# Patient Record
Sex: MALE | Race: WHITE
[De-identification: names, ages, dates, MRNs, and addresses within clinical notes are randomized per-mention and may not be internally consistent; named-entity substitution may affect disease eponyms.]

---

## 2021-02-11 ENCOUNTER — HOSPITAL ENCOUNTER (OUTPATIENT)
Dept: HOSPITAL 53 - M LAB | Age: 51
End: 2021-02-11
Attending: PHYSICIAN ASSISTANT
Payer: MEDICARE

## 2021-02-11 DIAGNOSIS — R21: Primary | ICD-10-CM

## 2021-02-11 PROCEDURE — 86617 LYME DISEASE ANTIBODY: CPT

## 2021-02-11 PROCEDURE — 36415 COLL VENOUS BLD VENIPUNCTURE: CPT

## 2021-02-12 LAB
B BURGDOR IGG+IGM SER-ACNC: <0.91 ISR (ref 0–0.9)
B BURGDOR IGM SER IA-ACNC: <0.8 INDEX (ref 0–0.79)

## 2021-02-16 ENCOUNTER — HOSPITAL ENCOUNTER (OUTPATIENT)
Dept: HOSPITAL 53 - M LAB | Age: 51
End: 2021-02-16
Attending: PHYSICIAN ASSISTANT
Payer: MEDICARE

## 2021-02-16 DIAGNOSIS — A69.20: Primary | ICD-10-CM

## 2021-05-13 ENCOUNTER — HOSPITAL ENCOUNTER (OUTPATIENT)
Dept: HOSPITAL 53 - M PLALAB | Age: 51
End: 2021-05-13
Attending: STUDENT IN AN ORGANIZED HEALTH CARE EDUCATION/TRAINING PROGRAM
Payer: MEDICARE

## 2021-05-13 DIAGNOSIS — Z79.899: ICD-10-CM

## 2021-05-13 DIAGNOSIS — Z00.00: Primary | ICD-10-CM

## 2021-05-13 LAB
ALBUMIN SERPL BCG-MCNC: 4.1 GM/DL (ref 3.2–5.2)
ALT SERPL W P-5'-P-CCNC: 20 U/L (ref 12–78)
BASOPHILS # BLD AUTO: 0.1 10^3/UL (ref 0–0.2)
BASOPHILS NFR BLD AUTO: 1.1 % (ref 0–1)
BILIRUB SERPL-MCNC: 0.6 MG/DL (ref 0.2–1)
BUN SERPL-MCNC: 15 MG/DL (ref 7–18)
CALCIUM SERPL-MCNC: 9.8 MG/DL (ref 8.5–10.1)
CHLORIDE SERPL-SCNC: 106 MEQ/L (ref 98–107)
CHOLEST SERPL-MCNC: 209 MG/DL (ref ?–200)
CHOLEST/HDLC SERPL: 7.74 {RATIO} (ref ?–5)
CO2 SERPL-SCNC: 30 MEQ/L (ref 21–32)
CREAT SERPL-MCNC: 0.76 MG/DL (ref 0.7–1.3)
CREAT UR-MCNC: 63 MG/DL
EOSINOPHIL # BLD AUTO: 0.4 10^3/UL (ref 0–0.5)
EOSINOPHIL NFR BLD AUTO: 4.9 % (ref 0–3)
EST. AVERAGE GLUCOSE BLD GHB EST-MCNC: 114 MG/DL (ref 60–110)
GFR SERPL CREATININE-BSD FRML MDRD: > 60 ML/MIN/{1.73_M2} (ref 56–?)
GLUCOSE SERPL-MCNC: 110 MG/DL (ref 70–100)
HCT VFR BLD AUTO: 46.2 % (ref 42–52)
HDLC SERPL-MCNC: 27 MG/DL (ref 40–?)
HGB BLD-MCNC: 14.2 G/DL (ref 13.5–17.5)
LDLC SERPL CALC-MCNC: 121 MG/DL (ref ?–100)
LYMPHOCYTES # BLD AUTO: 2.5 10^3/UL (ref 1.5–5)
LYMPHOCYTES NFR BLD AUTO: 31.2 % (ref 24–44)
MCH RBC QN AUTO: 24.5 PG (ref 27–33)
MCHC RBC AUTO-ENTMCNC: 30.7 G/DL (ref 32–36.5)
MCV RBC AUTO: 79.8 FL (ref 80–96)
MICROALBUMIN UR-MCNC: 6.2 MG/L
MICROALBUMIN/CREAT UR: 9.8 MCG/MG (ref 0–30)
MONOCYTES # BLD AUTO: 0.7 10^3/UL (ref 0–0.8)
MONOCYTES NFR BLD AUTO: 8.6 % (ref 2–8)
NEUTROPHILS # BLD AUTO: 4.3 10^3/UL (ref 1.5–8.5)
NEUTROPHILS NFR BLD AUTO: 53.9 % (ref 36–66)
NONHDLC SERPL-MCNC: 182 MG/DL
PLATELET # BLD AUTO: 240 10^3/UL (ref 150–450)
POTASSIUM SERPL-SCNC: 4.9 MEQ/L (ref 3.5–5.1)
PROT SERPL-MCNC: 6.8 GM/DL (ref 6.4–8.2)
RBC # BLD AUTO: 5.79 10^6/UL (ref 4.3–6.1)
SODIUM SERPL-SCNC: 139 MEQ/L (ref 136–145)
T4 FREE SERPL-MCNC: 0.88 NG/DL (ref 0.76–1.46)
TRIGL SERPL-MCNC: 304 MG/DL (ref ?–150)
TSH SERPL DL<=0.005 MIU/L-ACNC: 2.04 UIU/ML (ref 0.36–3.74)
WBC # BLD AUTO: 8 10^3/UL (ref 4–10)

## 2021-06-23 ENCOUNTER — HOSPITAL ENCOUNTER (EMERGENCY)
Dept: HOSPITAL 53 - M ED | Age: 51
Discharge: HOME | End: 2021-06-23
Payer: MEDICAID

## 2021-06-23 VITALS — SYSTOLIC BLOOD PRESSURE: 180 MMHG | DIASTOLIC BLOOD PRESSURE: 112 MMHG

## 2021-06-23 VITALS — BODY MASS INDEX: 36.81 KG/M2 | HEIGHT: 69 IN | WEIGHT: 248.53 LBS

## 2021-06-23 VITALS — DIASTOLIC BLOOD PRESSURE: 105 MMHG | SYSTOLIC BLOOD PRESSURE: 151 MMHG

## 2021-06-23 DIAGNOSIS — Z86.19: ICD-10-CM

## 2021-06-23 DIAGNOSIS — F17.200: ICD-10-CM

## 2021-06-23 DIAGNOSIS — I10: Primary | ICD-10-CM

## 2021-06-23 DIAGNOSIS — J45.909: ICD-10-CM

## 2021-06-23 LAB
ALBUMIN SERPL BCG-MCNC: 4.1 GM/DL (ref 3.2–5.2)
ALT SERPL W P-5'-P-CCNC: 60 U/L (ref 12–78)
BASOPHILS # BLD AUTO: 0.1 10^3/UL (ref 0–0.2)
BASOPHILS NFR BLD AUTO: 1 % (ref 0–1)
BILIRUB CONJ SERPL-MCNC: 0.2 MG/DL (ref 0–0.2)
BILIRUB SERPL-MCNC: 0.6 MG/DL (ref 0.2–1)
EOSINOPHIL # BLD AUTO: 0.4 10^3/UL (ref 0–0.5)
EOSINOPHIL NFR BLD AUTO: 3.1 % (ref 0–3)
HCT VFR BLD AUTO: 48.1 % (ref 42–52)
HGB BLD-MCNC: 15.2 G/DL (ref 13.5–17.5)
LYMPHOCYTES # BLD AUTO: 3 10^3/UL (ref 1.5–5)
LYMPHOCYTES NFR BLD AUTO: 26.3 % (ref 24–44)
MCH RBC QN AUTO: 24.6 PG (ref 27–33)
MCHC RBC AUTO-ENTMCNC: 31.6 G/DL (ref 32–36.5)
MCV RBC AUTO: 77.8 FL (ref 80–96)
MONOCYTES # BLD AUTO: 0.8 10^3/UL (ref 0–0.8)
MONOCYTES NFR BLD AUTO: 6.6 % (ref 2–8)
NEUTROPHILS # BLD AUTO: 7.1 10^3/UL (ref 1.5–8.5)
NEUTROPHILS NFR BLD AUTO: 62.6 % (ref 36–66)
PLATELET # BLD AUTO: 233 10^3/UL (ref 150–450)
PROT SERPL-MCNC: 7.2 GM/DL (ref 6.4–8.2)
RBC # BLD AUTO: 6.18 10^6/UL (ref 4.3–6.1)
WBC # BLD AUTO: 11.4 10^3/UL (ref 4–10)

## 2021-06-23 NOTE — REPVR
PROCEDURE INFORMATION: 

Exam: MR Head Without Contrast 

Exam date and time: 6/23/2021 12:33 PM 

Age: 50 years old 

Clinical indication: Dizziness; Additional info: Dizzy? Cerebellar 



TECHNIQUE: 

Imaging protocol: MR of the head without contrast. 



COMPARISON: 

CT Head without contrast 6/23/2021 8:28 AM 



FINDINGS: 

Brain:  There is no extra-axial collection or intra-axial mass.  Mild diffuse 

volume loss is within the range of normal for patient age.  Normal parenchymal 

signal is preserved.  There is no acute intracranial abnormality.

Cerebral ventricles: Normal. No ventriculomegaly. 

Bones/joints: Unremarkable. 

Paranasal sinuses: Normal as visualized. No acute sinusitis. 

Mastoid air cells: Normal as visualized. No mastoid effusion. 

Orbital cavity: Unremarkable. 

Soft tissues: Unremarkable. 



IMPRESSION: 

No acute findings. 



Electronically signed by: Swapna Hwang On 06/23/2021  13:50:49 PM

## 2021-06-23 NOTE — REP
INDICATION:

dizzy.



COMPARISON:

None.



TECHNIQUE:

Helical scanning is acquired. 5 mm axial images were reformatted.  Coronal MPR images

were generated.



FINDINGS:

Bone window settings demonstrate an intact bony calvarium.  There is no evidence of

skull fracture or incidental bony calvarial lesion.  The visualized paranasal sinuses

appear clear.  No intraorbital abnormality is seen.  On soft tissue window setting

images; the lateral, third, and fourth ventricles are normal in size and position.

Gray-white differentiation pattern is normal above and below the tentorium.  There are

is no evidence of intracranial hemorrhage.  No mass, edema, infarction, or midline

shift is seen.  No extra-axial fluid collection is appreciated.





IMPRESSION:

Negative noncontrast head CT.





<Electronically signed by Mendez Rebollar > 06/23/21 [id number]

## 2021-06-23 NOTE — REPVR
PROCEDURE INFORMATION: 

Exam: MRA Head Without Contrast; Arteriography 

Exam date and time: 6/23/2021 12:33 PM 

Age: 50 years old 

Clinical indication: Dizziness and giddiness; Additional info: Dizzy? 

Cerebellar 



TECHNIQUE: 

Imaging protocol: Magnetic resonance angiography head without contrast. Exam 

focused on the arteries. 



COMPARISON: 

CT Head without contrast 6/23/2021 8:28 AM 



FINDINGS: 



ANTERIOR CIRCULATION: 

Right internal carotid artery: Intracranial segment is patent with no 

significant stenosis. No aneurysm. 

Right middle cerebral artery: No occlusion or significant stenosis. No 

aneurysm. 

Right anterior cerebral artery: No occlusion or significant stenosis. No 

aneurysm. 



Left internal carotid artery: Intracranial segment is patent with no 

significant stenosis. No aneurysm. 

Left middle cerebral artery: No occlusion or significant stenosis. No aneurysm. 

Left anterior cerebral artery: No occlusion or significant stenosis. No 

aneurysm. 



POSTERIOR CIRCULATION: 

Right vertebral artery: No occlusion or significant stenosis. No aneurysm. 

Left vertebral artery: No occlusion or significant stenosis. No aneurysm. 

Basilar artery: No occlusion or significant stenosis. No aneurysm. 

Right posterior cerebral artery: No occlusion or significant stenosis. No 

aneurysm. 

Left posterior cerebral artery: No occlusion or significant stenosis. No 

aneurysm. 



IMPRESSION: 

No stenosis or occlusion. 



Electronically signed by: Swapna Hwang On 06/23/2021  13:52:30 PM

## 2021-06-24 NOTE — ECGEPIP
[facility] - ED

                                       

                                       Test Date:    2021

Pat Name:     LIANE WILLIAMSON               Department:   

Patient ID:   [id number]                 Room:         -

Gender:       Male                     Technician:   OMI GRANGER

:          1970               Requested By: Grace Connors 

Order Number: [id number]     Reading MD:   Jeffry Hurtado

                                 Measurements

Intervals                              Axis          

Rate:         72                       P:            

VA:           140                      QRS:          208

QRSD:         82                       T:            80

QT:           390                                    

QTc:          427                                    

                           Interpretive Statements

Normal sinus rhythm

Right axis deviation

NSTTW ABNORMALITY(S)

NO PRIORS FOR COMPARISON

Electronically Signed on 2021 7:12:13 EDT by Jeffry Hurtado

## 2021-08-28 ENCOUNTER — HOSPITAL ENCOUNTER (OUTPATIENT)
Dept: HOSPITAL 53 - M LABSMTC | Age: 51
End: 2021-08-28
Attending: ANESTHESIOLOGY
Payer: MEDICARE

## 2021-08-28 DIAGNOSIS — Z20.828: Primary | ICD-10-CM

## 2021-08-28 DIAGNOSIS — Z11.59: ICD-10-CM

## 2021-11-18 ENCOUNTER — HOSPITAL ENCOUNTER (OUTPATIENT)
Dept: HOSPITAL 53 - M LABSMTC | Age: 51
End: 2021-11-18
Attending: ANESTHESIOLOGY
Payer: MEDICARE

## 2021-11-18 DIAGNOSIS — Z20.822: ICD-10-CM

## 2021-11-18 DIAGNOSIS — Z01.812: Primary | ICD-10-CM

## 2021-11-22 ENCOUNTER — HOSPITAL ENCOUNTER (OUTPATIENT)
Dept: HOSPITAL 53 - M OPP | Age: 51
Discharge: HOME | End: 2021-11-22
Attending: INTERNAL MEDICINE
Payer: MEDICARE

## 2021-11-22 VITALS — WEIGHT: 240 LBS | BODY MASS INDEX: 35.55 KG/M2 | HEIGHT: 69 IN

## 2021-11-22 VITALS — SYSTOLIC BLOOD PRESSURE: 137 MMHG | DIASTOLIC BLOOD PRESSURE: 97 MMHG

## 2021-11-22 DIAGNOSIS — K74.60: ICD-10-CM

## 2021-11-22 DIAGNOSIS — K64.8: ICD-10-CM

## 2021-11-22 DIAGNOSIS — R14.2: ICD-10-CM

## 2021-11-22 DIAGNOSIS — D12.2: Primary | ICD-10-CM

## 2021-11-22 DIAGNOSIS — R12: ICD-10-CM

## 2021-11-22 DIAGNOSIS — R68.81: ICD-10-CM

## 2021-11-22 DIAGNOSIS — G47.33: ICD-10-CM

## 2021-11-22 DIAGNOSIS — Z79.899: ICD-10-CM

## 2021-11-22 DIAGNOSIS — K22.89: ICD-10-CM

## 2021-11-22 DIAGNOSIS — Z86.010: ICD-10-CM

## 2021-11-22 DIAGNOSIS — E03.9: ICD-10-CM

## 2021-11-22 DIAGNOSIS — I10: ICD-10-CM

## 2021-11-22 DIAGNOSIS — E78.5: ICD-10-CM

## 2021-11-22 DIAGNOSIS — T18.2XXA: ICD-10-CM

## 2021-11-22 PROCEDURE — 43247 EGD REMOVE FOREIGN BODY: CPT

## 2021-11-22 PROCEDURE — 88305 TISSUE EXAM BY PATHOLOGIST: CPT

## 2021-11-22 NOTE — CCD
Summarization of Episode Note

                             Created on: 10/28/2021



TERI LIANE WILMER

External Reference #: [id number]

: 1970

Sex: Male



Demographics





                          Address                   [address]

[place]  [address]

 

                          Home Phone                [phone]

 

                          Preferred Language        Unknown

 

                          Marital Status            Unknown

 

                          Latter-day Affiliation     Unknown

 

                          Race                      White

 

                          Ethnic Group              Not  or 





Author





                          Author                    [facility]

ems

 

                          Organization              [facility]

ems

 

                          Address                   Unknown

 

                          Phone                     Unavailable







Support





                Name            Relationship    Address         Phone

 

                    LIANE WILLIAMSON         GUAR                [address]

 

[place]  [address]                    [phone]

 

                FABIAN BACON ECON            Unknown         [phone]







Care Team Providers





                    Care Team Member Name Role                Phone

 

                    Zachary Snow        Unavailable         [phone]







PROBLEMS





          Type      Condition ICD9-CM Code DHF15-FD Code Onset Dates Condition S

tatus W/U 

Status              Risk                SNOMED Code         Notes

 

       Problem Parkinson's disease        G20           Active confirmed        

[id number]  

 

       Problem Delusions of parasitosis        F22           Active confirmed   

     [id number]  

 

        Problem Parkinsonism, unspecified Parkinsonism type         G20         

    Active  confirmed         

[id number]                                 

 

        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

  confirmed         [id number]

                                         

 

        Problem Mild persistent asthma without complication         J45.30      

    Active  confirmed 

                          [id number]                  

 

       Problem RENUKA (obstructive sleep apnea)        G47.33        Active confirm

ed        [id number]  

 

          Problem   POTS (postural orthostatic tachycardia syndrome)           I

49.8               Active    

confirmed                               [id number]            

 

             Problem      Essential hypertension with goal blood pressure less t

nielsen 130/80              I10           

             Active       confirmed                 [id number]      







ALLERGIES

No Known Allergies



ENCOUNTERS from 1970 to 2021-10-27





             Encounter    Location     Date         Provider     Diagnosis

 

                          [facility]         [address] 

[phone] [place] [address]

                    21 Oct, 2021        Zachary Snow           







IMMUNIZATIONS

No Information



SOCIAL HISTORY

Tobacco Use:



                    Social History Observation Description         Date

 

                    Details (start date - stop date) Current Smoker       



Sex Assigned At Birth:



                          Social History Observation Description

 

                          Sex Assigned At Birth     Unknown



Education:



                    Question            Answer              Notes

 

                    Level of Education: College              



Language:



                    Question            Answer              Notes

 

                    Languages spoken:   English              



Alcohol Screening:



                    Question            Answer              Notes

 

                    Did you have a drink containing alcohol in the past year? Ye

s                  

 

                    Points              1                    

 

                    Interpretation      Negative             

 

                          How often did you have six or more drinks on one occas

ion in the past year? 

Never (0 points)                         

 

                                        How many drinks did you have on a typica

l day when you were drinking in the past

year?                     1 or 2 (0 points)          

 

                          How often did you have a drink containing alcohol in t

he past year? Monthly or 

less (1 point)                           



Tobacco Use:



                    Question            Answer              Notes

 

                    Are you a:          current smoker       

 

                    How many cigarettes a day do you smoke? 5 or less           

 







REASON FOR REFERRAL

No Information



VITAL SIGNS

No information



MEDICATIONS





           Medication SIG (Take, Route, Frequency, Duration) Notes      Start Da

te End Date   

Status

 

                          Flomax 0.4 MG             1 capsule 30 minutes after t

he same meal each day Orally Once a 

day for 90 days                                                 Active

 

           Ginkgo Biloba 40 MG as directed Orally                               

   Active

 

                          Metoprolol Tartrate 25 MG TAKE 1 TABLET BY MOUTH DAILY

 TAKE WITH METOPROLOL 50MG

AT NIGHT Oral for 30                                                 Not-Taking

 

                          Omeprazole 20 MG          TAKE ONE CAPSULE BY MOUTH 30

 MINUTES PRIOR TO MORNING MEAL for 

90                                                              Active

 

                Nasonex 50 MCG/ACT 2 sprays in each nostril Nasally Once a day f

or 30 day(s)                 

10 May, 2021                                        Active

 

            434.8 (200 Fe) MG 1 capsule Orally Once a day for 30 day(s)  

                                Active

 

           Venlafaxine HCl 25 MG 1 tablet with food Orally Twice a day for 30 da

y(s)                                  

Active

 

           clonazePAM 0.5 MG 1 tablet at bedtime Orally twice a day             

                     Active

 

           Symbicort 80-4.5 MCG/ACT 2 puffs Inhalation Twice a day                        Active

 

                          Albuterol Sulfate (2.5 MG/3ML) 0.083% 3 ml as needed I

nhalation every 6 hrs for 

90 days                         22 Sep, 2021                    Active

 

           hydrOXYzine HCl 25 MG 1 tablet as needed Orally 3 times a day        

                          Active

 

           busPIRone HCl 30 MG 1 tablet Orally Twice a day for 90 days          

                        Active

 

           Doxycycline Monohydrate 100 MG 1 capsule Orally Once a day for 30 day

s                                  

Active

 

                Rosuvastatin Calcium 40 MG 1 tablet Orally Once a day for 90 day

s                 

                                                    Active

 

           Sinemet CR 25-100mg 1 tablet orally at night                         

         Not-Taking

 

           Gabapentin 400 MG 3 capsule Orally 2qhs & 1 am                       

           Active

 

           Probiotic Acidophilus - as directed Orally                           

       Active

 

           Valsartan 320 MG 1 tablet Orally Once a day for 90 day(s)            

                      Not-Taking

 

                          Levothyroxine Sodium 25 MCG 1 tablet in the morning on

 an empty stomach Orally 

Every other day                                                 Active

 

           Vitamin D3 250 MCG (58970 UT) as directed Orally Once a day for 30 da

ys                                  

Not-Taking

 

                          Metoprolol Tartrate 50 MG 1 tablet with food Orally 2 

tablets in am and 1 tablet

in PM for 90 days                                                 Active

 

           Sinemet  MG 1 tablet Orally Once a day for 30 day(s)           

                       Not-Taking

 

           Omega 3 1000 MG 2 capsule Orally Once a day                          

        Active

 

           Aspirin 81 MG 1 tablet Orally Once a day for 30 day(s)                        Active

 

                    Ibuprofen 200 MG    1 tablet with food or milk as needed Ora

lly Three times a day  

                                                            Not-Taking

 

           Coenzyme Q10 50 MG as directed Orally                    

    Active

 

                          Flonase Allergy Relief 50 MCG/ACT 1 spray in each nost

ril Nasally Once a day for

30 day(s)                       04 May, 2021                    Not-Taking

 

                          Albuterol Sulfate  (90 Base) MCG/ACT 1 puff as 

needed Inhalation every 4 

hrs for 90 days                 26 Oct, 2021                    Active







PROCEDURES

No Information



RESULTS

No Results



REASON FOR VISIT

New Refill Request



MEDICAL (GENERAL) HISTORY





                    Type                Description         Date

 

                    Medical History     High blood pressure  

 

                    Medical History     Parkinsons disease undiagnosed  

 

                    Medical History     hypothyroidism       

 

                    Medical History     lymes disease        

 

                    Medical History     mild persistent asthma  

 

                          Medical History           sarcoidosis involving multip

le organs brain, lungs, liver, 

spleen, skin                             

 

                    Medical History     GERD                 

 

                    Medical History     generalized anxiety disorder  

 

                    Medical History     major depressive disorder  

 

                    Medical History     osteoarthritis] open cervical, thoracic,

 lumbar bracket close  

 

                    Medical History     morgellons disease   

 

                    Medical History     irritable bowel syndrome  

 

                    Medical History     memory loss          

 

                    Medical History     neuropathy bilateral legs  

 

                    Medical History     cognitive decline    

 

                    Surgical History    cardiac cath        2021

 

                    Hospitalization History cardiac             2021







Goals Section

No Information



Health Concerns

No Information



MEDICAL EQUIPMENT

No Information



MENTAL STATUS

No Information



FUNCTIONAL STATUS

No Information



ASSESSMENTS

No Information



PLAN OF TREATMENT

Medication



                Medication Name Sig             Start Date      Stop Date

 

                Venlafaxine HCl 25 MG 1 tablet with food Orally Twice a day for 

30 day(s)                  

 

                          Albuterol Sulfate  (90 Base) MCG/ACT 1 puff as 

needed Inhalation every 4 

hrs for 90 days           26 Oct, 2021               

 

                          Omeprazole 20 MG          TAKE ONE CAPSULE BY MOUTH 30

 MINUTES PRIOR TO MORNING MEAL for 

90                                                   

 

                          Albuterol Sulfate (2.5 MG/3ML) 0.083% 3 ml as needed I

nhalation every 6 hrs for 

90 days                   22 Sep, 2021               



Next Appt



                                        Details

 

                                        Provider Name:Zachary Gillespiei, 2021 10:

30:00 AM, [address],

[phone], [place], [place], [address], [phone]







Insurance Providers





             Payer Name   Payer Address Payer Phone  Insured Name Patient Relati

onship to 

Insured                   Coverage Start Date       Coverage End Date

 

             [employer] [address] [facility] [address] [phone]

0 LIANE WILLIAMSON 

self                                                 

 

          [address] [facility] [address]  [facility] [address]           WO

LIANE ORDOÑEZ self

## 2021-11-22 NOTE — CCD
Summarization Of Episode

                             Created on: 2021



ALFRED WILLIAMSON

External Reference #: [id number]

: 1970

Sex: Undifferentiated



Demographics





                          Address                   [address] [place] [address]  [address]

 

                          Home Phone                [phone]

 

                          Preferred Language        English

 

                          Marital Status            Unknown

 

                          Muslim Affiliation     Unknown

 

                          Race                      Unknown

 

                          Ethnic Group              Not  or 





Author





                          Author                    [facility]

 

                          Organization              [facility]

 

                          Address                   Unknown

 

                          Phone                     Unavailable







Support





                Name            Relationship    Address         Phone

 

                MARQUIS RAINEY Next Of Kin     Unknown         [phone]

 

                SE              Next Of Kin     Unknown         Unavailable

 

                    NONE,  PER PATIENT  Next Of Kin         UN

Unknown, -  U                           -

 

                    NONE,  NONE         Next Of Kin         UN

Unknown, -  U                           -

 

                DISABLED        Next Of Kin     Unknown         Unavailable

 

                    LUCIANA MILLER      Next Of Kin         NA

Unknown                                 Unavailable

 

                    TRIPP BARRON     Next Of Kin         NA

Unknown                                 Unavailable

 

                    MARQUIS BACON    Next Of Kin         NA

Unknown                                 Unavailable

 

                YANCY BACON   Next Of Kin     , ""            [phone]

 

                    LIANE WILLIAMSON        Next Of Kin         [address] [place]  [address]                      [phone]

 

                    FABIAN BACON   Next Of Kin         [address] [place]  [address]                   [phone]

 

                    LIANE MILLER       Next Of Kin         NA

Unknown                                 [phone]

 

                FABIAN BACON ECON            Unknown         Unavailable

 

                    NEHA MILLER      ECON                [address] [place]  [address]                       [phone]

 

                    GRICEL FORREST       ECON                [address] [place]  [address]                 [phone]

 

                yancy bacon   ECON            Unknown         [phone]







Care Team Providers





                    Care Team Member Name Role                Phone

 

                    Azzam, R Yahya DO   Unavailable         Unavailable

 

                    Azzam, R Yahya DO   Unavailable         Unavailable

 

                    Azzam, R Yahya DO   Unavailable         Unavailable

 

                    Azzam, R Yahya DO   Unavailable         Unavailable

 

                    Azzam, R Yahya DO   Unavailable         Unavailable

 

                    Sergio Strange MD, PGY-1 Unavailable         Unavailable

 

                    Sergio Strange MD, PGY-1 Unavailable         Unavailable

 

                    Sergio Strange MD, PGY-1 Unavailable         Unavailable

 

                    GRICEL THOMPSON      Unavailable         Unavailable

 

                    LANDEN WHITMAN Unavailable         Unavailable

 

                    LANDEN WHITMAN Unavailable         Unavailable

 

                    Griffin Winstno MD, [facility]         [phone]

[phone]

 

                    MD Faviola Pierre MD Unavailable         [phone]

 

                    EINAV,  ELDAD MD    Unavailable         Unavailable

 

                    EINAV,  ELDAD MD    Unavailable         Unavailable

 

                    EINAV,  ELDAD MD    Unavailable         Unavailable

 

                    EINAV,  ELDAD MD    Unavailable         Unavailable

 

                    EINAV,  ELDAD MD    Unavailable         Unavailable

 

                    EINAV,  ELDAD MD    Unavailable         Unavailable

 

                    EINAV,  ELDAD MD    Unavailable         Unavailable

 

                    EINAV,  ELDAD MD    Unavailable         Unavailable

 

                    EINAV,  ELDAD MD    Unavailable         Unavailable

 

                    EINAV,  ELDAD MD    Unavailable         Unavailable

 

                    EINAV,  ELDAD MD    Unavailable         Unavailable

 

                    EINAV,  ELDAD MD    Unavailable         Unavailable

 

                    EINAV,  ELDAD MD    Unavailable         Unavailable

 

                    EINAV,  ELDAD MD    Unavailable         Unavailable

 

                    EINAV,  ELDAD MD    Unavailable         Unavailable

 

                    Girshab,  Perkins MD Unavailable         Unavailable

 

                    Girshab,  Perkins MD Unavailable         Unavailable

 

                    Girshab,  Perkins MD Unavailable         Unavailable

 

                    Girshab,  Perkins MD Unavailable         Unavailable

 

                    Girshab,  Perkins MD Unavailable         Unavailable

 

                    Girshab,  Perkins MD Unavailable         Unavailable

 

                    Girshab,  Perkins MD Unavailable         Unavailable

 

                    Girshab,  Perkins MD Unavailable         Unavailable

 

                    Girshab,  Perkins MD Unavailable         Unavailable

 

                    Girshab,  Perkins MD Unavailable         Unavailable

 

                    Girshab,  Perkins MD Unavailable         Unavailable

 

                    Girshab,  Perkins MD Unavailable         Unavailable

 

                    Girshab,  Perkins MD Unavailable         Unavailable

 

                    Girshab,  Perkins MD Unavailable         Unavailable

 

                    WASSEL,  ANWAR MD   Unavailable         Unavailable

 

                    WASSEL,  ANWAR MD   Unavailable         Unavailable

 

                    WASSEL,  ANWAR MD   Unavailable         Unavailable

 

                    WASSEL,  ANWAR MD   Unavailable         Unavailable

 

                    WASSEL,  ANWAR MD   Unavailable         Unavailable

 

                    WASSEL,  ANWAR MD   Unavailable         Unavailable

 

                    WASSEL,  ANWAR MD   Unavailable         Unavailable

 

                    WASSEL,  ANWAR MD   Unavailable         Unavailable

 

                    WASSEL,  ANWAR MD   Unavailable         Unavailable

 

                    WASSEL,  ANWAR MD   Unavailable         Unavailable

 

                    WASSEL,  ANWAR MD   Unavailable         Unavailable

 

                    WASSEL,  ANWAR MD   Unavailable         Unavailable

 

                    WASSEL,  ANWAR MD   Unavailable         Unavailable

 

                    WASSEL,  ANWAR MD   Unavailable         Unavailable

 

                    WASSEL,  ANWAR MD   Unavailable         Unavailable

 

                    WASSEL,  ANWAR MD   Unavailable         Unavailable

 

                    WASSEL,  ANWAR MD   Unavailable         Unavailable

 

                    WASSEL,  ANWAR MD   Unavailable         Unavailable

 

                    WASSEL,  ANWAR MD   Unavailable         Unavailable

 

                    WASSEL,  ANWAR MD   Unavailable         Unavailable

 

                    WASSEL,  ANWAR MD   Unavailable         Unavailable

 

                    WASSEL,  ANWAR MD   Unavailable         Unavailable

 

                    WASSEL,  ANWAR MD   Unavailable         Unavailable

 

                    WASSEL,  ANWAR MD   Unavailable         Unavailable

 

                    WASSEL,  ANWAR MD   Unavailable         Unavailable

 

                    WASSEL,  ANWAR MD   Unavailable         Unavailable

 

                    WASSEL,  ANWAR MD   Unavailable         Unavailable

 

                    WASSEL,  ANWAR MD   Unavailable         Unavailable

 

                    WASSEL,  ANWAR MD   Unavailable         Unavailable

 

                    WASSEL,  ANWAR MD   Unavailable         Unavailable

 

                    WASSEL,  ANWAR MD   Unavailable         Unavailable

 

                    WASSEL,  ANWAR MD   Unavailable         Unavailable

 

                    WASSEL,  ANWAR MD   Unavailable         Unavailable

 

                    WASSEL,  ANWAR MD   Unavailable         Unavailable

 

                    WASSEL,  ANWAR MD   Unavailable         Unavailable

 

                    WASSEL,  ANWAR MD   Unavailable         Unavailable

 

                    WASSEL,  ANWAR MD   Unavailable         Unavailable

 

                    WASSEL,  ANWAR MD   Unavailable         Unavailable

 

                    WASSEL,  ANWAR MD   Unavailable         Unavailable

 

                    WASSEL,  ANWAR MD   Unavailable         Unavailable

 

                    WASSEL,  ANWAR MD   Unavailable         Unavailable

 

                    WASSEL,  ANWAR MD   Unavailable         Unavailable

 

                    WASSEL,  ANWAR MD   Unavailable         Unavailable

 

                    WASSEL,  ANWAR MD   Unavailable         Unavailable

 

                    WASSEL,  ANWAR MD   Unavailable         Unavailable

 

                    WASSEL,  ANWAR MD   Unavailable         Unavailable

 

                    WASSEL,  ANWAR MD   Unavailable         Unavailable

 

                    WASSEL,  ANWAR MD   Unavailable         Unavailable

 

                    FENG, A NATALIIA MD Unavailable         Unavailable

 

                    FENG, A NATALIIA MD Unavailable         Unavailable

 

                    FENG, A NATALIIA MD Unavailable         Unavailable

 

                    FENG, A NATALIIA MD Unavailable         Unavailable

 

                    FENG, A NATALIIA MD Unavailable         Unavailable

 

                    FENG, A NATALIIA MD Unavailable         Unavailable

 

                    FENG, A NATALIIA MD Unavailable         Unavailable

 

                    FENG, A NATALIIA MD Unavailable         Unavailable

 

                    FENG, A NATALIIA MD Unavailable         Unavailable

 

                    FENG, A NATALIIA MD Unavailable         Unavailable

 

                    FENG, A NATALIIA MD Unavailable         Unavailable

 

                    FENG, A NATALIIA MD Unavailable         Unavailable

 

                    FENG, A NATALIIA MD Unavailable         Unavailable

 

                    FENG, A NATALIIA MD Unavailable         Unavailable

 

                    FENG, A NATALIIA MD Unavailable         Unavailable

 

                    FENG, A NATALIIA MD Unavailable         Unavailable

 

                    FENG, A NATALIIA MD Unavailable         Unavailable

 

                    FENG, A NATALIIA MD Unavailable         Unavailable

 

                    FENG, A NATALIIA MD Unavailable         Unavailable

 

                    FENG, A NATALIIA MD Unavailable         Unavailable

 

                    FENG, A NATALIIA MD Unavailable         Unavailable

 

                    FENG, A NATALIIA MD Unavailable         Unavailable

 

                    FENG, A NATALIIA MD Unavailable         Unavailable

 

                    FENG, A NATALIIA MD Unavailable         Unavailable

 

                    FENG, A NATALIIA MD Unavailable         Unavailable

 

                    FENG, A NATALIIA MD Unavailable         Unavailable

 

                    FENG, A NATALIIA MD Unavailable         Unavailable

 

                    FENG, A NATALIIA MD Unavailable         Unavailable

 

                    FENG, A NATALIIA MD Unavailable         Unavailable

 

                    FENG, A NATALIIA MD Unavailable         Unavailable

 

                    MAYDA Uribehi     Unavailable         Unavailable

 

                    Makhuli, N Zahi     Unavailable         Unavailable

 

                    Makhuli, N Zahi     Unavailable         Unavailable

 

                    Makhuli, N Zahi     Unavailable         Unavailable

 

                    Makhuli, N Zahi     Unavailable         Unavailable

 

                    Makhuli, N Zahi     Unavailable         Unavailable

 

                    Makhuli, N Zahi     Unavailable         Unavailable

 

                    Makhuli, N Zahi     Unavailable         Unavailable

 

                    Makhuli, N Zahi     Unavailable         Unavailable

 

                    Makhuli, N Zahi     Unavailable         Unavailable

 

                    Makhuli, N Zahi     Unavailable         Unavailable

 

                    Makhuli, N Zahi     Unavailable         Unavailable

 

                    Makhuli, N Zahi     Unavailable         Unavailable

 

                    Makhuli, N Zahi     Unavailable         Unavailable

 

                    Makhuli, N Zahi     Unavailable         Unavailable

 

                    Makhuli, N Zahi     Unavailable         Unavailable

 

                    Makhuli, N Zahi     Unavailable         Unavailable

 

                    Makhuli, N Zahi     Unavailable         Unavailable

 

                    Makhuli, N Zahi     Unavailable         Unavailable

 

                    Makhuli, N Zahi     Unavailable         Unavailable

 

                    Makhuli, N Zahi     Unavailable         Unavailable

 

                    Makhuli, N Zahi     Unavailable         Unavailable

 

                    Makhuli, N Zahi     Unavailable         Unavailable

 

                    Makhuli, N Zahi     Unavailable         Unavailable

 

                    Makhuli, N Zahi     Unavailable         Unavailable

 

                    Makhuli, N Zahi     Unavailable         Unavailable

 

                    Makhuli, N Zahi     Unavailable         Unavailable

 

                    Makhuli, N Zahi     Unavailable         Unavailable

 

                    Makhuli, N Zahi     Unavailable         Unavailable

 

                    Makhuli, N Zahi     Unavailable         Unavailable

 

                    Makhuli, N Zahi     Unavailable         Unavailable

 

                    Makhuli, N Zahi     Unavailable         Unavailable

 

                    Makhuli, N Zahi     Unavailable         Unavailable

 

                    Makhuli, N Zahi     Unavailable         Unavailable

 

                    Makhuli, N Zahi     Unavailable         Unavailable

 

                    Makhuli, N Zahi     Unavailable         Unavailable

 

                    Makhuli, N Zahi     Unavailable         Unavailable

 

                    Makhuli, N Zahi     Unavailable         Unavailable

 

                    Makhuli, N Zahi     Unavailable         Unavailable

 

                    Makhuli, N Zahi     Unavailable         Unavailable

 

                    Makhuli, N Zahi     Unavailable         Unavailable

 

                    Makhuli, N Zahi     Unavailable         Unavailable

 

                    Makhuli, N Zahi     Unavailable         Unavailable

 

                    Makhuli, N Zahi     Unavailable         Unavailable

 

                    Makhuli, N Zahi     Unavailable         Unavailable

 

                    Makhuli, N Zahi     Unavailable         Unavailable

 

                    Makhuli, N Zahi     Unavailable         Unavailable

 

                    JENNIFER Vaughan MD Unavailable         Unavailable

 

                    JENNIFER Vaughan MD Unavailable         Unavailable

 

                    Clement K Milton OLGUIN Unavailable         Unavailable

 

                    Clement K Milton MD Unavailable         Unavailable

 

                    Clement K Milton MD Unavailable         Unavailable

 

                    Clement K Milton MD Unavailable         Unavailable

 

                    Clement K Milton MD Unavailable         Unavailable

 

                    Clement K Milton MD Unavailable         Unavailable

 

                    Vaughan, K Milton MD Unavailable         Unavailable

 

                    Vaughan, K Milton MD Unavailable         Unavailable

 

                    Vaughan, K Milton MD Unavailable         Unavailable

 

                    Vaughan, K Milton MD Unavailable         Unavailable

 

                    Vaughan, K Milton MD Unavailable         Unavailable

 

                    Vaughan, K Milton MD Unavailable         Unavailable

 

                    Vaughan, K Milton MD Unavailable         Unavailable

 

                    Vaughan, K Milton MD Unavailable         Unavailable

 

                    Vaughan, K Milton MD Unavailable         Unavailable

 

                    Schader, M Bernie   Unavailable         Unavailable

 

                    Schader, M Bernie   Unavailable         Unavailable

 

                    Schader, M Bernie   Unavailable         Unavailable

 

                    Schader, M Bernie   Unavailable         Unavailable

 

                    Schader, M Bernie   Unavailable         Unavailable

 

                    Schader, M Bernie   Unavailable         Unavailable

 

                    Schader, M Bernie   Unavailable         Unavailable

 

                    Schader, M Bernie   Unavailable         Unavailable

 

                    Schader, M Bernie   Unavailable         Unavailable

 

                    Schader, M Bernie   Unavailable         Unavailable

 

                    Schader, M Bernie   Unavailable         Unavailable

 

                    Schader, M Bernie   Unavailable         Unavailable

 

                    Schader, M Bernie   Unavailable         Unavailable

 

                    Schader, M Bernie   Unavailable         Unavailable

 

                    Schader, M Bernie   Unavailable         Unavailable

 

                    Schader, M Bernie   Unavailable         Unavailable

 

                    Schader, M Bernie   Unavailable         Unavailable

 

                    Schader, M Bernie   Unavailable         Unavailable

 

                    Schader, M Bernie   Unavailable         Unavailable

 

                    Schader, M Bernie   Unavailable         Unavailable

 

                    Schader, M Bernie   Unavailable         Unavailable

 

                    [place] B James OLGUIN Unavailable         Unavailable

 

                    [place], B James MD Unavailable         Unavailable

 

                    [place] B James MD Unavailable         Unavailable

 

                    [place], B James MD Unavailable         Unavailable

 

                    [place], B James MD Unavailable         Unavailable

 

                    [place], B James MD Unavailable         Unavailable

 

                    [place], B James OLGUIN Unavailable         Unavailable

 

                    [place], B James MD Unavailable         Unavailable

 

                    [place], B James MD Unavailable         Unavailable

 

                    [place], B James MD Unavailable         Unavailable

 

                    [place], B James MD Unavailable         Unavailable

 

                    [place], B James MD Unavailable         Unavailable

 

                    JANEY Pierson MD   Unavailable         Unavailable

 

                    JANEY Pierson MD   Unavailable         Unavailable

 

                    JANEY Pierson MD   Unavailable         Unavailable

 

                    JANEY Pierson MD   Unavailable         Unavailable

 

                    JANEY Pierson MD   Unavailable         Unavailable

 

                    JANEY Pierson MD   Unavailable         Unavailable

 

                    JANEY Pierson MD   Unavailable         Unavailable

 

                    JANEY Pierson MD   Unavailable         Unavailable

 

                    JANEY Pierson MD   Unavailable         Unavailable

 

                    JANEY Pierson MD   Unavailable         Unavailable

 

                    JANEY Pierson MD   Unavailable         Unavailable

 

                    JANEY Pierson MD   Unavailable         Unavailable

 

                    JANEY Pierson MD   Unavailable         Unavailable

 

                    JANEY BETHEA MD Unavailable         Unavailable

 

                    SASSOWER, JANEY THOMPSON MD Unavailable         Unavailable

 

                    SASSOWER, JANEY THOMPSON MD Unavailable         Unavailable

 

                    SASSOWER, JANEY THOMPSON MD Unavailable         Unavailable

 

                    SASSOWER, JANEY THOMPSON MD Unavailable         Unavailable

 

                    SASSOWER, JANEY THOMPSON MD Unavailable         Unavailable

 

                    SASSOWER, JANEY THOMPSON MD Unavailable         Unavailable

 

                    SASSOWER, JANEY THOMPSON MD Unavailable         Unavailable

 

                    SASSOWER, JANEY THOMPSON MD Unavailable         Unavailable

 

                    SASSOWER, JANEY THOMPSON MD Unavailable         Unavailable

 

                    SASSOWER, JANEY THOMPSON MD Unavailable         Unavailable

 

                    SASSOWER, JANEY THOMPSON MD Unavailable         Unavailable

 

                    SASSOWER, JANEY THOMPSON MD Unavailable         Unavailable

 

                    SASSOWER, JANEY THOMPSON MD Unavailable         Unavailable

 

                    SASSOWER, JANEY THOMPSON MD Unavailable         Unavailable

 

                    SASSOWER, JANEY THOMPSON MD Unavailable         Unavailable

 

                    SASSOWER, JANEY THOMPSON MD Unavailable         Unavailable

 

                    SASSOWER, JANEY THOMPSON MD Unavailable         Unavailable

 

                    SASSOWER, JANEY THOMPSON MD Unavailable         Unavailable

 

                    SASSOWER, JANEY THOMPSON MD Unavailable         Unavailable

 

                    SASSOWER, JANEY THOMPSON MD Unavailable         Unavailable

 

                    SASSOWER, JANEY THOMPSON MD Unavailable         Unavailable

 

                    SASSOWER, JANEY THOMPSON MD Unavailable         Unavailable

 

                    SASSOWER, JANEY THOMPSON MD Unavailable         Unavailable

 

                    SASSOWER, JANEY THOMPSON MD Unavailable         Unavailable

 

                    SASSOWER, JANEY THOMPSON MD Unavailable         Unavailable

 

                    SASSOWER, JANEY THOMPSON MD Unavailable         Unavailable

 

                    SASSOWER, JANEY THOMPSON MD Unavailable         Unavailable

 

                    SASSOWER, JANEY THOMPSON MD Unavailable         Unavailable

 

                    SASSOWER, JANEY THOMPSON MD Unavailable         Unavailable

 

                    SASSOWER, JANEY THOMPSON MD Unavailable         Unavailable

 

                    SASSOWER, JANEY THOMPSON MD Unavailable         Unavailable

 

                    SASSOWER, JANEY THOMPSON MD Unavailable         Unavailable

 

                    SASSOWER, JANEY THOMPSON MD Unavailable         Unavailable

 

                    SASSOWER, JANEY THOMPSON MD Unavailable         Unavailable

 

                    SASSOWER, JANEY THOMPSON MD Unavailable         Unavailable

 

                    SASSOWER, JANEY THOMPSON MD Unavailable         Unavailable

 

                    SASSOWER, JANEY THOMPSON MD Unavailable         Unavailable

 

                    SASSOWER, JANEY THOMPSON MD Unavailable         Unavailable

 

                    SASSOWER, JANEY THOMPSON MD Unavailable         Unavailable

 

                    SASSOWER, JANEY THOMPSON MD Unavailable         Unavailable

 

                    SASSOWER, JANEY THOMPSON MD Unavailable         Unavailable

 

                    SASSOWER, JANEY THOMPSON MD Unavailable         Unavailable

 

                    SASSOWER, JANEY THOMPSON MD Unavailable         Unavailable

 

                    SASSOWER, JANEY THOMPSON MD Unavailable         Unavailable

 

                    SASSOWER, JANEY THOMPSON MD Unavailable         Unavailable

 

                    Makhuli, N Zahi     Unavailable         Unavailable

 

                    Makhuli, N Zahi     Unavailable         Unavailable

 

                    Makhuli, N Zahi     Unavailable         Unavailable

 

                    Makhuli, N Zahi     Unavailable         Unavailable

 

                    Makhuli, N Zahi     Unavailable         Unavailable

 

                    Makhuli, N Zahi     Unavailable         Unavailable

 

                    Makhuli, N Zahi     Unavailable         Unavailable

 

                    Makhuli, N Zahi     Unavailable         Unavailable

 

                    Makhuli, N Zahi     Unavailable         Unavailable

 

                    Makhuli, N Zahi     Unavailable         Unavailable

 

                    Makhuli, N Zahi     Unavailable         Unavailable

 

                    Makhuli, N Zahi     Unavailable         Unavailable

 

                    Makhuli, N Zahi     Unavailable         Unavailable

 

                    Makhuli, N Zahi     Unavailable         Unavailable

 

                    Makhuli, N Zahi     Unavailable         Unavailable

 

                    Makhuli, N Zahi     Unavailable         Unavailable

 

                    Makhuli, N Zahi     Unavailable         Unavailable

 

                    Makhuli, N Zahi     Unavailable         Unavailable

 

                    Makhuli, N Zahi     Unavailable         Unavailable

 

                    Makhuli, N Zahi     Unavailable         Unavailable

 

                    Makhuli, N Zahi     Unavailable         Unavailable

 

                    Makhuli, N Zahi     Unavailable         Unavailable

 

                    Makhuli, N Zahi     Unavailable         Unavailable

 

                    Makhuli, N Zahi     Unavailable         Unavailable

 

                    Makhuli, N Zahi     Unavailable         Unavailable

 

                    Makhuli, N Zahi     Unavailable         Unavailable

 

                    Makhuli, N Zahi     Unavailable         Unavailable

 

                    Makhuli, N Zahi     Unavailable         Unavailable

 

                    Makhuli, N Zahi     Unavailable         Unavailable

 

                    Makhuli, N Zahi     Unavailable         Unavailable

 

                    Makhuli, N Zahi     Unavailable         Unavailable

 

                    Makhuli, N Zahi     Unavailable         Unavailable

 

                    Makhuli, N Zahi     Unavailable         Unavailable

 

                    Makhuli, N Zahi     Unavailable         Unavailable

 

                    Makhuli, N Zahi     Unavailable         Unavailable

 

                    Makhuli, N Zahi     Unavailable         Unavailable

 

                    Makhuli, N Zahi     Unavailable         Unavailable

 

                    Makhuli, N Zahi     Unavailable         Unavailable

 

                    Makhuli, N Zahi     Unavailable         Unavailable

 

                    Makhuli, N Zahi     Unavailable         Unavailable

 

                    Makhuli, N Zahi     Unavailable         Unavailable

 

                    Makhuli, N Zahi     Unavailable         Unavailable

 

                    Makhuli, N Zahi     Unavailable         Unavailable

 

                    Makhuli, N Zahi     Unavailable         Unavailable

 

                    Makhuli, N Zahi     Unavailable         Unavailable

 

                    Makhuli, N Zahi     Unavailable         Unavailable

 

                    Makhuli, N Zahi     Unavailable         Unavailable

 

                    David,  Paulino DO    Unavailable         Unavailable

 

                    David,  Paulino DO    Unavailable         Unavailable

 

                    David,  Paulino DO    Unavailable         Unavailable

 

                    David,  Paulino DO    Unavailable         Unavailable

 

                    David,  Paulino DO    Unavailable         Unavailable

 

                    David,  Paulino DO    Unavailable         Unavailable

 

                    David,  Paulino DO    Unavailable         Unavailable

 

                    David,  Paulino DO    Unavailable         Unavailable

 

                    David,  Paulino DO    Unavailable         Unavailable

 

                    David,  Paulino DO    Unavailable         Unavailable

 

                    David,  Paulino DO    Unavailable         Unavailable

 

                    David,  Paulino DO    Unavailable         Unavailable

 

                    David,  Paulino DO    Unavailable         Unavailable

 

                    David,  Paulino DO    Unavailable         Unavailable

 

                    David,  Paulino DO    Unavailable         Unavailable

 

                    David,  Paulino DO    Unavailable         Unavailable

 

                    David,  Paulino DO    Unavailable         Unavailable

 

                    David,  Paulino DO    Unavailable         Unavailable

 

                    David,  Paulino DO    Unavailable         Unavailable

 

                    David,  Paulino DO    Unavailable         Unavailable

 

                    David,  Paulino DO    Unavailable         Unavailable

 

                    David,  Paulino DO    Unavailable         Unavailable

 

                    David,  Paulino DO    Unavailable         Unavailable

 

                    David,  Paulino DO    Unavailable         Unavailable

 

                    David,  Paulino DO    Unavailable         Unavailable

 

                    David,  Paulino DO    Unavailable         Unavailable

 

                    David,  Paulino DO    Unavailable         Unavailable

 

                    David,  Paulino DO    Unavailable         Unavailable

 

                    David,  Paulino DO    Unavailable         Unavailable

 

                    TIKI LIZAMA MD Unavailable         Unavailable

 

                    TIKI LIZAMA MD Unavailable         Unavailable

 

                    TIKI LIZAMA MD Unavailable         Unavailable

 

                    TIKI LIZAMA MD Unavailable         Unavailable

 

                    TIKI LIZAMA MD Unavailable         Unavailable

 

                    TIKI LIZAMA MD Unavailable         Unavailable

 

                    TIKI LIZAMA MD Unavailable         Unavailable

 

                    TIKI LIZAMA MD Unavailable         Unavailable

 

                    TIKI LIZAMA MD Unavailable         Unavailable

 

                    TIKI LIZAMA MD Unavailable         Unavailable

 

                    TIKI LIZAMA MD Unavailable         Unavailable

 

                    TIKI LIZAMA MD Unavailable         Unavailable

 

                    TIKI LIZAMA MD Unavailable         Unavailable

 

                    TIKI LIZAMA MD Unavailable         Unavailable

 

                    TIKI LIZAMA MD Unavailable         Unavailable

 

                    TIKI LIZAMA MD Unavailable         Unavailable

 

                    TIKI LIZAMA MD Unavailable         Unavailable

 

                    TIKI LIZAMA MD Unavailable         Unavailable

 

                    TIKI LIZAMA MD Unavailable         Unavailable

 

                    TIKI LIZAMA MD Unavailable         Unavailable

 

                    TIKI LIZAMA MD Unavailable         Unavailable

 

                    TIKI LIZAMA MD Unavailable         Unavailable

 

                    TIKI LIZAMA MD Unavailable         Unavailable

 

                    TIKI LIZAMA MD Unavailable         Unavailable

 

                    TIKI LIZAMA MD Unavailable         Unavailable

 

                    TIKI LIAZMA MD Unavailable         Unavailable

 

                    TIKI LIZAMA MD Unavailable         Unavailable

 

                    TIKI LIZAMA MD Unavailable         Unavailable

 

                    TIKI LIZAMA MD Unavailable         Unavailable

 

                    TIKI LIZAMA MD Unavailable         Unavailable

 

                    TIKI LIZAMA MD Unavailable         Unavailable

 

                    TIKI LIZAMA MD Unavailable         Unavailable

 

                    TIKI LIZAMA MD Unavailable         Unavailable

 

                    TIKI LIZAMA MD Unavailable         Unavailable

 

                    TIKI LIZAMA MD Unavailable         Unavailable

 

                    TIKI LIZAMA MD Unavailable         Unavailable

 

                    TIKI LIZAMA MD Unavailable         Unavailable

 

                    TIKI LIZAMA MD Unavailable         Unavailable

 

                    TIKI LIZAMA MD Unavailable         Unavailable

 

                    TIKI LIZAMA MD Unavailable         Unavailable

 

                    MIHAILA, L SHAYE MD Unavailable         Unavailable

 

                    MIHAILA, L SHAYE MD Unavailable         Unavailable

 

                    MIHAILA, L SHAYE MD Unavailable         Unavailable

 

                    MIHAILA, L SHAYE MD Unavailable         Unavailable

 

                    MIHAILA, L SHAYE MD Unavailable         Unavailable

 

                    MIHAILA, L SHAYE MD Unavailable         Unavailable

 

                    MIHAILA, L SHAYE MD Unavailable         Unavailable

 

                    MIHAILA, L SHAYE MD Unavailable         Unavailable

 

                    MIHAILA, L SHAYE MD Unavailable         Unavailable

 

                    MIHAILA, L SHAYE MD Unavailable         Unavailable

 

                    MIHAILA, L SHAYE MD Unavailable         Unavailable

 

                    MIHAILA, L SHAYE MD Unavailable         Unavailable

 

                    MIHAILA, L SHAYE MD Unavailable         Unavailable

 

                    MIHAILA, L SHAYE MD Unavailable         Unavailable

 

                    MIHAILA, L SHAYE MD Unavailable         Unavailable

 

                    MIHAILA, L SHAYE MD Unavailable         Unavailable

 

                    MIHAILA, L SHAYE MD Unavailable         Unavailable

 

                    MIHAILA, L SHAYE MD Unavailable         Unavailable

 

                    MIHAILA, L SHAYE MD Unavailable         Unavailable

 

                    MIHAILA, L SHAYE MD Unavailable         Unavailable



                                  



Re-disclosure Warning

          The records that you are about to access may contain information from 
federally-assisted alcohol or drug abuse programs. If such information is 
present, then the following federally mandated warning applies: This information
has been disclosed to you from records protected by federal confidentiality 
rules (42 CFR part 2). The federal rules prohibit you from making any further 
disclosure of this information unless further disclosure is expressly permitted 
by the written consent of the person to whom it pertains or as otherwise 
permitted by 42 CFR part 2. A general authorization for the release of medical 
or other information is NOT sufficient for this purpose. The Federal rules 
restrict any use of the information to criminally investigate or prosecute any 
alcohol or drug abuse patient.The records that you are about to access may 
contain highly sensitive health information, the redisclosure of which is 
protected by Article 27-F of the [facility] Public Health law. If you 
continue you may have access to information: Regarding HIV / AIDS; Provided by 
facilities licensed or operated by the [facility] Office of Mental Health; 
or Provided by the [facility] Office for People With Developmental 
Disabilities. If such information is present, then the following [place] 
mandated warning applies: This information has been disclosed to you from 
confidential records which are protected by state law. State law prohibits you 
from making any further disclosure of this information without the specific 
written consent of the person to whom it pertains, or as otherwise permitted by 
law. Any unauthorized further disclosure in violation of state law may result in
a fine or [facility] sentence or both. A general authorization for the release of 
medical or other information is NOT sufficient authorization for further disc
losure.                                                                         
    



Allergies and Adverse Reactions

          



           Type       Description Substance  Reaction   Status     Data Source(s

)

 

           Propensity to adverse reactions NO KNOWN ALLERGIES NO KNOWN ALLERGIES

                       

[facility]

 

           Propensity to adverse reactions NO KNOWN ALLERGIES NO KNOWN ALLERGIES

                       [facility]

 

           Propensity to adverse reactions NO ALLERGIES ON FILE NO ALLERGIES ON 

FILE                       

[facility]

 

           Drug allergy No Known Drug Allergies No Known Drug Allergies         

              [facility]

 

           Food allergy No Known Food Allergies No Known Food Allergies         

              [facility]

 

           Propensity to adverse reactions NO KNOWN ALLERGIES NO KNOWN ALLERGIES

                       [facility]

 

           Propensity to adverse reactions ALLERGIES NOT ON FILE ALLERGIES NOT O

N FILE                       

[facility]



                                                                                
                                                                   



Family History

          



             Family Member Name Family Member Gender Family Member Status Date o

f Status 

Description                             Data Source(s)

 

           Unknown    Female     Diagnosis  2007 12:00:00 AM EST          

  [facility]

 

           Unknown    Female     Diagnosis  2007 12:00:00 AM EST          

  [facility]

 

           Unknown    Female     Diagnosis  2007 12:00:00 AM EST          

  [facility]



                                                                                
       



Encounters

          



           Encounter  Providers  Location   Date       Indications Data Source(s

)

 

                Unknown                         [address] [facility]

[place] [address] 2021 12:00:00 AM 

EST                                                 [facility] ([facility]

 

                Unknown                         [address] [facility] [address]

[place] [address] 10/28/2021 12:00:00 AM 

EDT                                                 [facility]

 

                Unknown                         [address] [facility] [address]

[place] [address] 10/27/2021 12:00:00 AM 

EDT                                                 [facility]CW1 ([facility]

[facility])

 

                Unknown                         [address] [facility] [address]

[place] [address] 10/21/2021 12:00:00 AM 

EDT                                                 [facility]

 

                Unknown                         [address] [facility] [address]

[place] [address] 2021 12:00:00 AM 

EDT                                                 eCW1 ([facility])

 

                Unknown                         [address] [facility] [address]

[place] [address] 2021 12:00:00 AM 

EDT                                                 [facility]1 ([facility])

 

                Unknown                         [address] [facility]

[place] [address] 2021 12:00:00 AM 

EDT                                                 eCW1 ([facility]

[facility])

 

                Unknown                         [address] [facility]

[place] [address] 2021 12:00:00 AM 

EDT                                                 eCW1 ([facility]

[facility])

 

                          INPATIENT                 Attender: Eric Sierra

nder: CANDACE LUNDY MDAttender: Gregorio Ibarra MDAdmitter: Gregorio Ibarra MDConsultant: JAY BETHEA MD 2E-2C      

               

2021 04:28:00 PM EDT - 2021 04:58:00 PM EDT                         

  [facility]

 

                                        Patient discharged. 

 

                          Inpatient                 Attender: James Alonzo MD

Attender: NATALIIA FENG MDAttender: 

Milton Vaughan MDAdmitter: James Alonzo MD                           08/10/202

1 11:17:00 PM EDT - 

2021 03:18:00 PM EDT SOB                       [facility]

al

 

                                        SOB 

 

                                        Patient discharged. 

 

                Unknown                         [address] [facility]

[place] [address] 2021 12:00:00 AM 

EDT                                                 eCW1 [facility]

 

                Unknown                         [address] [facility]

[place] [address] 2021 12:00:00 AM 

EDT                                                 eCW1 ([facility])

 

                Unknown                         [address] [facility]

[place] [address] 2021 12:00:00 AM 

EDT                                                 eCW1 ([facility]

[facility])

 

                Unknown                         [address] [facility]

[place] [address] 2021 12:00:00 AM 

EDT                                                 eCW1 ([facility])

 

                Unknown                         [address] [facility]

[place] [address] 2021 12:00:00 AM 

EDT                                                 eCW1 ([facility]

[facility])

 

                Unknown                         [address] [facility]

[place] [address] 2021 12:00:00 AM 

EDT                                                 eCW1 ([facility])

 

                Unknown                         [address] [facility]

[place] [address] 2021 12:00:00 AM 

EDT                                                 eCW1 ([facility])

 

                Unknown                         [address] [facility]

[place] [address] 2021 12:00:00 AM 

EDT                                                 eCW1 ([facility])

 

                Unknown                         [address] [facility] [address]

[place] [address] 2021 12:00:00 AM 

EDT                                                 eCW1 ([facility])

 

                Unknown                         [address] [facility]

[place] [address] 2021 12:00:00 AM 

EDT                                                 eCW1 ([facility])

 

                Outpatient                      [address] [facility]

[place] [address] 2021 12:00:00 AM

EDT                                                 eCW1 ([facility])

 

                Unknown                         [address] [facility]

[place] [address] 2021 12:00:00 AM 

EDT                                                 eCW1 ([facility])

 

                Unknown                         [address] [facility]

[place] [address] 2021 12:00:00 AM 

EDT                                                 eCW1 ([facility])

 

                Outpatient                      [address] [facility]

[place] [address] 2021 12:00:00 AM

EDT                                                 eCW1 ([facility])

 

                Unknown                         [address] [facility]

[place] [address] 2021 12:00:00 AM 

EDT                                                 eCW1 ([facility])

 

                Unknown                         [address] [facility]

[place] [address] 2021 12:00:00 AM 

EDT                                                 eCW1 ([facility])

 

                Unknown                         [address] [facility]

[place] [address] 2021 12:00:00 AM 

EDT                                                 eCW1 ([facility])

 

                Unknown                         [address] [facility]

[place] [address] 2021 12:00:00 AM 

EDT                                                 eCW1 ([facility]

 

                Unknown                         [address] [facility]

[place] [address] 2021 12:00:00 AM 

EDT                                                 eCW1 ([facility]

 [facility])

 

                Unknown                         [address] [facility]

[place] [address] 2021 12:00:00 AM 

EDT                                                 eCW1 ([facility]

 [facility])

 

                Unknown                         [address] [facility]

[place] [address] 05/10/2021 12:00:00 AM 

EDT                                                 [facility]

[facility])

 

                Unknown                         [address] [facility]

[place] [address] 2021 12:00:00 AM 

EDT                                                 eCW1 ([facility]

 [facility])

 

                Outpatient                      [address] [facility]

[place] [address] 2021 12:00:00 AM

EDT                                                 eCW1 ([facility]

 [facility])

 

                Unknown                         [address] [facility]

[place] [address] 2021 12:00:00 AM 

EDT                                                 eCW1 ([facility]

 [facility])

 

                Unknown                         [address] [facility]

[place] [address] 02/15/2021 12:00:00 AM 

EST                                                 [facility]CW1 [facility]

[facility])

 

                Outpatient      Attender: Paulino Hernandez DOAdmitter: Paulino Hernandez DO

 [facility]         2021 

10:04:00 AM EST - 2021 07:19:00 AM EST                           [place]

 [facility]

 

                                        Patient discharged. 

 

           Outpatient Attender: SHAYE LIZAMA MD            2021 12:00:00

 AM [facility]

 

           P                     PCE-PCE    2020 12:53:00 PM EST          

  [place] [facility]

 

                                        <content styleCode="Bold">OLL MS4 FIN 20

[id number] Date(s): 20 - 

21</content><br/>[address] [phone]<br/>Preadmit                     [facility]

icine 2020 

12:53:00 PM EST - 2021 11:59:59 PM EDT                           [place]

 [facility]

 

                                            2020 09:17:42 AM EST          

  [facility]

 

                Observation     Attender: DAVID WHITMANAttender: MD Bar Winston MD                 

2020 02:36:00 PM EST - 2020 01:00:00 PM EST [facility]

 

                                        Hallucinations 

 

                                        Patient discharged. 

 

           Outpatient Attender: SHAYE LIZAMA MD            2020 12:00:00

 AM EST            [facility]

 

                Outpatient      Attender: Bernie OviedoAdmitter: Bernie Oviedo

 OUT-OUT         2020 

11:00:00 AM EST - 2020 11:00:00 AM EST                           [place]

 [place] [facility]

 

                                        Patient discharged. 

 

                P               Attender: Bernie OviedoAdmitter: Bernie Oviedo

 ANC-ANC         2020 04:00:00 

AM EST                                              [place] [facility]

 

                Outpatient      Attender: VENESSA LUGO MDAdmitter: VENESSA LUGO MD

 LCG-LCG         2020 

12:36:00 PM EST - 2020 01:36:00 PM EST                           [place]

 [place] [facility]

 

                                        Patient discharged. 

 

                P               Attender: VENESSA LUGO MDAdmitter: VENESSA LUGO MD

 LCG-LCG         2020 11:36:00 

AM EST                                              [place] [facility]

 

                P               Attender: Kala UribeAdmitter: Kala Uribe ANC

-ANC         2020 04:00:00 AM 

EST                                                 [place] [facility]

 

           Outpatient Attender: Kala Uribe            10/29/2020 12:00:00 AM E

[facility]

 

           Outpatient Attender: Kala Rameshjeana            10/29/2020 12:00:00 AM E

[facility]

 

           Outpatient Referrer: Kala Barnardnikolas            10/29/2020 12:00:00 AM E

[facility]

 

                P               Attender: Kala RameshjeanaAdmitter: Kala Uribe ANC

-ANC         10/26/2020 04:00:00 AM 

EDT                                                 [place] [facility]

 

                P               Attender: Kala RameshjeanaAdmitter: Kala Uribe ANC

-ANC         10/23/2020 04:00:00 AM 

EDT                                                 [place] [facility]

 

                          Outpatient                Attender: VENESSA Josephi

tter: VENESSA LUGO MDReferrer: Sergio Strange MD, PGY-1       LCG-LCG         10/22/2020 04:00:00 AM EDT - 10/22/2020 04:00:00

 AM EDT                 

[place] [facility]

 

                                        Patient discharged. 

 

                P               Attender: Bernie OviedoAdmitter: Bernie Oviedo

 ANC-ANC         10/20/2020 04:00:00 

AM EDT                                              [place] [facility]

 

                Outpatient      Attender: Navjot Jovel DOAdmitter: Navjot Jovel DO

 PCM-PCM         10/12/2020 

12:06:00 PM EDT - 10/12/2020 12:06:00 PM EDT                           [place]

 [place] [facility]

 

                                        Patient discharged. 

 

                    Outpatient          Attender: Navjot Jovel DOAdmitter: Navjot Jovel DO 

[id number] 10/12/2020 12:06:00 PM EDT                     [place] [facility]

[facility]

 

                Outpatient      Attender: GRICEL VILLELAdmitter: GRICEL THOMPSON PCE

-PCE         10/12/2020 

05:00:00 AM EDT - 10/12/2020 06:00:00 AM EDT                           [place]

 [place] [facility]

 

                                        Patient discharged. 

 

                Preadmit        Attender: GRICEL VILLELAdmitter: GRICEL THOMPSON PCE

-PCE         10/12/2020 

04:00:00 AM EDT                                     [place] [facility]

 

                P               Attender: Navjot Jovel DOAttender: GRICEL Martin

mitter: GRICEL THOMPSON PCE-PCE         

10/12/2020 04:00:00 AM EDT                           [place] [facility]

[facility]

 

                P               Attender: GRICEL Fountainitter: GRICEL ROSEANNE PCE

-PCE         10/07/2020 02:45:00 PM 

EDT                                                 [place] [facility]

 

                Outpatient      Attender: Kala UribeAdmitter: Kala Uribe ANC

-ANC         2020 

10:32:00 AM EDT - 2020 10:32:00 AM EDT                           [place]

 [place] [facility]

 

                                        Patient discharged. 



                                                                                
                                                                                
                                                                                
                                                                                
                                                                                
                                                                                
                                                                                
                                                                                
                              



Functional Status

                                                                                
                 



Immunizations

          



             Vaccine      Date         Status       Description  Data Source(s)

 

             COVID-19 VACCINE Liliane 2021 12:00:00 AM EDT completed      

           [facility]

 

                                        Vaccine Series Complete: YESThis Data wa

s Submitted to [facility] Via [employer]. 

 

                Zoster, Recombinant 2020 12:00:00 AM EDT completed       <

td 

ID="[id number]">Zoster, Recombinant</td><td>2020</td><td></td> 

[facility]

 

                                        IIV3. This vaccine code is one of two wh

ich replace CVX 15, influenza, split 

virus.              2020 12:00:00 AM EDT completed           <td 

ID="[id number]">Influenza (IM) Preservative Free</td><td>2020, 
10/01/2009</td><td></td>                [facility]

 

                New in .  IIV4 2020 12:00:00 AM EDT completed       <t

d 

ID="[id number]">Flu, Recombinant, Quadrivalent, PF</td><td>2020, 
2018</td><td></td>                [facility]



                                                                                
                                     



Medications

          



          Medication Brand Name Start Date Product Form Dose      Route     Admi

nistrative 

Instructions Pharmacy Instructions Status     Indications Reaction   Description

 Data 

Source(s)

 

                                        24 HR venlafaxine 150 MG Extended Releas

e Oral Capsule Venlafaxine HCl  MG

             Venlafaxine HCl  MG 10/28/2021 12:00:00 AM EDT              1

.0 {capsule_with_food}  

                              active                        Venlafaxine HCl ER 1

50 MG eCW1 ([facility])

 

                                        24 HR venlafaxine 150 MG Extended Releas

e Oral Capsule Venlafaxine HCl  MG

             Venlafaxine HCl  MG 10/28/2021 12:00:00 AM EDT              1

.0 {capsule_with_food}  

                              active                                  eCW1 (On license of [facility])

 

                          Albuterol Sulfate  (90 Base) MCG/ACT Albuterol 

Sulfate  (90 Base) 

MCG/ACT 10/26/2021 12:00:00 AM EDT        1.0 {puff_as_needed}                  

    active               

Albuterol Sulfate  (90 Base) MCG/ACT eCW1 ([facility])

 

                          Albuterol Sulfate  (90 Base) MCG/ACT Albuterol 

Sulfate  (90 Base) 

MCG/ACT 10/26/2021 12:00:00 AM EDT        1.0 {puff_as_needed}                  

    active               

Albuterol Sulfate  (90 Base) MCG/ACT eCW1 ([facility])

 

                          Albuterol Sulfate  (90 Base) MCG/ACT Albuterol 

Sulfate  (90 Base) 

MCG/ACT 10/26/2021 12:00:00 AM EDT        1.0 {puff_as_needed}                  

    active               

Albuterol Sulfate  (90 Base) MCG/ACT eCW1 ([facility])

 

                          Albuterol Sulfate  (90 Base) MCG/ACT Albuterol 

Sulfate  (90 Base) 

MCG/ACT 10/26/2021 12:00:00 AM EDT       1.0 {puff_as_needed}                   

active                   

eCW1 ([facility])

 

                          Albuterol 0.83 MG/ML Inhalant Solution Albuterol Sulfa

te (2.5 MG/3ML) 0.083% 

Albuterol Sulfate (2.5 MG/3ML) 0.083% 2021 12:00:00 AM EDT                

     3.0 

{ml_as_needed}                         active                          eCW1 ([facility])

 

                          Albuterol 0.83 MG/ML Inhalant Solution Albuterol Sulfa

te (2.5 MG/3ML) 0.083% 

Albuterol Sulfate (2.5 MG/3ML) 0.083% 2021 12:00:00 AM EDT                

     3.0 

{ml_as_needed}                         active                  Albuterol Sulfate

 (2.5 MG/3ML) 0.083% eCW1 

([facility])

 

                          Albuterol 0.83 MG/ML Inhalant Solution Albuterol Sulfa

te (2.5 MG/3ML) 0.083% 

Albuterol Sulfate (2.5 MG/3ML) 0.083% 2021 12:00:00 AM EDT                

     3.0 

{ml_as_needed}                         active                  Albuterol Sulfate

 (2.5 MG/3ML) 0.083% eCW1 

([facility])

 

                          Albuterol 0.83 MG/ML Inhalant Solution Albuterol Sulfa

te (2.5 MG/3ML) 0.083% 

Albuterol Sulfate (2.5 MG/3ML) 0.083% 2021 12:00:00 AM EDT                

     3.0 

{ml_as_needed}                         active                  Albuterol Sulfate

 (2.5 MG/3ML) 0.083% eCW1 

([facility])

 

                          Albuterol 0.83 MG/ML Inhalant Solution Albuterol Sulfa

te (2.5 MG/3ML) 0.083% 

Albuterol Sulfate (2.5 MG/3ML) 0.083% 2021 12:00:00 AM EDT                

     3.0 

{ml_as_needed}                         active                  Albuterol Sulfate

 (2.5 MG/3ML) 0.083% eCW1 

([facility])

 

                                        Lactobacillus acidophilus Lactobacillus 

Acidophilus (Probiotic Acidophilus) 1.5 

mg (250 million cell) Capsule           Lactobacillus Acidophilus (Probiotic Aci

dophilus) 

1.5 mg (250 million cell) Capsule 2021 01:23:03 AM EDT              2000 M

MU CELLS              

                      active                                      [facility]

 

                    Metoprolol Tartrate 50 MG Oral Tablet Metoprolol Tartrate 08

/10/2021 02:03:58 PM

EDT           50 MG                       active                      [facility]

 

                          Rosuvastatin calcium 40 MG Oral Tablet Rosuvastatin (C

restor) 40 mg Tablet 

Rosuvastatin (Crestor) 40 mg Tablet 08/10/2021 02:03:58 PM EDT           40 MG  

                                 

active                                                          [facility]

 

          Clonazepam 0.5 MG Oral Tablet Clonazepam 08/10/2021 02:03:58 PM EDT   

        0.5 MG               

                      active                                      [facility]

 

     Gabapentin      08/10/2021 02:03:58 PM EDT      300 MG                activ

[facility]

 

                    Albuterol Sulfate (Proair Hfa) 90 mcg/actuation Hfa Aerosol 

Inhaler                     08/10/2021

02:03:58 PM EDT        2 PUFFS                      active                      

[facility]

 

                          coenzyme Q10 100 MG Oral Capsule Coenzyme Q10 (Coq-10)

 100 mg Capsule Coenzyme 

Q10 (Coq-10) 100 mg Capsule 08/10/2021 02:03:58 PM EDT         100 MG           

               active           

                                                    [facility]

[facility]

 

                    buspirone hydrochloride 30 MG Oral Tablet Buspirone Buspiron

e           08/10/2021 

02:03:58 PM EDT        30 MG                       active                      [facility]

 

                    Hydroxyzine Hydrochloride 25 MG Oral Tablet Hydroxyzine Hcl 

Hydroxyzine Hcl     

08/10/2021 02:03:58 PM EDT        25 MG                       active            

          [facility]

 

                                        24 HR venlafaxine 150 MG Extended Releas

e Oral Capsule Venlafaxine (Effexor Xr) 

150 mg Capsule,Extended Release 24hr    Venlafaxine (Effexor Xr) 150 mg 

Capsule,Extended Release 24hr 08/10/2021 02:03:58 PM EDT         150 MG         

                 active   

                                                            [facility]

 

           Vitamin E 100 UNT Oral Capsule Vitamin E  08/10/2021 02:03:58 PM EDT 

           100 UNIT    

                              active                                  [facility]

 

                    B.Breve-L.Acid-L.Rham-S.Thermo (Probiotic) 3 billion cell Ta

blet,Chewable                     

08/10/2021 02:03:58 PM EDT                                    active            

          [facility]

 

        Aspirin 81 MG Chewable Tablet Aspirin 08/10/2021 02:03:58 PM EDT        

 81 MG                           

active                                                          [facility]

 

                gabapentin 600 MG Oral Tablet Gabapentin Gabapentin      08/10/2

021 02:03:58 PM EDT  

        600 MG                          active                          [facility]

 

             valsartan 320 MG Oral Tablet Valsartan Valsartan    08/10/2021 02:0

3:58 PM EDT              

320 MG                          active                          [facility]

 

     Multivitamin      08/10/2021 02:03:58 PM EDT      1 TAB                acti

ve                [facility]

 

                    Omeprazole 20 MG Delayed Release Oral Tablet Omeprazole     

     08/10/2021 02:03:58 PM 

EDT           20 MG                       active                      [facility]

 

                          Tamsulosin hydrochloride 0.4 MG Oral Capsule Tamsulosi

n (Flomax) 0.4 mg Capsule 

Tamsulosin (Flomax) 0.4 mg Capsule 08/10/2021 02:03:58 PM EDT           0.4 MG  

                                

active                                                          [facility]

 

                    Levothyroxine Sodium 0.025 MG Oral Tablet Levothyroxine     

  08/10/2021 02:03:58 PM 

EDT           25 MCG                      active                      [facility]

 

                    coenzyme Q10 50 MG Oral Capsule Coenzyme Q10 50 MG Coenzyme 

Q10 50 MG  2021

12:00:00 AM EDT                                    active                      e

CW1 ([facility])

 

                          Rosuvastatin calcium 40 MG Oral Tablet Rosuvastatin Ca

lcium 40 MG Rosuvastatin 

Calcium 40 MG 2021 12:00:00 AM EDT        1.0 {tablet}                    

  active               

Rosuvastatin Calcium 40 MG              eCW1 ([facility])

 

                    coenzyme Q10 50 MG Oral Capsule Coenzyme Q10 50 MG Coenzyme 

Q10 50 MG  2021

12:00:00 AM EDT                                    active               Coenzyme

 Q10 50 MG eCW1 ([facility])

 

             Aspirin 81 MG Chewable Tablet Aspirin 81 MG 2021 12:00:00 AM 

EDT              1.0 

{tablet}                         active                  Aspirin 81 MG eCW1 ([facility])

 

             Aspirin 81 MG Chewable Tablet Aspirin 81 MG 2021 12:00:00 AM 

EDT              1.0 

{tablet}                         active                  Aspirin 81 MG eCW1 ([facility])

 

                    coenzyme Q10 50 MG Oral Capsule Coenzyme Q10 50 MG Coenzyme 

Q10 50 MG  2021

12:00:00 AM EDT                                    active               Coenzyme

 Q10 50 MG eCW1 ([facility])

 

                          Rosuvastatin calcium 40 MG Oral Tablet Rosuvastatin Ca

lcium 40 MG Rosuvastatin 

Calcium 40 MG 2021 12:00:00 AM EDT        1.0 {tablet}                    

  active               

Rosuvastatin Calcium 40 MG              eCW1 ([facility])

 

             Aspirin 81 MG Chewable Tablet Aspirin 81 MG 2021 12:00:00 AM 

EDT              1.0 

{tablet}                         active                  Aspirin 81 MG eCW1 ([facility])

 

                          Rosuvastatin calcium 40 MG Oral Tablet Rosuvastatin Ca

lcium 40 MG Rosuvastatin 

Calcium 40 MG 2021 12:00:00 AM EDT        1.0 {tablet}                    

  active               

Rosuvastatin Calcium 40 MG              eCW1 ([facility])

 

                          Rosuvastatin calcium 40 MG Oral Tablet Rosuvastatin Ca

lcium 40 MG Rosuvastatin 

Calcium 40 MG 2021 12:00:00 AM EDT        1.0 {tablet}                    

  active               

Rosuvastatin Calcium 40 MG              eCW1 ([facility])

 

                          Rosuvastatin calcium 40 MG Oral Tablet Rosuvastatin Ca

lcium 40 MG Rosuvastatin 

Calcium 40 MG 2021 12:00:00 AM EDT        1.0 {tablet}                    

  active               

Rosuvastatin Calcium 40 MG              eCW1 ([facility])

 

             Aspirin 81 MG Chewable Tablet Aspirin 81 MG 2021 12:00:00 AM 

EDT              1.0 

{tablet}                         active                  Aspirin 81 MG eCW1 ([facility])

 

                          Rosuvastatin calcium 40 MG Oral Tablet Rosuvastatin Ca

lcium 40 MG Rosuvastatin 

Calcium 40 MG 2021 12:00:00 AM EDT        1.0 {tablet}                    

  active               

Rosuvastatin Calcium 40 MG              eCW1 ([facility])

 

                    coenzyme Q10 50 MG Oral Capsule Coenzyme Q10 50 MG Coenzyme 

Q10 50 MG  2021

12:00:00 AM EDT                                    active               Coenzyme

 Q10 50 MG eCW1 ([facility])

 

                          Rosuvastatin calcium 40 MG Oral Tablet Rosuvastatin Ca

lcium 40 MG Rosuvastatin 

Calcium 40 MG 2021 12:00:00 AM EDT        1.0 {tablet}                    

  active               

Rosuvastatin Calcium 40 MG              eCW1 ([facility])

 

                          Rosuvastatin calcium 40 MG Oral Tablet Rosuvastatin Ca

lcium 40 MG Rosuvastatin 

Calcium 40 MG 2021 12:00:00 AM EDT        1.0 {tablet}                    

  active               

Rosuvastatin Calcium 40 MG              eCW1 ([facility])

 

             Aspirin 81 MG Chewable Tablet Aspirin 81 MG 2021 12:00:00 AM 

EDT              1.0 

{tablet}                         active                  Aspirin 81 MG eCW1 ([facility])

 

             Aspirin 81 MG Chewable Tablet Aspirin 81 MG 2021 12:00:00 AM 

EDT              1.0 

{tablet}                         active                  Aspirin 81 MG eCW1 ([facility])

 

                          Rosuvastatin calcium 40 MG Oral Tablet Rosuvastatin Ca

lcium 40 MG Rosuvastatin 

Calcium 40 MG 2021 12:00:00 AM EDT        1.0 {tablet}                    

  active               

Rosuvastatin Calcium 40 MG              eCW1 ([facility])

 

             Aspirin 81 MG Chewable Tablet Aspirin 81 MG 2021 12:00:00 AM 

EDT              1.0 

{tablet}                         active                  Aspirin 81 MG eCW1 ([facility])

 

             Aspirin 81 MG Chewable Tablet Aspirin 81 MG 2021 12:00:00 AM 

EDT              1.0 

{tablet}                         active                  Aspirin 81 MG eCW1 ([facility])

 

                          Rosuvastatin calcium 40 MG Oral Tablet Rosuvastatin Ca

lcium 40 MG Rosuvastatin 

Calcium 40 MG 2021 12:00:00 AM EDT        1.0 {tablet}                    

  active               

Rosuvastatin Calcium 40 MG              eCW1 ([facility])

 

                    coenzyme Q10 50 MG Oral Capsule Coenzyme Q10 50 MG Coenzyme 

Q10 50 MG  2021

12:00:00 AM EDT                                    active               Coenzyme

 Q10 50 MG eCW1 ([facility])

 

             Aspirin 81 MG Chewable Tablet Aspirin 81 MG 2021 12:00:00 AM 

EDT              1.0 

{tablet}                         active                  Aspirin 81 MG eCW1 ([facility])

 

                    coenzyme Q10 50 MG Oral Capsule Coenzyme Q10 50 MG Coenzyme 

Q10 50 MG  2021

12:00:00 AM EDT                                    active               Coenzyme

 Q10 50 MG eCW1 ([facility])

 

                          Rosuvastatin calcium 40 MG Oral Tablet Rosuvastatin Ca

lcium 40 MG Rosuvastatin 

Calcium 40 MG 2021 12:00:00 AM EDT       1.0 {tablet}                   ac

tive                   eCW1 

([facility])

 

             Aspirin 81 MG Chewable Tablet Aspirin 81 MG 2021 12:00:00 AM 

EDT              1.0 

{tablet}                         active                  Aspirin 81 MG eCW1 ([facility])

 

             Aspirin 81 MG Chewable Tablet Aspirin 81 MG 2021 12:00:00 AM 

EDT              1.0 

{tablet}                         active                  Aspirin 81 MG eCW1 ([facility])

 

             Aspirin 81 MG Chewable Tablet Aspirin 81 MG 2021 12:00:00 AM 

EDT              1.0 

{tablet}                         active                  Aspirin 81 MG eCW1 ([facility])

 

                          Rosuvastatin calcium 40 MG Oral Tablet Rosuvastatin Ca

lcium 40 MG Rosuvastatin 

Calcium 40 MG 2021 12:00:00 AM EDT        1.0 {tablet}                    

  active               

Rosuvastatin Calcium 40 MG              eCW1 ([facility])

 

             Aspirin 81 MG Chewable Tablet Aspirin 81 MG 2021 12:00:00 AM 

EDT              1.0 

{tablet}                         active                  Aspirin 81 MG eCW1 ([facility])

 

             Aspirin 81 MG Chewable Tablet Aspirin 81 MG 2021 12:00:00 AM 

EDT              1.0 

{tablet}                         active                  Aspirin 81 MG eCW1 ([facility])

 

             Aspirin 81 MG Chewable Tablet Aspirin 81 MG 2021 12:00:00 AM 

EDT              1.0 

{tablet}                         active                          eCW1 ([facility])

 

                          Rosuvastatin calcium 40 MG Oral Tablet Rosuvastatin Ca

lcium 40 MG Rosuvastatin 

Calcium 40 MG 2021 12:00:00 AM EDT        1.0 {tablet}                    

  active               

Rosuvastatin Calcium 40 MG              eCW1 ([facility])

 

                    coenzyme Q10 50 MG Oral Capsule Coenzyme Q10 50 MG Coenzyme 

Q10 50 MG  2021

12:00:00 AM EDT                                    active               Coenzyme

 Q10 50 MG eCW1 ([facility])

 

                    coenzyme Q10 50 MG Oral Capsule Coenzyme Q10 50 MG Coenzyme 

Q10 50 MG  2021

12:00:00 AM EDT                                    active               Coenzyme

 Q10 50 MG eCW1 ([facility])

 

                    coenzyme Q10 50 MG Oral Capsule Coenzyme Q10 50 MG Coenzyme 

Q10 50 MG  2021

12:00:00 AM EDT                                    active               Coenzyme

 Q10 50 MG eCW1 ([facility])

 

                    coenzyme Q10 50 MG Oral Capsule Coenzyme Q10 50 MG Coenzyme 

Q10 50 MG  2021

12:00:00 AM EDT                                    active               Coenzyme

 Q10 50 MG eCW1 ([facility])

 

                          Rosuvastatin calcium 40 MG Oral Tablet Rosuvastatin Ca

lcium 40 MG Rosuvastatin 

Calcium 40 MG 2021 12:00:00 AM EDT        1.0 {tablet}                    

  active               

Rosuvastatin Calcium 40 MG              eCW1 ([facility])

 

                    coenzyme Q10 50 MG Oral Capsule Coenzyme Q10 50 MG Coenzyme 

Q10 50 MG  2021

12:00:00 AM EDT                                    active               Coenzyme

 Q10 50 MG eCW1 ([facility])

 

                    coenzyme Q10 50 MG Oral Capsule Coenzyme Q10 50 MG Coenzyme 

Q10 50 MG  2021

12:00:00 AM EDT                                    active               Coenzyme

 Q10 50 MG eCW1 ([facility])

 

             Aspirin 81 MG Chewable Tablet Aspirin 81 MG 2021 12:00:00 AM 

EDT              1.0 

{tablet}                         active                  Aspirin 81 MG eCW1 ([facility])

 

                          Rosuvastatin calcium 40 MG Oral Tablet Rosuvastatin Ca

lcium 40 MG Rosuvastatin 

Calcium 40 MG 2021 12:00:00 AM EDT        1.0 {tablet}                    

  active               

Rosuvastatin Calcium 40 MG              eCW1 ([facility])

 

                    coenzyme Q10 50 MG Oral Capsule Coenzyme Q10 50 MG Coenzyme 

Q10 50 MG  2021

12:00:00 AM EDT                                    active               Coenzyme

 Q10 50 MG eCW1 ([facility])

 

                          Rosuvastatin calcium 40 MG Oral Tablet Rosuvastatin Ca

lcium 40 MG Rosuvastatin 

Calcium 40 MG 2021 12:00:00 AM EDT        1.0 {tablet}                    

  active               

Rosuvastatin Calcium 40 MG              eCW1 ([facility])

 

             Aspirin 81 MG Chewable Tablet Aspirin 81 MG 2021 12:00:00 AM 

EDT              1.0 

{tablet}                         active                  Aspirin 81 MG eCW1 ([facility])

 

             Aspirin 81 MG Chewable Tablet Aspirin 81 MG 2021 12:00:00 AM 

EDT              1.0 

{tablet}                         active                  Aspirin 81 MG eCW1 ([facility])

 

             Aspirin 81 MG Chewable Tablet Aspirin 81 MG 2021 12:00:00 AM 

EDT              1.0 

{tablet}                         active                  Aspirin 81 MG eCW1 ([facility])

 

                          Rosuvastatin calcium 40 MG Oral Tablet Rosuvastatin Ca

lcium 40 MG Rosuvastatin 

Calcium 40 MG 2021 12:00:00 AM EDT        1.0 {tablet}                    

  active               

Rosuvastatin Calcium 40 MG              eCW1 ([facility])

 

                          Rosuvastatin calcium 40 MG Oral Tablet Rosuvastatin Ca

lcium 40 MG Rosuvastatin 

Calcium 40 MG 2021 12:00:00 AM EDT        1.0 {tablet}                    

  active               

Rosuvastatin Calcium 40 MG              eCW1 ([facility])

 

                    coenzyme Q10 50 MG Oral Capsule Coenzyme Q10 50 MG Coenzyme 

Q10 50 MG  2021

12:00:00 AM EDT                                    active               Coenzyme

 Q10 50 MG eCW1 ([facility])

 

                    coenzyme Q10 50 MG Oral Capsule Coenzyme Q10 50 MG Coenzyme 

Q10 50 MG  2021

12:00:00 AM EDT                                    active               Coenzyme

 Q10 50 MG eCW1 ([facility])

 

                    coenzyme Q10 50 MG Oral Capsule Coenzyme Q10 50 MG Coenzyme 

Q10 50 MG  2021

12:00:00 AM EDT                                    active               Coenzyme

 Q10 50 MG eCW1 ([facility])

 

                    coenzyme Q10 50 MG Oral Capsule Coenzyme Q10 50 MG Coenzyme 

Q10 50 MG  2021

12:00:00 AM EDT                                    active               Coenzyme

 Q10 50 MG eCW1 ([facility])

 

             Aspirin 81 MG Chewable Tablet Aspirin 81 MG 2021 12:00:00 AM 

EDT              1.0 

{tablet}                         active                  Aspirin 81 MG eCW1 ([facility])

 

             Aspirin 81 MG Chewable Tablet Aspirin 81 MG 2021 12:00:00 AM 

EDT              1.0 

{tablet}                         active                  Aspirin 81 MG eCW1 ([facility])

 

                          Rosuvastatin calcium 40 MG Oral Tablet Rosuvastatin Ca

lcium 40 MG Rosuvastatin 

Calcium 40 MG 2021 12:00:00 AM EDT        1.0 {tablet}                    

  active               

Rosuvastatin Calcium 40 MG              eCW1 ([facility])

 

                    coenzyme Q10 50 MG Oral Capsule Coenzyme Q10 50 MG Coenzyme 

Q10 50 MG  2021

12:00:00 AM EDT                                    active               Coenzyme

 Q10 50 MG eCW1 ([facility])

 

                    coenzyme Q10 50 MG Oral Capsule Coenzyme Q10 50 MG Coenzyme 

Q10 50 MG  2021

12:00:00 AM EDT                                    active               Coenzyme

 Q10 50 MG eCW1 ([facility])

 

                          Rosuvastatin calcium 40 MG Oral Tablet Rosuvastatin Ca

lcium 40 MG Rosuvastatin 

Calcium 40 MG 2021 12:00:00 AM EDT        1.0 {tablet}                    

  active               

Rosuvastatin Calcium 40 MG              eCW1 ([facility])

 

                          Rosuvastatin calcium 40 MG Oral Tablet Rosuvastatin Ca

lcium 40 MG Rosuvastatin 

Calcium 40 MG 2021 12:00:00 AM EDT        1.0 {tablet}                    

  active               

Rosuvastatin Calcium 40 MG              eCW1 ([facility])

 

                    coenzyme Q10 50 MG Oral Capsule Coenzyme Q10 50 MG Coenzyme 

Q10 50 MG  2021

12:00:00 AM EDT                                    active               Coenzyme

 Q10 50 MG eCW1 ([facility])

 

                    coenzyme Q10 50 MG Oral Capsule Coenzyme Q10 50 MG Coenzyme 

Q10 50 MG  2021

12:00:00 AM EDT                                    active               Coenzyme

 Q10 50 MG eCW1 ([facility])

 

                    magnesium citrate 58.2 MG/ML Oral Solution Magnesium Citrate

   2021 12:00:00

AM EDT                                    active                      MEDENT ([facility], )

 

                    POLYETHYLENE GLYCOL 3350 142 MG/ML Oral Solution [Miralax] M

iralax             2021 

12:00:00 AM EDT                                    active                      M

MARGE ([facility], )

 

             Nasonex 50 MCG/ACT Nasonex 50 MCG/ACT 05/10/2021 12:00:00 AM EDT   

           2.0 

{sprays_in_each_nostril}                         active                  Nasonex

 50 MCG/ACT eCW1 ([facility])

 

             Nasonex 50 MCG/ACT Nasonex 50 MCG/ACT 05/10/2021 12:00:00 AM EDT   

           2.0 

{sprays_in_each_nostril}                         active                  Nasonex

 50 MCG/ACT eCW1 ([facility])

 

             Nasonex 50 MCG/ACT Nasonex 50 MCG/ACT 05/10/2021 12:00:00 AM EDT   

           2.0 

{sprays_in_each_nostril}                         active                  Nasonex

 50 MCG/ACT eCW1 ([facility])

 

             Nasonex 50 MCG/ACT Nasonex 50 MCG/ACT 05/10/2021 12:00:00 AM EDT   

           2.0 

{sprays_in_each_nostril}                         active                  Nasonex

 50 MCG/ACT eCW1 ([facility])

 

             Nasonex 50 MCG/ACT Nasonex 50 MCG/ACT 05/10/2021 12:00:00 AM EDT   

           2.0 

{sprays_in_each_nostril}                         active                         

 eCW1 ([facility])

 

             Nasonex 50 MCG/ACT Nasonex 50 MCG/ACT 05/10/2021 12:00:00 AM EDT   

           2.0 

{sprays_in_each_nostril}                         active                  Nasonex

 50 MCG/ACT eCW1 ([facility])

 

             Nasonex 50 MCG/ACT Nasonex 50 MCG/ACT 05/10/2021 12:00:00 AM EDT   

           2.0 

{sprays_in_each_nostril}                         active                  Nasonex

 50 MCG/ACT eCW1 ([facility])

 

             Nasonex 50 MCG/ACT Nasonex 50 MCG/ACT 05/10/2021 12:00:00 AM EDT   

           2.0 

{sprays_in_each_nostril}                         active                  Nasonex

 50 MCG/ACT eCW1 ([facility])

 

             Nasonex 50 MCG/ACT Nasonex 50 MCG/ACT 05/10/2021 12:00:00 AM EDT   

           2.0 

{sprays_in_each_nostril}                         active                  Nasonex

 50 MCG/ACT eCW1 ([facility])

 

             Nasonex 50 MCG/ACT Nasonex 50 MCG/ACT 05/10/2021 12:00:00 AM EDT   

           2.0 

{sprays_in_each_nostril}                         active                  Nasonex

 50 MCG/ACT eCW1 ([facility])

 

             Nasonex 50 MCG/ACT Nasonex 50 MCG/ACT 05/10/2021 12:00:00 AM EDT   

           2.0 

{sprays_in_each_nostril}                         active                  Nasonex

 50 MCG/ACT eCW1 ([facility])

 

             Nasonex 50 MCG/ACT Nasonex 50 MCG/ACT 05/10/2021 12:00:00 AM EDT   

           2.0 

{sprays_in_each_nostril}                         active                  Nasonex

 50 MCG/ACT eCW1 ([facility])

 

             Nasonex 50 MCG/ACT Nasonex 50 MCG/ACT 05/10/2021 12:00:00 AM EDT   

           2.0 

{sprays_in_each_nostril}                         active                  Nasonex

 50 MCG/ACT eCW1 ([facility])

 

             Nasonex 50 MCG/ACT Nasonex 50 MCG/ACT 05/10/2021 12:00:00 AM EDT   

           2.0 

{sprays_in_each_nostril}                         active                  Nasonex

 50 MCG/ACT eCW1 ([facility])

 

             Nasonex 50 MCG/ACT Nasonex 50 MCG/ACT 05/10/2021 12:00:00 AM EDT   

           2.0 

{sprays_in_each_nostril}                         active                  Nasonex

 50 MCG/ACT eCW1 ([facility])

 

             Nasonex 50 MCG/ACT Nasonex 50 MCG/ACT 05/10/2021 12:00:00 AM EDT   

           2.0 

{sprays_in_each_nostril}                         active                  Nasonex

 50 MCG/ACT eCW1 ([facility])

 

             Nasonex 50 MCG/ACT Nasonex 50 MCG/ACT 05/10/2021 12:00:00 AM EDT   

           2.0 

{sprays_in_each_nostril}                         active                  Nasonex

 50 MCG/ACT eCW1 ([facility])

 

             Nasonex 50 MCG/ACT Nasonex 50 MCG/ACT 05/10/2021 12:00:00 AM EDT   

           2.0 

{sprays_in_each_nostril}                         active                  Nasonex

 50 MCG/ACT eCW1 ([facility])

 

             Nasonex 50 MCG/ACT Nasonex 50 MCG/ACT 05/10/2021 12:00:00 AM EDT   

           2.0 

{sprays_in_each_nostril}                         active                  Nasonex

 50 MCG/ACT eCW1 ([facility])

 

             Nasonex 50 MCG/ACT Nasonex 50 MCG/ACT 05/10/2021 12:00:00 AM EDT   

           2.0 

{sprays_in_each_nostril}                         active                  Nasonex

 50 MCG/ACT eCW1 ([facility])

 

             Nasonex 50 MCG/ACT Nasonex 50 MCG/ACT 05/10/2021 12:00:00 AM EDT   

           2.0 

{sprays_in_each_nostril}                         active                  Nasonex

 50 MCG/ACT eCW1 ([facility])

 

             Nasonex 50 MCG/ACT Nasonex 50 MCG/ACT 05/10/2021 12:00:00 AM EDT   

           2.0 

{sprays_in_each_nostril}                         active                         

 eCW1 ([facility])

 

             Nasonex 50 MCG/ACT Nasonex 50 MCG/ACT 05/10/2021 12:00:00 AM EDT   

           2.0 

{sprays_in_each_nostril}                         active                  Nasonex

 50 MCG/ACT eCW1 ([facility])

 

             Nasonex 50 MCG/ACT Nasonex 50 MCG/ACT 05/10/2021 12:00:00 AM EDT   

           2.0 

{sprays_in_each_nostril}                         active                  Nasonex

 50 MCG/ACT eCW1 ([facility])

 

             Nasonex 50 MCG/ACT Nasonex 50 MCG/ACT 05/10/2021 12:00:00 AM EDT   

           2.0 

{sprays_in_each_nostril}                         active                  Nasonex

 50 MCG/ACT eCW1 ([facility])

 

             Nasonex 50 MCG/ACT Nasonex 50 MCG/ACT 05/10/2021 12:00:00 AM EDT   

           2.0 

{sprays_in_each_nostril}                         active                  Nasonex

 50 MCG/ACT eCW1 ([facility])

 

             Nasonex 50 MCG/ACT Nasonex 50 MCG/ACT 05/10/2021 12:00:00 AM EDT   

           2.0 

{sprays_in_each_nostril}                         active                  Nasonex

 50 MCG/ACT eCW1 ([facility])

 

             Nasonex 50 MCG/ACT Nasonex 50 MCG/ACT 05/10/2021 12:00:00 AM EDT   

           2.0 

{sprays_in_each_nostril}                         active                  Nasonex

 50 MCG/ACT eCW1 ([facility])

 

             Nasonex 50 MCG/ACT Nasonex 50 MCG/ACT 05/10/2021 12:00:00 AM EDT   

           2.0 

{sprays_in_each_nostril}                         active                  Nasonex

 50 MCG/ACT eCW1 ([facility])

 

             Nasonex 50 MCG/ACT Nasonex 50 MCG/ACT 05/10/2021 12:00:00 AM EDT   

           2.0 

{sprays_in_each_nostril}                         active                  Nasonex

 50 MCG/ACT eCW1 ([facility])

 

                    Flonase Allergy Relief 50 MCG/ACT Flonase Allergy Relief 50 

MCG/ACT 2021 

12:00:00 AM EDT         1.0 {spray_in_each_nostril}                         acti

ve                  Flonase Allergy 

Relief 50 MCG/ACT                       eCW1 ([facility])

 

                    Flonase Allergy Relief 50 MCG/ACT Flonase Allergy Relief 50 

MCG/ACT 2021 

12:00:00 AM EDT         1.0 {spray_in_each_nostril}                         susp

ended                 Flonase 

Allergy Relief 50 MCG/ACT               eCW1 ([facility])

 

                    Flonase Allergy Relief 50 MCG/ACT Flonase Allergy Relief 50 

MCG/ACT 2021 

12:00:00 AM EDT         1.0 {spray_in_each_nostril}                         acti

ve                  Flonase Allergy 

Relief 50 MCG/ACT                       eCW1 ([facility])

 

                    Flonase Allergy Relief 50 MCG/ACT Flonase Allergy Relief 50 

MCG/ACT 2021 

12:00:00 AM EDT         1.0 {spray_in_each_nostril}                         susp

ended                 Flonase 

Allergy Relief 50 MCG/ACT               eCW1 ([facility])

 

                    Flonase Allergy Relief 50 MCG/ACT Flonase Allergy Relief 50 

MCG/ACT 2021 

12:00:00 AM EDT         1.0 {spray_in_each_nostril}                         acti

ve                  Flonase Allergy 

Relief 50 MCG/ACT                       eCW1 ([facility])

 

                    Flonase Allergy Relief 50 MCG/ACT Flonase Allergy Relief 50 

MCG/ACT 2021 

12:00:00 AM EDT         1.0 {spray_in_each_nostril}                         acti

ve                  Flonase Allergy 

Relief 50 MCG/ACT                       eCW1 ([facility])

 

                    Flonase Allergy Relief 50 MCG/ACT Flonase Allergy Relief 50 

MCG/ACT 2021 

12:00:00 AM EDT         1.0 {spray_in_each_nostril}                         acti

ve                  Flonase Allergy 

Relief 50 MCG/ACT                       eCW1 ([facility])

 

                    Flonase Allergy Relief 50 MCG/ACT Flonase Allergy Relief 50 

MCG/ACT 2021 

12:00:00 AM EDT         1.0 {spray_in_each_nostril}                         susp

ended                 Flonase 

Allergy Relief 50 MCG/ACT               eCW1 ([facility])

 

                    Flonase Allergy Relief 50 MCG/ACT Flonase Allergy Relief 50 

MCG/ACT 2021 

12:00:00 AM EDT         1.0 {spray_in_each_nostril}                         acti

ve                  Flonase Allergy 

Relief 50 MCG/ACT                       eCW1 ([facility])

 

                    Flonase Allergy Relief 50 MCG/ACT Flonase Allergy Relief 50 

MCG/ACT 2021 

12:00:00 AM EDT         1.0 {spray_in_each_nostril}                         susp

ended                 Flonase 

Allergy Relief 50 MCG/ACT               eCW1 ([facility])

 

                    Flonase Allergy Relief 50 MCG/ACT Flonase Allergy Relief 50 

MCG/ACT 2021 

12:00:00 AM EDT         1.0 {spray_in_each_nostril}                         susp

ended                 Flonase 

Allergy Relief 50 MCG/ACT               eCW1 ([facility])

 

                    Flonase Allergy Relief 50 MCG/ACT Flonase Allergy Relief 50 

MCG/ACT 2021 

12:00:00 AM EDT         1.0 {spray_in_each_nostril}                         susp

ended                 Flonase 

Allergy Relief 50 MCG/ACT               eCW1 ([facility])

 

                    Flonase Allergy Relief 50 MCG/ACT Flonase Allergy Relief 50 

MCG/ACT 2021 

12:00:00 AM EDT        1.0 {spray_in_each_nostril}                      suspende

d                      eCW1 

([facility])

 

                    Flonase Allergy Relief 50 MCG/ACT Flonase Allergy Relief 50 

MCG/ACT 2021 

12:00:00 AM EDT         1.0 {spray_in_each_nostril}                         acti

ve                  Flonase Allergy 

Relief 50 MCG/ACT                       eCW1 ([facility])

 

                    Flonase Allergy Relief 50 MCG/ACT Flonase Allergy Relief 50 

MCG/ACT 2021 

12:00:00 AM EDT         1.0 {spray_in_each_nostril}                         susp

ended                 Flonase 

Allergy Relief 50 MCG/ACT               eCW1 ([facility])

 

                    Flonase Allergy Relief 50 MCG/ACT Flonase Allergy Relief 50 

MCG/ACT 2021 

12:00:00 AM EDT         1.0 {spray_in_each_nostril}                         acti

ve                  Flonase Allergy 

Relief 50 MCG/ACT                       eCW1 ([facility])

 

                    Flonase Allergy Relief 50 MCG/ACT Flonase Allergy Relief 50 

MCG/ACT 2021 

12:00:00 AM EDT         1.0 {spray_in_each_nostril}                         acti

ve                  Flonase Allergy 

Relief 50 MCG/ACT                       eCW1 ([facility])

 

                    Flonase Allergy Relief 50 MCG/ACT Flonase Allergy Relief 50 

MCG/ACT 2021 

12:00:00 AM EDT         1.0 {spray_in_each_nostril}                         susp

ended                 Flonase 

Allergy Relief 50 MCG/ACT               eCW1 ([facility])

 

                    Flonase Allergy Relief 50 MCG/ACT Flonase Allergy Relief 50 

MCG/ACT 2021 

12:00:00 AM EDT         1.0 {spray_in_each_nostril}                         susp

ended                 Flonase 

Allergy Relief 50 MCG/ACT               eCW1 ([facility])

 

                    Flonase Allergy Relief 50 MCG/ACT Flonase Allergy Relief 50 

MCG/ACT 2021 

12:00:00 AM EDT         1.0 {spray_in_each_nostril}                         susp

ended                 Flonase 

Allergy Relief 50 MCG/ACT               eCW1 ([facility])

 

                    Flonase Allergy Relief 50 MCG/ACT Flonase Allergy Relief 50 

MCG/ACT 2021 

12:00:00 AM EDT         1.0 {spray_in_each_nostril}                         susp

ended                 Flonase 

Allergy Relief 50 MCG/ACT               eCW1 ([facility])

 

                    Flonase Allergy Relief 50 MCG/ACT Flonase Allergy Relief 50 

MCG/ACT 2021 

12:00:00 AM EDT         1.0 {spray_in_each_nostril}                         susp

ended                 Flonase 

Allergy Relief 50 MCG/ACT               eCW1 ([facility])

 

                    Flonase Allergy Relief 50 MCG/ACT Flonase Allergy Relief 50 

MCG/ACT 2021 

12:00:00 AM EDT         1.0 {spray_in_each_nostril}                         acti

ve                  Flonase Allergy 

Relief 50 MCG/ACT                       eCW1 ([facility])

 

                    Flonase Allergy Relief 50 MCG/ACT Flonase Allergy Relief 50 

MCG/ACT 2021 

12:00:00 AM EDT         1.0 {spray_in_each_nostril}                         susp

ended                 Flonase 

Allergy Relief 50 MCG/ACT               eCW1 ([facility])

 

                    Flonase Allergy Relief 50 MCG/ACT Flonase Allergy Relief 50 

MCG/ACT 2021 

12:00:00 AM EDT         1.0 {spray_in_each_nostril}                         susp

ended                 Flonase 

Allergy Relief 50 MCG/ACT               eCW1 ([facility])

 

                    Flonase Allergy Relief 50 MCG/ACT Flonase Allergy Relief 50 

MCG/ACT 2021 

12:00:00 AM EDT         1.0 {spray_in_each_nostril}                         acti

ve                  Flonase Allergy 

Relief 50 MCG/ACT                       eCW1 ([facility])

 

                    Flonase Allergy Relief 50 MCG/ACT Flonase Allergy Relief 50 

MCG/ACT 2021 

12:00:00 AM EDT         1.0 {spray_in_each_nostril}                         susp

ended                 Flonase 

Allergy Relief 50 MCG/ACT               eCW1 ([facility])

 

                    Flonase Allergy Relief 50 MCG/ACT Flonase Allergy Relief 50 

MCG/ACT 2021 

12:00:00 AM EDT         1.0 {spray_in_each_nostril}                         susp

ended                 Flonase 

Allergy Relief 50 MCG/ACT               eCW1 ([facility])

 

                    Flonase Allergy Relief 50 MCG/ACT Flonase Allergy Relief 50 

MCG/ACT 2021 

12:00:00 AM EDT         1.0 {spray_in_each_nostril}                         susp

ended                 Flonase 

Allergy Relief 50 MCG/ACT               eCW1 ([facility])

 

                    Flonase Allergy Relief 50 MCG/ACT Flonase Allergy Relief 50 

MCG/ACT 2021 

12:00:00 AM EDT        1.0 {spray_in_each_nostril}                      active  

                    eCW1 ([facility])

 

                    Flonase Allergy Relief 50 MCG/ACT Flonase Allergy Relief 50 

MCG/ACT 2021 

12:00:00 AM EDT         1.0 {spray_in_each_nostril}                         acti

ve                  Flonase Allergy 

Relief 50 MCG/ACT                       eCW1 ([facility])

 

                    Flonase Allergy Relief 50 MCG/ACT Flonase Allergy Relief 50 

MCG/ACT 2021 

12:00:00 AM EDT         1.0 {spray_in_each_nostril}                         acti

ve                  Flonase Allergy 

Relief 50 MCG/ACT                       eCW1 ([facility])

 

                                        120 ACTUAT Budesonide 0.08 MG/ACTUAT / f

ormoterol fumarate 0.0045 MG/ACTUAT 

Metered Dose Inhaler [Symbicort] Symbicort 80-4.5 MCG/ACT Symbicort 80-4.5 

MCG/ACT 2021 12:00:00 AM EDT       2.0 {puffs}                   active   

                eCW1 

([facility])

 

                                        120 ACTUAT Budesonide 0.08 MG/ACTUAT / f

ormoterol fumarate 0.0045 MG/ACTUAT 

Metered Dose Inhaler [Symbicort] Symbicort 80-4.5 MCG/ACT Symbicort 80-4.5 

MCG/ACT 2021 12:00:00 AM EDT        2.0 {puffs}                      activ

e               Symbicort 80-

4.5 MCG/ACT                             eCW1 ([facility])

 

                                        120 ACTUAT Budesonide 0.08 MG/ACTUAT / f

ormoterol fumarate 0.0045 MG/ACTUAT 

Metered Dose Inhaler [Symbicort] Symbicort 80-4.5 MCG/ACT Symbicort 80-4.5 

MCG/ACT 2021 12:00:00 AM EDT        2.0 {puffs}                      activ

e               Symbicort 80-

4.5 MCG/ACT                             eCW1 ([facility])

 

                                        120 ACTUAT Budesonide 0.08 MG/ACTUAT / f

ormoterol fumarate 0.0045 MG/ACTUAT 

Metered Dose Inhaler [Symbicort] Symbicort 80-4.5 MCG/ACT Symbicort 80-4.5 

MCG/ACT 2021 12:00:00 AM EDT        2.0 {puffs}                      activ

e               Symbicort 80-

4.5 MCG/ACT                             eCW1 ([facility])

 

                                        120 ACTUAT Budesonide 0.08 MG/ACTUAT / f

ormoterol fumarate 0.0045 MG/ACTUAT 

Metered Dose Inhaler [Symbicort] Symbicort 80-4.5 MCG/ACT Symbicort 80-4.5 

MCG/ACT 2021 12:00:00 AM EDT        2.0 {puffs}                      activ

e               Symbicort 80-

4.5 MCG/ACT                             eCW1 ([facility])

 

                                        120 ACTUAT Budesonide 0.08 MG/ACTUAT / f

ormoterol fumarate 0.0045 MG/ACTUAT 

Metered Dose Inhaler [Symbicort] Symbicort 80-4.5 MCG/ACT Symbicort 80-4.5 

MCG/ACT 2021 12:00:00 AM EDT        2.0 {puffs}                      activ

e               Symbicort 80-

4.5 MCG/ACT                             eCW1 ([facility])

 

                                        120 ACTUAT Budesonide 0.08 MG/ACTUAT / f

ormoterol fumarate 0.0045 MG/ACTUAT 

Metered Dose Inhaler [Symbicort] Symbicort 80-4.5 MCG/ACT Symbicort 80-4.5 

MCG/ACT 2021 12:00:00 AM EDT        2.0 {puffs}                      activ

e               Symbicort 80-

4.5 MCG/ACT                             eCW1 ([facility])

 

                                        120 ACTUAT Budesonide 0.08 MG/ACTUAT / f

ormoterol fumarate 0.0045 MG/ACTUAT 

Metered Dose Inhaler [Symbicort] Symbicort 80-4.5 MCG/ACT Symbicort 80-4.5 

MCG/ACT 2021 12:00:00 AM EDT        2.0 {puffs}                      activ

e               Symbicort 80-

4.5 MCG/ACT                             eCW1 ([facility])

 

                                        120 ACTUAT Budesonide 0.08 MG/ACTUAT / f

ormoterol fumarate 0.0045 MG/ACTUAT 

Metered Dose Inhaler [Symbicort] Symbicort 80-4.5 MCG/ACT Symbicort 80-4.5 

MCG/ACT 2021 12:00:00 AM EDT        2.0 {puffs}                      activ

e               Symbicort 80-

4.5 MCG/ACT                             eCW1 ([facility])

 

                                        120 ACTUAT Budesonide 0.08 MG/ACTUAT / f

ormoterol fumarate 0.0045 MG/ACTUAT 

Metered Dose Inhaler [Symbicort] Symbicort 80-4.5 MCG/ACT Symbicort 80-4.5 

MCG/ACT 2021 12:00:00 AM EDT        2.0 {puffs}                      activ

e               Symbicort 80-

4.5 MCG/ACT                             eCW1 ([facility])

 

                                        120 ACTUAT Budesonide 0.08 MG/ACTUAT / f

ormoterol fumarate 0.0045 MG/ACTUAT 

Metered Dose Inhaler [Symbicort] Symbicort 80-4.5 MCG/ACT Symbicort 80-4.5 

MCG/ACT 2021 12:00:00 AM EDT        2.0 {puffs}                      activ

e               Symbicort 80-

4.5 MCG/ACT                             eCW1 ([facility])

 

                                        120 ACTUAT Budesonide 0.08 MG/ACTUAT / f

ormoterol fumarate 0.0045 MG/ACTUAT 

Metered Dose Inhaler [Symbicort] Symbicort 80-4.5 MCG/ACT Symbicort 80-4.5 

MCG/ACT 2021 12:00:00 AM EDT        2.0 {puffs}                      activ

e               Symbicort 80-

4.5 MCG/ACT                             eCW1 ([facility])

 

                                        120 ACTUAT Budesonide 0.08 MG/ACTUAT / f

ormoterol fumarate 0.0045 MG/ACTUAT 

Metered Dose Inhaler [Symbicort] Symbicort 80-4.5 MCG/ACT Symbicort 80-4.5 

MCG/ACT 2021 12:00:00 AM EDT        2.0 {puffs}                      activ

e               Symbicort 80-

4.5 MCG/ACT                             eCW1 ([facility])

 

                                        120 ACTUAT Budesonide 0.08 MG/ACTUAT / f

ormoterol fumarate 0.0045 MG/ACTUAT 

Metered Dose Inhaler [Symbicort] Symbicort 80-4.5 MCG/ACT Symbicort 80-4.5 

MCG/ACT 2021 12:00:00 AM EDT       2.0 {puffs}                   active   

                eCW1 

([facility])

 

                                        120 ACTUAT Budesonide 0.08 MG/ACTUAT / f

ormoterol fumarate 0.0045 MG/ACTUAT 

Metered Dose Inhaler [Symbicort] Symbicort 80-4.5 MCG/ACT Symbicort 80-4.5 

MCG/ACT 2021 12:00:00 AM EDT        2.0 {puffs}                      activ

e               Symbicort 80-

4.5 MCG/ACT                             eCW1 ([facility])

 

                                        120 ACTUAT Budesonide 0.08 MG/ACTUAT / f

ormoterol fumarate 0.0045 MG/ACTUAT 

Metered Dose Inhaler [Symbicort] Symbicort 80-4.5 MCG/ACT Symbicort 80-4.5 

MCG/ACT 2021 12:00:00 AM EDT        2.0 {puffs}                      activ

e               Symbicort 80-

4.5 MCG/ACT                             eCW1 ([facility])

 

                                        120 ACTUAT Budesonide 0.08 MG/ACTUAT / f

ormoterol fumarate 0.0045 MG/ACTUAT 

Metered Dose Inhaler [Symbicort] Symbicort 80-4.5 MCG/ACT Symbicort 80-4.5 

MCG/ACT 2021 12:00:00 AM EDT        2.0 {puffs}                      activ

e               Symbicort 80-

4.5 MCG/ACT                             eCW1 ([facility])

 

                                        120 ACTUAT Budesonide 0.08 MG/ACTUAT / f

ormoterol fumarate 0.0045 MG/ACTUAT 

Metered Dose Inhaler [Symbicort] Symbicort 80-4.5 MCG/ACT Symbicort 80-4.5 

MCG/ACT 2021 12:00:00 AM EDT        2.0 {puffs}                      activ

e               Symbicort 80-

4.5 MCG/ACT                             eCW1 ([facility])

 

                                        120 ACTUAT Budesonide 0.08 MG/ACTUAT / f

ormoterol fumarate 0.0045 MG/ACTUAT 

Metered Dose Inhaler [Symbicort] Symbicort 80-4.5 MCG/ACT Symbicort 80-4.5 

MCG/ACT 2021 12:00:00 AM EDT        2.0 {puffs}                      activ

e               Symbicort 80-

4.5 MCG/ACT                             eCW1 ([facility])

 

                                        120 ACTUAT Budesonide 0.08 MG/ACTUAT / f

ormoterol fumarate 0.0045 MG/ACTUAT 

Metered Dose Inhaler [Symbicort] Symbicort 80-4.5 MCG/ACT Symbicort 80-4.5 

MCG/ACT 2021 12:00:00 AM EDT        2.0 {puffs}                      activ

e               Symbicort 80-

4.5 MCG/ACT                             eCW1 ([facility])

 

                                        120 ACTUAT Budesonide 0.08 MG/ACTUAT / f

ormoterol fumarate 0.0045 MG/ACTUAT 

Metered Dose Inhaler [Symbicort] Symbicort 80-4.5 MCG/ACT Symbicort 80-4.5 

MCG/ACT 2021 12:00:00 AM EDT        2.0 {puffs}                      activ

e               Symbicort 80-

4.5 MCG/ACT                             eCW1 ([facility])

 

                                        120 ACTUAT Budesonide 0.08 MG/ACTUAT / f

ormoterol fumarate 0.0045 MG/ACTUAT 

Metered Dose Inhaler [Symbicort] Symbicort 80-4.5 MCG/ACT Symbicort 80-4.5 

MCG/ACT 2021 12:00:00 AM EDT        2.0 {puffs}                      activ

e               Symbicort 80-

4.5 MCG/ACT                             eCW1 ([facility])

 

                                        120 ACTUAT Budesonide 0.08 MG/ACTUAT / f

ormoterol fumarate 0.0045 MG/ACTUAT 

Metered Dose Inhaler [Symbicort] Symbicort 80-4.5 MCG/ACT Symbicort 80-4.5 

MCG/ACT 2021 12:00:00 AM EDT        2.0 {puffs}                      activ

e               Symbicort 80-

4.5 MCG/ACT                             eCW1 ([facility])

 

                                        120 ACTUAT Budesonide 0.08 MG/ACTUAT / f

ormoterol fumarate 0.0045 MG/ACTUAT 

Metered Dose Inhaler [Symbicort] Symbicort 80-4.5 MCG/ACT Symbicort 80-4.5 

MCG/ACT 2021 12:00:00 AM EDT        2.0 {puffs}                      activ

e               Symbicort 80-

4.5 MCG/ACT                             eCW1 ([facility])

 

                                        120 ACTUAT Budesonide 0.08 MG/ACTUAT / f

ormoterol fumarate 0.0045 MG/ACTUAT 

Metered Dose Inhaler [Symbicort] Symbicort 80-4.5 MCG/ACT Symbicort 80-4.5 

MCG/ACT 2021 12:00:00 AM EDT        2.0 {puffs}                      activ

e               Symbicort 80-

4.5 MCG/ACT                             eCW1 ([facility])

 

                                        120 ACTUAT Budesonide 0.08 MG/ACTUAT / f

ormoterol fumarate 0.0045 MG/ACTUAT 

Metered Dose Inhaler [Symbicort] Symbicort 80-4.5 MCG/ACT Symbicort 80-4.5 

MCG/ACT 2021 12:00:00 AM EDT        2.0 {puffs}                      activ

e               Symbicort 80-

4.5 MCG/ACT                             eCW1 ([facility])

 

                                        120 ACTUAT Budesonide 0.08 MG/ACTUAT / f

ormoterol fumarate 0.0045 MG/ACTUAT 

Metered Dose Inhaler [Symbicort] Symbicort 80-4.5 MCG/ACT Symbicort 80-4.5 

MCG/ACT 2021 12:00:00 AM EDT        2.0 {puffs}                      activ

e               Symbicort 80-

4.5 MCG/ACT                             eCW1 ([facility])

 

                                        120 ACTUAT Budesonide 0.08 MG/ACTUAT / f

ormoterol fumarate 0.0045 MG/ACTUAT 

Metered Dose Inhaler [Symbicort] Symbicort 80-4.5 MCG/ACT Symbicort 80-4.5 

MCG/ACT 2021 12:00:00 AM EDT        2.0 {puffs}                      activ

e               Symbicort 80-

4.5 MCG/ACT                             eCW1 ([facility])

 

                                        120 ACTUAT Budesonide 0.08 MG/ACTUAT / f

ormoterol fumarate 0.0045 MG/ACTUAT 

Metered Dose Inhaler [Symbicort] Symbicort 80-4.5 MCG/ACT Symbicort 80-4.5 

MCG/ACT 2021 12:00:00 AM EDT        2.0 {puffs}                      activ

e               Symbicort 80-

4.5 MCG/ACT                             eCW1 ([facility])

 

                                        120 ACTUAT Budesonide 0.08 MG/ACTUAT / f

ormoterol fumarate 0.0045 MG/ACTUAT 

Metered Dose Inhaler [Symbicort] Symbicort 80-4.5 MCG/ACT Symbicort 80-4.5 

MCG/ACT 2021 12:00:00 AM EDT        2.0 {puffs}                      activ

e               Symbicort 80-

4.5 MCG/ACT                             eCW1 ([facility])

 

                                        120 ACTUAT Budesonide 0.08 MG/ACTUAT / f

ormoterol fumarate 0.0045 MG/ACTUAT 

Metered Dose Inhaler [Symbicort] Symbicort 80-4.5 MCG/ACT Symbicort 80-4.5 

MCG/ACT 2021 12:00:00 AM EDT        2.0 {puffs}                      activ

e               Symbicort 80-

4.5 MCG/ACT                             eCW1 ([facility])

 

                                        120 ACTUAT Budesonide 0.08 MG/ACTUAT / f

ormoterol fumarate 0.0045 MG/ACTUAT 

Metered Dose Inhaler [Symbicort] Symbicort 80-4.5 MCG/ACT Symbicort 80-4.5 

MCG/ACT 2021 12:00:00 AM EDT        2.0 {puffs}                      activ

e               Symbicort 80-

4.5 MCG/ACT                             eCW1 ([facility])

 

                                        Triamcinolone Acetonide 0.001 MG/MG Topi

casimiro Ointment Triamcinolone Acetonide 0.1

 %           Triamcinolone Acetonide 0.1 % 2021 12:00:00 AM EST           

   1.0 {application} 

                              active                        Triamcinolone Aceton

brando 0.1 % eCW1 ([facility])

 

                    Doxycycline Monohydrate 100 MG Oral Capsule Doxycycline Mono

hydrate 100 MG 

2021 12:00:00 AM EST         1.0 {capsule}                         active 

                 Doxycycline 

Monohydrate 100 MG                      eCW1 ([facility])

 

        Ivermectin 3 MG Oral Tablet Ivermectin 3 MG 2021 12:00:00 AM EST  

                                

             active                                 Ivermectin 3 MG eCW1 ([facility])

 

                    Doxycycline Monohydrate 100 MG Oral Capsule Doxycycline Mono

hydrate 100 MG 

2021 12:00:00 AM EST         1.0 {capsule}                         active 

                 Doxycycline 

Monohydrate 100 MG                      eCW1 ([facility])

 

                                        Triamcinolone Acetonide 0.001 MG/MG Topi

casimiro Ointment Triamcinolone Acetonide 0.1

 %           Triamcinolone Acetonide 0.1 % 2021 12:00:00 AM EST           

   1.0 {application} 

                              active                        Triamcinolone Aceton

brando 0.1 % eCW1 ([facility])

 

        Ivermectin 3 MG Oral Tablet Ivermectin 3 MG 2021 12:00:00 AM EST  

                                

             active                                 Ivermectin 3 MG eCW1 ([facility])

 

                    venlafaxine 150 mg oral capsule, extended release [id number]    

          2021 02:26:00 PM 

EST     Extended Release Capsule 150.0 mg By Mouth                 active       

     

                                        150 mg = 1 cap(s), PO (oral), qDay, TAKE

 1 CAPSULE BY MOUTH EVERY DAY, # 90 

cap(s), 1 Refill(s), Maintenance, Pharmacy: [facility] [id number] [facility] [employer], 1 cap(s) PO 
(oral) qDay,Instr:TAKE 1 CAPSULE BY MOUTH EVERY DAY [facility]

 

             metoprolol tartrate 25 mg oral tablet [id number]       2021 02:26

:00 PM EST Tablet       

25.0 mg    By Mouth                         active                  

                                        25 mg = 1 tab(s), PO (oral), qDay, Take 

with Metoprolol 50 mg at night, # 30 

tab(s), 2 Refill(s), Maintenance, Pharmacy: [facility] [id number] [facility] [employer], 1 tab(s) PO 
(oral) qDay,x30 day(s),Instr:Take with Metoprolol 50 mg at night [facility]

 

        Nasonex 50 mcg/inh nasal spray [id number]  2021 12:55:00 PM EST       

  2.0     Nose                    

active                                                

                                        2 spray(s), Nasal, qDay, INHALE 2 SPRAYS

 NASALLY EVERY DAY, # 1 each, 1 

Refill(s), Maintenance, Pharmacy: [facility]/pharmacy #[id number], 2 spray(s) Nasal qDay,x30 
day(s),Instr:INHALE 2 SPRAYS NASALLY EVERY DAY [place] [facility]

 

             Metoprolol Tartrate 50 mg oral tablet [id number]       2021 12:55

:00 PM EST              50.0 

mg         By Mouth                         active                  

                                        50 mg = 1 tab(s), PO (oral), bid, Take a

s directed, # 60 tab(s), 2 Refill(s), 

Maintenance, Pharmacy: [facility]/pharmacy #[id number], 1 tab(s) PO (oral) bid,x30 
day(s),Instr:Take as directed           [place] [facility], [facility]

 

                          Amlodipine 5 MG Oral Tablet amLODIPine 5 mg oral table

t amLODIPine 5 mg oral 

tablet  2020 02:05:00 PM EST         5.0 mg  By Mouth                 acti

ve            

                                        5 mg = 1 tab(s), PO (oral), qDay, # 30 t

ab(s), Maintenance, Pharmacy: 

[facility]/pharmacy #[id number], 1 tab(s) PO (oral) qDay [place] [facility]

 

        Nasonex 50 mcg/inh nasal spray [id number]  2020 09:55:00 AM EST       

  2.0     Nose                    

active                                                

                                        2 spray(s), Nasal, qDay, INHALE 2 SPRAYS

 NASALLY EVERY DAY, # 1 each, 1 

Refill(s), Maintenance, Pharmacy: [facility]/pharmacy #[id number], 2 spray(s) Nasal qDay,x30 
day(s),Instr:INHALE 2 SPRAYS NASALLY EVERY DAY [place] [facility]

 

             Metoprolol Tartrate 50 mg oral tablet [id number]       2020 12:35

:00 PM EST              50.0 

mg         By Mouth                         active                  

                                        50 mg = 1 tab(s), PO (oral), bid, Take a

s directed, # 60 tab(s), 2 Refill(s), 

Maintenance, Pharmacy: [facility]/pharmacy #[id number], 1 tab(s) PO (oral) bid,x30 
day(s),Instr:Take as directed           [place] [facility]

 

             metoprolol tartrate 25 mg oral tablet [id number]       2020 12:34

:00 PM EST Tablet       

25.0 mg    By Mouth                         active                  

                                        25 mg = 1 tab(s), PO (oral), qDay, Take 

with Metoprolol 50 mg at night, # 30 

tab(s), 2 Refill(s), Maintenance, Pharmacy: [facility]/pharmacy #[id number], 1 tab(s) PO 
(oral) qDay,x30 day(s),Instr:Take with Metoprolol 50 mg at night [place] 

[facility]

 

                          valsartan 320 MG Oral Tablet valsartan 320 mg oral tab

let valsartan 320 mg oral 

tablet  2020 03:13:00 PM EST         320.0 mg By Mouth                 act

ken            

                                        320 mg = 1 tab(s), PO (oral), qDay, # 90

 tab(s), 1 Refill(s), Maintenance, 

Pharmacy: [facility]/pharmacy #[id number], 1 tab(s) PO (oral) qDay [place] [facility]

 

        Vitamin B Complex oral capsule         2020 11:50:00 AM EST       

  1.0     By Mouth                 

completed                                       1 cap(s), PO (oral), qDay, # 30 

tab(s), 0 Refill(s), Maintenance 

[place] [facility]

 

          Vitamin B Complex oral capsule [id number]    2020 11:50:00 AM EST   

        1.0       By Mouth  

                                active                            

                          1 cap(s), PO (oral), qDay, # 30 tab(s), 0 Refill(s), M

aintenance [place] 

[facility]

 

        Vitamin B Complex oral capsule         2020 11:50:00 AM EST       

  1.0     By Mouth                 

completed                                       1 cap(s), PO (oral), qDay, # 30 

tab(s), 0 Refill(s), Maintenance 

[place] [facility]

 

      Ginkgo Biloba       2020 11:49:00 AM EST             By Mouth       

      completed             PO 

(oral), qDay, 0 Refill(s), Maintenance  [place] [facility]

 

          Probiotic Formula oral capsule [id number]    2020 11:49:00 AM EST   

        1.0       By Mouth  

                                active                            

                          1 cap(s), PO (oral), qDay, 0 Refill(s), Maintenance [facility]

[facility]

 

      Ginkgo Biloba [id number] 2020 11:49:00 AM EST             By Mouth      

       active         

PO (oral), qDay, 0 Refill(s), Maintenance [place] [facility]

 

      Ginkgo Biloba       2020 11:49:00 AM EST             By Mouth       

      completed             PO 

(oral), qDay, 0 Refill(s), Maintenance  [place] [facility]

 

        Probiotic Formula oral capsule         2020 11:49:00 AM EST       

  1.0     By Mouth                 

completed                                       1 cap(s), PO (oral), qDay, 0 Ref

ill(s), Maintenance [place] 

[facility]

 

        Probiotic Formula oral capsule         2020 11:49:00 AM EST       

  1.0     By Mouth                 

completed                                       1 cap(s), PO (oral), qDay, 0 Ref

ill(s), Maintenance [place] 

[facility]

 

           metoprolol tartrate 25 mg oral tablet            2020 11:47:00 

AM EST            25.0       By 

Mouth                            completed                        25 mg = 1 tab(

s), PO (oral), bid, 0 Refill(s), 

Maintenance                             [place] [facility]

 

           metoprolol tartrate 25 mg oral tablet            2020 11:47:00 

AM EST            25.0       By 

Mouth                            completed                        25 mg = 1 tab(

s), PO (oral), bid, 0 Refill(s), 

Maintenance                             [place] [facility]

 

             metoprolol tartrate 25 mg oral tablet [id number]       2020 11:47

:00 AM EST              25.0 

mg         By Mouth                         active                  

                          25 mg = 1 tab(s), PO (oral), bid, 0 Refill(s), Mainten

[facility]

 

           levothyroxine 25 mcg (0.025 mg) oral tablet            10/20/2020 09:

15:00 PM EDT            25.0       

By Mouth                         completed                        25 mcg = 1 tab

(s), PO (oral), qOTHERday, # 15 tab(s),

 1 Refill(s), Maintenance, Pharmacy: [facility]/pharmacy #[id number], 1 tab(s) PO (oral) 
qOTHERday                               [place] [facility]

 

           levothyroxine 25 mcg (0.025 mg) oral tablet            10/20/2020 09:

15:00 PM EDT            25.0       

By Mouth                         completed                        25 mcg = 1 tab

(s), PO (oral), qOTHERday, # 15 tab(s),

 1 Refill(s), Maintenance, Pharmacy: [id number], 1 tab(s) PO (oral) 
qOTHERday                               [facility]

 

           levothyroxine 25 mcg (0.025 mg) oral tablet            10/20/2020 09:

15:00 PM EDT            25.0       

By Mouth                         completed                        25 mcg = 1 tab

(s), PO (oral), qOTHERday, # 15 tab(s),

 1 Refill(s), Maintenance, Pharmacy: [id number], 1 tab(s) PO (oral) 
qOTHERday                               [facility]

 

           levothyroxine 25 mcg (0.025 mg) oral tablet            10/20/2020 09:

15:00 PM EDT            25.0       

By Mouth                         completed                        25 mcg = 1 tab

(s), PO (oral), qOTHERday, # 15 tab(s),

 1 Refill(s), Maintenance, Pharmacy: [id number], 1 tab(s) PO (oral) 
qOTHERday                               [place] [facility], [facility]

 

                                        Levothyroxine Sodium 0.025 MG Oral Table

t levothyroxine 25 mcg (0.025 mg) oral 

tablet              levothyroxine 25 mcg (0.025 mg) oral tablet 10/20/2020 09:15

:00 PM EDT 

Tablet    25.0 ug   By Mouth                      active                

                                        25 mcg = 1 tab(s), PO (oral), qOTHERday,

 # 15 tab(s), 1 Refill(s), Maintenance, 

[id number] 1 tab(s[facility] PO (oral) [facility]

 

                          Hydrochlorothiazide 12.5 MG Oral Tablet hydroCHLOROthi

azide 12.5 mg oral tablet 

hydroCHLOROthiazide 12.5 mg oral tablet 10/16/2020 11:34:00 AM EDT              

   12.5 mg         By 

Mouth                                  active                      

                                        12.5 mg = 1 tab(s), PO (oral), qDay, Dr. Alonzo patient-needs medicaid 

provider, # 90 tab(s), 0 Refill(s), Maintenance, [id number], 1
 tab(s) PO (oral) qDay,x90 day(s),Instr:Dr. Alonzo patient-needs medicaid 
provider                                [facility], [facility]

 

           hydroCHLOROthiazide 12.5 mg oral tablet            10/16/2020 11:34:0

0 AM EDT            12.5       By 

Mouth                            completed                        12.5 mg = 1 ta

b(s), PO (oral), qDay, Dr. Alonzo 

patient-needs medicaid provider, # 90 tab(s), 0 Refill(s), Maintenance, 
[id number], 1 tab(s) PO (oral) qDay,x90 day(s),Instr:Dr. RISHABH noyola patient-needs medicaid provider

Dr. Alonzo patient-needs medicaid provider [facility]

 

           hydroCHLOROthiazide 12.5 mg oral tablet            10/16/2020 11:34:0

0 AM EDT            12.5       By 

Mouth                            completed                        12.5 mg = 1 ta

b(s), PO (oral), qDay, Dr. Alonzo 

patient-needs medicaid provider, # 90 tab(s), 0 Refill(s), Maintenance, 
Pharmacy: [id number], 1 tab(s) PO (oral) qDay,x90 day(s),Instr:Dr. RISHABH noyola patient-needs medicaid provider

Dr. Alonzo patient-needs medicaid provider [facility], [facility]

 

           hydroCHLOROthiazide 12.5 mg oral tablet            10/16/2020 11:34:0

0 AM EDT            12.5       By 

Mouth                            completed                        12.5 mg = 1 ta

b(s), PO (oral), qDay, Dr. Alonzo 

patient-needs medicaid provider, # 90 tab(s), 0 Refill(s), Maintenance, 
Pharm[facility]: [id number], 1 tab(s) PO (oral) qDay,x90 day(s),Instr:Dr. RISHABH noyola patient-needs medicaid provider

Dr. Alonzo patient-needs medicaid provider [facility], [facility]

 

           hydroCHLOROthiazide 12.5 mg oral tablet            10/16/2020 11:34:0

0 AM EDT            12.5       By 

Mouth                            completed                        12.5 mg = 1 ta

b(s), PO (oral), qDay, Dr. Alnozo 

patient-needs medicaid provider, # 90 tab(s), 0 Refill(s), Maintenance, 
Pharm[facility]: [id number], 1 tab(s) PO (oral) qDay,x90 day(s),Instr:Dr. RISHABH noyola patient-needs medicaid provider

Dr. Alonzo patient-needs medicaid provider [facility], [facility]

 

           hydrOXYzine hydrochloride 25 mg oral tablet            10/15/2020 05:

26:00 PM EDT            25.0       

By Mouth                         completed                        25 mg = 1 tab(

s), PO (oral), tid, PRN for itching, # 

90 tab(s), 3 Refill(s), Maintenance, [facility]: [id number], 1 tab(s) PO 
(oral) tid,x30 day(s),PRN:for itching   [facility], [facility]

 

           hydrOXYzine hydrochloride 25 mg oral tablet            10/15/2020 05:

26:00 PM EDT            25.0       

By Mouth                         completed                        25 mg = 1 tab(

s), PO (oral), tid, PRN for itching, # 

90 tab(s), 3 Refill(s), Maintenance, Pharm[facility]: [id number], 1 tab(s) PO 
(oral) tid,x30 day(s),PRN:for itching   [place] [facility], [facility]

 

                                        Hydroxyzine Hydrochloride 25 MG Oral Tab

let hydrOXYzine hydrochloride 25 mg oral

 tablet             hydrOXYzine hydrochloride 25 mg oral tablet 10/15/2020 05:26

:00 PM EDT  

          25.0 mg   By Mouth                      active                

                                        25 mg = 1 tab(s), PO (oral), tid, PRN fo

r itching, # 90 tab(s), 3 Refill(s), 

Maintenance, Pharm[facility]: [id number], 1 tab(s) PO (oral) tid,x30 
day(s),PRN:for itching                  [place] [facility], [facility]

 

           hydrOXYzine hydrochloride 25 mg oral tablet            10/15/2020 05:

26:00 PM EDT            25.0       

By Mouth                         completed                        25 mg = 1 tab(

s), PO (oral), tid, PRN for itching, # 

90 tab(s), 3 Refill(s), Maintenance, [facility]: [id number], 1 tab(s) PO 
(oral) tid,x30 day(s),PRN:for itching   [place] [facility], [facility]

 

           hydrOXYzine hydrochloride 25 mg oral tablet            10/15/2020 05:

26:00 PM EDT            25.0       

By Mouth                         completed                        25 mg = 1 tab(

s), PO (oral), tid, PRN for itching, # 

90 tab(s), 3 Refill(s), Maintenance, [facility]: [id number], 1 tab(s) PO 
(oral) tid,x30 day(s),PRN:for itching   [place] [facility]

 

                                        Carbidopa 25 MG / Levodopa 100 MG Oral T

ablet Carbidopa-Levodopa  MG Oral 

Tablet (Sinemet)          Carbidopa-Levodopa  MG Oral Tablet (Sinemet)  

12:00:00 AM EDT         1 {tbl} Oral                    active                  

Take 1 tablet by mouth Four times 

daily                                   [facility]

 

           metoprolol tartrate 25 mg oral tablet            2020 04:02:00 

PM EDT            25.0       By 

Mouth                            completed                        25 mg = 1 tab(

s), PO (oral), qDay, Take 1 tablet with 

each metoprolol tartrate 50 mg dose, # 30 tab(s), 2 Refill(s), Maintenance, 
Pharmacy: [id number], 1 tab(s) PO (oral) qDay,x30 day(s),Instr:Take 1 
tablet with each metoprolol tartrate 50 mg...

Take 1 tablet with each metoprolol tartrate 50 mg dose [facility]

 

        Nasonex 50 mcg/inh nasal spray         2020 03:50:00 PM EDT       

  2.0     Nose                    

completed                                                   2 spray(s), Nasal, q

Day, INHALE 2 SPRAYS NASALLY EVERY DAY, # 1 

each, 1 Refill(s), Maintenance, Pharmacy: [id number], 2 spray(s) Nasal 
qDay,x30 day(s),Instr:INHALE 2 SPRAYS NASALLY EVERY DAY

INHALE 2 SPRAYS NASALLY EVERY DAY       [facility]

 

        Nasonex 50 mcg/inh nasal spray         2020 03:50:00 PM EDT       

  2.0     Nose                    

completed                                                   2 spray(s), Nasal, q

Day, INHALE 2 SPRAYS NASALLY EVERY DAY, # 1 

each, 1 Refill(s), Maintenance, Pharmacy: [id number], 2 spray(s) Nasal 
qDay,x30 day(s),Instr:INHALE 2 SPRAYS NASALLY EVERY DAY

INHALE 2 SPRAYS NASALLY EVERY DAY       [facility]

 

        Nasonex 50 mcg/inh nasal spray         2020 03:50:00 PM EDT       

  2.0     Nose                    

completed                                                   2 spray(s), Nasal, q

Day, INHALE 2 SPRAYS NASALLY EVERY DAY, # 1 

each, 1 Refill(s), Maintenance, Pharmacy: [id number], 2 spray(s) Nasal 
qDay,x30 day(s),Instr:INHALE 2 SPRAYS NASALLY EVERY DAY

INHALE 2 SPRAYS NASALLY EVERY DAY       [facility]

 

        Nasonex 50 mcg/inh nasal spray         2020 03:50:00 PM EDT       

  2.0     Nose                    

completed                                                   2 spray(s), Nasal, q

Day, INHALE 2 SPRAYS NASALLY EVERY DAY, # 1 

each, 1 Refill(s), Maintenance, Pharmacy: [id number], 2 spray(s) Nasal 
qDay,x30 day(s),Instr:INHALE 2 SPRAYS NASALLY EVERY DAY

INHALE 2 SPRAYS NASALLY EVERY DAY       [place] [facility], [facility]

 

        Nasonex 50 mcg/inh nasal spray         2020 03:50:00 PM EDT       

  2.0     Nose                    

completed                                                   2 spray(s), Nasal, q

Day, INHALE 2 SPRAYS NASALLY EVERY DAY, # 1 

each, 1 Refill(s), Maintenance, Pharmacy: [id number], 2 spray(s) Nasal 
qDay,x30 day(s),Instr:INHALE 2 SPRAYS NASALLY EVERY DAY

INHALE 2 SPRAYS NASALLY EVERY DAY       [place] [facility], [facility]

 

        Nasonex 50 mcg/inh nasal spray         2020 03:50:00 PM EDT       

  2.0     Nose                    

completed                                                   2 spray(s), Nasal, q

Day, INHALE 2 SPRAYS NASALLY EVERY DAY, # 1 

each, 1 Refill(s), Maintenance, [id number], 2 spray(s) Nasal 
qDay,x30 day(s),Instr:INHALE 2 SPRAYS NASALLY EVERY DAY

INHALE 2 SPRAYS NASALLY EVERY DAY       [place] [facility]

 

        Nasonex 50 mcg/inh nasal spray         2020 03:50:00 PM EDT       

  2.0     Nose                    

completed                                                   2 spray(s), Nasal, q

Day, INHALE 2 SPRAYS NASALLY EVERY DAY, # 1 

each, 1 Refill(s), Maintenance, Pharmacy: [id number], 2 spray(s) Nasal 
qDay,x30 day(s),Instr:INHALE 2 SPRAYS NASALLY EVERY DAY

INHALE 2 SPRAYS NASALLY EVERY DAY       [place] [facility]

 

        Nasonex 50 mcg/inh nasal spray         2020 03:50:00 PM EDT       

  2.0     Nose                    

completed                                                   2 spray(s), Nasal, q

Day, INHALE 2 SPRAYS NASALLY EVERY DAY, # 1 

each, 1 Refill(s), Maintenance, Pharmacy: [id number], 2 spray(s) Nasal 
qDay,x30 day(s),Instr:INHALE 2 SPRAYS NASALLY EVERY DAY

INHALE 2 SPRAYS NASALLY EVERY DAY       [place]

 

        Nasonex 50 mcg/inh nasal spray         2020 03:50:00 PM EDT       

  2.0     Nose                    

completed                                                   2 spray(s), Nasal, q

Day, INHALE 2 SPRAYS NASALLY EVERY DAY, # 1 

each, 1 Refill(s), Maintenance, Pharmacy: [id number], 2 spray(s) Nasal 
qDay,x30 day(s),Instr:INHALE 2 SPRAYS NASALLY EVERY DAY

INHALE 2 SPRAYS NASALLY EVERY DAY       [place] [facility], [facility]

 

        Nasonex 50 mcg/inh nasal spray         2020 03:50:00 PM EDT       

  2.0     Nose                    

completed                                                   2 spray(s), Nasal, q

Day, INHALE 2 SPRAYS NASALLY EVERY DAY, # 1 

each, 1 Refill(s), Maintenance, Pharmacy: [id number], 2 spray(s) Nasal 
qDay,x30 day(s),Instr:INHALE 2 SPRAYS NASALLY EVERY DAY

INHALE 2 SPRAYS NASALLY EVERY DAY       [place] [facility]

 

        Nasonex 50 mcg/inh nasal spray         2020 03:50:00 PM EDT       

  2.0     Nose                    

completed                                                   2 spray(s), Nasal, q

Day, INHALE 2 SPRAYS NASALLY EVERY DAY, # 1 

each, 1 Refill(s), Maintenance, [id number], 2 spray(s) Nasal 
qDay,x30 day(s),Instr:INHALE 2 SPRAYS NASALLY EVERY DAY

INHALE 2 SPRAYS NASALLY EVERY DAY       [place] [facility]

 

        Nasonex 50 mcg/inh nasal spray         2020 03:50:00 PM EDT       

  2.0     Nose                    

completed                                                   2 spray(s), Nasal, q

Day, INHALE 2 SPRAYS NASALLY EVERY DAY, # 1 

each, 1 Refill(s), Maintenance, Pharmacy: [id number], 2 spray(s) Nasal 
qDay,x30 day(s),Instr:INHALE 2 SPRAYS NASALLY EVERY DAY

INHALE 2 SPRAYS NASALLY EVERY DAY       [facility], [facility]

 

        Nasonex 50 mcg/inh nasal spray [id number]  2020 03:50:00 PM EDT       

  2.0     Nose                    

active                                                

                                        2 spray(s), Nasal, qDay, INHALE 2 SPRAYS

 NASALLY EVERY DAY, # 1 each, 1 

Refill(s), Maintenance[id number], 2 spray(s) Nasal qDay,x30 
day(s),Instr:INHALE 2 SPRAYS NASALLY EVERY DAY [place]

 

        Nasonex 50 mcg/inh nasal spray         2020 03:50:00 PM EDT       

  2.0     Nose                    

completed                                                   2 spray(s), Nasal, q

Day, INHALE 2 SPRAYS NASALLY EVERY DAY, # 1 

each, 1 Refill(s), Maintenance, Pharmacy: [id number], 2 spray(s) Nasal 
qDay,x30 day(s),Instr:INHALE 2 SPRAYS NASALLY EVERY DAY

INHALE 2 SPRAYS NASALLY EVERY DAY       [facility]  [facility]

 

           hydroCHLOROthiazide 12.5 mg oral tablet            2020 12:05:0

0 PM EDT            12.5       By 

Mouth                            completed                        12.5 mg = 1 ta

b(s), PO (oral), qDay, Dr. Alonzo 

patient-needs medicaid provider, # 90 tab(s), 0 Refill(s), Maintenance, 
Pharmacy: [id number], 1 tab(s) PO (oral) qDay,x90 day(s),Instr:Dr. RISHABH noyola patient-needs medicaid provider

Dr. Alonzo patient-needs medicaid provider [facility]

 

           hydroCHLOROthiazide 12.5 mg oral tablet            2020 12:05:0

0 PM EDT            12.5       By 

Mouth                            completed                        12.5 mg = 1 ta

b(s), PO (oral), qDay, Dr. Alonzo 

patient-needs medicaid provider, # 90 tab(s), 0 Refill(s), Maintenance, 
[id number], 1 tab(s) PO (oral) qDay,x90 day(s),Instr:Dr. RISHABH noyola patient-needs medicaid provider

Dr. Alonzo patient-needs medicaid provider [place] [facility]

 

        gabapentin 300 mg oral capsule         2020 12:05:00 PM EDT       

  300.0   By Mouth          

                completed                                       300 mg = 1 cap(s

), PO (oral), tid, 1 capsule in the Am and 2 

before bedtime, # 180 cap(s), 1 Refill(s), Maintenance, [id number], 1 cap(s) PO (oral) tid,x60 day(s),Instr:1 capsule in the Am and 2 before 
bedtime

                          1 capsule in the Am and 2 before bedtime [facility]

 

           hydroCHLOROthiazide 12.5 mg oral tablet            2020 12:05:0

0 PM EDT            12.5       By 

Mouth                            completed                        12.5 mg = 1 ta

b(s), PO (oral), qDay, Dr. Alonzo 

patient-needs medicaid provider, # 90 tab(s), 0 Refill(s), Maintenance, 
Pharmacy: [id number], 1 tab(s) PO (oral) qDay,x90 day(s),Instr:Dr. RISHABH noyola patient-needs medicaid provider

Dr. Alonzo patient-needs medicaid provider [facility]

 

           hydroCHLOROthiazide 12.5 mg oral tablet            2020 12:05:0

0 PM EDT            12.5       By 

Mouth                            completed                        12.5 mg = 1 ta

b(s), PO (oral), qDay, Dr. Alonzo 

patient-needs medicaid provider, # 90 tab(s), 0 Refill(s), Maintenance, 
Pharmacy: [id number], 1 tab(s) PO (oral) qDay,x90 day(s),Instr:Dr. RISHABH noyola patient-needs medicaid provider

Dr. Alonzo patient-needs medicaid provider [facility], [facility]

 

           hydroCHLOROthiazide 12.5 mg oral tablet            2020 12:05:0

0 PM EDT            12.5       By 

Mouth                            completed                        12.5 mg = 1 ta

b(s), PO (oral), qDay, Dr. Alonzo 

patient-needs medicaid provider, # 90 tab(s), 0 Refill(s), Maintenance, 
Pharmacy: [id number], 1 tab(s) PO (oral) qDay,x90 day(s),Instr:Dr. RISHABH noyola patient-needs medicaid provider

Dr. Alonzo patient-needs medicaid provider [facility], [facility]

 

           hydroCHLOROthiazide 12.5 mg oral tablet            2020 12:05:0

0 PM EDT            12.5       By 

Mouth                            completed                        12.5 mg = 1 ta

b(s), PO (oral), qDay, Dr. Alonzo 

patient-needs medicaid provider, # 90 tab(s), 0 Refill(s), Maintenance, 
Pharmacy: [id number], 1 tab(s) PO (oral) qDay,x90 day(s),Instr:Dr. RISHABH noyola patient-needs medicaid provider

Dr. Alonzo patient-needs medicaid provider [facility], [facility]

 

           hydroCHLOROthiazide 12.5 mg oral tablet            2020 12:05:0

0 PM EDT            12.5       By 

Mouth                            completed                        12.5 mg = 1 ta

b(s), PO (oral), qDay, Dr. Alonzo 

patient-needs medicaid provider, # 90 tab(s), 0 Refill(s), Maintenance, 
Pharmacy: [id number], 1 tab(s) PO (oral) qDay,x90 day(s),Instr:Dr. RISHABH noyola patient-needs medicaid provider

Dr. Alonzo patient-needs medicaid provider [facility], [facility]

 

           hydroCHLOROthiazide 12.5 mg oral tablet            2020 12:05:0

0 PM EDT            12.5       By 

Mouth                            completed                        12.5 mg = 1 ta

b(s), PO (oral), qDay, Dr. Alonzo 

patient-needs medicaid provider, # 90 tab(s), 0 Refill(s), Maintenance, 
Pharmacy: [id number], 1 tab(s) PO (oral) qDay,x90 day(s),Instr:Dr. RISHABH noyola patient-needs medicaid provider

Dr. Alonzo patient-needs medicaid provider [place] [facility], [facility]

 

           hydroCHLOROthiazide 12.5 mg oral tablet            2020 12:05:0

0 PM EDT            12.5       By 

Mouth                            completed                        12.5 mg = 1 ta

b(s), PO (oral), qDay, Dr. Alonzo 

patient-needs medicaid provider, # 90 tab(s), 0 Refill(s), Maintenance, 
Pharmacy: [id number], 1 tab(s) PO (oral) qDay,x90 day(s),Instr:Dr. RISHABH noyola patient-needs medicaid provider

Dr. Alonzo patient-needs medicaid provider [facility]

 

        gabapentin 300 mg oral capsule         2020 12:05:00 PM EDT       

  300.0   By Mouth          

                completed                                       300 mg = 1 cap(s

), PO (oral), tid, 1 capsule in the Am and 2 

before bedtime, # 180 cap(s), 1 Refill(s), Maintenance, Pharmacy: [facility] 
#[id number], 1 cap(s) PO (oral) tid,x60 day(s),Instr:1 capsule in the Am and 2 before 
bedtime

                          1 capsule in the Am and 2 before bedtime [facility]

 

           hydrOXYzine hydrochloride 25 mg oral tablet            2020 12:

04:00 PM EDT            25.0       

By Mouth                         completed                        25 mg = 1 tab(

s), PO (oral), tid, PRN for itching, # 

90 tab(s), 3 Refill(s), Maintenance, [id number], 1 tab(s) PO 
(oral) tid,x30 day(s),PRN:for itching   [place] [facility]

 

           hydrOXYzine hydrochloride 25 mg oral tablet            2020 12:

04:00 PM EDT            25.0       

By Mouth                         completed                        25 mg = 1 tab(

s), PO (oral), tid, PRN for itching, # 

90 tab(s), 3 Refill(s), Maintenance, Pharm[facility]: [id number], 1 tab(s) PO 
(oral) tid,x30 day(s),PRN:for itching   [place] [facility], [facility]

 

           hydrOXYzine hydrochloride 25 mg oral tablet            2020 12:

04:00 PM EDT            25.0       

By Mouth                         completed                        25 mg = 1 tab(

s), PO (oral), tid, PRN for itching, # 

90 tab(s), 3 Refill(s), Maintenance, [facility]: [id number], 1 tab(s) PO 
(oral) tid,x30 day(s),PRN:for itching   [place] [facility]

 

           hydrOXYzine hydrochloride 25 mg oral tablet            2020 12:

04:00 PM EDT            25.0       

By Mouth                         completed                        25 mg = 1 tab(

s), PO (oral), tid, PRN for itching, # 

90 tab(s), 3 Refill(s), Maintenance, Pharm[facility]: [id number], 1 tab(s) PO 
(oral) tid,x30 day(s),PRN:for itching   [place] [facility]

 

           hydrOXYzine hydrochloride 25 mg oral tablet            2020 12:

04:00 PM EDT            25.0       

By Mouth                         completed                        25 mg = 1 tab(

s), PO (oral), tid, PRN for itching, # 

90 tab(s), 3 Refill(s), Maintenance, [facility]: [id number], 1 tab(s) PO 
(oral) tid,x30 day(s),PRN:for itching   [place] [facility]

 

           hydrOXYzine hydrochloride 25 mg oral tablet            2020 12:

04:00 PM EDT            25.0       

By Mouth                         completed                        25 mg = 1 tab(

s), PO (oral), tid, PRN for itching, # 

90 tab(s), 3 Refill(s), Maintenance, Pharm[facility]: [id number], 1 tab(s) PO 
(oral) tid,x30 day(s),PRN:for itching   [place] [facility], [facility]

 

           hydrOXYzine hydrochloride 25 mg oral tablet            2020 12:

04:00 PM EDT            25.0       

By Mouth                         completed                        25 mg = 1 tab(

s), PO (oral), tid, PRN for itching, # 

90 tab(s), 3 Refill(s), Maintenance, Pharm[facility]: [id number], 1 tab(s) PO 
(oral) tid,x30 day(s),PRN:for itching   [place] [facility]

 

           hydrOXYzine hydrochloride 25 mg oral tablet            2020 12:

04:00 PM EDT            25.0       

By Mouth                         completed                        25 mg = 1 tab(

s), PO (oral), tid, PRN for itching, # 

90 tab(s), 3 Refill(s), Maintenance, [facility]: [id number], 1 tab(s) PO 
(oral) tid,x30 day(s),PRN:for itching   [place] [facility]

 

           hydrOXYzine hydrochloride 25 mg oral tablet            2020 12:

04:00 PM EDT            25.0       

By Mouth                         completed                        25 mg = 1 tab(

s), PO (oral), tid, PRN for itching, # 

90 tab(s), 3 Refill(s), Maintenance, Pharmacy: [id number], 1 tab(s) PO 
(oral) tid,x30 day(s),PRN:for itching   [place] [facility]

 

                                        Carbidopa 25 MG / Levodopa 100 MG Oral T

ablet Carbidopa-Levodopa  MG Oral 

Tablet (Sinemet)          Carbidopa-Levodopa  MG Oral Tablet (Sinemet) 03/

10/2020 

12:00:00 AM EDT         1 {tbl} Oral                    active                  

Take 1 tablet by mouth Four times 

daily                                   [facility]

 

                                        Carbidopa 25 MG / Levodopa 100 MG Extend

ed Release Oral Tablet carbidopa-

levodopa (SINEMET CR)  mg CR tablet carbidopa-levodopa (SINEMET CR) 

 mg CR tablet               1 {tbl} oral                 completed              

 Take 1 tablet by mouth every 

night. Swallow whole.  Do not crush, chew, or split. [facility]

 

                                        Take 1 tablet by mouth every night. Swal

low whole.  Do not crush, chew, or 

split. 

 

                                        Carbidopa 25 MG / Levodopa 100 MG Oral T

ablet carbidopa-levodopa (SINEMET) 25-

100 mg tablet carbidopa-levodopa (SINEMET)  mg tablet                     

1 {tbl}   oral                 

             completed                              Take 1 tablet by mouth 4 (fo

ur) times a day. [facility]

 

                                        Take 1 tablet by mouth 4 (four) times a 

day. 

 

                                        Metoprolol Tartrate 25 MG Oral Tablet me

toprolol tartrate (LOPRESSOR) 25 mg 

tablet metoprolol tartrate (LOPRESSOR) 25 mg tablet                      oral   

              completed         

                          Take by mouth.            [facility]

 

                                        Take by mouth. 



                                                                                
                                                                                
                                                                                
                                                                                
                                                                                
                                                                                
                                                                                
                                                                                
                                                                                
                                                                                
                                                                                
                                                                                
                                                                                
                                                                                
                                                                                
                                                                                
                                                                                
                                                                                
                                                                                
                                                                                
                                                                                
                                                                                
                                                                                
                                                                                
                                                                                
                                                                                
                                                                                
                                                                                
                                                                                
                                                                                
                                                                                
                                                                                
                                                                                
                                                                                
                                                



Insurance Providers

          



             Payer name   Policy type / Coverage type Policy ID    Covered party

 ID Covered 

party's relationship to osman Policy Osman             Plan Information

 

          [facility] MEDICAID           [id number]           Self         

       [id number]

 

          [facility] MED[facility] HMO G         [id number]            Self              

  [id number]

 

          MEDICARE PART A [facility]       [id number]                   [id number]

[id number]

 

          UHC UNITED MEDICARE DUAL G         [id number]           Self           

     [id number]

 

          UHC UNITED MEDICARE DUAL G         [id number]           Self           

     [id number]

 

          COMMERCIAL INSURANCE OTHER COM         [id number] A            

       

 

          1         COM         [id number] A                   

 

          [facility] MEDICARE [facility]       [id number]  [id number] A                   [id number]

[id number]

 

          MEDICAID [facility] [employer] [facility]       [id number]  [id number] A               

    [id number]

 

          MEDICAID           [id number]            Self                [id number]

 

          [facility]       [id number]                   [id number]

[id number]

 

          ROBERT   I         [id number]           Self                [id number]

400

 

          [facility] MEDICARE [facility]       [id number] A                   [id number]

[id number]

 

          UNITED HE[employer] MEDICARE ADVANTAGE                                  

                  

 

          MEDICAID [facility] COMPUTER S MARGIE       [id number]  [id number] A               

    [id number]

 

          [place]ICAID [place]                                                    

 

          [employer] MEDICARE           [id number]           Self         

       [id number]

 

          UNITED [employer]ARE MEDICARE           [id number]           Self         

       [id number]

 

          UNITED [employer]CARE MEDICAID           [id number]           Self         

       [id number]

 

          [place]           [id number]            Self                [id number]

 

          [employer]           [place]    [id number] [place]                [place]

[place]

 

          [place]           [id number]            SP                  [id number]

J

 

          UHC MEDICARE MCR       [id number] A                   [id number]

[id number]

 

          [place]ARE [place] MARGIE       [id number] A                   [id number]

[id number]

 

          HMOBLUE OPTION MARGIE                 [id number] A                    

 

          DENTAL [facility] DENT MARGIE       [id number] A         

          [id number]

 

          DENTAL DENTAQUEST MARGIE       [id number] A                   

[id number]

 

          WELLCARE MEDICARE MCR       [id number]  [id number] A                   [id number]

[id number]

 

          PFAP 100% COM                 [id number] A                    

 

          DENTAL MEDICAID [facility]       [id number] A                  

 [id number]

 

          [facility] MCRO           [id number]           SP             

     [id number]

 

          MEDICARE COMPLETE           [id number]           SP                  [id number]

[id number]

 

          [employer] HealthCare           [place]    [id number] [place]

[place]

 

          Robert             [id number]           Self                [id number]

400

 

          Wellcare            [id number]            Self                [id number]

 

          [facility]           [id number]           SP             

     [id number]

 

          UHC MEDICARE MCR       [id number] A                   [id number]

46

 

          MEDICARE COMPLETE           [id number]           SP                  [id number]

[id number]

 

          [place]       [id number]  [id number] A               

    [id number]

 

          UHC MEDICARE MCR       [id number] A                   [id number]

46

 

          [facility]           [id number]           Self                [id number]

[id number]

 

          Medicaid            [id number]            Self                [id number]



                                                                                
                                                                                
                                                                                
                                                                                
                                                                                
                                                                                
                  



Problems, Conditions, and Diagnoses

          



           Code       Display Name Description Problem Type Effective Dates Data

 Source(s)

 

           I20.0      Unstable angina Unstable angina Diagnosis  2021 05:1

2:09 PM EDT 

[facility]

 

                    Z01.818             Encounter for other preprocedural examin

ation Encounter for other 

preprocedural examination Diagnosis           2021 04:28:00 PM EDT [facility]

 

                      unstable angina unstable angina Diagnosis  2021 04:2

8:00 PM EDT [facility]

 

                      Hospital Follow up Hospital Follow up Diagnosis  

0 12:53:00 PM EST 

[place] [facility]

 

             R62.7        Adult failure to thrive Adult failure to thrive Diagno

sis    2020 

02:36:00 PM EST                         [facility]

 

                    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

 condition, unspecified Mood 

disorder due to known physiological condition, unspecified Diagnosis            

     2020 

02:36:00 PM EST                         [facility]

 

                      Failure To Thrive Failure To Thrive Diagnosis  2020 

02:36:00 PM EST 

[facility]

 

                      Hallucinations Hallucinations Diagnosis  2020 02:36:

00 PM EST [facility]

 

                                        Needs placement/ assitance for ADL diffi

culty Needs placement/ assitance for 

ADL difficulty      Diagnosis           2020 02:25:00 PM EST [facility]

 

                     Dysphagia, unspecified Dysphagia, unspecified Diagnosi

s    2020 

11:00:00 AM EST                         [place] [facility]

 

                          XR ESOPH AND UGI       MLB XR ESOPH AND UGI       MLB 

Diagnosis    2020 

04:00:00 AM EST                         [place] [facility]

 

                                        6 month/Pt prefers NOT coming in office 

6 month/Pt prefers NOT coming in 

office              Diagnosis           2020 12:36:00 PM EST [place]

[facility]

 

             R079         Chest pain, unspecified Chest pain, unspecified Diagno

sis    2020 

12:36:00 PM EST                         [place] [facility]

 

             I10          Essential (primary) hypertension Essential (primary) h

ypertension Diagnosis    

2020 12:36:00 PM EST              [place] [facility]

 

                    R931                Abnormal findings on dx imaging of heart

 and cor circ Abnormal findings on 

dx imaging of heart and cor circ Diagnosis           2020 12:36:00 PM EST 

[place] 

[place] [facility]

 

                      US     MLB US     MLB Diagnosis  2020 04:00:00 AM ES

T [place] [facility]

 [facility]

 

                      US AA      US AA      Diagnosis  10/26/2020 04:00:00 AM ED

T [place] [facility]

 

                [id number]          Unspecified asthma, uncomplicated Unspecified as

thma, uncomplicated 

Diagnosis                 10/22/2020 04:00:00 AM EDT [place] [facility]

 

                     Dyspnea, unspecified Dyspnea, unspecified Diagnosis   

 10/22/2020 04:00:00 AM

 EDT                                    [place] [facility]

 

                          XR ESOPH UGI        MLB XR ESOPH UGI        MLB Diagno

sis    10/20/2020 04:00:00 

AM EDT                                  [place] [facility]

 

                 Prediabetes Prediabetes Diagnosis  10/12/2020 12:06:00 PM 

EDT [place] 

[place] [facility]

 

                      LABS     MLB LABS     MLB Diagnosis  10/12/2020 05:00:00 A

M EDT [place] 

[place] [facility]

 

                      BP issues  BP issues  Diagnosis  10/07/2020 02:45:00 PM ED

T [place] [facility] 

[facility]

 

             G60.3        [id number]    Idiopathic progressive neuropathy Problem

      2021 12:00:00 AM

 EDT                                    eCW1 ([facility])

 

             G20          [id number]     Parkinsonism, unspecified Parkinsonism ty

pe Problem      2021 

12:00:00 AM EDT                         eCW1 ([facility])

 

           G20        [id number]   Parkinson's disease Problem    2021 12:00

:00 AM EDT eCW1 

([facility])

 

                I10             [id number]        Essential hypertension with goal

 blood pressure less than 130/80 

Problem                   2021 12:00:00 AM EDT eCW1 ([facility])

 

             I49.8        [id number]    POTS (postural orthostatic tachycardia sy

ndrome) Problem      

2021 12:00:00 AM EDT              eCW1 ([facility])

 

             G47.33       [id number]     RENUKA (obstructive sleep apnea) Problem    

  2021 12:00:00 AM EDT

                                        eCW1 ([facility])

 

             [id number]     Essential hypertension Essential hypertension Problem 

     2021 

12:00:00 AM EDT                         [facility] ([facility] )

 

                    [id number]            Allergic asthma without status asthmatic

us Allergic asthma without 

status asthmaticus  Problem             2021 12:00:00 AM EDT [facility] ([facility] )

 

             J45.30       [id number]    Mild persistent asthma without complicati

on Problem      2021 

12:00:00 AM EDT                         eCW1 ([facility])

 

           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 

12:00:00 AM EST eCW1 

([facility])



                                                                                
                                                                                
                                                                                
                                                                                
                                                                                
                  



Surgeries/Procedures

          



             Procedure    Description  Date         Indications  Data Source(s)

 

                    Coronary Angiography With Left Heart Catheterization        

             2021 12:00:00 AM 

EDT                                                 [facility]

 

             Echocardiography (procedure)              2021 01:41:00 PM ED

T              [facility]

 

                    Echocardiography, Profl,Tranthoracic, Realtime Image Documen

tatio                     2021 

12:00:00 AM EDT                                     [facility]

 

             INITIAL HOSPITAL CARE/DAY 70 MINUTES              2021 12:00:

00 AM EDT              [facility]

 

             CTA Chest non-card W/ & or w/o              08/10/2021 06:15:00 PM 

EDT              [facility]

 

             Plain chest X-ray (procedure)              08/10/2021 03:10:00 PM E

[facility]

 

             SARS-CoV-2 Rapid RNA (RT-PCR)              08/10/2021 12:00:00 AM E

[facility]

 

                    CONTRAINDICATION TO ACE/ARB CONTRAINDICATION TO ACE/ARB  11:36:05 AM 

EST                                                 [facility]

 

                    NOTIFY PROVIDER (COMMUNICATION) NOTIFY PROVIDER (COMMUNICATI

ON) 2020 

11:36:05 AM EST                                     [facility]

 

             IV TO SALINE LOCK IV TO SALINE LOCK 2020 11:36:05 AM EST     

         [facility]

 

             DISCHARGE PATIENT DISCHARGE PATIENT 2020 11:36:04 AM EST     

         [facility]

 

                    FOLLOW UP PRIMARY PHYSICIAN FOLLOW UP PRIMARY PHYSICIAN  11:36:01 AM 

EST                                                 [facility]

 

             DISCHARGE ACTIVITY DISCHARGE ACTIVITY 2020 11:36:01 AM EST   

           [facility]

 

             ADULT DISCHARGE DIET ADULT DISCHARGE DIET 2020 11:36:01 AM JARETH ELENA              [facility]

 

                    IP CONSULT TO NUTRITION SERVICES IP CONSULT TO NUTRITION SER

VICES 2020 

07:32:05 AM [facility]

 

             YELLOW TOP   YELLOW TOP   2020 04:27:00 AM EST              [facility]

 

             BASIC METABOLIC PANEL BASIC METABOLIC PANEL 2020 04:04:00 AM 

[facility]

 

                          OT-EVALUATE PATIENT, DEVELOP A PLAN OF CARE, AND IMPLE

MENT PLAN OT-EVALUATE 

PATIENT, DEVELOP A PLAN OF CARE, AND IMPLEMENT PLAN 2020 10:03:36 PM EST  

                                        [facility]

 

                          PT-EVALUATE PATIENT, DEVELOP A PLAN OF CARE, AND IMPLE

MENT PLAN PT-EVALUATE 

PATIENT, DEVELOP A PLAN OF CARE, AND IMPLEMENT PLAN 2020 10:03:36 PM [facility]

 

             ADMIT TO INPATIENT ADMIT TO INPATIENT 2020 09:41:10 PM [facility]

 

                    GRADUATED COMPRESSION STOCKING APPLIED GRADUATED COMPRESSION

 STOCKING APPLIED 

2020 09:40:01 PM [facility]

 

                    NOTIFY PROVIDER (VITALS PARAMETERS) NOTIFY PROVIDER (VITALS 

PARAMETERS) 

2020 09:40:01 PM EST                           [facility]

 

             ACTIVITY     ACTIVITY     2020 09:40:01 PM EST              [facility]

 

             SALINE LOCK IV SALINE LOCK IV 2020 09:40:01 PM [facility]

 

             MAINTAIN IV ACCESS MAINTAIN IV ACCESS 2020 09:40:01 PM [facility]

 

             INSERT SALINE LOCKIV INSERT SALINE LOCKIV 2020 09:40:01 PM JARETH ELENA              [facility]

 

             VITAL SIGNS  VITAL SIGNS  2020 09:40:01 PM EST              [facility]

 

             ADULT DIET   ADULT DIET   2020 09:40:01 PM EST              [facility]

 

             FULL CODE    FULL CODE    2020 09:40:01 PM EST              [facility]

 

                IP CONSULT TO HOSPITALIST IP CONSULT TO HOSPITALIST 2020 0

7:40:04 PM [facility]

 

             CONSULT CPEP CONSULT CPEP 2020 06:57:20 PM EST              [facility]

 

                    URINALYSIS WITH REFLEX MICROSCOPIC URINALYSIS WITH REFLEX MI

CROSCOPIC 2020

 04:48:00 PM [facility]

 

             DRUG SCREEN, URINE DRUG SCREEN, URINE 2020 04:48:00 PM [facility]

 

             FLU A/B RSV NAAT FLU A/B RSV NAAT 2020 04:36:00 PM [facility]

 

             SARS-COV-2 BY NAAT SARS-COV-2 BY NAAT 2020 04:36:00 PM [facility]

 

             COVID-19 ORDERABLE [facility] COVID-19 ORDERABLE [facility] 2020 04:36:00 P

M [facility]

 

             CT HEAD WO CONTRAST CT HEAD WO CONTRAST 2020 03:53:01 PM [facility]

 

             GRAY TOP (BLOOD) GRAY TOP (BLOOD) 2020 03:39:00 PM [facility]

 

             LIGHT BLUE TOP LIGHT BLUE TOP 2020 03:39:00 PM [facility]

 

             EXTRA BLOOD CULTURE EXTRA BLOOD CULTURE 2020 03:39:00 PM [facility]

 

             EXTRA TUBES  EXTRA TUBES  2020 03:39:00 PM [facility]

 

             CBC AUTO DIFF CBC AUTO DIFF 2020 03:39:00 PM [facility]

 

             TSH          TSH          2020 03:39:00 PM [facility]

 

             TROPONIN I   TROPONIN I   2020 03:39:00 PM [facility]

 

                    CBC WITH AUTO DIFFERENTIAL CBC WITH AUTO DIFFERENTIAL 2020 03:39:00 PM [facility]

 

                    COMPREHENSIVE METABOLIC PANEL COMPREHENSIVE METABOLIC PANEL 

2020 03:39:00 

PM [facility]

 

             MAGNESIUM    MAGNESIUM    2020 03:39:00 PM [facility]

 

             LIPASE       LIPASE       2020 03:39:00 PM [facility]

 

             ETHANOL      ETHANOL      2020 03:39:00 PM [facility]

 

             SALICYLATE LEVEL SALICYLATE LEVEL 2020 03:39:00 PM [facility]

 

             ACETAMINOPHEN LEVEL ACETAMINOPHEN LEVEL 2020 03:39:00 PM [facility]

 

             CK           CK           2020 03:39:00 PM [facility]

 

             C-REACTIVE PROTEIN C-REACTIVE PROTEIN 2020 03:39:00 PM EST   

           [facility]

 

                    HIV ANTIBODY SCREEN (RAPID) HIV ANTIBODY SCREEN (RAPID)  03:39:00 PM 

EST                                                 [facility]

 

             XR CHEST 1 VIEW XR CHEST 1 VIEW 2020 03:30:21 PM EST         

     [facility]

 

             ECG 12-LEAD  ECG 12-LEAD  2020 03:19:11 PM EST              [facility]

 

             FLIGHT RISK  FLIGHT RISK  2020 02:49:40 PM EST              [facility]

 

             SUICIDE PRECAUTIONS SUICIDE PRECAUTIONS 2020 02:49:13 PM EST 

             [facility]

 

                    US EXAM ABDO BACK WALL COMP US EXAM ABDO BACK WALL COMP 2020 12:00:00 AM 

EST                                                 [place] [facility]

 

                    US EXAM ABDO BACK WALL COMP US EXAM ABDO BACK WALL COMP 2020 12:00:00 AM 

EST                                                 [place] [facility]

 [facility]

 

                    GLYCOSYLATED HEMOGLOBIN TEST GLYCOSYLATED HEMOGLOBIN TEST 10

/2020 12:00:00 AM

 EDT                                                [place] [facility]

 Of [facility]

 

                    ASSAY THYROID STIM HORMONE ASSAY THYROID STIM HORMONE 10/12/

2020 12:00:00 AM EDT

                                                    [place] [facility]

 [facility]

 

                    ASSAY THYROID STIM HORMONE ASSAY THYROID STIM HORMONE 10/12/

2020 12:00:00 AM EDT

                                                    [place] [facility]

 Of [facility]

 

             ROUTINE VENIPUNCTURE ROUTINE VENIPUNCTURE 10/12/2020 12:00:00 AM ED

T              [place]

 [facility]

 

                    GLYCOSYLATED HEMOGLOBIN TEST GLYCOSYLATED HEMOGLOBIN TEST 10

/2020 12:00:00 AM

 EDT                                                [place] [facility]

 [facility]

 

                    ASSAY THYROID STIM HORMONE ASSAY THYROID STIM HORMONE 10/12/

2020 12:00:00 AM EDT

                                                    [place] [facility]

 [facility]

 

                    ASSAY THYROID STIM HORMONE ASSAY THYROID STIM HORMONE 10/12/

2020 12:00:00 AM EDT

                                                    [place] [facility]

 Of [facility]

 

             ROUTINE VENIPUNCTURE ROUTINE VENIPUNCTURE 10/12/2020 12:00:00 AM ED

T              [place]

 [facility]

 

                    GLYCOSYLATED HEMOGLOBIN TEST GLYCOSYLATED HEMOGLOBIN TEST 10

/2020 12:00:00 AM

 EDT                                                [place] [facility]

 [facility]

 

                    ASSAY THYROID STIM HORMONE ASSAY THYROID STIM HORMONE 10/12/

2020 12:00:00 AM EDT

                                                    [place] [facility]

 [facility]

 

                    ASSAY THYROID STIM HORMONE ASSAY THYROID STIM HORMONE 10/12/

2020 12:00:00 AM EDT

                                                    [place] [facility]

 [facility]

 

             ROUTINE VENIPUNCTURE ROUTINE VENIPUNCTURE 10/12/2020 12:00:00 AM ED

T              [place]

 [facility]

 

                    ASSAY THYROID STIM HORMONE ASSAY THYROID STIM HORMONE 10/12/

2020 12:00:00 AM EDT

                                                    [place] [facility]

 [facility]

 

                    ASSAY THYROID STIM HORMONE ASSAY THYROID STIM HORMONE 10/12/

2020 12:00:00 AM EDT

                                                    [place] [facility]

 [facility]

 

             ROUTINE VENIPUNCTURE ROUTINE VENIPUNCTURE 10/12/2020 12:00:00 AM ED

T              [place]

 [facility]

 

                    GLYCOSYLATED HEMOGLOBIN TEST GLYCOSYLATED HEMOGLOBIN TEST 10

/2020 12:00:00 AM

 EDT                                                [place] [facility]

 [facility]

 

                    ASSAY THYROID STIM HORMONE ASSAY THYROID STIM HORMONE 10/12/

2020 12:00:00 AM EDT

                                                    [place] [facility]

 [facility]

 

                    ASSAY THYROID STIM HORMONE ASSAY THYROID STIM HORMONE 10/12/

2020 12:00:00 AM EDT

                                                    [place] [facility]

 [facility]



                                                                                
                                                                                
                                                                                
                                                                                
                                                                                
                                                                                
                                                                                
                                                                                
                                                                                
                                                                                
                                                          



Results

          



                    ID                  Date                Data Source

 

                    [id number]           2021 09:54:02 PM EDT [facility]









          Name      Value     Range     Interpretation Code Description Data Renita

rce(s) Supporting 

Document(s)

 

          Discharge Summary                                         [facility] 

RNQHCj8tUhMBZzZg22/WTQfwHGEpp4EiVRnrAAh7QRkiEXEgX7ZmHWQ5iV7eRON7NNzQLuDrUySdXVKd

[facility]
SnMlyKGgCoYOScCpsMOxEnMUznUadjaCBuEM9RjXI6BGHvG99uSNVjQZNbV6BpBEQ5YEM+Wl8PQLPpgH

QrVW9VVdtT0BisK8p8We2+Vpzi4NTocz0YbSS7jWNRXBGyAPRyX8p1+8PzIwjEF3D6zFx58Wsw2yosjM

83534PgPttNPRore9synBsQ5OSo4+LnSiQUorqZ/Ux
ZxY2sHby/cDwts4vjOg/nm9tTF1mCAgZoq+eevjATuJFOkC/LaemLg4xHdPOlQXCytikMCgSQGoStXjk

0g2+rfU6Li1UdB98XXz9cUbq8WHolNO/XdFGY3JLsv3/94MY/Vco3y+bKbOqhIA0VtVcuGAhXGHzfGe4

IYYMgffYGRvKBqhpii2CuI50oJ0vyWi24RDPKPFQZR
HsPsIODYijbDac86xzRDf3syAJibNQ1qUU84qAgSrKV2rTV+TMfEB0yuSZ2rPBwWasRaXJVqfzlOoOxj

6r203AtVGkcpmjBbz+neRYPT7Oqu4YsiECCd+LHKDcZap7GU7O44ySqTmzXoGKrAWE7BTnQTmbGC/KpK

TUDJrZfXKPawy7yZkw7lLnoJjz5gY6+B/Tr+lYvBfn
N2n+1thLWUk86Rw6GS4o0HjRVKQrCAw1d+ON7E92MkwkDVZa2fatmtwwQa6RzuF7wl2tqUnzvZGzuN/m

l3rTKjFcYtrtYpL/m3F6S+BvpFPxrbWktAWJE2qEjOZncf3OG5cQMPLr8ima0s2Ql0zhOKSdYtGGBRGw

+DfCtOlRlrus2nTkfiL2XlshdRu60vQakPZG2/x5b0
rLhZW+/ckYm6JjN8Vg8vHvi+vZ9l4G+CiV6ESktAjqeJmobQYk/E/Hotov9HXhLLJsdR0MzhQ3q

ro5V+gwehRrpLu1+5A4F1U1f7An2whKQyz2kKoGYh0k18dIEct56JP+vOx75NOtuoOova/1E3yIdzbd0

9O9s7+cgn9xx7r8FC7MiE3/PRxpSlQYiqiwJdWmIjz
8ehklpsC4+s9GHy4wqhg1fjyNjlFf7v1S/z10lHqqKgggfRr2jETDqs9MDogw+Pywve6MrLTYqn6r57k

aa+4VyJb826Y51n0cWSWyWk6asx8JxDBmR5i32s7+ASgZpkJXEDI+Gb/AQfoOILXsH3mJHUyHN5TrimV

Os9Abj6SNaw81sZY+C3d5RvgQdklgR21DrMnb51tin
pgh6oMoIk3V+eMBwyGGSme0yb2jMgX9VUgNHZ/Vx0CG6VZf9XaEIql1MlvFSNCemV8yy0HustLwciArJ

qUtb3TN51YG2TKx2z/O9wg4hok6bElPcrNT5Qhge7T+TF7ANEK6esOkuIn8Ipfn2DIjHUtLfSMe97qwC

hwteSCBLEc98DW42WXLORpmv3IDXMmM3sLWec6Yq0z
kZn9wgnwJPLcEOJFcbRwSiIABFmQIv4AiKR0j0SYOiHblVV7P4KANde92SMisr/F1TnHlnHdwnDrLWAJ

g+SVwwUbe+8u4RniARayp0H5Hx9hZNgB8TdUarwLJBIE3u9b3weVxYN3rJkgyW3iVwDV2BbOwHZ10XfH

/pTvdnss0MCvZ5BdDndrYRDI83/L9RoRxkCbeHZ6hb
6uM/UyS12q5wQhOjQzT8GQda3OJlUY82y2SkvKxQeDhBBPOKXSuFmIuT1dKSIFd2hMKc46mhSKK+qKtI

notuIDSRwHcphCnIhahe2KAJQXNlP1zPL17WXkuVP8BFu8P1AzBgS1pR8Lz2MPRHiBPwYcb3XopPXOE2

el7lxqQCtXk+NvNClwFkgc+VD1z7VNdadUTnFqpGLC
7PqMye9tUTucuD2Sz/TJ7OOwAxbKoURuF5AFa07esKdvp/6bd00qp+K6PeguSHBa5WaYCpjSSD/Gfb+Z

HK3WyTU7K64vY76BMiGgtb/Qp1Z/Go2DXJbgMQn3bC5DykEECFnlGzHJ1bQ2us/95soWtVoY1rV6zWl7

08OJ6L2Fn27//i2Nv55CvEKlViutMUWetYnsbqLcM/
zwrkqbTviZkETrM8RQ4B/gHXeXl4wAT1AnOedL6zZgGpvMd9vqto967nnbBZiFOznbEAPHPkkEPlUR0R

sNEFu7f5OaprkpV0cptx6ZWojcMMuF7chX8PCiX98qMW8ZaWrZQKslJcFNkc2RyNGyEUeIP3ORflQACO

oPasi0VCfAxjRAFN2jtuXBJkiSCA5t9ikdfGJtC9lw
HCXTQh0j/NwvRUpEWgVGjRQNZxhi4hnGxE2yPb8wx10559PyWoNiB2KEgbD9XEqS2IlW6xF6uII+h+9C

5I8aC4DhdVjSFXQLQpUnk9XtnwAHEhpgrbYAqBEKlj7Yvp88C7r71ZTMlua82gB+u51vfx/KZqMH28SX

loyasljOuytbrylqNcBW0lJkSHQ43tYm3YJtYW3SKZ
/JJW8nzQ/DtDt5mPVaKFjDTfYOyquANU5CSNCYn/jcAwKxekHvD2Jp732wPHQ2rQThpHDmS1+f2X5+iR

kwbSGCWvnsu72EGUBwa+mIWJoIbc6ZMHSQCRtfj0SJIU3xPBgiK5+OPi+EtxROJnMbCuHn2RVoeVzC8e

wCF2RBrv4BJ2OmyRha2GqG4IRnRomfHO23+xGdUIap
VLaIIABeunA2rWz6Ymr6Lzoh/+/FYXR2qoSpE9a4B6w2xpSyFpY+ew4Cg6dCxwQsRAjmrj4ID3h1k3bO

sMcXX4jxsb+yw7wNTidhjEksDm1klx3vXHpjcQlpUznwDZFNu/asTtICbg99uq+Lk3ZLWrlygO22m6E1

oUk1a5YKVBdNoFmpkReXKNJVaEcKxZGa5rHz3r+Z/v
L1x042UjmIBsEiwFwzwRcgCRkJ/0vZX2yNIUpi+zd4V9XsSzMDQb7wNh21s/KHA1iZlbSpmVVjeU9faK

W/86Acq9O4p/LgZG/0af/saMTOTaPin+zENMh+qPKdHtkE0ab+x3ujg+OD0Tk/K3UVP2M70kGKaUuXxl

CDHaeOlYluEHaa0x5hgAODqKe9D3/kM/TYoW01I3qy
l3Cq5BT85Oakznypk+K3QI7rwu6D4dakPODF32JmpXQXGren/fMQXYa6+phC1kWZmEhioFqy+Fpigo86

0hzUBs6YsaTSP2kvSUpjmcfu2F1QT97FmaLy7akMEia/a/EK/cEoR9HX9RZ8h2cx3EZqT0465WaQC/dY

322Obt6gx3/2TkWMb03K/3O2mfIBk/+GuhApo7O/Of
i4f69ifszsi+ThMRHuGFzqW6o58harhzEUHgIJ9/fp1TgAyyV/cviBsctv/7Zyg+G/wX85/ZJuFHh0YD

M27ZJlnwct2Xv1NnDIRvH6HsYaj9ebm83GiqJtq/AaVI2qbxmj35ErfPno/BfETzfbrHIPZ/QOnBwMvF

+mhRa+ahpoc5OvGqKTKIW8Vb5N+IPlBst+g8/H/tD8
eXhd5C6T/lB5fxEv0VSmFaG/0TsE1n3G/xaVuGun0O+uxNm7DUjqmpZrcSHdYK5DIjLzBZ7qli1TKARc

RA6rfc1GBDO6YV7CBRUmFO8CyEEuK9OgE8PPGaOrWPBbBRGsIA03PCEoVEOHGTkgKFOfI7Arw377bbVh

ipSlYCFxHw0ZJRYzVV0EMZNzMBWxfCAyUSFqVELuSk
L6ZHFcHSpsAHOwK7TflwErimMpKFUtZQNTIHbiKUQxD9ddv9PhPZh5VL9GXC3BqhTii7WlxaRhA6qmD4

TTMH2SQSOrN3RAM9CjU6joZtUsk5ScX7xaPgRvz7JaEl3DNfMsWg3JMvPeVE1hso9PWeJnLW6ajn9VYU

J9BR3GgBj1CCPsI7ZdIYKqEOMyh7AcBV0ZZW3prPpn
MjYyNT4+EIhmYIA5azHkaG9SMSEKaxjc90jGst8T7NixatDLlzkTFe+FsMFC2Hi507BeqciJ3PhYmjvu

t5WDhfmfq4+dl8rgDipE4c0smApKmjZ6I0NovP4awdTCxy3/fhNdugXXbz98DIOT0FcGC/+hummpaad1

t42Z6Pp4h53Dqpr0O/UQI9Z39XH4oi+dqN+Si0UOP4
ajxy/O8vce8Sd27s9Qs+HFw/W2+acP0/Ujnf32U/AgIU8zY6pTRZM0Lqr78+TdCcp1R8ZcqwSope3fmi

nq46W6q0v0Ay3R8rhO0P9GBzX9x9KIZjBewGIRt2N3cK6cP3kZi5bLoXJnWQztXJlKJC0ZkTQ4L9GiD9

KiWFB9qU+3iKITi1Ngg0lNnQd1YGBqmppLyl/SLDtJ
l5Jjuv2tQcHO2P/XbudB7Lq42ZU1/OyR+iiEC3DsiHK5uOXwpFTdk7nWueA/TD1i6zmBblTDgml5iLuc

k2d/aPeabzdjcS54zoyUVxkcw8P/7jZ5yoysd/RHpUyfehR2xfpzTeIz3n+GvsYCN633u0PmrMDl4Fj7

jrgFqO8YjmeYm0zbLn60fuy51jGDA9sMYoulc6dRxk
saYl04dWtwviprNyB2zIelTf7Af4cY3QYF6jPEQajbJsmv1LFf0tfzZC7eZ2eRdvKxJcoFRmMAOv2OW7

qM0l9ycGY7jhswEY7xVU5iwOqi1UiBT1nR9Tb5xS2zutGxxxUqkuhlw2iF1MVBiXkJ4MXQrjafgWfmdt

g+Zd45CZZZCokudvKwR4AeJIQr7TE2Cw2MoeOqaKzE
eryQ5SZVEbjjAu4Gu6H6l1hQphyaJ3nIvWsrKkEqndrCWgqzBUI+oDl7Zl76Ug3re9HGD3Jru6LhUZtS

VQkSErS692o0pku0FSvtLWEN6tm5PiwyUc/7e8f7Atcgj5dRBeHVgHEx5JakWV7k31v4vmapm1V5EXGp

z98FDm99GOjwoucrNxotn/NKaKlryzKRW+i4wdvvkz
F3l9c8nxFkEynhJGYKu/mXurlNjORS1/HmHYM4uQB4M4AapQN6xEblgqkGQOSFAqLCIkhrLl3JVp6+1K

4SooI5enryaIfJwTPhfnLozpOfzuCwEFC312VoJnPBwBw4nDk/Re1yambQpAUaXBZHxpdHnIket3JHAR

1zkuXH1rS/1ccPZxfsqiB/uVA9BPCjqsBDp8/KvKhA
cgS7WsfoEWL/ZUm1HscpjqiNel+CXw9Ei+fQ6MUw15ZjoIk4KLOaZXlYm4c/UnC3q/Kz4nT12Y3dCDqf

HVClaivt9H11qlVQ8HPF7Dq/q1GDEq66KPAQGlqSj9BJHcg3M/5Mt0vyfZuwe32lopnPC6+Fx6e4F18G

s8dJ680VbGY0dOcH9p3mB369heefqRPyQ+u0P+T1m4
b1VN2+xlckgXiKc37EKpGsvkkk48dudEN+S4CS4Q8wPPeo0uCCz41S7+9wamf2QOTx8cR3iGtOK5a7K3

Ljuj77qnK817IKuGO0/lts9DB5lM1jX6HWDrwLyXr9/J0YXQ6m5JPkSsuj+izo6NTAoLP9FclMeg/3Lz

9dB3blvmxIxkjAQEYdHEd9CH++dcX31vSjicuQdcUV
kAiAv6aqiFN1uV+ad3qN45Btv61Oh2+OD0/PGFdDmLHMGC7PBIy26FUcjT9PlUERNwJ5ULOrN7lN3OFa

uqlqk5fYgQRc95w2JK6f2/bjfU4nZr6NLJ9J149wS0cspugsh5A+bPc0UuKz3cdyjdbDmwQzY73mdMnK

DwPnqb8cr14c+OxhNp+oL+VSKraXbhtd2Uvf0qzlbG
IsphjxwJf03lrS2/egKkg6ytkqbeecdglSMFc1E+ja1l5Hpiq/79Knx53djKtFU4ZOlszi0H1EEV+6ym

HSev6WBTfs9QMd3UbiiCjJ+4OJMACA3WNhM5Mdd3FjR++FMioRpEAasHMUjsJQG3C/s/BdS0Y2JBSiqp

iv2fiqfvizPFVt5C5LfwFtm+8sIUW8S1cyaYnjdVpM
U9cK6cLO25e56XIw5AhvFYZ7uIKu2IFXuNFDLat9HHn9FMpdZHwOtEx8Z1PIoTK3JAFHFcFONb+LvIM3

QfgWWBYp2iHVLiIqSUvBtVvJ84eeoD/Za4/iEbT43ryhQUIfvwdrpgthYZNuGVrXP4xBg5gM6W0UpVD4

jn99CFnN0YTmV3HIt9ABVnGIHNom79vYiZl5w0M1se
kDTQhIfX37TRm7PnvBXH+v4Ju57CVeGPHQhp+ZET5DMwrWU2Ixsx7HZCMuvO4MyIPUjvSSz9Mz6C8U6D

USWJc31NKH7VhH2qSbHoN4wfhjM/lgi1zU5F27zsqX8EipHLgtYhfW0t8itkV6jIosancI3ohKzBFe1W

OCfTPIYhv4SLt9MMoyTVnIpWc8n7PShNU7ZFzxlrfU
z4og0qbDYtn7RkxJkfk8xBuWLKzx8KUvWrP7FSsAuxCrq57FYcJRjReU+XSXVSawldZj+JqUTbwJsb95

qHVMgvtmOPlECFhPGh4wBljleHOykN6dB3NeZrgaqR/YRFDX1D4t3WMNcabhqfL+FTR42wtteUkB4RZe

nWn0qqDx7ACDdQhCpWqJh+vTQ2OHPV9tgSNC4Eo26B
FcAHRHel+UNXsn9VrUSmS6mYkhO2cqq7CEm1qF7D6FoCV0at26MXdO2AMsX2HHc9NRPiDQWBlp33uHoG

o9f6K2wlrDZVrKr6qXAbL4JHhpyadv2owxvlniIEJw1X7QwsCky+2iBCK1B3innMfswyNXgj3CdOTJM7

nyMdWQBU8CUygiS3t2YQfnVVtJWHz4Z5OvobneclEK
UL9V9qakKseyIQkeL5FfvRXNweeiCaiG59BtsilxIVX+Ak89vfqw7cPjuFLtD3dlp+HKrQoKx9VCOPwi

Zgg5TwMRFWom2f0uErD0DGUVHp341LH2prA4RWgQ7I5uR/bAzwIETdM6A31itcQ94hkAA1aQ6zDNc4a1

6Pyn7TsrTIN3fIMl6ORKjDJHSev1SKg5AHydTMmQoN
l8R5MVtVR4ZYVUTjFUTd8oxziqELCqNKO0AaMkBM+hFl5LONB8zWwLXyMc9XDPhJFESir1CCfcFi7laH

3deeBDetYe6dwoAfCDILXg7jEAUdHfHTUOqToU15rpnW/Za4/tVkq97avnYJThgrgrobzwCCh5xTOLj7

kW58XtuhrqsVijvzqdnQOFfzF6COeHf+i9T0ZM9U1m
xQUkcGTJYwg7QxVT5R2hBfe7xUXRJO8FXojrSbtjX/dCR7uCge222buG+iZ54axzzGpRsYtHPTweLUlg

M1NkyNIMmVblviWFVcCXawhcqS3zy0CV1RBfMPOQLz/uuipQYbE2s8PVJTK3Vqen1iKVHKcLutnY3EEi

NF81RV++cQVtEuMMDb9+i3oNlyWLWf9zc+TShQkW/t
jM2LAAYWgvsi11v8378Hgr21j91zZxMwePCoeSzkdKc081e6NBLxtAJ7pkJZCvSpn1KOY38D5N7hBD/U

SG/jW1IMiMMFrxasPzxIHiJI9QUaItCQ5dot6IGhRjBI3pqv2UUYO6UD6QLLCrLE3PiGOfF2AlC7NJOc

WpXZRwKGSnEB92HOXsIUWVZUciQTDaJ4Xhc135ntCh
qfJxROMpGv0HQXKbVK5JHJMdQFBxlUYiFAVsJHGqUzA7MFYhGWjfBXTeG8JsfeTcunOtCZOqAFCWPQhr

KTHmT3bhx4XtWTu3DZ4CPY7AhbRkh3AsxoXiU4crP3URGS6OZDDwG6DTO4LvJ6ifOhQwh6SsD6dqWvZv

c1GkIx1EIgOnBv7ISbEsKZ2yss1LAFXeHX1cqj0VTR
K0TU4LdXv7DRHbN2ZrQKYzVINdn5BuGP7CQA7vwXafSvH8VW9+BYvhVEE1udMmyX1BXIKiK10a0oNQuP

em/mTVBIuyTSxMMNsKLsoCS4w0Dzsyr6sSUd0TetnEMLKxogfdTzJQbUMlQgJNyWoFP7mRmI+wCyzeAy

mE1L/g5OBRSyjm/Ku/UuPf7RH3/TBpubNLK3x+Oo2a
74PjdTG/S8YF+uabwXFRJONZOkG/GKq68woV4P5WAc3X2GgHsmhaDT8ma9zze94nxVUcb/aijs08SoJS

gqOhiQTFZgdQix6LaiDUZN/O0P17IBwLBy/cTAhBmx7AuGZH4GiteaB2ZONMEEzQeMxaU7N6PXyGYQcc

wI7DPUwjK2a8XTdNrIydtVwYvzpetxFYLPVjKp40BR
Zf0JyioyhTV5EbM50XJE7y78WBF/MkTjp7vjBONry+45jvnDP5/YQSdehd6jR3jwXMtdlAXiRILsWm89

ExWo4ljOuVyh2McJYeGF5mtDk431rDWfeJJ3MJN5NmQZyZnpm9vdgp6tdwondCNXb1nxGtIrUI1Gnzm8

n9E3wAmW1P/gu1eijfuR8JFZpNZkYKCf0OzH413O6+
vyAHxEF2fWCAVLLYs2RWsXqaApmY8ffUf0pyqkpSbQhjGwNW84XAT18HvrI3ZWaZLtF0rDUGGuCeY1MQ

FY1xNFtD8+897JT9jB6eLTg27WeYYZqjRPPMnM1wcRk2jd/NiNG7BJFyQmR2X/8jzXqf/TgkhwxkLGdH

E8jL9M+KTMEl2fZihaF82SzYn0dKCS0bMYrv0twpU/
NsP/fFds6AUXw6JiRt5POBIFimk1sAuoDcgB+jZtlmmRXwCc1h8cpzCj2B8o0GBken3FO9apkV2j2BJk

k+DDfYRmYCVEV5zYnp7apwqor9eChRyYr/hBZ4XoIUTRbjK2AB4GjSb1Ir1QUx8LFxKLvZwjTB0etZGM

B0UpwiGvdmrrbVRVBrD+TeR4L+ppCKoJQne+0xD/3q
7ISDwubXTz1PhNMIRF34hsIyojPpGvLQoxFnDTuJQwJVeERMZuepKMaE6ZyoY+B7jgZLC+rAXO4EVdIQ

FO15Tw3Az/Kif6IFl81XAvxHs4gOrcH2Rv3YOn1oUUZuRqRmfXRM5M44F/4H1Zt4q3CUXWuL0J4Hkn0P

46wjaBT9kDXQTuEhDv1ejyrBByIeAlmMG5ltXnqYQW
DAR8yjF2z+KcegHx7kb5a36u5YSdMIrFtdL6sBWRsQEhiU+GdHzmCcO/HUlf5WdBSatpD6lwKkjil88m

q6l3SWln7tp/3JmziNdMJQa1lutuCsxfR5gupFRzIYtzY73S0PQBJhmBLldFYzVEKvsdKnuJskLZa3YO

Vv3SVTEkFC2dAqMSmtaM9fsCapTpPC4O+tsFgJdJmg
RXKX/CkFC/r5pr+iS1iqjXP5GXYFGv4cTtN8SqEzoywDvdqow9rrW2ao71OB+kR1tVihhuea6QDgA7nL

XYjqNkDPGFR4AsAQVaOQWcVn2jPqX7Ec91+36DaStClP4YlP/tQPCwMpLpm4kyhp9J4XvIZcpGI4csAj

aj0HSi4Of0AHA/H9WvwfQD8aIX1P1LSFFWlIhQtMh+
kyYfZZT1y37QPYFzzP1IF8G98kAJ6ZldfvgXW/DJIiYrlCSounn8GbpyuuI5u5folgakJQGF4wYC8U1p

q7N8XFvTYYn5251i6b4WJzWmmDC+UsCcmW4G1oKQtsctY2b5AY90817/Si0t/3T6yqHpFWfR2eqoks9k

3uaXhGLXXcG4s/GARVwaRmR56u6L+IsYpgnby/HJ1f
/aL228qop5KUr5rzumMKMXR7BgXkVqBsqD4L3bzETtC2aExFrfnUvjFSz94gZLxXkUNZNyJjKYoMZuL6

8OpwgVTCzATysHLQBLJ/XJhCAqvPHMA2ObE0fxXbj501ABI1MJJZXFy1drgTD1y26Fs2Hn1nwRF3rZCz

FLTduCfXtMIZGF0mxirU2V5pgDcoHjZ0TedpgRNjjO
Gx0lZ4zBMRL2WRRdfvCPDq491/TwBgkwV5PtBB90FYNOks4dgmo+llsm5Kp6uDzQxMvDZBS6OM7690i1

DuU4/TUs0IQYQP1iQ4DdSSWs0SVOTjfDUrV1aPqyokFo6MEEbbbC4FdbLabBeHOE8QMSGPnCf8eD46vj

i/MAUm3xVuQT9zGwXfM4f5aGn6h/x0buOU8zN9cii7
K7R/HuoAbkcOV2Cx9z4Ovt8ObUkbWtv0SHQYnH1Lohxkl/LvvOtUNLJjEP0QupzWBQ/y2NpVScjot5v/

s6VHymJ56cl5+4XE5WjOwtiGKDC4bfeD+uMx02V1maNw3XRXkUP2TDfi5b0RGgC1qdhXFp3d4Hy9L6Sa

CISP+PSqOADjQdcp54po0ldnj55dzzGblkerfxJXkl
nydUN9CLm3rp7D3pOgnn1vf9x29FHk1kZyVmqRivDf0rPjVqlZwAexi1kvrJb+erqUBeOyTTZ5PNHxcm

En5kw70Gx6voi7BKlyOMMGtmJNP33WckZscxlEP6ZLVQb8LV25qODIVSw/2zlt5n5F3i33e5X3V7M6A0

Hg1WuN/mLx91fxYIYLHoPojoYZRfy/MMu/SZk/MMu/
UFeEhOyPtCHChSehVCPZx6nF+gOz/JuU+QOz/MfJ3V7DFQRC+MhTm2pz0wmoQY1Qjz+FPl2djo+TMn9g

ln+TMo9g+khzJU36z1jqzYIIW/2qwcirWyUgrsxoR72NoXu3x0UPuoBOWnopUX+o66J8he5qeHW0yr7q

vGI1fO0ylOfpLHsUC1c+P9vdrQE5lxbRhzq6elgJuq
a1frdHLrXSDjdVD+m2cQ9eLfdhxB7eMcwxhP1sBcvsdkxT9po8kzXvsViIP5FieD1Pu63jjZ5Te00azZ

fdsVeKXBa7PB3FP5P6VzgMYYntCqrJTNwxojr9sB0Ea28RSmQCID3DRG0fw1XoPVKzAYtvozMuEmtODi

klEKUrVueOIkQjZBuARzQfQNVhXVvnTC7UQNjrTOcv
TPSvP2DfftDisAFmJEPvCw0SYVStXX9BWGFswZIqLYPjOhQySJYBKmUmPCGiJTRucFORl0xkOcBaRNS1

XNYxWozpWD8MMVCqHT0Hp455LH13sdR5IWRaBz7RZRPwRW8Dot18fTV8WAFoHpIlVTRrqpUnHRBlilE8

FM0TWkNqVQE2iQNrKugKAG6OZLSacHCxTR8JRROnnD
NlID4+DQogID4+WSlptlDqNntJIgIgIQTyl8DnNBqkBQo7V3PihQTsiwZqQlfgtLXSIJUyUQTiW8zkot

k0gCOwHgZ1Af3WRuXxt3InRZFpVCqIvf5wW5/kEeZB0wsfV/NkRWoDR50ZljOpk48ZRY3sl7vb7bWnix

MeGzaugYs24YR6tMtITwDFk34tnhulmI7HZj/y8JAq
Qvq/b+jnTGmgQC0tg9GgHpBA/p6awnfbNgvkrhG8Bp4LO7U1s81sMKo2kcUVlrw2St5miixJpHMLSWZ/

KwaVfw68lAWqcVf85ZjO0G+3D4EKHsw6xW7Nr911zHRMDLm+ZOQh/ZKDBq2iKH5OFs2dY+A4co4SbUNf

rKRBdAR6qP8xVviEeuFuJTzQ1tfAAnNQWfsXpt6kv6
6xd2HiTf0qpnvl4uyF8zv6+yMMgeyrBnJmoi2774P27IG1FyH2xxNkN/XKx+zs+49jiJKxtQe5H5glj0

qJc6U9jpQpF/y+wF86KkxD87zColOsz8ZpjsDs7689JhtIt5quPgR2CehJ0xgIXN2scBbzjT3ZnGp3M5

/rC+9duN/Xo4r83MCduhTz++KWs4kTa8yHtegernV5
+VtdWzOqNRlbA/MOm4DlOJJKyiYoWtTYKvRiTREocWUo4/pucnSAbVgUtwUqvUtYlhrjjKTEPct3VSzr

CzlOYbNY4FQLD3fDLU2NGZb8USw0BVT4sjNkeRfmrggoDLKaeG6D41TJCMoFes95kpPSXju8rbXoDK+G

rjgix0FxdoC+Th+k5X0UBoMq+XZ+ly57H/04oPsMZC
RHRxPI8/HGPaHunKtGk84hF29eixqsI8LXzqCjhf5jZWVsV/fo6zL/vkTJ+gU6P/ueWArsmH363rijU9

ZI+euBcjOZ9due+p3jQE0jH3usBGeoZh/Qsk7MDeuVUBRyddpSEiBuEQxyEQASJ1VgsPoPdjiGdUPKqc

L/MVb5IqcHjn3/zE4O0P44vWsmrnf2UZZHDbDwMHTY
RPKqx7Nyj9aEzEj3uVzwzcQYbyQnSqv/exKaxlzN5hSYxFWp7iaVzicPujwj11dBr0kL6albiIdovngi

Cg1VHxozUEJ+ZHzp7PTFjuL+9xlWq8OqXj3bMaMRiRGoVhikVz8mS/vGFlA0WU9Bnp3OrPG+7jV4H6Xn

3J2ZLj+bY45HU9mUgrTBQwGYTvLCzTvUbVR+KvJFvp
PbUFpbFelKInnJh0Uey+uIKkj/z17qtbYIhNVkFJYe63m/m8xKK1L78Wer8zoMkJ0sg/C08zmCuVITxA

RDLSRxm32otS/VtvRgvmRolQtXfFfh2nrJx0QVr8P1K8DgHi8DChmrfPBqB/M+I9a8eC5FdoALZU91qv

DOvBGFGG/J8hfF/Wv5pXXov33TUBBJeRAZlds2FB56
Ph1NTo/RaHzUY/FtFCYKJz0mPwgQ5Fbd2sRUiq+Yicg12KGLYCAh7+CDkCn6Y1t6YueguNeZq4HoMZPw

UUKCUOf9++thGd3WwrCVQZmlrt/ARLrJ4XH+qMq+QTItN+mBL7hgr6KAXnnUPrMKtod+TZdpMZ+iWbG5

gHDwJfN4rB/ns/TgOi+ayJeWnZ02DetDOAaPLxYmPk
Iq7oUZxgn2VrWNv1yU+IHBFsN7ga8aK69bo0W/z1KobL7FHFl6zi3xfH9f4wVZhhliHr1MqZeKh67qCu

Zc+RPiW0IpfNtvSe7dVBrVSqUnggpfme3Fwl68PlNKPTOOSAFsf94kq1QisDX1gf4NeoUY7W7DXH7irb

7o8OB409Ldp7TVX/Ybf20SM8Los9OC8306yX6CQjlV
ZceUNqfe9YPymjWKrgg0oYOcVedFVY49JLJPv/Jm3W7pQFVTs8m/IdHBLU9MbyB9Q6NP6Dv5Tp4az3KD

WjJlgLAc/9NwWGar83sDG3F3B3jNFjolP8SMuZOVTKDWLIgPWAwE6X2gd6Y1/f6hsHF6SosvJkctWQRP

TQrFKZ3m+LeSwcEc/3FpWDh75jRWKHhgQHSMKMhXCX
7m+LeSwcEc/3AuRXk7zgWnwLUVWhTjSkQDFofp/SxR7JIWfvtTYVjkddxhdxCMOYJVAOcZDMnZ4D1wf0

M5/j2tqYY7xUT2laNEVeUhSrZCBcov/BqD1GOBecqORRcvz9QrZENbHTsgqnRlhMNgDTT0LOZHeJU9be

kU66ydjKGtEZ1GNqgxYwGzSQhKKccPtsO1oGX6jtVe
3Sqbmu9W6AaNAESI6cWd8jKrDxKCVrRoD2SHkVmjxYjPJeJshUHIvNPvJpkjK0bq84W5nNe+9H7+B0Bv

yRbNKoSlUOF0bqVwcX1FRD5zb0CaJLqbHFUcRN6gdy6GPXJ1TP7FALQmSL3KpVVjC9VhM0JRNaZhVGWg

RPSuKZ36FIVwPBFHKZuqHFLdF0Bzb933oqIqsbHiTF
FbFe0RUALiQM6FYWNcMZNeyZFxKLArMALeCjC2RHVsCYhoFMCdV1NbcqCznrAnRKLvLSXtFh9BYJLpXW

4Cxs12eZJ3OYQqAcIuQIYaowFiLBJjxjS9AH0KUhWgCAZ0uIYzQkzSLE1DSAUikTWlQE3LROMddHQcHH

4+DQogID4+LJihxvGuKnzPPmTpKRYok1AjCTioDEw1
Q4PzdVObuyQvVmpuaWLCYSSfUOUhK7xflmm7gXTvKvB1Jj1JDrQpd8UaSYHxFUtSvs8oJ8/bOBJ/P+C+

D6IQ2yTZSMSZUSzpujDpA2l5A99ovBiS334ptIP0aUwpPnhH5Id0w7rFZNlbTZOw5RTgljuViVvrj5h6

fOAse6s29+/oENAbc4n+W/0assNEAxcxw2IjCv3PA7
m8iiFiru4M5uK+HE8B7Dkl/qAoxpNZMmW/9EfZ3a/52pPk5l9y37wAnBNR2r0T8ryP/q9AyZlr3I6orB

gAWrej5DqSgHK7z+99L/ifgHMNAmMnqiG0FwOCLCa/9ogQrLT1Lo+w0fRo+AMb/Li8QvOHdBSfbMYB6H

J+5Zd7HSHweMnYeCfnaXlGPAaV7U2ib1VwDFd8jb/6
be0o2ZpNtjbBE3JM9bG+iZOo4C5CGPs6EVMQgR4GwWGtVBUnJUJt9mWfM+1+OJG/fBI4MJPTxMu2QYAG

MQyDEQryFLNu2mDnJuFjatFOaq7NZNP94LVkdJOb9Mmp5IwZgjY+IFx0a5ZXkN/m7G5CMAuBltoDPvyL

6G04/WgoyiiUjChAv/hY7Lpe0k5eWZUhXHRKV9+Qco
11xYQ37AH/qXXWwKYYXGOl9kIfBVE8qmQ7wsjC/SKO5PR8bruGNl8QUddsZsXRPwaSSlwEpU0UqSdTp6

s9RsarMchAH01BvI27cWh9WPtNedv4J7idkZlLX21HhfJBeHGUvdF2PEXWN00k+gEIrybEofZQXYONob

YiR1D7JX7aJ+fdlBP9UpRTp2DwuGnXd51nzkmu5UYW
Fqn3j/vI4CNtiImptT+QSlpDFIUHnCoUhWgsCrQH+YU2cVxwqc76x6zR0HdZeTlDxYXgB4Lctvu6nRVe

mo5u43Or8OaJFebLf7cRZFvmVFkNbxYEaBpJDcZJsEVEDjQIA+SgnldOe3iXr4AvuBHfZZzzz965zIVe

J6cVq4RB+JLCRXGROVivon4lRWPYuX02nFrLNVBJOH
/ZJPKDsVXMrB2ASRTKF1sWQYpvVarKIMDcVbXAqZfCvVRoD18BPjXB0HhkJHMun1UsXyVKBkZ0Au5IIW

VGGk5SnwsMj1eSE7ZAFLVha8As7ffGa8vrpwLufl0oF5I1Le8EVbjf2HwYhW6obI73flK2ZejO1KttC1

z0h8Z9Mn87qodHjmux2F7yGbXzsYuDH7Vgl/jlOedh
9HCQZidsFrOrGgdr2QUgnlh9mf2IlfD4FoCXM7iJb641UGGsIdWopZeV9dnG8etxN+XhfkbVaSrzG79v

7qa/usJc8AarzesI61D5fSz+k7xsuxnKulZyTIE+sR3DOC8a1/Jpkh/WYABfCT/ckuIoBx+aPRahR9I+

AG4m5GDPm1n5ryqluuWOmucdrAobH/r6AQBtV3fGxQ
jGdQ+GNLw7YBhzugjnw3lR8Id4OTWIPqjCtOnx5fXSEUlXGW/4jfh/jr+4oMW5mieAd/xzXDdj22o0O2

0OaTe93bOwy9nI5EadM+Ua8IhEOCcikVNR4zOdCex76M+j5XAtiFrKVZ5VS9oGKnEVmIA5tkFgIauYML

7PMnREAU8kmeEFeVygcJeG5Rfirpr1fbWUYxdN+ufD
3hQgGtEYkMbd03Qi2qtpGAgkmKhets8BwsS1uv90XsJ/2n+dci2JQLtAz005Mr3xKrZejLlR+j1XSVao

F2XzPTU6RWV1/G78v13sqKj4WASPoA4/upZp7c/IHffAQqpNr6lnuaSIsAvJH/ZL4iNLt2boSl6Q4eVG

4sVx0Z8w771XBvgVmSwEATgcMMcrxVREBy5B7L4pGa
WJnb2PimEw+tPRVbgVfaPXrfAymwrOp3UT7zXNQJB8W2G2fGWCDkHRrJAa+2Gyk95/3xpC3PZmQYzWHc

2kzyGy3OOPHGRa481HSgvuFxNJQswmvYPVbU9B/1SklVLDBVDp14Xn6DNBT4XIdmdQXEvOaw1HK2zGUn

jbmqUMLFXiwjHThhn3R1KVj/Calcf+bz1u6UJw8Opi
xXwOmZfYBnO8sQXmCYkwUDfvoqXUg08LNTFrVLBazijrQ9UgTU8TH8gMJb+A7aSN7OsgR+eUKh5wDTKl

mJVxX25dNxUBwOTPSPnyCLWLKv2X/93EHN+OZZc5I/5uC0f1ZcR1sP4OVNUJjIc9+sEA2ZDPPYXRKU3G

1MWtAVQA4MBjGoDoquAMfgJ8xH3h6fnckzEqhzSrzP
SNT0EspZzQ62+OclrHVoAayA1e3Goes3fJzuvWwNhYj6uZJO3wRkKrOk+dYKEA2EA+fnHKQxDyKCK6bQ

ru/kRHRGlHcJT1CZD94vIEkS9GZFdaBLstuR2GYlFDWAmQn7kli6PHXB7KGGmmJqQf570aiTckFcy6bQ

5vAWNkcUYHPSL/rCPFGgzIFrS+UYLX7le/BlEJK1xr
bbOkDCPRJUxxao9vSxdZZywIAnqSFvaLJkb9y0D7QdL6HrOENTGEldRCFYAWatQP02gdalQAyPGIApSJ

BkBVt8uXgrdgev7tp8GaDw8hmimX8ArwHdGeBlxiz97lZgTyDtWH0KfKD66DLlIIV7FdwmdXA5SNiyYs

TF3qcE4XfeoNx+3Xvuk1UbVantgkpfeyrd9bO3GFVX
VhLmV4IyNPfEI28kqi8xeVO6xwGmZYw6uVKhElkAEbZWVsGB/fqe2SQ5R3Q94KYQqH5H/qqlscZnQdBw

4/U4vAqz+ZJ7qqEAq+JL+u0ym91i79u46V2gix16mwy4wcz0sHrMtHWtuK40iTId0wGm4m42nzMxN4+m

61ek4Qq0ZBV4nSZr766GnS1Yhc8lIJmOJ0E23mmaFs
9tWEvmzCH1eCZrztvuUYw09268nZEztc8LV5lNYnUH8W4O8vFAG/0mDuU4xPnNP8B9hUOR2qS07H/1g/

hVCFzrtySIp2gB1Cl4pRBKB7fyYt8bhP8roGvCTGuAVWgr4ksLvCFl/jIIm7rIGk6puN6aTtj79osi65

hGnsA1KiPD1cyko7sT9Acb4/DrhC3i4M4iJH3bM9Cc
8y8mZ7b8+S1BnEk9GZtX+zi+HF0CGE+be2xOKFj3V+J/stacey/aPeAUqfvphaR8UZIteH2uuX41XMnyxWc

K88jrK/xJ8FZtAQtUXG7Zy+x5xhpjrX9UyZ3n4JmHjkeUpMlWo6vlTPD7KHFJhRTUX9gPL0NVgJPTvfm

umwk0zAJbqdl9Zav8vMsWLAQLDOSrxQvQtZBKJkUuo
AXfO8aLyFwJo5X0CpW9ZacIaQBe6FBFvhmn2G3PGX9YxtJ+oWJPHHRmUs6heyB8+RIF3nYN4p2Kf7+JH

Pt0D36nWD/Q94C7E8R++UYGxSonk+jqZFPNvCUuze+QyOcBkpZPfSkXXrcm0QAvQKO47elQS68MOJD4z

a85JHDgHm8Nq8Uj1ZvrbCvbj3z0t/DAq0+vpbApjT2
4zDk3ZabU9pT5MQYQLMu5s0H+QwUrZtNqYyBEInq3HpglgbKCDMR/IJv3Laon+9qpPR2ebrWwnQhHfVg

tWOhV0lbTtCU4xA5Ko/tCJD55MNYs751AyhF8kSHkbXTMuQSdnwr2syr0OjZ/izbROgqMvt4NK8NihRu

1Eu8qjE2Te/Dso5O11wtdXXRPfevedFEvItuU/aEr+
bdRb6S11PTi5rxx5C+t6uTs/bpgnX2NYugYh9ylL75dUoFAtSs1A2+K/aHevohBv8N+nLdArvVXa+y2m

78Gv/Monique+7PPiOGweqzFiiHCsHG6DCxTyPJ9cjk7GJTTdHNYzByzIMsImUDkWOcWzVJVjHJleMU5BHDhc

KOprLDEgG4RxxjMzlTWkVZVcYj2DKQFvRY1NCIEgyV
KqNVIvRqBlXPYQUqObYKNqFKRkiUZWs1xeWdTxWQE1QBHsLmkuAS2AOIWmAL1Qx041VR94buKcOvPzAY

XQOfFoWBWlF0OowWTzIApcC6BuS0XwDY2opVXyZB9gkDZfU4BkJ7ExgyclWIXXKwKoQCQuOJzsCGHbXb

BmYWxzZSA+Ld7OHHU+Ia1SVE3ub2QuVHeaLMQpQO5a
zg6SCVQ2AX0OqZc5WNXrG4PuFMMfDSYhw4JgIR7ZRY3caXetWQU9Tu3JJhXmz3CnMBRhVFrJbp7Y960p

WWX0d1F/tSeHBK9r08iFfVd8nwcJwG+iCoYKWO4Inizz/r4muw4+81SsKjXYpYRqnkfmSuFkZtm8Nwmv

1BgjT5/HN61kEmjN9zcgaQcQjqryf9C1lv15MiDapy
+xOQkc2jwc/lVtPVXqNUF21m9wIz4I1VZ/YEv273pxIBl4c7vjwmw24h0Da+9z5m9jV+vGTXY0shtNST

7b+F5b0MywHJDEOOCshcOa1j6qITETv2kocQRvec9vA/Zi3x0yQlHKc5aUDy786Pkf0FeCLU+caK0T+7

r9u3f18rNe7e3RT7EugDaQCnut05DaK3eKkItTxZKR
hyx4yYR7XCyYuEpgI71CncAP7K2o/yBFGR3vsmfqN0m6ZoNWc0oYXmjzeMlDmuzZqo07J8M7BTVUNzUa

dQrOLRi1ZQyMxnxWjj0pwh3i2xqDO2xipxQ3a8uTqli1M1nfOPXKT7syYRSXxXJTZZ4lg4rEnPltLyan

evhAkExUFiLL8XLB7JRi6RLsKiKbbDFcT68bq9ynsh
wbeK7xXlG8YQsS2/Rt9ta05NQZr63b6SynU60win/1A9F13Vx4HAQ1sz6GNtFicm3XaGMRAUyKTw9sEm

XeqC3uy3jvAjGPUSBOCEMXjMDmi/6YlkULHOrIVoBoAuva7zOTBPrlCdzV9ZN0WpLvF/hs5oQSW+2qvC

c7XAEeORlU2xmfCZOOG1tJTMZAc7YN30xHGt9s8lPe
dbSIZ8bq6HtbAY1SMxpqaYRXyBbOCbWgBDw92gaYgcdjaeTF/M3kCCYhgTBvBBPa5nHj+w+x3O7XaaDM

CD0kKh0TGPgEoTpJyFDnhRidIinbzGOP4IJNvqHabDapGYSno4an1AxDtHE0Z7PO7bOb9avf18wGLZ7q

CpHa2HU58W0Sb6ZI+0pPkFX9d0Dzm8fTgyJcNffdou
x79tUQdi8XCvgHcan7uglQzu0u4GtuMNZB4OUtniEnwhl5uenqzTBJY+gyBVaGEAiUgom/4vlSpHlgkN

HGsFMf5PxspjE5ocShEq3FzJ/V5mieKi+vzOU6qw9DJ55JhPPk7VzjcKg+cwx4cXtybhoA75E/4FcauW

hfyUxdZr06vu61Rr/g8Q+Mb04pZLduvlMgfLJrIU2K
AnObYW7zhb5JWRQnYFUcWsnOLuWeEJqHXbYjWTBlXDoqDP4FEPkiLUcnRBEaF0JoirSilMEkIZBzZk1B

RKYiOI7CVEVufPLuVOIzBnMzQSHCRaGrLLPwUUHoaXOGl3yiZaUxDAY1KISqZarzYZ5KWXZkHL3Qm895

BE84rpQjKNApUQRTZmSfODLgY1MdhAMjQEuxA6CnF8
JqHR7kjVLuBA5vwWKtV0OpH8EtsnbpZVEPEcIxKMMnASufMSAyCsMmOJloQBO+Rr7WNGT+Hh3KOS4bo8

KkHWopIvAyZS3vao9NNBDwYYj6OWL9FZVhWhn1SWDuXbC0QmGfASR7PFR9UuN8DXgrDaUbQIGzWHVuLq

CcDkQiUYN6DVG8ALZhLdh0CLEeGrRwFnvrLbl0QTV8
ZzR8MCQdTXD3VHR2BiF1XTZzSSU6BGK4VkB1ULQsBCT4MXJ9FnWdJqXfJaDvEQR4DRLZGtNrADa0XED8

EKI4RCUzPRe0PXleHwL7YyJcFeYoDTyeQsT3QfllWwGnFMz2VHE1PyHhCzd8DSX5ObL9AgKiAsBqDZww

ZgV2GeZuRod6HKS7VxW6JfsuCvDrWFU8RtY0LYEnUo
RzNPT7SgD8ZZDzIjI5JOD9JdN5CZWaPFybYYMgSwKmTnynNlUvFBG3QSP3EXLoOsPbZAF9HlV4MEVnQG

W0FXAzAOK1HCFvWeQgZYSrKCM0UFRwAbo7BFE4UUT8PQDmUjp9LVu7YLQ8MZBgGmZgTBEpHUT2OBNqGw

a0WND7ItArNpUpBhUfCCU7OiP7YzyfHSW9BD4EFHD5
FYBzQJUgLMB6FTJfDAPgBkf3FRJ5FNP8CSNaTnUeBUm4ASO2OXGcQvXjDTj6KOV3OITjDdZnAId4UQV7

IPQpVwRdXIp0NMI3XRDmKtRsCUp7BUN3NXQwLaAtEOt1UFR7POFkHjHoMZe0XIE0SXNcEnWhRFg4SEFP

SqPdZcUaUMn4YNF9VDQdLlRfTTw8CJO7OGDbSxIlWU
d7OXH1UTWpVox3KAMsUzK8YKAbIKF6BOE6GyX1HMHtZlbmCRR8ObAcGcQySyG5RHJ4TBH6QPCnZAm3XC

FhZrZ2UoabBXFnWFDuUFX1GPhhPxNaSUBfHaPwHnPbRPplNGO8RgS7ZMWeEjGcTDWgGtHrUkHrThJ5IK

T3GpW0WzZfEQJ6LDksNFJ5IWAuRkGsUAqpRtY6HcRl
YnHfETndJjQ7McExDYZhMEM1QrThWuD2FIG9HrV3IlopNgH3GPU1JCYyVbnsYnh5WXK0OBB0FiPnTaXy

TKr8JGAHVkWiYtz8TMc6AZZ0DkkiSro9SVN2SAM1JfndTaDuEJavOxO9IrZuHoTlBOQ7AhQ4TscfVpFq

DVV0PnC6YRVlOCG0DQT5RnV0ARVvQLG3SAb5JYK5BR
GmVDW6JPG8PzL0AFJxSXW9TVV3SIMtLmhySdr2SWU6XKP2XIFtBFqjZEGoZUK6DLZjTyArQFBlGJT0KK

BfNtNyPEE7KEB7AHZoIsRlOSOqFZB6XHRgTwYxHJD0QwG8ASUuGZA5TY2kKZfpinPeTmkOOiC6ZFOno5

SoESbpNBk5JVeaUISuU1E5zTKdAy7nmSQvy7RkcFM1
c4HCCjWqFWFfRo0vpV8dxMUxSSCjDHlySb2zRT4KQKBxDP3Yj5XxllKoJCO4Q5SkdGsvmAtkvSH2PWNs

WENcK8BmqMGsLrAiLVmxXVBwQ6LrUDrnOXUzMZruRBXvJ5GcanMELa53EGfwAQ2mZGGeHOYxDUE3ZZRb

BSesXVBxM5z8MNuhB7QbS0aqHAFmJ7FylHAiDS4EMO
A+Zu3GGA6rl6RgGWmuSFZhCW1kst4VEUF0MX8NESHlCB7DqRWbZ6UvowVsM3NxaSsbNG0CymLjFDukTG

3MORNeEj6xvK3XocvokEwUt6bxU4BbE79pzG9iK4bueqHrk1rGaeMqPZquIg7SBRRqXO5UqCIcvVPpIZ

HlRyRwGQEqyIQlYHKcNvW3LElpOVHkK8ooYVDmufZq
QfFoMJXYZiJyMLGpRg2ceRUuu8BsfUU4s5XhLToiDRWKITgsDX2+XIyweoJmIvnDEyS7ZEAjz4QnINow

UWytElOjNAz7ONGsKadpOwo2KEE1YEI8PJSxIVT5JUl6DXZ6HxlnZMjoYVBxNkUvUlUwGjc0TCJ1NELs

TxpuZiPpSLI4SYVaVmfsBWH2BVF6NiI7WKMnDON8IK
V0KvY2PHKeYKA4ZOX4SjV6FCLdQDA8TDV5VUAlAbglMYm7ZA8JCQT7QUOmGCd6ISP6IzNuPVQ6PGC9Al

B1IrxsFzXnDXccRjZ2NbjdCuWhTDg5FVW0GaRpZax7CZGkFHB6ApisHFD4XWyoMsA3QeVfZyu8SMH7So

C9HugzFjSlDKZ4BhJ8PVEbWrMtPLT9GtJ8DKSmHtF7
RXM4YeO2KHFkLUjdRZA8HYGvIdneKjf0EQT0FGS6WIByDqCdCUA2HlL4MGTmIZDlBMY5AfP7FCDqPju9

SFQ1CdS9SNFzGoOuSOEmXaH5LEMhGzLsRMxbMjD9ZXCdZHY4BTZ9BcY2MMHoBuJcSPTfIIEyTpltPSY6

GOReOGV3YsOrVKGsCO3EGBK0ONHnGUXrVGJoXEGmWr
YxEqG5WDQ6SQT1FYDpTlYiYZt5RWX8SXRhBqYaLOc8XXA4EUEdVoPhZKy5ZKM1GUVdAxInOGo9PHN0JZ

ZiJkVcAVu7QYG8IEYfSfWwJTa4PBG9IXKdGuRoRTz2HBJ8IBZnGiTdLTm5WELWSzWeOyIuWGe9KCU7MV

RwWuXdNAi6NOE2PKIsXjJtLHj7TVI2CGCgDwg7HYXp
VeW6SUAlVBK6LNI0ZsL5RQKmQaHbRCH4ZzNwXrZgYlF5WMJ9JZL9KBFsRCw3KOUtToV6KyxvBOStLGQd

NLK6HTyxPpEbBMGnUbEeJmNiEIufEOA2KlE8CxweZdYqMJMoGoRsIyItCpJ7MPD7VfV7LzKaCMW9GJge

SZD0WQIgTqL4GQO0EqG0MqwzOfV5TUW8IvB0BybwFW
UoNMD0GwDlZdQ4UQC6NqX3CytiQpM7RJE1LYOlJtztOox3KUK4MYW8OtOsRaQcWCq6IBKBQdGuKmj1HO

t7WOD9SarbCwg9WGL2VEK9DgwaWjZvBLlnLjY7CkUdAcHyYPD6CpV2PodwWgSbMUK3PpG2TYFxUZK5CD

T9HzW6USUhJLA1AYg4FMO1WGZsGEI2KJK3XuA3VYWw
EUD1YJI2SNSrYfssFnr7VBD5XZM5UCFaMUgwHVF3XaJ1JLBpWHK3ZHN3CbP6MBNyBCQ1HJK8WRC3ABCg

ITM7OAR8LgL7DPCmPDZ0DJBfFVR1KZFlFIZqHU5vBLnrxfGxGobXSpPbZGJfr9TvOVtlNDn3CWiiURLn

S4V3aRWpOs9auKQhf7RajZN2q2EUKhDdBDHhOo7lfG
6auHWyTYOiVPmAYjNsRAQqILXvJV69FMdgNW7VMTHWCPrfcRZvZGC1H5Lul0VvkkNiGYRvTs0LWDJxGU

6MqLOivcDqBz5TNPSbLS5Nd845ObTrbHWmZSAeSqDrKSTnSLEeWUC9MU7TYJMqLS0TcWDqmUKLrihnLT

KwW1N1RF9CLXNFMbAiPd8JSeHqMH2arp1TVoJhPKWa
GwzPCqNhZIiITbUvCPHjYFnjXV1Md865J9R0HkD4bUGtWRD8VCV4wVAwSoSgPGBazjWeVLHkAFrrKG1q

c4KxxfmfC7diUR7nxCXvJ00zfE5oVLvoCYPsZ4JoeqM1T6dltnIoYO4WXNI4O1hvifWiUMCVSdYtEVBa

S6wgwAhxZQM3ZAIdLe7NQOCcJE7Wf269BSGiD5YumY
XxwrCvBJKiDVZFByQnSq0DZlXlDD0twq9NUqYeJGMkAitFGqCsFuP8GISfHBxoPHZ1VjRkWqzkHEhlCY

G1UgQ4BiCdOHW5JMm0FGZaDYZyUaB1HHV3DQT4CERfEcY1LFY2GEC0VLPsCiymNCT7GvC2YvCgABe9IQ

A7IDO6AgGvKCh7YSA6LSS8ZgYzUSf4SUL7LTY3IlMa
SgnkHSM4OQA6GOzwPAawPRqcUsV6QRypSPW7RQh7DSB9YQPuUpEkYCReASK6AznfDPKkSPXwHSL6XTJw

ShX3QVXeEpT0SUOvXcT5MOGhAGM7BYfnNttpCFi3TKE8TNKdBnG2KWP4GrM9GgNzWCi6BHauEkP0Most

AEOoYII5FXQoPxI6JHLkHeLGDcPgOoA0MHIdNcUfFh
psDdR1KGTlSVRmTDDmWNV9FXOrCFH6GWMpKpZ7THL1MDEgQPOhJkC3FBHcCwGwJSLbCwb7HQU2MMFiZW

HxVQY9GDRvKDB6ILHnLlOmXPFtLBFoSUxyAWSaAFR8DHC9RWXkVYJzINv8WaK6GTPzDLkuUNYoQYM4Rf

qwMzP5HGEdObEiNegtBuI9YTMcEtB6ILJjGgZ0GZJ9
HeC9GOLbLtV1HQV2ReN6GFJnEfP3XSU6NnW0MBMwOxJ4EXH7ZyP5IPOeMjM5VGR2QrF3DKXwWjH1KPM9

CuK1TJAmSrU7TZL5WmD5GJCqMfA4XU7QWIV2RSCbOfX2TLN7OdX5OESaLxX5NQE3DoF0TUJdFxT0JRB5

UrIaUjQzDyN8XZY9YQJ2OtEkUMW9XUQ6ZJRiQmIvZU
c0BCX1VoU4MtWqVNVlRRZ0VWI0XWnsMAO6XDk9LIB0ARHrXadfJNpcKaZ5NAMdTYK8FJBpXvIUSvFuSz

GeMWAlZHVwTnDkQsU5MZJ7TFQ1ZZHiNRhePLl4GxU0NeChJLg8NKXiRHW8TOAdUvVeBLvmPkK0RFXfHm

IwBKzhGpX3CtNgZrR0YLKnDhL3CpYdRGQhWIVsWaQv
WAtsQdL0EYTmGhNxWEbrAeA2LYl8TIY2QEgxCYssFTa3UMG0OGmaSqG2FPf7CZV4GXqwOtE3QWwdFdK1

FoZfWhGqKIyhVlL1UYbkFqK8NBHxZMQ8KjhwZMQ3XDClOUD7HytgGXD9QCQrODP6CpgiGFlwNOE9IUT7

QSLcQULkWTA8MNPnUANiMcx6VJI8ULAgIXQcWVckIS
1CZIS1TYTjJoOsAFZzLNK5SHToZgTwTFAcQGD8QOlwKmWqHWTjYAY4KGJxWcT5ZKQtJIZ0TevbBaLpLD

UuARXbRW3QXJ5zp1SkLNmcUlVpCO9smf8VOKB8KH7GAOKhVF9QaOUoT4FxzrAZTYWhbjmecI1kBHqiFX

KaH2UpwoAAVK1cF0CuH38fBLhnVd9xHE6UDQZoTT7U
t2JherFgQRE6RC5JRTAOZJnewMMaYNV2QY8DGCUwIP29XC7fJFISEiLzDGNiCcheW2NjXgMLAdIqTKIf

Qb8zqZEVf7fyBlRrTNI4OMIjFIY3UNUpIpiwWHwiLKZcJ4a8UGvfL4WvE8jqDWGlQ0ZusNPtFM4IRID+

Kh7BXV1uw0MdZEawKYAsTI3hmj4QWYY3WZ1QUHXpTN
1BhDIeF2WamdBsS5KfeBguBQ4JnyZdINqeXV2KGRMlYb4gzA3PHJjnUJQPWLVnpVMvJT8pp7RzkkimM1

skZD2rmASaX86haV4fQAgrIYNwT8UbvlB9T0uysyRyAE0YMZE0J1esmnNnZZCJAjVdHXTtC1zkaCwsXD

TwTBWmGr4OTZXyWU9Zx371QLMuJ9ZkxSNkooVtRtXj
EPPKMzWfRy9LFqOoKL7tzw2VAgMyCPWoEzmROrPiDzL1RLHjJCTuUAA7ABJdDIFhVswdFSE8UFD4Lnkq

QGG9VSxoADMpRfHqCoRiCUHgOmG1EPgxVnr6YDQpFzD7BINeZpY0SMM4NQG3XiikCAM6HHOxKVG2Vlzk

QUR1HMMyAYI5CageUKR4ZYJeGMA1TrsiRjW8HCWjQj
S4BZFlSYjcDMH9HZZ1ZFYqPRG2NPa8VMY0CUNrWDibVDEkHAU3QUWhHKI5HCO7KGE8RDYcAkCtGPU2Vn

PqNNjgFRhlEYE4EKJ8SCjkYcQ7ACj7TQW5BfMdEuW9HIZ4HWY8CqXsKLF1RWY1SxF9PzGwQJE3TEK1UM

J6TSUkAMkhFO4ZWSNlKKGqXzc4NLBxWlT4DMKxCWV0
HWX9GID1FHbiJRy8GLC6KsM3BNcqBLOuBXCzXmS4VStmIPE6HKR3LmCePOHvCCu0XTE2GyR8HbPlLLJ1

SXL9GiV5ITyuCDe3DEN6FXT9ZmUdMlM7DEX3JnX2LviwXxTyYPV6RBNYWdZiMLj8JMG9CaWcEZRoFbX7

RUCbQvK5XXXoOgKhUIE0FpC9GEQqQhO9NHM2ZxV8RP
LpBuW2OSD6DnC3RGLhAvN5JUK6NzG5LPMbFzV3YBS4KaU2GMGjQrR5EVN3CmK3DCWuIiL5VVT5NtV2IX

RcQiK6PKY3QtR3RPMdFUrcLNE8UeB9HNJnTmW5OYU5PkE6BEDaMuV1SYO7JsX4IZMlHoS7PAF0FWOwKR

RiUPJ8WOGxRRU1KAWeJAU8YALjWPP5FKwqWJO1RPb7
HPMvSALoSZZ4NMY6ZVU6OSXbLUswRTArSWSwHimuKir6DVC8KkJgOkMkHnmpXC3NFLP1QrvrZKZ6GQJx

VoHuESLiXqPxPNQnNDB4TRMeKNJ1VTnfDJQ0IWZuISJ4QYA4ECE9KCZfLbO6VJI1MGC9PIGmZrF5NPd1

GVE9BIntHXG1FYVbJiP5XSNwXLX1IJT4ZcKhCxUnXi
P5QIY0ZvM4VWAxUxR7KNt3HGJTBtThPtL0AIe9NOR7TKFgUcO1WFN5SKK6ZIEiYrq3RLT4UtM3SLtzMc

z5CIK8BhL0PqKoWBS1MSTfIVV6KVmaOKZ9NIHzZAI3ZRciDQK5EKmjFpC3IoAkZOXdNTDhYsU8ThDmMA

XnRJY2HyHeVARtKtGnHWO6FsU1JJmkAFjrTZD1BeW4
UAZiZQc6GBY6UzV0KHVvYOe8JDC4RLW8TBNcJVN8EYN6ApP6AkMjKBO4JHX3KPP6CYyoSVb4ER0kWHle

xvVzJiqREiY8LSWmu6XrPIntMIw2GOooAUAnX1L2mAYlPc7bmXFsf9OivMW5l2MRRwZzFBIcQj9keE3f

aHCkY1Pil2COIQ7GMZBqDW6Xk0UihuYvAAJ4RJ0VNX
IIDMfccKPoQVM9XQ7WCYGnIJ22AN5sCPYFBjAuQPOgFtdlJ2TqNtWVBdMwZWUjYs5wxIGJs8eyHhByQW

Z1EXX6KJTvMHA5AO3ATkEjYILxDUZsuQwhFQ5brCYaGK1SzEKiWxLlIIqoTA7+XPzjqkWqXzsGTtO8GS

Vnh7SuCCtlMJh9QEvkTHGeH4X6yDNvPb7cqX3WhLX2
rLRtW3JgzLQQaOTpR5Xms2XJo808N2VcG66oEQldWI6on6MjtfqtG0piTE1tgNXkR02ybK6jZZqyKOXc

C8VsfrF5S0zqzfMjCQ0HVND9I9ydobWkIZBYPtSbSATeI1lizVixYNZ3CRLgYn0QLKVkZX5St192FACc

T8AczNMifxIxEnDjZWWEBiMkEn0JCoBjKS5qsu6OVi
agPTGfPzvFOzPePPzoEkvwpSYqCW2XjOE3KICfX99dLVHlOXKlF4OdMCM7OvWgM4zytpy8aODcSxRsZT

4+XGvmFUJ0pwQztQ1MUGElK2r7D7qR10byByV+JrsbieGSdbExsNWZKwBVPF9TFGZrnIVA1XNQYJHecE

SyckZBQKbY8gYpQBmbxNWkeVcCC8utgXUbx2txjd7a
rkgexO8KUD9kUDIrl8ijqB9XXi162snk1EM1P7/+1oRm1c19Wufb56U/e/KURPXgzMXdg6hxKD9xw6I0

rqqgPrhxXnBG1Vu+9PlGGhIldjCYxa5NpwF2irv5RAUNy/65NZHwVXJiVucvstEKyPXcZJKNuc7ia4jw

x+ZFALJMGj/w14f1oBo5UhwYOQJx7dFbyZdIb3/vAM
eca4rVUWxjaz+ebF1pIDZvzgwR6IjwmioezDk/qpCFv9SA6WYgEksEvq+C1d2MGE8rcgSiVM6/UeYrlv

TJDS/V8/0P08crGVfofkZJgoT9cMRzYeW8psJkgy61Yo9aSaT5aNWPZi7gbR8k2fdIl8QEdeO6uc8Kkl

NPcQBXFOzyfEJy3SNBOyZkreF6H19UH9LqqV6umptp
4Qwa/LNK1t0AEMJCZXHD2WrtpDybNpPZJBoIDQKk9ghGuW0echss8TlumOIqTjWxmC61AagOiKQQDkFg

ZMx6F0srG7GNPWHAtRUj0IG54jd9jscZDJflZT/b5Z3Q49tu7V/R7NSSzNIuHhlL1Wv937/i2wZNDjmK

02RNN3V/gXdxHMs0zBt6OzkN3kYB9KT0Ke/Cj+A9XI
In8Q/Vu3ilqT+m/uJrTJWsJPl6SVKhPbaThGwg/fe6zLuSdao5pMzCk4H9FO0n6xiS4ZUK8VlcoAgR1R

NC/4XkN2AHhCoLPhiQ3yAdNG/dtEUl62wt8VdQsmIEh1O4DOfVwKvFTmVMPPfu00tkpAppYFisUr1S7/

J0rTgejGafk95ODljANhIp4Cl4WnnR6x+k1qc+IR25
6UJqn4qp9UltR2CS2ZmR/Bbp+eh3FlGOD6Lv8+I5hB7wc0/ht/G/iZr3ge3e5JariJwOU/nI02S7kA8O

gZvRjUFKO2GyW61LChhzhpHBuCppaBimOvtKZ8cOzaYrQJEEbb6qwwcM2hEkX9jrxCho17JX5HfrAJKX

5nfM2dTa3avIoswjc0KsAVYccDYS9HC3Fl1JO9Dd7G
dRv9uuhlgtdLaOWehSdscl7G53y5XdugMSUkKNhXVRg6BfpV9RteXL3Zebwx1imIwDV/lUqMK4OM8IEN

izMWFM6iY4ZfNyW7tLzwx2AIO8MPFN6QGD7CYvRCNvEM/phOdL3YITQ2C3Z6K+jj8F3ryoGX5k47K7AE

rqzwbaXM6m/Ax+Bb8l/62J38Ax2YuXc/d2Um1TxCHQ
3y/bkwt5eJ/99G49U5/wheMvQPgLfBiwAR7ikBMviUndUaoO1znuL9MtN4VfzX3kzAmJVpsGXdP79Emw

L+pExsAH1moxeAN98FEsmFTexIc/M5yEM2wCsiBGTAe+IT4v/rv0QKkLOhkKYfHAOCxr7IFsjUxHeti7

mt5JRcKfjX51UPsuYLcrlaEe417/3ZGjqIL7Vz1oxP
J+1lkrJGycKS8z/fqxhR3sUMLWTPM+TBbOx1+xJNn7DQPkYK+7nKw7cIpAkkAZeyd0fJOYHnZfD2MnUt

QM6v2CiZqBnBGyLFItJ8IvrGsWxL9fd1V6tB1dgDX1CKikGfGVQoEZwhOaqYMa5GT0uqcgslBr9SlZYi

XTIDRFPweMjH8iOx6FWFRpBq1m79bzhv5jV+F9fFPe
QtaNC8/JwXcrNX4kUQEmQC/z8wv2GnmCZcl/N+Yu4RJyv17VKY4zi5LFxWHmP4xDu/AnODbxmfgWvMze

aA25cyIaf/UsXne72bxNXZhBTr2zXhgOEoFjYL2e0MRefzwVfytPd+Wl937Doy+97xl8je9aAkglXoMG

DJ/gXdXw/PLJPVZadK6cKrBsfLvsGeuawRpYa0tqYg
W3/Y4D5bvDue4zHV1+fNFmdK9y+zeSkqiMKrvYy3Woj82terVyLsMxhNRdn+O0J0wHjyxAqCrwAD6d83

kJU8PLq2lJ3fAiHV37Ve6xUa+lvCgKxSTdamIBZnjrs6TErYCeC7oUnjEIzJmdjiXWUa5c5vP5+L/ELF

iV84ola7fHXzY4b6T/RFdhnN7fz6wDmsz2heBIUOmS
s9XnvbErSPJumHsNgwxTNgHqcVW9yAybPg1eayslmpuAdmjqj3rFkMe4cZHRoAAZXf3EoupbRAnL6Tqb

GcZNqhGcme6ki+G4a3aIIu7eiW9H5tPnv+ujsh3IRXd/eo79P3H6ZrblllQSrxmhqUC3hAjnlVu8c2Wn

kaw3y6Q6yT7C334vou1p9ywyXJNH8n8N2sH4tV3LsP
/bp0saxGlyeQaX9Ls7eC0Zxp8AqCk5pkdbhBbicejTGc7g1EnS9f2g2QkkFSW0de5o1Q955yHx0I04LD

OQ9er5BPIs3OGo8P8ciZ6KJsKq54xwoPZlylHL3PttVpEd6bA9ieXC75sSk3jzv0ewHGawxOgcoXPDox

Oe5h2DciHVc+Evc/ocosrlz3mEdAvN4Cs8rQUnJsfd
+jufH5DUU2WK6W4ZwStNDqNK+zRaaQHN7RndLLk+OaJFSIB34FzVvmBov2+KF1Ow0Nc4oGHliJF7d3ni

fLnL5+WXWUjU3T7yqRT1Xt++xuNGMz8LFdYSdYtIGGPTfsNYPBW3igNJVt72twxMi2oh5qb+uotyZL6P

ynmMZumPNCnX0TmsnKB6wmU4VGiSTvcz+BvYxTErte
K03xzjVTNJuqPY0FW4ifFZcHm9wix4v7ZkPF8h8fk2J2q7q9NyUNdYeXzLLsfZzsQBv4hn0771az8uQ2

zUcuY8f94ezC0iUY1rbCl01OF8X07MTiPrLvuLBqybVF+irD4TZewtSMwHMzniCZtRkI7g+gdYbSpF/I

6RErdbEJjC2TGJMv/EQjTqYmdtBmzKqt07k3UX205g
WFrwn50Wf0BqpRL9QyQ4YUqit4Z2Jtvzly5N7gVT/hVSLrQRXiHdAbPk+DuI5fNiK9pedHyRAEXMJzRP

RWPEoMuCAyNYnx2mc8CU8ZR0fkBnu6Jx41ckM8zW4QytDeIP7y8yJL+rcSmKxl7UbsHX7QUl53HEfOb4

kn+pDeU6Vrla56H5SdGE+Q2glNLwIzvic7uhI/U6jW
yoaE7Os4l3QuHv4zZuHOl6H3nxQeV/agv5ZCbl/kxCq6i3GDtlYo72ZYiKW56EXdZIzz2OqISlPk5mXS

8oO2CR/L589vz4DviDNdasjaP/DS2U413r50Z3MbiZdsoRmNEZpylz0xxG37weJrfY1DcjoyD7/A6opt

jcJL+Re0Ls6eu5Oht3tisAelgIAzDBP7NKftEaYSJX
2vH5Duf3XGSIq8Z4nL5Dnak0qnLvKjjvRoe+AIy6lS5jQMfxhGlda1jDn2dWYltKvwjZnwPf/cdepNre

6vEkzUnS/DWshmr/L0sLsMHvtXc3oRYXyou33XI7e1SDs/MwFvVpozuS9DMfQQ3TM+knDqM49PK7vwAv

QZKoVpJtTJI/L6S4ab26e/uC/cKEdN/HWdvl5PIq+6
kNBL9lEFVO5XESNKsAYCdfRNVZzkv9cbi0icFI7jUzB1AWF8gFCUQdVr2SbAJsMKnfLWoyNNqSW7Y34g

qUmSvetNMqxTNB9qceBdTxGzhcjs8ERtI9sHjR1zo3Xr+nDi9OcVBJOg2Usz3Z5q5azumYgqfRftqNWu

tJKqAIltX5Tleoty1WyfxlMhcU2p0PzUP29jCa5Df+
wcG75Y83N53XmiDgVDmuTMWbFZ5ACsMkomkiiyssN3JvdpD9GbZUXorBzOygB/ZhP96G+1xCeNDbdR8T

kxcDnUxQ7T/mYlka99ge2f3fiXauUgxQJdj7QKbH2HL0PC9J/4hHqep+JV3Blzfz+Y70Uw6Sp/EEfguf

wCfp2+fb+BQ+arlette/gs3gKT+Z18Ph2OA2LIfDck2yZJ9
B68mU5RqZae/h9/AG+hD/Yb4OHEI1oBn4pRQJ2w4LXfS6XA1ly8HasR8XR6f6E2sq6aHUYSG5VifSpOo

Q0S+1H6WZVmPESEcuUHcJJuwykJLAWbnrsC8bo2D1Zgmrk16cqk09y2RzhnEXpXgY3kctIg4pTGJwqcT

tISrRoNwkP18L3OSkZEUHHD5RCqytnoH9RMOyoDdIL
zA4zNsH7WwVP+RtfZB0bkOF3EhrMwD88gSdlARZSpZzPXOv6DACDu5PuAbiADf4JZmIl0XIRHxNCDo0e

3DPWxvZme0eqXydIDA4opbFIWmfu6bqharIgUGY3mSBdbQ5oLvP7lkZJskyq9nHDQwleQS6TlWzcteln

wT3ZLvwhWJiYVEaYt6x5a1zXk7dYxZJ5q5gq5dw6q7
yrC+7TMinZz8IYyXK1TwS0mGJLX4sqxgw48ReRke0bORgqHTB4VDzQ04cULYjySbOMKDVUpCgV85CKm2

4C9q2l05ICI1ePirzxop2/IQ9z0E4EzSmiv+DIGC7Ci8UYujE1052XmmkIuJs7W5mhPXMEKpFOsMmPK5

ni50CGQhEQJTcIv8xrzamCOiXpCO/khN6rk/VKJh41
Ax72hxdBo2yeYfVAPfQaTaMZkr0KrU2wEjjf5yTcVJbIaPYp2T1PVrZUekMIct2lTEtMz8D8V6FiY44k

tbKFJWPZVm+jRafA9t2LR5whvKPxfJjpdr+jWCaGquETfzdnHef01Lj2ZdeLd5eaaW4OLs3zjYl6B2R1

umyBZDisGbaQvkcPaUokalgTc+rm53JxpCA2AYioSY
gLWwJGr6LpOcRxIPBDu5Z1ym71a/p5OwfJFiKvi69cqTAIU9badCsNtj0LLHvQqnPYlqEM74jwpzlqIT

VY5UuubZ7kva8aeGUM8vvj+vXsfnoVtQdibZdw2yHMIzA0Y8BF6xur9VVgUxMCkzlnPAZS1KNss0zC8I

kRxOE0tFJLpyyXaAOTG74tAbbqjLxgFazt2fyXoBdI
u/KD0Vi/fB9kzvuMad3ksBnWjrTzuUvxrvsQ7HOP6ImZunyapXtGXbxUUQPSUmLfrKTMvXMw/CnCunYm

kFAevfMOXBHYuueDKD4ncaU1SFJ26Thb6gLPdnGQO773f7MiVuphepUGbFCsB3VVOwLvYLznurDP7kNl

91rsBlk8s1QTY5gh9Tkh1Zy5Cm1JMsEVkWA43ALdWO
+2Ze3rj2C5frWciY2MYKOzUXyuTfW2iQI4GfckWlfik3eSvF/6UAHOzwBzVY3cir7tn05vAy2P9SbSMk

w5e4nohSvOiEetxr459dfyx1wadTkK5vGUTa5Ta961mWMwjjMzg215yjxflvgnkvpjls4mNgOhaCp1uQ

zd6OvIledlPC0UPA6rLy/ojHVuRjYfp8EvPqpFXxrG
MjwtjYsmwjep9d6NCIcKA028loudH13vmb7siJejm4JZ8ce9l0z9W9an1iny68Iw5VigpV87+UEMc4Hr

nnGr3ysxDRdO7WYZ14I92W/3oVd97Vn8/D739zCDmoGnNefevtTe6VwRwhnn9oeiPtpeHFuCa+OpG2Kg

8RIMGavhvDGUtrBvV8jcrhTOmGpaQw5AzoVfp20rmV
noDJjAP+Hz0pRpvkNObcXHrvQBzq0P5OsttadmxT+kAAJ+C69ZADGu3XiX6iD7Gh1nz8Gnl2l8YsPgHL

0ejKxnTr7UJikots2YDiG6818SB71DJs470kBGLeOPLcwhB8gj6hrOPUq73TCHHrR3KBbUKv4gN+5V2r

/0V3CF/lr2ewCmsmWH5JJfzMlrTGCai0R8aAO9xgkD
Vt44sS7z27LrOEC55RS1W9HkTVx/mblu+7wcEhDJHgBD4fUO5s/WWCv0fyCloAxUyKVte989ZEkhUtaa

i8oe1QC6MvM5gx+r+JxvGKER6TXWYa/NRZSHH2PdZy2+zUN9hyhxZerymNje+AtQBjg/+uOVHDXDsVUU

/DN6bvXlLjdOOotsBpt4rAMTTxdQ9xSoUVFF04JI1X
sbkl2OaK00Mu+7P3jWS65EOo/QiHr41iQ3TFlm73/8fyt4SQnPyy2Kbh3Rm6cNBkNjw4MN/knJ1HS/kz

D0Rfxfn8wxQZ09CtGCn/T01JlY/D+UdeE6wyZHewRm8+8N6Kbi00u5NKXEo19IEPLvJaTDEMDKzKSWnr

FC5ulsiUis2dhmYg1eMmRATqXIdoGayE6mMhWuvK28
sh2O/+SlppUQRipwnuqweCUjAAsE5NmBA6vAYcRNgXWbp+4pIKUDrOIJGamGgXAWoF5d95RHzp1DOqC6

zT4oMSFI8S/pRO9lmTgWEpbxrFQITH2DUXZdQIa4zPLVAKtePusyFAHMMbMZBSRQf5gpXHUbZJ4qdXn5

VE9+kYX+H/5Pw/B/A9xaoWhBAhUtGKO0yyMpfV7MGY
6bd8RzCCbuZFOcOL2obl9KBMA2JM5EdCk6NULzQ1ZyXVDnXXHtr8UlVF0WRP7nyLkwMhO8Zv9TQlGkw5

BcZPMfXEaLlJFQdN2XEOy3L/VHDbL1HCspTHjGsZWMNqSKcaXY5ZWlvCF9IdBa65rLqvSjj4P+tp/zbI

HusRY6M7aJGO/bVUDNQoBtzQM9M5OfT5DEN0ybSSyl
zVyZZazHWe7Nw043eELgBO3e+OiF4RmNc1HLhMel5SXEPtE1+4sI28oXDhYBbak6ju9DArVNhkA4HewD

rsXuJSuuuw8nPJ+JG6qUmGokqMvddDKEqmj6+DIlLET700wcqpN4iB3vEKz36h/qzSN8FktpOfp3j+ov

QgfMhDaCweZTVX0hOok6gFhgarSqnaKFphS5KGI7eE
WTBdZnODE7mhLooZ5WMI4vn8ZsCXusHLPdUB1hrp4YEWgXAmWyA8Z0hZHdWs5qiYKtu2VkhRQ2n3DXAv

RhT6HlkaBUIV9wS8FUKJZHQTcFpN2lx1dUOpLwR2TwU2KcfZBtRAM1F7UkzReuxQuhvKAbGLP6I4Hfa8

FtllVtYCMwIH8GLKHiUqtkO5AtTQ3ZVHRtEv7xzANP
h1gdUcEzSXAoJGHbLGKqLJSnQFAxMGnlNC1JqMZidTJYykoxGHWtE3I9UJ3QAGJPDnYpR0NvpuNJrYym

MiAyOCAwIFINCj4+UVnwknAcExvMQlWnDMGfc4NrDTi3TB9NYBDwPLrnQF0Mi637P5E9WeS4tGVgZ2bM

Jc6tpNF1yULiQ7Wjs8ZGn721G4YGPHGGKQbSbE8tf9
ljG2hMXU4IYZBPTSLfXTHidgFpbCtDPnDwT5JQXBA9y0TxeCgtOl4lCZsvQsXguAX0jrizYXUiw6ZdCW

9UaxZcaqaqCxYhEVFapnBvlRaxMA1QsKIenJDoDE66RND+VyRSRmOgL1WfabEGMQUqehkezG7jQKBmDY

JoWw5JDUWeYHoaKRDaTR9ZVaPwJdTeMM9lUOPwJSn0
LmSyCZ4VDr7+RGwfrzWlFldHUlJfNJCae4AmUSi0VF3MCQTaGSfuCY8Bu667H0T2SyG0pLDsPLhbUWJl

EkHvGBOvahBkDRVLNKCZO5O9fGGdkG6GaiYgMScnSd2RKKFupOk7pO1BQRzfPZ6TNMZqQR9iBW46Zt9q

xPXaXvViVJZaZr3YVaUtJ4CdWJ8qC66mBIZiFUCuKH
INCj4+WNzcvyZgHotMHbZnZXHsc9TsJKdqBEA8GaB0YFIdKfl2UGO3CyN8LFKfXXjzXYK7RdI7NmiiLH

SjWNT4SlQvDdOaHxqyIYB1SBD1SMioPkjnEWTuZEDiHrFcTOT5KOA5TxR9QnQgVYc6SCT3YsF5FcNtQT

q6JTJ1OhC3UsZzLPo9XUS1UjRlGoNxPpmwBDpuLLAM
WiriJOE0CRttUUI2LZt0QYD9RAXnIhN3IHMlPFI4SUQlFBKvGXUvVSI1LZomKzM6TZSnUrO5RIJnQmNj

VCFmWrA6NdHuRgr0THp7BBB8YcBzDoD0LRN1WPA2FmwmJFt2QNzkUoS1YwcdQOTuLAK7TWYoDfZ0QLLh

PmDHGaF0IPJ4OzdbIzkkIMgyHIL0WGPhOMH2YMT7Sc
U8OEYcOCTuVKO7VmD9JyNgQoy4BHF2TdP7LJjuYaH0MFP4EzC4LaYoHnC3XTbpSGH7ZKkqGPr6QYG0Wg

C7DBRzWNQqLJNlWDOqMcOxWCm6TZQwTwF4NOHzZQH7DM8JTHV9QEO8ImQiLtIjJfG5DWQ8QQD7TQeqUu

GcCZJ3IoO0ICQfHjC0QIA8TzK5CJGdNmU6GRB7VoQ6
WLZaDhC4AEP4JdO4YLUnMdI8KAU7MqO5QRXnNqW4ZAO3QdD1YKAqFhS0CQA7YvI6KNOmExM7WNY7YrF5

FBCoSQa7NMIpBhZ7WGR2IxM0MJLzAxZ2LPN4YvH0IHPjAsK9KMA5WyPeDiZtVzK2WQW4FgS6LuMlMKV7

ZLF5HpBmFhCjWPi8RFIeRQR2ZyNlOSZ6JQU2KdU6IY
xmDRA6TIe6EGA9NjpjMqrtYPhgKGI0LCSnBJWvWOInBJMUHaVwDeV5MPhsYowlDEJvJOZyDyssAHH2BU

D1KfQ7ZPGkGAO1QDi0ZXG4TCdsGjRaPJbrRyL5ZVYxRbFeDNbyNvX4WGBqEVN3MQFuMXD6SwRuGIOwJR

GoMJT7CjMiRzX2NRJvLaGyZUvqJgX4QSv9BaS0XFcr
NyA8BT2NWzD4RJr0YZP0CPblCvZ7EZczMBY2XZamNjNnTEcqLeO6JsPbRiRxKCRnEBL5VvOpMlLfRSL7

BrO7QGEtESomPWA7XjA3LNXgETorSVi9AVI6LxQqCUNvQBThONS9DyCcXDYlKAWqDnYoFTalIzE3BTPp

UrI0IxcwFGu7XWsfUYy4DOP5GVN6VpTdHBk8CDI5DN
B0XJgjQDl7LRG3TIB0RAfbROb8JTH1CCA4WblhNExcEDXsOQBqIY5UAP1qb3YlYAaiYBElTD8axt6HSP

cGBoYvC4J6iWZqEt5xoFLlk9KxwZP4c6TTYmAhV8LqsfRRHP4jI0GfR98lZUmwBo6wVMcSbULsvLSTCq

JhX4VkP4AylPQuJPb2H2ChjBposJmpgRNoDOd0S3Rv
c5XcncIyDWU4OC0EBDWlBayuU3RiVWWFThBlT1LdjnFMHk93GOfrSV2hBYYgJHMfMPFiDCl9JQ9NIXTc

JPTajDxbRJ5sgUZzKX0KiSCzPjYfXIk+Aw3NOX3pz2VvGCshBVJhCQ2hpw4ITUfPWlWnR0R6fLLmRr4z

bT4KgIO4jBHvF9SedNOHbUMfT8Alk7BBx546W0EeK2
1lOTsaPt0aNZtRpCJpwNUkTT6jo2RsddecB9ufLL2hoGCyO75mqE3xVJmyAE5AeNJlmTSvIZVwEvFoJQ

QmaMAiZMOmFuV0JGxkZJ6JtPF8tJBfZxMpYCILJUtcOF0Gp160XWMuS2ZwbUSuzsQqMXAaEDCAUe6+DQ

imqeKzBqiEWfN6IQGsz7EwPUkkFA4BLW1wz3TxJCwt
FJDzo2BwGZg1PW3ITKHtOETgN5SsvFCtB9PPPx7IGQt5H2mmPXxdUt8NuRFlJWOiGCyuWW8Yu910ZXk8

NV4IRYOxDKMgXJWISxLqRMWgAaNxJfOwFSIYUTmwXGHiE7XvUXY1MAPpGl9RBDCoTH8UQrZiZjQjWAAI

EjHxVRFpMmMjOpQzNOERRFlsLEObW1F0SEU7BCMfYr
4+HBegYS6DJ4GgFHW2TPc1KG9+TLppPT9TgCLXA5TovUMaNXubW6WLKX6FUTV8LZ7YyANiJC5TqOICH8

SslXSxIo9jYNZoz7ZoCl0tE9UVKKEZYLVuDMgaBIojQPBdCGq5R0B1YBGjR7GOF921uNDgjSj0Rm7iY1

UIRHsSNsPnFXviRQvmXYTfCPt6U6K8XJUlZ5KMO4Rs
UkJsooLyP5M+JfBiIXPQQZ1KFDAYXTq9Y6U4lGSuX8R3fVmXbZS7EK1HKV8SdTEyjTIqb78+PiANCiAg

H4MUO3HJSV8TQGh0U2W6rPGwO3E7sAmLnYC5LG4HIE2OiZhulKHkMk8xJLbwSHA+Ra9YCm1BMjWzCN6p

pq0UMdfyNZVfAicUBlz2E6kuxeq5dAXuOwG6G3A3Os
P5dMUqQW3XH2C7mLTmTNR4PAFycJQ+Qp0Vy9BkOOOhRHz1I1ucORMhBDSuDaLypF39W++4tzudtXN3D4

m4OCLWlVKbsNtSpuFpI4iYSZF3h5E2ELr/Sb3GBTU5aHs3rZIkLWDlZUq2uZ1qcRj7TsVhEC83WUZhCU

niqY4uEaj0N4Dtz6TkUf6iRo5epIJhTu3KRzYsOPE1
atEhJkPIPaG3uPnpnvcxIQX4H1h3mRK9Ev83j8yimfDxb9EpZiJ8FHpiVLDoIsKpasBxMQQ7wfRogV2b

oiOhRx4RCDVmFJrwudPtCuFWQn4DHhQpGA68CcrieB3yaSJ+DQogICAgICAgICAgICAgICAgICAgICAg

ICAgICAgICAgICAgICAgICAgICAgICAgICAgICAgIC
AgICAgICAgICAgICAgICAgICAgICAgICAgICAgICAgICAgICAgICAgICAgICAgDQogICAgICAgICAgIC

AgICAgICAgICAgICAgICAgICAgICAgICAgICAgICAgICAgICAgICAgICAgICAgICAgICAgICAgICAgIC

AgICAgICAgICAgICAgICAgICAgICAgICAgICAgDQog
ICAgICAgICAgICAgICAgICAgICAgICAgICAgICAgICAgICAgICAgICAgICAgICAgICAgICAgICAgICAg

ICAgICAgICAgICAgICAgICAgICAgICAgICAgICAgICAgICAgICAgDQogICAgICAgICAgICAgICAgICAg

ICAgICAgICAgICAgICAgICAgICAgICAgICAgICAgIC
AgICAgICAgICAgICAgICAgICAgICAgICAgICAgICAgICAgICAgICAgICAgICAgICAgDQogICAgICAgIC

AgICAgICAgICAgICAgICAgICAgICAgICAgICAgICAgICAgICAgICAgICAgICAgICAgICAgICAgICAgIC

AgICAgICAgICAgICAgICAgICAgICAgICAgICAgICAg
DQogICAgICAgICAgICAgICAgICAgICAgICAgICAgICAgICAgICAgICAgICAgICAgICAgICAgICAgICAg

ICAgICAgICAgICAgICAgICAgICAgICAgICAgICAgICAgICAgICAgICAgDQogICAgICAgICAgICAgICAg

ICAgICAgICAgICAgICAgICAgICAgICAgICAgICAgIC
AgICAgICAgICAgICAgICAgICAgICAgICAgICAgICAgICAgICAgICAgICAgICAgICAgICAgDQogICAgIC

AgICAgICAgICAgICAgICAgICAgICAgICAgICAgICAgICAgICAgICAgICAgICAgICAgICAgICAgICAgIC

AgICAgICAgICAgICAgICAgICAgICAgICAgICAgICAg
ICAgDQogICAgICAgICAgICAgICAgICAgICAgICAgICAgICAgICAgICAgICAgICAgICAgICAgICAgICAg

ICAgICAgICAgICAgICAgICAgICAgICAgICAgICAgICAgICAgICAgICAgICAgDQogICAgICAgICAgICAg

ICAgICAgICAgICAgICAgICAgICAgICAgICAgICAgIC
SaILSfPQOdUUGrYFMyGMMtPDLbFLBiDNMhIJHyYGVdCMSrLPUvOROiQIGmMWOoVMCmDMZwXKPsEYi5X3

zbDIElZFRqKT9dQUh6Ng0+XGtLZgBgMQV9ihDheK6MKI4ci6DtLXrhATRgl6CzPNn3ZU8SWPFkLEvpWW

4LKOwlcu1UDCSlGQWuvFEOy7tnIlBaYOB2IQFcZzos
OS1NKHRuW1zylmEmOROgPHGDLYdsKLZSBTleUUVUZVDyYJVuQdHuTgMqNBTuYKMaSXQRCB4YXgFqU7Sf

lO14JQCKYk6+XDgegnJlBdoEEvG2EIMbh0YwHFp2ZF6RFTZkOwgel1PeDpikPMEKRCjvBY9OXEY9OGT2

TWBqNp9ILXZeX523hzLfUT3KGo4EFwGuKR2tpw6JGx
mbCLYiSkdCRxc5RRcsTG9MiCAbCGyDqJNqaZIeB8SlW0HahOFajXOlhPWEAGokIMLUXNVgVZikoEkmZG

VmSAJbJO0gJg6zBQQuLOZzBjN4SSJIGV2POCEfGPNtrEAzUPBlPAECGF9TXQlgVXN6NqbifcNseSCsYM

uqSK2EOKVrvdZoRanvQDAXCQd+Rt2ZME5yz3ZuURd6
SIOxWE4rzn5EUDzFEpUbP5O7uOBkF1P6YKphGz0JDPToHAKbOkYsZLWVUWwjVI2NFU5tmzB3DK0FpZVl

GTWxHYAmoUVbBOu7T55tuHHfLGquES9VTZO+Santosh+Sj8XWFGxAFQbFCGeGtDmKVJQAsFyX9QpP4JKl8Bf

K5GxEL63cSeyqmXqGGucIH4OIY9fIHPbRVLAFX0EkP
DxmD0nvnScRMOiVQWDGsEuK18abPRsQTFlJJP6JBFfMo0MWUHtH7YxtxWjdOtdwyFxERGiKFNFDS0YEK

hubeCcjPHvrPsgGD95tAraKB3YEw8ENlImHD5gvv5GvHDvOy7ANRB0KT8YRCZaOEJjSNGkAOK8DGUeOn

OlJCtgUYUeIRSvVGB8MMByKHJyGV9BLlEzMXOsNnL9
FrLgJLFqKWDtve1XAHGqIEFrERqwNtQfJAHrYMMwIPgzYZRjSITyFCX0HADrIPFfFU4FAsVuTQSsAFH5

RPZfTIYoMYEaak4WDHWlQEDpNoa8GTYwZCDdQASvWEudBTWiAVL3Sla5FUXoMRBpWU2SSfVhJBUxZOF0

EOzqWQIgOVSwgd8YDBHlIGNkOZA3AENrIFHzBUXiWL
rmNRVvYQZ7FfOvBTLxGKSfRI2GKxOsGQDcLTGgYXXpYMFfXHDmih1XPLLiUIKnHPO0KNWxIGIrVTHgZI

svBWFiBHIkBVT5XMCdYFGaNY9GRwOzWYIdBDEnEzBvAENzBWRera7ZQAZwOHNsCFB1GLRpHTOtHNGxSC

ehDANyTQC3UWP2YEHbVCGbOP0HOpHyYNZdQCJ1KUZx
GDBaIOEgtd6SFRAbNIPnUNFqGlEpVJAsSCBmOAkuGPOeKLP6NVY1JZUjIMLmVY6GSsDpMDArGOE9YjSg

DTLlFBBrdb9BPROaUXYgLmc8TsDdCJMfORDsHQilNDGnCEA7BDG7KWBgHELyLT6FWiWpAHMoDXnlCXSt

PQNjSTInyj0NMEIiKQRaKDRwRTIiYKZaOVJaOJsuBI
EeCEO7GUJcSZRjLVMtNJ9SUjUzSIAcKFn8ZHwaLTBtSRBwup0PFSBxHOQhDOD2WYQcQSUzOLViXWljWG

HuTXN2HYD1XKEqSCCfFF4QBpHyPZSoYiZ0FTVlOAHlHABljl5XLJHxQUSgMCJvEGToMUBtGQVqISytYY

GfWST0DeXrSFWgRVAwYT8NLfEyRITtFoG9KvHoLHYu
MUQtgr3SLDLlQKMcZSMfJGUzVZZbBHTaOTgfHOEjMQB0LUYzIJCxMOExOF0TKiXnZATeEdF8QBdoQQGm

TLXlgr2UPURyUGXsZzb0FNOfLDVjNMGvHJhySEUpRQT7QxkyVKZtCQBeBE7UCkDeBKXmEpy2YlVcGRZv

TZIwvc7WLIGlZAGkCMtyLSOkSAKcPMPuWDswJSGfVK
H6ZJf9PCOiMDTdJE5EFfJyKYnuUGUORfr8NYfkB4p7CIG0FB2ZT7Hbo1LoEAMhDHIEFWgfVO5aazXfFB

DiTc2KB4dEEpz4NnYbFkRtAkQuFUD8BIs9AhUrPuX8OXD3NHuaUeH3UW5zZBOpFMWgXUC2CcH8Rnz1Ne

x5BXZ6JdS7XbjhYSFvARdzRwZzAF7VRa0YLmE4YKE7iPWzVq5KMogvEzUZXlUePJ9SFRn=









                    ID                  Date                Data Source

 

                    [id number]           2021 04:52:29 PM EDT [facility]









          Name      Value     Range     Interpretation Code Description Data Renita

rce(s) Supporting 

Document(s)

 

          Nursing Note                                         [facility] 

TQQSQv3kNoPHSnRq31/NRPhaDDXwp7CrFTyzHJn0WMmiRPXnI2IwDZV1tJ3bHTC6ZIaIUoPpFgVgXPBf

lbm
LsAycIQeCfFYLxKbmHHjMnSUqrFzfqaLWnPL8ChGP6SVUkG84rSBDsTMVpG4AxYKJbEVJ+Od8OLBBohN

IaXV6BVsbC3HmbbnGPRD9B2y/fnGhK9s7556WRtEaWADN0wnOVcXlkDy87LcCY2p+vVjc7bJ2REkxUgD

/y0q6XHCL1JqqnuNp/77SbWKUUFf+tqfQ9dQnT1+9R
fijNfBkypZRxy4NzElYG/QJIRagobqHlFNzSOip9fL2gKhWJFYm8iUMTAyNSy99c28zS7x7TyjO443Tn

F5K9UJg/nvrVxF2njxeLr92Ly/XyGTb3B4tNoN8bYibmiJvsZvmf9jvvNw3kMWpklWboJK4z1iYTJh7N

OkHd+SV5AWpfAPqKRpYh43SOeNm6qLlMtjT1dfQqVV
N66QZiGJHTr/ELh7m9DcOoEZCSP8NBO1bFcx3N04KG37HJoFudImgyPbcfxld4D49ETQRKuvIvoXeWX/

F2W5BaG5LyNEyhw3pvfOzIt9ZteYcer7vpFr5+pFJ7HohzAz6+wb67xWh9e82Eko7jDUY+uc7tPRsMYp

+cu8Gy88QwfBuiAscVJ9Dh/WxtvtmAfe4dl8aUnVJN
x34VR474wFA3tyPHh7aLiQaik9s/Sppw03S8sebB//4f9jKI0cmwVLhtD22w10ieTkhpLA8nm1D9pCkf

cexcXANTab1DrLKi66onR4MEMCB+FxQkc78pXkDQ1K+wwwd7+l+kYig9S9u3W9uay1pi5Yx5F/52fmrr

21jGR1F+ND+bT3IrEfx6wY3RJz8S8lE9jv2pwKVlb1
Jo4jHqi7RnK//KZUOfrrSXTzv/wD6WJF76lpCFRUz9fq3M9AY1WUmRJpKU1raaM6/WN6hVtgY0reyPmV

jbytKgXhXfMIYUJYgx5AiBIMBrsNMfnmkOeb8w6ffyNMnBTGNyIx5pyQNATvhWlwDC9I+1bZ5OdbwoxV

v3n20cNbqiwxLZQ8JAqUFriYPDgYX8OHYAi5PO8Evq
h4JVBcOYZbkVrlgDn9wghncpzMWEXD7ULDfybFTcNd1EeYWGC7pO6OJqF6ztcfMd85M50rlG0+u6wzsn

YpwheBPRfLy2aYuwM0t2rxblNp1hLLAVoULYatC1KijfwQEpoH+pwiuSNJgmkpnqfUkUJMtawrnkNZrF

MbEnTmEupghMl4ENIiHq39b8WjP/mBaaZqs7CyWkUW
fOV2N5o+2m/Icn9fQ1tdsCr75sgr61t/GXh4Dtf6ZtaYdYqJcVFIgLhC9yhEvWTdGxA3XN28dIViTT1x

NeCh0NDVcvJXhrDr9jX9ImptZD/PpaF9iLdXCTo/YtVYbYT/IZGPjOlzk4tadQiUduoGLjmGjmw/eheG

XfC4Va2aaihO8PQS5dfrUNOJfajgIM2rfUYQA7y63k
PeS6PKtn/GYn+MBe5qQpnH9j7VlCm4UVn3rJQ99ZwUAZeWLJTYI0hEXQFBvptDDzDM1/ALcmlDgNCmVu

MLE4sxNpzS8NRN5qx5JgMAc4FCZss9FfLIzsWXi1IWpdPUUnT1O0fNVkMTCpNM5KSGVaGZ5AGOKzpvFd

OpHtHMRWSmTmUITfNaNbp0XoB2LkUSOjEQRNMGufYN
AhB29qSOnfDs59OAkjKADfBeClURx2Di7HTcBhYGRwI19feBCctTXwSTDmRXUAYkPsZXCmK9VznXYvNL

xuT3HvX0FjTK4cnROpUZ2scZYlH7WqI8WxuoeqPXNOExSyJAPkFUlmOHWwJkLuBZwpARH+Gl8DZUS+Pg

2NCA5gj6ZuJLm8WPLuq9EnXGnbPGcyRsRrUXf9ZVXe
TrjiKuFuZVT3OGW6XDPtXKG8YZj1MGI7ZkFvIlX0WZFhCcPxWbZdLym4FDF6KGPvSmsvJnOwSLG1EUFm

DyfjCBZ5GJZ5TxD2QQIcABX6HTW1UwJ8QJWsUKF6DHK0FiA0QZIaDXE1BMMxRdIiZlUpNNk8GK1LEUB7

JUUhFJl0NAMyKNA4FuAjXkJjDPwmVmV3QtNtLkSwUA
X4EvK8PXMeBnq9OLsiKzTsMcgjGAP3BFtgEjG0SRFcVMFlUXytMaI2DlhxNfU7YNr6EQW9HbKcDkV1FT

MrIFE2HxIbRrD7EWh8ILL0TlpyFrR6RHTtZHUHMqRjCaQgHPT0NHNgTcVyESz8QLQ1WxGpNdWcUEK7FS

BwZFJ8GWZfTrCqHEV2DxEiTmFcMqDkWZTpTVGpNkcq
Plb4RDW6XbDaAlpcKTp7NGMtMTS7XMVyGkPtACZdBEUeLHzyBNA8YRWgYqG6NWArQEC7GDr6SGZ9JMCy

CTakIFS1GsY6CVMhGnh6BMY9CHTrAAogKGj4KOy0HRA1JSIiRrCtKVv2ODP3SPUpQrYnRFc1QLW1TWPh

HcZaJPs1EVQ0HHNfQeYsADi0MUP5IFIkDhBcBSu2JT
F3DDTjFmRkSBy1AON3CMCpMaUuABf7JLR6SMYkZxBoNQ0OECE2IXVbEqZzOKg5QLQ7DSFxQnEgHRj7NZ

Y6LTErCjVcNWc3TFApYtkrZmWoVXO2FcS2IZKkZOU0EDH0AzRjPXMgGLK2ZWUhSaY9JhzoQkbsVAI9Rw

Y3HJHyHrOzYZxvTrK8UUViKKFxDVA2XGPwIkReTrSe
UFXaWwRJGpFcMFx2BBH6FdRoGeUzFzTxHDXwIcFzHiJaXHL7FZgxQRC8OzVnTRL2DLNkJND6WwVxJnTo

WBshHiL2CcZtCwGcKFvqGjBsWTMbQLusAfT5KylmTlH7JAL4CxO5SngaXdq8HJA8JYEmKnmkVac9MUrn

BoI4EiOgCjz8OO9KAPZ4VqaaAiw7HOg9VBA3FgsjOD
k8CPs7XYT9QuHxBoXeLLnjVrI7MaTcCkJ1ORC3JzJ0LHGtYUM4EIR2CdO0OBSuQTD5FAC6EdM6QNTeHL

j8HSU1VtP9BNBgAWO6TMV1MnQ6URUkEog8XDR6KQWpVvdbWzf1FHOkQEGCYbDbPvNzNBCeJUX6BZXvLi

BgTCUaMSL2XRDiREA7EAGyRWJ2WDSmJeQxMWQcSIA8
WHAuVSG0ALSjZEQ1RBDvAKPSOyWkFD9hyi0FUgDnOQ2foh3BCKQ3IP7KYZCbDL9GhHLyB9UjakMPXWUn

kdgncD6jKQljERWzX1OvuvBXZR9gD4PhxINaNEFiwXHXAdWnDMKuJEFjYE75IGdrKC3NBZBFMPkvhATw

JKU6T4Ycn5YaepUqBBQsGx3INZDdXT2FwHTtmhKyMk
9UCCSjVU1Ni797OfPkyYXmRUVhSlUnOQTjVOTgOLC5OM1FTKOaKR5NuZCojXCUabmrJXCmV4R3PF6POR

KBGkTxUj8INzAySB8rel7UOOIhDJ3qsv5KRLD2GR3CMCRhIN6QtZRfN1WrzfCkQ7ZwvBixDV6AtoBoHD

zoXC2XRFPnCe7pnF0SjfndfKoJr0tuG9OcR71azO5v
U1rhllXdj9rVssQkNCfaIp9HPHOeHZ5HoIIeuHSsJQShQiMrBIXbsEIzXLHmBxA2ZAmmESTbW8cmTDFt

hbZ2LDWhEl3XPLOkMO4Xl620CHAcT1ZicRBigvF1PTWwSg3LIBB+Be7NWL7tx7RbZCb2SXLke7SyVHny

RCmnSqSoCLe1ICCbXbhbQii7LAP3FLS5CUTeFSM7FW
y7CFE6RgehUWfiOYBpQbFxGbXiOdo3CBK6OUKlIfbgQpRxEEO3EHVhAmohVXG5IPD0XzW9OTUoTNF5AU

H0YsP0SUArLDT0MGC5MgN9YJRvCNH6JWI9UBDwAjwqKWz1UM9ALII3QXKcCAz8IKO1MfRlXRQ2EAJ7Gn

E8NavyNpBdRQwvOaY7ArzjWiJeKEq6BES9XhFiWck3
EUQwXPP6SdrcURU7SAgvLxO3QsJbQbz3JCH0DjZ0WmkaKqJhQUG4KpK6SCOeTdUlUQF6CaQ4KLCiKbX0

DHT7QkF9RGBzZWpmCBP0GAHiHzxiVvu9AGD0NSU0VKPaWoEiWPP4UyZ2GMFiQPBlLHE0TxY8JSFqWes6

ZKU9QmM2NTSqVyItCWGoSuL6AKEcUyPxDFdpIhT1JQ
KnQTF6ALU2ApD7SBWqLxOzTXJzYCRlWuhxHXM6IXTdRXO3XaQkALTyCO9KYMY6GMLbCRWlLQFyLPAePn

QiYuQ8EVT5UOJ2OGLtOtKuCOi8BDX1SXNsWzXxJUg2SDS8HUYcQzZtPHi9DXW2EKRsLfDeCKy2GUZ0BF

YeObZaFLq5MAB3FVZoZeWuGOg8OQL5GNIeWfAfATa2
BTJ4JEHvDzEqNGc8IGZVOqIjFtOcTXk6INU7YJItYbXhSZz3JTS7TNKmUtAoJAc5UOB4JWJuWpl6IQGu

OpT9HAXfPTM1OEU1QjN5BGPxMyCkFUC6LhMiOwKzVfW6QUP7TQO3GVRdRBg5TJKdPeY0BleiLIOpBQUc

BZD1FLhlWqXrNVJxUuOtGkZsIQdsOIJ8XuR5XmikYi
KgVXAqMiSoZhUzGkR1RPG8EbO3MaNeAQF3LGxuRNU4SJSiInB9SPD6YmI6BbviWaZ0NIV5KyL6PtcuVJ

KcZWZ5PfFsLbS0GUI9QkQ6OktfCoS1RAI2ROBiFuzuYoy9ZBI8AVF9XtFrNiUjLVa2BHRSJvCnTgm6CI

h5CIE0GxlcNky6AHV9ABZ6GrzuAuIlSIcbUtQ4NyOi
JuQzUNJ5IyO1SwogEmEwQMY9LoY9LFLvOOC8LNQ4LfF7LOAqDDC7CIq6UGD6JWAtZBZ7AQO5CiI6AREh

LXZ0SHY7VUWtLciyIrd7QVD5UEC9MQAySEohCSQ4OdE8BKStOIP3JTG9ZiF9CHKuRXC8EEJ4BWT2FFFu

UXK1VKB3UkN9LDNtDKX0IWMyWLE3WQJvFFGeJG9hBT
ntdpIrZidZOzEgIYCiw5YwEXekWKn5YDftVPMyP1U4fLJrTs0ukRXrw6LvfMI1t7DJGdLeEWSfRk5jfF

7wxGKrZKIbBSvXEdEfBHVjDKQtSJ49FPyzXR4IVRXBECdfzOGdVKE4B4Aav1ZiopAgKDQxWn6VRVJqHL

4QdFKohtKpNo4EEPGpFU1Fa909DqHryTIvUJLrLjCf
EIJuNJKkRBB9PS6VYOWmYZ2OuCJgvVTZtjjeSOOoG9P2CG4WUPKHKcAwNw1HXrCaOE2swo2BOWUdIPPt

UqgAFnAdMZsENjMnKOFbLTpfXS4Xs732A7C8QgL6gTKfULY8WUF5aJKzUfJxECAmxzAdNDUrQRnnYY5s

f9VdgdpdN5juYT6uwMGhF26ecN9eLLzkAIBkD2Sjde
U9R6ikewUkNW4ZOLA2K6psquReEIOPLbUxRNFdC0uwjYtgBJulWTDONSqlHURoN3EppjFAYDRqwdxfvZ

8yKJBvXYLoSa6RDZZ+Ds6SEZ6bd6OoXUguZfClAH5wqb6BLXP3CW2BbEi1VXQeF5TjIXYbGIRjk3TjJQ

5DQE9awFliXNHxRUBoH6gucqs1rAKnKrKuNDY+Pg0K
GFIehCCxYZ9UAtmN9PnYoSVM0dhpnjj4ykIPHEZB2T2NqjEuGJXZRMCNuTZ1FIWXQWbBjsRIfU2BLTBu

z69NHLXUGfLZJIT9ZxxnyZR33v1R+x9kgrdM+7+d3x7bwsXyeAV///N8z9+y123tJgOe7w3hseofNbSX

HWeJoEn8i5no7E61rO4S0lHt4jxVQSdKiMi191VPbF
WLFmzzPmQQiQT4/aWXLPRv++qpL+XdB5TnwOTo6jFtkyxfXwIZZeNJ2NLbTwN5cFgGPEJds9K/0ZnzFi

9nnmwFf0YfsiMoeBJmWy4gmuhdweCB8ELYV6xnDSpDsuxHv8F5/+pGNjtJ8s+HFp3EEIlhG4+99Mtdf8

SceRRraVRcc36/uu0SnNNdBi2oQnVp2U9ha+zC9K93
g4h6963SX1MJivk2FmSXbfXUs7H10AAbqm8p9mladaET622kA7dD9nVkFkq8pNBIBrtCH9cAIzA0ILL6

ofNjnZ0cSMGRAqycLM5IhCW8oqj4AyvqtY7sk2nLMMsxASAAHsX99ZL3aqNglCsskRirUIPFRXKa9VZN

uITSSGcZucSvQBuF3cabRib4A9iIJ+CdRFfTbmopEq
os3Jrxbl8LwIPML5dFt6minoaOUzA0IO3tB/UA2VwS5hG0HMZrgrDF1i8K/gGG5pOidU7MX9dDqwq+Yv

6QzDl0e8ilzW12le6Qc2M2gseUknww2a7S8EmcPS4LF3rlPVXgTQvEhFrSt1h9It47d6DsWuWrvX8LJo

qORaaKgOQMrAJN5gIy7YnRwnbDQMwH7Gd6ZMgFdVcz
qqGd+NbSX+rx6se2qs8yCqRZ5MTWfp706KDBg9Ucf1WtOx3flDY2Vi/EzKf/toilGFIGE2y6EpKukupj

q9oeEnccMHLF2iGi/Ez8JK/O8bgkIO6XipDgjMkpsxAdDxaUfGHkOfzUMEfR+BX1lxR3WVSAiPcyIth9

NXJ/Q9CJdqCwBYvhg4/Cf5cq6u7zUkFtfsLI6wskIU
DB1D1dyYWdViBhGFLrsxDeI+0O/SG4MV1Ppd4+zaOVtJV+UlTqvtmxmrUscHMdkvuOaGWuvLsQP5EjmF

QgsNGsOP3O6P28Ad50zfN+4Ouq6Zd+FWrE14amR+t+sjKspTQO4+FaSH8nrUfNdtFBehvfD+gjYYhGoi

m+fpEsCsQV+X4oKbwAMyW1NZcRDq+Em9SG1wvirbsy
VXNvOSRrvsdBz2KqOP+Yr4q82HR6H4ZcfR+6Zqe5GW1fqC1RwrZ0eHkWcnf+e2aq9e30q3cZmj7wRsw5

PZkWuurUjaNywjf0M0bw/u2+394Um37StumqWnVX3B0S9wiFC3ATH/dtw0HORv0r0n+bZTR05r8EE8Ex

L5GWRQJl8BSOpRgxsSeRedsrmJOUDKkcHZ+kmx2RLe
AlN0amKR2sp5xmYEuf58Z2mOVuSx+TO+Vm9geWhyIMKyC0kTa9NFhES0vZbazfi9RH6qF2k+3h7VumU4

iyJEf79j43XI9ZP9nG8M/W1+xm601hG3lGeC/NaHOeudSsNf/oQtqhcMHCH78avwgrz24Y960bxB187P

x8Dw2NqJThIn8Iv0jjqk710nDJWXEeLJQSmGSEG3De
CwiiAICV5ZYuLvcRUOV5ZjLI2agNdZetu3KDFtbqaByqGCtxyRub0K8zY/V99K3+OWq2VQrsAkxSz+UR

poJYVZL6VIdlQd32LItP0gX+CJ18CjeLkgshdUatCE0xVqGm0KKVRXQdn1Yj2zPfMlvG1vJGX/OuwDge

EbRZE9pWUvMjbTMWuqOq1lK2JIbscunWzuK1Jg0/Arlette
g3XYCN4CFC3jh1RrMvm7OX3zYQRq/ZWUaViR87aJUyXm13rU1GU4vR2gT5bt10IB1WY7HAUUfo4s4C4X

flo9oo/bucUxgTTVfLjgRC87q6qDtNPrUqZUSBUQM/NF04vKjsT3DdH81fKUeiC8m6kvyObjcqjWJi9Y

hHiBbOnqsT63xuDV7arJea+BLyiEM3n2ubd0mS5LGJ
3fClD5FEb9rVYbimlhLXkEzbE/GNSpmU7RrJAmCeseKZLczSDrK4iMtsb3GVdbgp0LviOFT1H+bLVSf3

H8e8ZYurE/g+No4f9nrv1a+qgYy3LmpQDKw0EnYwtMf9pt4HFk+UQiInp6nF5JgQruBkL3DlK1mh2hSe

D3J363lYC6FLPgUlqVVSfn0DjvlE7ktuJQ21Ejegvh
CHWkcfKZgrgSb10et7Wp4n/muaeKutOW6oDqMvU2XDo72fdhWpvECnL22rs0lSSooYFbcjPs2GgxjYY0

l71j5a6Jz5Yy3sLRdg2xbedk1ml8NIi7vtiAcd6AXo6+IzYgZ30/lJ2nGE01/nfGAbsAfQaarWDuGxeF

1RWQSttL4Yr4MGmMhT+yD8gM9KAB1H1aiWEN2o7DBM
PorquE8sG5P1gnXRWPcOogEDdUCilBnPzyY6fVw0MLI+bT7iPoecOpOdp5agijH7DjP8TjX67lxD9Z8h

fHFUM08ls7ieMbwjGcJX91q3wsomA7mB0iEg7rNrQkCTXzsZIzjG6If8rhNJpElMhXpcEnVkTHHBrtIN

ZD3GjADYIaEdtB2wiWbqbj4YU7VFWDR68Ch9BQDZoQ
lJ6EcxjMSFMb72S/mtHSO/5o90RiZ0v2cYc8jmcIkN2Bf2qfw/cMasYB6mPOHDd7M7eVXcUGRJq/h+gO

/84m7EiA+ndCATmArcA+wBjgAe+Xc8Y+Q8rMjiF3yxUWwslqOWvugreYgvsAsEjoamTtGmdsF8+mXsUo

95nRqrW9EuePHkL0xGuSr6Gf6agWVmFmMOIZAGgY9y
MYIV4npw8EYiA4cGNOzACy/e7k/tiOgyX42BIBHRhjrT3zuyB2FKlkAIqPAaZxcyqLcjPuAXOGSzbjKG

BfxyP/Vn6Kv7CGPVUSMjI74IAsXoVM2byt0oG2sg47MN+cNx5wUAH3xDFlzYsYcSB9Jdis7Rx6P795Zd

LrzxxUmMaOONA9odq8dyEg7ePH1h60JklExpZ0aKLD
ErdIUtjo3shY7OMz5DCNO3BOQ0JB67dagj6fxq8LbiDHSKHlZhI5n2j3BNu6g267XJOQuzPyWlQ/Wm2+

DiR+r1K+DiR+ru30QAf5U7x3NJm7Tl9KKbM3SbMLz6cLdgMvk24Mh1x8pf5AacMw1wI69Vn1UJYvKjCY

pIyuS6ZHM54a41b3ikhcpQyJkvJUgkJi8M4lEzYTM5
FjLsCTMjpxUtUFcihGbeQp5PsEixNdgVgsBIMLP3NMq8KREveLNia82jqdkvBkpfHEWMocTHJGhOzd1q

0i/8POozmk9eCkbiVNE5VX656AEIYnxLkLY6gIWTjIO3fp89HrFK8lAK4U0fI5qkf8Y48i5Knc79/4z5

fW00mRPehkHqaICz1PJ9JXn3mEe+ZBCDZyYwFdgLHA
PbzQP4gzh+j4rH2HxREDQBgBoTB9LcvWfBRLqjkAgWWxMJgysAzrM9nN2iiwiYSWFrsFxUvVukPN8lQZ

LSZwaaV2WQqlbsB1gzla18uEfOd76/Ycg8gtFBGAwE8fQoKpPRQ7swM6t6BiGiDwqaFvZhIR/+i9rpGH

fuL3RNYUH+VZF6sTbBa/QsSpJbQTNqkiYiWUxNalff
btGUU+30/ZL0ie/DuJaQ6wvXE/sc2RiqsmL3MZYew/kkT7lI27R4mNbqK5lPNuAtqvA7U9lm83A6ldLh

EUpk1W9GUFmfmoQmqjtKDrMotli/Qp1dyC200dHuEULXnFHHB5cAogx6nMrd+umyPO64jK7aO7hLDgy5

YWgMWypOVAwO3ZP5JykoNqseEvp2JjkXTCIreFH9wa
cCDD2YQ0+ux1A9iwgexjmg8V08di6iH/yClZvtmv6KyBJwkMNi6R1M1MjfF3mv3Cq1a5QTgBVA9YFzVL

/s5GnQmVAhAmhe0m/9mB+pAdVrAedQIOUVzrLY3lBfGY+0wRfLXF1LVn3d5Ue6yyR6NToRPNDqb2hZvl

BYKywOWpIYihtSuIuEaLZkZSLTTktWFhVRQstLMhMy
8gA799bgcPbJlvMMZ/KPRNltCoa6On+/vSo6sa8ir2gSKmtcXUGwlA6R360u0XQf/ubA1q2dJlUMwmw3

VeJ1uIYcay1PWkwUgtTRLZ/5xT/Fu9LgV3UuzJqTpEM3ksK15ra5JDtXjq4VkYbYYBrTKcHfUmYeauvs

UorkVBf32DC3YkvmHxQGhWXV0+pHMY9OdvGhMXz3Fw
/+ilZLTqpkXFGEkNKsQhGZ7I5ZW3Pwc6msceYYu4aitCy3MEYfsKZ7NZGEf6TfL0VI1Ak8x7/kfMM9wI

5LBytBhLHEKuKxjjwjJf37susUUHA4a5+6tIgj/TCrjHpb2XjtAzAsywD4rht8jC1fKkJeAUWFPGuEPU

psbaLGFMjnNkaPnzjZKNZHXTgf1y047Es9Lyszrlkz
loq7owiXpzMuiLmZ+4hesp3TS+Wy+sUDeckruqQnqgxKU98kApFNnvpUDOHcykxlRnS3CHV4BYru4QYC

pdgYNU54JpT9XTpZ5YfvPMF6jKWuEeBLv6OdPbYEckALRNMorSFAoXPF5ASoSgZZTShrUbJqVhh5aqYF

sGBnZ6n+r1NLNZme9zj0c0uKS/ogLicULMmuWEiUF+
uOccaPXEx9vPhtGOsiVziR7u8t6Alf4ikxI6MORK7G5YJ8Zf9hzGBAEJ0ezj4/zPYD6KybqZ1vU1F0Qx

GTIXadbccHULJBtWzk16VCtnsnXGPnATMUYzOkK9Qz3pIBpGICmk+3fd2SQui8pGKA7XI2HXLVvH9D/+

UZSjoezHOqT9YceUDDWdhFnmlYuLEz2r4g0QR0aOeB
ZmjpwbhUnyb7+OK4xGkPpsE6ZxYc1+HjaBAqOD7wGWIFsFEUY0M8rKWPswUF7rUkm4jR3bE/EdTaaxqf

GJ8PuDvjtVmSpGUGHc0idHko2SlgK369GkQg7W77KD+qAJXwXDsLiZAWj88pr0z/Kmm9dYu0IuT7LGdy

qD6R2pyiwhCVeBXRIid9evI1L740H07IV5ZTs+3ZI7
XyKCdunCp9NGsYEgmhu7A07fi4GvoOqej9JdWiVMjgm1D2GAgbWT9zPSMZdJnewb95JWZfJ4R5QyqKfS

izNkt0x1KWyxG6GJcABMTqMLBhxZZfo/1x3nEugcsYqyzQWbjg2pX6Q7X0VNMxXj7l5hG3xQdgP3Icyc

/Ng/9+xWhBebFKmGNjbjoCIeN8TtSXDkm1uNH324S8
Ipt9AC7hp2oycRCmUi9U+wfk7Gak1yHesGvZI2ErIsycWPBKKYkVyNCbgBXkCEsCGg2k9tO5ieAvHJdl

yl/Q1OC1B3mygrRHy2Q6KpVrkmaH9HCDIhIZDsqMr7zvhIKHclqd/iNeikZJ16jef71nAX8e42pvTleN

K7d6CrVH/wdEZyLLJKzF3eeI42oSXdexQYNNO5+hef
xBEjkaNtnetYTeeTF3rQ+vg9gTlVtF0Me0beJq4tTyRUks5/my1UV1X79Kz1n3SkQmN3l8DS4EOyqPgT

HO9SgOv1mmid4mFql+qoGVQH9B+d1Be+gaepE0EHeXwa655mCqH/+65BydOAg0JkNC+iqtKTnuWnjI3l

Z0qD7w0gdhrw5prL2AnwJDpgawjN48DmxGa9eqXe65
7LbK5pinsBZ0PhzMrIoOwJFbHi5V8wqmbO4PBGdjF/ApsBGYA/PB1nJXPDwQgyvAF4PAc424AhD0wUtW

moBCfUj4AlfHb9mk2q2pUt3l3SkW8azO6wl6uwSWnSqMfe19l3jEnIFJ9bav9T+Z01nsxo1y9nhr73LJ

Ym2CaX4H8jfDtOweTzpBDt1xq1DKoxgN23EsFo0lee
5lLiv4VSrzFgSkCfATEv4QL+nKzv9o2Wb0P8qHz5TpXfhJBG0qRmHhePo0TvtXZzI0yqpDNE/whuTXNN

06o9dfG2iQrtdHglO6JcmOjp2Fyamd3nYblAw6QY4x9+kUbRPntg08WPhthQHDY/cxPBFnga4IgOTi08

fD/r7CN6lh7Udztf+MQ5aF+dyDr0XnayrC+Q8D6Oev
EujiTROrvXNEraeAYrH7/u0NjsaezoDwYunlZtP12MeNEVYWBY/SHm0l+iqL0rieeFUODJctMUMEKkab

LPkGLWZw/tvQHPcRTqilkAVBgs93dhIx011v5KA0Pi2NMAnOjZ/RXgsYhOfKlOwbYM9o4uoedFUqA76y

14/PF+dT7Een6ZbdZydXeZ/AupDjrax6Swlmivft5y
a3/lcbPfaoedZ4a0qM9M+aQpro3BzTC1iIk4UjR390srEZAd2asQ3dDvU6WCvbe56GfO6VOr9S2vkapQ

2NUQdWfo7J7c+ctPwiw773ognHV0zvaufa7HcyToextnTiejB54Ss8NvpRBR7kl5mm2TziYRRvTyrYqs

5j2pu7HF6+azfcRn61ZLlSSArfeLzgpxQZ8BNV16Wu
EzEui4QkcnV8KstXIoaHeva8UmSlLrBNKQaNOSNxUxhakikYnUJLBIdpvGZoI35Ox9D0926SwveylNzN

UU/4JaMEJtV8Uhz9ENNKsmsq2gkw3mFIdgHlHDtOWz/yw12QQ0I5mo6lM05n0jUW6FATdME9fVcxb+yJ

r+xop7PyBW1j44xdk62JpZn33kl9QhB9EJkL8hJN0/
LacQ3p93TwzwKEIqOV/8ZhqGKD0AeFhL4ntDbydNDzZOOD29w5U+e4ja7U96qeYks1Izk1gQaUatoPxv

SYw+LmAazjlhN9S4HfTmA0KtAtZfzeQOnwh/C3izeoXC2kS9lIIWsE08atHC1PRrULrWVD4IPDg27X08

cdgNzH7MZbnWxOCNBS/2SMgqt312oTeY8RIvsy1Y2j
A59OKwcRuJhgLn8nupJFjMwtP+ZuM+qm/aHg6Pvxh3ZGXF5/nnLqjEQ35frBWVpj/ifkH0U1TAdF1fB1

+ApIGW9m0w+nC/SpVKFVY+g0AaN3iUQ59nm1SKi8BRphoG0C4RDV9wIzzw5I+xesg+XFBjxsmxB9ZoME

zSzOvqUAyDgsY5XlkC3PtCk+SV7IWi+nK+Is8NC57L
jUsdp4wVLZ8yNuoBMIdfUlZZ4HKso2lGf9sc9Nk22ylCQlIQxQ8+e7aGMHp693NT0rG3iA3D9Qnph+2G

741O1xWqXhN0ny1bNKaNGxE8UEzaQ59CLxIw1j12Dm5VEOqf5g+R/RBaRG855ohQq+xRAUWH1r/RxrNw

EjqTNMvIHsjA6kCowXdNrYirb/rRe57Fr13Sy7tYRK
fg/55Jmxs8G3Ie9+AG35B/ShZZNiurqRq8b7bxkz01HDN4Z+/0W9prxUr/PA0GqmJBMIKoXweemQ0M7A

s1W2Hu1+QvjtKjyi/7RIUgxuLzul1M/d52l4wtgZZBo8RyaWn385dRRfny+E2CuqqGGT53mJMJubW3+w

GDjcHchzsbjqoWXmmoEArRWWUcM6QtwSzEEf/QehIw
ScUgfl0BVPt13H1GK7DmCx14pwUNjxcp6wfyEV9eNl/7j9Etk/kK+lxgRJo1EH7U+aDzrCpeFzNnLeRo

a0vtY4HAYig+oM6ommNNmq/cDzwHP/w9iCtEOYyVYid7BLEeGUJyK5nt6yMeuZFeFuSxGNwdnC7HdQw+

vc1tenFlytwX5VjHvb1W5lW1QgStGfxQrHBijrrM3q
sO4uo2Jild8+6YfAn45OnCCGP5bKxQy6bnKZ81D/B57PsDE6s8Q928QyqeFTO0V/EuBJ+L+Psf8HAIte

vSHiXBOoZOwAf3JrKXFREfB/Tk+///mjRBDCXnS29TpI1TDFy5f/TN3+kAOY2hBe/m+Uuf0BIYC4HdNc

+jh0Xiw27/T8glcUoa/qivRFXClmLuBzB6ocKvnvrb
3oULV/R2GnbvFn9TD7otf+EyqI3TuBj1FCpaUcMlL4kxZAFUkJocv1amTCmKIcU+cT4gkB2AnL0NJO5m

4A8jy8N+VJMGQLBfkYLCchlJynel75RxQI+NuC/yJbqx5vYIRZYnEs/jp1x0mN/EfR5YoWSqRascRVrM

4JY7JizG2LFp3d//NmyyM7Lwsq/p/21LCaFfSgqcW7
zjCoJlIgyunb1DL/ITv3z/oj7Y4/7Y+wS1x/JF4Pfmu5ze6HcynAX9Jc/Vnw3kJ/4XLf14wJFU/r55vj

QgqmnVE2yUIqsb3O/pVr3AbYSpSuCq/V3t8ow/sIZcC/PEF6eESJMSjLhnZClyoy9rZ0p1O/Hz5C8JK1

IIbtXWpAV6eYvI8UJcQ05QbSg6ITOJCtZM4Yf/ua95
Ao+2WEefb3QN2d8TU+GNubAVjckwzU1FD4N+KJzX3uEQeJ4y5Ht1moIlbTTt81Ap2KEP146Cad4ZoKZ2

sLaRj0/PC2BW40cs2EH2c8JEBfiY5hWTUl8kOI0zZR50Byh8/FW0nn62cr9TrL5+EyoTmIiTgq1Gbruf

Tdbp4cl0E9wBhN1Joau4JOtun4osbkdqgRfsIC6XNi
p9BfJ3KzWIO362NL9AhA6sp5Y8FDx0893xGo079tYB8X7nl5L+q2DURhlGh7p1ns9UKDTKbR0jk9z9lL

09uV3ZTL01WHfL8fDAfy+sKPk27DYxMCpqtV2XLD9KKghT9ajTQptBGf+ewjNnzNl0sOLMe/r7B+PP15

p/Wyc+9061okO0va5vNU1xwSHK9h3HnhJ6xDoM/Q+f
fAeZlhBEfbhpQmg88mR5p7DbpE7Dpk/q6No3Edh49Ih9Wv/KXqr+UrQ7GVqDMT4j6sa38/t7iLA8KsHs

q8GKWvJfe5s7o9DDbIh3Y/HdjyUQcbIAF33npy0tPAxRdwImwi0r/HyFrjC4YPF7rq/A3PBA08NxhQhZ

AnZSD8iaFLniJ7vX6zI8WnZMXcd2Ir9GO8RG0upfai
kDU0XtbUdeIAEBykqSo+PrvqLnVoLYB4GDjj4irlm39Jwwl1cZQJVzsC15jwne1HvYkWfXbdkn4vsMld

j5KiybL8H8HpUrHQ+7zr2G0rZPdHXGcOem8DE9ZWXioSlsI+Ctmi0RI73fguyDs9vm6+OfgB3xSLHFt0

oJwdyjyvrv98aEWKm94lDex/4ova8bUSLbLQiLRRkI
U5X2jEfjQMheS8GUsYs2Nl/gWOrsG84HBMR5HelNr6WkNTVnbBzrnE3KkQDxHRd1kIslepRXFmHtpO21

x9I9ZtGGZncBM88b8JhtVzD9wYvrZ/H64gBUMe3pxpRGP3g3gNvke5xsumN0Sd7UNtYJ1vI6x7PXecE5

O+8zj2U8XTUSQdg4gYy0UI5A5/SIijmwWdq181YKyX
JyHS7WdG56FRUO7VhF+2kv1D4GruNnHMlLbZsXmSLDgjs6Hx117tt0Qqsgilppg//gjoCxH+lsBndnmq

gNo9e4LQfAcvrneibpM72QS2c+EiJ6DvtVWJSmXFtTUE7uXSW5C1c7iV+9UzFnuy9IeSAqa3b2uZ1/w4

52XGB7Xv+hswyCWscUMyIPxupa8QH8IUVWhCQrWR9k
OMkkTSzaS464ZATIaWOqjeqowsSL7xlWz+J34ijL8C+dJ2mzFOHeZrN5jbbD5iwb9PPRw3AUgRht7MEl

l2GWDfep51y3IBlKZTk5h9GfpcxEltLvB0mK2LT67G+bnPgc8ptX1tdVYFCsdEE6o9p5T1fe2nNb0xh4

3kvP73jC+Z8vHtRw9tWnSplrJq9c7AcSeWOzPGAA66
vtYBS9rQjnB//pwuNH9TFjjmiOJt2KVRpv6598EBDV9fw5+q4Rz2hmyVNJDWUmamWklWKpa1W2Q4jSXb

My9x7t2cNrThRngnFFnf0aFXQdTiF96HWeEEUvFX8EVPuBlr5nuOz1b+B79/7krlrkKtOtR40dWkcLqC

FO1Sfh/melpUqgxTn2jodTo6Svm9Z1mfjd27ch4HWl
MuaVa4wrVq7l9C04ea/dkZhO6wk2/92IjpuC2encrhiV2jM8b8Jv+kkWJRNoe6Fi0nqcZfe3r61XlAWc

uYhlqRGi7PxCSjAboykLAbWLrNwO26BTcrn1svlGPMlIX2UbChWOZxn0Py8+/ZhELqjwCV03nJUIbD85

1pUbvGf5nRRI0wjYD7hxP6b7I4/Y3hxcTHjWCd80Lz
4393+E0IS5s9yDsmf/oN6KBzVcDPR+iJd/d4ofOzgQ/nWJ1uNR+pOs07/kh6O+eM2d354eDS0ztR9q+Y

mg2uQTO1ed/YJhD6KrKWJGwjmtH37BDk4M5NX06b+J494m4tAv098ffVm7skts13zXXr+A4YN0XoSP/T

Q6r987r0eXOzev4l9VkvV8d5em4ogVm+Yy7bY7t+H6
He8/PdnN9B/E3y7kYSVyVMKw6vXP8ShXDYl2nT8tc8M6Z5A0VCOt3zzQ7pRJK8Gmh1Gb38hUTcCornWz

3WSFj/Ujj9/ztd001VJ9j13wW0wECH/44RlNS7oNI34tE8lK26VzRKNQcIPXsBnFNq2KW757MjX3QhV6

J8Sv1PotpThs/Iqwol5rn/qt6/N0d4yDk9KeP1q+Zp
5Ub/YHuFanrNs2YiUlSGbm2LM4K4Wl7zPI736muJJrjYZ3w1v7hl9sLmcGZ8I1rfy+3kfAEqF3+CPQ/4

9e6RXocd+3yY3yJq1Im9t7v6D8xko0AdHWr9ysXi1shtbdoe99rF7eRtzobO67wY/qHUP5rkq/Lt/bS1

CdwkCc8rebDRzD8B4pvFXbbwsY4tbuZiJSIb8d833V
NZdaxZ17q7dIf3D4iOFvaV0pnuZSebm8CZK486aPqgYPccHhcOF2ea7j0vG5TeZSVI94cVew84Dc+FPf

3zvl97yDrYD/y+ZBjjhgcjrIdGfsV5L5wt+7J89vA+gD4cnChd7gec+c33a/hjBeMt9HP/DJd0yPxbE+

nd/O3rua49A3k1OhPwyn+KA2o3wWeqiKX6LNUEpetK
uGXPxAIat6ekVZz9+Os1DWPWgAhdsnkhFit++FXYO/YWylIEO8uAyKhCETqRRVI9NvuoaIXffB2sSpkn

vjtJ9nlX+8XBChscr/obQ9pVKyRJ4oZ6Geh3XenlR6+kdxl9BPINIanyxttIOan51ZgkkrXnJzi/zucK

zK/dRP2pTbfafpx4bqrieabD88EFGRY3rh8gzD7Tup
VEjpQ58kqQlQ8O75sGZslpmg33+0EjgFA6KwyKqZblqaQNyYUDaa51G+DrBW54yxG6TxN1CffqQSzEEg

q/muM5yoWe6jDCBsrg26CLOFQKk2FD9WSz/Q5Tbe3e/qPoJU7B2o/PpxxiiSia8z88LpuWzzrIbqXDn2

pCQKpg78N8/MlyZBy65o9g5le/cTcdG5lqfwgYNwaD
v7gkrrezO6Z1n22ImbBEfl41bGeP72DQgR00R18zwb0zu+62wuTt9VwLoRR4J6s2n5VA/7bue7GUi1gd

dN7h2A7IV0d7VbU/kRkcoLn0SxiB3gN/4p1LQ+/HHBaDWw6BspKQ8KWycz2j8Tyy6/sZOlmWOh+17bUb

qXdW8ulxDs7ZILxmID4ehXw75B1wio29r6m0MUGpjy
v3uU55dhemlzdG9tZ1lyk6IDW3dPhH9dt6/xHO+f368ZG3Rl1FKAC1onSfAhF8toy0yuZ2Dolo4ExJZ/

6y1fLrTOVVW2utsLbLxGHhsxirur0SgyoNG5A95HYCC31xnVQMM7tpgKujRR6j/kT7CSkydd8/5Ou2Dp

Ubm6uA13NQ/M6P79ZyT754h1iB8aWncRmIUxMxJVqK
71Hs/VMMz0H+CUwJ5vTrX33RqO+7wKB69Mem9HwtLd/a4rGzXOF1Vp6Qgwkpy/2+y2qWpcx7usjtSDQ6

6nDu0Vm3tF19Q/rIfrcvIvcF/D6H+4nz15rnMlOez0OeOrR8op95PtfsdEu9I3OK7WTkzA+F9b0jJ+9P

RrFp3rp/09K5mo1fa3kMix7s54aOLlzKi3FVua6k8T
69nlj6R/NoVsPrLtNo0JWnYkHKYhPOLSt4NiuQ+Kckmw3Ntede/k0Py6f5etnvnq4Pt6l6O+10WJT6Z8

Ldj8flw2fHqXEjpiG3elXS8eiwR0x5Th43UyXSyKkA+/ojh3Ws7dGa7seM+25zIoqjf5SIM9l3Pzc77Z

4Uxkp9vjj7pnspcbh6NFxN8pyleQE2FAvqwv5IpvN5
fukcb9LOeMqakuM2WeZ+ie1QZ5kM4O6qoFIKVUb5I/yPsVa3PLfZm3F43bqAFSjiD3COMotMUwZM+DlO

pnlQfjihBIo3dAa7LHY+1QK1oa7/RDqudiVEGcF2ZYlto+xl7Id9S2WjfN/q8ZgWFm/2ENX8w3df3Acy

NYugc+jOD26LLDEyxE4N7UxR6LZDzWsddi9XquyLid
FkgN4r24iVQfuwH+z2mBGL6R3eCCwCREa9hxImOTFfcu6hlLLHH7TmdEPgNm+RkcJ9LaTynMka6fFqhR

Ru9ZRUZ6kypx2Jjof6bZbhd9up80ij3vYwwmQqAx6G26du+yYunOSpX2QGijwHFvRV2OD5N86CV7LlW2

xIUHas1x/X4Iux41lR6aD0Wy2CiaJnFol7KZ2IeoUk
3oD46dXfZsWG+RCn+lLy29qlNr1ZRU+rsTWCy+V4bk/hh1rxf6XhZGJIRrpyVMBze56FyuwX9vzMFzcD

fQD5I0leNlEMiBvCJtpxayUAqz3XlaTHe0ohX5iZ0TywH81AlUlc+K+CWqtxeSC1cnKLjfgz3pYGyBF+

aB/+jj2yuAGHbl3nKxECowu+ao5QOJ5s2Wq+R/Gz0g
psqEdhrW3JJMDjhM6Idblsl/gRNA5ivG2y0X2Z8mWvfxplRNOmc0JNK5CelH0mpHOoNc/HhdCYMcC9j7

u8cisan1g3I2rWmFnW0OQuP0pkU4NTD4ScAnPJ0iOmOS0wXVefwDBOq+b2sITfvywad9zxZiMj8H0wB5

Pja3zpZ/rLy50WRmMjscnszXU9eGK1dRiqntTgtl43
012XM/zh50KW1icyNS8WwZLKd669mNNEKTv7CnZN+ykYR51voQjckElGF3Vh4M+R+t2WoT8FXw/BMQeo

YBop5x+99/4S0713NHLW1Ku18+P4lDmu4PEcp2THXPmOpb0/NzllZJ5IdTfBLrKzAb/7Rj6cONRL8fb4

n2dUdkwZL16kJlscfiO2yKQLT/m09t1DN/QJOZmd7P
titr/p7c7gQUXlY/BCLskHZWwX9kE83s/xdkL61HG20J2GOW9bgMygdx/Rs93q/XvRn7R3dxnHOcjByO

75lwKoAwDdDXqi6WY1rP+FAGcZPu1rB6lmrWFFy16bopYptprh0kCv+KSOlaAe3ksHiwuY9i3eWpaG6l

e3FVtuWdLyykN5ffVCRW8y00IaEJ4H3sxgjhE5c/vF
jGp1Xv1Ng+GGnIJi0VGOHVE2U+PSUtAkyK5ySiUO76LtZY9Ppkf+K1nReQOL/r9EgBuK4oWSxmMvLGAf

64JQEJWK815hwL54d3/6b5RVmx+L+/hgTXn14nyBOY9Gg7U0pU/yCF2Aq7uFj7c57kfPqekOkpW+XgZ2

Vp0Odx4kCry03HSG/RjfC+l+FZdDD+p53UJVZL7XWh
4ZJ3CbwZgWGnXEnWSmnM7KjTLA9I7ENgrsHp1dFvs4gjlIy2W0CpNdLzTMDezXAdtMq3nOhtV9fOHJcj

eGRC2Iqg6qMfLyZQkVRXJceccFMqov5WBtfMuhcBmUdazYQJw3jvx+H+noy4SeEFNonZDVBLTHTSgIyq

xTa0mIrB3M/qc2QAjDTNPyAFpHj8550bmBp1d3q07S
/gjx/F+mHKvTbyehUof8aSaMIJ46H1OT4jPA3j+wftYXoD7/Yt86jOp/X55e2b5YrpfSm8AqFYSYBadz

06glNAeZZz4Fe8YdOTjJxZx8t4pzTow39HSrhWdos608p+pB1rbp38mpl2a12InTjxL3PCdmui8cpV7O

Jaey57vAztMc5+RAf1T9kzgwD/qbENlv6wWMwWKcSd
Gocu62gcV/nmhT7a/HE5s7CzpU6IH/5ZMO6faVh2QW/woQbWpFZeNwEHcm30a/1K5HyRtWKK3SuLTi1/

pPrZYsH8DQ3aC+o31+NfwBinzfNrLNYb0At7Q3bFIoc8w6C2u1pQMVK13ntMWy2CZc0rBnZc4u321ml3

3h8rhw8AV5q2yosF52c+qRnzVu4R6W02hCZvstEchE
Xj5dKbMFwfBb64XR89oITM4ZvsocGMO5Q5Cc4H+bAnm92STaIz8LZncSLPlLRUIGj1Jql7xY/mLF5j+Z

WdqO7XZxLTnyJQ3nOn7LgPQ4xv0czW/5NnuDLivrucwOd0K7zKrz+sj0Awy0JnnmCL5bTJRv8yUJrDyV

RLE/D7B6jiakbQQzLAyjIIcTRRm3mDwxJQ3rRC5YnP
XrSQGLn0iItFPGOog9bqN5w7BaPAjJcx6xeGLcTSccLPseWfBb7dmbxlFEt3x6dM5CFvGGbSMr/0uTpl

fagZbzQM9NnPSm/fxxX3fUU060h1HO7yjkZpdZkFqUBwQxSSPAFLOGmhTBxvsa1fZwEGudBxT0SUWIgn

MTtaqchFl+5aZGJtWMUllgFA4P8rqQWnAMNypSJ6nK
j2YXxKZg+P8BeqHLiqKXOQIa/VwTrpVdP/sf+6QUz/oZfsOZcYuGd4sWIX4bIuM+ktZhDc1Zu5hF2RlM

NzMhVBGuvdzYBSQpW4C6tPqpd52rVbE3MswxN1qb7YJ3f0D1fELpzOnJM3uw34BLgpXzv5e8gmmRVFLh

AyLkL/QIbxKQ7uDEB+B5qog8O4hfBCNDZVgCp9rPFP
0prortz+BxVcZrnSm7TR7KSoGk299PWcooMtaJ1I+JxTkP9rsv6x50DR2x7DHUWtNIYMXyGDnr/L27db

5xZnLJK3Q8UXo+Obd3KUahe46uC03dCc2vKapK1PvJmgc5/Y2xjilMivU8txNo5ao0dae7WyvYdhN0fP

kFXSq01X/Q9sLd0Xj/uXvJmFTc9s+Wcc9WTX5I5+UH
5dJOOdIbJBV7nOk47AWpSh+4Mq3pwUhIPlXWyzRIeMsGxr9fwqzAU1UtkZ6ZW8IK2pRlwbdtVsNBq42j

tniY/SyKTk7+r7jjvhIiVwPVjSfdJ/3TTvYPOMl/dhwTuoCWkIsFuPlXh3ccT1iFQXELXX4YdMkyGNjy

FoNxOdlFWUjTHwj1GwfHI0Y06FUtjQoclhkvIArZnj
mCBr8t2mebKgHYWePY/LlVxVUltVbltIA/PhP7Au7zc86fzLKwOeiqb/rZapMrZBBuk2cer9psBW2xwi

zeQj7OhOZWy0Eu9AomN0XvWZcZNFKlNXXiGXtPPzy3M+KmOyLM3AFHPF6Xr2xPR3Mz/QjVf6GymzxsWZ

comGiZcKBN5ANptsp4XXkMgYTXZfMWnx6VV2Y9VyBz
+VX0RLxxWtodEpG/dYeCXeZ8JUHSz2RoAerXhGmZJuMJdW1SWU8gMDMfE1eD2x7jcNvBP9iyy2F/i1KR

9qcangmFweg+mb1FQfXXGoLyNBvjYwU2nKwI8FRXKnUgBbTI6Jx8DpMQYotj8pI3gZy0F9PMPSK3QVBy

6aNiXgo9qMpH2VJcpfI0GGK32KJpBaaaSVrgchtKKz
p6ntozACxKKIf2WMQiFw25COzu1l7XYjFCsutTvCb4N85XRPwLuPPYE36VbldbfqJGIxYVX7DGFP2FdL

95JeEVLi5T/K/yRT0DITgIF45CARcQ9jUh9hE6iN5wjyAQBsYWiSsmyOH6dIQ8bDqCp+cHpO22QdMz4i

ZN41LQCbtwgXGJVPgQXWztgKj7pPwiXDmMC2aEGemF
WCKZ17wdFcDAHRyZUZ7JapogTX5eFxlnBquEi39jnKdqd8iQfGS3cUqxu4NEaXBaGlKjt4qu1NyC38Gh

e1FzzrcayfSpBEDhnBGtkhhTUzEBGX2ZQmgLXWlaOlytWiajjkhyDr3eWE2FWNkkFmZMHQbTBpUnAF4S

IGAEAvyF0+aH6DF0uq4x6vOt72W9TidjhUKIshVPVs
qSJRN8AbFh/cVF/uKhJGJWaU3dVcbb/zwmskSywFuR1nATD+DBHoMAu5IB7UKS2nEhSA3cY5KbNqK2s8

upYkEwSwFLdoxzksvLYQJTM2SKtEcWY0zthAu3F6NYBIiqh787O0lcemsFkOAszS0JRCAkYzTAVXHQyZ

rQbTb3PG5qt/X70iT6MUUxjIm/vypM+CUc6sTsbGzG
xmlL6qm0URhAHGGEFtvPupDvFHPCiBYf0jx/4RG3wCppOMjsFgRr/MZl7latUTTJkYyU6dH/cFyYFpKt

+iKSKy/IA2trAenLC44HVC3iTBXAJYQeyKDInoJvyz/SCSSzGsTWVeTRURRd0cvXdOYd5NihDcrtr/y5

iWhY9/+FzP3/+P/4X4BH/w+xIxBZDQplbmRzdHJlYW
0PPrCmEC7igo9PPYYpPBPyYzeTGwUiJRwiHiohzEZpLW7WbMF3LNDtI08zHIJeFSVvU9ZcCSUjPX2+DQ

cvXVL4ioVeeY3EPMB1UhhdeEWBlNbzH/mNxsrB9UYWQp3QvNGmUv7GaRjxiLRqFc81gBM5EnrbafIa2Y

6/AM4Qx+drAcqJkPJRQOMdYCzFfG1iTFphTT7oNNVd
l9JbJpw7yVYz/OmZwbY8MCabgz7SAvDAf4g+PZ8i1GZp+UKjf10WTU4FAZdqbvH10E4Qt2BRABQtFRz7

eo6Ww2f7neoD/WE1KTjZpSfxh/OD1EBZWlwR9XghMZPS6qIjYEnt85+AVTi6dm0IwZw7oSOrYBPtei+n

+cnSlZGj0yeeylCIb1b+s+x9o8wMp6/0zic6O+cXM2
+OyY6ODF5co9NoJYBkLGeqmuNnDvxIVqA7FZLkh9OpVZk2LV1XDSOqZIipDO2Ae469ZUQwC0DlpQTleb

0AUKLjAh2iyA4bmPVaFNQNHOYOQ6KbwIRmQZvxBW9Ld9FjmqZeOQV7A4KhhMgscIjxwQP6IFMiVWEdD7

HfhYKhQhTuMQiiGF1IeNTsgiTyQr9HIOCcNt4nkYKI
f3spSbIzVYAxAlWiXFGiVRteBF1GXfLoM8w9LYxgY7XwF6vsRLVrF9TxkOAkON0GSYMnEy6lkCVplIYk

RAVjVVUhOk1HYf8ZCzGrRI6qkh2IVULsSLRlGmxJEac9THihWX5CwTOrF7BnvnGjQ9TmjDqaPJ9LKHBF

n993HAtiDHErMjPzVXCnbnKuRMKRNCKDJ1QnoLMiY9
IUCRLjS9ePGBUoF3ezSW97eIM7HIsgBS9HZLAMiUL7HZ9RkrLmOXb6H3LdT4uuoTK0FKyTNK5lJPfnK6

AtGKRwokcdPByaKH52pPS5VWWxT9FtzDwjcCGbjAUaQt2qUBdlKT2Ii865YAWrE4LvyLQhauEyHZNbNG

BEHiBpV6TERLAhAVKpR3emOPo3XHHpYEP7INKyJFMg
JI5oOU7ELw8VAwCzQI1udx0ENZGbCKVuOcyQDbp3RXrxLR1LfQLhZ9QipcQkQ4CinApbJZ3XqWWsAV9P

MYMbUx1fmV1KCJYVHVCsOYEaNWlpIH3uz5QpksmgSEDwvxEpqTsbKU3GNEYxODVxM0LjMBGqmGTqkmGh

ZNshCFHaBWEiUGnzCL3Ls8MluTLiDWUlGYXxGEFUTD
o+Ig5WGJ4dt1LvDKupRjCfQZ3dvz9PVtT8DROsMKOxNEX9OGErXHAnLpccDJE6BPX3SiuqREB9QBbqCI

LfDhWvOgNeATUqWkS7KHeeRbm0ICGlIdP3PEXgXxJ5XEA8HSH8OjddSAG5IIWfYWR5UjcrWHU3HCSeNP

U3MbzjMHZ0UWYwMVU1EkbbTqF1RJLhOmX1NRKjHOq2
WJApUpb9PTA9VSE2SFLeSpO2KIK2NaW6PZakKfOcPLLeYjP5IRfmOeh5YDnwALXcObOeCwJ9NJD5HGP5

ZdAqTKm2YKz0VQA3OAQlAKDuABm1GIA3KLwzUABoCFFhCVL4GNytXgFkAHytFIV9BIPnLWIsPAT1GGZZ

FvHvRfTnLtetGvVgDQM4EZO6BMRgHdD6KXVnMPO4RR
eaYQBvQFN9GPU0DcZnVcEyZGA0DDJ3BpJnDePvEOR9IoB4UVrrWiSyZKg1JRO8BvZvAHi0JQX2LMS3GU

mtCxO7ADQuHJNlFttrDRT1IVR4AWN4ZyYuYRQ6TJ5ELMd5VQY3UqRkHHEbWqJ5IPTxHhC0ELSeMhYyKY

D1LdK5UCIgIdF1LMD3WmQ6RJSzKfZ1UJJ3LpU4AQBw
YgA2GMV4CxQ8JHEgTzK4GKC2NiS1LCZjAtE7BLJ7UlP3YXWwHmH8QJY7QfT4CURkIlU0YZO7PrD6TOFs

MGw1JKRmMlR4IFT0SqO0RMNqMcA1UGD0PeW2WTLaYaH6MQR6HzZuSrUoHnb0PAMnFXU5YuqsDLG4AOQn

PLWyMaaeFDJ5NHBvHTU9LDOoYjzvCZYzStV4UBGsNN
RcZAp8GVW3SNAwItk6CMU8PJLuIgAaQnV9VXT3CdQPIhM5DhC0VRvxErJ4MFZlEYZcTHVvWAWzHRWeZr

U2EQFoSXAzWBj2BsA4BHwiZoA7AFE6PGP2ZONyAbW9VJO2OPF5IPEmMIYvXDBrHZH7TRCuKTZ1ZMTzAg

O3XLPmMvZ9LDB5BnTzCtWxLlI1JZqrBUU4CKcxOpC3
WB7BPwW3DHv2VRV3EUCsLlL1DIE0GZD4AXHrWrz2WTS7WtU9XMjrJsm6EAF8XbL1GjJeUNH1VWCmNSE3

QLvkHQE5DFQoBHV7XOxiFEQ6LBrnZqV0SuCkIOOyOFLlQwN1BjZjPWDcACB3AlFkNRTpWoOpOSL9SxF1

CDikJYa7MiByUIv7JSH8VkF5QBUuIIc4KSK9XjO4WV
CkWHl8VFT2FtZ9QlYgQWT3QXZ7OhG1JYUkJJu5UET2CDYoDB9BOX6ka5CqVVqxIJYeYE5oum6PZPhEUb

EaP2W5eWLbJw3zjMLin5UiyIR8p7EHEuVhA0VpclNFTA4uO6SrP68dYJeISxRsB2CkJ2RgaNMaFQt9A3

UmkXpksXzonGKpTOh0L2Hqk4OmeoEwIWN5OI6UFEZe
PknvJ3HwMvHVQqQmB9EhclECEa35HMgmCG0jBBHyQAY6QGKnJmljGBbkEQ6IfRVtkRVLdvmoAXJfF2L9

GG5GRWOKUz1+DLredaRkLrmYJlP1GEBif4PwALo8VU6RYWRjYHvmMI8Yo728E8Q2ClY2oJZvGDQ2KIF1

xLHqVtTeXLOdljDgH5Jjr9VQLP2IcjItQBrvHm2YkS
6BlwOaAC3ni9RaljmDHhMnS6UwrjT8L0qslkJjAP7VFZI8R2komvVbWMSTAaHyU7eyVKWkzxLuJnSmZI

AMYwOlW4GueyRJDMGaxfzclP2sFES2ZDCyBr6FTw9TMnOdYR2tpm8JVnBuGQSvMtpUFebbTAlknqEwPc

bOVjOzGWQpTlxFWev1HJsvLR3Hty0oS7P5IYztZRFW
P3VhhHAdFX9gA6AYB5ynEKkxJj3AHsKkQ9SlugTtNYspW0LqZKviKVVNPVvqIKQkS8BhRUNwDFBiKz6W

UTObOV0NCsRsEgEuVTOXIqYyKYNvLgDiYJzeKCYDSr9FKgYhT4gFAqebE4CwETtgCj8IXlDcX1X6eKyV

iIZ8AJK9IF1XSySILsLkJPh9V0B1tLNtV5G2sGcMsW
W8CR2FVU4SECAgWE0+CfSqH9JJXBsXPNS4RH3FhTBoAN1BfYHRO9XxbAAhDj7sPUOshTkkaLe+PiAvU1

BYMBDOAMW8RY7JaUDmEM5CwAQVA9FzgMFtRl1gVNkqUsFmQH6fSB5+HW8VVLNVUyJMXuZdCYkzQAbqDH

PsXKw8J5E8OMKpC7WIS6F4J7k5q7twuy4+KF9ZAQBv
G8TDDNGSUoTpTZyqEYnhLSXrTQv7G0C0JATgE0HQC7acS2h1DT1+PiANCiAgID4+DQo+Sv5PGO2xd7Ux

HJscGLKxBK5wdo7YYTapNIUdM5JfJGIzZOzcW7YtbFhrBG4ZOVnmPUxfEU1VCIPgWFK3BP8+DQpzdHJl

VP2GOmm/nKWcZ4lhkFBaLMhxyo3t26p/PdXuXL6nLq
HKXH6pZ7IkaOs6zmZQfv4QL4adDbmkTy3+TNdlSCq3VeebwW6mjPDhlNz5jRF4rq4kVt8jKPlyAMsccI

4hfrO7eE6iJQZgTsJ2ixB4vMA3ZI7rFi0YSHBiTEzzZNW2FvVQMDjzpK2yUkExBl9kfHU2cThpA6d4cd

39Lo3qqhumAQt2NL0fHr8xHk0eKTCng6uodAC8AU4p
Iyc+OEhkUFThVA6oMEF5AcWXSy9HGKU1X9m5bM6noUB9ZU3OAqXvJPOaBNWfTNZaXDKkJNIfTVEvRHTq

ICAgICAgICAgICAgICAgICAgICAgICAgICAgICAgICAgICAgICAgICAgICAgICAgICAgICAgICAgICAg

ICAgICAgICAgICAgICAgICANCiAgICAgICAgICAgIC
AgICAgICAgICAgICAgICAgICAgICAgICAgICAgICAgICAgICAgICAgICAgICAgICAgICAgICAgICAgIC

AgICAgICAgICAgICAgICAgICAgICAgICAgICANCiAgICAgICAgICAgICAgICAgICAgICAgICAgICAgIC

AgICAgICAgICAgICAgICAgICAgICAgICAgICAgICAg
ICAgICAgICAgICAgICAgICAgICAgICAgICAgICAgICAgICAgICANCiAgICAgICAgICAgICAgICAgICAg

ICAgICAgICAgICAgICAgICAgICAgICAgICAgICAgICAgICAgICAgICAgICAgICAgICAgICAgICAgICAg

ICAgICAgICAgICAgICAgICAgICANCiAgICAgICAgIC
AgICAgICAgICAgICAgICAgICAgICAgICAgICAgICAgICAgICAgICAgICAgICAgICAgICAgICAgICAgIC

AgICAgICAgICAgICAgICAgICAgICAgICAgICAgICANCiAgICAgICAgICAgICAgICAgICAgICAgICAgIC

AgICAgICAgICAgICAgICAgICAgICAgICAgICAgICAg
ICAgICAgICAgICAgICAgICAgICAgICAgICAgICAgICAgICAgICAgICANCiAgICAgICAgICAgICAgICAg

ICAgICAgICAgICAgICAgICAgICAgICAgICAgICAgICAgICAgICAgICAgICAgICAgICAgICAgICAgICAg

ICAgICAgICAgICAgICAgICAgICAgICANCiAgICAgIC
AgICAgICAgICAgICAgICAgICAgICAgICAgICAgICAgICAgICAgICAgICAgICAgICAgICAgICAgICAgIC

AgICAgICAgICAgICAgICAgICAgICAgICAgICAgICAgICANCiAgICAgICAgICAgICAgICAgICAgICAgIC

AgICAgICAgICAgICAgICAgICAgICAgICAgICAgICAg
ICAgICAgICAgICAgICAgICAgICAgICAgICAgICAgICAgICAgICAgICAgICANCiAgICAgICAgICAgICAg

ICAgICAgICAgICAgICAgICAgICAgICAgICAgICAgICAgICAgICAgICAgICAgICAgICAgICAgICAgICAg

ICAgICAgICAgICAgICAgICAgICAgICAgICANCjw/eH
SeD3yspLShenT8R6drHl0VIo3PVI7ku5ZoIRCkKVbujfHrZdcRHjJnMRRmEowZDjt3HZczCE7OkALpB1

WrA3DtIIucQM3KUNWlGYZnaMSuWVRyXUCeDkL3NCUnTFioGL5VzVQnERkuYBKyIDYrJL4KEVNfL140vx

ElIF5VVl1ZRtIhVA8byw8BBbVvTQRnHflIGcg8JJdx
LZ3GcGErgHRjLxHpNFWASbYoL3bvw2SxNsGlNQIGUPssOA1Cc0AuqKOaAPj+Uo6ODJ6vi4VbIVrzDqIc

FL3cce9UTUpSRqOhZ5DcjUnyPQ49hgRfxnnlSd96ZKXvtLFLrGMaEDHOq0QizgRpRBSIVoXmzOE0QhEz

BtOgItBpSIK3CZIvSE2aVAgsDR7PVCN1OKzdVUPgXI
FtW9iOShGmJYsqBQCdqIrqKC0PLjZoO5CzfjAlgPDyXvAdDYADCk5+GHycxsTkCksYKzV3CGAzp5UjVH

e8BB1MLROnDQxzEU0MEOLklX1gXAhzJI9MToYxUPDcRMDWOsVvA90bxCTcDMh1A2LmNsNtNRNzMkvyKR

MgPDwvTmFtZXMgWyBdDQogID4+ID4+IHokVG4ZTBgb
uiYnRZScHz3UWDFjNYQkBA6aJWFyVDYaC3Q2sPvaRZXHFkGrB5sexyxaLF4nUHKwT215oSrwlrQtFBAf

XUBrXa9UMTMoSSG9PLLrySGdIgLgLZNWMQumUK3OqIUzGDD3cY4kNZmzAJLeEGUzI1oHDgVpaNsfUB67

bGwgbnVsbCBdDQo+Ha2ZVG4du3DlWUi7nwHqNVnvSM
M7ENjaQLXbORDuQTJrGRY4AHK3ONHWXmZaNPXuCYDxASuyMFBmRCSgsq9BRXRrVCTrGYy0ZpQqXMXhUA

DxVOycLLIiAVKlFCncVGEwASFjLZ6DKcMjSIXoTMAsNKdaFHEeHCNvgz1IMLNlBPZwDDU4BQLfDESnIJ

HwUPidHGFkUOIzIQxaGCTeVZUwFX9JNdZjXDYgLCM5
NBOmRZGrMQFkuc9JJSBiEVPrRaI3BhGyRFKiHDXqISukMWQuWPWcQCI8TCUxVDMxMO6VAjVeJITpIDN7

TKFrFDIzLZQxst2ISKJpURVkVwx6BiSlPIPkSERqHOrbLPTrJYH9LKo5MCNzXWOoFW9WPpKcMHTgNTI7

RhZiUVLtPZUejk1NJXEsWLKbIih6XDYmDTYhZPZbCE
koCJRwUJT5IZO4SHYsRCGlXV4QTtPxEKBrHYuqHLkpCDGqSEFwtb8OAFKpYRUmPeYbKNIuCUZbSPNqIV

ldXFLfNFB4Pwp8RGBjWOHdCU3UUgUpIYTmEIi9OcVzGFUeAGEbqz1XWMVxMYRoNEwqQLYvLCNiDHAvPI

nvVPZxWOQ9FPAtRDTxYUWlTE5YHtItFVSaAhN7UDro
AVSkULIfry3GRQLyWNAzORU2OLDdAOOjPWLrJGncMBJnJJYbGAB6ADTwDKJlDT6XQmGxBQzgKVJACaq1

YQehG5m8NKQmDY3DL8Ybf5RlTiZaLERSTJhlPT5qveQuXEGnSd4PO3zZYvorCJHoGQMgYIWnTrkgSAMt

QWO4NOKaOzVaOZK7SaV4YM9uLLMjAgO8UuC7F1QxLf
HyT1R8UHe4C6XaTwLiMxowTcj6ZsAbQY6SKw9RCsK0JQG9wHFaFc5SHmJuGDBOUjYtPA1UWVr=









                    ID                  Date                Data Source

 

                    [id number]           2021 03:51:32 PM EDT [facility]









          Name      Value     Range     Interpretation Code Description Data Renita

rce(s) Supporting 

Document(s)

 

          Progress Notes                                         [facility] 

IGMJQa5sEyFNLcEh62/TPIrxFJYgf3ZvCChsQDy4PIonKWEyA1SlENM7aZ8dQKD1FGjXLzKaQbEeECQf

lbm
VtJqkNFfFxFDDmWrvJObSlEUtnXzwjkQClBS9OrOE6EXKvY33lFLJtMSXlW1VvTYJ8PyZ+Jf4KITMdpH

EjIH3LXsiD1NtYfhv4JT6w1L1QeS9XmAhwhJcRi0aJJlyKrhZeYCfkGvzJJkJinKvrReb870omY9GUvQ

8ipTzYdLyQgMHrknIapPCCSaN012XwwqDGZIX/l1+N
0+JqKr7+LzzuAIs7egNAQJEAoQAMSIzpw1Q9s+EENCCl3TS+QchBqOHCZzsEzISJ3B0udevX6fJ9F1L3

/uiE3BF7PLEHA0LajUXA76ChmSPQdGSmBCmqARt7TNioUj3GZFLMw2fr25ZyACIsJqHcwZNx2tjbPIUa

BSpJgucH5kF0zYdi2Ls8rCwPlTRY0c3UHU1mLFv9b4
80fu72JPX0NSMIQy4VDdmBg7wlc8ofqG1ASN6Ct0s23CmKBjuw8Ls2HVKXsJzVbVzKFCi9ZNRwVaVL+7

Rmmtl1igoBkA6WS8ZUOT2MWMdSw1kXD5zYEIGNrVQJCqQdPHTgyfyn+KRbdImynG8Hai+cEY0Fb9uki/

l+wTmJo8yXD5hwWNiVNUErqUVu74AjR3qvAoz7zMUh
e21PydWvYzOaOt89F+Vr/cMfYvBif/ilFzHeNYDr4XDLZN7HZ9QYrbJoQrOnPO9AIZIVnMCmJohpTpeA

HfNuxUF4MUXM99IpWEcrGsvPWEphqv4wqp/j+l5kaMm8B48EgnrSkeh4xf7qq/b35cVnyU1v6WgyFksX

JT1Sspa22trtHm15FnI0ZtK0A2CMsCjupG+mlRuiBA
Jh5qv5/uC9M9vKsO/pIlQ/4j+5d/24fyFUrpaNx2moFIqauhxp/4r4n1vxUrpoSJj8HlrVtGfJ6+IVf2

/T+0Dwi0yC+4RJWgJaZsCafp32WSg9kRYzqDoydK/ALlDNTag3ZR0THBWt/1/f5uImsko1pMKEBCDmIY

REUi+ydsOXOOavolSl6OhhikGiDE1F8qH7e2/G86wo
xOmszMLJrU/tgNQ9VaoDh8mBg+D1UT66pHiZyviYiKYQn9jUFEapVjTeGam0WLPJtYnT0cPndbkZffsC

WgyvC5khGGSDJNPmS5niqvKpII85M9uFuwJaZ4JZSWiYsgXoTCVnRu2LBYZRuMCwmM4HjoaeZcLCgoJ6

jF/BdSnHxoEd9I5bRm8kg4R2091IKuqlq8ZtqHNp3o
UX+VbCqX+5Dqz4V8Wp9Ei4+YWRutyq56bXQUFkrM5a1Y0PiQK1LZtQxr443dMrnUw6Nyvaez6ODtxMwQ

hog9mahok2a8CHxio4rWEtNLXWupO0K5Q60Eblzt6184MtjZ51YsBGfzmlddCsbjM1oZakwaCo37Srkv

n7sCcJxJoLZzgX9P7uXNYpaKFbemRQYH2bv3fd7W3Z
Te4CpZL0Uv0+95GdRO9bzMSeXrRpx7EMbVJygGbQQSjDaata92nQjX3twauU5e+w0Q280zaSvEyfeSm6

jPfF4dBm02cup+dZLhgPxyqffdGRRnDdnYs32hK/ngZZGciiu41CL4k28t/TgAN5uZvccTchVrRNQVLG

dsEECpzK6glklHl6JCGi3cORrdGC3uP5HND4blqOPW
ahs01l62WKOZcS+yfRkGeSRlZcHUwqWb+5AEq7QiyEValBCyiutRIEiXUSiJwWeriCtSc7FAsQx5ApA2

2qOJHR2A0OlxHXx+lNRFfGjXatGuMQ1UDeyu7kt+qzJ+bubQw478G5LDwjB7Ffd+zYGzsBNIeqi9czYV

4pymF+xRbSXYxs2cTfEY9pWxmDtGMZsxKuWGC1pFjT
YH4oA4e4/0VCTN5tT30Et4qIK8n5tft2CBL4XSjm4UdJ1w/K6Bw0NYBFApVhzQnTogkAL3vWR8NThDha

dyxC2l6zB8ioUK7nwSMSVhF3SMTt9j3V8nK1OB+trxqhqOJXa7zzZZWnYzUX/wUvoOh+9CrA9YTsa1i0

bH/ZR/GBzjDj8SPYQRyGwkzYAyrZsfqX0iMtXrJnc4
rIg2z9IhF9NsMVLZFnzjezDeZWBsGTjbjFCH88yhxgyw5n/s8qZvMdBNF2Dmw0VFnqfjjl+Kce3uVfD2

BT9J5sXuk5QZ8TbJacnQc6OBebtY0djujWXldEoHATrmzU8uqiu2OMFysLWs2iYW6nxfXJ4qruYig16A

I2OG0SkchIP700dBiqhOqIZq2R7rDgNTjRtIw3/LVd
lC268DELWNBNbcrsbL2I8OKLoE/9e05Ki9gN0xS3Lge138L8c0uOJ1uHAULCUF2y7357tYBTmArFgYSM

+nPDxx+WEREXGGw06oLrbgdQaPsGMb/zpYhf2vleJhdsOuaweodJMQlbjAx4J0EZbhHkaagiyIWI3mr6

/3zK+7+lEd7OMuZ2ob3UWyiHu18HPkjkMoqiGzvlHH
JgtoCVeGVEhL1t0NvwB/8OnuQq6H8INS4ji8FdKJCvIDddtgJmDywPXaSgUYSjPicECjNaVFpCBdIkDN

JwCFcxFU9VMIdwCVzlWEUwP5PdbxNawZMyWFLiWu2EWZKtNH5UTRRslPAdSTZsNmAfDEKPJvMlOCNkWP

QidMZRa6dlFzEjWBL3PYFgPovjLB7NZZRrCS0Df260
IC48osN0IBXqSh1MXMZwXW8Ztj49jHO0XOGfJfMyROEqxdErXBPgifS6ZX4ZVrXuAPQ6cEFgGcvVIF9M

FPZykNCwHN2LYPNvgQJkKI0+DQogID4+AXlgncMcEwhEVrWoJYFgMzrSOxOfWvN5YCGyAhQtCMC0GOKj

VuAkAFl3YRP9YnS6KPSmRWn9AYahDvMyRncgWeOqUZ
NlPiTpTXluAOv7DIX3FICfHiGiGcp9QUT5OUJ9ZZBbYHM6CMR8KdM0YROqHXI3ELK6EkC0XGRfHSA6FW

R6UbE2SNOpAfOyKQNhUjG0PBMeTYovUKK5IHV8TGFdBnDvXRp5BRJ6CdEfLxAhHZuiJtJ5JvQfOgS3CJ

SwXWD8PmibGsBmBCU9JMC7ZHQuByZhIYYvJSV5VuFz
NxWmTUi7GRQ7IptbFkc3KZrfOyP6AdeoCpHgBXipImS5HehnANY3VNN7NyW7JtgvHnZpDU0JLFLhClLf

Xgh4PEDyKyN8ABSbJFV2JUYrQfC3ZSDfCmAsANC0YiF6DUIsUYV4SVWpXaH3ONNvXiHmIIT3IGLoGehb

JPC0TXS6OXD3WKxwFsUhGVKrNAF3IQAzBvEcUOW7ET
Q1GNTfKwJqQYSpJQB5PKJtZvn4PRN1VnSPRmWhJEG9IJRiWRQvQLbyTbwvOKR0KMS4URHjYvRaUWd6HK

V3FIQnKnQoBHs7XOP0XZKgKoMdLRk1WKD4HBZnIyMqGIw2IMI4MTQzCiHrGHc6ZHA4PRZkVxGqOXa8PB

E4APBzFdKdUGq9SNR6KLVlYoWiKPh5PWY3AGBhBIig
UEc6OFK5AUKcWjWbEJz6DSB8WFHcOyPkUCr5AUK1XOLoQtWlENC1JJYrAkIcJTS2FYG9JoA6PMPnGZL5

AJL4REL0KZXiMaLqLMqbNzVlLsJtSBL1VVY1RNHeNsYjXdZ4MSB5HpQ5LNBwBBD9WPPvTqRxNgYlFmSv

YM4RKOB8CrIpCKN1ZEKzAxQtOgZqKiBxXNZ2NFL7KB
XhUWY3RRhcCCZ5CjXaNjZwSHknPiZ5IqBwQtPcAHmnQxG0GrRdRlCbFWLcXYTuCzSrESH7YvE6RtwuDo

X7FWL9WgHdHnlbOhs5SLF8YNNcZfyaOdRgJQumWcP3ReuwONgtXGz7DUY8BpqeEkw9XZo3DBE3EHNdZr

t5TCwdUoT4RiYqTpGeEWouXwN7RnvfPsB8TFZnFHB0
JULnBET7MET9KdD1GFVfWYH1JJQ3IvU6FQdbKWP1YRU3LnY7IOZbWHY1LAS6AlTnHbukXpc3OQF0QJPt

FaypWwEcDV6BBGR0QYOdLaPkRSQnEUU3LSUxOqXnPYOxBTO4CAilMlVsAFObRDM1VGJaOiRfNUSaASI2

FUQwDmJsCWT4EwCuJJ7VAQ0mz0TiNWt7JAEwn3QgBB
zjQHu8SWscLCEtF7B3wBTmMj5fuULgt4KwkHB9b9AAOqBnUYAjJs9zcO7eeAUtNGYyBQuwEi4kVM2GQB

LbOP1Vx3RbhgIfBJS7F5NvfGirtWaihPW9WHXnWRGkC6ZarOAqImSiRToqSMBzT4QwFWwwEBUmFCsnXM

RyF9TbvwWCDr50HYndLV3bZBFnFHThGAS4GXYwCHcc
BEPpN8m9PTooO1GyG1ozXWMfW8NucTMeKT4AQFG+Gt3CND2xk9NwNKl6GIPjm7VvEEmjCYx5RHtuWVEu

W0H4dDBkTt5doG9AfYX9jSRhZ3EuhCLCtVSlG3Oft0YGa147A8CxvMAzRRDchUCsAC3zy9MqmpqzG0fg

ZZ2xgNGqO12buT7dFDtsWPUqE7BhroO2H3tlbyHnYI
9YWCT5T6rrqpVdPSJLGzJyLVOpX7nmwRayZUDsOAPKUDgmPUZhQ0BztbFRAWTrnoypfF4zBVtlEBWNHB

ogID4+PKfhshHuErnQAacxCSRfFpvCIcVnBzJ4IOBhBhRmUPQ2DYWkYybmZqU3UYF9LfC9XQSbDWm6UQ

S7YmEsGLTzWiMfLOPpJhIcHSrgXOd8INV7VMCbMqWv
Tfs6ZIU6HGD0PNRcMHS5GRK5XfS2JYTuRQI7DIN6TgV2RUAzREH9QAS9ToN5KZYsPsv8AJT0VXH9ZRMt

QHypBYU7XZZ3JGUsIGR9IREtNUYaPxB3KBW3YdI5IqRrMaEgCVP9EdC0AWBbNnh6INenClAiIywlBPNq

YFA9GhK9CQLiGPQtKSjwFjZ5WygfSxN5IMr2QDG2Pk
RmOtE1FRCoHCB3CdGzIzQ0CWf9CRP6SfqwSvM5JSZyUZJRAxAnCql6SLO7HJBdVcdvPOA9UZO9UkFbFr

FyGAA6LNF0TqC1CDNpVPJ9SUB7AkDeXjbtKRQ9IBI4ScUtMuUsFuDmFWWxJQTfNqNeDCRvWHJ7BrP6SC

EeBCC6KSJ9QtKlNjPhOPDiBMT4DRU9GWWtPQMxEDho
LnW5PDOkEZvcBEKkWDA8ICVnKzT9ZRG6RARuFuUlHOq4MXg6COY9JVMuJeYsFZr2ACR3UYNyOuQvVEk1

WSQ1UWLcNuZqZXl3AAA0CNMvBmZtTTd9FSK9IHFjVxGrWYt8RPM7AVGjHbMoLHo2ABP8KTGyDxPuFMj5

WXU7YZJiLyVaZT1CDZA1QSBtLgQcJKe6JME0FAWlGg
LfDXz6AZF3MLQdZtXyUNr7HCBtZawlAfNcHUE2RlZ5DCJiGMA2WYB1BgUsZvWwLGP9TVUwQpV5JdfrBa

kmGCT7DgU5DQOxCrNtCUltLqJ1RENhXXFmSUT2ZCGcKgKiSwWnEPUgPsVWQuSjZOu8KOBxEfLhOtAuNx

XwBTZaBgTqKnZrIXN5XShwZOZ9SqDaWYB0NQVcOFQ5
AbloZnT1JGE7VvD8UvyrUrF2TJL8PhHbWEDdTOcgYwM4DylqPyJ5SZQ0VpP2KivgLpe4WHN8MHCeVerh

Nrv3JWkyZdE0BeJqKbl1PI5NFVM4RqejSed1SKz5BHV8DhezSGp1YJz5ZIN9IzJjNhSqUEvxYdO7PvAe

BtI7BNN5HjM7MFLrSEN0UQE7IoP5RNElGBU4KWS1Dr
F3KKQeKJd8XJXlEWA4MUJbRVR7TPZ2LdD3UWCfXvh1NBH7AVEyTthlJiq1HET5DiIRLwMoBSI4BEK3Ul

B8ESDhVGT1MBP9PyC0URIjTPX6OGEgSCC2VUAbNDR6AKS2WkW6WIXlFGMlVRJ9MvE9PEAsUNPNSuPxJI

2hkt6GNQClYUArEtgHFvJaYQoEQjEaYSLbLYzzNE2V
j554PCQaI7AzmXGxtu9PXHYuZA7Ut922SwTqEZ5EvhbbqB8BBVSeRZ5Ta5BzcwDrZIA3U6DcmAkgbTyh

eGR7HQLfRXUkA2UeoZCfChJjJNunACZcR9VfRPmfUUBrYMzzAPSfI1VunuSDOc02JRusCH2iNVLoEXCw

HSW4XTWpMOokIKGxO2f2JIluU0ReR1mrZKTsM0FbwT
IwKN4RFZF+Hl8QEV7md4ZeDAxaKHAtFC5wyz6TDRV6SZ9QRJMpNC0HjCSbN3BnzxYaG5CkrOppGB0Fth

MdYZnmIX6QNAJfKc3ovG8WnfrhbL1WhuNtZNkhFj5AjR0EonKpFE0wo4OkowqLCuKoKHEhMivzb6RVjB

VtUKGiZ9usq1PXiMPmDHM8UM3RIOYsLY6VpGP7oDZa
GMKbEYHISxArTEQnMo9rsJAyq9KrbAR0p6NuFIPwWNDLDMurLO4+LGuonfRiWuhVNtFfORZqf4AbVKtg

OKq6G9ZkoRYimkTrGhcndOWXMFXlGKQuH0hxwbj7kPPrEzC6JPCaEHByG0VtJXCnGbF6KL2+DQogIHN0

igOryT4LUSUazBg3FUAT2ie1J0sLvkGGRSFuKy9VOt
PQKFQQG2SavY2EErDZMRTWvUCOCZhrGyJAh6EBXGSOrHGFCnunLC2c7PnoMcu5AgIEA7J0j8gyzvQjxE

h88z//19cB8232nncUHS4oFg1jZl5KWEHbYEX9XmTmkPVtxVkpXqBbRKV5HQOWxt1uPxc3XIy2NTqNqJ

M+WUPVNXq8xPqd2N/35jvu7J6TFR8i3IKiCI21cuBN
Bz2hbkbqN+VuYd4Z34QIyqXhet/RmfMWLlqd/RcsUT5WB9RuDUJ/TdRBju1hRI4p1zgHhM3GbXx6FZBF

kjj/2YnvhMCmz3iV/8EPHVg7a0ZjCuGRyN+U2EuXvydmTH++icZwG9FO/qUH7ZhQkS779WQ2b4u/fPzX

vgcxyQL8TEhD6NcPBN/ZmKfZJAIByk448+Dn5WFDu2
ILzmFe3tCbFJdCV1BgOVzUrErJolyqjTI6R8uwguEzcqIWXx5whY47ddCzauDHwpVREvWwXcgOi+kRMY

pjfU31bBrIWPkpY+5pD3gYFdVcsDNvFzmTrCrVeFLH82dl/O2V7utB3LRxohCw6L5TN5LzjdmAwO/pGl

qi/R5urxHIni+KvNuOl9NiuUBjYjAR4VIMi2fERkYI
HReB5o1Iqme35GOo3f6fhgXmklLp+L5i/xF8y3kYmmDMqgFZXwQ7IG5Q8t2nSfUwUNcNId77RVoewH4l

TAPzYca6EgEjkQ2wUzhS5+ppRByhVKTs37yi8xyzWvP8ZUIF4w1CGedYscWLa4V4FyKTIDhU1bcjgT39

7pVo4L5R9Vyl8K2oserHjJhcMaqtiQdp4X4/ti4TLW
mbfWcBI1YEz/+m5+xGQGQIhldDJfBKGDjYG06CB+pYIOA5GbP7FNdoe8g5Au04FkiJhvBwa5t3jhuO+l

D4RoggsQoaZ2c7ro67FHnIQe64z1xV7yq4bx9meUdwbqU3wO1fe2WHoN3oGrzG8FGgcT1kaufMZ3IOYf

dZciTJ3jGfYlgCmwLq9U32Md30XfzvbL3Fv3F5xY3X
caKvGCfOFTPFlWKZuFWsEa+VV8UYtqAgWMcMO7wE9OTeS6dOexf6mc6VULmrhB2OA3k5NW5e853wGtwl

DK9w3JzDxsntC1671QB4Q51V1TBzoVaoDm0+jZzCbEE2Bf4Wyz3ac3zS5IUpr0lXrDgcZ/GDOCYxdKUp

R1TpeH4vGO9bJ8P5gZpxBMtspx/GV9EWAolhQriyYw
TOgQaJuml8H40JkB/qyyINadO0dtOKAojCg39dtdSRAqWz7kmjgG/+5v8eZU6/X0d1y+hJ8lEI9OEltY

cftkEr+ENynsIJleXY05he9lgV69EimM9t4HAOJjODZjHNuHNhKKHPiO5lOR5b0S8XH4WO9wCEBWSXag

Mt9x59vfl1Qm/aNte9XA8gd9P86CdyX21jIgOghBgv
alDEV35g8gSpv7rdqDW9zxH26HNgZ+49nC20Xzih8jTIDQ5ask/DZ4gW04/illaLiWsTM33iv3704Wc4

/+y00nYEV7TbqfOk44qYKg/k6zJYgc02ZE7qhgARTt0yFDr0zD2EjxRZ5hD1lLM7fqw0ABExEX2Ow+VV

FpkbBWshF4gHVkAk3BKX1g/Je+IGbaD6EiYAdIntz4
q2mP03FSKP9C80ax5x2kYJZD0lO3ck0FRyVyAUkb1T8aGhQg6Bt8gm0W8UBu4gkhmMTc2Pm/JBbSxGwV

/4DHKbGIx80tGbccNajmqfWMIn602XFV2hqwOmSLTP4ngXWa0Uteqzc36Oc3gIwRe+kLtuUo0qiuQWjG

H8UosF93RZp9MzoBYEeSpCH3pGonHaImsT8U7M1hlL
AjIqUwZ78zxvpGtVuq6D73D0ay9GCwk1U+Io4id4jHSyoTOMlBvj3uYgRkeYQZQeQmCLTbLxUW9V2Cvv

enJks25ZdYUSVy3aHiuh1WDJ0oeYIADwaMI7RMENQDevjktZMWKnMaf3RObqS6IEoAWvyKbFJNKyPVtc

1N7wyxJzXcLTxPxk88jw4IWi08yvaQm+mTdtx6uKab
UtoHaYOe+FzIudPLyeRk7uwOnk4mlHH/qqQfhWE3kS+V5CbgUbGeeA39P6lrMFTGdgtesXi28prKRVJo

Et/3DIfyb5qGyfrLrd2LlOBY3KdMm0nIUT4SCYusknzD0+3DVjzPNTTo2M28OoE0suzckRheC4b5GEN8

XLbE02HKdpEw9SnoL+J16NeQ9GolFP20GXIw8Vpfje
pWCnR57ku6H7qsc+TNkumGY+mQdC3zXHl0Wletclj/VJrTMdBqTWuSatrW+M9aIvQiNCK5xZEpxu0yNt

BnfT/Jjj5bDVa+o4QPrzL0AfchQU8mZ7OlQDCjoPWjUykIrJB9t4tzsYKfLhyyWfghv2qv/e2A0rk9Yb

Km8Tl6DsG5TGeOMfJIeuOIMNfiIJcF6Mbmv7KO3uPh
ASXSmtkyrhpzq9BhIPTUmche477TxjKzN9H53Y3i1Nqtn1bvjJe0yibR4usv8v0gZbq8qstroI11HzYz

lJH9kLU8im0nFYoPlTZ+Mv8QNSJtWI2oWvkfTX3RtgWEvCqj+AyeWLLg8ldRxWDX6xCGe4TpHeQvoiqW

nJIxRHPvkP+a0dI7/r6cBMyg1VM3zOzAbzR5CwT/h+
KD+ka+KrphI7O8W4vL8LQtDYYFfX+H6A7/ueoSfSe5u9UMFOUqxQ7KCYQG12uvsp5MwyxmlfciVCKf/D

M1a+x4q0k9qMnGkml+F3BE4lPQ68UpaGelE9h+OQdKqvPr2QCAlVBwhrwF+gFrIQ1gDNWR7u9RjJ2qYx

59O5bXCGkJv2m9weS00nI/LtTEzOla5gCAEG22Vf5+
GUqsPAfRUMlKmIyD5tCnWHXGDKPrOA6SsN/HI/4DSoLlBTeoDTjUirzXDadaYaOSeBe0GQdQIjKRQ90X

kuAYDhlh5x+zN8OpFJQxoH6vlW4oLw7jFBfCmhfDKOAX3OpVEp9z3jsdvNqjm5fL94mRXAwlWVVOlmQ5

PRWwC4SLqfIkj0TokroHclCV5l5Jca9AZxyQ2mbGnE
rU9lzg0ZeTkZx/i7gGmh6tw10LJi0rss3IHA6iBk3THZ1uYBvjUW9GwGzNPF0HRRoLMzxoCSvWcLJyKB

XHjUyvWPd+mh5bXxoaTRytAZjgOfCVegnrf9fAEy8JxHIpkWpk3qLpt86OmoFdajjkceqfHREFujIS9T

xhMz2epXww8jSGpfpsdNhuNsLNG0Q7SFXvqB3QxNjX
F73fHqq9FOxZgoEj8VaENnWhSMygcUNbLFU/oQr5GbvrOnrhJusf1pPivZfpID+7EUVQXbMwD8RcJWJn

uBCxvaMrElo27bt11GkO20Wuc2s7m/jPmYPvuQcB+2OQ33BCzakL4ETknBwO9kLSNtTcOG2LaoLTjKDL

f6MQ4rxipvuH4tKtIFt0DqdKhWRKJZva+HdX1RmnXz
V1LLHRK1D6+5mrLrxwFfjZDlJwud7lDcOdYKkQnaeOR8uGUIoY5rpovSgmGClM66+czxPvQ6PUrdBN1z

2B2cywX59/zK9D2C41A+4RscL/4Q4oqGqiLbmGoY7P8Ernh0QroZ0NcEzriYK4aYDoZBLJ9aH51i2QLL

2rX6nnYQ74ovLqhnL3iP+N701+qixncIIVt3+l5Pus
U8VuyvDgCFbdDJnP2+xborqa/kkf5A1EbUnB5rGa3fh++qpKG+ITwuitnCHE7ZxrDJt4ooIJ7a9OcsAW

2YiFInQ08PZgl3CfdapBuNOl4BoSBfD6aU8C4WinRDM0Aq7HAwPPfVL2LTj7oUeLb0Z6Z8RVx9iwS5Qb

2vK2PhtNdRD+sw3h0bfiH1mcZC06gTnb1glvFMYO4d
jNPJzu6E+ifyc0rug9V5wpA1IVwhTMXsL+ytS0u16G6IxkemG/1FX9rR3tJto8tWytgB8VKsnAeI87Y2

Se05MwOoCP/wAHtgQjIFDnPiCHAR8vfgYTqaYG8rtrfFT2L7t0ErklOaFmQDD7WKPMOQm0yqPsSkYlXi

m2b5tmnu10e29Zg2bq48L2QkK6+pjrzKHH7pabCZtc
1ZcOeFbs/3TyncBTP+hS80KpBXAtHRp2GC1yzz2ZpyZnlbSqviVBUZK/zZJ4CTgwJImmzPRx811Wh4e/

xM0nyaUD++CxcIRuMWGHmeHScP4mzQ+oweCpDsZ4sluVxc+soG4BU97pkh1A2KS9cT9wfAXtlCv/6Wlc

vWYkYNyvMR3h2WoDljPZPHuzwiCLwoKTd0SZ8iF0uW
laSJEzxJNk+Nuc1Jo5KhMtiyq/VbhFVzxRAD3KVl8fFGsDWtREcE8NjDqHQI6TD/l9b0zEA3AX36cCwu

KURc6oTqYNv7zVEK4g3vSHP38AQ9ooQss9rm6EfpcI0ZLM+sVCIfmEUapB6mYfDfSJ0QNowL+rLOGnua

hBNrAynoQKE9mXd9Q9kM5aV1iNo7fHljfnPcQNHNvP
xvhZrondSDvlw74Ptct1S4Xvkqc0JnonReFU1BWon6yEcAt89EQN6i0VwQUBfFaYP7il3OhcSmNcf3UA

SCF0Ud1XTnnO8dwjSFu+GuwUlOX1ucwqRPbFGv2/6rwAdBCy/mBrefaRVn+kMvCdLwln8SYSOM5eCoS8

yeG2do7dLEhZYYXiQzFXkQXTaD7lkEmmpezk4MnNmF
DhvIYVFRDuOp/X+xC7faNXcPB8nJlBDtNDWKheC3uNQbUSTYcyuunTmMe4ZZ4GeSUACFUfFuQhHz3Onz

eqx8j6eXYnBj6HiftkrbCWxgkhU9VJR/4cZKq2+vgZdk7DhlIsiYD1DO8qIuZIrfHwEbUDzLXzTgaTyZ

zLAiWhs3xdy8nS6qm63dQ9nh3bgQptjINR2FxAuOO4
g3rTiV0PKLQwMjFRrxbZGr7Y8V6VrnQf8S0OwcKaQSVtQvKnDuqou3xRGz5O5Cewn+iPYptr5Qc693zh

bqF4cKZ4wPyiTfo03VRpDU/VikcLNIEEH1RvvnOQjEApwkvcIHTnrD3MqnzgjEher7hTV/7dkjsRphCe

f2niZmosJ8Jh5GfMaesfqh9fyN37uPOcBLJv+voHhg
zNxlNCxDhCFctVbIPGOcN1rYEe3dviz1A+SaLQfw24E/NVcKRTcFDZy2QgMYUkIa/eLygQhAoqHQV1PS

gmo56YjzjrHn1f9ypcylfBi9Q+tr39opS96E/75v78O50Bb3gOgZrTGKZ/hqbTsvsU0ySxp3cu3K4Pm9

8xlZOdrVGYXB6kn18JdEy2U1LS0sW8ilqe4KyUNDhL
YkrRFIYxRO1qo3Q6qzwinj9ldhK+XPwAPIJ0jfXVCWgjDuVGbCHLwY6A+Rje4w3OMqDbvQGmvTdU5mHR

+CpqP+k+x6A8/BJL0okB5czyQPdhMqnWqFgklKUDN2rG1Sp1Jdh5AgCAsVxgYk2SwwXInsA2U5+kgSaK

18glxfTTUpjcr0J1fcm4Pz+ERfuPl79Qw4V9REAqef
+i2AjX2AjFH8bhvZ2WcOo/fUpxZy7c99U9SaDiblvZV/TdBagG7ikpRRHLH04QL96B+qMbQUWIGyjrjt

xG0D/7Xo1/Eo6zc+oHp5qDOm+yCVgKaquJv5azIfgsGwcc46DdERJHcMLkhn6wjoHV4sX45ccgzqsBPS

RtBlAWwxWcOYdHeFgOpfLdK2MUUnIXcSA9J29ErmMB
hW0LpuuA1cp3GrnsJwlitUXNT+AAcB73WYdY2AUtyR65B/2nPmXicvLifZfJjmOEjmPHm+7CfWLGNw3o

lzeLm1TX7kNTig/5KhrABbelRk2P/DDUBMmKNENCpghZYXXL3DzMQlmhcDfVlNPTkdFs6ZPuenLLDkRE

uqpU578EiF2JkPzdwc6CN6XrsiMNtepWdh42IPIiTx
mOTL196jiypMnrY0J1kaG6SUU/PQ9Nj86kcMtxQgx6O/ZnTwMtqLzafEQaGpG5vlVP3TEz6LExIm69zZ

BAm0blESnAikoJnr2ji6FtVwQesXSA24R8H/Lcq1kA5VeAbgx5fQydY48HWqgrJ74PC2QFrfFiTA0MWT

S3i6r3Phs9Iotye/InlxP1v/EaQ+DF6d1EjbhcaSlU
lCK3KsyCX3+sL8sadKIu89VLHBWywSl1Ks+QYtZnD+zTDKMc9BZfMAR1wt1plDyFathAIhIcIs/w44+M

KKn8CQTWz2HQEcmB2y/azWB+clOEzgCsbOq06P3AJCD50zLVPJP5xHt7dD2nCHkrAV+g4gfkebVjq+rB

3Ay3ARLR8Qqrdud4Og8itbLste4fAj5YVtXhs0AIHr
jmDCypgk5ZgrhoYoeLvRTtQqEy1eoEjX/RZgWA+lQNeYg5pYZy8czjV542E6kBg989eGw+Y5+ux+pW++

ozvUOjpRyRdlbqNrjGPod+yCZn1LJn4hTWY6ZF7Aox6CQxBuanNbyF3AO6Bil1b+JCupsGAFVR2OoDZo

GEztxgRc3XAS15qvrsIxddCE1ppXEFYCqvflPMDL4g
eUoRdA1+5hBv6qiwpMr40lpHydbZGxNo4PS/7v9VMOWJw8tLDF0iSmTRNlXyPL8JciPtJuyA6IV+d3r0

oAeVlkIn9BtvCXseyaOixnxKqLaIle9Wra1I9bjRAiQg4yqfM2iPFWjRcpwNzcAHzPPYlRpFSa7G93Se

nEbfvz8lA6cLr/XEfDveJ3VHL9LdbYjYUkwECBhb3y
eKci63xFOxevX3aWjNjJtOdhb8hmoEIkqVuR7vwxpnJYzKq2tromFIL70a0p9q9TJF8l1Axf6RnEofyo

1YQlJ4XldpxKsr7RVCkTziQfFERpgCf4qfTeFnlzWH+daIclMIj/7Pe96Liqw7MxJoAc6FeKPgMMzGCI

GsHNa+gu/VqapU+xIF0l0h9y6jk/INO9E6yuplDWtO
+o29gfXzt98cGCN9Y+M6JzlcsSTrxv6eQ7R9JawQGXE9nU0FxAcFTw2Y2I4ls92ITWSTuujCDG+u+Qrw

McDU1NPiAmDOV2X8cK0FOi4QJXM/Jz3B2hC6AP9TFJsYKTrIrQLeT13Y3LEqisC5cb26rsO/XkfJGOef

F4YJHl2Y5TPyI8dZIlaGD1eWwucGrDJpIenr8z3KzH
TDd4460BkAU9mAa6DprKw1qQ/qp89Q3Nzn4yRVCV7a5Aar66XOhTDRrzPrb6WTQukaeSHl3nxNMpGUUq

3Om4J7WeR2snur6TJA4675se82j4EHz3Q2QjHGQ5aju2DlDUTauUe1N7KP+DoCt5QDKYkBvuFGs+D/j0

MYaVQQbc8e7ImnfP0O0C9GW00xG/W7Z/F+eiMmXZ0k
7/gGd7OPKc3Am0CbtA2FJ64y8DMJQf1c7/i0a82W8R+O0qPKL/KANTm83gVN9cr+xPklfkK71OgkvB7p

cIpJHLgTrJE7m8m1+cFZuxRxM0c7jre8RABLYm4zrQPLVb21g+yiu6SWwPkWKVw0DwN+i2J9BzEFPG1A

rZDMlJVFnzfm6nLE/el6EFI4pRelF82obs9A6/uP0S
2T+Qr6d+bDnFroLsV6eDTaJz1SF7ho4Ecnt70PY4LONy16bh/G2BlK69bKRXf/+3agXTkQyOGtD5wVBq

YIpqjl2rNu6vAr3m4yd0lMr6wIPghtmC2c2EMRnZxlZNiBS0e+yAM8MkuTHIQrrvX4ORr06nbpNbPiuf

k1++dl0RL1T/iv2wd1Uo0qCRrlvrVAqwI01WzUr2Td
i/QrrrQZ+0211Bih2G9Kw2t1Y/XjXS2t0j5yNiyrZOMTSjCkC6til+9D9OT7//+NBUEcxd1PQonRbqlM

U9uN6E3a2prDvnMtv+o8xNdXmBFBcajHo5HAVuPZmm41d9GNthV+kCkMjTs7UHJzM2id3Jdx+V/ehQtX

5OgswLDNuaVugj2D6e23nbO4X66AnVroHv8O4+kstZ
N8J1q/du7yEdIMDW81YuW1bLNkQdN3Ft/YJ2Qpzj1KhDe3p1gOYWu2Wr9k6C3h2Q4RE376M+7C1rky04

Zyb8fTe2n/1/yq89F3eDUPhSI7WywRz1MDobKF7XrkSBgv7/9lp+dtS6pT7+n/bczl2I6GARPXfB3g6O

pQ27to6JvD1tM/fP+iPtjj/tz2GBIE5hErkZWBgoTb
GC6bGrKv82EeSvDv/qrM2wekF6q+vnm+NHG+KZD8vgu5UNufY+AB1ELhArIr0J/7Qf3vwD+oxrsF1qrJ

dgLVKjYKg7lwbY18pXetyaeu3Yl3LpNujkl6g4O04ilo+RtDx2K2ypJinv1cFOJDbRLfda+2c0wNq5aV

Sqy/layvO0z83SxDBJX0T90XQaAiuu6X+SCn6vBdXk
YjLMhO8BWb6fWn8+9hBIm4KlyFtvmVWritayOXN2DpymVapoxkTdsxLI70jt1hAtdqmyw4/p4I/nsyGP

b92XwVlrsD9Z/W2q5LUpg4H3uwRdsmsn1Kutn6A/qgi4LuFppKZ8qF+S+CzN9ckcL7Pgi9oka+SPYRtP

cIbyD4nbYeFm+Ks3hYuXh4rG2dTH4KGquaeJdNIESs
6K149tvFs7Ei8fWbCuWAgXlvmyVehCGKJrMdojaRQVmMMv/zZCBiz4B31odiP4eNJfLo9m86dvro047u

yspd+sxka7EWWI4iJxgNBYQd+vsH48/Idh1kBg9qrORtEqzI/zI8/qs9B09Pqs+1ec5H6fi8D1+lLAae

ZAFgqb5N3Be8ZBqmBF1EOGFS/2eemWcDcMzCvPDs76
wejt1woqMnUE8Emyz3zQnd+fn4VEnF7Xe/3KVR30cfygwENEEEMrl2782uhBpVgQc/uu1r1l4P3Xn48W

5hpl263UkZGUfv1Rge398jpMQAzQ2wG0uOyY1PhskXH8vHpQWwVmMj9FC1z6pb94UDKVlMfifPqFbYoW

98lh2FlB+h+9TQ6Iwk8IPK3JLMlBYe8yhDcl1BamYK
d7Dw3TyuFetZsuYBOJ78ySe4glAns26N49vivC4Xso2K4axUf7xQjcjglNb9KrWCNnEBFwf6OOE4ruEE

356Q1b98ahG+r2507baH0QtdI1n0WNcWf9xLZW6KATwljGFO19pa3ZEH/bgqJulKV6xFE35uoMAYaXih

6iMlmLMjZAFpSjxWL+CCZgfais7SQfMN+EOXYyhkQC
kAdvOvXiUHLs8CW9eODZbFOCPcGWy7UmXLuhejTE5ylYALpqo9PRL+McjJSNS79wO51sUE89I+Q4Uzt3

iATCO/nJRQJkmaBGfSSOJ0HwM/yk8pBSe24eoUtzwp4Lzwifd2iIoKGcvz6kf++/AOx/cPIM1doKesSc

DGd6lCqXnrDtv9tWwBGtj0TY7ql3W393J1ZQsOiNbQ
nrnrDLVnDKrNtupz/+PFjXWm1WvGy6govv0m64SVMPKux+a83dI5eyrdvi5F2Rokxr7nFZfWAD5AtmM7

0/uzkWnBr0UV7A+Qd4wEpmylrc2pUp/MwyzSMz2V/3ZwjZOrXd1X14A2ysB5Sltr28sKYvnUWfc95sc9

C2PwFbDIezf5OHlRlm4Q1uUH8LL/4pdAUtCOB5ELfE
gkaKz1T/A62IJ0vphsRjemtiDy+CfiOM2c1E6er0zBwVcmZHUaU3ZfEIyY+nQCKh1LoF/HDXmqv761A6

Tl2Kot69ELtY0A3Tq/hvwbp+Mg5brwWZfxQtl3bK42wYrZiTIsDs0HWke4Cm8cdeM1NLE5wVZdNvuoJZ

V4RvIb/+EYHBCEXa6juQLYe1rjokt2udaVGW45Nmz0
jxB/cQKlkYAxOp3MM56oDi3FrPpkmwluOgl1s3dpod0aVYY+GV7g0b9MkQEdauwPSFOmZP95fRo7Thzb

eid8297f3Cl2/ujNZmrkDKkhjl6bqj1bwE8NR+H+ltT8A6b9brqbuWDUNmmbeIlWcyYdp+uCEZ0N8ROX

vGtaPqbQrnjKD+zMGtDyivn/C9s1tAjBNzCyxGzBiS
ynrvA70QSOCWBaDcty/ZafPNbfjUVE3Qwyrgu5wx0wiBvxzV+D91GUaDIdfxp0v1fb6tm3t35Kcw3UnW

Te2IXxPccGpq67GvBbwW14jyc8FHSJvfjST+n6OsD98EWaWgNva2ou6PTRk2/jqKfA2bTMctpVkgy2n7

Hwk7q/nAhtqH+MVhVQyc1jFD6Bn65jo+chukf/AMje
ia69u9vGl+IKn27KfDUfsuP0xQ35MVJ1bhBP2Xurd9/1N7CsVk1yqiyi+YRvi4Gbzk/CSz8Kh8YXbOiw

ixSeucb2aXw5eu/j0uIey5WJgX1YWfo8A/qagbzFv7xcmy2j/VN0ZsbFjA/pvuwv9yevFi1B4jcc7j23

3u56F9Kr5vRRWMfwHS3vx0KFgqVKuH7QEg4IyrcAqu
0oOxEqKbrbNf83M4iLCAocqjbhAR7JUW/dRHJM6MPO83bxywhgMNuOlX2PfvA6x/kxS9STcvmclpZDI2

RpvvyLtluP67oTRHMDVjtmpi1O0O7Y8k0Wxpmnuj+U6+/9N+/J/2y3+b6d2mmkraZ6xh5xf29vsoHq5e

/FsUyqgreYe6ZZNgCuGrac7ZruarjgSeh3abYaGSct
qvWJ/uoGPmMikOimH09v+WPkz1N2B4D/sifu4lAz70aCti4frgeTdDvmhj431zrb6AdAi61f6k4rZlUV

6q7AQ2oW4SmwyqmVt+FE0/5nnv4z50xNLY86IA/buOBljAB1h4JWzWJai9NzBxeZ0JpXxrXfkzQQQm+1

skfUmFvfmHzIMuDwr5go0QtUKwo8SEpk8VvwkKEbd+
z4ZLmROxZl8m8VNQdOz2V4Onui4Zn8F2/QguaL2KiEkW/B3SO9PHsHEvM5C20bv9j2f5eoz35F0alSWg

l5Pq0hM3tfxr+B0IfY5ikR90mdMX+Y2J2c93dKi4yoVSueiw5mi4G3fQo0yCP814PifRl29JICHbOI11

45y/gthDebo5JwoS9f9iEmE52pQYy456Vbs22Xf/EJ
dDjdUfUSCE5SXfDz7Q+DGMk1/BD2g6/C+JLossNis5jcNCWyvc2fp7yCh9vOcfd/xSiTK9EOaSS7azsr

Qs5Ac+iE9EJqgXl6fjDyNIQV5dm//O5wrMr+UgvreAJY7cVDc0RkFka4EDDMnLhnxWQ6vwUzcciK+bAL

3R1wec/OEjjm3JhU4lOsi7PsS5KZk8oRUXU0sQeT21
G93ALepC0+AwDTR+wNFWMoSrh9Zfx7tsFq+3oMaV4L9pP1WJ9+pkhBcA5YKM3sC9ZtD0PUuIgZS/EKpA

aVf6t3vu71Ycgb5s/oI881Au60yrlUcUmaRs5822zSu3RBySgY8cmwfzKR+J0tRhrn+o4sHlHs+HfOiG

+tDHporbjruETvwlestfdU01tZYuksMktqA8Wonq9U
8cpwbNplM9RcaX9XjiOHf/1++0WCJ1yMH4I0nnswLPgtVV7p/JdyqRdzcausvRoD/kbAkU85cnLxaMgy

V3omarQt1O/3HgVujX+gv0DQF0R9SdzUn4OgzaG7UIHW8kxGTDOgplV/3VTl+wvpXV3vaDd+biVWdc2h

rN+Z2DOk/upwa8L1FG3RgYMk9wCil/Fk99pojqIV42
7VlUwLqWdapaM/huN/Is1HxccMave6FO/eexUQFBxEaRyCTNPTvLwqggef9VXkdfJJlYqaiqsjNunxf4

BiXgU2sL/SmRo0C1glIUL92Hxc/x95QIsh+Em8K9cCv9tvk222mXoZhsg8Nq7PymF9Ch2UOMtQZcNjr8

GebGHw4xMDN0frOdcd2eJ9p9QypEVhSTIOLup/PuBm
lAu4KvO8+d+W5v/p8ZpKg9U/1W4h1MEuftDVhicGi1CiIN+sh+bo4c7bZ6Nxk93z5B8+dmda+Y6ca26L

uQ0pu33VVltDjjFHhCacn6K2N9oISr829bI9gxOC/3c/ie5m0VrTS+br3TRwGT3nN3woE6/2TY/vZ+hr

mA9jV2wL6nppDfHpUGf5pErQN9O6yR/BX4B/CGfU51
/G3+7RC3S/1+6B6+K2K7v6kX6vCgTXmHOjpmz93rtDN0HP+LM/k8SQy66dtPG/dmwzvWKqS6O0u3+myH

JkvPwORfO4U9vTvnIEzQgFogeRAL/D3hPke/CrZ6R/WeaqJ+ahnNPRQOOt4i7giWJUekUr4+dHW/N1+/

awlQJ9h18ErrFW7vNFGajSGwCNshNpBu+/cT+kWUy/
k39IGCOYM3nnB3JfuZO9p+S1gWJQ/8UV4S7fbqhq2Yi/F3sMghnI9lcRgYOw7BAs3+moYdHtyOFKxO4h

66nn8w5K7Co+eCpeE5C/ohFnx/f5d4meY9c7Vx3uSUcty9EM+YNAN9eKWm8PrrbcctMxwsqL8jKMRaPc

zTfEcmB+Sb58vCvFKoRtSqX1BOAWzGfDOO7t4Ntg95
LyyutRsEp2sI/466o/xVwF+E8tAIHKRQKqMl4oxZw2sNN57PJ7UiV45USRQyulvjaNEncV9jAjPrlPg6

oR5oA+0V5DyRJCAoRiee5ptod3IeFj6VocbWTlk0E1pmLEW0N1nh2AfFyGpvwSfuQ7mAkY+2GH9k8Ilh

4+ostZ15IPd0tDFbko0NcSIGu6giTKSN1OmcI/N7ij
P4fzvrkEIDRXUYRHNs6jel+EBuXcFseGnutGpm8jfLVdTaaosq+D427qjY/c+yI0+doL98dNqLzBUveW

8Tuu22f2WxxIOqYGtLfz3dee/ldMEvzj0iO/50VvBKH7d/KOvUmYC2HG1k7wT9/qPg+d6X8fLOUjP0qy

OiPhdRFgJI1yS2DPrK+zRWHCjDXzSwnqtjNN5N+qkh
FQ445NZzuYXsTiNFDfoca8bVjIHpgoZ/tonya+C61/O9cqgLdllC1JPcWuBxSOhszLnQ0aqEIsVsMUXfKD

eZipclSN7id6UAwYjsv9iGMoM0bvQE8XrZ8zxCcC+xkdAUQ2iRNw52pZwBucU/sHN0csgM+CLjnTXZcz

+OTm3qDsaPuLyrWc8ri5AQntzdFJh/BH3nd8P1USvy
mwCd+tLH4H2ZqHk6H3c9A8BiLan7KvA4ljDshwE570/1xofmeOnY7cxm8t1g77hkeueFH/QBUa2Vt6v2

81/X16n0StNC1L4lrqu/m0ByJ2LXFqB/Z6OGL2Z3YOT8HNiAjNvviX346E+0ikKuof78VEhDQ7D6D3q8

zNRp5bZ9O13EqB5CAYCElWAsYyF/J+2A3JIFjfw6Ap
EshBKnbJ25Yw1yy9plp/njgUCGonEy00fwcOhNQyWwMBlPJdj/cli8DNx6Ye/ft4uNbGebvJnfzLGXIN

AGrHaF85HZupql/S+lD8y7Og1nRx2AW1dG489lRotIKvV/HF6u8MswAqv68ZSXgxk+jDMHeD+8WIO/oN

2lTg6C75o4Ybtro1auzM8cKkU2AacQ0mVaExQBPEvn
M2zM1rco6VoZE42i6mTlamXEpniIiC7e60C4BHXW/Erqj7touop/y2S09sl6v3+cH6KwtzHGStjXdkw/

uAX+kAPjrKKhsFmMkhmCAHUFksJF1jJoFHLrA8cjT/GGtaTwC9MD9H6Z5Qd5MR9O9sFV20VZRvaF8quw

049CKwY9KfkSuwiNrTYT/XmnYK0ajgYyUJ85FgnadP
zPhF0wm8QJXjMwOf35iRgc6Q9f7ND8FLUlhTYzY4OhV92T6AD7TojUGwa9Ud814lX562h1xbB3MN+RBd

K1kdfY7n5WkFOWkED8jGbhPS4iBsPr4w1DiKwH/APwn+vGU50CVo2Y6FU1E/nXjauk+fCj5iltE+CPH8

O4bRIFGtiq3Jbky815bRLncEZvpyplxgn5S+1hegPv
3PSZeofl5Tlwh9UouhCtzndCt3RTurtFjZCtKLB2WJ1oX/oRsVOQ/E+KN1vwMKmChtjALBAMA2hkq78k

tgNiUmSKvHL6p4PtA55tgWoJ+QNilJ2lqK1pvwREAdQ0Ex4SPv75Fj81WT+J5quadDLhZ24aIQKtNfqH

V6YT+prUSic0fNq4aG5zwMXS/6h7Pc7djGscH/F1h8
Vxer3qhe+a3fjL/UpGO8KUQIJNNMUvGk/FSMESI/jKId0F4qjV3C+Flow0EwkWNDFne/jvu8e4wEazmz

nqt64nswWTiLSAs76IYklEMEGutfg9+z5mo4wqiHK726o30tFQt62hxy8oBWXogag8n4WuOY85XDQoSf

VO9pVacIiXhuFjBFAg0b4NmM1TIyhKwdX4ASd/TUwL
Ol6zrsPPWWVRoYpvpMpFyVzTK+AzRcG0wxBC0mXBjDyvVTyaqq/QNpYLeu/pyAP/YMdiNyBcvWX0M3QB

agyB/1EckLYrmgzu1G6zFkKUNiOZbB6msKkQ4kvSrLiDij7kooU49ivefyVTb6j16TEw3LzkOpuMmPXb

DVQmXHaxeUDs8dRJxaXNgQu6F1Jqh0ICmgNLrfLCRb
bkAoPkFiE4DDx3q0IGkjwVTuSnJ/C9FrUCnYAzPtPcv6ABS+DXsikqUc7XOg4LbsNVaDpOi8XFSYDeOr

iZAExVkV2kAxoodD8W1wnQUz01ddzfcOYfB4vmnNEJ38XsSm42h5hCtAq97qM6WZ7qOpnPHiMLTSclhx

HpS5YipH2GTaxqHTqxM3LAVsxJ0/+x/0zHII8rovCy
qFpZLbj3J2agiBKH8n6In9yIT3lSBLUylhDgGozCjGQVrEyDHjJlrtinATlR9l9Os+OaZb1+rYZnbDVj

mCyQaRb6W1ngfjyNqnggmfgM9XXvJyCOgBPzSx9MRDinSf8Skk4C3hurMf8FEqBguEFdXWsHqDBDddsf

3P4Zw+NZHIQkdVJfYMCIDgz05MWADHhSAPV1saYK0l
pfezJZlgGJ9QqOvZCu4W5LdqRWX8vtn4ihKPBxTPTf1dmC2fc0VvUjusaeFZYiZEnuY4AUApJSXFY8Q2

bdaRKBRO8ARumCyfsA9cSGbZs4P1TgIMwdTCpJhMp1fpJ87Y+r+Q3SwJa4g7K8yKbhTxnuyf6Thnsu3k

1DpZIDnqxljcJE5t28s+jxTAkk3b1LMp69EYkzq1/x
+RywJPYeOQnoI/Vw2ytsvXV6OJmSz/ukf3vIe8DVsKOr1PmNern2yRdVZJROFG/bWX8w67zZ+XeA7uNC

vzb4w1PELzojUUtvCPravr5rZPyZoPtUzbDrTTcUwS/NVae/syihJDQTTZEGbA+UMWDD5ExUzChuxmSw

7ohgVR8YME5Sn8jHQMBAW0GsQ4xM63TBQWLr1ufjnF
U+kRgCqr682SfygVXGrxmjX/z82PbfehXmmxZXENJDNmsZC9FGQdQTtAOtTqUF5ypSrjOCcprgzZ8+QJ

sHAewbKUKASFq7YEiaU1uNU3TwXuCM4QSYQXIFBqXFCjYPWDz/jMOoQn7TKQTG+kWWUj1QX77yn1A6nN

yVcKkT7BhZ+0LeEI32rwhzfyeF8ZnqiSatjGpnNOal
hUXH7iZI38hEAk8qIeczMOsvH99j+DBzA4fqtwTSNO8W5M/aOUnWAkZ3AKCKyXQjbXM7UM2sIzLlVSL3

/aGdlYHEOIWSvzRacPeijvbaqpmhEvjReMvwPEoYzWM56CoXYl2aXKyCyAPfvM/MVVLsRX61KLMCvBX0

MOYfDVgijVuKrI5EciGzqwgswRLiOjWmkMgXSIODat
xajMMGoXxK3enbcgPEDP5ui5yJNacDbtFgdFcK4VFdP8a0yi6gRxcCtJ7mWDoyxJ3sCXUuLoXiDBfvXD

P2nGA3dq4KRfUukUKNkm8b+AI661GkdddJohnVIApMrQ9CmthcaRS0LF7hDqDMOABVPyIUKbjHmoO4kl

paTyBYy1OYsJYHgtJLcGcXfIdTaGAxYNml86SmN/rI
d1UztedJDwdZuMRzNUfjEpzu9u+eZzt0eoDHtDkk6E2p9yPxyUzC69zoRh2BFLNy6EVJ1yPfgmPpI2T5

Fu9+fN2i1tYcecacIeFODGLzPTma4YAy3sSJDZ/GRI7WzxuqrOZboDYxWaLlSVTd2ZalBJ8ZcEVsmBlM

ZNvBV4cPF+VcyMDP6HhzeXFfB+3GQzOaOcM4on8LeZ
7C0YRaQRuHSvvuboDM9LQazDlDLA4Ps8K46d5NJv6WeWJQStHoMfDSVUrhVF3YRADdqbNcoqiCDsUCcA

eegitgY4hGIsHuayddk3hhCilAaS8OUlGJb5eurvfQE85bytYLRRg4d9Xx+/Wos037ST1W8bDZu94k/1

5/iCO5bnkzjBZG3+UwEyN9xoIDZZh8/E0Dd71uO9UD
dC1N/5EXyLj6TVOyYflAscGH2ySFkIsb41LtIxU5JXFamKVuilg7aMkcDRJkIgmzzOfreDmaMvG22KV9

3Xc7XslEgdWUXml1ML0vJmozHRQH1t/LmJaFj3/4XM/f/4//hfgEf/O3UhDJaPFbTwUNU7jvRwqA2HNO

2wr5GbYHvcEtFrGJ2rpl2VTNC9DO5DaRn0HTKkI1Xu
TGIoJCZhx1GoFR5URK8ypPveXmY2Oq4OUhSsi7LzJQUyDRkBvUUNa37UTFp7G+o/+Orzu2PTA1LptLGR

Qx8q/VLBZJvfzTIVLea5S7gxTLcCljXXNj6LokUK3oUbP3S1ovW5JvpaIeaOflsTRdmn2r6nooMEVEqU

e3pxda/4Jn409ADH0Cmf7gmDFPkqrG7HexVzUFY0jZ
SfWe7aCtxcM+puAvXo/Ab6rBWCsDIEsAff9wJ/Qu0K2Jp8osZ8dPLWEmVC3Q4p0DXONsKqKl8plpKxLp

GtgBLrW8Y4Nhud/jL3N4NZnQ8qlVLXR7n3d1Y7AECv34r9ET1aZY2k2CgknAukO/BJKdr93gtjs6jJMX

tqwlJotPTrWL8SBoYlGQ2zud0NKKKsYYFfKwiGSls3
LCbkUQ7XvTWrK3OeddTXGQHyfsmvvE5vZGleST3Mq333DzEnIS5DTQUOMDHdRGAuQXmCBgEbC1OkW7Dk

uAL8JADgK8IyHZIyZ8r8ROhfIK1KRDTeFK37QG8pYRPXAfIfK9BxFOgkFLCtKJepMD5Zj262NrRjjBZm

BAVcJdDzJLNqKWViKBV9IA8EFADmFNOnbVgaDJ6daF
WjRO4XfMNfGpIgVSdxMP3Wu458HbcpFERbOQVlJRTDYQh+Fb3GKB5bu2CeWLipZRNzJF3dsk6UIJiXYf

QjQ1H4uJInOb0edA7ZyAU7rVYhY1ELQSWuwlIHqYWzJf9VPFSyBr2suN9TGKEMVIHeHMPfJOvdO3aUSX

9AWSOMAQYwEKMmtfGhfXrLJgQeT0DYTUT8z8ObkCmm
Ym0wAYtmBzWchCF7pcxqYREwi2GeST1ZvfCzmabjYbCoATAskuTjzJlePC0JdWKrkBHlJZ85GHU+PiAN

PiRoR4TkxxQNEIMjuncgrD0yHHE1OLCyKq6XSODgNGfuRYEeYI5ACnPkBzw2XY7oLMu0AWnjWZRjGMFn

DQo+Bc7ZRQ8qb4YsDZqqIbQzCZ4nvg1VQEoSJfKeM8
Z6aLHhJi9jjL6JdJJ5vLIiS0Z1lOWaN6Vou8DZl112R1TQYSQLNfaUpcxheX1SrbObUXncJe4HNEEnxS

h0iF2UNAbjPU0OMHPfUH9pAU46Ix8vwKFvBkR4KBLtXh2AEkXkI4XnLQ4lV68sSSCmVfOrBKCVOq9+DQ

nothAoTllGVlI6SCZwg2HpWRqiGGT6WgG3HNFkIfq8
ZGKnNwX0ZGPiAGhpMBS7SDR1XejdDQXtWJP0EtGtVbryFyN7GMP2XKK9GPjpDrtzKLJrWOHrPvUcNZGl

FCJ0DRM2JqEyEKv1HBQ5GOG7HiBfCAe8TJM1GFG2LpTyXVc2LLE7ENCmAtCmVgiuCIsmJwSTGtqeXrM0

KTvwTJX2UZo2YOO6INXnSqNhSBUfMDW1EpdiEBRgHE
IxHWP8PTRqNpP1GQWtAeL1XTNePnW1ORKkNAG7WJcmNqrsZFi2ADP1HAStHoP1RJF6QgI2EmFaHIx4YP

hkCiA4CbfgYRUcZTD0RJPaYfA2VNAjKkPIDnT5WHH1BeLaNoG8NRnpTkY8CZCkJrO2GQXfDMN1NkTrOO

bgZGVuUXR5SSCvLswmTIBsKKP4UCErNji1YTG8KGJ1
KFcbIkp8OXsvSpC3JWSnHhFkWGIgVKY7SFhoKmr4KJU0ZTKuGJgmQjZ5QPU1HfR1PUwdCXQnRP0NAYC3

PFD0RANeRcccSeV8YRK1ESC5JDdgOwKiPOK4WsV4FHLfSlF7ALZ3TnB9JZVtZfY6VUI5NkV8ZBNnZuC7

VKO5RjH6UFYyDdM6LAK4RlA5ZDLpFmJ4ZIP6SaT7MH
WlGhE1PIV3ErX9RIZoIoG7RFR2ZjV3TIRsIFr0GGFhXdM0RMH6KhQ6ILHgWvE7TDP5HpH6MXQvCfM0XT

T5MgPoBrYsIfD5FOW1BFR1NhTsYYR3GSP2QOJmNwGcLKa4SBS8YuT2BeTyVFBtPPX0DUU6DNnlKUA0SH

t8OBK7EKZxRytqTLrwGbE9IKIeMUD9BAKzWbUIQzYc
ChD1LWafCzrhNBIsSJYeOURbEWK8NXJ7LLC9KZVbMTZ7OOi9PEV5OdhnIpIpFHcqUrJ4LBKtEwLiWNao

SlF7KKAgQPZ1ROMnWMU0HdBsUOYpOFOaWrP6EvPgWfT5CCLqPyYaRPelQxE6VWhcFcB4GKJzHwH9PL0D

WkZ5JLd2JXU3XDvgFcD9MOpvCcO6THdkRlQmETgsEx
L6GpLzVxFaIIQhYpH8ZORgMzX4FNIoVVX5PzwbXLF0KKBgTGM6CloxIRH1WHmuTFP3QULzMLKfMTY6UY

X5SILaSKMwWTI7NEIpISYcVrg2KJH3DNC2ZEFyJSt3YBUvXuZbSOWjLMG8WARqAsScTJEkMST4VZoeYn

QmBVRaIFC3YGJeJvD4SUHhHLR7ZaqdTbFjISGeKQIm
RC3HAP9cr1VaUOtgHCNrXM3wvm3LMIrOItDfF6O6gRQlYz1qwYAjt8QyxCK5p1GJOrOqZ8ZhbwIUNB1a

R1LfG64jWZgoLs6vNK1FWJRqDCPfRK93RXGsSyilF1UvZECpI5e9CKXxAzpdONYzG5AhlSKoKuHlGLqi

FS9CnRLorbSkGy1VPZMsJh8soBSSw4uxHyFnYTA5XC
QmQIC2TLDvFhkhGTaxQM4DnKBjvLVDhgssEVJeU4K5EJ8YLBEEKj9+WZgctzKcPqzJCyZ9JBVov3GqKD

i0GQ0VPYKqIIqpJJ5Ct211E7A4JxQ2qLUyUTD8GCQ7uKZyNsSoBKDagzCxD1Jdn2PVFXyVg0fwS6QdI3

8ltF6iG6wzqlHmm2jUhyGcXDhmMd5PTTAfSefdj6BL
dQWbEJPqF0ejz6FKaTSwCBA1SV9QJHRjD3bpbRecBLT1THJyKo8LOMFuLj3owZVru4XixPI7k7QhUFuh

MCBSDQo+Bo0XRX3fj6XkKVsmFCWgNG9gtz5KIjD5EZMqDAYbUVV1KPLiMGFfIsinWZR2OLA7RtkdHEW3

ZPmsZMByJvKoVkLhHCBkTcM5OZwrSlf9SBOnUyY7RS
RdIbC4XJP8EPW1XhyeMLR8VFMyPKW8AnodIRN1VQUrBGH4CzspYZX3MWOeLBY6BmfhBcQ6UOTiQdT7KU

MiRJc2EQWzFoh5GDD7HWN1UZRdZzN5HHO4CvF8QLnwEqWqDZPpKpH0RKmhBih0IRltTSCdVzEsXjQ1VL

Y3UXO9SsJpBEi2BIb7EHB6KXUoDETaXYb2ZMU8CEey
JPLdQYXoKSA2AQncFlFvGOwhLRV0NFNmMGHoSHY4DNLGNcOfZqEpEidzQlCmQZX4DKE8KFNlBfV2GWWu

DWQ3QUdoAYLtHVF8PMN2RrGcEcZyTCI8EKV6CaOnDgHtCNY9ShQ2OKefCqQzVXv0YKZ5ZgMhLSx9WUB6

QVP6XTejUtI1MUAaGXTfPefeZLQ9NAY6ODE3JgYeNI
T5NU6BDGn7JPS3ZsSxWFSfMaZ9SEDxYcZ7QHLzNbWjANC0ZnI6AVBzDcX8TWA0MgB1UNMaNnQ5KEM2Br

L3RVIsOkY7ATU1DoN7YUMcArC2FXS6LjO0BDXlJgI6SMA1DfE7RZAiJgD8RCE5FwO1WNQtBjJ0AMH3Qu

V4JYNgBYg9XNHhAjH0ZBJ4PnP1RHZoFwS4IFT6LgZ6
GZNwTzH8YVN7GhEqWcAiIks4JEIoAXL9TswsSXK2DYCyLTYsVdiuJYQ4DBDyMNH1HXZbJkqoSVPyYnB1

WPXlZEXfDJy0RID2YDRtPnj9WSG3JHAaCyTmKmD5LUE0WhWYBgQ2AyF9DBbeHdA8MXWsUYUuVQPyXDSy

XZHmXhF1SKWvYQDmCPp1HtF4TBiuPzH1SVN2JLQ2NY
KbQdY8SQA5XVZ7JYNgAGZcRCVvFTD1TTUmWFP5PKMaBrJ5GOBsLxJ8FFS9IdXvBvWqXxV4UUpbHWB9TF

icRjI1OX7KCjU1DTx6POI3OJFgDaV1RTQ6YVD2WJGkLpp2BGW1IuL1BPnmLvk3FLJ4WuJ9XhFvUYA2EZ

KpSOW2QEkmUVW7UIJeVTU0LGxiZPD9UFugMcF9JtPh
IFEvQCWwSzG6QpDwAOYyVQD2ZvYaJYWcTxFoKCH4PvD0HQyeVAr7DvZnZRr5JNE7KgH5IJVmFZh9WAD2

HpM8CIFcGCc8ANN5CqI4GgPcGEE2MYQ2YmM0PRZxUTf2VTH3OVPeID1WUU9eu6FhOBbaBZNiJR7cge3Y

TSwOVkAgP1G8pCWlHk8zyDGbv0KjyBA0g5XGFjPwA1
FhkdTNHF6rF7KlR70nNWhGObPdC7ZlW1MhvMNjFMs2G2BqlFzooVuaeESzJAv4T1Wrr4HssaXkOKV3KF

3EMEBgDgmxT7BuAkIORoBlW3AefiVSTv62ZSbyDD5tDDViQBO6QJJhOjmySLcePH4HsAZfeCUWcuwlUC

NnX4W4PP1CIXHNDn0+TCbwhhNsZtoAGzGqGEXmf0Rm
QVp3SS3TEJMtJWtvSO1Xi046T7K0RzQ6iQWmASH5ESY4nFCjFoUlCKIfiiZgH4Ase9XXRR4XvyIyQJdy

Ai2FjO8MwqNlSW4el4CjjpqHMeCkT3BxlcP5E1vyxaMvKH5WFMU5F1hahwJbXKXQIbVdW0wuFXCjfyFq

AFZdJUGOYeRxJ9SbhfDKFMEcvmhccH0iRNYhICVyNh
1HZp2NAsOvBD7msd1BKqIcRGScGvyHIxifGZkbstDnJesVHcSwWINaPoyLPur4PNozKB3Yzl9iI3L0ZW

eiWBIOY5UmkQXuCV7jV6SSE6skZTemIo5WDfRwT2XypwVuUDauY7LbSOusCMLOKHzxVNZhF7RzXSLzML

HcFv0MFBHaNV3KFqWrWpNsZWDETeVkCMHrThTsBAqh
TASFBDrfJMAqO1D7OLXbVFFrFv3+JMdbQB6YJ1VpMPP2FLk4TL3+QVpeCX1VmADCZ7QqgFPmVIvtQ4GP

LX2SMJR3HO1EhSWaMD5AtSNFM6IoqWOvKi4bZIFjm8RyRz0nH3TZURJQZKBtZCfxYMvrRBIxWSs9J8O3

RPQvK9QLO583rPVgnPt9Pr4kX8HMCRpHOyMaRWkzWL
msEGMzLKs7M5N1KRVoG0JJW1FvVqIqrtGzZ4H+TsGvPRKUBZ9AWNLHDAr5U4X9gPZcP3C0nYmZwEH8JC

0INV7UeXPrvZAui81+QcQLBgTjR8ZJQ4LWKL7JMCo1Q6Y3bIVxA6U2jCqKcCB2CL5GLU9PfVvphPCvTx

4gDQogICA+Qd4VMp1WZnZmER5pyt5OCjFjRCZfYlfO
Jke1I7andfc7yCFaAhU6F6B6GeP5dXJfLM9EJ1Q4hLYeSXL9YMExgNK+Sf4Pa1CpKLVsVDp0F0lwFJYw

OPIvVuGpvF53Z++6xztehQF5Y0b1JGRLlOOupRaZbqKfR8pYIZW4b1I0FGy/Ai8UBKI8gJl6fRAoJQBh

CTy5vP1qkVh9MqBnIO37GRGsTRquiN8mWwl0X3Xrh5
OwWd7aDn7vhLFxBm4DUtZsWRT4djTwYdPVJwV9eMxujovnCEB8V8c1iWP8Kd39o6dcgwOnr9YmEwI3MZ

xhZEOiPxBlqrFiRCM5wjVxdG8bgyFnXj5KOGSeJTxujvXoRoFFVh5EFuUyPS41GvjehH6nxZC+DQogIC

AgICAgICAgICAgICAgICAgICAgICAgICAgICAgICAg
ICAgICAgICAgICAgICAgICAgICAgICAgICAgICAgICAgICAgICAgICAgICAgICAgICAgICAgICAgICAg

ICAgICAgDQogICAgICAgICAgICAgICAgICAgICAgICAgICAgICAgICAgICAgICAgICAgICAgICAgICAg

ICAgICAgICAgICAgICAgICAgICAgICAgICAgICAgIC
AgICAgICAgICAgICAgICAgDQogICAgICAgICAgICAgICAgICAgICAgICAgICAgICAgICAgICAgICAgIC

AgICAgICAgICAgICAgICAgICAgICAgICAgICAgICAgICAgICAgICAgICAgICAgICAgICAgICAgICAgDQ

ogICAgICAgICAgICAgICAgICAgICAgICAgICAgICAg
ICAgICAgICAgICAgICAgICAgICAgICAgICAgICAgICAgICAgICAgICAgICAgICAgICAgICAgICAgICAg

ICAgICAgICAgDQogICAgICAgICAgICAgICAgICAgICAgICAgICAgICAgICAgICAgICAgICAgICAgICAg

ICAgICAgICAgICAgICAgICAgICAgICAgICAgICAgIC
AgICAgICAgICAgICAgICAgICAgDQogICAgICAgICAgICAgICAgICAgICAgICAgICAgICAgICAgICAgIC

AgICAgICAgICAgICAgICAgICAgICAgICAgICAgICAgICAgICAgICAgICAgICAgICAgICAgICAgICAgIC

AgDQogICAgICAgICAgICAgICAgICAgICAgICAgICAg
ICAgICAgICAgICAgICAgICAgICAgICAgICAgICAgICAgICAgICAgICAgICAgICAgICAgICAgICAgICAg

ICAgICAgICAgICAgDQogICAgICAgICAgICAgICAgICAgICAgICAgICAgICAgICAgICAgICAgICAgICAg

ICAgICAgICAgICAgICAgICAgICAgICAgICAgICAgIC
AgICAgICAgICAgICAgICAgICAgICAgDQogICAgICAgICAgICAgICAgICAgICAgICAgICAgICAgICAgIC

AgICAgICAgICAgICAgICAgICAgICAgICAgICAgICAgICAgICAgICAgICAgICAgICAgICAgICAgICAgIC

AgICAgDQogICAgICAgICAgICAgICAgICAgICAgICAg
ICAgICAgICAgICAgICAgICAgICAgICAgICAgICAgICAgICAgICAgICAgICAgICAgICAgICAgICAgICAg

ZQOpAWLvWERvNWGaSBCtFSh6U4siSDAbJCAdTY7nNGv6Oc0+XGjZWxWkYHB8lkCopJ6CCS2dg3UpIVko

SNDqp2KjBGk8XQ8TIRCoANhzGM3GMAbbqe8KPVMfJK
SbqZIJo3vtOdWqGFK8YYVnSxchKK5MHWDlL6bsznZeEPGwZANGXO6CXjEoI8BonV23LSOTTw7+DQplbm

AoQefTKxX6SRZuh3EtFTe4AT5KQYVlDcnob0DzBtMaWZVGJQtaMI8MMHL9MJV2JMPyWa1KVFOjU640rr

EhBI5QCk9JKfCrGQ9lzl2YHtGkJTFiWhcKDar7FUqy
FB6QjUIhPSiDyf9fkrMllpIXu1VmmtYlqCPTgB9yYJOoYdZlXKS6NEadFGHfXmBjYOBcZqn9CWJLBSsR

IoVjF2Gma8JsIgX2VVNrUsTtIXfpCHTmCjY0JL98aBgsKH7FNBHvQARfTN02RPA8GSNoNo4UQx0TUvLn

AZ6efr3NWrnlOBYuYcvUTwc3MNiiUT3WgCNrT9YcqW
Zsd2wTKjFvQ8FNOIRlQAXhXn2GUPEzJdChMQHsSSezCR5wZIInEPSVuDwhenK5HV4ZIV3wvkSvSB2NAf

ZuOv8zLr2ZKkSqL9LnJ0OvVYZsORJIFDjsGW9VCUqgVZ9dXR7Qa6XXsPKesG0jqd3GTLZbSGWoVsdgci

7QRvzzF5Z9lLprMKNbCrYxTHWWTJscBO3WLNPpBQV6
FXFoRWIkIPQKJeOxK00uII4MV7Ztr86bByX6RIMwQsOgMIjsKI40nMmjbeWpzVFevPziYS4VMk2+DQpl

vjNhDzhFLpjsKOYAZjMdIllUCeXjQDLzKLDiLXOjYaV3OjKhHd4HWAMpURSsBKGdSwOpBUYwQXMlUEqs

IWZmCNAaVvgbJGNaPBMbFR6UOfEbIRJdXsG8GWKkRR
CqUYFbga4OOIBqRUWbMIT7MeZdMEEtCTGsFOxgZGEtQQYlEXIpBHTxDLNnBF2RBcClPTAlRFChGNCpEN

KnRVAbob3NAUWvGWJwAbZ1NvYlNYWqQRLaVHnhGZMjPEWmZbi8ZXPxDKSyDM8QStBcSKGkKOM0GFbvYI

WzPHLwop4XZNXpBIPoIKGnZPEpZBYoMKItHIbhWPOe
BNJ4IqG4QTUdJVKqKP4QGeIpNVMbCEQ7MPhuIDUiKKFals4VHQMqQJQcIuC2KnTiSBGiSSZyZSeaCYRw

KNT3VZUgBLZjJJYbUB7QBiDnGSRqPTr3UzRiWRGkFWLkwy1KVXZgJPHdCCDoVCJwBRWkBBLtMDgtPPFy

GEC1XOP9JIWbHPLqEH4UCeHiJDYzECmkMQPwEWQuUZ
Wrds1SCZGkSULvNEO4HKAiBFLgRZLfGDazPMKoESQ2VFA2TUIaJETrBB4GMqVgGIPpPiY8DJIcMQDoPQ

Fcon3NGQFhHBWbESE6GhHwLCIjVBGbAOkeFSIdXXMpKkG0NEWwVZVsOO0SBbJjTDYiLzEvCFXoGGYfOH

Nzvo1WALXfYSOpWeBeOIMzZFPdDUNkGOcnMHRuHUIf
MhBvQHOmLKKkWR0JWcJaGTJgLkL9DAIwSCKkDLItsm4NoCDvtYikys5CZZzMAh2SrLydLGC2TSyoMw8u

yRDdRzKzOTZLRr7VloLeOVUgMLXKHModIZDsJJJ2ZvK3QYDsNVX0WNkvNXoiQERbK1IzUVJnZaX9TDgj

DgC2DrcbYOriMVX0Tja3DUJ2KPHwM2WoFFSwDdL1WU
c4OWU+XY7xLAk+Lo3Ez0MmavY2mfLdIHccSlU2UI9POPVFC5OYEo==









                    ID                  Date                Data Source

 

                    [id number]           2021 03:47:15 PM EDT [facility]









          Name      Value     Range     Interpretation Code Description Data Renita

rce(s) Supporting 

Document(s)

 

          Progress Notes                                         [facility] 

VNUZFe9oNyWSPyEd17/DWEfcOHOne0YlUNvqGQo6JOcoVJTsP8KbUJU4kC3cYPQ0DPhTDvYeFzLcSDTz

lbm
KkBotCGwWqLOUwGdaSMhWkUKqpHpgirRHgKZ7IgJH8XRSdC20xKZHkWQOkS6PdVREsIEF+Ls1ZVGNlqQ

VqRX3OPrdV3NrHwnv2LG9U2P7v6UDFQHRSzq4LBYsujdXTdUaUHaO2PTsQISEteQMt9X++moPF1bfudv

heoswAf5MBlSsGN0fLuD//rU1puYTI+f/Ki0sO1joK
fP+kUY2R8ZHJ0wDuJNJsdyjHaSv+RurrDbtFlOmE/Kp9YZ3IOdrKReyPwZN2ENQGTsbB17QXg5Ig1Sst

kouvKTjwnVAsAiqkuWnfHczkzb8DMtLuOaW6e0+qhR4hPpGKFiceVnigvekL2WltxYM4DyNfQoEpiNaI

8KXEpgE9mfUrQpgSoMX3aEDs3NE1ZaORbYrXpyO5VJ
5epOAuMWgSOweUAA02qx4cVNzZl7ehZKVAisRynMkEYtwEbmViW/oTjZZGFd4fcxWcISojrPWTqojFy1

dHSTA1t4zLbuQ8TeU+X9MskMmjvWUSwBRTm+fBImF/GvuBCq4bljckrS9f2Fg7SC9aH0U7X2wfkRqKo3

L7tldL0lJ2warS3JZ3v3A3F6Y+wezhYS6QaGTv5+hy
1T+k6OTDL0xISODXM7cZhDiOXT1EVUc//qyklr6tZ2F5wD244Gga5ZJU5/eSXyU4zhjwCbKR9QlmYF0T

fppJqpjZ69jclU2HqFPMNwMq0/TfBhmSa8hcV00ylIH2VUtIntDlgsk9VZ9bZ3wRP88YkQztQFmVxwmT

dQcaq2bcrdoRWjyyDXk/gnD+ZX9dtpv8Xn1Qn9sz+j
P+0GA/aVAgX3VR2fduNkMEh8UrLj6a6cUTKU9PZk7PltXfsAPPrdiQmzV34tVZSZGUtRqlt6Z2skL9rT

ZF2vtotoIcxSumRc1Y+QMO08ONFojcAtwuhV+5rc5E7FGGB5Ajnz31p0lyYnNL6nXtPVc+J2s7EtPX0b

3xsf6CZxt7MiwHFzQ3nA28QIPgibYC7Hqgl+zhks5S
xVFAO+JZyfPKyUtgseL7Z3tNXq4+kF07MVPi9rNZsHGfBrZXA55w5EPKgJOCRfg1xAPL8x7XL8Guwm5T

WtC60GtrBilwUy4sdLL1CkAjalSHHQXF0lEvWNjvrGIfDzPC1c18WePy5h0enw0dUG1s9in6fljvCW7Q

pxYrXHU4EX6YG/F+KA7JJeXKOn5Oc7Y1nQQeUUdAcU
nRXLrKcAcWzzdTnV7XnZWkf/8eVd9NIE3kHpN4jRUpVZQxbHFt27gwQh6J5mSprkHz5ChddsGbIanFdT

6M2KfeWkENWLQwBH+Xyinay3lxN2uC6uGkEIPctgW+kiErFqiFNecRIHYvDMQdIlbhCT4NwQhFrEfoqm

HKSRDb+fSTeFRUqLY9dfNjbUSxvWRchkaO4IYFGPjx
rhEbVBOi1CteCBoj1jPvJyDtVhoDOPUd2dThUNC0SVxQk1hbruZbs1qzpQ/MpsCsn0x8vRNUGXNMTupJ

f9ceUcAL6G55JpGbH2Fxp0339ZOsp4xQucQDkh/vkV2MBhg8AYp6TMgmDRcJCAnUV0IsYXVrgD0oZPnN

HtfqHA+Ex1OU47u4D+LI0BnSHHxD9EC+qWJYsmFrmQ
mlz36F5feSWf/yjprPWAdO3FhUduxpugAGuFIGbNzPTxS1IhdgGqN2gqUJRItMPTVPweWxVNMuZvX5vm

nWWlWpX8FhoUH0CtWXGqi9IYZUbA6BbdF0D1XAYfIWEbPmLUoq0J2MClKMEPiaLPqNDoDESK+UhsDQfT

g5oXD+pY9fpPGZlcTOBzPNOgbi2VISu8GzkvEvbuNH
a5f1Op7/DX6sZyNo2YIR8i2lemhYCYu5pZWMVYDsCfewl/UD5txqUNojqu7YjIBUR4KgX3Vr+3qkbyiE

SHmzZY6cF0AOSYX1ZJV8W5mQXcaTXzNU/6P19KAnyKJfdmC8srLRm3GnB34Hd2jAXyFoZKtADX86xYuW

BwARoIiJ3bEX3zQhP5grYwpACtnClBT0W3pQP8/MGl
F7ziE7YuaH0tkCmjaXyf61Xkh1jK4AZR8au9r5OEoFz/HZjF9B8btKQwWERlGA9n2EXO5XQWbI0Gx+dv

NMmmRzuC+rebWsJNQxZG8EGxJp4bjcqVnvI+c0Lg2F8MdTdgFrNjqH4UYhAzpUxEufqKiVl+ymW4abbq

k5Cep8KDBZTN2AMHvyLtliJMI41cQx92ZwSInW2sHA
3OaAktA5QCNihGskq4QurtHJsLjtVwPNxCHQHSU2zsEjpHfAmm9oXe9gCeQcSfU7kRjpE2QNgIHH4y+F

5s4QKmB80X4SgMQvmUznjV0uD9+b+h3UhvmpoRXMLMl+aEtF+e5LyJeP39cu8GPnilCpC9kbTYFStG5u

Nv53ZnQ71Mj2hB0Afb6DSZFA6t5BztzNm0CZsfGIad
YN1Y+DHCy/4iHDAvoKO6TFejEHP8npevfCuP7ekiUNjWDYX/EBoczsY58/JrqL6xUllj4wI2pmuZwP9U

MLd7KNUW7gx36nwDYhnirxJgEakJmtvLqiKvNNr4o+/KQbMqZR9z1FrWj/53OooNHtyfiROdim3FOImk

8M+R2pRWdnAh3Ga8WUj6UhxQc3X0NFEgbh37XltTxz
ulRA6efC8LZ1Bk4blkq9SWSXA/ag5IZhq5WmuIh8wTB6ddI4hAmqUnb3ZzWrv6LnnTx9qsIzhOOZoJ33

/zK+MUFrb5OP29MvcEL+/fle3Pg1qdhH87Tfc8F/Dbkc9aS7sxgpXM4B8ObtIXs9SinxPwhZdW5rg4T9

NEm9BSjlrKQ0UfMeWBRcKIGG0b7krTSEr802xj2e0o
8031thGX2pf+Eyf4Wd1y+65qrzZtii73p9cpUvm+RH7ByBMdGyX2yJVPXZbgRnREzk7ZQx/6DBNgBrlZ

ivWIE5jtH46JoAheBlLWf8Ls4a0y5Ut4Gm5xZNMW0+qPuClOnYp3EIryaHYSbyCeq6iaZdcZCF3IuZ7q

cyaTYvKzCw81L9VO9dExwH9zwEDxeWGFx+QYQeh0E7
YbC1VgicKA5Lnkm75CC01/16hxD2Ef9+npWexU49sqDbc8tQ/W2HKjVstbCGv50x1arrT4o2Vmy59uUx

ztX1aQ09F5LGhklqu4z7UCqmA2S30CeBCZGKF4UZ1IkBVFb7y/94g/JGF76uxPxng3jy51Eou4W6ViPV

RsZJeCuJyioGayz7Ycg8yFN9C5A3xhv1P49aZVIAMa
NsLZW6scVggZ1PVX9de2BrZPj3QMDrx5VeBXypGAs1ZSzmVUWvI0C1xXUnPDOdRJ4VWGThHP5IOJSxqj

QhOlKhBLILOoBmZYBpNoFro1UdJ6YcAIFwQVETVEpqCAVeE08sGKzuYz38TArjYQRvWgPhVKi1Da8HJp

EqUSFiR68ljOQgeLTiNCBnHFDZSrPkDLKoP8KtnYFf
BFjiM1MtL6AqWC8mlWWwVK4yhPCdB8RjW6JzibzsYBBOTuChEAXoQBueVUEeKqYjPGoqUKB+Ji9DDNR+

Ev7CUI0yp5EvQJl9ZLRbp5KeCYscMVfaFtKvVPe4XCWqSpffEzQtAJM0WEZ2RIDoGEN0CWk3NEJ8UfUv

WmK5FQAzHwOxCfRgNgh1WAX8HXTlChefJbLtNHY7ID
ZlLmuhDWJ3LPO2WnC1PFOkAFZ8OIH7UqV1BWEoRRU9IDB3KkP3HXOhWWA3BUDaTiBeFuMvYJy8OX8HMJ

T0TAZwNCv3FCBvQAA3JpWySlAjDQqjQkB6PzWqSnUpHQR0SiZ6BBGaSea4GDneTtAvWhmgEOC5MLkyMf

R3GPZcDRTaGRmyHyH4UdwrDuQ1SWm9MTC3GcZoOcK3
JCHbPMH9NhZwWrJ2BXo3WWM5XhggToV0BSQnWBTMWeJbRcChUSX6FEOjQfTnLFi0XIK1JtHsErBhGRI3

RAIeYNH3ZTIlFcFtGJV4JbJnDuAnPzYgEJExGILgKtriEms3ROS8YlWaTilmPZf4QMWqOJO0NOLuZkCr

JTFnXKHuLWptKFI9PHGvJyU5PMKsXAH1KMs3ATE1MB
MkWDmvTXL2KyZ7HGUnWux2BDT6MYQsMUtwXBx7MJu7RBS7KOXeSmYbGVh6GTU5KGKxXhDrEVo1OSC1IX

LnIdGaIRo1YCO5OEBmVqImCIl4AZO9BQXmYjZcCGu7UDN5GOXaCaXgMRp6VIA4UZSqVuTtZYv7KYP1KF

TaLoHhMP4YLSJ3JXDmHdYbPFt2OTZ9HBOmXsXcUVx8
LCP1IDXhVwQtSVs7LWClJnbsXsGeTAS2FpE6BGDkJQV7INY3IbNoAJUfXFZ9VPJkGdH9PzppFuxzEMO7

UkD9DEFmPyInZMlnVzI1NUDcXNImDKW7RBOgUpYdOhXqVMJuQyIKYuAwAPa1KJK6KzEaVsRkJiSpMJOo

HsEoGoGlGLC3QWhbGFF1NkMaYFO9HKPuALQ6ClSvWm
FvTIbbEoJ9RrSrZeGbEOfoLsFcBHXwBYdoSjJ3QpiaVwG8YWP4VhL8JgteRck1BXC7WYIpUuwmKbb5GA

lcDzT8SoKmKuz2MG0EZAF1YwsbAee5CCd9IFP6GpbdVTc7YVg8PJK3OxNnYoVtITtxWvU2PmVsTjH1VH

U6QfJ0WPPgJRW7TKO1SfA9HMZhEPW8PIL9ZqU3YRCx
YHp2BFH1MtF4RGElBPT8WNE4OhA5RQDpZbz8KGS4YPUbLzetPvw0CQPqRKMCFlYcFcEgYMOhPLG4IWCq

BlUgMCNtUNA0NXTrXBW9STDpHJJ5BJUaRjVjBNLxSZH4VGUyLAC7FPBlOZK1KPXfHRXPNzPwQL2pbw3G

ZfUoHW7lsa6BXNG2CK7UWVOeGE5UfPHqL9YxsnPOBK
RblkojzO8iICywIZNgB4NlrqNSFE6hO0QacWLtLRMqkSOTGhJcRUTeHMGmUK38FVjtNZ3ZOGOVSScorZ

VwSOT6Z0Ynu2YdbzFiCVCyBc5PWZHqBD5KiBHqgqTnZw4GTQAtKM9Pf315JeBijRNvOBQrAoHbBDLhIN

XqPXO8YU2TYTUoMP5AtINdxHQDkgvuSAIjE5V2XG2X
KJURTqIcUn7UQgBrHU1evf0HOKDkMF0gpb7DLMX4BS5KNNFiAM6XpPSrN0VbdmXeH8RwsBlrVB4EcoUz

NPcoTA9SZXKjNh8nzE2KmpaeyMoLt0iqL5RnT55tpL6aX2vlpaVeg9sPmzAdGUrxPr9RZEXtMD7LdOXv

hVHcNFItIwBvDNEdzVYhTNJgSnH0BSyjYHNxS8sbOC
HthtP1NVTmYs3STWErOK5Px813CFLvY3BaiMPtrjV0HVBrZb1TBQQ+Ex1NZS4ky5RgLEt1KQWrp7FqNZ

zsXArvBbHeCUo1ZAXdXbdpRve9QXA3HSC1ZWYuDUS6GYt8TLK7MstoDAvtAENaCuWwKyVbPvn3OKV8ML

UkFybaZmApRUA0WOKtYzlsTUT5LAK3UuN6BFFqRAZ1
ORP2UqM7DELuYJJ0YDJ2EeO4IWJkPRD1MFQ3FFVvDmoeTXq3XS9MBDE9MRYsBFg6XCR4UrNcIRJ6DEG6

OwG8PiqtJtOdNIewHnU7WstbBuDoNYt0HSF1MsUhGrw2GKDbDWL1HvmkCRL2BVeoApI7MpKoRwl2VVM1

TrG4ZureIjEvOZF0NrF1MLQvKzXkAWK0BcU6YVDcCb
Y8IMH4FoB5UZCgQSglEYO4TBIrRpjgTok5XMI1FZT6TARqEiJsMLI7CuL2WRSzYQWwUCH2YzW0KZGoHo

p7DLM1VzH2CNTeFgTtSZMbDaR2OTXyEiLwZXmbXyD9IXPzJOB7XFI7AbA5FJVcYwQnGMZrMMQrEwttHS

U6HJEzQDU5JfOeWMPrJK8ATCR3SHXuVJApKENgUWZb
ZtLxMpN2YVF2CGI2IVDnMtQnBVq0YNE9HFBgDrHtSUq1BAS4JETuPlFsHXa2LEN3ZFFtLqTzWLj7QLQ3

STVxHwWbINx2GXA0TVVuKqRnHBk2SMW9HDTzXtFyQTe5SGI9ULGfVhUjDXn9JTSXUvCpLaIzFYl0HQV8

VHWeUbPpGUi2YHL5TFFsBnLjVAz6ITN4OOIuAze0ED
JjIxB0GFYoHMS1VSK2CsH6CLAtDqVeVPR9YaTkXpSfRtH4XEZ4QWZ3VEGfHMg9TTVqEaZ4DjzcVHPmGA

DlSNP5GMcrEdWlHKRgCoUzCyEtCNieVHT7YvL3IdelDbSxYSEuRdWgUzMuJxA0RDU7DoJ7VhToWLA7JY

vcWQK6TZIaNjB2NMU2HkJ9YsaeYaZ9YHY1DzC6Mxys
DQVwRFB8KfYwOsH1JTH8TmA2QukiGdY3NPW4KYYoEifvWek9RAB8SMU5NmGuWiOmXTv9QHSFXnAzLqn8

WLf9DYD1XonoFva5ZBR7PNX7SvlrKoXaMZgpPjY8KaGcVkEoQSX5NtE7JxkwFfTmCMG0QpY5VMDkMMG2

FQA6QmW4GANzKBH9DLn3BTR7QUWnGXU7HGN4JnQ7RP
DlCBZ8JIB8BMPbTgbsSkj0YJE9JDX9JXZeIEcoIXD1KqP3JJQeQVD1RCR0IpF4QXRoASV9DIG0DOL9FC

ZdALK1HCZ9FbO1QMYvQMV1PPDwAKW7JPHjKQOrIH0vMGexpoFjThmIUyZiCZHla0WeAGizVYb8CJfqBE

DbX7V7iGJaOo0ufVLxx6PvfSQ5o7GSJwRcBYKbYi6t
mN6plXQhAWGoCKsHYhPeVZHtOMLgMK65EOcbMK6JMGUEWMywrKQbWWG5Z3Ltb3JdlmLxMBLkOs6ISCNm

AI6WgSCruxRbZc7RHUVhAJ0Je025DeOmgBSdNHOmSbHwPMNhQQMtKOT7TD1YJEDvOR4FoLCqbBYUpfha

BWCcN1N1MT6HCITAXjGnYl5AWhKiYX4wgb3GRJFbRR
BnHfoWBjLeYTzNWwFlPKZpJAfrVB8Hl615S0S8LrO6xGUfUAH5HWW5lIUsRyRcOCLxcoIaKCTuXFrrTZ

5mx0BvfffxS4fpFT6qjVLyT38pkO8uECmaNXTbA4ThxdW2T0kwgsKnNY4OPEX0A2ccshYyWEAEClNnKH

NxJ3claDpsNVxpZHGFBUtuWBXjK0YzqcVAAYEitkxv
rL2vTLClTUBxJr1CMGT+Cl3EUF1zk6LeHAmeTrAgUR1ekt2QWDR3EG1VaFz0JPZmE4LwJSNqVUMwj2Wz

HT7JYD8yfTxuFADcSQYnQ9hdmsm6tATkQkVzEOL+Xb8NXREmwVHgXM7OBktM6RzDcQAY9jxxtps0mpHF

GFWH5Y1XjeIyHZYELTYCcDF2JZKLGIyUrkUOqA3JYL
Juj22DSTYHSrRRCQD6UccefPU11i7Q+s0sjsgV+7+j8u6hweJkaHY///N8z9+d429rUhMf5n5qqphqUa

DEJDhXjFn1r3ae5Q87vB3H7cSs5dmGHLuKuAj688AQaGAQKugsNnZYpOO3/aWXLPRv++qpL+RfF9CwyW

Pf6mVhvhlfSmHMQpIA1AAgBmD1eSeESPSzk3C/0Znz
Yv7egfvDp1KojhZlpAHfMl8zbixctgSF6JNOP8bhTKgMrfvHz5I6/+jBQbzB2o+QEx4HLByoG2+05Dni

n6VbhWPehSPgr09/ez8UaWLdSo7sGkQn9V0je+wI2M31t5q7029VW8JPypa8TcRQksBEc7Z19EHjhr9y

9eepflHH326dA9iV9yExLkd3zVEDGahSU1yHXkE8DR
I8qwZaqH5yNOEDNrbrZP6GgKB5rey2RfrfkV7kw3aCISmhJISVZnJ23IY2vvLmuHngfHitAUFPRMVv7P

ASiIKTCKkBztCsHTqS2rorLnf0O3eKD+YaSFqWctkgOhri9Swvgu4SbYQNZ0iKa3ykxvrCZcN4EH1eB/

SP3YlA8xE4ONLwfnGL4a0K/gPA1mKmjD6YR7wLhjw+
Cl1OwKb2u1tpuV75gj4Zo4E5abvEmthj2x1P7XmcWT2TG9ogNHTuFLlLpUlPw8t8Xf24r3FwHpMasP4B

FjxCXcoUrJYHxQXN7eCo6RzHcuzPSUcS4Hk6ZBsBtJpqgvEz+NbSX+pj2xp0ln8gBwPG5ZFMqq577PUN

h7Ggv6FiCn9ssAB3Sf/EzKf/tctjVNLAH1m0GeIavr
srb0frLqcsSXYF4dOr/Ez8JK/P0belMV7HrbRpyYafpwSrFapBlDTjOcjTPMxU+LU1dsP4KCQVeNhbQx

p7NXJ/N8FLkxJpQPfav1/Gw8xm4i6rHdYfjsYG4brmVSHP6S7vkGQfXyOiQFIwkmSgE+0O/BH4KL7Roc

0+zaOVtJV+HiPywetyjrYiuVZnijdZwHWnzKfZR2Vv
kUNduQUgWU5G3S61Hg55cdP+9Old7Ry+YFeF52htD+t+sjKspTQO4+FaSH8nrUfNdtFBehvfD+gjYYhG

oim+fpEsCsQV+N2vWjyVWvO0RDqTFi+Wa4LH6junwodeSRJpTNOacasWv3GpIC+Ra6z32KU3E3DmsM+0

Zju0MR2hqQ3VraH1pFhYfed+z0db4d21s7iWxg3kGe
g5UEbUkozKwnAqejb1T2pw/u2+472Of52RzgzsHsKL0I0O6wtLU5BEF/agx0WAXd8i8l+xSSI91p9HF4

PeG0YPLPSa4TUOlEaxlHgNubggtUZRNEbcDU+eyz2XJtOeT0xrWX9fy2xcXWlh78L3tGQuCs+TO+Qb8l

wSnlCOYhW1uHm0DTbFZ7bMekgrg1ZM1xE7y+0j7Uft
L3apBGj88f49IE6HK8bI5I/W1+we795zL2yYtC/NaHOeudSsNf/xDyxldSKCW43kdbocd71U185jxB75

2Ly8Iq8MvRDqPg9Sf5wrwh280zWETFQcCPRWiQQVR0LmLiimAEIX1BFvElmAGFN1PmQY4duDnYdmb3BC

XgubbIiqQDpetOyp9K8sI/V99K3+BFf3ZHmjJpdSq+
ZEkaPFRED9WLpfPt27QZlL6zP+PO57FcpRcmgpgRmuOE5lVkWt3NRSXNOjc8Gl5kKwTfoO7pOSR/OuwD

osUvKFE2tOSmZkmGADuhNn9vI1EHnxfjjDyyW0Aa7/Rfi1AGXO7FPV1pl8HrXvx8JE0yONMx/KWXqVbC

58yMXeSw82iO4GD8oX5wG3fs57RV0JP5LQVGhq7j4P
7Mzpu9vw/dkpEyuTXBhUbeHR35f9pHsJUnGpMRWJOPU/UG72jRetB8SlO34jJUjiB9t2qqmWofjooQIh

3TjEkWvBhkjF34kjZF3lmQga+LOvxNB3u2jzs5sM3RZJ0sEbO1TTb3eBVmerldZToAbyK/JHNleJ3XpH

FzAbawLSAxgYLoS0zMppg2KTdqeb0QweKCC0U+bLVS
l0C1z7GLkcC/g+Xd0d9wtk9r+quMd1AnfACCr0HxApcKz2kr0ZHq+UIaAlo1oC3UlMinIwU3JrU4zl6e

JwC8E514eSB1FLOjAdoDPPjp5BibsB6zleWF51ZiikiiXTGwudGQurrTi45oz9Ja7r/fvdsTlfIV7uAe

OpO4MTj84riePhdAUyS64ln2eIIkjMHdoqNm5YylvF
R7u65b1s2Bu1Tt6eIRrn9nzrcj3eg2BJg2ydfEod4HIo5+IzYgZ30/iE5bVW26/nfGAbsAfQaarWDuGx

sY6YXYYkaR5Zo9IQaEjC+gI6kV4JGS7X9jaJTO0q1KCWSqhepW0sW1M8tmDPYLqGyxJMeLEfsMrEhcY1

tHs0LPO+qX7gJhlqGwGxv1hyfbM9ZiO3QpY68maM4F
1siXRHC92sl7dfUkukOmDZ17d5jvcfZ0hS4tLl0gDuUuZRUxlYZbuW8Vz5iySChJlWaCatUrPfTFXSkz

RMMK1SbYQGHxQnqS7qaRcoxx8TA3BKKFW93Ro9NLMGpKjB0SpwbTGPIi26Q/mtHSO/4u12DoD5j9qDe7

yfyAlQ7Gk1lyf/mLtnID1fATXUt6X7nEIcGFSZg/h+
gO/44f6WgR+ndCATmArcA+wBjgAe+Xc8Y+X4fZvnT5xzKBrxuiDYuuymcEejiZjJrxveRjXyvyE7+mXs

Ha81uHguE1KycAShP3bMjLl4Id5iiTZfPlGBRLNEpT9eECDY2tgg3GUhU9qWDOdQHs/e7k/pdPevG78U

TPWQpqrW4yauW8XVoiHJjMUpZzrwqBheRqYGKLYyxf
CMBfxyP/Bm4Xb0KJXPCVIvN49ZNsClZP6zze0mI7ei59OS+dRz9zXKI1fPLmbLjXlWX3Nhou2Xq8H432

HaOrgxbHiIhPTVY3isb0aqSe4zAC6z48AjuKwhF2pCDSHqxLPtsy6vuA5CYc1IMWH3HDY6UB80vsdp1t

hc3RceKVUWGaKyU3k3q0RSl9n567TGHFhfMvYkS/Wm
2+DiR+r1K+DiR+na07UVr9V5o1MEi5Op0NOoX7XrCQa9iBtxRak40Gb8n7vy9PwcFz6gA94Ty5LZNyZt

KVvIjbM1BBM67s59g2ombwrWyOrtKNyrZo3N4uDdMPM0NfBlDYYmlwShRTacgMagKe5ApLxdOpbFdaCD

JUU5WIm7LHJkeKShx46xrhexAbknRJAWmxZFNLzOvo
6i0i/2ZHpsdd8uAcnyZMO3HQ537GDFVpyCmAM9uZKCaRV7he59ZaEX4pUU5C2hF1xsj7F69g5Qll71/4

g3xB71mVOgiqKfqYKy3MZ9YQw4oEg+IWAYFiOwWtcSLJSjzUE6tch+r6qT2XxVSEATxDyXP4DzxFgRHT

ubrRhIQyIWncsPdtL2gZ3cizmWURTtpKnLiJrsYL8o
VHBJRhysW9DEadqpG1iong77vWiQr10/Zth6whDEISgQ3iTkTiVYV6toL6c2HhTeWnyaCoTvSG/+i9rp

FBzqE0FDQOH+ZVE9tBiXd/QsSpJbQTNqkiYiWUxNalffbtGUU+30/ZL0ie/NiKuO8ljXC/gn1TfubhD8

CCFbd/txJ1lI52D6uZpsQ6nMHrUlfsS9F1wc90T6gy
RdYNth3Y9FEXdjchNjmibJQrPjipm/Sz9gaX244xDuZMYSmEJLF4uHpnf5iRiq+moyFA13eZ2mF9zSNa

s7GVkQRsbVGDjB5UD9JbqsXqziQgv3UfjERKBxlDZ9tjtQRQ9LY0+up0Y8ngqfwabc2A40or3jI/dKmJ

jiwz8MiOZuaFCd6E3G8TtcV7ud1Fx3y2JYyRQU0AQa
TAMELA/j8KbCcCZqVkkt3u/9mB+vYjCxEunPIQUOgzYB0wBeIJ+3rYmAOX7TZa7p6Av2anM5SQzIXXGtn5cJ

ngGUFgsFYwNHrwdBsRmRuXNnWSTFDhdQEjBTSieXMoSc9gZ881wsgTsDjxNUM/PJDBxeVef3Js+/sKb3

nc9dq1sJZyzzUNUoyK3M827l6DKd/klA6g2bCgXAcx
o3LlA0kUTtqp9ARzaAwgTLCR/5xT/Ei5WyG6NgfFiFzED5doQ32so4UUoPlq8QqBxSNSaPRgGaApCsxe

eiAwoaNGu79OV8JtdyKwEJvVKR1+fXTS2OfxGxUHq9Rd/+aeNVDdmfMMOTcLUyFmIY6D5ML9Ibj6ulla

SOq6surEn3PDZnjCB9EKCDb4AdZ6BV2Xn1k3/yrLQ0
fY8EHwvPyIMLAcWrfvqxVk57wsbSTWO3a7+6tIgj/PJugEfm2LcvBnPryhU3znf4nB4bVuObVIUNNInT

HQhngmBQHWavIjtUbkcGJPHCUTct9r839Sc1Canwrqvzvdr9lusBeoBmeVxB+7yhig4ZZ+Wy+nBZfbis

uvMgdtgDI58zHoUWwdvGJYDtsdebKjM3FER3AHnm0Q
INxxvRTK68ZhJ0MKfF0RgkPUT5sZIiFzQHu7DhWoVLjgGSUPMqzOERlDQP0LLkXbLEMTwrHcAkFex2rh

GTdSTqA0l+w2PZVJlh9ff8x1tBY/ogLicULMmuWEiUF+sFhjyGYNs3zBnaZZmoHdrI4c8z7Cob8tymO5

PZSN5Z7VF0Vi2kzQRFNA5twj5/tHHG1RilwU8oF0Y7
AqUGTLyawzcIBYTOqCcv89RBvkqvQDRqXAULWiMjR9Nv5gQMhGCTrj+9hu6KThf8rXAR4GH2MTMVvS2G

/+AHXrekmYMzZ6HzdJUXSwkKaxkGxYQu2g4t4IN5kRqWQtwwimkBabx5+VJ6hQxBmyG5VdUm2+ZalMZq

JN9aJICVbGVNN3C1cZNXenYX0cNqw2oS0nQ/EdTaax
igOY4JiJfwdKzJyFZQCd2nhLbo8NtkN493ZhMw8M91KM+jDAFcPKcRpOYFu05ie4y/Fgt5iZm4KtY1FS

wzrU4Z9oojbuCOaMDKHzp6tyP3J578M77SF8IFg+9OR3IaIHpcjWn4UWfLWphvt5Y20pg6WjyYzan6Ug

LmHCukm5W5NUpxSJ4qNBKWhBautl70QTNeQ9S2RcgX
zItvWea5k0FQvdO6RVrDGNGgAMYxfPKgb/1i7uFzrwfCjdpMBdcu0pT4D1N9PNQzSe7x1sP4mScdH9Pf

sx/Ng/9+aLrWzcZNpQXtxqhSKiU2MaSOZlc8hTX588F7Hwe7QS3pg7ioqOWaFw8G+pld5Czz0vMraZaU

X7OiPdiiDQBSGYcJgLQsgJOpCOxLBr6a0hW7ceQfHI
kxyl/F6ZV1Q1bxdoUZu1O6QiQatdmC7UBNToVYVlbOt5sayDQWnnju/tCsbmWI72jup72zTJ7p04vrQf

uDT6b4PwQA/axUIhKPVSgL1mrW14mJSzhuXFFRV7+ehvqNPpnxPihtlCFbvXK9vG+fj5jNiIgN1Og9nh

Oh2xQqWBxn7/bf6UZ4Q19Nz7s1PtIjY1i2SR9BHruS
zGTK2LbDm2sjzd8tRgf+pyIEPB7R+d1Be+jgfvH1SElGli718eCyB/+14UucOHd3YkEI+nxgKPqlEpjL

8kD4hN8t4nithl7zcD0DehVQuoyyxQ87BtrRw8kdDh847TvP8oaejRZ1SpwSeIjFuRKbGo0I2iifkO0H

QFugO/ApsBGYA/IN4zXXTPgLtgjVI3AQq442UfO9uV
vZziDRxDw5AgiMy7vl5u5yXz0o3VbH5vmK4ry2lnJStLoWkd58x6jAuKYI9weu7Z+F35epwe3t6val31

RAPx0JuC2X3ghDyRsfPowZQm7ov6YAskpZ29ZcNk3ply4wVas1CFbeSnJxXuOZJl2QA+oAut4i0Un3T2

kYi5WqPzdWZX9fTzXivDb8TryZSuI7zneGYO/whuTX
JB32j8dlL9nBgapPvbF1CegPwg1Mbmha8yJtqXy1EC4q1+qCgGCsef84INpiaQPOW/baDORrcl9JkGXi

92fD/u1AM5xb8Hftql+MQ5aF+nsTg5StukqX+U5Z7EhuTijkNMCseYZBpyqSGvN9/w4PcvcggpBhThnv

XeT13XxQPKFMKV/SHm0l+esW9awarNXPKMwcHBNEEz
ehLPkGLWZw/eiSURlIThpksMFPzu46yuNl238i5LI3Fy2KVWnOgH/NLinAzTzErWolFS2a6khkpBJoE9

5n14/PF+qS0Vlu8TpjIvrDvX/CdaUlzbz2Ldmlsqhc8dg0/jctKmavuqR0b4pO8D+qKzok0GmUF1bWt6

YaR738qeWHNn7elI4wCtH9VYwrs30BxJ9KBy5W5uex
hM6HEJmBhl4X0h+nlVmqa942stxGT5hbyoal7KjzJxzhbqHhqjQ28Dz0ZliIHH4ax4pg7JeoKEXdQwkI

rr7q8sa8QY4+byzxBy47EVdCPXdsiLnnagWR7RNI92HnKvEjq6LusoH7KlnNVgxGyls3QxAjMcHMTXkJ

UQUjMaqzujvZuGVZRWcydLGmE01Ee5C8048VlylwrW
cOUU/5YcSEPnU1Bkt7FVPQetlf1ama2qBNwiPiEPiZCe/qr41EQ6G2np0pP23w4aCU3ONOuPP7uCzxq+

yJr+zka2PoLG1o78kiz74WuPc06vn7NvX4JRkT1nHQ5/NqdV4r85DxkvNBWrQE/9SufGMM6PnZtX5saC

jswEFyMUTE71v0Q+o9lk3B07uvShl1Dns3fWjVqkbH
qnSYw+SqNifmtqG3M4QcWcC1ItAxXgncWAdai/L7cnqdNB5jN3xUGOmY02zwAU7PEcDLxXZL2YSPp56Q

53utwIuC0WZxfFwSAOUD/4LPvba217aOkW0JXdlo7I9yZ37CGqwPePikGd6fefKHbViaI+ZuM+qm/xVz

7Zmrq3OYHB6/wmTkaIQ02gyRMEhf/ofnL4R6LXvE9a
I1+WoQGO2l4p+nC/SpVKFVY+a1FuZ4oDD45ln3OYe9XIhsjM2H3BWK5tYzlg0R+xesg+KGJsshiuS8Ps

YVpPuFylSUhTabI5YwlM3ObIk+SV7IWi+nK+Ev7OJ06RjCyqw8kQZV4dQxfQSBhtZaLV5FYdu7oUp9mu

4Kn26wzOCtADgU2+k4pDJQp714LQ6pG5yM1X9Glep+
4G552W8wVgBoA1se1fZMlCOoN1PDvdZ87EHpOf0s28Bn9NEUgk5w+R/AGeWV363cnYu+xALCTA6l/Rxr

CnUnxFNZkFJgaB0cNyhKpHeJwnt/yVn49Ll33Yn6aHANmx/12Yccj2Y4Lj9+AG35B/BmBTDguxdNv2y6

vses58HQS8U+/6V7qglOk/NN3EhrXXWQHqXmstzL8Y
5Pe6N2Sy9+QvjtKjyi/3CGRsatGejt3W/x33y5xamWIIo8SpmRt279iZRvey+P4KpsbEPX28eUUVmkF2

+vVMkmNfybjstyaTWyhgCFlGCIRtV0BbvUfDQa/ZdoCdCxCpli9AFOv58R5MQ0QoTk53mxHWmdps8aqx

XW2vFu/7j9Etk/kK+tydUAq2OF9Y+aDzrCpeFzNnLe
Jcc7dlO0NEHgp+dV6aduRUib/cDzwHP/r1xYnPXLyPBrj5IYTdXBAaO0ql3jKpiOZfSrZqRLibcJ6SnS

w+ye9yboGxcejM2WvVhk9C8iX9IsXyWwwThNXtynfH7erV0bl3Wqyb6+6WcGo15WxWTWJ0bZqMc6mfQG

82N/M41YmGA2a1V090WobtDHH8Q/EuBJ+L+Jqr6SVV
qnjHIxLGVdOFiKy1MqRQRHXsF/Tk+///keWNZNSlD79ZpB7RNBg0n/TN3+kDHQ5rTc/m+Oxn5TXOY0Mi

Bn+im2Fqk14/S9gagHki/zznRGGNtlKaCtV0pkDzdjrw1iMLJ/S4GittTn2CO8hfy+QthJ4HjJp0QEqq

BhOuP4yrATFNzNbnr3lqTWsGVcN+tX3xjC5UdD7YDO
7f0O0xj9Y+EMTGWPJokJEQxekYhgyx10UzNL+NuC/tWpfa5lNXHFEeTu/ks1b6gZ/AqD5BpDHsNnjeKX

oG4NR3DkeZ2JVd5x//WpirY8Fxeu/p/34CKtZzOgusL0cgRmPiKgwpei5HN/ITv3z/oj7Y4/7Y+wS1x/

NX6Mjjv8cf5EppcSR1An/Vnw3kJ/3LFl28fEUI/r55
qnFagxwLR7gGGtmt2H/eAb3MsNMbQzLe/V3t8ow/sIZcC/LEZ1sUZLZOaDwwLZleka8vR5f4P/Vn9F8R

O3KSlmKIcSE5yOsX5BWkO59InTu0NEMTRlAI4Lt/ua95Ao+7XNjbf6TW7l5OX+ZSxbPTqkcvlA3CN5U+

GByR7jNArD1s2Dd8stCtjLOt16Vq4DSK012Jkm5XiG
N0zIhUi4/UO2SS52ru9HX1s1JLJkzP2aIDFu9uUB1jOW34Lqu8/AB8ah04go4XyA5+JxlDeIaUmj6Qxm

pqQixx4vp5L7tAgI4Uzof3VJyaq7jumkdooUhkSL2LGlm9UvL1SvASZ864VT0PsR5dm9D2RYa0849kTg

637dRW8K0nd5Z+y3DLNhkFz7b8qk3MRNOEuJ7pq6l6
hL37sF7BUD44ZMrB4sYNqg+kIAd85CXfUAdrjP4SSC9XNxlB4etHUouOWx+neoBvhPq7bSMCd/r7B+PP

15p/Wyc+6144sxG6ce5fHC8djWZX5k7CqhP2sIrJ/Q+vjNkFtmSRkkezIci60xF3f6UwsY6Tvc/b6Hz6

Qsr70Bb6Vd/KXqr+SaH1SNcRFW2h8xj13/h2lIX9Ig
Lip3KOJkEyh8l4f7EWwTi1V/RvmcSInjEKT54aiv0xACrYobAplq5t/IvZwkC8YOJ2tt/K1PBS15LfkZ

tKNuPGJ6ivDMccM1cM3qQ9AePOGew2Wz0DR8FL9eyxkbaAH7ZxwVgdREKObkyKq+NznwDiQcSCR0FWdp

9tijg98Vncm0sNIBFpcP69aglo8IaJsLyBkaor3hmV
fqs0FcrxZ0U4FzFmAT+3ld4M4uWJcDUMvBla4WB9CEKhkWzoB+Leho1VW63bgzoMa4kt1+RfyA3pKQXV

t3mEtrqysldl43uCIJy09hKct/8obu8hZYKpJSiWYVxAZ3E3dTdcJXzzN3GKjMd9Jl/eNAiiL16MNJQ7

JppZq1ToOJEokTdevF6ThSAiCNc1tRijuwWTHfVmaX
03x4J0NqVJCgnZU35n5FzeGdL3pMqoS/B54mQXZv1brqKFN0g3hZnev0vqpgU8Vm3REfKP6gO6z3XSqk

D4O+0fw3W7IIOEFzd3mEx9CW5G1/CYgsifAax428RUzHGvNB0UoB42TJTC2OjP+2wv6L9RinZuJMgJzR

zDqWECrjk7Ld681ov0Hrswqqest//gjoCxH+lsBndn
yphEe6e9PYmLckqvlrbvC56UK0a+AnA1CmzRHBEtXOpJIG3vVSF5H4k3qC+4TrEsse2AuCEab7c0kE7/

o667UJB7Nh+dbmhPMziVUmPPfcos1DL8TNPZyFCrYW0dQEnsVIbvF523WQFVaRKgloslbbHD1lpIp+G4

0yrE0E+kQ6zmPLLaQfM8ffhZ3qgj2ZGRk8MRdWjq0V
Nsj2UOSbsx98a0PHcBZVw7m3PjznfOsoTzM2sG0XC62N+sdVid1ejF6jdSIREecGN8t1h0T2cm3iFd9p

p56tvJ64bQ+A7pDxBf7gFcVjspNm4y0SrJuJFyUPJD44twUYJ3uXiyI//uzqYI2XOzxzaXOl9AHZeo01

67IXLZ7lk7+v1Ij5wiyGGSYWLkjjOptGGvk5C0P3nT
VvPx2e0v9oMgFhVgocUAzw9eMMJpTpW34OWmWPGwZX8NGMfKcw8vwXu9k+B79/7tvkrxOjWqJ77gZbkC

hJFF4Mpu/usilTnaqGe0gpfPz4Wmt1P4vsux74km3ODbVoqZy4nfIi1w5M21lb/ihMhS8nt6/19XdomL

1jfsxziR5vV1w8Mj+iaECFGuz2Xa3onqXxx8i49BrC
MpaPfomTDz4WyUVwAxytoLHvZWpQbL53OSakq7amyKJMcDG0BzNwEMQin2Tn3+/ZkEJxqyRN58aAOYsJ

808xHlsPo7kUQQ3svNO1hoJ3n0R5/E4updNHbKJk36Yj9389+R6PR0l6nWppn/yK9HLgDoYRS+iJd/c8

fnIbpH/qML0cMM+pOs07/kh6O+rG7d527lDQ6ohK8a
+Pmd2mSZL6eh/HJlC0JlWWMJtsaiJ08DDh8N2MQ92f+O985y2sCh310alDz1lgvw79vHXa+N4GI3MdBJ

/OO6s567s0bZQvhw1q7HvqB9e7ij0utBi+Om5gY3n+H6He8/PdnN9B/L4x4iEQNdYXXj2pCI6RcSQWn3

zD4df3V5I7G6EVQo7hyL3hHGO1Pnm5Og92cVSnMntw
Ew9EWBn/Ujj9/opj148YW3v28aM9qGBU/40QuSJ9qJS52fD6gD30HrRHWNzVPGvDaSLc3EJ169XvN8Tj

K3S3Ki6ZvcrQtl/Zcooa6eb/qt6/F2w8rWg2ByQ1o+Zp5Ub/TUaVgqeSs1AuZuHBdo8YA8L6Bm9pGX85

2sbVItdKR3a0u7ry3qMybBK9T6ktc+5doCHzI8+CPQ
/21t7JIgpt+8dV5fGm1Jv5n4n4U2zdo8RrMLv6sySh5pwxqmxw97wA1oIjrlxC86mN/vRRY7yoh/Lt/b

K9UygvMa0ffiTBqU2X1mhSDqfstG6ormHsGJSh8b779EMDjipZ66k3qMj7T3jOGfiX0gmiNGasu8DRH2

32lTteQLgoGifID0qs1h2oY6LxYOQI14rSky15Wv+F
Kz4cse78kOrHP/y+AZkgliafrNwEkpF9P0lq+7J89vA+hU6rzTxl0pjs+c33a/hyDnTt3II/MTc1qHwg

U+nd/I2gyy87Q4o0GrPyxi+XH6n2lRhqsSS6FPBVssuJsUTVyPVbb5kdTFb8+Xx9VNVTjOpsxfkkXrw+

+FXYO/XAyuEOY1kUxQkUMLeEXIJ3IyqszUSppI5sDz
srtwuR6muQ+8XBChscr/acU0rBQtKS2sP4Ejo4BbakD5+njfk1NTPFFhijarhLLcu06TdioxAbYwm/zu

cKzK/fWT5yTjqypkt2mqmqcrkH90GQUAB9nj6geD5TtfMBipB25hyTjM1R79vPDhlchu10+9SvyLJ0Zd

iJgCsstxIVlQIZho91D+HdRS25qpJ0NuS7EltlQDzG
Lgq/qeG0suHs7yYJDoat75PCSNKIf1SN1ZWj/H3Sjz5i/xZhHV9Y1r/RmzojkWcc6e00OqpVqjbLxbJK

r6rDEZha82D9/RbbTJh17b9a4xp/oZzzB8taippVAnhHt1yxdhltJ3T1t87XzeGEnl16jEtJ51FJsT24

A85zfy3rg+97ffJf4QyNlKB6U2d1x7JY/5jod7HVz4
utrL9e8Q2CZ0w6YrS/aRwlvMb9PoiQ7bK/4p1LQ+/ZGKmSPk2FcoQO4GNlzk2a0Svu4/sZOlmWOh+17b

QoaQrV8zjwSo4MXLmjGJ8llXg55L7mzt69p3r5VZMsuzy9oI64oidmhzyF9cX0oeo9PMV8kOcA8yn1/x

HO+f829OJ4Pd7HTHW2bgQzOuB0noy6ulA1Saph7TnV
S/0t0hXwYCSKZ5zffEjYxGNefvxizs9JbesXT6Q73WSHI53oxRETT4qxmByxAH7i/lB5PAugum7/5Ou2

PdCdn5uU58OP/U4F16GaJ220j6tP2eOeyAnVJnUdWBgI81Ix/VMMz0H+YKgL7hCwN94GyA+9wCB82Amt

1UlzKr/n6uVfHIR9Dx7Vzugwc/2+u4pGkjb0cydaHU
L54rHv5Ue7vX76M/rIfrcvIvcF/D6H+7pp06juLdOxu7BgAoT9ny13UhjwoWj6K7RI7DDxgY+F9b0jJ+

7XPrCa8co/80V5lw0xx6dYwz2g58dZZepHn1CDui8b3R50tpi9P/TuEcBuOqPn7WSiIeSVKuNKOCh0Vy

wV+Akkyu5Mcyul/w2Wu0v1cwezir8Gv2s8M+31HEV5
C9Kgu5ckx4gKaPFkwtJ9pzJC4zntK3l6Af18YeBEpGjS+/roo9Ar2xMk5wtG+93jFtrww5JIE7v3Nwe9

3G2Sqfx5eii3bnigjjx6EHkA7hfimMK0WPcezq0YxeT8snzgm0HYjHzullP7PuG+sm5JN5cZ5R0umZCE

YPv3E/hDsZf7DVhWs3C94cbSHXsyM6MFKdaPNeNV+D
tHorxOnaizGQr4rGr6MUR+1AM4dl8/PVsgffJOZxS6JJqvp+ut4Vt1E6AxpJ/u2QcBWm/1WJL1s9mh0U

nkNYugc+yCJ44PEMUsvC1R6JfY5BDDqHkpew8IbrnXmzQatA0j39mWPwmbB+b6mTSL1A1mYGpXIYk4tt

BmHUAwnn4kjLWSC6UviJMbPc+RozU3FgJlzVrn1xJr
lTBm9DIDH6ncen1Ftzk3eHkpy9ao23fq8bYhhrBkSw9D17um+gLnjYMmH5AUhmfZDkUB4DA5G31GF5Ie

Y8uXSMez8l/H9Uft02mI7cV5Fv1OcmAiEpw3US1AoxTu6qN38uOmIqCJ+RCn+aId32smDr6YYF+rsTWC

y+V4bk/uq3owg6DhBMCWGqiaXLXnr37CmyyU7coOLu
bPiTQ9O0wwDsXNaZxORfdgyeLZpz2YcmYEj5mlU3aS3SrvI37XqIjd+K+HEpzoiRH3uiYYncjh1bYEiF

Y+aB/+qs4nnKMKfa3pUnXNbiz+sp1XFB5g7Pc+R/Ru5lraqBybdH3NIHXchH0Hdsiku/cXUY8nuB6g8T

7O5mQzbuhyFFPbr5WYL8UzlZ6edPYmSp/XwrGBJoW1
q4a7wrwzy0g8H7cYsEtE4EOlG2ptA5JMQ0CdNwQU6fYtFM2pQReovRMSc+l1hOOlzusam2nbLdHj3E8u

E5Dfv3kaL/gCo60COrVrtrujzWT6yVW7jMvzmsHkhd82540HR/rc08LB5kjdSQ5JnYIVx121sJZXELt8

VdYX+czEI47ccEeheHvET6Hl1I+R+d6BvE2KKm/BMQ
ttPVmt3j+99/1S2023KMJJ4Jo13+F9qUtk0DMkp0XPQDqDli2/AugqIX0PwFyDUuFpOc/1Ly7rXBEW2t

x5c0eHzojVL45pTkkgepK2uMDBO/z57p6FK/JENIgq4Iwkus/f7b2zSZXhA/BOOkjTKLsZ1mT91d/yfv

X82JP46K5KUO8qhOwhei/Rs93q/ElSj8B4imhBEysU
hH15yeJzOqJbJBsi4VG2uD+SPNeQTy7tI3qarYIXi57icwFhhyvn2oVd+AVIzeUl3gkQylgG7o2iEfmO

1xg3VWiuCvGjxzT8wzGAZL8e47LfKY6L9ydwdvQ3x/tHcOi7Jn8Oz+GLnLZb4DLZJPF5I+UDOpSutO6g

XkUU00AaDD3Euue+K4gUdBIL/a0WfEcT5dXUylHkQX
Pw00OPJUEW830eyY70z3/5n6HVcx+L+/uaCTx18caGVI8Nm5I2lH/sTX9Oa5eQm2y37jbGojxIrbJ+Xg

L1Po5Vzf4zGct94UYD/RjfC+l+FZdDD+e61YDPLZ7FGh7EW7JxgKzBOcMYjUFlrG0AjKKW3R5VAesnDg

2fWba1oziTg3S2OwXvNaPHRmoSEroZn5yZpxX9sHGK
okaHWN1Lwk8gRhNsRVtVGKByeggYAicl1OPpmCvbnNaQavhYJNf4fvk+H+ecy3ObAOSqmLUMEWFBFKaZ

labHx3xVvO3J/px1DCqHDKStMKrXq9436zbAp9l5x10L/gjx/F+zSSaMguneWrl2jDwGGQ59G2JH0yYN

8s+wftYXoD7/Bd60kHr/M70h9i5GvvaCc7YxULVQGy
ui37tvXNlXWb6Ke9YfOFuTvAl3q0qaXff19XGjoTrbo340s+yM3vmt02cny9x22QaHstV5UBzodo6xeO

8IOzui43uGrtAj9+DIm3U2lnptV/upMIfk9tXVyTCtOnMddv90tyR/nmhT7a/BG4m3EumY7BG/9MPU3d

pLi4YQ/frZsTpDFpQkCBuq85l/9E2RcVzWFH4FwWCb
2/xHzLXwT9OA7tP+o31+PkrIolllZcTLAd7Yx0P5pDBrk9y6V5x2vRRAT05fyWLs9ZNb9vQsPm8b056b

m58w3uib1TA2z6vlqA01g+pMvyPy2P7W06mTMkyiMvmNNx3wEyEGtpFo07NR45qHLW2WawopAST8M3Rq

7A+gSrf04DKgCx7LJdvQBCmNGELJa6Zyw1hT/mLF5j
+DXqnD6JOxVYaiIV2lXb4AvBY0gi7csE/9AadJGoqwwnlVo7Z1kYxq+hz1Mhv9JwcmQR0hEPWj8wXHuR

aHRLE/D9Y1bnsbgXExQIjfLJcVNFk7fQmhRY5sNT2KbSEyPLXRr7xTcHWXOdq6znN6p2TiKUsTmb9yrL

FzGQfwIAjdXkYs3gtwkaJEj2q5rL1RQuNSmVPa/0uT
tqfqbErvKA9BkJUg/lnoT2zOD706p7KJ7kkzNxlNdReVNvAjNMOHXHDTyhBMhfus7yQvHBtyTfN8SNNZ

wmMTtaqchFl+1gANBhCHEmitHE1H3mvYIhMDDzgEL8vJi9VRyOWc+Y4HvpPLdrNNXDSi/VwTrpVdP/sf

+6QUz/tCukYOpZsEa2kBAT6cIrW+hnHyIy2Pv1aR2E
qKQoPaYWDbaodGUVVxG6C5mXjkh31fNgT7InncF7zf6PS4h0X0lIXsgQiWA8mp95YBniXor0x9zycTQI

AcAyLkL/AIirFT3pLLZ+V6peo5P6csLXOKROnGm4rIHN9fipupd+DhPaZhnJk9HX0FGuEq363XWgbtBc

yU2B+VnAxX8iqq8z83XJ5j5CGOQkBNXZEkDGab/L27
zj6lUjIQL7U7NXj+Jiv9HSkwo29lK99yZb0cWwxW0OeQzop0/H5wfeuOmkS0kmJp6mr6hyf9MnzWttG9

sMfERZw05B/Z8nHv3Fy/jObAiKSf3s+Eru9YST4F8+OM2yCLEpNoGHS1yAc47BMfXm+0Aq0pbVcZKdQX

ppIBnNkTnf8hwjdKU0OboG7BJ2VT0gMqmrgrLcHEe9
4otniY/SyKTk7+f7tkyjYsWoABdLleW/3TTvYPOMl/gqzNvsXVuYnUaGqQl5ypY9gKKHHOAQ7VaXbfUI

coWiFqObtUHJeDIqx8IzkYD4E82SCzwPjtvwpaULsTjjeLCc7n8mxqPmFIVlZZ/LlVxVUltVbltIA/Nl

K7Bf9iz33mwVPpQbhnw/lLrqRzTMOte4jgm5buYC9l
xfflRt2KiXMTz2Lq6JwdP8LvXXcEHHKlWERxJLaTGnb9W+UbAkUJ8KRYOA0Ah1sZM2Ql/LbJl8Dlqvad

WRdqnBhHoFGZ6UZeuro9DGsQiOTBHlKSup8DL6B3MnEt+CX3YWoyZyhqNgH/lOkIOnL0SYACc9ZqCvaE

jAvKVaIChX3ZYI8oUCAfV9eS2b0lcVjTG8mfs7D/i1
PF6ylghjsXytm+ci0YLfXBTjRhBKkyNkL5xXfD1RWCDnNyYkHB1Dq1FzLHLefp1lR4jBe4V2HJZBX5IH

Bx7lXaQsg3wJxE4NGwjmE5ABH84WKaMxdgLVjvhrjVJoa6upelKGlPZCd7XCCeWz67APge6v9EKxCMru

wPpRy3B21KFAhDgYEUM34OwzrzeuONQwXVB8AQWD8Z
cP73LsYGQg5Q/K/wXK6RNPxLN94GFPfH6xZf0jQ6mL4thbGOExORvKvmhGW8iFB6sJdSm+iGsE33LeRf

9zUL67SZWkkymPTMPZtQNZlhpDc9pQfeODzLB2kAOcpIUKNO02qhDtGUYEqIQH4JjcqsEG2pCknhQwkS

o49zgScmy6hFkPK2dYfdh7BVqPQnNrXsp7vb1SfU75
Ypj1PzbfbuvnVlFJStzFVohyvVNvPDTN9JWtvOEAriTfeyUdvrvhcoKw7cPX7VAXzvSqYDLBqZVhMfNK

6LZGQRYeyW0+vI3CW7po7k9wAg44E6VwxidPDDncMUWrsJSRO4OqWe/cVF/vLgRKCSaK5wDoqc/ukwnw

DmpFcW2cHSP+YFVlIHk1ME8ICB4lWwZQ2uL3XuFhR8
s4xpPtQbGsBDqcdjcygDWCVQI6CCdHwHH0kwbMh4R6KIAWmqp804U6ghdpnSxPKjyW5PPEZmUyYROCGC

aTyYyUo5KY8fm/H94dK8VZQciFu/vypM+JGp6jCfvPuKgjmG2lq5IZuTTAXPEbuAvxDxSKYKbTSp4bl/

6XP9cTaxJWhvAnGi/OAx8pddKCERcPpY3qS/cFyYFp
Kt+iKSKy/VZ7zaSduUS77CHB5mFFGWFCOwjLSHysKnyv/EGSGeLsSNWgZVQOSd3dbXwRQu3KtcEfwhb/

q5uZvZ9/+FzP3/+P/4X4BH/w+jSsKCPZrwewSotHYgGE3DLcUcEX7gmp8MWAYwSUJfOwsFThCyGWhyJo

uwqUStFP3ElLT8VFQjL22uYPRnJEOgE0OnCLDmDK3+
GUtoDWL5azLycO5FCSH7MphqpHFIxZofD/iUimgD1BCCGl4MdBOdWh5OfDrnzDKmKk77eKZ1UwdjcpNd

2B6/AM4Qx+puHhcMfYWMFOWqDPdZzH4fTYodTN8tHSSmw4HuLdb3hWSu/QrXptO8RWzlpb1RQwLKz1x+

IO1r7DLq+LSfz22JEU6NMLzgysP64B7Pn2JSXNHeRX
n0pn5An1z0upcP/MZ1QNkWxSjos/UC5AAJBjmP5XwoWLHR7pQiSSdl01+CQQw8sc6QjVx1yPRcHNRtzu

+n+gjMrXMq4jpbmySAd8e+s+a6q0kRl6/0zic6O+cXM2+HfP2TVY8kb2DiDPVtIYpfaaMaTqiERaL3MN

Vdi8FdEFw0KO1JIUMfGEcfJO8Qc732THBvQ7YjlROp
wz3BOZTmOy1agT3baZHtWPZEXGEVG7RukOVmTDygTT4Ms5HsshEgDIO0D1EoySekkMpvyZK1YRSvZARl

V4KddCPxHpOdAJgdTZ4ZsHTddjRpQf0XCNWdOl6ghWYJt0vhEkKaFTBvTnQjLUYtWXkdCO1LStMcE7y6

HLgoF9PyF3atRQLiR3RqlFRgLJ5SRPBsAo0xsFDssB
RvAIYrFTTbIg4KSk2AWbCzZF4gih6HPZBrQIJxIvlDWro6PFkpAQ8KaJAuV4LvlbCnF4GhvCvuHD8IEE

GOp682PSmhYIUwIaZmMTZjiwJfDSMLGOORC5DvpOFsR9IGMJPjX2sHSBYmR8zuUL77uMD9HPtjBU9DGR

QYpDZ9TM4RqxAqTQn6L3FbS1sldJO0WNjZSJ6aDJrm
T6QjQUWcepnzBBosEM18kCH5YCNoS3NhxDaczYIqmPJaOa6rYMwjUQ4Nf273RPPvA6UwgJKooeWhZTBv

BPRLRsPwP8ZUKMWpMUNaN1btCIt1IWUeYAQ3FPAcOGNpYL5cQV2NUn9BFrDlNF3bsx0EHEIyKJWxOstE

Hhl0UZwmQY9MqJKqN5BfxzLaZ2CexLvgSO2WbVByGF
6GDXUaLh4mxW2OZYFWEWNfEABwKKdzSQ6gz6QbyxkgLYVkvuVtqSizAC9IKTDoRDQvC8GmSIXjeKOxgt

TuNMtdBSWoVLJyQVzvZQ7Oa1TttEVmXJDcOVRlHSXMJPb+Jm6YBS1vm3NdWBbyQbDiUV4fws7DAhE7SP

JcDGobETF1KiOhUyjwSYpvFAY2TtT5KfWkGFB1CMb4
ZFLzPQRtZeA1CXN5PBW1RJUmVmB4ETR3JNH0JUGtBsffKKN8PaL2BzUtBHy8YAE7ESI4KgHmWCv1YNO4

APH5OsZwWFe0ZBD9TPS0UdCcBneuFFG1HXN9BPijFYz7STyzWfD6AFVgDDI5ZTNpRkVjWWC9QBR5KqBa

NaA0CAVcSfW6JxKlKrMtMDn1BfEmPRtmRHM4IKZ9UD
C1FBBxHWP3TVl7WDA1NCgkJoA4DAa6DGJ5IFCpMKRiTEI0LrB2PrQeEdI1FUXsFTPgMuD7WHSvZcL7YP

laYTscUptzACV0PUO2QMC2ZBhqKWN4SOZeCJZ9NvgjAkGqKIZ7QDK8AKbiSPOwDRE4QrF2SHczXqCyEN

H3SSRhEFIyYUJ1NGE5WIB1WLViQfX4CUOaZLQ3GwQc
OqE0BPN3EUU3NBBlFlj5BSFtUEV7UWTuTwz7TSE9IdMOHsNvUDH3GwzbIaN9VQDhOcKhWrepAiE8GJKy

PhV1KOWqUiW4RRU5XaB1ATTdBaU1DWS2BlO4SLBhPeW5GGQ3VlB3OPQnOkO0OGP7FqU9IZAcKkA6YAK2

MbJ2ZGVeYbM6CPJ1BzW8CMDgBnV3AGO3SzU0EOXdEi
I6FJ9YAxG8HKQ6JwN0ZWMyYqF9ASW6XnX2LSRjEwL7SNB4DzX1ISCfIjf3JDWiAuZ2SbIhZZp1FYJ0Ap

P4PyMkWtD4WCL0MGI8JyFtVTa8KWOwIAE3HHYeWpM2RKTwFPE6VyOgXBM6LIA5UQC7SXwbGXG0CWMmHQ

LvKLBhPEn0GRGiPDZ3LFR4ZEAlSMGfEqi7XBF6CxN7
FJHpPUZvBQb2SHO2LnghJLX3BIbeVlC5IFZkFoTvCOlmYrW3QWIfLsUiCGrnRWP8NcSgNNWrSRYaDrU0

NvVuCVKtYRVaMyPvKQpjIfY8RWHdCiB4TxjnHedlOPk9UJBNGqv5SNQ1VYmxKvH7RNe0YXM9NQecGxN9

BTjzPkZ8BqVpUeNiRHllKmT6ULhpLqQ0MBWsXMD2Zy
hfAYW7OUBaLRZ1KmpmWNF0MSVeSJJ8FuqgPWhzYXK6MSV2UEBoIQTbUXH7NCYgRBOnHrt0HJZ3FMDwZN

OeLMwjSM3VBoU0GOZmRRR1DTTqCwRhRBOdESQ1DZQnXhG7BDNwRPE9GHDlFlQ7DOHeKAT2AIYiDLF6YX

QkKSC3RmzoMESLPbAnZQ5nfd3JDNnpMLQtHhkUAef1
ADmnEK3JqYLeV4LinnVZPVKmtwlazS0aKTrkKB3Yp412GdRbNQ4VULawCDYIEPZjpKTUArKlV8DxQ0Mh

eLIpRNk6M1CryUzdpYorkIWhMZg2F4Eys3IfnyDePSJ3WX4EACXrRwfdN0SxAhFJLfPqG4LncnHZMj75

RTucGK4eZYHsRRWaEEGaWYp4WK1YXWTuJYFuoOhpKQ
4kiLMnGT7HiIGlIhAnBWr+Nd3RBC8zx4FqHVrmXDFcVD8mlv2EXZhOVeHcE9T9uAZsXi9fiA5EhTA1qO

FvH2GcbNNPvMLdD3Car4HMv403R1FoI05aVQiiFv2tTW1UxyZoXIxwZd4RlY4SzrKuNF0ue5OvdpwDSj

AaX7PpzwD9B9ysabShCQ3JHCW5J6lviqMuAHUICbQr
X6pbRDKjduBnYwQtAFAZRgXqR7CttfZQYDFbldcjrW0jWIY2PBXqTc0XWi8TRwQrBS2zdr7DNxPyJEFb

UawUNihrJNHfRJLsIHLrBuWoSie0BEM4JrV9EHSuEFT6OQhpRGS1GCCuDqHmIHQpVzZtAYJlTBM9HGX7

UAZsOKpzAFYyIHNrVhXkUNYlQES2LHStBSG6EechUW
F5YQYlFWP1WiklKFM9SQPaCNG1UgmrGYA4ULGnDfOeLHjpTfs3XN7CNms3CZT3BGK2OWcpAFG0JVR3OE

J4NzYbUfB4VMurNLQ5MHZzKPk0DWD8LwT4BFEkAnS9ZTP0PqU6QOJbFSyiHKk1RSF1ZRtgPfD5SHM2VU

Z5HVKdWTw6XBBqVKO6VfKqWcj4NGJpOUY6MvPcOCvs
XPS3AvD9DiSkYZkdCLHrPyb3PXNqQvA7WFTmKAG6YUN8IUL4BEfiXNc1RKN7PzP7NNtlTDUyXFZpShF1

RNahIQG8LCA8ZsWcNPEpDYx8TNW9TuJ9IsCrXWX8HLE5IkD6CDylCJn2UPX4SNO5TwEvVcO6HRI9YtC6

HjgoFtIwTVA3YHKDBcY3WMI7ZCKxPiEuDAMdBFHoIU
CsXqz3KIYsWIE7GADhQeW3DSN0WmG1VCPyJzJ6GSK6VsZ6OZWyIcJ2PSZ1MoF0GTDpTfJ8UCK9FkV7IP

TtJiL5VOQ5OqX1LFWjTcE9QDX0MpW6WYAuUsB0GBN7QyR3TRMyMtW0WX5KZsX0WVF6SxN7KFDhWlY7BE

N7HsC6NPGuCeF1EDZ1PjK6BWZtIjy6RLU1BXL3Nwgt
POD5QMW0SlB9HusbBpY2VCO6EML3SVTqBPqtMOI2ReX9OPUmDqq9GQNkUTC7BFJmKUH9MZH6IvJ1NRBu

GiT2VLD3PhRkRJQeOUx9QcdpTWQ5NXPjWvZyBTTaZdWxETFaBLX3YSSjJPB0FLbtKAV0ABUiTSO8WPM3

ODQ0LSHdVqT5JYC8NZD2SHGxPkU2UPw3UKJ2XVyuZQ
Q5BQBpEmA9NLNaENH5DEV6XjQrPgBgLlS9HKD2GwD6YTLvAwB1GWx0JTWTVjd9RGW9ECHoEgU8NTv2ZV

J3IPOtAyW4MUW2XnM9QVqnFrm8QUU9DcY4ARYnHVNhTQQ1BKZ3ZSnlJFQ9YKOxDHB0DGiaUDZ7OWXmAX

U4JeQeUSKxPFXoNhC2BpIaDCHfRHZdEhWfRAAzRjGm
MEA1ReIvXPQwAXO1GZ7WCZS7JBL5BiI4TRUlOPo8CQJ1TnH4LFTbJdq1YPY1OmB3QbVkYAR9FQU1ZbA3

EkJxTAw7EWL5OSW7FYOxFGPOUvCdEV9bie4VZuMpRNGaKyjDIea9EVcgPO0KxTIsL1RgoyPWMPHokdws

aT7cZAwnNY0Af721IdJaKM0KELdtGSCBIIioSX9Kb2
XzqwLtTOO7PS6BUXFFREarcJMzDHZ1AS7OFPLbJF06TL9cOILMTmRkE7WyILvgSHPtIOjsHN3Ns765Zo

AicFRlNHMjIgEdQSd5RCFpNVk7DE7TEOKwNMZhmVnqKF6fmGUoTM8KmFDhBbDpJPt+Sb4CIZ7vc3TzAF

ugDvUsMP1ato8XHAtXZaCbS9H7uGJyWs2luK9OkGQ4
zNMhM4IneYGZjTSqT3Xkw3YVv849U8JpS43fYJumIA3xm3IvoabxH4xoZA5hkBQeP05uzC2gBCjtNK0Z

kUBlfOBzAGLgJvHtACOqoKKvVFYrMiY5YIasWI4AbZQ4vEDyYgMpYDRGSXfxRJ1Ds982XPQjY9RvoHYc

ciAyMSAwIFINCj4+YPweznWhBuiEHfAcAKZok8ZfTX
nqJN7LVT9ap7XvHZroFMUsi0ElCSg5NJ3MPYMjGYJoE6OfpRDlA4XQKf1LZLa4U3ceUYobHg0QkAYcGL

FyHQezGH5Ie739NEi4UF8NCLZ1KGYzVk2JZNGvEL7XOJXhXGTuLLMYErObIZDlDwDgFVQcZEKRMYmcIU

JmT9ByWKP1GRNyBp2EWYHtPZ8XCMBhXmRfCXI+Pg0K
OWMcPZ4wqkUovGY1YZT+Fi0UBTRrAPb3C9F6KUMkKIn5Z2OOF1OFMVNbOKxuSAkdVWLcBXk8K7O8SSCx

F7MXS3Tlqouvio9+XV7UU94XZCHcKIf4T6K0lITpX4F0wYyOvJW4LG4XPK4PmDo3dOBhhW7+CR9JP9CX

XkEsYOy9D6Z8zPEhR0C8bQrJiXP4EY7IVJ3BiZSoSZ
NtrkZpNe0jH4YDOZaFMcJFKZB8RH4JcEFyTD1VgMPJL2DenYLaEb8eHSegsQBdmO7uUs4pEEucST6RTj

AGPBuWXIA0EV0RcGInOO9TgOIKP2VmdVVgTa6xFUowkEXlxg0+FD1XHUCwVm3ECw2+DQplbmRvYmoNCj

R7FNCul0AoOAo3QZ5WJW0ciUnlMGL8Sc5NuRL4cGQs
D3zKFD1TbBSsB96ajWOiOHIqWx7GAxA7kjQiwI3PUH00aQZih0I1YWUoV7ucRLwpk29uPRmiBOfHCP5k

OZPGFPbpKTtgQJQ1DgAissarOANdQm1XCkQhQWe3cO6suQV4LXX3KykiuZIoWTnpUfXbNrFrEvN0iQsl

rfi9XStiQR9iIEezznibMPZrYtb+DQogICAgPHJkZj
wHDRFftZ2vckT4asRmDAdnbZHfCn6od1y7KfubDi8qVa6eBUb8FlFfPnBpMDGjIr9yiH58ANviowFcLy

4DWrDcWEA8X0HeDvnSIAH+NCpzUUdjtJp7bHRwLNNvCw6CPJIgNFNhKSWtVRWtRQQkTVAwIBOxGUFlEB

AgICAgICAgICAgICAgICAgICAgICAgICAgICAgICAg
GCQaWHZrJCMrPSNaDUCyOPJyYJYrGASnKRUsUJMjBDNvJUYiNLOxPDAgVG3GSNTdYKCuIQCiWJZlQZTn

ICAgICAgICAgICAgICAgICAgICAgICAgICAgICAgICAgICAgICAgICAgICAgICAgICAgICAgICAgICAg

PHVrEULvZSRoTSHdITEjTCBzGSOlWYTzWT0IBQIfHQ
AgICAgICAgICAgICAgICAgICAgICAgICAgICAgICAgICAgICAgICAgICAgICAgICAgICAgICAgICAgIC

OnSVSoVBUjUUPlRLTfHFEeLLZnXPDbCHJgYBGtHIBdWYRiYP5QDLJgTMPiPSTpAIKcPHKsIGGvOYOpOW

AgICAgICAgICAgICAgICAgICAgICAgICAgICAgICAg
ZLHlLLKlZUTcSKMvCVAhMUIlCWLnYRKuWCIgBTZlBKFjRGVfWKTbSFLcBULuSP0KIQTlVNDgNXWbBBAz

ICAgICAgICAgICAgICAgICAgICAgICAgICAgICAgICAgICAgICAgICAgICAgICAgICAgICAgICAgICAg

MIRyYOWeQJClBYHsJURsRJJkDVCgYRUoSCFbTR2LSI
AgICAgICAgICAgICAgICAgICAgICAgICAgICAgICAgICAgICAgICAgICAgICAgICAgICAgICAgICAgIC

VoUCGpVQKuIYKfRGWdPKWlGQRgUPMbQBRnNXBtUVKkAMXrJMGnFI0DBULsWWEnVSHgJRNuESFeFFZzPH

AgICAgICAgICAgICAgICAgICAgICAgICAgICAgICAg
FXInDRJcLXSvDZAbPKIhYPWtYJJrYBRiYPVjKXGoKBWoBSBdTKPcREJeTENyIVEvUX0DLBXtTGZnKCHm

ICAgICAgICAgICAgICAgICAgICAgICAgICAgICAgICAgICAgICAgICAgICAgICAgICAgICAgICAgICAg

ICAgICAgICAgICAgICAgICAgICAgICAgICAgICAgIA
0KICAgICAgICAgICAgICAgICAgICAgICAgICAgICAgICAgICAgICAgICAgICAgICAgICAgICAgICAgIC

CxSXGaYRSnZXQyPGHaKDIxNHCbCNOkHOAaAMOrFCYbAZJfHNAnZBYjYR7UNLMiXMKdHZHeMUPfEMWgRL

AgICAgICAgICAgICAgICAgICAgICAgICAgICAgICAg
IADhRJRyGEZdOHLoAIRkGGBmFWGfFDKuOIZqGZIjUSAfPASdXVLzFDOtQBIpTSSiPPTqYP0KYE12eJNh

m2X2SOUtJI5rtgh/Ao5GCMdyghBmgQEyXX5KVcMmAT9dzo2MNmWtSR8vwc4WCBfZViMbB2Z3bRVwWSZv

AZZWEuFdW07hAFtuTl16CZsnFAKqSuSxJHj2Lv6YYk
HcS4abGLWcHgI5WNFnUbRmGQlmYZ9Sx4YogNWyMCi+Lu8OCX0gb5OoYSurYMVmET5jrr1WJQyCAgXcM8

LyfiI1LHL5IENnIh0HBCOsMVKyfLMhOxTfJPILXhUcP5UloP04ZUYVFq9+FElbbtQvJhpAGcU7FOMay8

KoTUq4EG7OYTQaAOf0pJMkHYAiL6Tcj0VaRf30GUVn
OcloAYW9ozjeML5csnFyRE6gXQEhCB4hVl9iAJPpGSVxLfJcFWGXGK9TRCXeTOYtnHRbVIWlVSJKWL7B

SOeqIRY1QumyaoGepSYyJSfeAS2YQVFfscIvZoHgEZHAMGe+Ub1RTA0ka7AkKLobCiLfWZ4oxt9DXUbK

ZwFoE8P6dDTgZ4Y5TUosXv9NHNEkZBZbDpYfFQZHAG
lvWN5ZWP7trbD2RC0PjUKoBYIoDAWryKAiOOk4C30jiEYiUJtsKN4RHWF+Santosh+Ti4IYIAxBFLdWNXqEy

IlNBDSNiXzY5KaI0CYd1VhP1JgRL26iAxiekXgRUavUQ9NZZ3lEGLjBVFQQW9GxDEeeY9yifFhEUEyND

XMVfHeP87woSTfIZEaRAJ8FJKtAv3ZFBOaA3OnveJd
gTiufxIpCFPpNLTPGW8HRJktpbJnjIRitSiqDL96oPcoSO0RXs9IHcCsFO3kps2JsXDyUr6BDWIlBG1O

QHKgEVEiMMNbHKZ5TGSpRxTqUZuvHHUjZADdCYT8QMWrUECqGJ4IKmKuPNMtLkTvNxSkZXCrCBMtrk6K

TLFiNTKdEtFnUCRdLCHoEOKoGHiyVYPyZVCoFYC9IB
PqVLDfAF0HCyZsNAMvTLA1KFHgBHSxTJBwgd0BNEPsVIKtUyu1LCKzMGToNOVlOYxjCNRvPHBhBaD8DY

RaRNYvFR8RGtZbEFRsPZH4NKggWLYlTNIhsa9OOHHfGEHhJIC9DxFiGGOtBFJhTQszILEfNDJ6UXHbTJ

WhJWEvBN6ILsEjLORgKKMsCEdqNBOpHNUwcv6IYDBi
AYFoRUZ9CMDgUKEfMKYgMKsdCQUnRZE8MCM5ZAZyJXLvXX3FUcSpUMCgOPvtHCDyPONdDYPhar8FCUAm

HNWqRIT9GBHgNHOzABIhORiiFVTrRZF5XHi0VTClZYOjZX6SUcGjJFSuWOc2SBOxSAEuGFMauu8ZPDCz

ZGUbVEJ7EzKwQFMiAMPzURrkVAKfVJY7UOi1HEJaNL
CiLY1ZAjFlBCFlTjHpAXGtOQSjUIOgcf6IEDYaUBQiHCc1KRDpLZPhHRDsCDvqRDNsTQWkKJq6KIJfJR

UpID9UUzKwBJGkSvPlWLVtCYXlRYTdqe3FUEMiEPXtFQi9XLBvODChMGNbGCjgLHGjVPHgUgNfITEzEZ

KeCQ1WNvXaNWCnUiTgCUJlFSEtRSIaic2NNWZzLINn
SmAcBOSwKITgMNMvGSo8frNinINmPUk3BN7BD6KaakRjHjrOYy1Fl854GOH4VJWcSq5KX3rmOb6aHTUy

CXDAUd0BMXl4IVOoAvKjBqVcVRPeNGJ4PXVxNDbhWNRpDhEpRTWmQXX+FEr9QjQgYZQyX9XaYaRlOOr4

VvU3YVOhYwZyQgXjUyL2BQ0gUPWTUm7+QMwbxDQvzFmkDQWZUgDzUftbIOlhQFJXTe1V









                    ID                  Date                Data Source

 

                    [id number]           2021 10:53:23 AM EDT [facility]









          Name      Value     Range     Interpretation Code Description Data Renita

rce(s) Supporting 

Document(s)

 

          Cardiology/IR note                                         [facility] 

HPDTPe4iCfAJPxDb65/AFGamWGSwx0KrLJwaPNk1VZfbOIPyE2WeTSU9eV1iSSS4VIlTNsLdEyAcWCDx

lbm
VqTbrQUvDlFOScQrgHNjQvKYsuOjccwIEaZF5AgEO4TRQpT22hUDDeFFNtX4BrHZU4ZfM+Sc7NPKJayI

HdAU6JZvhV7X1Jz3p3Wb8fMf+Jv4POsaK1MfyJCdsv03IrhvXWW9cZY6EPO461oPE3yq2NLlJSnWZGid

f03MHjO8Dex8vJwrCfTXZpdR76AFgOroSM+P+Xn9qy
YnMo/hpL9mcdU1gion+Me3MUWFSIJd+4n5z+8oHHlmsmkz/9qyTEHP69JgJ1GGR24Io4XFYavK+Jbyhb

7Ltbs9aQWdbJa6txuAHmdl9hUZx3TRpdNMoIAiIF1z/PPytu/dUVRVhIM3SYaeTa3K4MzqxNjmvL8DIc

h8BLZ6wIFpb5f5w0lZ1UpZ4SJ1DZ/zDX8jM2g+hiG8
FsvU4wSkqvOZZBmYYjPahO155WtXVGahTqUzapBJPwJAF3qlTYdMtDaI1crpX1U314XJybM39fL4r0o4

hkyzeKyueT7i1wLtf+GgSwTgREM1MKvKmrsiE+k4S8NlKtuxuUY09NcwM0v1F1syBTEqp7dsn8FwimdL

CxyhoeahpwO2Rp7j1/JZqJ8bfwZM6vmy7XEWg4039X
+BnarKebybgdbFHwy5U/lg4dTKhHA+5ustavs0q3tmMnkDYVHaP2UJCEewV8So/Tvih5Yen0y0IqYKWw

6aZh7D9uTC2nmncyYt/t70/9qb9hG6XrwNqxlOJ67DZJ8tPGK7nwpKSosMUv5Fuz7EXnSOlak2B/rj3x

7qv+wRGO6KEMbWokZkQM/6p1TI08caojEQz4M7gcWW
fnqVhGY0hmZDkQTF/zpyM23eO79og1Zi9e0cKrKEIkxLCq22kq86Pb/AdVpvpeJEGYQkqyRlLTTcJ6eh

yCAXsiYg8OpqVwjZJxAw0YFHt1epT/X+lk2mZ+cj/lg6CRnqn5Ojuj9sLhv5c17NqWgC+u7CjqOu1PBQ

nf4OCJXVysS3jabUW19UC1q9o520dk1fe5n14dV/0v
tLSdNOPzHiRPKtcb1tCZRL2P6PhJnV931QGBd0v84Ydw3uHZF02tUt6ET2xdxTFbfWTjvgwbdrjF7ex1

6S5+NxYHOc6XW/I6mjXy+/couSIIL3Dajn1w/DTQCga72sHBEWti7IqJxfrTsy9lelKetMN1OOZgI+ND

Si1MV0nG9EbHzjSFfhm3Vv50yZ2VCk0w5i32IBj7b2
iEOfENjvWlhqeQwDKg/jCfwxOfEzB+pwcZ1GdITNDfRQ0e31fjApz6nVCgSYAQ5uFBkUgoTEThRuKxXJ

nD2/f4unF01GhpxNJutUODYIeh7KG0GZtuR8LmZhTbiDKCWHqs6koCG/JZiMjLaDWjRxmvSe8D9T0Np1

rflg/H8EJNECCptUmwWczj7+Phw7vMtNGCzwj2PKSM
33MnW5L7L2uOTFAOBbr9ZmnH9o2p++pQ9j+O2ngTfggSyDjSt4yK5pLvtw7K9BqvzXtBj3QUQN44WcJ5

7F43aPK5n+RIhqVSKMrtd16/VFtI+9GQAZgI7WI6Vf9Ee3ALdJ4jqwHkTa1X30geBHim4pRron+dVz3j

bCDr503rgXfHzmj+yfbIqWwFAgonC77f46f5fyqc8c
Ri2weVAtu/X2ceZ4o27cf7+dNmu1LzlR9v3hbtUpfYJ9TMGsUuI2miYx32ycRgJNt/ttGJtP4+99hqK5

lM9pfWEKbWgo3RbB3jujZGYMwk8qmYwRisDtYYGOGHMn9tJp79GyKrBt5favZqRftLvkkacLu8vqEXZ2

69V3mDBG2/6ipi1fVtF++B79WkJa/83Q66wLhGYKOl
D2UIzC5wEtIXYrGiq09joLzu4U63fg7kjks8qMpCUKnZlhy3b7myiBKKWn0pQTS4CiJz6m5xP4ciGjk6

rHBBr+SMP5Qosx9A9+9t0w7deNKEkvT5x2rDtAUl/QnFrRO5z5jHYBl2c8eCnZzrYzmZ+/IBQJ5bylx7

5NQbr2oLyszj+dmoeI3sGJBtwQ2WDjRSPlZQeJBbnQ
KfVQPFQboClPsceQWwjDCm4TVk8cASlCPvTIsyNjCpTr3xTvaKt2WIFTmIQXPtF5T5yqF40GvxbxGGda

NQB55TUJnwCDAuIlJJXlwUXM9HQmFcdEikiFZQy6IIEHw+xBKc+NPuMHB2VwKfOlPMkBOyygKpv+VpX3

uSUog8SWHcoqGwOOPm/Rlftrb9rIy94RcsiflBlXgB
33y1AA/7+cN4hXQqNH789guAkuDHAnAkS4B2g5MfaMsVBkHjKQLTe3PlLak7tXzjQ+IFk9ly9IrjAU3S

9u+o8wd1jAnWx0eQwAWuU+mmuVE4QaINPBzS3SagFBYrsRufcttIsNDxCFlvXhtyGxFtHU8FEDT1oRW+

VI+7A7QEm0T9IwVAKutMqXc2TBehdBDS6CUMRi0lQj
KDkpMAabg2DrJzpDZVkyPFYZfgUGXzPO8zVh9OMtTOkuZkQ0Q29yEB7n1XJvH74yfIsblPdjPHUVsDPb

Oq5Tg5UHgtQeHJUiuhauKFSuIEC0sVbQ59NIEw7tGCMtTYcWTEmXrGWxKYIBjWpRt92o0pXP0gqctKYW

spH+bwDpswprC3IJ2V+7GqfrAxlPQdyNuE6Eyb1v8O
YvyumGQyWYs3VzkwjVAevUp9PDAz6bzTiUrkscExYkm3TENUU54skxWjeG7XshAEXSQa6a+WPFKjG6xx

4LEr2Nd68bCiV1V+DztLl8GG6SJzp7il7SuZLXuAxk49tAsnQ2N3npk2KoAHTvwEPrF5ZBncrgxOoE5J

pWD7MeDtjPj45CyPGQI5vcFefBXDbE/CToumCJTluK
dnBy6Lw2RsR8UJ05+0rPr0QQlGb+uh73bN/oKFjbA9DGYapKW4I2LBTYaiMv50rIsA+bidYQG+q/ryWB

6ucAflL41iLOGOJTqG5CHfb+cdYEDM/xD0AEftqZ0AnMT8WGj+ixEGZ+lXJxbgKfcA+H2PobibVurKmz

2zd02IqpNoi+VZEdKJtUYsf0f5hrXYTcRaS9m/04TA
XWkaOCvYhDHxb+1bbHZ1mBVRuzzqhcdyUTyFcNiBBXYDg1nmNl63J3cMMFv+lgc0N9AkbYB4dSVn5hKw

q6ykqFU4c3TDFpKGQClYKyXSY2gjuXGtFeC+RBNckboWVtroOGWDFtUSHw7iRfYVyuO1ZkEjli4EnpLq

DVbjrCYLxaNunwguIAUUNS0kG2csPoDCbfuUO0LH7F
dnI9YQFuytnlT4SNDts5aQPATYvgulBXTiLO5YaNMV1T6nIgJ9N9s6QRH6zQXetvD5a/z2Z20tn2n2xD

uSgluGQlyMLzBdiG/79lGyi4wEihjQdMzvstoDuRY7eHuVsvxB5NUideFDo56ymnVwy2Z0r4l4BVuKqF

zBCiasHdYcWGnQl8iCxz9M0OQQwp68yDZ8n2czrgTo
RogAGDIP7+IkHW1vhRgPR/wf4VMJmBCc2rXtl5qHqZtype7WAV9v3yM4nWt5V2qNNRvAev3cHOjGAAxz

uFFOt6PnYFuO0ZqeJO3rLJDJuxjcTU5MIXyDA6/ZDu0rpgqPBC79OPH72+KjGMcHIMRnaCSUPvwcheyp

XRrWgPnSByNAVsbDSiF1mCJwZt8UCtFkcJAocJOaPw
YD8w5qn5mRqS2zJdYZveiga0PBu4Dmm+3FrAKWH55kXbruBHMI8qosvPERZm07gUPBU6ICoZ/DMIwLj/

kTttu84110bArd22VPT5F1roBp6Uw7vRaHH6608O9SUPDua9dlwSaYq1ohcA2VeEhBhLnSEgfPf6DHo9

EQkGrQXGPo0SGZi0TEFREyT8CyjHe+Do7dXDeHT8DI
IC6YPw/cdaRSoHickOFikHNTDMyXGw7YQMUyjL38yYmgFS0VEqdFWPZvO/675WIB3K5zjXsCEiYrwEMj

qeGNoT5YCud8kU3Q20E3dE3TS3DcVgtmfZfhHPVSZ0+rlyZq8BOFQwr26/V1XMRScmvO51PtAL+ptFoj

0Iegh51u0r4sWS0oAmxjSEKYHP/elcR77bx2PD9Fop
otNjWnhVznfDHp0d7/6vC1zxXBVQ+Z1QI4pkZkrysOgBpld1v/Klrm4M9MyvTSz4IcT0Ov4Gw5FdtTm+

Ho9DG+W19cwd2G26AH+OHfwC2nP1Tk5qn7VyKAo99BbK67AiobHUa2N525+YJG/VNM9PKCO9GjdTsb3x

+cAEtxFCDswa5dXB0kRG8zFEfkgcVjFcU+zNZsSiS5
A5y1VfZ96gucfiQP8blaRatKRFqd/S8xiXSnAFkXC0FB8UjhqinDmqMd//zFgAW5hM884eD422JJraUl

2J9+DYKe2AeT15kTP9rgI1isQHHmPoaMhASxRSPjZEoTF4wgdpLWnXReB6vD4tohmi+9kJAZi5qkcTYc

RK5XXjt3NIn2fn/7uPJDf4V56IOxdFj/Ck0ftvX/aZ
affz9R88ao+nvfMQK8RqAPtOnkrz7/NyA2qe8qt8pjZTc6uBFbs0eyK810CBIWUwr/oQ5Cc9Gb1470XQ

HcuT3UWXY4fw354iLcX2yL35mgs5AEQGwR59pH/mVWz5k4+inl0iKsZ5en/SeCKAXV8Qv92ugyrm2Fyj

98IdDfsvhz9uqRIILVPLXgui1VE/INloy5QD7jqu0Z
PT/hiDew3g1qnwjS8X6q5rg5BSHGI+pSAaA0kwbhbAETEA8zdLiFzi0UZC+Pxn3N5fmIco3CKS5dj3Cf

HOUrMVnlmxUrYulTLqTlFREyJavCQzXzCMdWTjKaTJGmBOsiYC0KIBijSXcrZJPqH5VbmhZnmQQeEKLk

Wk2DMNMhPS0KALZeePWmGQDmCuJcOPLZPnMsTBMdYN
BsiROHp7sxUvXoXHR0SYKbLestZY5JXBHrRL6Id119ZP29ijL2MWTrXv8AQHRqZO0Sdb49dID0YYXjVj

FoDSHqxuNvDJKqphX9IJ3WAcOrRZX8jGFlLvhEFA3XRWHmrXTyMB7QJJDuqQSpWW5+DQogID4+DQplbm

LxCyvIMsEfWZAsCujYVrGqLCdyVedtcUXxEI7BiTQ8
KTDxQ36iRMCvVFEiO2VoNGm0Ps9+XEscLES9dxIbxM3ZLZGxA6mC1iJWaY+0//Z7jkELr65GHYKdjYjc

I1gq1eU4UUJDNrtkq9RP/edbJ3g7jLsESsFJA28lr1gqV6B+loQ64OnA4jd4jPtjMeyN1q6iIlNV28gR

R0Pp+xmswIaeQ6s0kIJfxEYMbs5pF/odSLLuj0FgW3
vjjIecBg2EHytiFj8f6z5x5tPYu2+J7mJphZQ0t8nexsewpV3VL06Q+sGNCtlSy3eBhCMg/isRrnUI0m

Nwc2I2L8zm6Qx2lsazSWps3LpOgswYPzmFqIDiUoix5CBMGiXKtABU5ZYDOwfhrSnfGFaPWhUwPEdN5R

aFQahQjhdFR/fW/EZ0aE9DLsRUpFQHfCLc0ToJ5VTX
lzOSOAC/YAKLsAxLL4r5khvGuWzfYcsIFUQ93DrTYaB5gW39qT2X0RVjBcNn4uFikZXY2N2gfwQSw2QT

h14UL4zvICkbgtwn8Q5OnhJ56kOx8T0bBUIEADf+glov0Tt+mV2vJuI4AyDpN9tT/miJU86upnknSFI6

UGg2nO4bvdb+5pMNg06HdAAVFTuoMRxObsPZMpS7yV
24XX8Ycnb3aEjN6X329LL8ynpQya+ypPEoHOCAyj1B5Jc8TK70GdXoWth+JWfyc1AGuF/OZsWN7qtNU0

X2qvxu1ePfO6oiqppgumb7AhM7VkTUmCVVQb9xffDWLO9TufjaBu3PG3LigXmaz6Ly3CEvv5SQeytouZ

2d8OwIs1C4X6ihsS/0GvI4tcyA1N6aRx+dKEi85q5M
7eZBuTE+lWURtA4p7x60uaxh/5tF78fN66Qt1uzmsjypfqOjCJC3aFOfdZq0bBaoEp7qnXb7X8UHHK6B

1jTLrpD2WmO78eHJ7zTTV2tOYrjBJn1S3NnQeRiTCjZc5qG9tJilZZzG5eyBcDuXwaajvTtoy1F8mLKd

tPV2hkIikUtxVrXOc26O7iEe6BEe14tTgtpyebXDa2
1C4M6LpunvS7Q+St2h/mjMN3eGaiPbkAKwPmrmIP/CrRoCVfcJwR9DSE8vc0JdWLQyIUnouyCtLoeSZd

foFYJkGlvNCuYqWZfZJtJzPGGvXBwgSM2SLHqhERgmEONmN1MkzuZstYOnBVAkKu4DSIKzWC0GXBHifX

BqSAFoHfJdLUWDMlPjVUPyKJTqlKOJa3huVpRzKPW8
PSKhTzibJT2EJHMdZX3Oy589TI04kcY1DXFtUb5EKZXeVQ2Ezv85gYN4OPPpIhAoJOPynrSmTFBcrwF6

HH3LHtSsQRW8xQUbEvcAHM8NZEAdrGKpIR2YRKPviQSeRL7+DQogID4+DQplbmRvYmoNCjggMCBvYmoN

EsKgUuG1KNYhGvFaLWK4WOKoZcBnFRh1REH8JhJ0YP
EtZQy4SFirWuMrWyvzNzSvIJYdMuKzATlsBPs1POK8PLEkXwAvFvi9PEQ6YZF8WQSnQKT1NGS6YfJ3NC

HjGTT8EQT3YlU3YTPwTER0SNO0SaK3PTClPtCvILViCqO0CHAmDDkxWGE3MUV8PATvTyOxOYe4YOM1Dc

RaAdOnYBgeTxR3IvIsYbF2QYJbCHU2PltgEfUqKOD4
YSF3VPPmIsBpYGXvQIO4GvBpFpQnEUy3PPG8GhtaKbm6KGogWzF7HjtvJxCtKSekCcK9WxyhKRK3TCZ4

OvH7LkbcOmZxIF9ODSVwQwUmAbv0HDKsDdV1TFQkGBV8DSFoIdQ8KVTdXtWsLGU7DrJ8GBYcZGC9HCNj

FdM8IXExHoMbDQJ0HXAjEmqtZYI2WCI2AER3LOpkCg
YaEEXxKZL6NMZsFiQcBXZ0TPT1CPQeUmGhOKWmZPE0UMUgBeo2TMN7SmYBPaUwXKJ0WXSfDMFqYWiuKx

qsZAA6DKC8SPHuXoLrEZi8ZMQ6AYFfZhNdIRr8SQX8APStRtZjWUm0DYA4CNSkEpXyVMk2DPP2QLBbOy

LzGBl2MGN4BKHoUpJvBOh0ELH5NCOpPkRyDEu1EMT5
RMVfLiZpASe9NBK8DAUkMAjnKBb4GBS9ODDjOuTmAXm4LZB1PQOpYaZlQGt7SHH6YWIwRqKbFSN9QARv

FjXbOUY0MTA7IzV7QRYaYWR4YPK9BRA6BQFkPkNwVYltIlWqRmMdTPA8EPI1JCIvRfKiGaR1YIG1XcY3

NFVgNOF6LGYePjZzOxYvGbAlWE1VVLC1ShPgFKE7MS
LdDdQmRnGzQaMjNOD4JUW1ESGnRWF9ARgiCLN1RsPxMsYyQZqiMmN2CnFuNlGeVUveJxB7KxTjMcIkZP

MzVHZuIpKzPQV7OzF3UthkLzL1QVL0TnAjZtqpNsy8PYE8ZQRgEakjUcIwOSwoAcS0RuqnLOhtLPq1ZX

V8QobfFbh0GMz6XAI0HHTcNbt6QShaWeF9WhCjJtCz
LInrZhC6GepeTyP8QMDhYZJ2JZQzGZC7RTV6ExF7CLLuOSC0OZV7YzO5RRhuACI1GIX0WtL0DAAiZKO5

OXV5GoBfKazhTzj6DSK8IMMoUkehPiRtIQ1OCZI5FHOaXdMdDGXbDDF3IBIqHiPsINHgAOK7KBmsSrRp

CPZiPOV9NGGfToWhXNHwOUH4BLKcWcOoYSS9ZwRbGK
0NVN7io5JnTUl2RCZsr8KzQEfaSIu3LXwcVCZkL7U5lTMhFy6zuUUuu6YtnCI1k6QKDlKkZMLbUo5hyB

4ipHAcLTNnWEqcNh7wZD6RSDCfUT4Mx5RbfxUfCDR8W6NijKihmHjwxNE6LNIqHPAtQ0BkuOYoHlTjLM

foGYAcV0KoWEwoDWEmCGnaDMYlP6VhnkFZMw51YKiw
ZX3eQDVdLGXkUUR6KEIuDGynZBNiQ3k1GGkpQ9JuA1qsGCHxK5YinPRkAY4ALBF+Cs7VJV7sd2NtJGhd

XNTrWS6vjz5BZIT6UA7DDIImPN1WoXGqQ0FysmFpA9KuaHuwBL3DphFnXBuhNT7VAZXwUq4caA8Anoye

aGiNh6xgE7UiT71bkI7uI0epyyPzd2qRigBiUXkwUi
9MLLXrNK9QiRVgtGBzCVOhPwSsDZUimCPgYZQnTpY1TFjuAWCdP0lsYXWbteL0LNCbNh2MYBRnEI8Wr3

32AQGnR1XzaWAtalD9AOIePv5ZLBN+Dx8DSI5br6JkQRhfSODoQR5jqm1XCPUsGWb6TZN3HRBxKta2VW

I7DJNvXWHaDAX4IPU6KaC0GBzfVuL4JWI0SFQmHsJv
JbXyCHU5LJQ0XJAbXtl5UCQsYaEePbljLsi9GZJ6EfD2HSWtPVC1DVO5HcT3PPTzFML2LAC2YqM3NPUu

VBA9FCP2WlCtYowuLex6OBH4RVFZZkJyBCb9ROI0DYN6TQDwKBHrZOS3YvvjHxP7XVtfOnJ0KdDrPhE4

QAXwXMH1AcrwSxFxAIV9CBA5WJUkHrZ8YLL5AtL5Qr
NhTfUzFHs1OBJ9AjsiGfi9VRwiPhN3BkuiHeIrGVldCzZ0WvfeEEY5DTF1KzI4QeysIxJwOV3VUZHcLq

trYbd0FEV3HQG5MalgYOO3UNDwRpH1BDGnSYD7BZJiTKI0PPYgPLU2JSX5XMB8HXMsYLR7RLMoLzVoZn

YsQTOwNXBzNxA7PnYwLYA5YTH3RmE2THOoEGN8DGPk
VhD7NFIwSgn0QGZ9YkX5TKFyInQpGHZfAAHVCkZpSCWnKARqKSRhIrKrFlFjJSYmBQR5SHJpNqFoDTg9

RRE4GHCxUeZmXFw5SWQ8HQMgIyIiWGj3BTV3UGLdKwOiAMr0ULD4DVOnZiYvGTj1ZUV2LCAuKzAtIJo5

SSE8GOTrItXzVJf5PKX6NKRlAvCaCWf9NBG3MXExBY
seRWj2EMS2VFCvVhEhPGt0TKF4EQDnPbKmBUi1BEP6DVZbHaHbKVC8YMHuCyYiNAR1NBJ5LxD4AOYaJS

X8UZU3HWA4IUPlPpUwUGveAmQhVlDpRBH8KOF7RGToSoCcXdY8UCW3LwB4LNVqGOX4UVTnEdMwHmHyHb

MlUM6SQVO6YrCuBRJ3XYQxUgKpDgYrMbTnPGE7DIO6
HLKyTQX5EXgcDZS5ZzErFtLvCZF7TqA7PdmfSaZ3JSY0EmF4JmbsBxZ4QAYbGTRgDnEaTIX8CpH0Mzgv

UlM3MVD0EnSrNltzZfc6USL4DCUrQzqgZmFtJQgtLgY1BftoHAmeBPh4NRK1CqanDnb5HDr2WYQ9HGNj

Huw9UBllWlA7CgVaNnOfXFnnLbE1FhanJoQ5HOBfDP
Q2YIRvIOX3XHF4VcH8APSdWDI2AFN3AwF1XFfzLJIuQDL5LdR0YQGvFJO0LRF9KoFrSrkoTsk7DDW4MI

GeRybtVFS7BS5SILS7OIBcVST4PKT6VwZ5RPFoFEL3CND3MxL0CTixHmAkPSW2ZpC8RLOcQVC1ICO7Dz

Y8EYDsIBL9YLEgZPPyLD0GPK9wf3AlNHgrNaOtPF6d
dw1XPAV8HS3YVKCfEQ8YmAVcC4EyhiXYCDFwnhtgmJ3fAMahJLQaV0IzwyZXIO3mL2UgzOKyAVclJYLp

D7NfX6DbbXV9FBBpB0InQWYhU5x1HUebVR3VIFTfBJ49EL8oFXVGAsEqSSMhDvdfZ6VrJmLNVjKkMYRs

Yw4wfIORh0jcJbNgVDLrGaIwRKK5BKkbHH4PAoZxQZ
BeMJOwzPhpCO4qjWDoCF6BlMIvQhHvFZwhAM6+ACrmakTbNveUAkKdWSMmb0XwMAmpZXu9NUykTXKkD6

L9mFHrHg9umU3XeAD8oQBfM3TmtGNSmENtM3Iuq5CLj917Q8CagREeF3MiC89npU4oU9xcxzBwo3oHdp

WiRDvhQo1KUQBwIJ7TkDEntIVdDVLgEpPgEHKuvZYh
NBOaHkF0OPslSWNvL1hzIAIlpoOyGFDeDFJEFiHtNVDbUe4ksIQfo9JgwZL4w8CdODSwRRCXLStnPH9+

LPvyuhSuVdiDOzG2EIKap7MeQIstCYbiMDloRDX0HjLuIzTbTkN3UAR1RhG4HVEtAMr7CWj3ScK3XUAz

RgjkHHQ8ABLeQlbjQLP1AWgfJcRhLhUhMWO1GKP7EO
B5MEMyHZz7BPZ5MUQ3CqVyMXt0JPF1NFG6LtPxMWi5MNN3VAX0LqMuLCv1XKN2IAObWtHrWtB9CU3GBE

N9XApmFpV0PCWnDQH7NUAvAgAcIOW3HZR0GjThRjR3SYZhJkV5NqJgXfPnCFi8KySeZPfzHVZ4CSN4ZH

N5NARtSVO8IUi2CCR1VHppAvZ0AJv9RYI9WUCiZJMj
MHR3NwT6QrWcCbN8CVUzQQInZhE1ZIRsDjZ4HTqbRAvpVOS3JHL5WlYqYyO4YBfcUsC9HGJmRjN0CUYq

SWA9CfShAPtbCWFmUUU9NUZgRkwtVSVlIQI7UCUiRru5YWY6LNM1EOqmHzf5AFdsCsB4VFLtXrSiDEYk

ZLQ9YDnxMui2FXB6ZWTeMJmtOqT4RUG1MnM0XJfoJS
OoQY2QAAO5FsmoORn6PGX8IRQyVwAsUsO8RPmzQxXgVRFhCtB0PEE3HmO2EGBgSvB4GHX4RxW5ZOVjPw

O1WLV5VyK2JRJmEnL0VZW7DpC8ZYQzEoY9BWW1RjY9DFLlPmD2XDO9DmO0NOEwMkM5ISS2XkE7CTBiSx

V1KWI3NnPXUfVoNwJ1JYP7GkM8FANfViM1WNT3EiL3
XMSeFtM0PFQ2DlP8CVEgPrp9YIWgKyJ7NeAiDYp1KQL5RaW0NgAiVnS5IVX2XKZ6XfWrAJg2WPMwHSP1

BZApGkX6VFDbENP1LqUcPXP4QNA9HLC6GDhyHSD1HGQjBHHeFWHlVGeoARMiFaU7DKuzKuxqXOQpVPPh

VZXcHYE5YQU5ITA5BQDzWBB8ELc5ZMD9HqbiSeAkHC
grPcY1MYLsFsYgXWldAlD9IOAsOFW3FBSkQNF9SxJwHMYmQXQoNzV1JjIzEpS2CHVbUuLsPAizNyM9MJ

kgRoW1NBMcWvR8CL8PPFS1PXvhBtD9LHh0HSY6XWqeRfF4BGu0NAT1BjCpUtAtYUaaUtX5GeIlUoS1OM

JkDWK9MaohPOI9XVWlCGJ2MblnOOK0QXXoIEY0Madx
CTK8WDV2NCF0NEClOSJiPDK2KJL4ZMZzEqm1ZSV1INBkUMZtHau8HQJ5KhYEXgRvRmA3LEKhFTV2DIMx

BzKmKNRvXZU2GNRtCiA2IENnIIW0QPJoPgJ4YBKoIXA6JQGlOPX4DOJmYDO7AlhvJZGKGlNkFH8vlv3F

UJUmBUIzDkeNHqVgWEaZHiRvQTGvZEiqOZ0Ou557ZS
BiH8FgiKKwni0XVAAiNL4Fp081MxWkJF0YQLgoSTIUJNNsuVJFIyYuUUFcDVKoRZ58VWUeOgytJ4GmIP

ZfZ4g4FHAxVebkLPPoB7GobRLrKlSxUMwkCYWwO6PaKXjdJBPjQQkkCLKjM5IcdoXGHx76BIzgCS6vLO

FiKJPvAMTcPLs5EU6NTFClAE0TrAEmeIVGceebICMw
Y8R7QP6ANOVINnYhFa0LWrPrMD1xgq0AUAXrBHMjCvtQQjTdGXlLBtYoJIPfKFiePA7Kf108R9S6DfL9

xAXcGXH1ROW9kFNwFfUhAFEwlxKhC7Ygm1NPEKcHi1miZ9KnC42frJ8aI7ojxjHrg2zNneZxEKxcVy0N

GDLaWB0BbKEhxFFzNGIjSlDtHMLfeLNsRJJpJcC8JH
wdLZZdU5nqCUJvqbPbRLGbAUWDTxBfYLWuYv8cqLLid9VmwWS1z0CpFNJkGHNFVXqnUF6+DQplbmRvYm

bZEpN8THFfk5YsJXldUAqiFZZmLALnKCSeRtZtSjd4ZBW3RyF0PQKrUQW6TQpdCME0ZVImIrJrMPZmNm

CeCICaLIM7SMN7POIgHJpfAZRoYHKaHhKhAYHtZMK0
JLCiLMD4BzvfHCF7AFKlUGY0AdlhWGA0MHSfPNG6XuqpAPQ9TNWuAuRvLNqdTve3RF3YLJV1WLDcLfr8

VNZ6LQW5AXRbTfC4IEW2UnD5KHolTsAkSAJtYyL9GReqQqo0BWyzYCSgEzKnDfA1LIW5MJW2WuYhROj3

GQb6WUJ2EFHkDNXvNAu5RGK0KUwvJRVvRAXiRGC7VG
ilIrZeQQnzYVL7WJQeEVIvLGB2BMRPSxPlOsEvBCO8GIYcMTInLva1FBHnGBQvDfyxAPB8DAL8BHI3Dp

DaPCk7MRWaQxHbLFMrACz2KTF7EvJwOEJwLlPyYVU5QzFiQBSmGZWdUYM2SuP1ZSLqMTr3NAJ1UBQdOM

veOTL3BJGvKFP1WnXdZMy3UJpdUxY6OCunAFseLXV0
VTC3GBXyZfBdLXVoTIRzQOLeDxj8FZRcQDH0DAEbBdG4DRF4IaY0PYDtWpX4WVE4CzI3EXHsSsC2OZQ0

HwP7QIUbNtR0LCH2FpG3PWWvWqW5XUK2SqP2XHXbNwJ2FSL4CrI9EANoAxU0AQT9JaI7ZAWxAaL5CN1Q

FCI0RNDjPpQ2OWN7LcG2QTOuHqN5CDW4PpO3QPFrZk
V0XBW6RcKdSlIuSll3OAAcQQF9QqfyMVQ1WRAfQJHvIdopTUI0BFCxAMN0WHHfXhmrEHNlLzD6QBNzOS

WuDTy8FQQ6AIDcDgb5NSG6APUoXsNaPjH6SLK7IwIOFxUvRPf2LBD9QHOwJUwtRvO0JAS8EFK5CfEbCR

G8HLizZcO6XfKlBFOmTCB5UYW0GPCxTbC3DXC9EDT8
JQStBgM3WGC2IKA2NgLiWpA2DPRzIqI2DJOwNVI0VWKwGbZgKpNzRdA8KUL3TtHxOgOgDcReMFv4WIK3

LRRoQLdeLTo6LQK2BPVrReG1YPy1XMP0UQUpCmS5KEZ3GeJ3TPwhFld3RRW5BbJ1SVRwDBLwKYD9ZXB5

DVkpSBD2VTEcLWQ5PLftQXB5XELtAFN0NtUfSYRzPI
AoEsZ6FrJvWSOpOWIcBaEuLKUpMtZjWHW8ZxYqGVVzUJO9TO0KNSO7UrWwLPo2EQX2MlC7FVNtWHx7GC

W9CkF4ESEiHHw1JBA9TsD8MoMhTPC3EEZ0AfD9NANoGPj3IUF3KGYmXU8NIP5et8JrNSffGIVcAH3tan

8SOAM5ZL6XIWSjCU0UzXKdI9OkqaSZIAVtadjqcC2s
JCkkYNHjD1GyelAILW4vB3KgJ42pYIjZRsDnYOYtEMSsOQ40VMNhUiooO0VgYNUbZ0z3LVUcIkqwDSWw

Z0LvuIDjJpJmDQgqFXUpM5FsCQnyVEIoKEarSGDiY4HvekKFQo91MXcyEX1rQXQkYYI5XNKyQqxcBDyd

USIgW2h8HIyrO1QhY6fhXRFpH0LlvEBuDI4BDCA+Pg
1TDQ0de7WiRDkxQIBfEN9vcy9BOGA2UH6LUSXeHZ0JxQTgC5McebBjY9KcuHlxBT7OvoRzKVnsNH9JSK

WrLo3vdW0ORKrbKUGYP4OeX51vkP2aI0vvkwRjp0nEteGyZVskXn3PUYKoXZ3GnGUfiGSlFYTdLzXsLB

WolFCcIFSxQnT3GXrfZZJuO6tbTOSjhcCeClGkQLSN
QuZfBGDoPv9teXJyi3KyjGH4g0OmGDhrEJWHAQigRP7+JHzcgvWaXrrRCmFfLQVnm5GiFOguQOxaIISu

LGVaEQVfAoYmSwy1ANG4IvM4IQKkPSQ6HYogEDD0FCImRkMgZWHbRpHmUKAlGKJ8CYO9ZNEkCMmdYOPl

OYMvPrZnMLZoEJH5KDJpGOU1YieqTYM0RYZtWZO8Us
vhDDS5WJVxQTV2QdotXYW6ILSbBfOcATqsWcs1UI2SUEW9DWIfOmh4UCP0ERR9BFYuPwT5FPQ7GsM5YF

coNuCwOZKiIfW4CMvnZbl5QVvpCKSjPsWaFqF1WFN7PCB6RzIlJWy0MEx6JDR3PYIyOVVsTAy8AQG5AM

phOVLmIMYcRZD7JNzoRoBpJSuqNLY0SGTvMDUzAYZ6
XUFEEvLkDwCsNVM8UHSrAHKjOti8CJAmGOEdWwytAZB7EYM5FXS2HuDrIJe5WTBtNuRlKCIjQWc4RMM1

TnFzPQMcWeAeDNF1FyDiDCFrVYTyOHU2NiM7FUTdJMu8IOA2NLHxOTkdEGX8SMXxECM6WsTiZYl0ZSvt

DuS2FVugUJdyQVW2BNZ6CFCcKiXyBPOfFRTuFLIfHk
u8EQIbLQS0XXIrPfY6JKK6WrE7TZQhDbZ7EIJ0GcO5BJZaGbN3CWG3GbP0TGDaKpA2USU8RfH6JVKwXi

V0FSS1ScH9KINrEyX0MSK8ViI3WRTaTmF9ZIP0IlY6OEYaKdI6RH3EESK6PRGkOtP4SUB4QyD5GZVuIy

Z8JHG0YaM9ZPIwGwF3LKW9GcAsLzHzWpu2UOVnJGQ6
CmnsAYR5YDLbVMVgFeezRWZ6QEFvYNB6WWKbMhrqUOLfQxL7DIThZAAuSDr0EEI2WVUiApd3FJT9XAMv

OlJjBaR7LJN4QgKJMbAvFDa3VQG0OCUxJUzcOwW6LWJ6TVT0IeHrGXQ6WYljCoS4ReGsSDDaZJR8LNK2

FUJwZlJ7NOL3QYA0EVUzJmY6TKB0QSV5FaFaVvF6BR
YiDeJ3ZBVmPBG6MLTdHfXxLcLhEoB1NOH8KhXnUdJcOxJlGMa0KHE5UOTwPIqeFMu7CUS0PAUgWfZ7ET

x8UAV0JWCsFlQ7COR2NxZ5WZdoFyl4ADU9YxN6HRUzVAAdTHI5KLB0FNopEZT1MQYhDZR8LUzzIMS4KW

RbMVA1DmOqEYGwPMXgHwX7JjAtGLSiQXXiHoOsGWMg
UfOwMGA6VoRnIOLnCLM3AQ6MHBR4GeXjCTg7NGQ9GsG1KRXqYFc8HJU5GyK7MXHwMAq3ALU4OvA6UgMo

JUT9RAX8KpG0UJKuMPa4BJS9VGHnQI3JUZ8br7XhBCqaGKVgUR5fyh0MYWE2LX2JCHOyXQ0PlDQbT9Qi

roQUYESyhtwejL1vCKxiKXCeL8MprwMXYN9gS6OnC3
0sHVkADpFyGSTaDRQdWH34TNKlPgbtZ1EzYEBfX5y6TVErOnwpKTXlJ8AlgABnUqVtUEqzZPMsM1CtMB

sqQUXiDEqhZCNnW8QdtfVPYb60BNoaNH8cIOYuIJA8VIJiAivqKQwjPLWvP7d4VZykW6ThO9jdBJAjS5

LmyIWtZF8SIET+Bu4QSX8ld8UzQCfiTvKjQE3ult9A
XWC8DO2XEESaZY1VrQTpT8BhyxPmP2MyuOrrGS1XycLxZCznCG1ECZKsVy4avK3FHAyjROFEQ9LgL61u

lA5zU6cbfjYdm3dHlxUkDHfxDg1WFCVgKG5KkIXlqZKqIRMcUiKaLIPcdTNaBRYzMeT2XBtrORIzJ0wx

JVZxotXaDYDsRSAPQhYtMORgNq1igMKhl9ObnFO1j5
IgMjEgMCBSDQogID4+PKznddOwEdgDIrRkAPUhf9IqGQahVKrdYAhaezWpHmuXTbHwTKQeRqzOTdQfIU

oXLnHsRAOeJAIeW9SozPXrI5CQJv0QLDq0P6quNOlsDa9DsZTkYQSrCQsrRVPmD3RzksNyEFjwE7ClLT

CtDUQyNd9ICGGaDPDcY2WxNODeSUXhIk8IQRRiHHEb
W9CsBUL8OASeQe3BBWMjGGYzB5DbERS6XWCnNd5SGODxRJWfB1M9LOCuSWPaJh0+MVulMOHkK6nBCwgm

P9EsJJtdOo2RXrAeNEEeMMg3B7U5RAUcWZu3E1FQI2EQAYCnSKflCIrlXZQkLVg9U3Q2PFAuI3UVL5Em

cmtlbj4+PV9EJ02WJNOqMLh3J2W9uFGxO0Z8vApYvL
C8RD3ALF7OuTy8cXWgdF8+SI8OA8UPNyFwCFw1W2O7tKMnA3E2dLvWaSG3EQ7SZL4AvLAwXLWhnmJiLr

6dQ3ZGWDwHHhNOXPC4HX5FcPKsAD6JpIJKL5FbnLDzZm1jOUolmUHutW6fYm3bHOaoVAGxC7KPR8ZIBI

6JJSl4Z4T9jOBhL6K1hWuOkWV2RZ4DNE8YqDmscUBg
Ds9vFMqkBCAiTN9+DQogID4+BDunpaVtUigRKcC8NMSos2AcZHj5MW7CUL4auCfyNLV2Iz2JxUA7tMHh

V9fVWE8SbGQgW95fpTQkNOXzGd6TEvP8ywMfsC5DQO67rZLym4V2ZUBhM9ziEOwgk34gADtyDSuMSA3d

IGRLLNxfOYmhUFQ3RhElsfauNQXdAo6LKrVmINb3rB
2doAY7FUC7YzbytMAqYQjbEzZoIcMmCbK9eMmhccc7RBizFX1rQCakdwqbBVXnIhi+DQogICAgPHJkZj

yLCJIywG7sadT0tcPkLTgsjVCiLp7dn8g7KlukLj1jQo2wXRu7ErNkJfZgPRDbUe4wtY20JNodvhGzUa

8ZMzPmHKF2Y1PqIxpRLHQ+TBwwLAvbxIh1zLAtQVWp
Qd1FKGBrCEOyCUGuJCGmYDFmWFTaEJNtTRDsFAZsQWWfOBNrOJQrVJVxKHCwFNVzXGXkWBKwNJTzSXGi

YFUjSJOsJIXnYSMjVPHmMKVeECReWZEjMHHtPUEnCRIyKLOlWVQhRZFtRV7QIBVdCJRbYBJxOVSrDEFf

ICAgICAgICAgICAgICAgICAgICAgICAgICAgICAgIC
LaEKIoWBNoFOVnAJFsJQWkXLLkHAYoMDHpGKVsHOQhYDXdTWCwZGTkPQGuCZQdLNMkKUSdOM5VCFFuPX

AgICAgICAgICAgICAgICAgICAgICAgICAgICAgICAgICAgICAgICAgICAgICAgICAgICAgICAgICAgIC

AgICAgICAgICAgICAgICAgICAgICAgICAgICAgICAg
DAOaIX7TMMPkRNBtHSPuPYJfESYsGTVzENWuTUIuCGReULCmHUMlUMXeNIAhWIRqBQIaFMVgWQSzIRMh

WJKpZYSpQSIaWDRgLZGeYNEdYNEnHKApJHHnCBGzKPCmQOYwCJLvEHOiVQTiUG3MTRIiSAVfOSJuJDEy

ICAgICAgICAgICAgICAgICAgICAgICAgICAgICAgIC
XwMNZvBSHzHDFaLVMhNWHkDTIuIONcALEoMRMqIZGsACLlRLCnBKOwZOPfOYNaZSUmTSHiHJMlZD5ZZG

AgICAgICAgICAgICAgICAgICAgICAgICAgICAgICAgICAgICAgICAgICAgICAgICAgICAgICAgICAgIC

AgICAgICAgICAgICAgICAgICAgICAgICAgICAgICAg
FWWfIPQiGC5DLTChTODqQGGvYDQtKHInRYFoIENfHRArEOMvTCIbHOEvIQIzKSSgHWGzAYMcQAPdHVFp

LKUlIUTmAKWlPHUjDKJhNCWtEJIbKIAfQRKcOVHoFFWcMVIwTKLxFQZuELWyERXpTV9CEZAcXLDsGPQm

ICAgICAgICAgICAgICAgICAgICAgICAgICAgICAgIC
AgICAgICAgICAgICAgICAgICAgICAgICAgICAgICAgICAgICAgICAgICAgICAgICAgICAgICAgICAgIA

0KICAgICAgICAgICAgICAgICAgICAgICAgICAgICAgICAgICAgICAgICAgICAgICAgICAgICAgICAgIC

AgICAgICAgICAgICAgICAgICAgICAgICAgICAgICAg
XBNvDMKhIDNiXO5ZJZMxQZCzSGKkDJAlABNdMEWvKMWxMZDiQIOjPYAtEEEiOEUhQNByZLOeZVWpKGNm

AAUcWANrVLUmDCQrOWDyPVIpMMBzPDByJBFjNRJkVXVdQAYpBAHwRWWrVSWkCWIsTEVgXA7ZMO41yLQf

r3Y0MNLxIE8gzuf/Cv9PDWurmyXreRZrYD6MRfFqOK
3clw4FOhVnWI0bsz8GRXaIFvQvO1A8rZQjDBAhVFIETnAaN98eIKdoCm61GMqaTHOjMiYjGYa2Lp8HAj

DlM0tgAFYwYeV8QKMbAgV3LHHqVmUcIUnkWN5Zx6YjaTJaZHx+Es4EXM1zh7YfPQgdHLRuWI3jmm3BHX

xOOqAcQ3DlgbH1HMW5TMPsFt4BZKFhFABfpOJgNlQb
SZBULcErG6KimO09UFJUWt8+YVpbsaOlVhrZBmU6HGKjq3TaOMt9LY2XCTSuQAv2nSCyC8UrEEaokA8t

aC9YLeNxo4FaFVN1EXghb77pJHIdx8XiSULPZLRjvZH2QcYvEnTfOtMwKZN6ZVkuXK5uOUowIE0GTMQ6

NAseTEKiJGGzV7dPVyNuUAunKPQlgDkdQQ8SAhIyC9
BhcmVudCAyNSAwIFINCj4+KBqybgAsTjqZHaF4TMZrl9HjRCj2SW4YBVHaDDxgOB8LLZCbdV8zLEnuWQ

3ZWnWrBwMfJSQABmZvG43guNBaKDa2Q1XsWcIgQJAkUpsqWUZnDSiyEfTtAMVgApOrIDkpZC5+ID4+DQ

tdOJ9FDAvnziAeNCYpCb2IZXZfRTByLF2uRVVhOHIv
M1V1dSrhPADYUuZpN4dswxjoQA2xVUPoL660iQqrdzYhPAM4VYDnCd3NCYCeDGB7PNRwpWPyOnEaKVBC

TNbaXE1ElGFmQPZ2eF4pUGvlYSLvOHWyU0sJJqJldAsoUU52mRearjLeqFCkGSe+Ak5DOL4tu2GcBQk5

ajLsDAexOFV3WJcvKNFlDHDyQUOqWHV2PTP9BMJWLm
ZjNINlQHEdMCemLZQdGWLhqo5VNUZaPYEcYUsqOcNgSCPiWIPrEVlbRDZbRKDiHNb4ECHnISWlFY4QTg

TxJXFgTCGwQLpzXVNnRILvlv1FNMLrCGMgSsdmQGIkRPVgXIQlADhiXIVhMUFiXWYqMIDwXJFmHY9XNt

MuIGRrMRP7RYPsPRRpRGAzag1CTMPoXHVrRJd6RPAx
XAXuLZRxFQodYICvPWJ4YVU2EUVcYVBjRL0YMdJaBEEzHVEvQAobMLCfDYGflv1AKKXlHNIyIbDdWIWv

CHKdHDRbKYmbRZQeBBC3SjA7ELDdXHLuPK5LSxMyRMSsCMo5SyOuHKSuYJClvd4PWUOgVMAjNxW8MERf

CAAaGKXwOZwwNMJdYKN8LqH0ZAFjMMHoLN3BIbTbEV
YuKYr0TVOzRFYnZGMqhb8WNROmYMQuPFUfQAQpCCYgIVMgZKtxOJVaOBU5TNK1LCYnOOYcCQ6HDgIpHD

CaHAAmBkXtTDQzUUYwhl1JYRBxEHMdDAB0IKNiXZDdSJWdTLtzPZNfRLAfZvIxUEFfRBPfCE2MHuZwEG

EhOSE8BXBnXIYrUICoia7FRKRzUUQpZWZ8VPFvCLWx
GEEjFCayZVMhMAPfWSM7MUMvJLFgWM2EHyNqWPIpQXQ2QQJtULIzTFPnbn7OUJMaXXSvLgU3GvVyAOAv

SAKzZLylSXDwZIHoXUPoBMUkCDRuBH8UJwDrXDkcJJIEMon7OMgxS8g5QREvTX8WN7Zve7GjYqkjMUHW

VLivUH6jtbTcJOBpQg1AX0jHNcbqMsNwLsp9HAAeTe
I8RcpoKGBoCkV0DEQdZKP9NlikHW8uSONvFTVgJSw1MEDoDRPmCGXhTxR3TFD0XbQiFZI9ZIEqUmFqJC

0DJw6YExJ4SFK6iDYcLr4PXVJzYDsBAyJjYX8CKZe=









                    ID                  Date                Data Source

 

                    [id number]           2021 10:19:42 AM EDT [facility]









          Name      Value     Range     Interpretation Code Description Data Renita

rce(s) Supporting 

Document(s)

 

          Nursing Note                                         [facility] 

YTQUVg7vFvUFVgDz05/UCNjhCESqj4KcAEnbCXf1VNljYTRpX7ZbUHG3xM7mDBS1QGqPGeZkDeOcBJGd

lbm
UlNbkWFuDwGJWxGcsOTfJcZYczJjjjtXYcOD5QmCB9QDYaF49yENJrBNDtZ6OxFVxlGF9+SLigPNO3vz

DetI6ETVSZN85d9hNTrg+0/+XtU4cL72e/fWuSl5LOEq8TqRXQwX4NXh6MbWbVld37hPFSyiqHDVRqQI

7ik8/o27qxt3VT4C5U7ZQbrAOe7z+mBsP3RU0pLe4T
tR9xXKEk4YDBAHR7oEpF0LFKMzcNslaNFPCPWE9lN4RNNLGDu8dankHDcxVMeLN4JRYcTV0U1S7lFUDA

5eGuhAqJnGnBO1ZJSUc/FZ2U5QKjZPmrT41bjFp8c98bouG7ehTKhwZ8fxzfixEQIFAK+PuiAfoFg4bH

0hgnWVmv9LZN6qVi5bopl9Af687I5yYuimzOtZMQK4
nDO26lIgcB7kshji7Xq43u27OEFqOvya78DVsrHOpduZCY62Aoxxrfpdj9YKFxYvIAqLpf8fdkChKMIS

BWAiSwBMGaaoNugHtF5+PIzfvmSmUiI3DK5AC2ba/TbNARZxEMVYLW+EDcZy4juJ0rJB+wz3h70tRMnO

JWWKqHd7ykKr8UeAQ4sG+38hk6SylOEhllgKEpIsq8
KMQ+im7cZSbzn46rupqv/gRdte2LwD1/cf8BmZ0rwNbW2KAN7CBMz8R3cPuae4T/Bedg68GVkywtsmnX

Ht29YbweahgtyAZRqTiWs/zC6UrnI0A/5tq5R1408FbAIOxacqc/rtXqW6LN+n0t5XStB95/mwtZ4xv+

djewZgzPJd4j+VbdId1qRq2DYAfDX/Mk7/kluBez0N
1AA7l1fX+3Z//XZsKwlC9VG5bJyxlrX+OuEeyiV8Fv4Gmia9aMGzO8N659vIuyivZbXogQdbLKVGL5JP

t0KGyP7nC5ilpSEOaszKVGU1usSjX/3pT/Y6P8yk30nf+jc4jd1QMFsvf8eDP7zdIh5+y3dnWAl5rTQ2

3yEX4JEDS4F2SYqbhzrUo1OE6ut+NPYAj/GN+b8jOv
oABKgyJ5nCGZC8DAn6JKJcNHhnP6dbON9LAIJZtCPY3ISP9ll6TlIXYuLGpedeJtCduJNeYpAGUuFiqF

XqRtOJfNLqDaWYRyCPvwYK5BRWxqNFzrISIbV7OwoyMybAVeMRApYp4ULTYdRG1RJADvtUTiPNLcXjBq

FCQONiBwNWUdZXBwpTVKx3cvUcOyANY2RCCcXaehAF
6PIPSbJF8Uf245IM91vmP8FEGiCp5UCGZzLI6Lar42lKZ6YGTlRqKfDLQfieFjKPCqdoS4KR1UTeMsHM

B4dAFhNacTOL6VWBYypYBeWZ2QRYKhfORoMX9+DQogID4+MBnwitOqPzmTMlNmEQJrMclDUdMlPhB6FW

YzHcOeCPY6JVTmVyOwBAr0LTL7ExT6IABrIOy9ODnw
LzToDqmhRmAfFQBcBpIfSIvvFVn9FFK3IYQfFeNyNrh7XRW1DKY2WTFuLQF4DRB6OmT4EUMbSKT0SOZ2

UrU1JICiDYV6LDV8HlW6GWJjHzQaYWQiCtP9FWBwNUdhFWP3XAW7XJWvVdFfSSe6OLZ2NbJuUyDeFXwe

HjI5NnNpFlL1DAAtCBN8QtioGiXfBWB0HYQ5DWPtXm
QpSXEdJFL2SkCfHrBjDIn2ZLH8PxilBzp3ILofUfD0MjhpXiPsMHurZxE1LixiIYD9HIS1WrE4NygcZb

MaIV8OVPMaRlHxTtu8KREmKvD8FRZtSOT9VUYuNjX0WCRrBfAcLOO8QeG4PVNwWML5PMUuQfY2YXSyDy

ZbRNN5YOVaBxvlDAN5UYE3RMX9GDugBeZkCMTkZEA6
EGIqXsJfNPO6GEV9LWHaBuOzKNGmEVW4EQDiZti2WFU2ZaUNAfXmHNV4KYCvAICcGVlzAtlySKJ0ZBA3

IUHjWnGpLHs2PIC4WSKxScYbZTs4JEO2XWVgVsZgNNo4CRK5VEWbGmIxCKr6UMZ6ENXpXqYeRCz3YST4

DECwPuVtSDs9GLQ4WNAyGjAxULl7BNK3ROVcWrKoMV
q5KJM8HLIoKXqoFYk8SHQ3KJCgShVwFIq6QJR5IDMhLrFzGUl1AHF5KWFkVxKpWNQ1SCSjAsZrZBA9KC

Y6GyL4OIShHJY4FUY4TAE9AMQjQjSjXDxlDxHxZsWhKEG4XIW0BHLnBbRbXdC6IKL8YoB2MHFcREE5OD

IbMuZiJoVeBwUkBH5WTHW8QiZpDMT9UPQvLiHrKcZx
NeEkQYZ1CRV8AQJzSOU5IWuePJR1DsBcKmAlNLpcIpG2PpSfYqYrJCdeWnR5HhBoTuDhCGQwIVEcCtGw

YPK4ScP7UzbwEeK1ZKR4HcWqFspeYjf1DJB5EQGsAatqMyAvCTmdXqR7QawvQGydQEs9DSB6BqmhRjw6

SFp7PJO7VAQfTcv8NGznByR7QiVhPpGoZWwsLpW2Yp
ghVwH4PDOjQXW4XLDbUXG7CVT3BjS5UQGxDDH1ADQ7KpJ2VNkgNVY8BXE1XxP3HRIuEDM6AQR5OdXjQc

wbTey3OYJ9AYVyGssbTfEyIY1HEIW2AYOcHpNdSUIpTDC3VGRsRyVaNWUkHSW4OXkiIdLyRKWqHFP9RD

OtCnOjWYOzZAK2YUZsOvGkJTS5OmOgBQ7ZQC3yp2Og
MGe2FRNxr0WdBAvdCMo5HIdhZWOeY1C8vWBySe2idSErj2EfeUP7c4VAVgDpRRUcTq6dfJ9ifIOkSNGs

IYqrYk1vNH1VMNNqRQ9Iz5EocnJlQQT4Q3MowCkyqMplzMO3WIXiGXRnB7FmvPSoLrUhAVrrCYAjO8Uj

PCkzXVCtGQnvUGBuS5VzcbUMOs44NRgzEA7iVVNiIF
MeNLH1WCAnMEuxZPUdJ9d8SQjjQ3DoZ9akEHJzL6SzrPKdYO0YZPX+Qh9NIP9ge0AvLWe3UPTcf1TvZZ

nuTRt9XGasUGCrS9I9gKMmSd0yaQ8KpKX0uLGwT9BfwYHQtAZfS3Fyq4EDe710F1CrrUDhECGubALqZF

0su9SnbtrjM2yhKG1lnONgY73grG4bHCecMUNwM3Kf
xnX0Y0derkUdVS3CMXW8J7ckpkHqKQTFGiDpDVKmG0cfxQsuXKXdLFYNYIxmFSLaQ8NsmyMTZEWpoezy

nO0wDAkeMQZIZUvfRH1+STmoecBxJkqBKmjmKLGlCmdPQfAbPdK7HTDdYaDiAGA6RWNpKaihOwB4RLU2

QnZ1FXHiMHe5DUV9YgTqUGOaZcGcRQGxBdBzIRafLT
j4ZBV6DMPeBgKaHpm2YNS9IMG3PJQmYVW3MIY0GsW3VGUxTKD5VWJ4XpE3BFSvGMS4ZYD8QfP4DBJvBb

x4SQP6JCX2ROUvMBvxKCA9CEV2ZWUbPVS0WRQkCHNtIuY2EMP2KhC5PiRtQhQwMNF6DgT1YMJoXvo8HD

pgMoHyTzteAEOcXOJ7CmQ9CDWlTUNpNHmdKfX8Bqfq
BbH6KVd3GBI5UfSbWvI3MNSrHRO2ShKiWtZ9TZp7ASJ9XdlzTfY1EBUzJLJYCbIdTzy8WJQ9WEBtQmvh

RMV8LLQ3JaKcShVaZCJ9TQP3FnW0XPIrBJL8RLW8XeUbMlzyBXE1HFQ0ZsRpOfGbQjSgAWKvSZYyLzOt

MIXaONZ2EcJ0JVGyQWN1UYV0SxDdJpSlZWBeGUD5RU
J3ZIDbZTYiKRdvStN0FRMwWLvxXLDdYLX9CJYtCiZ5OJD0LRDcDdWcTMb5NGu6LPZ9KVUlWyGsSCz9TO

O9QMOtNxGzAXf8YSN7OUZnSlMpNBv5XHX9YTFyLjMlSYi5IOX6MQDoWqBlHOd5REZ3OIQgKeOzWTt5TN

K3JEMrCbXrTQv5CYY1BJCfJxYfJE5OJST2TDAeTjOp
BFu1UVA9KVHnYuHrPTt5ELJ9AVLcFdWkNZe9URUfNczbIqFlKHX4IqG2EIWyRNK3VIX1BtYtGdIdTOD3

IAIlMlL9OmsfTromWCW2ZtL6BWStHgUnGHhzPvE4COMmOJNyIJL2BWWlWlKmBlAlNGHoArAATjWwUOx4

HBPaVnToFtTzVgNoIBJyHdQqImPsTEB1NHiqAJS7Gk
AtLCJ3WESxPVK9FgbkBmY0PKQ4YhY3OnktFbQ3XJA9ErCoWOKoLRtxTyM6XxpqUrE8EKC6ZyE0QjteQx

x1EAF5EFTlRdsnRda9YBbiJrE5JrKuUme6BX5TRWI3VdpvVhz8IIn7OQC2EtprAOt1RXn4HGG1ZpKwBy

AnTPvmCzS3BdGhHqE8XNM9XbF2XUSwYVA8IZV0EuJ4
XIStICF9BRG1HrR1SSTfBYi4OAEsMAO7RICmMGH3TZQ3XqS8OLKqVlv6BCJ1YGNsKtzqLlt6AJL3KkYO

HoIlQUD8CUC9EeL2GGRdEZY8ZRR0BsY2CXEyVPI4WLZyKYJ4UCHqZFS3IFU6YeH5OBIjVDUpLSZ2XwS7

KPPfNUKCGxWjIA6srb7VJZKpJMHfOocINjXsPOnTVb
FaIYYsVKebQK5Jt113GFCtX3CuzDMkhd4PKICtBM9Zh103BqDjGX6HiinaxT2OVZDaUP3No9WqblOfXZ

Q8N2IcbPifaSognVT8YYDpXDNqF6ZauXFyDqUfDHatHSBsI5BnCEdsOWAkVVkwHPZiC4JfmbDFMj41GB

adAZ6uWDKjKHHxQXZ9EDUbZVbxWCYwV7c1KHvkX5Yk
B4aoMNYyT4JjhOChCQ9ZJSK+Tq8KWV3cw7JjFUwiXORfKX9qxa5JIXH1VQ5VWAYiPS4LaYXiB2XhwoPc

V5LfjLygEV3OogVeWWbjYG9VQGCuUa9gyX2RkvfadY7VazOzDJqnFh9UfY5BspJbOW6fu3KjcrfJAmLt

ODMsOpxhb2TDqWJlNKTlG4uuk5NBhCFqSAK3QT1UAM
MhWH4LyXM3kBRgKEQnZCTWZbSlCEPvSe9euGHxk8BkuVT1z4LxTZJkSXHFKQwpHC4+DQplbmRvYmoNCj

EkTUQfj3IqKSiyDUu6W4LjgSFgthAdDbkjfXVBJAZzZEErI2tcxit4zSVwRoD1SYGoMALeE8QcIFUoEt

A0ND4+YKexQJO6fqRjmM9QOPBsxZd8STUU8di7W5eI
brNPFXBtXp3HCiUQSQDVD1LfoX3RSfFMSXPGiSWKTZxqHfOVj6BEZABVzPPRZjsiRD2x4WnaRsg7QmKS

Y7H6m9mhosLbxXm98m//28gC6393jsuPND3gTv9wXo8WRJAgBEY2CfIsbNDjkRhiThZuCEW2NHKWcp7w

Ngv3EVv6EYiJhSA+JFWWLNb1sVyx3U/40qth7U7PKM
7d4CKjCM47jjRAAy8kjbmzK+XnMs2I67PKagIprl/RmfMWLlqd/GacQK3JK6JhSJN/JhAYmi2dDK6v9l

dRjB7NkHc0ESVQxkd/8SzzlEGwp1bK/8ASGMb9l7DaOtHWkN+G9AgEpgqoEM++kdHqH9FH/mZW9QoNbN

177JX2g8i/cRsVvektiFB0BRkF1TtYQX/PoLrRNCSW
ft326+Mn4HYDa5YVcuPr8yGrNDtCY3FhYDwRcVjWevevsGI8S2gjwdXewaTTJz3fpB73dpUunxXUiuOM

XyGfWgdEm+sOTOhpgG86iHlOKStbL+7zX8pURaCgjMVyMeqIoIqReKDZ65jt/F2I7plX7DWlxdYt7F0K

R8LyhpfEuG/pGlqi/C7dcgQSpu+WiObVa6ScpPDxNu
MB6MWAn8bHRyYRKLiZ0x8Hntl71WBr6s5uehFapkAj+L5i/vE4w6lRebLOpwUUIiP7HL2E2g2iEuAqWA

gLWd56EGapzV1dIWXpYoe6HsJdnR9fCatD5+xaJKvkNFEh11rs7elcNaL1XVVW1w1TZikMyzXXj0N8Ks

LRJBxW8sholE207tOc9H8W5Vuk5V4ktcyWnUqwGrku
xQws7J7/oo7DGAdogOjGM0TDe/+m5+kYYPRZpbuNVvRKLZzNE12MJ+xEHGU8HdC8NMlkl0m1Zu68PuyU

uhQnl7a0mxzI+lW9UvhrsLzmI3y1mk96DEyQNq66j2fJ7dl3li1idRdxvfN9yF6gc2XSgH0nCdhD6YHq

nC3scufKI3UCLkzJidDQ2uJsEoqCywNh5P06Me60Bg
rwiC9Zl5K4xX6SypIrEUgNVTYKxDPPkBZpHm+YH9FNriCtCMlZM5zI0MVnZ8pCowj1gi5WWVkfrE1XB4

l3RR6r033sGvmiUF9d3DkLiefmB9166SK1E85J4YRfgTilXd3+qErFgIS0Xl9Ipb4bv3mT3DNvr8oYrJ

fiO/HXPRKzpHVeA3OxjN1mXQ8eC1N6nSseDDcsof/I
X6QTIigaNykxJpAKwDxJmin3X70WkG/tahYOxpX0tdDHRniYw06hliJWDhLy3uagjA/+3p5pFT0/X0d1

y+uY5uPZ0NTmjRvmpbCq+TXlapBQrhJY80um8tsW31FrtH9s9NJGNcGMOdSWkVKiNRWVxJ3rIB0x0D0M

D1TQ4pFWBGZUelPm6x21ksp1Zn/bMdh3IK4nz4N61N
gnR63oQqMlaPvnznYBQ98p7iOhu2lyaCX3wnV59OWzW+65gR22Jpit6fZRUD3rsy/BD3sA93/rnxtTjJ

aXD74ls2835Mi3/+h12sNKA9PtalDd38gKJo/g5wWWbc10IW1qufCEPz4pJPk8eT9FaaUH3xQ8xXK1qh

h1ILJzPQ2Ml+WNUbhiIIdoI4vUAuFf0XVI9j/Je+SP
txE0TwLVkGpku8f6vC97VDHD5H90zm3g3uYJZN3mX6qo9EBkDfCEzo5H3xQjCk6Zl7lb9R2RMn4bsgmA

Fq3Et/JBbSxGwV/1CKAgSFq95mWvcrKaowmsZWJq661ZBC1zroSjTDUA5dgWSd2Gmyxlb99Dm8uYeOi+

bTzqQj9ddqBSmVO1YdbC85CGp2OykQDAuIaFD8sLzi
JcSfmY6M2Y0nbUUnIkPwQ60ylxzVqBei7Z13D4ud5FTey0A+Qi3kr1sKObfEPBfCrv1hNyLynYNFRcTk

ZFEeMnVY2L1ZmjebPmw18VvDYEHf0cTqsq4CHS4svXUKGzePG4BUSRRQhyhcoXLFFjMof1XCjhP2XImO

KypRiLYYGcKRfj7E6vijSvHyYLjLrm76ha1XOr51ku
uJk+fEtrb0kNxaSxnKjEYx+NsEtvEFnvMd6ftOnc5esKV/ccXuoHS3kR+C9QynJsMpkA47J7rlGNXNgq

yolZt87ujCSKCnCk/3OKpps0jLgzqMai7RlEBS3ZuTc3iMCK7MKCncwsiN2+7ZVthLYKEs3V40EwY4fh

qzxVajQ8j2RWG5TXeT64VJjcGa3HsaO+C11YwC1Ldh
YK23WJEu3PfhbrjMKwK42vg6Y8nph+TNkumGY+rAeE4bMYh4Ucqrnsf/VJrTMdBqTWuSatrW+Y1nIbZq

MKT2lYIfgi1eFzBrnF/Alm5lYYi+i5KDvsI8HzrmVT0rQ7PrGWRmnCFcHxcTfSP0l3zpcPEoYcobOyro

k1fl/w6N9ph1EmXq4Za5RvJ7ZMrRVvKAcwQMGDkaSR
vH3Qcxg6BQ3rJdHQYJiqkmymhra0JbHSOTjrvv039ZygNgK2V84Q8x2Unpy5zejNt3oikD3rss3b2gFw

i5vuxcgN82UqWgsNK8fYS1xp4aZJtSsDJ+Im1IWCYrUV6fOtgmLL2SkgQYrGih+EytIRLq1tgNlHLX6s

POd6BvZiWnjzeGjYZeULIdeU+a0dI7/j7dOOur1NK6
jOaLxiH0WxG/h+KD+ka+DukyQ7W0D4fV1KBqFICFjL+H6A7/tutMdWm0u9EBSOTwnH6DKJNE59yovz9K

tsuqgnuzMBKd/DM1a+y7d0y8aOxAcld+I8RM8yAX90DlpInpE1r+RMyWyrGk4AYHyKZablcJ+vEpAG2a

LOXB0i4FbJ3qPl01Y9sWJMuFz0i4vjZ44hX/NtGNxD
mp7uVAMH00Wo8+FDpwJYgFFKnKhBqY0xIdKGFZEPNxSP3HeL/HI/8DLwBvUAgsBYwCtfrUExtfJgTWqW

u4ILqSAtKYN76JbxNZTtah8g+qK7NwRVYagZ3gyM3zLk2rGLgJlnpWLLQJ8DqCDw5o7maidLanc3rY54

iFHZicGEEBvzY0YVTiN1LTykLho4ObpqjXenPW1g7H
xt9SSarS2ugCrPiU0zyu8QtClVb/e4lJbz7eg70LIo6ckb2NOU7sNl0STK0sHAdoIX3LrKkTUX4UIKoS

EjdfJUuPiROmYCXHjUyvWPd+md5bFaguXZypOPviIyQKlxbqb2qZHg2KlSKyuBbw4eZsp57SejYdchob

xqgqTNPZhcWR5AuiJs4kkLel8hEHqosowBefWxBVT3
Q9XDChvC5FdIbEH55bHzr7GFkGixHm3KzQDpReCAbgfQPaYVM/yZh1HgqvMcewYezd0yLggFxaRL+8TI

JEMhGwO8QpTTJomYPbwgYaNgu67ry13TnM59Shb6y2k/jPmYPvuQcB+8ZO67IPchqU8AEntQmT5yYSCh

JoUS2JofPLzIQUh4JT5ifzqgqC0iFuRBd8MlfRpVGF
JImu+EzA8KxeVeY3AFZAL5U2+5rcJkykErmIUpJffr9hAiGdPGtLxiaMO5jFQTjZ1vnmgFcmDXvV65+f

frHxF9LQizPW9s1J0lmbM47/lA2Y7Y49B+4RscL/3T7gqIquBrfVdQ7I7Vwwn8VipK5TiXwcxUA8iYQz

CSVH7zR70a9VBJ3sZ1maHI35qmPeryE0kJ+N701+qi
xncIIVt3+k7OsuS2DduiOmSYboTLkV9+xborqa/pje9B1GoAdN6lZo0hr++qpKG+ARhiqxvHLM0WuyNF

u4fuDB1j4LdtMR1FlQHyW15VHzv2QuubcYdQAz8RwYDaD3kT9C1ZqoJUX5Yi0WHjZBpRR4WWu7wOuUs3

W6K8PFm2sbT1Ud0oE0OqiJvCV+xo8u6vumW8bzVB64
rUab9fyeRIAY1ttGNCnm4R+fbpr0nwx1N1ctA6VKenOPGpI+oqW3c46L9GlvxeV/3LK1dU8wIuf8nHze

wT9JPojXzM02R4Kb86RjXjTL/gCLjkHgTXVjSwCSUS1mzoEJkyDW2hjkwAK0O6d6HfibJcYjUYD5NNPO

WIc8kqOhTzTeLyc6u5hjqr54k24Ch5tj64H0IsK6+w
pbgCPZ5dyxYLmw7JtRdYbw/3TyncBTP+hB93WiLHLcKQb4RS1quu3GkeGjjzExcnHELRW/rRO0VTxvFD

kyuDOj264Pv4o/zT8rpeSC++UquPQdHRDNrbVZdE4glW+vbxBiIfJ4cxoTst+jqB7FD99unv0T8UZ9zD

4mlZSfsVz/0ChyoODuXEqaBN8r0LdCjkSWARffudML
beEWm5MZ6lC1xGriWCHcvBKo+Rxq6Xu9IePxbde/WuaROsqXNK3TKa1vEPgKXtGOwO8MhRrSQV8QQ/v7

b1jOH4NE78lDgcLYTd0mWrEOq2fYJD5o1eCQO70JK5klGhp7jq5CemqQ5ZQU+bMEUlxKBlgY2sZxGfWX

1OKuuG+hHGGjabfHVjOjjpIQE3gOx6V6jL6gZ2dVf3
kAoiutUbBKOQrBqfeMlcuySCade25Ckiz5E7Abymo6VzmkRmBK5GQiw3nYrKu09WFB8y5IgPWGhDzRO8

ls3NyrLmIne4JLPQH3Ok6NIcqV9tzwALr+AffOkYS2kdgsCHkVBv7/6rwAdBCy/mBrefaRVn+iAuJxQs

cr8LVCUL2dVrK2gqO8ah5fDRbXSNLcWqHAkYBJjG0o
mSalnjqh6FyXiGZilLOIECBuOs/X+rT5eiYJrIG7kKzKNmOFNXctH4nUGdNBBVpjfamZvMg3DQ9HqZGX

KLNoEwAtVn1Ebyvio9y5eXUnTb8HfbbpznFBnovwA6FWI/4cZKq2+wdNlu1NclKyyCG0XH7cHcOAoeDe

MsPAcMPxYjfCyIuHKpBne1lri9eO5hg59fV4hs1jpG
qprNCC8XeAbIP9d9uPzD9HHXOhVqWYzstFLa2L6I6NbuQq7F2UexAvFIBwIqCzDugqc8gTQd9K9Npak+

mZDowh3Su891gonnH6vRA3cMuuQuz41SLaSJ/LqwhVQEATY1HlzuZAlLNakcehLPVazM8QfwntdMqzy5

kNK/9xnmbTpvUqh8boAmoeO2Wz6WdQfxonur5ggU16
wOQvJNOl+rgFlfwUhpXOsCfGYylHrITOWyX3zHKb7wscq0V+DkKSwi85H/OChJMQvXPJo8MmWSXpQw/e

HghTxEyrQIR7QNodc13DusenPc1h2pebixgSe2N+hm49jiU65M/56a15P08Ql5dCtLzABZF/wuxJcibD

4hSox7ff6L4Cc17vfNPumPGAVS6du51CpJq9O0XZ0s
I2npzb8QjWNAfYWwvEQSGnJH6gu0L6dhdbxz5aqmO+MTuXALN1xxKYVLmxJgABpLZRhA3G+Jpg0o4QKc

KxyDUwfCzX0gCF+CpqP+k+x6A8/AIG7tzM6emuFLueWuuKsBvckAYTC1qL8Zn2Lyb9AuQOjXgcMq5Gde

VFjoP0U4+jdMfX19mitjZMXnqgk0T9ajp9Iu+AXkzD
u51Mz8P1DWVigt+c7OcO6BtZT0kwjY2ZyXs/nBbsGr6n42X2YwRudqoPT/LaTvjG9hrrRCWLO66ST41R

+zNkRLTYLzgzrzxS3I/7Xo1/Eo6zc+uIj3kPKh+kDJxFgutVl2reWuncAacw47NzXOGAdKBakg0vrzIL

7cB20tvfpmtCAXAlDcIRruNyDGbSkBcCliHmI1XJSz
MUzET6Q46UpvRQkA6RkaeN6vi0BwijBhydsMHCU+SFeU92MZeL8XSxjC79J/2nPmXicvLifZfJjmOEjm

PHm+1LyCDEOm7hcmnFz6LC1yWWax/0DvhMKbnoQk2W/EBJXHnJHCSJbfgPCNLG9TqWVygsyKjYnKPQwh

Zs8LFqvuNEPhLUsvwM688LnQ5JhGulaj6UN6EpnkBP
wkmWrg67JKWmHtcBZO363sqwfUugF4Z5tzT5YQU/YM1Dj72jsZefTak8A/TlBkFeyKqlsRRfLnJ5nnYS

2HRq6UYuSt45fRUYz6hcFOwAaobUah4gp7FjNdWfjHZY08M4C/Ics9mR5KlRami8vNxbL26GAulyN04M

S3WCyxXsIM2AILN0p3x9Wlq2Sbbcm/AmnqU4w/EaQ+
KR7q0WivblcPhWsNN1IsmWM7+jU2hdfQXb28RWPRWrdEc9Xo+QYtZnD+dFVLLh6LMaORN9fb9poLpRgm

nTHlAzUx/w44+WGEm5QDTFp3TWBxiR0z/azWB+zwZLhjJwkMo66Q6AHAD93iXUBNP8cKl2aX6tUJycZY

+a7fajasAwi+tQ7Ci9FAZL8Qlsiie5Nv5nyzUdhr7i
Ay5HPtYtn0PTTzetXOeztz3DnumyRjwKbDUoVoNe8oeDqP/RZgWA+yNPpEf1wHLh5agnC072V3cMv873

jRi+Y5+ux+pW++ozvUOjpRyRdlbqNrjGPod+oQUx3CGb9gYRK2DK8Qbd0OTqQfsrGxvN4KT1Cyh7v+IE

hyoNIWSI5TsPLiTZeirwCv3QSK96dspyQqedPA8haK
TFFAkrlcUVVW9jqAuKiB5+8qMn2pizkLz77zoPjbnAJpYk8AF/7a1VPCOEb9pLRB7gTfRKMvQpOJ4Paj

FqFaaA5XN+b4u1rXwBelPi5SntTWnxmnHekbvTmNiHpj3Twe2D4dgFHqWb1hsqY9vJYGeGkfrTuaWIyR

XGnHzCXm9Q46JaiFmems2eV9cPe/HTnKuwC9JNX0Qd
uOcWWmuZOEnk3ctArv02mVPhomD8oKqPfGoKbdj8rdgVEqiHwR4dujjxMNkXa3byraYYR01s8t6q0UJE

7r8Unf3RlQypwi2OBqB0QcdxkAzz0XEAvTtbNfVPGzrJv4irDkTkljLY+daIclMIj/3Kv17Qbny3OpYn

Lb2XiPCtRFyZGSFlOHz+gu/VqapU+uGQ7y0z2z1jr/
FDI1K9jcxxXPoQ+b24tpFjc69jXON5N+A2PoizeBJsml6zY0C8ZjyKNQS4wE7GqAyNSj7I6J4zb16TTI

GTdhnHRA+u+MeeGeRB1JByWjDKY5R0tM0VJx2PNUE/Cy1J9uW9AD8YBUoBFCuYqTJrC41K1JMsqkL7gf

42nkU/AeoWEIyjN8ZEXl7O2KLaX7pYLpeLG3pYdriN
eMZdOvwc9h0CiKXNx4480WnUM4eTh5AlcGn5mM/gc51W1Wbi5wOUPU0b5Wza39XHwINMlhUni0TOLybc

sSJb0plXGrUZTx1Gb8V3DpL3exih5LGW5498wp76e7TLp2P6DyCUB7cop1SgRJObuAl0H1DA+KvGm7NQ

XQyPqpIZl+D/g1YBtFEAan7l3OsazP9V7J7IM44eZ/
W7Z/F+kwUxQB2i1/rQc1ZOKc4By9IklG6SP64f7AJYRh5d8/z5a97C0F+O0qPKL/ICNAw82aLG1wd+zX

tepnW07FpcmT1vvYjEOUqGwFJ4u1k7+wDGqhXtS9c2diw5BEDGRl4wqNZWQa59h+cva0PWxRlLGId5Mi

A+e6B6EdLWTT4BdUIPtJXAljfp0sHB/ih3AGU5nNky
N44gvt6E5/uP0S2T+Qr6d+nOzYedPyW5rZIwDw6MY6wi5Jntv44VE2EKOx32xk/A1RnE30oMPSg/+3yx

KJoEqBVuX3pKRdKNjstv0lIw6kIe6j2uf4tGr4tTPrknyX8v5YUPbVirHYrEF5x+nJM6OglQAFYhioQ3

QGi95jnkUaIvsdt3++uh3CC1Z/um3ml0Km4xQPnloz
EZlfO46IuVp7Oge/QrrrQZ+4476Byi9Q5Jb3r9F/McEK8p4a4fObbeDGTJNrLeN3ewv+9D9OT7//+KMU

Klly3ULrpDrcfSJ5sJ7P3h1aqAfqRyg+w3sUoZcLEDeprFs8KRWqYUjq54u0MUbpL+iFhMjRx0AZCvB7

mm1Ztp+V/erQtD9UmggWGOmsFmuu1C9r67jaW6S66X
fGojZr5Z3+rngWQ7Z7e/ma4rLcQGQY48EnK7bSVqGaM3Vj/YF6Wojn4AzLl4m6jQCAa5Iy6h7X6k3P6D

X424L+3L9dgi43Sgc7rLt0j/1/hw02P0vBYOpZI6VapIz9QDixTJ9HrmLBay5/9lp+raQ9sA1+n/rddd

4T0YYQLZdG8z7VdH41rv0HsS9eQ/fP+iPtjj/tj7BL
ZM1nRmgCSEyxVdJX2vZqLe01JfEhGt/djU3expF7g+vnm+NHG+HYZ0ytt2PKntL+AK9SEmNhHu9S/9Xe

3yjD+oahzZ0mdRfzJRScYFk9uyuI91hNxiunjy1Rq9WfIffph8n0B86ifn+LgXo6J8uoVocd5zVKBQsM

Dcxz+4p4xRn8kNWeg/jubrS9f71AdIWCE9Y24LAnPn
hj4I+CSr1oOeDtIlJHkE7XAt9wLy4+4eCBx4CbcEtbtBCoyegpOVE4MmsxDdrrrjUhqnMA04gm8cYfdb

rqc8/p4I/dglUUj02QmWgsiW5E/K5v7JYop0P5zqNkouag8Bboi5J/wpk2EpWlgAU6bT+S+PlE0fyqT2

Cvu1ffg+RHNNxAeUsyV3ogGdRi+Ks2pViKt3eG5lHJ
1RWgdwjIfZOCXq1Q762avSh7Rs5wNwKdKVkSjqycUprATPByKhvkuFWMfRUb/iWSScl4T37nkuV5oGTw

Yd7u24nwtk140frupx+jppq4HXSM8mOxtCVHTv+vsH48/Hfw8vHl5joHDpOdmS/zI8/tm4T80Aef+9wz

6G2jn7B8+aFWjgJMPqxu3B3Kx7FRxcEV0AASKJ/9vo
kSzUtMoAaKGk94tbur8tloQfQB8Jnar5dOta+vj7JFwL2Ej/7LJT78fsmxaIAIWMKnd0425atFoMtUx/

zg1a3n4S3Mg05W1lbm196RsTAGtz6Ciq971cbUOUoL1mJ8xOiB5XmejPC3xSrGMpQcVq2DR8l8cb82GT

IQyEhanMtIpQxV57ni4ZlP+h+7GS9Eac9ENS4PESrF
Xi4jlTbt1XqpFZx8Zm7FmeAvxAgjMHHH91vGb9tfIcc84M96msjJ2Fgd3Y7kuHu8wLgcmjxFm9VwIOZt

SFRtc2WSQ5xgRC533R5c63bjY+f4021sqI9ImaX5k5ADbRo1lNOB1CYWbkoYJF56pc1BUL/aqeDmsYU2

kLK25miYAEfYxo1kKvjFIyYKKaKwhMP+EEQbszix2B
QzVJ+YQEBbkqLNdOhjNtApNPQd2VY2gTJBvMKMKmIHa9WcCKjzerPI3zaRQZrmm2ZYR+MkpJVAJ94fD6

3uMO65N+H5Cvc3mMVBK/gZFMIrxjMIaZMLM6RxY/fw2oFLu40kxBofiw7Pbbmyn3bPnFVuxc7qr++/AO

x/tUXS4bvAhbTsJWs5sAjWsvOeo4uBjJPte3JV4gl8
Z629F9HJtPoZcNxvkjLGXzTPvQmloz/+LSaLOq3JoTa8wzhw5p57IRCNKsw+i23hU1pfwtyl0D4Fsiog

7nWNeXHL7WseL56/prpOzWc7CY4D+Rn9sHuvnfux1wGp/RkqpGAi5F/1LgkSRmQk6Y83V8opM8Bbsk47

zLOgxAQub70jk1M6SlPiJZyug1VPlFxr8Y3rMS5HG/
2kmERqVBC4ZKcDrqzVw1C/G62AG8lufiZpjpydOp+ZqwJE6n2V3lx0zEaIkzCQVcS6PqBVsQ+xOVPg5O

uU/YEDstc127M4Re0Lbc20DUzN5V9Nh/hvwbp+Se9lqcRRvzAbu1yV40oKtSqEPuZr9HBro3Hz7pbfF5

KBW5sHNuWgeuBEM2InSg/+RPRRDFVc7yjZOIp8lwfn
v7qpuOOU53Fuk6pwB/rKQgrTAcKe1IP98sAl2GlPgahuvoPmt8w2jugs9qGSY+KM4c5p5ZvYGsspqUXK

FzKG83pXc6Shrzwpp5961e0Vm2/kpNPqkqMAaecx8ozf2hkJ7IQ+H+phQ4M6l8qcxutGHKEkbsmXoSee

Hvy+jJKT1M8ZUMbWalAynMmelTA+zMGtDyivn/X1d2
wYhGUkWgyJbFyDdcrjM69GHIPOFwJnao/GqhAJutrUGW8Plkqao1ll2iwNvznB+I23VMoOMvsdl1s0pl

8vp0w58Rsm8ZnOGl5KVxMyaGsv72EfNopO82mbw9UYTHfviRF+n3WcV50ZFxOdFby9ss6WQFa0/qzKcQ

5iFImmiNhmt6n3Nig5b/nAhtqH+OLoBVqm2mNM4Ie5
9zx+chukf/ASpcrx66q8rXm+XGo36GnINbedI2tO87FOV6twIL1Qdza5/1J6UdYu1rmgrk+SGwk6Snsx

/LCs3Ep8UGxDwsjyQlzyn9eLs4ee/u1aWht3UMwH2HXgc1A/eqlbtHn9xidd0z/ZC3StyUsR/rinti0b

bvEy6G3bdp0a410p74W4Hx5kHHJPquIG1gw2HUxxGU
yB1WOe2NvwtEox9uMzUnTfxwCu42J4sVYEtazdwdOV6TYY/mKMSM0XJM03fendsjBNaMpU7UsjB8o/vw

U3SHqugthkHRS8QjzwkHmurS20lSNQZIRnqlps4L5L5A3l7Dvqfwug+U6+/9N+/J/2y3+y8u5bclzxJ4

qo5at15rgoNo3y/ZrJgpqezGn5JOKoYyQhxh4Epclv
frPai9hbSxKWkjpeCK/mpWHhOecNamZ76z+AKwp0C1I7D/ppjt0iCt65tXgo0foveJyCkugq146fyi6I

fIm19l1e7vKyQT8t7RM5uH9LmfnebIi+FE0/2hgb1j73kGQY89YJ/xpISjgRL7t7MVyTMtz0AvYznQ3M

oKzrHbzoYINt+2gwlXkGkpwCwABnSyt5dk6HpZVla9
VLeu2QbxqXThp+j4VYyXFkUt8k9SXQkBf2O3Dtac7Se0U3/IdmwZ7TyNmY/X9FF0FHpNMoH7W71va7w2

l4kxh14E0vhKTdj7St3rY6aidw+C4NfD2hwG85dcZT+L9F6l57pTt1ovXHhazr8ko7Z6eNx5dNX156Sj

zBx96AQNLtFF3666r/dkdUynk6KxhL2o1aIsE65yVR
y096Lty34Ry/SEhVbzBuSPGU5NTpNk9X+DGMk1/BD2g6/C+INudhMte0vbOEByoc2je1mMj0zWzfa/kO

zHW9ATuFK0likfIx2Ga+kS5YFokCq0bwAgVWZC5rp//O5wrMr+OgfhcUUS9iJCd3VyWhv0VDWPrUmmvS

Z6vnTgaabI+yBI0V9oak/CTpmy1DlQ7bZha1RnI9JL
x4pASAK1qHeO93P28DZyiW5+AwDTR+zXPEFnPbq6Hze5pwNj+5nQtS2Z3dW0QD5+avdTrX9OSC1hW7Ev

N7HPcTdCA/TSvXaDq5u0cf81Drck0d/sG137Zg68vwyEuUygSr3274hXv4BSwFpU6qzyufBM+J4iAvww

+o4kYcHg+HfOiG+aNFcuxgdhkHUqmweozecU42dHBe
wlAjtwU9Aqxe0K9ettbAedD0CzrD8StaLIt/1++6CGJ8sEW7E8mlqxKMwbZK8f/JdyqRdzcausvRoD/i

qHjS74zcSbvOoiN4nyhbSm9X/3HgVujX+wf5DXB5W8NddAf8TqrnF0PRPG1guNCUFofoD/3VTl+hzvSX

2vlRc+ioTDru9vdG+Z2DOk/tsvr6I3SX1MeMIo5nUl
j/Dn27wffbCB977DzPrObRroutD/huN/Wf8OvteWjcd2XH/yurCHQNaJgXkSFCWJcZagwdie5CAyjpQT

aQhjutamYgdav0YjMkL3fP/UgTg4Y7lmCVQ28Olr/t21ATda+Ud0O4lOn8sws430fZtVxxd3Yr3RzrE5

Uy9UPQfFQtGhq4EazXQw9yIGK4ipFakq0wF7t5NudE
SbPVSXMqv/NbTgzJt6DmC4+d+W5v/q4EpRk4S/9B7c1VIygjWVquuUd6MnNM+sh+hz9h2wO8Plj00i7Y

2+dmda+N6sx38UoF5hf17KQjsBcqGHtOdut9U6W6cLRq388sP4buEC/3c/xv2z2UjTZ+gr8BTlYF9lD3

ktR6/2TY/vZ+pswT4wM6mH0rigFoXzDIf3iGhHH2C1
wK/BX4B/CGfU51/G3+7RC3S/1+6B6+M8D7s4lI1qUcPDvNRyowr51dbCL2SC+LM/c4PZs47lyRC/hejb

qYReZ9J3b8+qeZHgsZqKAnG9K1gHhrITfMrFmlkPZE/D3hPke/CrZ6R/WeaqJ+ydgDOVBIKe7h4uoNWJ

ymXw2+dHW/N1+/uwpBS2t04TauIJ4tXPJcnHOkAUnf
AcZg+/cT+kWUy/w88RPLDTS8dbT4EhlLY5x+S1gWJQ/3WO3M6jiubl7Kv/E9uAlwyG7edWmBAd6TKr5+

erOrPaiDSFaD0v08go7g9X3Uc+aRcaL7I/ohFnx/q5u5acD5k3Bs0fLYcxo4AU+BCKW5dOXa9Rquaunl

UeolzS9iIRWaHxwSqWaqI+Nt57gIbABwMzMzW3SEWF
qLoWOR2u8Erl16UwtwoOnSh5bX/466o/xVwF+V6qVDBXJVChQh1xgIm8jWI67ZL8PtD64OKOJqkwvdbP

MwtQ1zJtFzjWf8yB6eY+0F3AcEDEPwFosc1xzvl3WiXy6MafmQKbv6N5shAKO6D7ko1LrWjZzhdRfsM6

oAfY+1ZY0s6Jvp2+xrpV66WSg3cXYrlr8WhUJTu1ry
APVL2MgpH/I0vpW2whanpINBYVUPZUYq1xxv+TXtNfDlsYvtcAtp5maYKaUawzdz+V704cbM/c+yI0+j

oZ15kOsRwALfxV5Pzn14v0RbpLMrZWlAyx9bze/juPRtep3yR/17RpCGX2j/CYqQgRE8PT2f5fH0/qPg

+u4S5mAXLhN7adQaFwcTNuVN9lH7OVqQ+gMYLWeBPq
JmgmcuQJ5J+svxQE461JPxmNCbIjIKEnuub5qOlWSfwkH/tonya+C61/R6hgxBnhtW1REvIaEiUVupeSbB

6trFVeYgNECqLWgSpjeuUZ5yc3JLtAnnp9zHXaU8ohPE9BjE1mcMzU+gtsACM1sEJj81rXmLbmJ/eJN6

ypoF+CLjnTXZcz+FRz0mYqbXlNdtSd8mq1VTjyfkNO
h/IH9lm7P4WJmixuJg+nMF7M2PhPz9Q0h9M5FnNsb5BpO7vvDfhtF137/6yilfmJiF2ubt2a3y14mjyu

sGN/KXTw5Bd5x498/N98h2SsVW4F5qjxx/f1IfU5JTBnQ/T7JOO6H5KHS2YJyUlWmalR671V+3zjPthf

07EMlZT6Z9P9u8zBEi7lA6W69NyB9EKHJExOGyOpF/
J+7L7NOKtdu9SqOfrYTcaT40By1ub3faz/qbmWGFvcEz63foaJhVHbJoUOmCXby/zse2TMc1Ug/lw7jN

tGxzfAqjuMBVKCCPvRpK44UGgdea/S+pZ7m3Ow6wSh2PS1zY975cEszGSnW/RR4f1HuwPon01ZADsds+

jDMHeD+8WIO/vA3pAg3L88y8Irrma9tfuJ9pZsD3Zu
zW4aOqVmWSOYmzI9bR3any8GkUC93h3rSztvMYzuwNmS4a68I5URTG/Dlqj2ngteg/m3S15wq4g6+yA9

GzbiOUAkzQlzc/uAX+qNDayLCtqFmDbbuXNGSZoxNP9aVjZVUnD5auO/HHbkOdJ8NF1I1R5Cc7MQ1Z4a

CI98FZKnqN6pzo140VIaL9CyhTsgwPbFEZ/OspUL6t
tiFbHH69CgysgKlAgN0sa7ADIlYeFk74tJyj6Z0p1ZJ5CHIcpARpH5HqQ89I0BB2ZtnUPdz2Sv407hX2

90h9ksD8GG+ZVxF1blxJ0c3GlCUQbLE7zBlyOU7jAuPc8m7CnTdD/APwn+qOY54WOw3S7VK0L/nXjauk

+oNb8bleZ+LPR6V4wWTGRhun0Xbeu029rVFftRZfgy
clnft4S+1agsRk3JXQuxcz5Yaoe2GbubLicfvVd0DMhlvZdORgDYX9WB2xY/oRsVOQ/E+YA3qcXDoMxm

hONSPKS4dtl26pdeFuXaHVpCC9d2GkF86imUgM+ZTasD8rfZ4cdgAYHuU3Tn6MAt07Vw40UI+I1ljgfX

QrH08tKVQdDacNS0OE+egVAjm9dAo7vD8nkUII/0w9
Pj7wxSqsW/R0x9Nbkz9zny+a3fjL/QvSX9OHWEGQNQGuDh/FSMESI/sEXy9E4gcO0H+Zgcd4DskJVIPj

h/pbj5e1fJuurokla31vsqEDdAMUf40AQmgARDZrpdf5+j9rp5oljBR099z63jFLc86wmd1lIJWabfl0

g9BiSB72BZLlApMA6nPnsLiCxyIqBAVr6d1KxE8BZz
aEifL3TLz/RIjOBd0yuyOHSCDVyAwsoVsOqUxDY+CcXkJ6wzMM1mPUbFwaHGzsbw/QNpYLeu/pyAP/RK

fmEbIziQD2J7ARnnfH/5XplBGqnoyr8L3zGbWXOaLGwS1ybDcO4ngKqEaHlb6afhG05yrasmJUl9u66S

Af1VrsWldFgCFeTUQmKLqcsKSq7oREjqCLxDm6W3Fr
z7YBbxZAvnBGFniqFzCmBgJ7DVe1j1CIglfOZhZyF/G7FmCJvJVfKeUyl6HLM+SFhbuuFi2SXl8KmaTS

rLgCu2HFTHDbDimIRUgGvQ0uCtzfsM4Y8gzYQf80ubvhgDDgI8vibDRZ11SqBm51o6uUxIg69lY7TK7b

PggBJzDKUQreacHsV5XqgQ7ZJlhmQBocH6UKHmlU7/
+x/0zMOH1vttGbkCzCBqa3N6qiaYPA5n7Cy7cWZ9oPBDPhvqXuNseZzUURzPjBLjDmwnxeTNvJ6t7Cv+

OaZb1+vJIfbFRidPcMvRa4J6jqrqvCioarimqS7MAhEtFNoUOnKq0NLDdzDi1Ytp3F9qzqZx0YXcQinW

EbWOtWgXYAooyh3J8Zj+DZJQTxvBKoTTGITdg03UXP
QUjVNTO9hoRI4lexilPNpoGE7SpQpVWo2U0PvkTDP0moo2zuCXEtRKXf3bwD8qu5OdWidbglXXKmFRlu

W0ZTBdGMKUJ0F7uwpMMSJD8RBjyCvvoW6dNIkLb1Z7JmDCghSAjRwLj3uuA20Y+r+V5OvFc0m6V9fWiv

Ggmtge7Owrjo7g2CqYXKhujvnuLP1l14i+sqSMce1o
4YZq64QAozp8/x+RywJPYeOQnoI/Ip0afzhGY3UFkFv/bax1nQt4RVaJEc4NxQblh1xXaOTRAZEF/dNO

9g84yX+VwO6yGZmez3j8ETZhhkGEncVHywza5rANnUgYjAjzTpVDeZiL/NVae/syihJDQTTZEGbA+LOD

RL9PuVbBcvcqTq8dctDP3JFR0Fp5zCXWVCC8NpC1hC
06QDHGVd5dcjwDJ+zEjQbn704IieeHZJcelvN/u30AeofqUhtcFNTUZGFjbMV4ANMxQEoCXwYfRZ8mnI

woLEddqvtT4+HVcCXvacITPJHAm5VYupX7aPI6BeYyWH8ZMITJTHFiAOMkGLLHc/fTYqBa4LTGPR+fPZ

Om9EP95lj1O0pHxYgRvG8LrD+9AfNK41lwqtiwbU2E
sraJptlIbdCDtbuJKS4rDL36sHBo2nBrcoASvhZ23f+YJnG8pjxmGRXP0R7D/tYOmUEjW2ABVLaIMzdW

U1VF2vWiYcPKB3/wOsxGYEULVBoxJcaSdwbjetmmyqKrmUuGttTHvQqFW64DrHVa5uRZzUbOQseC/GFN

TrZM15JBWLbJN2NYRfVNskzIdGoN2OjpGpppelbBIa
GbTnhWbWEEMEjtbsjWMNkKxU4fasvxJFBD4zn1iWJarXpoGavLcA9RTtQ3k1mj9zRjuXvI0xNToilN6q

DLQpKqAhTCuiXVU3dRM2cf0KPlJhhEUMdi6w+EG589LbypkXxqaACNiEeV0PfbtpyCB0AY5nKoJIYCZW

RlTBKhvBenA1brijKdWIw1YUxZRMclJCbMxHbDuAiX
SnNBba34EhZ/qBd2YqbdhIFynEkVWcQNyaYckx5x+jHqw8sbWQuVcc4E3o3dHdgBuK17uzPk7TPTOw3B

DP4sRapdTyP2C6Yx3+qJ4o9aRytjftCsBJOHQqEIqo9JOs9yXSHL/ESS6IxdiwbSBwlETwIbGmLHOw4Z

jaXL9EfXKlsGxNSTlVK8pQB+ZxrHRX4MznzXPaG+6T
OqGkFsC5mn1QvM5U5YZpETbPCvhqdwHT6EBspDdNTA5Px2B52x3EWn2GaFUHKnLcAkQOCNzlNF5IJDEy

kiUxuvpZAxPChGvgcldoP2mLTpKqmhgpc7ixQnhHaE9GHbMMp9rtoenWV29khyKUIBd7t2Wi+/Fzu773

JX8B2tIUu14k/15/zHZ6elaexJNT9+AqUrS9wwPQSK
q7/V2Xz05fO6XKnC0G/9IBkBc5AVElFcnVhaNJ3gQCrHzt97VvZjC6FBCdgIQtvdu0eQxpKVIaNysmcD

obdMgiCpF03OO63Yr2CbwJgnUGSpi0ZH3kOjxbHNWD8m/LmJaFj3/4XM/f/4//hfgEf/Q6OzRKjMUjOd

AZS3siUneG5IBJ1cy2EyAOytKoSbGQ8paw7YLCN0LE
1ZcRm8XSMsT5HoASVdEMUqz6FdXH1LID7vcCtnPwP4Kv2BRgHex6VcATEbVWxQwGEXa29NCUl5K+o/+N

obz1GDB5VakWAOQm3o/RPZAMnsuHOORsw4R9ioIJnIrtPEUn1BfsAZ0yEoW2V4etS3ZuxbGahWkixNHo

ro6p1dvzEBMBsWw5hwku/9Kj277KRH5Ohp6diVZGra
xF7FlgAjHNJ4nMKuLv6lDpvdJ+puAvXo/Me6dNVEkLQWuFwj5lB/Uy5A5Gk1bvR9iCPZSfIU9T3v4BSF

BmTrVw4qdlBwJzKcfMDjB1G3Zpjp/nX7Q9QIbV6tjWMFQ9r0h5F8SLXm94d1MD6iNC5i7IqiwKuuA/TO

Nzz59lnyz6cQEAgelkVuuEUlWT2UTqZkRA4jph1CFN
JrQYLyIbqKWgq2QLygSK7WkNOxZ1OpnvAQUHKmlkaidQ7oFPgkHP2Xw534IvLdTR7WTFYPXGYmAYOqNI

mWZcJdG1RqA5JdxYA5LTUhV9LgUNSzK9f3LVtbSQ9UDWWlTL00AX4hRLQATcLoO1JlAXxoQHImAYhhAE

7Xh461VpPkyWDlUVUvHmBeXWOjDWJeHWT4MQ9BVVOx
YMOmcWzxEK9wfGKlCJ5UfVMyJrPwCDdtNZ3Fj216MpzcCSIqJWOeMHPLAKw+Xl3RCB4tq2ZiWRoiZKVo

VA6dzb0NMZdGNsUuW8S0dULwTj6bwR3IlOO2oMUgD5LJXYUamlYPdVHbGc4JLQHyGp0hdT4NUIJJONBm

BNQjBYltB9pYIT4XBIUMSDCyBRVnatMokUoBZgXnM8
XQIGP5o7UpmRirMj5gBUgmUmYvyVJ6ixglVEJim1HxMI8EazJgjwmpCyFeSREcwgAzfDsiEH4YsOOvlU

ZqQJ23HAR+TfLRIsSpJ3AvezLILPNajjqgkY0oVUQ3ICKjAa6QBUVwWCduORYhLH4AMvKbPgy7SB8tHY

k2XGdpGOMdAJRyRTp+Cw4GFG6sg7AhZHpdZhNkUU5q
bw8MRGpJUxPiG4T4vJKwRa6bnY7QoIG4qQLxG0P5lSMjS0Hnr0BPd920I2UFXDEVEsdEiaydvB3CqoJr

ICwsCo7TXETulXy1fC5OHHhhLI9HGSZzFZ8hTW29Jk3zeYHtGjE2YGXlXz2HZmZnJ2BmNN5iT93bKOTc

MyAwIFINCj4+DWvpezSgWdzCJbM4WTBgm6SgSJjeIW
BpRVY9YAGfDic9GHR2OHFfTNMoOUuuXSDjUOG7YDsbSRXxGTEaDICoWSWaApMaMBNeFuR9JSDgEbK6QH

SoVORhEOcpImijFGMwHQX6MejwSUW9CHRuQJN6SfqjANV7IAOmWQR6HzhyQWI9VBJhOTHtNFxrZkM4QY

O3TEMQAsH8RWI0ZOBrJNO3YVs6QSK1IIYiVUjeYCRv
GNU0EPZeENA8MRQ2NMV8LNCbYoXwZLQ2JdZbPXpxXKdiNII9FOO8UMqiWoP5NKq7BFK2OtTlQyX4OKB0

ZOX3PgJkQSP6HUT1UnY7FmFzISR5LKW9HIV9DIWrJMyySB7JWpDdCFI6XNFlQRXfVua6GNSaRGZdGvke

OKQ4WNS2ZAS9SrFaJQy0OVSjFbOvVNIiJNs1TNY1Uo
RvHRMeDpLpKJR1HqNgADUvQEXeIQH3EpX3GJMeXOa6BYP5DCViTNaqGDO7JKRsYYX3XlAnKSq5EDocYf

L6MVnvUAe7DHAsNXGvNHJdAgWmUxweSyIrDFK3ULUmELTkOvH7VWR4LjF5FRYhNkZ2EQU2JeU5FAUyUw

U6HHA9VlJ0UTMeLnV3UMI4WpE1TZOnRmS0IJZ4CeF7
RZXwJrE2FBM2YzM9DJSnPsO9MDQ2GbP6WBGhRxN3TXK2KaIJWhI3CcY9WLMzNbA5CKH8RnN9RFQoTkS4

EUB8SvI0WYFjMcE3CWT4GQImUSUaGRE7PZNiIMI2QDOnOTD7BQEoEJZ9ACaoFMU7VRe9LDMrCRYeUAP9

AUG8QDU9OPYvZSmxGYBfKYKqBnfaDom7SEL8KqBgLb
ZcDbouUH5IAJe8OVY7OPMkINdxNfX1SEN5JYR0GoPwRZO5MFmtVoW5CoPgILUePVK0AXL9XMXkZiN0ZW

X6JWH6GJSyMdM1YRM9OZO3VlWgFkL9EBCxUkX6LKKuWAL3JRGwHwJwMuFsZkR4PNV0YnLoBaRdRuFwHN

r1ZDR8HUHxEYw6IGBkVwO2ICh3YYZ2ESKbOym0YEw0
UEU5CKvxFcp6YEA4KzF0ATgoEVNlWGU0HTH5DYFnFNV5NAOhUJT2YMnoYKR8ZTVhWBK9ZJyjZWNeCOA2

IqL1DpIwKVNlUZDhQxT3FoCxZoTpJWK1KxIyMDJkXrHoXDC0AHSLViN6SzE6BJOaGPz9JXK1IrS5JWRs

PGb1VWF1HZB8XSQjNYC5JIB8RpQ7WdRoWWG3OLD8VK
U4AFdbPQu8AP7aJAyexuJiGgcWUwA4CCDhj7UzYOl3ZV9WQPWpOOouVQ6An937CVTnY1AmiWDojr9GQG

WiOp8wzP3fkTKqJ7Bvb1HTJK1IHFYjOOVdMI69XHFbUlfzP5JxIQNsI3b0JWPjOehnLYYtM0FmaJDlZi

YxDTvqCL5FrQDqaiIpJv1SRULmRu7cfTNOt8ubJcQl
SPX0IVB4YROtLWU8VV2VErQbQ2d5VRqoY1QkI6rsLCXgR6PnxVIkGN2MPh5RLeEtUY2pgq8BTTgeDPBm

PckYXej0BZixDU7VdDYcS4PnzyFqL4NraUzeHZ7KmsXxSEkpUM3RKLDaEa4blO6EDDvoUHETY3ObA25q

rZ6nC6ddhhNzo3dDpjPrBEjqLz1MLKAcMnwyg4TKmJ
PqHPVsR8jrs1JYzRKqXER7HD3BTVFnN2lwyIwsVOK7AVReUs3QWKCiAp4ceYKnk1CtcZB8j4KmXVlaQJ

BSDQo+Rv5FCB9fx7LtBLnyBZMwCM2xkd8NA79PRjSxQR3sxd3FLhXcSR2wuo8MXYbPXaFwG3Qlk9AVEU

AgDd7MEQGwVQF1cW1AiKZfEEGiRJ6jK4BPJR4BRDMa
Kk7odIX5QEZwAaAyBHDuVSILKhAuJUPtVlUdJGBfLEIPMIepHEBlB6NlACL2LOLhYj1OWRYrFF4PGrXh

OSAwIFI+Kq6YFNAqHX6iiaUdqIA7ACF+Dn4GHFCvHSe3A1Z9SUVzDRm2W4FPY5XNXHDhXHhoRAwnFGFt

MZb3P8R1NQRgK6IFB4Duhtwuiq5+PQ1OS53QZZSwAS
h1R0N6sLMiI5E4vSqPpLC2AY6FOL9TtQn0fVJkgN2+FC8WW9ROPdIhDJp5J4L5jSYdR3E1gHwXyFP5HI

7OFI6ZuKOgZUEvgjIrGt1fK3CTDVgZDaXWLQG2OQ5HkHCnVB6FiEFEW0KeaAZjGa7pTTwjdDZhrG0wIr

9lFZygAS0VHkINIBlEKBL4BC9VuATnCF3PnVRTY9Uz
kRRbSl3pVTfqmIOapu4+GB5XWTQwXx1QFp9+QMiyznSwOusAYjFaTUBrs2PjMZn5KA0NCW8leUpeESG5

Mh6YdTL0wNSyF2gSDG7TzHVlI00atOXlMVJjQw9YMeS2ozKwyF2MAL88fSSlo1F9OJEiP4qbWFgha01d

AVqwZJqWBZ7hMWKSKInwIBtnMMF7LpLggpewLZBmCz
9BVsQzPHi6qH6kzGY9LFJ7YzifpGDrQRlhDoOrAcTqUwI2qZymijs8NBasFS4oQClxuyctHNNvPua+DQ

fiBNHzJSJcVfoPNLJyiN1grcX4bmSvVFxfpHPoJg8pi6b8CjaqLd0jCt6nPHj3VaUiYfXuVERmMy3jbE

42TRrbbhChNk6GAjUwWWP7I7EkDnzPYID+DQogIDwv
oSx1pYHuCNYpNb1ENERlLNWsRCPhHKWwSZLkWDTlTHLgEOKeHIZxDYToYNVeBZHrKBXqYWVaJMYyYZDw

MZCiBGTuYIApOCPzFCCbUODuWXEjYSIbYFIcEIMnETMpSOTtQWUrDKTtSPAdFCXbLIUfDP5VQKSbJVWi

ICAgICAgICAgICAgICAgICAgICAgICAgICAgICAgIC
AgICAgICAgICAgICAgICAgICAgICAgICAgICAgICAgICAgICAgICAgICAgICAgICAgICAgICAgICAgIC

OmNC0BSXSoFXIhGCTmWFXoMABmZEJmJBQbFFYuNTUcDTYkLZQzXKDzEUOwPIBpCXAlQOZdCEOiTFXvTS

AgICAgICAgICAgICAgICAgICAgICAgICAgICAgICAg
JRJqWBAqTQPxDOTgPR0XESFmYRFqNIKgKAMzXZOeSLErSFGeTEBaGLBbJQWmVJBiBLTjGZLbOPHcHPZi

XMVzBFYwTYHjRAElSJEyOCPzYAChVGRrZMBdCNFyTELrHTUoYEEqGUIyNTRoXZLwBNEpSGMiIZ2HNJSi

ICAgICAgICAgICAgICAgICAgICAgICAgICAgICAgIC
AgICAgICAgICAgICAgICAgICAgICAgICAgICAgICAgICAgICAgICAgICAgICAgICAgICAgICAgICAgIC

FhWRXoBI6LPXXyYWBoAVXxJPDiSJCgTZYsETMbUOVxMQPtWXNwRQOnADBmQPZlWFVtKCHtWRXyHFCiNC

AgICAgICAgICAgICAgICAgICAgICAgICAgICAgICAg
JKAuMHLwINXnYOPtJAKrXR3NESKsXZHvKSRoCLNcFEKpRNQtMDSoOIUyGLCvVVVzOXHvDZBbFQDoBRWw

LFHkNAUfCFMoZFYaVTCzYUYjLXJhZTWgPVYoJWVmKJGbQPEhJAFkSTRwVJZgDKFaWZInQMDbDAPaEB3G

ICAgICAgICAgICAgICAgICAgICAgICAgICAgICAgIC
AgICAgICAgICAgICAgICAgICAgICAgICAgICAgICAgICAgICAgICAgICAgICAgICAgICAgICAgICAgIC

ViPKChUMXnUE9SHWBeRJCmIUDhGLZqQPArSJYvSMEsMRWzBPVxMWDyATUiPMVvGKLkUAHlICEoAZQcKP

AgICAgICAgICAgICAgICAgICAgICAgICAgICAgICAg
BMUzPRYeDUFwHZWzWRIyOXAyRS7WEIYzPDOdOOLsHNLqQCMdVEXmHZMyNMDsUPEvZZMtBZAeVEJtVRRu

ICAgICAgICAgICAgICAgICAgICAgICAgICAgICAgICAgICAgICAgICAgICAgICAgICAgICAgICAgICAg

BD6MLS05vAGsc5B5EECtFE9wris/Pn7HARbzylUmdP
PvDV9LVlLnOX3bzh2UYyZiNQ0uqd3WKDlYWmUvV3L3bWUnYMGuEQXUEdWfY53gTIgcUj47ZOkkESCePl

RhCVs3Mu1UUyYvF7ulKEUpOpW7VJNnDfTuRIakHY8Yf2OxsWBhOAu+Bw7WYS8ku9KkQUuyBiOaXA7hfa

7NXAhZWxQgL3ShgpY5UHGjQLEpRt1YQBIzUDJhjXWw
LhHaBILTKsXcR3PklJ35UWWEEs2+ZJkogvGpRvyEIwThOGWte9QwDVx2OJ4CUQCxLHr6fXYtOeFse3ui

XiPDe9DrXBW0OKTjOP4cZHLaLnWfcVDvYBANLFC8VFqoDCZxDpHdCIUgOAy8VWLAFQtRNmHfE7Fde1Ms

TnC4VLEbHbYwSOthYVKoYtI6KZ06gTzwXM5CNARsIB
WyLU65FEDpLQGwNc4VDc4KVhNbBN6vhl8PBvAjSZCkPwoLDdb9JNkrJT6RvTKdX8VrvAZfy3tMDcSsQ5

ITVOAoHKLzJc4JTYOaHxRsJQHdNFwkGE3dGXYmADLSlZlbimA7FN8LQA4uteIzSD9HTpCcBl7lZj2YQk

QpJ8DzE2TyUCJsJPFTHOipQI0UQWtmEF9rSX2Ma3TY
bMHocK2ulx0OSQTkQGUbVbjlmw4AVnpvU8Y2dMctPENuGaEoVAUBEToeHR7NIQGaWNW5QJJuTUExXPGN

RlIzD12bVT3YJ0Opc99yWsY4ZUXsWdQjJUtiWB76lAkernDwwHTnyOsbSN8VBe9+DQplbmRvYmoNCnhy

TKRPEpHkBiGSPrIjJEXuDKLcJMNoRwC4NvLbIm4IRS
GiCXXmTPSwGrWrOYJyPGUpVDfvMQJiIFI2OOP7ANDgUPKtRH7BKnJuAPKnGyDhUaFyTLLnMLStoq0WWU

CxAMWpJAC5HrUkCTNdTIQvDTlyECAoXRNgUQJmOFXkCDYiHO4SSiVdHXMyQFBdPuJuCAGxVKEwoo9EQV

HaNNBtDbFlQhWoFCPcBSBcJVxbGBDbUFRpUoW3COKz
TCOnWL4HNtRvYTGaROC9LQhjDDXsMTYapv9JMUKaXBOcOwK7DEHeTYLxLUQdFWvbMFVgSGYhWzH9KVLi

DRXeTM0RVkRiOFBaDVP2KXnwVOHqYNYssa0VKXVuBNOxHqD2QuZeIQPyMXTeTGmaMVEqWTM7RUKvMGGh

WZEePF1YWwTsPBJgUAM4JwDeJTVmBYHxtl9JPMNiVJ
LzAbi8QOKoNZGfPIJzSPgiGMUlDLO5QCF9NUBzYNDxBO4EFuUxWLKvJFgbHIleFFDfODYphn5UJRQfTD

IbPUSwZMUvRDXbMPMxSLzzWOXbFBG7UNG7POTxULHzPR2ZRjEaTYDhDXs9LzEsSYLvTDQits8DXEWyPJ

NdXMP5THWwJWRxSSPmJQytIBJnADUiYdUwLTWtRMSd
TO7OJgAwGGOtUlJ1XugcLIHjSYXupa9ClBUqyUicqf3EJLtQLi8SsMnbFYC6HQmdXr1stOKxRXLrFABW

Fj3YmqHjBGDbVEEECQyaKKOuFBJ4BKvhFkj6BHC6QMBzAfYzQMFtYVN7ZtFyTxK6XlP1RiN3Hjw1CLXw

Qgg5PJx7QHA9Z6WuUtNeIXOdQ8D4Uan3IKz+IF0g
DQo+Zs9Hh1CkyoZ1rgVdCEhgXHpkAh6ICAXXH8VROm==









                    ID                  Date                Data Source

 

                    [id number]           2021 08:03:40 AM EDT [facility]









          Name      Value     Range     Interpretation Code Description Data Renita

rce(s) Supporting 

Document(s)

 

          Perioperative Nursing Note                                         [facility] 

HZDOFv9iUgAXLxOy69/IPLaqETDuk1PsLZoxLTi7HMnwRYKtY2RbILZ1rY6pDIM7NXuZIkSvIdRwEPUt

lbm
FvAwfHCvGeFHYdHvwXFpAkTVqaEqkpoUUyIV4BsBG3GGIqK90pMUJiQDTpV4PoDRWoASP+Uh5HRCCzwY

BzPI4OKmhQ5XoSo8z3CW0H8C/szKLbzLPsalAVA4GfpDMqyAOFx9jrewyC06MRp01gzN3hBMiDqhhdS7

ckQsuTHfcR3248mwZxPrUwM2XyLskfIH7GgjfP9jLu
gYgCnWsOUpC1i4LLOZJ80wKS/6hscfUR29DykWfNkdTFBKhRXBExFqL23O7LDwPhRmcQ39Fn7OvipwLt

vC+TNwzv6mS2Tj3fWtyBnSYbHBs49G2rrHgi+l+GGvPce0AJNVXG6AwldSmKuLJmV5puHXJ6kX+RZBV9

njAERVvte9HWwWXxEO76DFGRF2nQt4CMMQmJk3gnNJ
ZKVKamjpGk2e+g8S1sMO7nvFXS7BeGeTZUB9vfwA0guXf2JMVpua/oNcOPHd7iZqMw9ZVIgNsTEE1aKd

Tc7roOovEYbSFYrY3VbJtfZIalBHRXKAXeJqW5g12uEx3zbJCdtNRHQ3mXJ6sHq5LZsQ0/tJQyfjIqm5

dqfBHeMJ31QxGFQgHiTm2+EauCc70QEAnWn0LRtDu1
GbXCjUFaxFANy2uj/JnE4ZdejJSyEg71TC96j42CGAEXXFU05/NjFzplHYssM2HlMXVioqzz+eMXKc/1

r3+K/zq68v1gSTlRTV/xf4ZbMKAC7yvYmQHvyonBwNaS82yDlYwWqWlxig1pSPiOpeQcjxGR019SJsCG

OmCiZemiXPbI2V+6v7b+7d180UWq9TSYvMnWqZqLIU
zUzyhjo/KugivccZ02+mtaA3iCe+W8y+50Ls/W9I+AL2FzYwSgbqpG3sZrsUr5O9J75pp/j++on0meGo

3R6DquJUvJUwX7D4/TYWQSEUmMVoYwW6El06lQ7OrUM1PoKuHmcSSegaVHPvn5Cx3aUp4UEJMa4t/ea7

Jvqo8FdSiplu0zop0aPtAYljXQB9aS2ULPSeeiZlH5
LrxzZGMeCj9mGHQZOrhWjU6ECuUXJn409qIA7UxzSnLPY3SR+ckj88V8F8SBWgeELZbrXxqBvbAtOTIz

tLoTCLPZQ/K3xMuOSd7h2gEA6M6M8xM0nX/Zrnra4Xml7Pg2QvJjDPDbKksL9HKaMiTtVmDyNqecYoMm

Lxjam78bfKorqPc8z27MNA/U8kuDKXZnu7RsnvWVHT
W1ydPmjXMNG05CmCGcKBmPCl7pABA/V3w21MbhwDT6TYNBr2peGqmicpxVq7fAmbzCOUKVnCkZCaHIPg

ypwnAEPmwthTA442OinvTPRrzD0XURZJNZlBKoegLRnjZk2XiHIvMBATjGUvV+WBlyh0ic0+61i62Fsy

T+0eZS963a94dUsWkh7ydGkb3/j4G/lAtcBAKHjY0P
tkNSiLpHpU44OvX1pnJAsSDWnXxaF+aM4lYeh7aH1E6oiYaI/UG+6i2wluo8D9KuAmOtpzgoXKfECJPq

rHDGBnk4Tqf/Ot3E9B5E34sXExkQE1gtu9hPp+OrYwt1i1tcgRG6ukHxCAF/K4Ag9cYGF1rSman2c8Sx

tWk0Q/lMDY87VQYtJHmzjTb9etgg70s8Y74akdhn+c
Q4Yg5Pi7+nW7/Uj/6i5/SV8Aa/l87Z8I93Vbsxrd/4I9BSsGOgWDWceQnnsXNPTNmlhmDCWc9C9T19qS

2CRU8cw0UjUKZjLRuatiQdSapFPwWxCHAnUbfIHyNsSJfCSuWpYAIiYAigUK9YODcxSIblNNQeG3Bwdk

MtiULdDENpMh3RKNIvJQ5XIZCqrMZqRAJfSlHwKXRM
YcYeHVFePMAhdITMp9lwYuGuIMP3KIAkMselQK2OAPKeCI3Jq122XR40mfX9MAAjKz8ODAJcLJ1Qch18

ySM4ZSKrYzUvTPZapxOwNNWmiqV1AQ5KVaBcAHW8bYVzPtxFCQ0XOGSnxSBcXG9GZJHltPVaQC9+DQog

ID4+QRzwoqDtBpzTQnWrFPPnBljASjXeXjJ4SHSqVf
JkQKX5GGSvEnNlFHm8SGT1GcX3BDIgQVt6HLlxLcDjTrmkZzFxQKKzDkHpNEyxXJe1ZII3LPGbNiDkOf

u8TXN6QUA6QCPzJFV3IMS0QnU2HJRgEVU2WET7XwK6ODIcNTG2AQC5GeF3HSEzVhKhABWvVuO5FBMyKS

bqFXB8WET2OWFyXsGwYTy2OOJ2HfDsAfHdOGmwEuA3
McToMyO7QMSiQUF6NirkIwOqBOL7WBH7SGFxDiNuQZKeHSK7LrHdGgHyOBe2WBW8AkjmAul6AZwfPsT0

GbakVcKvKQziWwL7UtlyNOB5PTL8QdG2UqbeRtUsLX1TJXWzKnFzDsq9YIBwXnB7YMFzNFK4KNNyUeX8

GWZxLqAoLUZ5ArB7LLQgDBU8CLSuUrC4WZOdUdMpZT
F9BXUmUdbtWNY7QJO4MNN2XRdqKiPlCKOwBNW6FUSnTlFlYAA7SVS5XJNbZwKoGMQpYXT8ZCNqXfk9ED

A0FwMXYeHhKYX8KJPyWXTpDOnuPdncPEV7FDZ9UVWsCnVsDUl7LSI5HPVzMeEuWOx0XGI9YIEeMpBpGK

f3RZX3NWJeQpYxSEv4TQL8WJBjVaZgHHm3TJZ7TOMz
MjFfWFk3SVS5WGIaCfLcARp7NZB4ZXTtAoXtFVn8KGQ1UYOnNWtePMm9PFP4EARdEdDeVQp3AOZ6BPIf

PyOlEAh7CEA6VAJhJdMbLNX4KCMcPgLtSNE6UHZ7AsU9BDTvVFD5VND0TGQ9LMHmSrAgPIohMtPtNpSi

EBA4MXV6NDHjNfRnXtX2UWF4LeJ1GRRqCVN9XDMvUu
YrLyGpMkLlQB4QWJS9YgUuHZV4UGXaCkKzHxDzIbIqMAJ4RNY3ZDTzMWY0XXqbPBT0TsSpAwTbGHvuRx

K9XqGwAlKfJGdwVmA3ApCwWaVrZGPzHQByOdPwJQS7QwI6BsssSbC6UJV0HwMvRwumUwb9HUW5JTYmRs

wzDuOyWAzyCvU2QdnrGGdgXEa9KRQ9OywfEey9KEh5
EHR8EKOgYql1IWrcSwI5VfTeMyXqYYiaQbH4AmoxDuA8DQFnCYK1QVEjTVF8AXW9VpD8UVXqKYR6TRQ9

MfZ7NGoyQDM2LSI9GjK0PDFnPHC9WJB4ElPiKzaaPny0YYS6BRUhNlnkHdZyEL5PIIK7ZALzUrMbCAJp

BCN4DUXtDtJzCAJpZRX0CFrsNdPlRIGiFQY6MZUmJd
JgVZEeJLP1BKRaIeRoHNE9GeBfGZ3QTA5zg9ZcBWr1BXYtn4KdBGqiHUv8SAesSHNiN2X1dCNvNy9jzB

Hij6PsmAJ1y3DIQfMtTYNuKb7luT0rrRQoMNNeXEspRl1yKS9JJXMaJB8Tx0HwttXnQKQ6U1CfxUlwfQ

ulrBL2QOVhFAZwV5XthZScXrSgUFpjVUMcY9ZhCPdo
EGXmKFcxWEQlF4QtsmHWPt20APgeLN6xAMYqPFLbQSH3SPGsOLdxMKTqX8o7XPxuY3RmH2jwCLCoE7Fx

bBMjQV2GBON+Yt7PBS3rf4ThWGd6THUnj5CrLQuhGZu7UFidYJMlC6D4nANeXk5pdI9AiVJ6aKHcV7Oc

jEBDmYKdT6Ytl5VJw514Z1OxfBZpXYClzSBwJV2nr2
XubsbzO8wwMG0anNYeD74hwE0jUKncWGEiO0WkaoF8Z3aboaHoBY8BPOH7A1wbziMmEGIZBdVuDXNiM1

kotQexXWIqWQCIPLhvAKWpQ0KkrtNHIKYutbfbtO6cALmeIRKEEFfjJZ7+DQplbmRvYmoNCjkgMCBvYm

jTWiIeCdL6IRNeMcLdWQU3OHSwUyuhNsM0BYR2RrB2
EIZfTAl8OMV2HpXgIIWvAfJlLMWwOkTnFOguVYu2OWC8CXPpVeNhZus2YRM1HXV6INSkOUK5IRD3BjV0

UEEzVBO3ODM2OyL3BJYrOCV6GSE3SgJ7OPHuPjl5UKT3MRD2BWEvSMakXQS7YYJ0FANuWPF1MWCzFRQv

KkI3OPD9PnG4DnHbZgOjHRB3XlW3FUAwLti3RMgkUr
ZvCumaUXFnXOQ3WpQ9MVPoKJJvXZvgPiJ2CaxxFdK4SOn2SRV8ZzOgNyV5NWXrPFM2KmDiTnW2KFw6KD

W5UwgaRsL1ARGyOYNJKhNdOfc2YMO9MLWeIjboZJH3QQZ9YjOsNrYpDOU3AYB7LwP1MQKzROI1OWO2Cj

GyTzckUCZ5ZHE2CaIvBwBaWdZfHCEaWAKjUcNdPLPu
IGC1KiN8CHJbFGQ5SLU0ThTyVcTmSWRbPMB3VTT4TCVjRNBcJMntYiF7KZNxIIewJYYrESD5THPzFtH6

FLX5LOFrRyHjTUy6QYf0APO6HVBkGsCvJEv3EIL6CXJmRkNjXQc2TDB2KNDlTgNoYMk3EYU1PFOlOaYh

YYp6JMC5JWTvLiGoZSj4NLO9FUOpDsNzSUb3PCQ6GH
CeNzEmMFf1IMP9WLZvQwUqCK9RYRQ2VGSbQkDuVAf0XWS6OISeRsUnBAh1NTM6EDLpKvPoCYq9EQMiFd

jmFpRvXQH0YfB6CQPvTSK0IDP0TyLxXbEaJKZ8XHLnXxX1HyrlFvmyVSV5NvW1FHHwErLoXHviLtZ5VA

MpNVWtYTP0HHKuFmUqXuGoUPFmXsTMKaKbQLv5EDJk
FdAiEtFuXfRpFPSsPvBiDhRaRUI6WGkkKQL8CdAjFIT8QVLiRMW6SukxGqZ2CPI6VyF7ZbsgZsU7JHW3

BrIfCOWvXNewDfC4DymmQaD1IRY8KmZ5DqkkVxv5RKX0WFZgDngxXla1CUjjRbV7MrJdPqq4BX5LSMK1

TsyzHlo4OAy2GRZ8TcnmSXp8WRd5YKT6AgIoHeWkSN
inGlA0NuVsTqG0FQO7DyH5KGQpASF8FQK4UrL2WWSwNMY1WMK0WbO3BVBbVQc8NBFiDPJ4BZHrASC5VI

E6PnF5AKZsHyj3OAK5RGOkObltLjb5MOM7AtJTByWbZTP8RCO1PcE5FZTeVGG0BZW2VkI2YDZmBHC0GG

RyUCQ4RYElZWJ0GRF8ElG0KDArMUBgKTX7QlM6FDEs
HISWSeCyWY8nds1KKIKfDOEeHnrESaRpOImLDnEjMNSvFIvsMT3Iq888IHUiY7ExmNGqdd4VIQOpPP0D

k982XhLlWC7ZnlusiO1TZWKfXQ6Qo9ZcprBkXOQ0J0TqzNqzmFdulAT2LPMoQMDrM3TaiELkVpTeULuk

VMYcP3YzDOhjIVIwRHxxRLPkZ8XcrrXTMd49QXqwXM
0mDFGfXICaPFX2ZVRkYFteDAJrG9v5QMskG2OoG6hhPUEaO1NmyMKkQW8XVKD+Ad1QMJ8li0PyEKbwGB

NmOJ0eys2AYRK5HE2NNRDyRJ7DkBNkN6MvfvFnY0EdaCpjWQ2JzxHlQUhnJH3EEDYgPn9uxL4QienpgP

9XvoYpOVieOw4KlW4PzxNuUR0ed9GudfbQRtHiUCAf
Xzgza5GRvPGhSYNeF5kel5OLyLFhEPY9VO6FHSTrQS4BpIJ4qLPwZHCyDMAAAlQoAERtRb3qqFSzk8Ih

yEI4n4UaPXQzWOUQZWfrGT2+AZobcvKiOjvTJvLiULUoi4YmAWcaNPm8O8OsyMBnxvQkHyoktGZYXZQe

XSUbF1nwrcz7pGNbTpD4JXOjRUAhR5HdSTTjCtL9GK
4+QKaeNQB7xmYkrK8AUIDvuGw5JSKF6st5M2tBxuRMGAMoEc4SAjDKIMVYY0UfaR2JJuVCNQMGwGNEMQ

nlLzOMu1WKETECtVYAVfomPW4j9HjoRah9InARQ5Z1t7elscDrnDo22o//46yY6719rvlHRW5fRa4jBy

7CNHBoGRW6PgBfvJHaqFkqInDfPMD9XEUKxz3nPml5
JCt0JGgMdKY+ZTNUUYh0bDtm6C/90rvl8Y4VTO4w8NAlJV77sgKZHj9uqzmlL+NpAk3D80VCvwIjal/R

mfMWLlqd/AvgRE1PF7NbWXX/UgPJlm2zTY1c7spMmH8BrTg7OSCGvys/7BdlfIYjw9aR/5WOZWb4d5Pf

OeLPtG+E1UpGxzfiUO++xjNmN6UP/cLS5WuPeD066B
L0r3d/eEgRefayfZS0SAyT1EvQCY/EpRaSFEGDjs318+Lq0AKVv9DExnCm3dGiAPmLP9OoQRoVrEpSbh

fcfHS3K8tpxbJxspYLSi4cxU76qfMbvaKQrtOHWzVdGgaJr+cZXOjbqI60mRmVIIuuM+1vN6wGOzDnzB

AlIdcYfVoZpYWB45vg/N9D8lgL2WDrxbLt5P4TL2Vq
aikSrC/pGlqi/P0lmqJTak+SjCeOq0HhtRWzZoPN4SZWm4cLQuVQIVoA0t4Ywmo17XWo1c7fwpVkkgYz

+L5i/oT4o2sWplWTmqMUTkW9TM7S0m1kZsFxAEuLKw36LTunzX8mSGKmChi8InAswZ8yRxtE4+ciQVyp

KHEc99gr1supSgJ2JORC8m6JVxhWzgLOi2C0GeXDDO
gK5bcvbV562jTc7U1Q4Npf7L6iyjfWlGmwBpsmcIrf1N5/ew2UUGbqePdZN6OEz/+m5+lVERBPyflGYy

MJKYcWK25LD+rQFGN1ThJ1RBeib3a6Jz51LdyJbwIlj9d4ocyD+kZ8YiizzPbmV5m9db30RGuPHm01c3

bG2gx2gm2imAoybtG0qM8pr8IKuP9hOgdQ9GFyiK2d
slrXW4DTMdvYtjEB2dNhNxoTxfRw8L01Wr67GxirkJ1Nm6H0tY1BdlGdMFyBGQWIvZYFuLOoVx+IV8UX

mnPtADpXP5nY9EBnJ6fCfva0yy0YTKilbJ2UM1r4QR5x585kIstvMJ2c6OlYrlgqD8433MV8T23M9OUq

iEaiKb5+yBkRiBV8Uf9Lqv0md0uE5GZxw7fJzMqkG/
VNLUXrgAYrP8ShsN2iEB8wS5V6zWuhDPcelw/AQ9XVGmmsJwqfLoKXhXbRrod8C66NjA/exwGAxbX2aq

HQIofLf14kkmEYKyPm5hntlK/+9c9mSE3/X0d1y+zR8rLF5PBcvPddufBs+BHqekLCkwCZ12oe0dmR11

AtyS7w4SNBGnIFAhPHcSNsHBTCqC0nBE6h7C0FD9BB
7fCRDOVPcoCc2q50dxx9Mc/gBth6SO6us8Z88OreB13yXgZmfYsjxfPTO71e7jPud7gytTD9ecX55VLr

R+45eV44Pvnh9tUYAU4xgn/AI1kB99/iqvbSnNrSV56br1596Kr0/+m15sNWI9ZludNb85wLPp/r3mKM

mp79SH0sfjSFGa3wDAd6vT6OepNU8fS6vSO2zgm7KC
ZhPX9Tt+ZVMdbrSOghX9pZPpSt5VST2c/Je+ILuuV2TiDCvBpfw4h6mB86WOCL4L84yi8n8uSQPO1aS1

lb8YLwXiOEco8Q5rPwVc6Rd3hr2U3IVl2ekqiLDg2Tu/JBbSxGwV/1GBQnGJx02zUwhwQgasxuQXCm71

0ZUL5kbnRoBDUP5evKKg4Imnjvk77Su7dUbBf+xTxe
Ox1eowVTjXK1RmmX09KYb8FtkWHOvMoQV5hCbbXwErnS7K7P3ayZCeFoDuI72uezbOoVaj9U13U8pa4A

Hob0B+Qr1ze8iPEncYNUpOoh7gVeKhrEBSVqBcIYOuWyIA3P2KyhkqJzk10NeIKUJx2wDnst3DYU7cyQ

TMJwrEG4IAKLQNpanoaRGWEiZmv3STseF6BSuDEvsJ
rSHBIbBLaj5V4mpmXhWbSHxFxe78yo3CMz30fuoLw+kMvnp1bCurBatPmHMp+KcYfwLIxxNy6fhHvp6y

tVJ/xbDhlWP9zO+P5TpaCxOswI73Z5qoOINNhtfinRj89uaSLVPxWr/7IZxnm1fIhxpLxp1OeYPQ5NlV

f6oOBV6PUEvcykzO4+5XCmcODYRa4P89MvH5qjklmX
azG5m4DKY1WUfD82WLpiNl3VoxI+N42BmZ7DsbOA45SQPo0RquxhaRTbM22hh0M2pew+TNkumGY+rUuN

8aTMe5Zuwcvja/VJrTMdBqTWuSatrW+I7qVtSeWHU8mWYxfh7vDvJivC/Ngk3zCJq+m8AWqjK1RrkhIF

0lU0FyLLFpqXOrNzpWbON0z2pwzSIpLrfoPsxin2ji
/e5T9dt8AsEn7Qj7ZuW7PPoBXgVUldZMQBcuWEnT1Dygn4BJ6iXxRMDUtldxylvah8SlMRZLnnhm747T

mxBoE3E27T7g9Jbhq4cvuWi0labI0gbx8w9yIal1iivsoT42NwMfyRB8uWJ6bo0hCAgVdHN+Ah7WVKCj

EX5vGprySR6JqxMQnRuc+IgqMUPg8asIkWNO2gGXf6
UdOkWcbgaGnJIxRHPvkP+a0dI7/w7oMFae8NJ8uCvEhrV8QnQ/h+KD+ka+FbohG4V9G3pB5UOfFHNYuH

+H6A7/wnhJvIb5j0IZGFWbnH3HJCCC92bzqk7UqdyalgqxEJMv/DM1a+p8u4i0aTjHtgl+N0PK9eHX70

BrnSyzX3t+NShWdaCk9LMGpMYomhxG+vMeLZ3rSROB
6q0XzF3tFa11M5gEVBvSa5g4ujU97aY/CpODzWon6qYNBD56Sx8+YXjoMWkQRKdBwHqC1fKvFJAALVUh

DK8IwF/HI/1VRcGxOKkoUQcKxocHCnywVgCHjGs4WQaLEvFGC87CnuAMQmow0w+uS4QpRABksH7yhD0t

Ij8gZDpWmemPLVPW8KfIVp8n5htqlHkni9jZ08cABD
dvQBNTczJ9TIPkQ1XFtnClv9NmsnnFhlKA1e0Ygv3RWhzW1kdGwGaY2mil1MwTaUz/u7gGnq2pr19BPy

9pis3KUI4tYw2GYA8sUUliTW3YuVwEBF3TPQvODdwrRJxQdHVtJRKUrPupESo+ir4vWqlnZWofHVjpIg

VNqjbih8lTWq9IeYHeuQwn7jYmw97LggVjhsyrpbbf
JIVYdoIA9IdzRo4pnUsd6vOTfxcfiJjkUoMAB0F8OCKmpG5AcQkCO84vSmp8GJlXbzKl3FmMEfQsKNzw

jKDqLSL/hJx5PemeRfpgNzkx2wAnuYgiDO+6XMDVExFgD0WuSYQaaCUjbbGbYla27es28VlL58Kyu6t0

v/jPmYPvuQcB+6DJ76HKsrlA1AOaaPvI2dORFrThTI
7PqyGXzPLLa0PV1bdzgjvC8lGmFWr3BhbBuXYANHqs+ZiU3RpkHnW4VELTW3P6+2ppJlcrBtbFJsWuww

3xCeSsQRfBugrTF5wNIYfR4hbykHqjEThJ96+qkbPaZ2RJcvQF0e1S3owxQ90/rH9Y5T09T+4RscL/6L

6upJmaVovPqP4K7Mlkx7BavR4ZsSaleXU3lUMzXKIY
0yF61v8SFZ3rM4vaGF98ykVbcoQ8wA+N701+qixncIIVt3+z1PzhW0EtwnXdKSquHLeY6+xborqa/vkf

9D3NcPjZ8zCy7hj++qpKG+EZkskzzVJK2NzqFOx0uqES3l1LzfXN6YtWUeT18PBhw2VucirCoGCk8FxR

ZhG4xD2B3UprOWE5Zj9KMbRYiOB0DVd9eAmRq1E3D3
IWf5whF9Tv7yT6YxtOqMV+zo5a8vvzT0xuMS61bWdn6bxsYEIE2jhNFErh1E+jodc1ghh7M9huH0GAwp

UHMtT+veE5n60C3OytwmX/5TR8mG1hEhh0lZzqsI2JClpOgD07V3Rd33OaFfNA/kGXtuYbUQNxUhNCSX

5typFNakTW1iimxVH7D3x8OmduEyJeUAM4ZSIZGFr9
sqZgWyTcMyr2n5ygbl22p62Do1lf91H8DwM5+qbgpFIT0ljoGDzj0XaYyVyi/3TyncBTP+kM78OiTLMt

FHp8VP9mub8FumXmqxEugcICTRE/iYF7FEdmNXvmtXRt955Am7c/aH0ieaFP++NmnTZrBCLCkmNBnY0d

mC+wumVlNuO6hjvGmw+qpQ0BJ54cjo3D8SP6aW1nnF
SfsVz/0ItqaATsUFibDQ7u0UzWqyEEADbocgHYvtMTe6WN8xV6mRpgXHDrwONx+Rni3Eu6RvGtjuo/Ks

dEOjnHGG6MPh4iHKrDDyZIjE7EwDtDKH4UP/m5v0mFD3OL09wZbaUJGm4gYwXEi1xKMS0h5lXIE16ZW4

soTvr5ez8XtfhB4TXU+jJMEsvDStrL6yEoJlXM1FEz
uG+yKZFkaoeYTmGeipYQW3dTp3F6lH3fR1mXi8xByvpbOdGJJRvRrhuDhqtyMSvbt30Gezq4H1Ymlxv7

JweuMyIT2XXca3jCzRc69QJF6u2BqEYZeVrMZ5kn6RlgFlCnf1MDVFK1Eh6XDqvL1uewIPg+BmgSxKW3

hxxcWUkDMr2/6rwAdBCy/mBrefaRVn+pQnQfVuhp8Y
YDTL0aGyF2nxQ3se7hZMgKCXMkFeCWxARMgA7yeQskwkfa5OiIaBApyTPZOQAvPj/X+mD6euCIrPO4aC

iCScHYIHwnQ9eQPaTRIKxzwkcBrHw3TI8MzRDBFHFmOsDzXu1Hkphld8r1iVKbWb8QpugoifKTtpmdX0

TVL/4cZKq2+ihJyj5UmgZoyUS8ZX5cQhYHeyWhWeGC
cRRlHchTjXcZRxKpx9poa3vS8th46cR4ey3xoYspjHOA8QpKfYX3p3tKkV1FANJrYyWMvdsDQx7M3B6Q

iaJn0S1KuhRxEATaPqLjTgkvd5wLDl6P9Lvkh+vGQwwc5Fl673wgncT4wGA8kEqrQpo54JFeYD/PpepX

HDSRR3NleoXYxHJieqhgKENnxH6VhzkivDizd2tNQ/
5giplLibFrv5czSqvvC4Ph5FqQdgtfur0ywP96sIBeGBIm+dbLnezDvcMYoAhRCcmIsDBTUjU1kDZl1a

ugi8W+UoMOdr06B/UMlSCBiCNHg4YnSWReRd/pJfaGsQugEYE9IAjwa79ZzjzeWd5l1uvwspvJn3E+of

18sfQ47S/17c15A41Am8aXrHnCMMV/tedMrwhB1rGz
e6be9U2Tt78jxRTuaVDPQT5xj88HqWw8W2MJ7nZ6cpop8GjEBKdVUseDBLTeIX4ck8X2cepmis1uqsT+

ECqEEDW7apGWJCewTgSCkYFHuN3J+Wjj5a0IPqGhjAAifIdL6gKJ+CpqP+k+x6A8/IKK4gjJ5cogKOgz

AozBaJocuCRLR0iP0Cl5Ffs0WsFVyKzaUm9WhmITkq
X6F5+ccHaA96hxitRYTgndm0P9gxw8Qi+GQhwWl05In3I5ZCHphv+s7WkO4XkPT4bwlO0VeTs/cEfoRd

3f49E5VgLacagIJ/FtDoyF2wolYXHIR36NG03K+mSbBJLNRjvhipuK9J/7Xo1/Eo6zc+zUq4jIBv+qUS

pKoavFl9zeEvhrDzzd04NlWDSXeRPekl6ydqVE4rP3
2alvfirOSVHuHnXTwcWhDZbTiRrKbxIkX1ZBPpWLjNV6S00RqtXVqT2RuriT2xt0MvvpCuwsrEUQK+MD

pQ62CYxP6MWzvY22D/2nPmXicvLifZfJjmOEjmPHm+0RlECCXd5knspUp7LE9qJEcs/8IajIMufdNt7E

/LOPRBtVBACTbajCROWI2GkXOyxzhSrSmDETuyNp2O
UhqdTAVgSIrviO543YmN0QzPkxud7ZO0AlfiMDeteHzw92RAKsUwhSEG993xmgzIdfL7I2ccY2NNH/CG

5Xv19umDroUvs3L/TvXmTifOdovKMvKjS5jlKK1LDj4UEjBe84bYGOc7tzZScSmwzJny6vk1FjQsDwdG

OT76Z0M/Enp4vP5EnIopq3tBwcE92TCghmR86GL2CI
loDwTD1ENZU8i0l7Psp1Dyroz/WfsbU9p/EaQ+AP2v6HmpfoyVmYiPT8GggLM7+qP6rbmPQb23WCKJIf

dBp6Es+QYtZnD+hLVZPt9QMaUOF7jk4vwCbGhakGSgSqTb/w44+TSXz7ZOOKx1DQIbrV1h/azWB+hoYK

olDcoSl70V2WRPK70nDWVHF3hQv7cP0zHCcjCQ+t2d
pyjrBrf+vJ7Lv0YMIK5Fwifsf0Lu4wesZyvv6qSk9TXjGbf1ZYNgjwLYuvpk2LainkMjwCpONdMyPs3y

bGbA/RZgWA+oGLgSo2bUYw3ptdN841S5nQm152uBi+Y5+ux+pW++ozvUOjpRyRdlbqNrjGPod+hAJe8G

Bn0mTTQ2GI0Swh4ELkUiejKtvG1YW0Kgn9d+IErgdY
PTNE0VhWFfILojdoPa7TGR27fxnkXslqJE3ioTIKNBtlqaCTWG2tuJtQsK1+9vVf2ehsmGw09mzReaaJ

MgHt4UE/8y5URXVTa0jEIL9kYiNTGxXjTU2VjjHnNpcK2EO+y5g0jOlSssFa1WpyOZdterOxcwgKuIuX

oq9Tkr7U4nlNRzAc6ixdZ5rWTHoUywpQlkJLuHKSiW
sMKc9T07EmtLpkrc4yO4oHw/ZKhQgbI6QCE0KggBuJSdaMLPyf5ooLmt48wYOkvzW6aZcRvVxPiwh6mo

fAEjhEvC4unzmzRNtCt9gklzMDD56y1o1b3GFB3x2Dbw9WpNyhll8LAdW3OcurgHkn2UMHvGpsIdNWQv

pGv0mcOwNkpkJE+daIclMIj/3Uw84Bmaz2RcLbPx9C
rQPrIXvYYSGsHNa+gu/VqapU+nSY1y3p7d3bj/MSM8W9kxloCVcY+b64cwEja65bIDR2L+U2UgvdlGAg

sd0mF1G9OqdFIRW4jW7RvDuSAq7D3W2sk92NFTWMsppSFA+u+YuiZzVC8URyMbXCV4C7kN8UWb9DRFC/

Mt9F2sD0PN0WYZbBKSrAiFMhK48B3GQkosQ5bv58ei
U/ZcnXWKbnB5SGQh3Y6XThQ5jRFieVW4oLlysEiOTrOfoj5v6LnRDMb3083QaPD9sKw9HmsOr6nH/wa2

2W7Ckw9wWGAH7t1Jln01GUwNIDceKfj8LOOxdfiAFz2dsAAbVZNr1Dm5J8UwR0gpxi9YLS0947rj01p3

KSf5E9KsJQW0php7TrCJGedNr2A9KP+RhLu1QMMDyB
qpIZl+D/v4LCqWGPds7v9TwmmE3L9L5UT07wX/W7Z/F+vkXkNV5t9/eHy1GOPi7Rp3LrmB1SL76h1ZOB

Tg7w5/n2l32N2H+O0qPKL/TZAIm10zCV2ot+qOpagaR43ZyrfX1akCqWEQnToCZ3u1b3+pIErtBbN5u5

lqd8OSPFDs7azTBPEj00l+dbs3SPnLsYINt0DpU+r9
Z1GnSCKB5NyTTRxSOXhduy6vUC/fz3BWF6fSllN86jgi5P4/uP0S2T+Qr6d+yGfYalWmJ3fKWoMp6KD7

rr9Wsql11MG9CYTo90zr/V6HeM57eAWGd/+4toZWtBqYOoC9sIOwLKwegs0sMv8dYv1c6xq8lAy5wTAp

gtiV8g6RAAlPjlXCmUQ7v+jOZ6XmiSMATeocT2VPt5
4bieWbPnhvd6++rn5YO4Q/vj6nq4Wu4aVIzdqlMRfwR01JpGt8Nno/QrrrQZ+3824Bxu4L3Tz4d8K/Ll

YX7e3e2zAvusCMCOQlChS6evv+9D9OT7//+OBGJxgz4KYrhPvqjDF5iV7A5d3lfIhiIxw+x7uXlFcTNF

sszUp0IUGpLTgj33h4ZGziF+xWkSfGg3OIGnP8so5A
tp+V/ovIqW7YseoQZRmiRbvm5F5l81txI0Y21IcRmuRh6V1+deaPF1X0u/op8gNnWCKQ15DnF3oMWqNz

E4Px/MJ3Uswa7YjWc4c9bCNVx9Wn1g7A7r3M7JS671H+0N5wnj16Hek8qDg5g/1/qf72X1dSBUkQT6Fo

uRz3BZgaIE5JcaCVpv7/9lp+llT8mO8+n/ahfm4D2U
IIJRzH3c9EzR13cx9DbN2dA/fP+iPtjj/vq0IWTJ6bCslJBYxrIjIY5pKoTx79DzEdHc/jkQ9oocE5p+

vnm+NHG+NDU8jmj8DCbsG+MY8JHpSeVw2B/0Qi9axZ+tjttS4jmAijREGmHJr7lhzM22gDdagtyr7Er7

IsOncns6r4K03bqj+QvSh4K1hhHdxu9yITLTxQYbfe
+1r4aQy1nOLwp/hmgvV8d55TsYQWD1T60SXgGxqa3A+PBm7eTsZcIuPXhV4NKp8cWm4+5qDXp0AucIxf

vPRyvyvmEMH7KgczVislpcQlafUR27ga7qQbbmoom7/p4I/egkRMj69XtTrcrM9J/K9e0JHsf7U7zvPy

ursh3Wicr8E/swi7OdHkeQI5qZ+S+GmP0aqkK3Rsv4
zwc+KFADyKlLjlK6prYwSe+Ly1bWlNq6cR0gRI1NEqitjKyYQHMf3O969nmFu3Ut0mOoSeHUnBtjsmOg

rLTFPvVwrcdZXXxNOc/cONLgi9Q44jwjZ8gTLfFc6v61qcwc721mafir+zgaa5KIPB0bMcmGFURo+vsH

48/Mhz0cLh3zcVGvVreE/zI8/nm9D67Bwl+8zm4J4u
b9D5+lXAggDNJlad3F2He8VFghAE0KCMMB/2pmsNeRuPhSkHPw33ncns4ssaIpXL0Cwbv6nGkj+fy9IX

gV9Sa/6JNS78ghinfQZUQLOci3664ulDxPbKk/yp9a5p4K8Cc76M5ify334DeWDExa9Vqi358adIFVfB

0dP4eNnL7XdlsBM0vBpBShUeXc0SK3p0ni99DFSNrD
whsAbCkPcV06hr0JbK+h+1HI1Hqi1LAY5SCJlTXo2hwQgx2YlwAFg1Ir8KbiOkrGlkTDHB69eUb4ldSq

k72N65rneB7Qcj8G3wfAx3rVvztgbWh7VzPNZvWYJmp9IKK7oqDP097V7r86mpS+c9094lmC9DezM4y1

BTcCe7eTMS2AYEkfgIZF11tm3KBK/cckNefWI7lAY5
6lqVIZsByt3xRteFLfFHPpObpAD+XYWtobav5XQuSU+LNBMpjlRPwXplEfAiBRKg2XB2qTTMvXKHSlGY

b3QnNGuxvnWB7rqAONrjq8NUI+IpxBNMQ40fI75pAM48Z+B8Qex2eFTRL/eFTNAfdzMOdGLTU5MwD/cc

5pCBj89qePkwjr2Xfojaq0pSuWNtlf3ai++/AOx/rT
UN6qgSwhJfZQy1aQbZmrVva8nQpUPxb5FI6nw4R545X5HMnIvSzVuabvEFGnFZcZimnh/+KZoHOc2KvZ

i6gflx4a29MQNQYip+a55eJ4zvtjgs7Q0Tqgdm6rOBfEKB2BubW52/crhWqZn1GK4P+Hx3hKhgdvin1o

Pr/IwstKTg5W/9QgyEAaGl4H84K8kiH7Wows23aSFh
uOAwt33vx1J9QaSwBTehl9DRjFlw2H7kKR4OA/6rgRLdXTB2JYwXgacAm2F/U73CA7rvhhAxnymdJi+F

tmOL0m7E2qz9aZoAscMULcZ7AvGIyK+iKUOv8LwP/ICMmmz757U3Na1Rdf40IRfH4F4Wf/hvwbp+Ly0y

vbWYmhBro8xD67wHfVaVAkFb3INhg3Ec1ywgX2TVP2
bOAhNgxmJXE8QxZz/+SBZBXGQk2rlQFBd7tfpji9ndvKKH56Vws0uiT/pPQmdQNeTd6LD90cPp1HaDmj

nzznXqx2a7citc4qGVJ+LV3p3z8HwWXyquqXZJDlEX04cVf5Zmpkjdn3348g6Ec8/hiAPtilIScukn2a

bz3fjZ3FD+H+dgD8V1p1xynlnQECTuzjxIfQhiIhg+
zRZK2W2TSIvWyoCuiKvrgCB+zMGtDyivn/L0a7pGtGAdKdxTtHtJiullQ22HCVYTDyQvst/GkmPPevbQ

VW8Cjdvju4lq2atTinrI+A30QJsXFhaux1n3tn8jm3t99Ayn3YbKYx5NMkKeoPxb44UzPcfN11ipu5WB

TMbznTA+l8JwV98PPfKfYaz2ao3FXBx7/daFaP5lFI
enmZeca3l8Jbb5p/nAhtqH+HJlNUhh6hDL5Aa45gz+chukf/ABntyu42f1wWd+STi58DhZClszX4hA73

QYO8xrOT4Ejlt3/3D7SbQc0rmzlq+WTjr0Ruzc/AAx9Po2KAuWdnrfYwxad8vMm5di/z4qOig7OPnX7I

Icf9M/gudtaIq8tkqk1d/LU1RdnTfL/ouwmx5imtNu
0I4fdr3f786i87D6Pm3vDOWJriHJ2sy0XBooLIbQ8PVc6DkncCey6gFnVeQvzkSu26E8lDWBirmbxeTF

7KUB/zZMOM5MYJ88wzulipGBqClF0KubS1x/rsJ9JKliorwdYEY6TkzmvRvlcT07aQHUGSXwmdrn0Z6C

7I2d1Mdksvdu+U6+/9N+/J/2y3+y7v7dipndC5wr6g
e83imqKq2h/AhDuxqbrQf7DYCrYdFubp4JivvvhbCqi7slGqNRvxtcZN/afVHxVlpCfkF12c+EJau4S4

I9D/rmie6dDm58qSwh0wemtGpIrqid170xsb2PmAw54q0o5rEoRU1g5JV2cV5NybtrdZs+FE0/2eaz8u

27aFNP47CB/dsVSqpCN4p6WQuLNjq6PbDpiH5YfBoy
HbzoYINt+7kfqEaRoalDlVVoZkp6tc3DaNHob7EWen2YcsbDBbv+m4GVuNWzNr1x2QPSwJc3L2Fcsy4D

n4U9/GdhoP3LvOjZ/G3MW7TMcWXuZ8D50ad5b6q7zrb94T9djIIzn8Ih6rF4dacu+C2BiB7xyA79mkLL

+Y9N9j72sHl7mjZYapeo4bg5B1yDl9jMY118DcdGl7
4KVTCnDD7907t/jodMjxa0PnkJ5a7tBnG95yONk428Quq67Ru/PQmGqtCyFARK2KAcCc1R+DGMk1/BD2

g6/C+UFalrIej9nzUWWxlv0yw7qHj0dJubu/wQhIH3YVrVS0yniqHg8Qz+dG9HHwuYp2tdZtXPVZ9gn/

/O5wrMr+WyzpaEJT3tGZc4PdAym2WPLBaSmigOS1ax
PpmaoU+jMN2R4xlg/QOapz9KtA7xKbf7HkY6DTz0cOKHN7uSrC94X83KEuoJ4+AwDTR+lNEFQtImz6Pb

e5guCr+3fYuB5O0fA8HY7+uzbStA3LEK3fC3ZrR2VQcFeSU/YTcYkCj1o7cr68Vyaz1r/rL179Ng02ui

nTdMxkRc4691oUu7WEgOhA8nsycnXT+L1mCshy+h6t
BaFs+HfOiG+nEJbsfwlekLPywquolsdC39yXVyunTekpI5Vixk2S7llqnJlaQ3TddX1WtsQWq/1++3ZM

C7cRC2P2gfblNXroLR6h/JdyqRdzcausvRoD/ewCsQ35srLxySvhD4aatbDm9K/3HgVujX+gn1VAX3N2

VquFc1AkuaT3VLEX3sdALSWwkxX/3VTl+cnmSU9dqM
c+hdCKtk8szF+Z2DOk/nzqm9H0FT2DfZEd1xGgy/Ko01lqpxGI345FjYvVyUeydfQ/huN/Fo5QgmjCwj

i9RL/tpxLJNJuEqRuJNHXZiNgdnqon3HEtkxGMpXkfaehjEawmv0RgPqT4pU/DlSy6M2qiUXR32Zah/k

67YOku+Ee8N9rIy6wiq668oZeMeba9Dx6OrcE0Zb6J
WGfOTeJbe4QpyZEv7gEXV1xmWfpt0gL3b8QyqTBnVUZAXmz/XcLbfXf5ThM1+d+W5v/r0ToCk2Z/2Z2q

4FMgpwSUidfJm3PaDF+sh+eu9z6iU8Hqk10u4A7+dmda+L9at43DrK1vq49LQfwKtqVLsJqfy6R0T3jK

Lh259qW4wgSG/3c/zh1m4SlMG+nd6OPzAL5fF2lrL6
/2TY/vZ+ohzR4sV1rT7mmmJgRvJDf4gWlLL9E2iD/BX4B/CGfU51/G3+7RC3S/1+6B6+L6T6l4eT7mLf

TTfWSrepu11ogFY2PJ+LM/k6YRo54inSY/arcluJQcA0H5i1+ssPEyoBuEPdS6T6fHthZRaHbShgxQMS

/D3hPke/CrZ6R/WeaqJ+kvjRPNLEOu3q5ozKJSxeQt
2+dHW/N1+/pscWE8c50OjaGA6fXFHpuJDgDKikSxWa+/cT+kWUy/p01QJNIFE2gdG6GpoNQ1o+S1gWJQ

/4NP7S5dxiis2Vu/P5aTalaF7ilZbFXh2BQu7+uiZhSleXCWcS5h73lq9o4I2Dr+oKguC3B/ohFnx/p1

c6zvV7f2Xc4fEXoxv7XJ+HGHD3cWCm6NjqeztqMcwx
hK7ySDLiEohFjOyhR+Ym95hNqWQpWtOjM0POLZaNaZLK2u3Ofx92HmrrsAwUv4dD/466o/xVwF+A1yHT

HDCYWaAt7phMq1gLD27BG8TxQ37YCNEsrjnvtXFkaO7aImGvaJi0kT0eE+6R2NlBQGIqVnpt1fmfg8Dr

Nw8RfexCZap7U6wtFKT0L2ap7UgGhYikdPnvG1rSvK
+3KO9y1Dmt6+ygpZ31OGa0sFMjfg3ClZQXe5fdDQHF7JkaI/Q0gfM0lhdjsOAMDYGVWVNl1qph+VTaWd

AwaFtimTfp3riKLiBkwueg+B328mnS/c+yI0+uvF86aCiLbGMqhJ6Poq85t4FlsJNsNWjEzr3zup/Jean Paul

Oahw8wU/94DfOCT5k/TObOxVH6NS1e3oE1/qPg+f5H
4sTULfM9qhOeBklYMaQQ6fT5SRxQ+tFHQVnNZaDredlbGF6Z+fbsNE951QEmyXWeMhRYKvrzy2dIfBMs

hbW/tonya+C61/V3xonIvkzE8RVtEcSsNRokhBoG6lcVDzIeYYOmIQaYzzioHW8rg0XOrYlpr4rDIsU6ly

FQ6VxL3cxXeF+bavFIE7cREf34bDaAbiV/zPZ1dxvE
+CLjnTXZcz+WTr3bXzcGnCezLu5ui6AGselnNBd/ZI3vc8G5JDvmvtZz+iEF5S9JoMv4F5b1T3HzMte4

AfO3qcBmqvT220/9dwkifLxK5pdm4j5o64wvpiaNB/NEQk3Fz0z895/M15e6ErAY3K1tqad/s5JkB7EK

HmO/Y6FBP4E4AOZ6EFoRcGzyvI955V+0loHyey67RQ
lLJ0N1F0a9vSCy2sG5X53DwQ0WQOVYzCHhDzM/J+4D3RTCaqk4ThQnjROsjD13Wj8sv5oqe/wzzLHEhb

Ij14fvyPvJQpQkHRaBVhr/pop7WNi7Yd/ct2sUgHtejFcqhIPTHYTKoMoX65LDliuw/S+aI3b2Bk5pUw

5AN0aV781pEjvPIlR/IZ7r6VasCxg76FVSitt+jDMH
eD+8WIO/wZ1mLo6A15j1Xjehy4gjcR8jLxM4PhwQ4sZbBdLVYCbgL4sD7com8GmVH27a5yJjtdYDvjkR

bC5r52M3GOSC/Yres3agxla/l9V27qc2k1+uG2UulyTHXvvXzkn/uAX+sIPdiDWojLqTvyuITPOIyvMP

6wAlSVNqY4rbQ/FKwmEvZ2YK0E0F2Nr8YV5G6pTW18
QYSzjJ7xvp847IZoV3YldNvfrKcBCM/ZlgUE2qrpIwUU85OwylmIsWyP9px7GGQpWjWp49dFxd0T7q3L

Z7EAAfwKThX5HqI11V5QA8DfbJJay4We055tV337s1ulV9CK+VCjM1pzbK0u9MgUFEpBX1kGuwSH3cPx

Cl6o7MlOpE/APwn+rGQ33VGh7V1SZ0G/nXjauk+fZz
2jbcL+ZSR0V4mGLQUdpi5Iplx555hNRcbZUjtpzjlaj4V+6itdGt8BWEvsoo1Komm7JnivAupegIr0XE

bepXxKRyAKJ4IJ0fA/oRsVOQ/E+LF5rtRAiBvjyEZYTCR9fxi04sljPiTyVOvMB1d5CjB26qwHiE+CPi

wX6lfK3ymyHOLiU6Oo0VMz86Nw79HV+H9vophCGnF2
3pKRGkGwrXC3MU+bzKIjo9dMa7zG1ftYLK/2j5Hy4rdNvxB/O2b7Qgxn2lfu+a3fjL/MsAN1KQVKGLGM

ZfHb/FSMESI/pUUj1G0kmZ8N+Xmhy2FpnWRYNvh/ekv6p9nZsyybjju84cccPIwMQPe11BIbnVRGJysx

r2+z0gr2hrlXY215q77pNTn59zfl6aGTKrunn3h4Zo
LV57OCMnOsKR9dBhxZoQuvVeCJAg7p5AcU3OLcuVsoQ0SJn/ZDaMZn9guhRIQUHHcTaaaZlDmRbIH+Ys

NlT6qvSA0tOVuBtnQZuack/QNpYLeu/pyAP/NGimQrZuaZQ0V4DCxwrE/8OwdJLagizm0N0qHaZSJsPR

pB7sgOoU0yoNdNdFci0pqbF81eemtfSIh4v45OBx7J
ovSgtBzLUwCNMzLYvjrUZd4gPKseEBvGt3Z8Ccj5UVlfRIkdSGFqpwYhLaZwT6GKk4f8HEsglPDlZxT/

N5LeEWjJVfIlTal2FRQ+RLubuuDr9PWs6VcjIXrZfAg7LKVTHmWptJXXaKlD3yGyjwjI0Z3pyACq05ju

fxiORbT7kaqMVE35UmDp99o8gLtCd61aP9UJ0pMmjG
VoLMBPkwbxYkG9YlzU4OAjeoVRpqW9XKQsiA9/+x/1cROP1nwfWwxLtYVsp1G0bzhWQC9b1Ny2hKY4nH

TPXepfCtVeoMgOPIjDjCJsYjczvuWMlV4b0Bv+OaZb1+cSItrZRccNnOmWn5M1jzjggRubynwcaR5ZTr

MfFNwPLbMp3UYEfjNs5Jjq4L0vzfOq2SXkJkwRWbNK
pSjKHStcdf3Q8Cn+NHVGXjkYTiBZSJNbt02LNAWBpJPBO2nyOL0nkwheOJzzVV9DfTySPp4M0UdjERU6

huv1pjPWOlOLTm4gnP2cm2AzIsqpimJCAwZWujG9PYTxAVOGA6Y9ddyNJWLU4TOcxXsngS9aKIxHo2E9

GnFJweOMzAxTy7vjX24O+r+S1AcLp5h4F4eFnbMtey
ga9Zimhh5k9ZkWLGatqmjpTP1z68o+vgVUym5d3ZMq23TVuqv3/x+RywJPYeOQnoI/Vj7hbrcBZ3ZIdF

h/vpk8rEg1JItFPc5TrQsgk2xPfITELIXM/jEL7i05jP+IlQ5cAYoph0z7YBKaaqTIdtEDdxiy9vGQwH

pQmOwfEqICzMwU/NVae/syihJDQTTZEGbA+MHJZG3P
tHmDbyqkUr3itvYQ8DPZ2Oz9bQHALJV7ItI5tJ81VYXJBc1jtgaGU+vQzTpt238JxntFJKwfucI/t64B

xxboThsbYGQYRRWpeVS7JDKeKSgBXqBvBC9vjEhwRUfcjneO6+LKvYJgteMYRBVJr8CEqeO9oVP8YoCp

XD1BYRQJLBMlUFZrZSYFq/nFZlPk2LQSYF+fVBRs2L
G70us5L0jSfAoLhA8QbM+8HqPF01knvccikU6UbjgNleuUoyCOmngWML2tCM83hDAl2uYqaeXVbmO70m

+AOwW5swdiZBSD2F2F/lDZmQPfR9PZGXfSTamFH1MX3qYiWzHEM6/xKrhWVFYPCCbuUjmNdzgrwclhlf

XelRxYuaGCuWmXU39RrRGc0rPLrRhCPmyO/GFNCyHC
87GBRKeMT6NRQzVWntwPfMeW0ZenGlwrpprTIaKsOptCkWAGVHornftSZIbXxV2lcwugLJUH0cr0vFIm

mSosTsnDyE9QJrR1z0uv1iTrcPeO6aAFeokU1sOHOaQnTrPRwqWWZ1wUU7bs3INpAxyYWRxl7u+

FodyxSsciDXSvIbS3SyexdfZF8DC7qTjLOPONOWqEN
VtwOjgR9iqtsSoSCs2MTpRSBmeTPmPsPiVrViIXtTQbu81IyK/xCb0AcrspVWpaPmLEhLQsaLvga2a+o

Hlq1inMCaHob7N6x2zUobFrP40voBm1YVFLo6PZP6zSdqbFgA3D4Wo5+mN4h1jEgvvteMtBLYYDhOQnt

7UPx7rMMNG/LBW5CizvcxIHuqBDlIqOhJNHn0KdaIL
4WzTYfdFkVCHaIX1dAW+NyxPDF5WxtiZXgH+7LCnDcTkE0hx0KkQ7O9HUeYVnCEhhttsCJ0IKcxWwAHO

8Hf2Z67a1EZn3RcDUKCdRgXsWSWTopLR4DNNJpagInchmQLoWGvDffzicyO3bUFuFlfzugo4bgTktAdO

0YAtZMc5ycobsRN90ploVARNd3f9Jt+/Wtn089UU8A
2oDAg30s/15/vMX0amdjcXTJ9+MwOnB0mmFCFCz4/V8Zk72nE9AZbX4R/8IPaPf7JNZlZcvPrkXC5mMQ

eCgg23TiHuB6RGPojTIfacg5tHvwIDNlUssbqNeftUdkKgA83BR18Kq7BsdEraDNDwk2TQ6pEnynTAYH

5v/LmJaFj3/4XM/f/4//hfgEf/S4LsXCoYKxFgLLT9
ksWbkG9TPC1mj0XfDPrgDuLnQQ1kxy5BZTB3AE6SvJg7HRLdZ2WrOYVuMYRrd6IyDG6WDG2ulGtnVyN3

Uu6HXtXdu5IqSOSjQUdFzQAZy90MRDa9T+o/+Veks3SYL5HeuPWIJj8m/ZCRIKdjnMRZMpm0N1cmELlH

duFZWs0LhhNU4wWpE3B8okE4OjcrZvoUnyqJXbmh0n
1krzSLZBjCt2htur/4Ie896WWJ9Ymp2fmLXJsbkZ7EyyVzMAG2qOHxTc8oWdogK+puAvXo/Kl2gSYFxG

WJuNlw3dB/Ml8R5Ef4zpU8aDHJZxSS9Y9l3RKKGfEcHe9bktOpAzWadNHrJ5U9Fvyx/lE7G5KFjL3ofF

BDM1c8h0O2WBOi15h0IT8tPY3g2YqdcZinC/TOJzo7
8daqc6nRNVtapfTznNQsZF7YGjAmNV8bzi8DAFBvPAPjNuqLAhn9DFflGM2IfMKdS6VuexHSYTMzxdhr

oT4vFRasID6Ha616UfEdPE2MJQEKZGKeRDAkWZmVYzSlV4SrD5UmvQV8QQDmM8XbWLKaV6b2JEzuQZ3K

NAVjUM64TX8zEQWAVfRjA2FfZLwuRRGvGAuaXX3Xp7
30OuDmfEHgVVUoDlDfJDQzHRZoIDC5TN6STXZaYLOoeBdyOW9slCSoLC1MxLRgVlBpFVmmZP9Es403Vj

lsZTIgMTIgMCBSDQo+Fs2VHQ0az6QnLDjyMIKiWG6aut7DSTxHNcBpC6N7kIKsNm1utY5TgOH7hHIlU8

RXOSCffaKFbTClPa9GLFScHq6thE4YACPLNPKtZYUx
CBwzH3dHGR6PMIPYAGPzSKBngmOhuIrGVbMkI1RFUQP2j5LznXqtPd7uYWofGeZvgIX5rxikUIOmu7Lt

LU9OcbEdrqqcUoBvOLHjlvKmtGusTX0ZwDFavKSrUW91YFI+MoKNBnCuG2WxtnZSYUQpwsltfM3eYIX8

CXLsKh5JDHEbSTgtTIXwRY8IKrDjBef5PB8oVNd1VQ
sxMDAwXSBdDQo+Gg8MTE6zt2LzEDbdVoIwHS6pka9RCZjSWlDwJ9N3eBBsCp9zbP4DjDO0xUHqD2V3fS

QjS3Tjl3KVk277H9GNQCWHMyeKaqdaeT0BaoOiTCrjYd3INOOjzQh8pL8PTOonCJ6WSJJtMA4nBN14Gk

9wsLErYoH5GWAzDb8OJrVtB7ItBG1wP25aHKCpDmOh
IFINCj4+ELrwrjRaWgmAGjU0GGPfj8MdLFzaORHwOLK0XWCjKjf5QSS3VAJfXYXdLZuzMIQeZRH9ONav

JECjVLHwWTNcCJDvCrGjXEFsZkA3AICtWvA2SQYsFOSuKWcfYjogCIXaEYB3CvbfSEW3RISbEQJ5Gxab

KTL5VSQbQRP3DsxbMVQ7ITUwJWRsMPnpJmW8NIC7TN
EPKiA0MLJ1HJOcYXN6PPu7EMU2GPEuXFqtUNTgVAP6WSXrYMY9BOO9WWZ7UTFxKnSqHNP1OsDvARlqWP

uiAYF8IFG4BLtcUmC1AOb5JWM0ZwDhGoL6TDW8VTR2JzOuIVI8YZY2CfA1IkZaWTL7LRN5AQS0SZUlVI

ajUX4FOxFaMYX1DGUwPYVnYkq4SKVrIWElOmnpHRQ6
SNT2USD2KsCiHIi1XSFiObSsMRZfEKv3HVV9DwDfZRYsThSzKOB5AmEfIBXcNTJlMKA2XaE3APCkCWj0

AAE5DVOwIEwhNAT8CMCjNDH8PpUyWTv3TOrzWkM6GTejYYz0EPUvZJEbBKFsSuOtUnheLeWgBIP0VBQk

FSTrEtW3HII0DtZ5DYCcBaB2FPE7DeZ3ZCKvIbF9CZ
G3ItT7AMDeJjK2YZA0FoH6OMGcEiA2FRY5VsN5HYQgXpX2FRM6GnM2FTAcUuP8LQO3LuT9CFBjMbR1EN

C4BkTHKrY4AbI6LFNnEfM4MAU7TiP2RVYiExA4REL4UqG0MFHdHxV2UMG9ZYJzLONvHJQ6ORCiGXH0HJ

ZfZIL1WTXdHCI1DQiaIAX6ICm5BAMgSONoDMY3XNQ5
UFB4QKYcOJxmRHVrHQEeWbnzGgn4QBI7WjCjGcSiMaviTG5FAUn7RJU1WLPoROxfCnY4FJR3RII8EgBq

KTD8USxmKuC3PyGfLENqCCM6IGN4DQGvGnY5IKO3CVW3YNPuAdC6UZS4SDL6TePjClL3UUEvDrS8VHWm

ADX5AZOnQpNsUxHuSaD6DAI1PwBpWuPkNrTsKDl2NO
Y2EJCkUHv8WGImJaF1YGi6QUJ1UVNwWwv4ZIo0NTX1NZrvPuf3EKF1SwC6SAteJZNoYQX9JTY1CYCnTW

G4LQWgQEO1QUzzRWL9LHAqDYE1WQtqFWRtTXT2DvY7EkJyONZeSPHpRkS1JcFeCeGtOSQ9JoZzSUFlGe

BfYOF3ENKJCfP6YtU8EPXsLWj2IQU1BwN9GKQpPRc2
RZW8HIN5PRTzJEY7JLU0KdW0FvBdOOG7YMJ8VDK8OGjxNPi0CS6oRZkqjfDlEydQBoG1QANua7JuHNi1

RU2TXCXwQKbjHY6Gh962WRIdM1XusZPkus4LMBTxAk0rgS8ziALmE5Kng6UZUX7FGMSxFACdXO40UJIg

SsqyC7DtJEXvW6w3OPSzIoihUFOfG7HzyNIfGnYwQF
qvNJ0PyGGtguMnOp7LEGAzDn5ypJEOy3osAbPmQPD4ZUB3UFCgKSQ0VK7IOcAqR3q1TAviX5HiF5naMH

AcI5XsnMAiZO0LFg5JPbIaTB0mus9RVQfwPRXgUddVCoz9YPncJW0BeKVdV1ManbSiY4SigLaqMR8Loi

BeUIhhXU9MHHRiVs0mfX3YAIhzIYPOY6EgF51zxU3m
D9osbbDft4dSslJtRWnnRf9DLRNsUrqsq2HQjOUzSCBeP8ree2PDhODlZDH3LV4YQJDsQ2xuqEmyJYO7

ONBkLv9ASCVtZh9gbBLmj0OjnGO1s4WpNLceCIZPXYf+Se4BKQ3uv4CwFNstRWLyZS8lpv7NMMW3YJ0Q

dWv6YPVzM6RkEZFuJDExg8GqUC1VWF3joKloIHi0TB
CtBPVqM4AqKLT3ZVGjXw1QKlAos9JwPJDlMGbEuc90j0MG75hjz7lYO81nZdAYwhsIbNhWXnkA4MyNmd

vYStJtNKRtoNG5rfzTnyTNtP2XVvECMRnJ19UIWe5wSy1J4fEmSga3qqOww9B37b2iUxk2MieZZy4b55

WmLb47Jiw3dbPGqsOvc/1ki2D6al4t628ylo109P1f
SSZ1KwSErY7UGZ63tFtXQF3DrAfmmkAyhE7rXpyI6El1vFS4WrUXKuEhP/cY9hOf/svY9zlGOQGQ58TB

k8/KIyW7ZWfK0fQ/02jA9vOHerxnT9zQh/ViM7Y4QMtEpQ0i5iP3+daPT+6YyEJKdGyvrxTYq518ypgQ

OWSvP9wko/K84kTQ2D+h9Y+Dg5K1LyAWCl3WO0ZvNn
FU3V+81bFTTH3S3dvL3QrHF1Bf/vniy8VBgaAK5lDSx94z4LdWvHhCQ8WQ8tepCa1juueM8E3/L1ucfu

tw49LQwBFVrotMRzgJw6M4K2FMh5ZeQK7fEWPKnX1XbEdAsaOL7h1nCzbFjgKlQKa6NqhNmbpOrPNqjm

5vXypXi8MlsMIQ9ffPBG/fQ1AYky++O0Kjp8jCHJfc
vrkSymhS5GqHlTQLDC9WauoAb+BDZQYUwwKowyjYYDbZ+ji+KzhQG79KX+dmTubLopbrU5ZOQFHNcrlQ

+9WxMBt5n/DvMJdWXALnUMZPUz+AZuggG/xCHJnhbsYtnAwmJHfKn4KQ8G4ynamcG0CZugLyF3p+g2Yt

httDVkyeCX3A+BaNexr+rKzLE0FtfR7gJ7M/gHlk/W
ny+fvaV4K6EikbHzaKZ5A/sJ2DdiHjlkQx5IkBN1oxr7zXqI/TcJPoZ0hheltbo4IxIWUIekOqzVLpVR

TfduiFO+IJMIKvAfEt0Wy8iPyfT8/Cl6gc584zgklvFuio43Rb71ANrb8kJlraIRttu15XNaCdvGxiFS

84Zgl9RtBHSP1deL4L4PF8Do/Hr+G38b+Yi73B/ibk
CzOEKsEn+GXlqOiYttMfxNmMxRxqfcjPgWMp0DzzZDS5PfmJUfIQPdfQk9U+dl8ZaSJB8mhuIbLuQLd/

ht/YyQsfj4auF9IizJ/QgUrsEig62IdXuB1jjf6M3KY7LMrrYirs/gEvsmvYSlbLtrBtrJvFmMEGmcmG

4FazHlrfINEo3ULZtdkT71Pixt+EXwu/FUHcKKqiLi
bFh0VT/D65WoyeRffzDnqcxDVjqHoZG54Cj2ANarxMDUQ6XGbiN9tlSbYTJWzqv1nyM5ADABwZk6XibO

4B2XXp6Dn9HyWf4BgCOyrFOOVs/re0BJ5GmtF/fgfvwKfwGQWH+zcbHViNNRTfL+O4npjdu/du6zbik+

CmWSbyZBjcJV9sFfhPvd3Wj34ct1ni5NV6gh3cg0lW
znPeBNc94irMYh2RMgvq7BUtWwmfgWMA1XUkymO6RAPCAE6CjBjqhpth2MO7ZR2xQt/+XHxdfFNaJtVL

N8AA0qn8H+c5R2KapOkXdxI4QwoBtgrPIdCT/SfG6UdoUt/lHGzZZfznahATjy68H+AfKdna2kP+Cy6k

HVwVrU5NAG7GUd3JZ9TInF8KvC7Hx1xgC4pLM5Q5wr
LFPlaiJPUCaU4X7WBtu7uLd6SPTshanOSTCGh/H0tdB63q3jDHflsUSl00wp5WYTUQGOHyyOBYaHpzGL

vYIvaK/PqJwHiUKfSGbzgGoz9B70cVYbKX/tOV1Y94Q2/J8czGalD36Y31O59D1j1Km7QNW1GFuodcyM

clH/ZvXFYEI27gQY937tJEiFnR3wYQJbeiVC5HkrgI
u9yW5De3NhRY+Ap4oD8H3GCxYC1T88v8t0jokvtKx2ykuhqFdqleF71CalKHNppIpBQmLMnG4fvqxoJ3

r7XeS/omPR4iNp6+wpYMn+Tt5wY5PfCcfKa1FRwpq2ZefPQdy+h671YIas66Qrw8fFDJP4sGV1gKGi0J

SLmIUxA0kGLwJpXBU4Y8y9+K0u5mpnMi7rIpEYPQ1R
kXt8/BcgU3ymBMZ6NXmfR/iEuBfLSn8uVTYsleHvtvQ28TZ7hSgk16fONfLMDBRtwPNkLeaFItAJCqKr

WYCYOkqeKvolr7k3HcQvVltAL/awDgcAgjiy2HEKhWfjnqrdKLPxnqnJQmY2KgiE3+1bMjfafmD08E6B

PvYFv2wafHePbdwOGuwjr4zfHwmz21eCNAG3eOSffy
ZCVY4vokFZldiPkQ6R0PvXVSn0NK1jnE2x5dJ2WFtPekTfokAG4/6r9adnzxfm+b4y0qFHuy+O8Cmcp7

/BELrdmIW6soUlv41PAYpPfGawW1lDCzokSekhGo+/CQ56w1Du/n7CkmZn9Jl583l0dW6L+z91ndS2uF

bgqV/kCwh/ztDh32zq1t70abT9fc6pPakIaz3EmNia
2eTu/VFuoRWtbiodM31VspGLXeWJluzNiL4AOutuYTX+CyLlaUIF2wAumXvhRZAK1x0ojf6FeFuICluJ

T8CYs4Bp/Jose+QAk2+5hvS/LmAi5370C6g+QyJZ5EpeF6gs8VrMfB5ToABwJS3nGgnn4fWtQUDudFVXW

QIs0H8rP5HKZYhMr6a+z4+vwNQLMfX8BUjlQiqE2A/
UOLRWziQJaOpMbZ/yRi9hbre5zBEV5ZADXZSZLhK100MFqrP7EszPTLdzfLWBQszNYrRkNeRzvUjnhvN

fe3shrteCn3w9Mgq6dWzU7ArHHCyA9IkSJfrLQk1EwpaC4DdnFB4S9uHBJc07JL8jnDu4BzPAVLmYIa+

ruudnlk4qOi/qiTbMP0aLtyWong/EJHVW6ffQ9avs0
8BMuoA3Qekrz7f6szey83dgzT/keo7jkuEk7/FMvgoANuuaKXiauGMGPRR9jwIn2aVAmAXsDVBC2S3xQ

6hSYRDa8E1N1eiPdQ6dELAHKdOEkwsQMLOM5DgmjdNqxWl+I1CzTE4mp/wvLMFbTpOxb3ueU3mWuEH2r

tJYe9ZlfRb1LY+Qi7gFGA+VIHhcTLpTo2Yv+T5fE7q
T8B04WrzmYupOdEfvNrjPx0bet1FKjl+uwUjiYGENLnS4C0MPmjddxfCxPh3B2SW0mHhNgUEb4V5S3nT

wlS6wlIx3IipZXhlIacKd80PPZfMzrf9yPe4RE64RvWRHN5zfBz/teZk7OeSaoC6WqNdu0q5ua0xnp8x

F/JZjz5ust/C6/gonKT+HI2/IdpGBtNjTw4ISVMqgQ
lJsQKbDUZtNlxG/jeUrdc1LXG7ElRB+hgE2oNW4GVnb685ZVOKwvqaTLztYK5ruhLFklOtdtxj54JNTc

5FqnIzn260iKw2Uzdz3Nqak7h5ZtdaZRbPJvSpDiZ5ywuHRpJwfC86ZpFXppuoTbII7sPYuxWBVRuJRI

H4KdsoqSkJ5EsAro8UChfgomscCbCesNem3huEcU5N
VQKKtiDq4CE9e49b9g8c8GGKTzI0NmmYgc063DRytEhfNJZIHBIdMSvlWNqmVw6e1YKMiPd+pcA1InXT

j7kq8XgJqDeoXSGlndpIacxLsm8Ooy0pn1EouobM+4M3nnD834U0MLKu3Gv2r8Sp7aSa0O4uOc8ZxxGy

LUXE5PhVfeuMmutpKCsnWL2rZZ9ViIH7M5iAeVfJs5
JQ30FfpsR8vV1KDCzQ8N80Ei41ICjsGhnhh8MS6iyhlMJ5jfytbrh2AtfaErlXd6EawjW0AwOlCuZiII

KxwuY16O7V2DJI9ZEvWIxH5LabaruxZ62MqawLE1Qcv7OH0VSsEIppoNuNdg8rGhkdKTwf9jX1dF3GBH

URU7wUXqDQ4aDEe+AnMISmZxOCXupB81OoH9rouaZx
9mE/6wt1zEt7W+/Er1Cl+VW8Cw/gP+BBquzvpqruHhzAe/E+RDt72C83pH/gg/gQPoz/rLbq9h2Al44O

4PZ4MdrvWg6D7TU+C5/AJ+uj89t1KS3Fp+DuvSrF48bpv/sdFz0z1qIVlHBzhAp6Lxuq4ejr+Y36Lg3Y

Q1ETtNCGYEq1fC3NOgqoubQ8aaVUtCEuHQR6FJtnBt
nR+oDSuLPUJK5PKS6yGTBG6P4leBJErapNe6gc9qrBpYYnIeb4rEkBf+lnGKhqAzZvspD5f8e6uNHeRh

LLg463odJpX6CAN42cY3VLFyvcjfKvrPrGbOH8kggscVzydZvYOnOaLHils4EB9niisD9iBIH5iiKXw2

q4WYeqsCqlaeaLaMIxmKH4D8AgI2gtM5rhvuiGEsx2
tRrUJ71nUIspBVCrWxePdQtv6XnfVb7w5QDhHbsd5zDVA4LEx8Ko5YKE3albbxoZDujpE7DwhKbq65K9

UjPd00kczUb3UmvKRmSjSOw6+ENaD42N9iZ1yOcczfC6b3t2BNnfWizKg2KilTwM6Tud2PhUzeUrPkrx

G4hByybENBjLmgHpHNmMPa7SDHHGHelcVNir3yqIuL
WgqOFMfHOKgfnva4CVnsIJLuVcF3W/HsmoSMF4MU1VHwedb6zoLal88U686N0gcyLNzjVjHBjHNStJEO

pn6W8W8E4r084Cl9GjGK8x4dUw62ZAtfl5H+O+EMWw6jTiG+HN6foA3OfySeD7tqrAiyP5l0qqkTbG45

7F4XwNKsMBcqbETQeOT8TbnQvJPZ5qjHXSGn+mxg2e
nqCuP01Br1rXHqQnnYL3eeUpV3xZv3nAM4KRwRXhFYVR2IZ1KyGuX5EGAzEnL8qKtrE9HMc8EOn/NxaP

xzGwdfTfygV61oqv0oz5kZUdJSqAldD+Tt1jBaCwAOIz1S2ziy4OTKCfmno10pslACmBldQ9qSVTHhkG

0PpyLbyH/CxW69mPEmYe1PBOpSnHxGR5HW9mUVLnQ7
JPeviSz0pUusdRqDtI5Vc6hTLfGkattTz4S2yQIfX2O6jJi7xEV/SEsnDDcL8K5fjVpBovl7snB2MJ0y

yVZbQWEfscObzEJjExKO1UiVt2hYMsctpgDCBj8PTH5pcVOOWmXOuxPbMjNYm88iUAZY+7PAcPlN5mxR

l1u4XNvWyHLKwsJyIkMTAew2B3DXjKySJdomQzi5YC
o/X78FXNAJ/0ApW3qfrWVJnrPp1wVyxGK5Ujym8zTdkrz3vspG1xbZGmUYL6oVYynj6mAycIpRZDL4rg

bxyJrLdw83rBR4heZzywwV3JlX4QSaMtE+ssYIBbFwsID92DKyKe0MUrE1SUi3OmmBTfyuhaawEjQOwK

oXeE54sHA5P8W/cyKZomKCWniYCRf/kFZMSuIClhLy
vlxhosxzGhRTP5a5dMEc4Rm99do8l0zwT+nbe4zhxdDjK6RfgWZfLXxvRHzrAqrlJE91hRjmyp+fPKSu

rNhXfiLHsfRZMBgEn70PfdDME6IsHINz4GNce3x25m0LzziJCHpghMC8HgwIep/KadWOuuHGPa/dYw+5

Xe4PAf29Zh0nqG4bxVXQObeFrbOkwXfZwns3GrR1Yi
Vi/fi4ufHzd4bANcroWl2qHviWZ6dXyyF7ZSCtrNb/eY6/a3MOl6EzBxCP34x4AOq05mrxUgZBGqWUHf

VCHSLDfBReyq70ulVuAjuQVdqbHQT151amYMNSLEp2dW8v0fD5sNj+GrrjKxOegMmMA/4jhPDYw78Uq9

TkZ74Ko47t0pky+hLgq3b6BVJy1MspzMEb/etvUp5m
cENwW1vGbj68ca5bVaDc+Zw02mARNPSPV3fEGLM4qevYAqqSzLJgwd80OmWFoPgQOHvYMV89BEObnmyP

6q5QjBRQyp0xqZ0hXwn/RXcIX/YywL4Wqq6zwb8gr5KO9fTDm/Q1n61G9smhlANgrTYg/TYa4tc/rUln

bVr7JcV/+OkU878Agf1VVNYHGbvF3tm9yxfDfOS4vM
gFWa2Bxo9PHPRqRIPw+njCOzIYrL4bCWK3y0F9hZW5NMATaeM8AhI5R7hvqGzp8SquwR0/NNqaLAaR/4

C1AGOD/379LgVePbKHU0ZAuU32Jl26i+hMjsx4y7EA8UxOo4hMdvN5JMMgdeMWEUtzyCnQpHY7CYuTRt

3QSMh3EGE/suYEzMqYN6r/WmaTsEyobBvZS+7ZmfS8
RkChX5v4m+ScnUdL+FFebF90S7qiuPQd3nRRCR9QTTaMa0Q6t5uGv4v1e1siiS5djyL4Vn9lxDiLutgk

ItDZFne6imSbxLhPgNsITubdeAsDHaekED/YnCTdpFHWTg2eXfpE6M1P+QfvuyHY7/0cGV1JW+y+qmp2

BQEEiyL2M7Y6LOoBf7UUUKIdWy0uh0NQA6wHmWoCJE
Eb2rEASdvE1d2pXUVFtrMp6iX9vit/CKACVEf9MQC1lURDNgYer1Ve2ANJeTUJqHDysK6V8HGExc9aGU

WMrfLVMOaaAnrHsehDLAM84Kph0d/k/E1I6PcfhNJv4GSE7nb3TmOXYtPEpxagMoRvxQOzOjZSAyj9Hy

NIhjMEg3C2MfqEBkzpMpGfnkdKCVDMSaTERvQ8jjme
f8bYZhLku+Gr1LWVAcfJXkLB8OWkdGtKX7npPnBZwB3A23Gegjf7b0EKyFaJU1oPzu/eBrXu1R7BBsOq

q2vB1tonnjBuWG4wqcJJ8ew+q9sjoA+dQguclstXOUbrtSRAGUtkv5oI5DIZ6Ly9Efi4GN6nex3D0DPs

YjfQHoAxGPrbI3L8xE7cuKk6q4XH08jhMyZMBb247q
7d54Axe1XHMdM6Ogukjbod/rEdDggSohkvhRlWoTpxpYg7a3ViL6jpYto9tWXsTrXZlWaQLqltzi9Itc

x+kIbAqKqiuayIQ198G/SQGGHSO74O49cgph5SbBq9Ioigb7pPv+0VkE/zyxjVbFKtKsSWR2feUqaE8K

KA6yv1HsQBpsWoSgVP6ckh0ABLzYMqEhZ3C4jFFiUv
8hcHHfv0BdtZO2g7VAYuWgW1WdfhYVLV4wI9FIAUACRVqWrM0rh4cYUhOmN4YxO2FinJNuTVL7W0QerK

kknJpnbPSjZZF8V4Msg9NipvHzJJOzIY6QKOYwNjpqI1KxAT5TAAYyIi2tvPTUz5nhAfTyPKHhVQVrEZ

MpVEUhVGWoGOkcFY2SqNZgjFWFudptAQPiL1Q6PZ5I
XCKVPlShT4VmswTSlTwrNnLrGFNgDBJPOe0+PIsppgKcUzhPQoQePUHct4FpKDy0AE0BRUEqRRsoZV2Z

i078C9Q1EwI4yKCfQ1lVQe6pcMQ9xGWaU7Beu8PVw563M7XHNNERCWbAkM5ua6jyL6fZSQ8MXQGFTITx

YLGlajGqsLnZApViP4PICQY4a6ShoYqgDo9vPCgaJl
TmwBC1qmsoGGNqm4AlYW2YzoTyliwbCnYaJVSkfwHftPekEZ8YaBHvlAJyPR38DRP+ZgHFEnZnR9Qtro

ERTBCxxrackM7gIQLqLQNhOh9NGNYkFIakMAHcYK8PXnLxGlDcPW6jNHLuBTu5BqVyXQ4YNn9+DQplbm

EgGewWOeW4HOOti3IgBBd7VN4UZCXnNEugKF7Um679
U7W9ChJ2xZLhCKnzHSTbMoRbGQQixuObMXWJZAPDO8S3xYHwiR4JsrNmJMmbNd5FXKAujUk1oA9ITWsw

AF8RZKGzXW1yZR06Gw1ubXRnXyDtQAVrGe9NKwHwN7NiRQ4fJ13hHTItJFXcZHMURm0+DQplbmRvYmoN

WeS2JEAhe4JrWGixSTJyGOK5ABZlTla0TIM3KXYxWS
LbOEvaGNScLYS0SVneZRKlWWMwZWMwWZOdWuAzVXChRlW5AAUqQdQ4JVEaPEVtNQspUcrcCLXjPRP3Gn

qzPTD7VQXaHNE8QvqfKRH4OPXbATM3BbrgQQJ9VUUtAEWiUDhyWmA1QXF8UZYRVlI2VEZ7ALQyDZN3KN

e7OLK7DMFmUMwrEXJySDX3MEZfOYO0EGA6YTF6DUUp
QbEyXSM4HxIgOSnfHCecIDN0QJO2SUvyUxA2XXk8PJW3RvLoLqT6ZIK4ITT6AxVnSAS5XGG6JxX3TkIi

XQN3QTU1XZX9MXYgLLxeMF3CRuWsCGD1BIOeHCAsIma0VTBhGMHmEupzGYD4PLU9CZD0SmDiFVg6ICVz

QiMgLXIoKGx9RCP8QwXpVQQvPiIePJF1RhVwHCVyOL
XdDGA0BtS5FZOhABq3TLE6PVKvZCmkLFL9CDHsKNO3ZeDrEZw8JKnmImC6FWuvYVa8SFUdQMMqGDIuMb

PqJzgsPjBfDOV4TUNkMPUdKoS1WOH7BrS6OWMoAtK4UWF1LkD0NHJxRoW4AOX5HzM7OALbPiJ4FLZ0Zk

W6OQUdMzJ6WYZ1IkR2SJZxFhH4DID1HrI8KYTnDpX0
QMG7LcA2ILQcLrO3AME0IaJDWuJ6OeG5RHOiSzI5DUZ9LwV6XBCkWqC0XAH3MuO9PCUwTuS1WWU7ZXEp

ZAGwENN9JZLqMIP1VDCfKOJ7MEIiGOT5CIrtYDU4YQv1VUViDWWzMWQ7WQI1MXB8UPKfDReoGZUdKDAt

BthyRrh2CDO5IeLqTzOrHelcBS8URHs8SXM1JMCvMY
beDpS8MXK4IIM9UdNwEQS4QHupHeI5VmOfSZXcPDO2KGW1YJIsZyH4TDG6IRL9MXAjOcL8YZH5IDX6Zv

ZyAcH0RNNuHyZ1HPVcZXP9MZOyYbFuQgAoTkH2HGA1XhVeMyQgDjWiQZq9FDD3AMYgPKz5YDLrPiI9NC

i3INZ1DNMhRwv0PTu5EGB6CJxzEcd8CDT6UwQ6GBst
HQOsLSP6SJF7UOZuXBY2XWAkHJF2UDleNZR9TRUkOCV4PTxgMWCdTNL0ScC6AvMfYDQsQOQuFoC1YkWl

EzVwBWQ0YkGqKZXvGwZxTHS9AVGZBmY8FvW3TTAfQGm9RRV2DfN6EZUbHMt1JOO9LPN4XMOlBNJ6DDU9

TsY7PlVmUMR5FFJ1TNZ8OGweIGa0UE4cZQariuClUr
nFZdA6GVAqn0KdPCz9RQ0GLRCwGBxyDK6Oz522PZKvA2JfpYFlwf2GONKwYz1ybW1dtFTuN7Ban7ICQJ

1UXJReMSRrQQ76JNMzGtztJ0RvCABmZ0w4RNTeAxnwHZGbH5OaxMXhBjLmICbuJB2QxCGsymEwAg3YHF

MsZm4tkQUPg6heEzQxIXO3IXA1NFEdOKL4CY2RGlFy
P0p2XDhsR5AqB4ddMHNfO7WreEDrZX8DDa5NKgVlPK6rva0BRzfvOMAtPjkNEil0MNcwZY6HmTKxL3Mj

nuFdI3EtvRmiFA2EegRwWXsxMV9HPZHsKy9sjB6FMEjpCOYDF2HrB12grB1nI2sktdCot0oRlrLdLFcm

Dr7BCXViFgjpn2CYdNHcMVHoY7ikb4OVdRYjOMT3CI
3OYDHrF7mgyXzwBNI2DMDqYx5PMBDxJa9gtQPwk2PogRJ2x2UwKnMxGGQEJYe+Yl9PJO1vo8GcNCwkOT

CcXI3uvp2DB62XClAvIM9ink8MGyHhDO3bcu6BGQaUYsZrB1Ijh8TBOHXiBf4KNONjZBA6iA8RwFEoWX

TkIZ4dJ3QRXP3AWSRjVz4eyLY4KPLdGmEnCUGaHZBI
YmEuBXNzRxSoVMWqXWTGYQbhLDBxL8EzDJK0YEUiFq8YJCZuXL0JLsIjHIYvZUFKZfHnLJIkBoLqOvVm

BLORPHcxDKPxH7K4QRZ0BVWpLe8+AAiyDJ2IL0ZpBXA3VAa7ST1+JLbwOU5UsFMSY5NeaIFlZAaoE7WA

JF2RMGX8NU8GxJZkYE9VyAKNW5RzfWWsEe2oLCMhh7
BpIz3uL8WNCLLSPBMiPZqeBOhlWUBdEAp1V0K0YGTrV6GAW960aBHbyJa5Ww8tG5TGSKeUBvOqYQubLV

glZRXzZAo2J3W0TJQpI9MPR7PhQiDabnUiP3P+PpVsDODILS6SEXOVPKk7B7F1tKPnZ8X0jFsXjLO2GN

0SOT3XhOUzlANot43+EjSAMgTaN7VSJ0HTUB6OTOl6
Y4E0sFGlF8I5rGvUiAA0FB1NXM2SkBjvmLFhPd7eVZwtSQS+Oi0NCd4ZXxClMB7rqb8LNwisQWRcCjrS

Han0B6zevme9oIMbInG1R2X5NsI0fNWkNB6PY7I3uSEkESE0BRIclZK+Oi5Xk0NiYCDuJPe0P4igLXHe

OMIoBxSwdI91X++3iencnCK3L7t6PHUKfQNphSyBor
VwL8bYKWF2v0Q9TNb/Oi7XTLM1iDk3uJLlWFRrTUb7uR3skMd0TnTlWP23FGRxLHhhaP0cVsi2M9Whd7

SpYr3rTy5caHIhMt4HJyHiXVY6dhSfBlQFAqV2dZprfsyvUAJ3J6w0jHM9Pb32i9rdewKfn1HtGvC3DL

xnLNNfTdGjysIkLGV7ufHndS8wvxKzVq9SVQZuKWyd
stHoKzLDDl8ICwNrBQ85HlfekP4zmID+DQogICAgICAgICAgICAgICAgICAgICAgICAgICAgICAgICAg

ICAgICAgICAgICAgICAgICAgICAgICAgICAgICAgICAgICAgICAgICAgICAgICAgICAgICAgICAgICAg

ICAgICAgDQogICAgICAgICAgICAgICAgICAgICAgIC
AgICAgICAgICAgICAgICAgICAgICAgICAgICAgICAgICAgICAgICAgICAgICAgICAgICAgICAgICAgIC

AgICAgICAgICAgICAgICAgDQogICAgICAgICAgICAgICAgICAgICAgICAgICAgICAgICAgICAgICAgIC

AgICAgICAgICAgICAgICAgICAgICAgICAgICAgICAg
ICAgICAgICAgICAgICAgICAgICAgICAgICAgDQogICAgICAgICAgICAgICAgICAgICAgICAgICAgICAg

ICAgICAgICAgICAgICAgICAgICAgICAgICAgICAgICAgICAgICAgICAgICAgICAgICAgICAgICAgICAg

ICAgICAgICAgDQogICAgICAgICAgICAgICAgICAgIC
AgICAgICAgICAgICAgICAgICAgICAgICAgICAgICAgICAgICAgICAgICAgICAgICAgICAgICAgICAgIC

AgICAgICAgICAgICAgICAgICAgDQogICAgICAgICAgICAgICAgICAgICAgICAgICAgICAgICAgICAgIC

AgICAgICAgICAgICAgICAgICAgICAgICAgICAgICAg
ICAgICAgICAgICAgICAgICAgICAgICAgICAgICAgDQogICAgICAgICAgICAgICAgICAgICAgICAgICAg

ICAgICAgICAgICAgICAgICAgICAgICAgICAgICAgICAgICAgICAgICAgICAgICAgICAgICAgICAgICAg

ICAgICAgICAgICAgDQogICAgICAgICAgICAgICAgIC
AgICAgICAgICAgICAgICAgICAgICAgICAgICAgICAgICAgICAgICAgICAgICAgICAgICAgICAgICAgIC

AgICAgICAgICAgICAgICAgICAgICAgDQogICAgICAgICAgICAgICAgICAgICAgICAgICAgICAgICAgIC

AgICAgICAgICAgICAgICAgICAgICAgICAgICAgICAg
ICAgICAgICAgICAgICAgICAgICAgICAgICAgICAgICAgDQogICAgICAgICAgICAgICAgICAgICAgICAg

ICAgICAgICAgICAgICAgICAgICAgICAgICAgICAgICAgICAgICAgICAgICAgICAgICAgICAgICAgICAg

ZIRoNNDfWIGgVMVcZXPzIEk3H7muCKHaPFVpVC4rLW
d3Jz8+UTsYYrIaEFZ0meTytE8AXA2pb9PdIEifOWQih7NdUBt1TM9JRFApBKstOI8DSMznum9RBYTxIW

ZpnAJEf1ztAkUgVQA2HKAaUigdNM4FWNUcM4lbkiWtXFAeRDPGOX8NKdCfJ0VioS08VUKMOe5+DQplbm

GuDosSRlNlFTNko0DrPWc2QL5MGPYgOfxok1UhMoYq
FDWNNFwxLF9XZCA9USMbSQVhNz4BYRWyS281nkJdSF9AKd1WOvJjQX7cog1EQsTxUWCqPbmVJix2VFdc

XD8UnANnDPaCUUYtr8OhiyG0lAMuXO55njEavdkpFd64SYZrpAVEYAParfqkVMpcRxR3GRpiYr3nKNFi

GP0wCh5kSDSpNWM0AwH0JMVNTV5QCZJvGQUdnCCtEY
ZjEPKXVN1YURrhEGU8PzfurtGxsNHhULiuXY1YXOAehlZgLcLaTHSNVAh+Ac6KZK4nf9UaKYdpDfKeUC

1nhy6GCUwMDrZzO1U1wFCfN3W5TRdoTr0VEXBsREQrLpyuIBENNArdCD8NIN3kqyB6AL4BkBCcMJZtUN

FgvCPcEGy2F16ecZYbIOdqDH6JDPB+Santosh+Rc4LELKy
KZSuKHLaFtYrFYAGXcIvD0DmY6AUw9SrK8LnKI72lSlnggBsYWgjHZ7BQE5nQKIzXLOXUK3PsDUakB4k

exOxREXsZPQLMvCuO02qaTUeDQZhSPA3EREfGo1OIXEmO8KhctDbmRzpqhWjGSYzIRGZDP3QLMypoyRa

jZZncRstNI11yLucYV3PSg9PCxYhVC1wki1FqJArWx
8EFCQlEs5KMEHuABSoEQGtYOM1FBWtWlBhWAlkPUOxHBUrBVM4FPDpGVXsFO6TKuQoPHZhZxY6FUEoEZ

CaNSKuzt0LIRTkPWWmGev4MsOcTOXzIEVvHLsyGCJoLSJxFYC7NYPqQPTvWX4QBcZeXCDbXCS7WTAtXD

TtHXDrkr6SZQVsZTQyOOK4EAYdZERnBBOeGHvjFOJl
RWTuSuC6GXOyBMIrBL7VHlOtHPTmFAG0YQljSQOhAUWzcj3XBKMwBQMkQav6KeLwHWBdHJCxRYhdIZJz

QAGhEXWdONPuYUKzOD1ACtQaZUCmMLIdZIscHVItTXCliz3DZYFfZJHzPGDeWRSzEILdWJOyZMscSHJg

PYO9Gnz8VRZlYHBgDG4UPbAiEEBcKXG9UWZsAESlOF
Bnyr4DGUNtHJKhKeQxICAjBGHzMDVwXSziNNDxBJT6QDE1NSGrSAMyRU6IEkHhDSLeNWq3MSMdTMJgQM

Insz9KHFTqBAVrCTSpZcFxXXGfAOJqNOmsIRSxSQQ6HlD0WOKoDOImDQ7UXgMvVTYvXMx4TYVoCGLfPQ

Muiq6THWXlBGSoQns5JuPuYDWxZCFtVOhoBLYfQCI1
YBK8BOQeTZSxVF9YOxDiJQFrDxOxAbtiFZIzZDTtkk9VLPBjNMVgWHLuYwGbQNSfYZWcPPppOXSdFJD3

DmR1WSUoKOOhMW1XIgMbVMZhYmA2RDDmWLCoSCFdgc2UKALrUBRtOFs7RXHfUTCgGXIxROexNQJbVSC0

SVS9SJAcSBYqNL7KNdNtSRDkQaX0MELkEPBdAVDncj
6BFLJzETIwWkd6EAWoJFQvZHAoPIefOSWtUMC3JIZeFCHqYJZoKB3HOuZlKIEzTkcpVHQvYLYlRZRcfh

8WwMYapBdufv6WWQxCUk3XhGbvPCFzFRbeUj0zgLBmLsPcRHTABb6WhqBvCMLyZDGFBWwoXKNmDJSdNc

XfXVXpZ6FsBdShYUN7CHJ3PfWeWTS3K8D7CUchGzA7
JYPbYKHiHMH0MLA4FHZmGnG2QJEuYzE8XBv4GanbX8K+RA7yYYs+Wc6Ii4ScqpK6akEoPFsmZKQuHg2H

[id number]==









                    ID                  Date                Data Source

 

                    [id number]            2021 05:10:00 PM EDT [facility]









          Name      Value     Range     Interpretation Code Description Data Renita

rce(s) Supporting 

Document(s)

 

                Angiotensin Converting Enzyme, S 84 U/L          16 - 85        

 Normal (applies to non-numeric 

results)                                [facility]  

 

                                        Test Performed by:[address] [id number] Director: Stuart Archer M.D. Ph.D.; CLIA# 
[id number] above 1 analytes were performed by [facility] 
([id number]) 









                    ID                  Date                Data Source

 

                    [id number]            2021 06:15:00 AM EDT [facility]









          Name      Value     Range     Interpretation Code Description Data Renita

rce(s) Supporting 

Document(s)

 

           TSH        3.00 uIU/ml 0.36-3.74  Normal (applies to non-numeric resu

lts)            [facility]                     

 

                                        Concentrations of Biotin above 100 ng/mL

 can potentially result 

ininterference.The above 1 analytes were performed by [facility] 
[id number] [place] [address],Acct#: [id number],[place] [address] 









                    ID                  Date                Data Source

 

                    [id number]            2021 06:15:00 AM EDT [facility]









          Name      Value     Range     Interpretation Code Description Data Renita

rce(s) Supporting 

Document(s)

 

           T4, Free   0.95 ng/dl 0.59-1.61  Normal (applies to non-numeric resul

ts)            [facility]                     

 

                                        The above 1 analytes were performed by [facility] [id number] [place] [address],Acct#: [id number],[place] [address] 









                    ID                  Date                Data Source

 

                    [id number]            2021 06:15:00 AM EDT [facility]









          Name      Value     Range     Interpretation Code Description Data Renita

rce(s) Supporting 

Document(s)

 

           AST        35 IU/L    15-37      Normal (applies to non-numeric resul

ts)            [facility]                                  

 

                                        Sulfasalazine and sulfapyridine have the

 potential to falsely depressAspartate 

Aminotransferase results.  Baseline values before medication administration are 
recommended. 

 

           ALT        52 IU/L    16-61      Normal (applies to non-numeric resul

ts)            [facility]                                  

 

                                        Sulfasalazine and sulfapyridine have the

 potential to falsely depressAlanine 

Aminotransferase results.  Baseline values before medication administration are 
recommended. 

 

           Alkaline Phosphatase 92 mIU/ml       Normal (applies to non-num

inocente results)            

[facility]              

 

           Total Bilirubin 0.60 mg/dl 0.20-1.00  Normal (applies to non-numeric 

results)            

[facility]              

 

           Blood Urea Nitrogen 18 mg/dl   7-18       Normal (applies to non-nume

marsha results)            

[facility]              

 

           Creatinine 0.82 mg/dl 0.67-1.17  Normal (applies to non-numeric resul

ts)            [facility]                    

 

                                        N-Acetylcysteine (NAC) and Metamizole ha

ve the potential to falselydepress 

Creatinine results.  Baseline values before medication adminstration are 
recommended. Patients undergoing treatment with phenindione will have 
falselydepressed results. Patients on phenindione therapy should be tested with 
an alternativeCREA method.Toxic levels of acetaminophen may lead to falsely 
depressed results forpatient samples. 

 

           Glomerular Filtration Rate >90.00 mL/min/1.73m2                      

            [facility]                                   

 

                                        GFR Reference Ranges:Normal Function or 

Mild Renal Disease,if clinically at 

risk:>or= 60Moderately decreased:30 - 59Severely decreased:15 - 29Renal 
Failure:<15_____________________
_____________________________________________________________Please note that 
the MDRD equation requires an additional adjustment forAfrican-Americans 
(multiply the GFR result by 1.210).Glomarular Filtration Rate (GFR) is estimated
 based on the MDRDequation, which assumes a steady state for creatinine ([place] Int
 Med 139/2 137-149, 2003), as recommended by the NationalKidney Disease 
Education Program in conjunction with the National Institutes of Health and the 
National KidneyFoundation. The Vista method used in calculating this result is 
traceable to IDMS standards. 

 

           Glucose    102 mg/dl       Normal (applies to non-numeric resul

ts)            [facility]                           

 

                                        Sulfasalazine has the potential to false

ly depress Glucose results. 

Sulfapyridine has the potential to falsely elevate Glucose results. Baseline 
values before medication administration are recommended. 

 

           Calcium    9.1 mg/dl  8.5-10.1   Normal (applies to non-numeric resul

ts)            [facility]                     

 

           Total Protein 7.3 g/dl   6.4-8.2    Normal (applies to non-numeric re

sults)            [facility]                     

 

           Albumin    3.7 g/dl   3.4-5.0    Normal (applies to non-numeric resul

ts)            [facility]                           

 

           Sodium     139 mEq/L  136-145    Normal (applies to non-numeric resul

ts)            [facility]                           

 

           Potassium  3.9 mEq/L  3.5-5.1    Normal (applies to non-numeric resul

ts)            [facility]                     

 

           Chloride   107.0 mEq/L 98.0-107.0 Normal (applies to non-numeric resu

lts)            [facility]                    

 

           Carbon Dioxide 23.7 mMol/L 21.0-32.0  Normal (applies to non-numeric 

results)            

[facility]              

 

           Anion Gap  12.2       7.0-15.0   Normal (applies to non-numeric resul

ts)            [facility]                           

 

                                        The above 16 analytes were performed by 

[place] [facility] [id number] [place] [address],Acct#: [id number],[place] [address] 









                    ID                  Date                Data Source

 

                    [id number]            2021 06:15:00 AM EDT [facility]









          Name      Value     Range     Interpretation Code Description Data Renita

rce(s) Supporting 

Document(s)

 

           Magnesium  2.0 mg/dl  1.6-2.6    Normal (applies to non-numeric resul

ts)            [facility]                     

 

                                        The above 1 analytes were performed by [facility] [id number] [place] [address],Acct#: [id number],[place] [address] 









                    ID                  Date                Data Source

 

                    [id number]            2021 05:37:00 AM EDT [facility]









          Name      Value     Range     Interpretation Code Description Data Renita

rce(s) Supporting 

Document(s)

 

          WBC       17.17 x1000/ul 4.80-10.00 Above high normal           [facility]  

 

           RBC        5.88 x1Mil/ul 4.70-6.10  Normal (applies to non-numeric re

sults)            [facility]                     

 

           Hemoglobin 14.4 g/dl  14.0-18.0  Normal (applies to non-numeric resul

ts)            [facility]                     

 

           Hematocrit 45.7 %     42.0-52.0  Normal (applies to non-numeric resul

ts)            [facility]                     

 

          MCV       77.7 fL   80.0-94.0 Below low normal           [facility]  

 

          MCH       24.5 pg   27.0-31.0 Below low normal           [facility]  

 

          MCHC      31.5 g/dl 32.2-37.0 Below low normal           [facility]  

 

          RDW       15.7 %    11.5-14.5 Above high normal           [facility]  

 

           Platelet Count 219 x1000/ul 130-400    Normal (applies to non-numeric

 results)            

[facility]              

 

           MPV        11.7 fL    9.4-12.4   Normal (applies to non-numeric resul

ts)            [facility]                            

 

           Neutrophils 73.5 %     40.0-74.0  Normal (applies to non-numeric resu

lts)            [facility]                     

 

          Lymphocytes 18.2 %    19.0-48.0 Below low normal           [facility]  

 

           Monocytes  7.0 %      3.4-9.0    Normal (applies to non-numeric resul

ts)            [facility]                            

 

           Eosinophils 0.6 %      0.0-7.0    Normal (applies to non-numeric resu

lts)            [facility]                     

 

           Basophils  0.4 %      0.0-2.0    Normal (applies to non-numeric resul

ts)            [facility]                            

 

           Immature Granulocytes 0.3 %      0.0-0.5    Normal (applies to non-nu

meric results)            

[facility]              

 

           Nucleated RBCs 0.00 %     0.00-0.20  Normal (applies to non-numeric r

esults)            [facility]                    

 

           Abs. Neutrophils 12.61 x1000/ul 1.92-8.31  Above high normal         

   [facility]                            

 

             Abs. Lymphocyte 3.13 x1000/ul 1.20-3.70    Normal (applies to non-n

umeric results) 

                          [facility]  

 

           Abs. Monocytes 1.20 x1000/ul 0.14-0.97  Above high normal            

[facility]                                   

 

                Abs.  Eosinophils 0.10 x1000/ul   0.00-0.76       Normal (applie

s to non-numeric 

results)                                [facility]  

 

             Abs.  Basophils 0.07 x1000/ul 0.00-0.22    Normal (applies to non-n

umeric results) 

                          [facility]  

 

           Abs. Immature Gran. 0.06 x1000/ul 0.00-0.02  Above high normal       

     [facility]                            

 

                Abs. Nucleated RBCs 0.00 x1000/ul   0.00-0.02       Normal (appl

ies to non-numeric 

results)                                [facility]  

 

                                        The above 24 analytes were performed by 

[place] [facility] [id number] [facility]#: [id number],[place] [address] 









                    ID                  Date                Data Source

 

                    [id number]           2021 04:43:18 AM EDT [facility]









          Name      Value     Range     Interpretation Code Description Data Renita

rce(s) Supporting 

Document(s)

 

          H&P                                               [facility] 

DZZCKw1dYrLETaKi09/ELZvxQCFxi8YlFCwbMMl8CRdeVXLyX5JdOTS8sB8lWDI7FPeXDiLyThGmLRRy

lbm
JuPmuKCeLtSNPxLhqAFqKpGQcmMbtgrQFxTK0TrZT7PGAgM25eRYWqOQQkR8IcLCWnXUo+Pl0ZZMGchT

CxEN3MHgjD4Y1dd5b0SI/yJE7UJUR2jaIeqW7AvjYmT7r3iYlzhZEPAx8Wui01kyyxQiExzb/6UiQocZ

miAtPWgfWIgQPCatjy7RtzHCam+t9vai+NgyBQy/+r
X9XegLIP5st/dATyuYo7ip9DHMKrsFAN7nnH+SgeadM55mR2Lpmwj9VVKcrgwNcTVlMA5gdBjhrO0+rN

kMRbpXf3Fec2YacsdMp0UIRdM4DSumvBBO7U0vVTh4KKvVDg0989Ra30ktPIifTZ3AF3HV3PFQtXLIGV

x7z3xq2aFO77cSuKLl46JITH6fGL3TQQ0fCtIvmkZ6
zBXW9akA/jR7UtnKuSl3KVYoYiIhPeTV/XG5Z5Cs1I57kwKCErscQwlh2b1Evonaqe8aZeAARqHbMsHH

IK4p8KP/bWTpHUxGBzcxXy59gKL39Rvi0R6oQPYFuqooooSGO1T5TV6J/MnYEOKtaSvYsAAh16+Hg12x

0F61DkWci6L/9091ck0r/g09TlKF26Whqxg7WwN+N+
HqPEKOfHSXF48rzYiyvoos0gwHModaSYHSbEKmrtFw/jyWetSmKHGM7vynwg44hmYJZqKanjZmGEypun

MN6QR43ztHg/tNyZPpoTr6SV5eIN+mj3Tg1K6j54gSftl8aaAZRI5GyT+4JaJvHdZ6+xAIudOXnRrTeK

DuIev6RqauX/e9+zGkoZiQo0oINiqv4K5vqP8mRxT1
G/W4wW1/ChyGNV0leCq7mlAk4vj2vThIOV8oiAkMXtqMqueI4/dTmglDjTU9rEZWn8oXcTch4m0i0Lkw

+Wru8wXsXxWQynDNy2vG0gayEx7cUr9b/kojJgo1lMH6R5Ns4MeXXoNMJrcWwlW2evmv4+KBelPpe/vV

zOt7F3MJstHzYsHt5ANP2kYHGWeAlAL825lbOtIi4+
dfzTwdHPJxsxKOxiicuKYy1+D4V1iDh1VGrGhA3MSGLNEwVxGErFpqcLAoGTGUWCXIAru2sPS6vNcgLY

e/SM1bnpUPwZSLi1jks780HvQrZhgaVYTFJXFxH3ntq6Pi9QZs0tMceGwUhqOH3KkVfXsdqLGi3Ef+BC

BGJAY4vJEMXmluav1xcHMlEeiLO4sN8PHUILnY2qTs
ubvshs0BoZN3keMTSm9HwdpUpIflEBK9RtIOabGSA7ZuVH+0vhj+xexG9Wl4/R3tsFS4MEJeyOgfP0Tw

GGvduN4e2kzFrZF8nZ5+pYlkkyqQO+WDUJiuwDzy4sFx0pmzJqJhb5+5buljvTAVaYX9j4g3TFLfxVGt

uhZgQXxvwktEdGHwUEOd/hTOaC8WdoJZXEhvcdDymM
OyMov7NGZL/fmCnQKACX8QEz2Iyv/8ui8m5GTG0LFmrHcZGB/dRq4hogUQXFhPQ66zqkkB8JVZAnRg0i

IFMdK2I0WeFlHtnNSTQYKVkXvyA8wxlVTzxKma1DO6TjhaM7tST6o9pniZOgCgDHmi/1cAOm57n/WwIs

SpbcIn+XDWXWgR2REbZA1eLCcF/TDJNFSNFAQWBAVA
DFWJXoUCm3YPPCEUjjjIgxseWEu+g3zKqaKStgRRi9MxcJdp2aLt0Vwlp7rHtMwe03NiahysA/rVHKls

l1OsFTWEFAF83cSIf1OeWYXWioLOTa1sVwCtLVIC+kPg7Cw+o9BEyh+nhFdzNnLToPCvobALRWFvqhDl

UjiWy4cch4HeQseL+r+ePMBMxmZzBftLU0q9HzWelx
maJWEpBL0FxcEENUMIJggKwv8WM98iu+QReNK2dEOL+r/2qRKteVGvOb1CqWcoRNS5EZeBGcFw1iUfzT

n0X1OaTX4IglAEAPkYhwP7xzBUqpkPa0CZwmqZh1fBDmbUYbW3eT6kKq8W6k1dN5M68bmzgRB2Q0flRJ

zqd+67bUEysNqyd5sh3YM5U3YTDsNfWBlJvMeEyToZ
Q7qIBKU2ejLoJwwhtJcJj3qj+GMkzmMEGwM+0Tehma/8iY9XXaTV+crSRsdzxN4kcsquPg20+38HF/uO

uVZH6pox+/IGMcj8YM4VyG4gxIUvh6u+5cY1IWPN474SvG6gUBwMuOq2uOn4WM+Wz1Z5Qn17RzihHlws

MwJKcROK9dlfdkO9u3XERIn45bjcNOq8BguFRXSAGw
Bfd4NF/DF4WNJ2MUGov6hKNarc6v8yq9QVcuFkOxhgJh9MkLzOGGbiXd6hG8teb01hyUWEBXXpodnMKb

9LKAIsiBWfoOjZSJJ3LgU0ecBqcHYvqjEnuxtUmAdtlDkdFOrT1mo9NZMIWbCJD6YrCtNPq93ZPcKINv

bjsSuhisPfUh12JK7U3DkCOHvN6TsK7baqgB5PaXAQ
+c1GyqP8uCKyCyXNPGlSkUCv2oNbI+ctWL7kLXgKW2TBhJa8J5I3NDl/vHMEYi/wj5UrZtvOArE9m58q

e9CObxEa31PgnO+GtL7tHWxhpm0120Ieq8wIQfZFMT6xwVgwPuyF2DDkiczk37MGuuEwBi3v99iaGB2n

NPKMzB3A3JMsnL9b3kZoby4ZPLV/h26hLY/qOJJKUO
QibWZKdw90iidHFQEgwT0ft6a2Y70slo2WYo+YsZFTbhaUzQkRX6wrXN6mb9wRIVp2xdcbjBw82azbQz

Gh9FGGBVmLr12WV78EA081ydRaN03lJOH/tot7tOxCs7SpXKXc1Ref3NyqxlPbmWZGeivHjGjYGiwwlI

lYt3WbmuE0rRXAs3GNEJ55DZSVdbyT6c9hWgOQMdZB
x96FFNFTCzEzs828P8xGB4g8WYSI/yfpwkdNt4GczJBEq2FSXICOMnx7ErRi7jZ0Z3DAe0wfxTM1an/E

pG+EvAvsMq6HL11QP41ti7GotnV672CNzGJuE+yTfrfWnUH5BcpzCsUDTr8x0c+esw46oLB2VzqdufYs

m1WXTbZlmwFcw58sh76X4rnwwjVtKXjfVBbgDK49iU
sN5VBMrLGptyYaooKZW/GfK3kehugPcl2ZCKTqfl9lSw/jONr4yG27DPFh4pGM9E8Ggk9ZcCipom+cqY

zQhPZkyDuHjn4tT1wr1fUX1rjsLy6X4F62J/2d8n/6cmEH53awaO6fYDLiT/B2+2W502rkOU2DY0eYm2

dJ7k1un0Cn4kDKw4kgtMrrEheODytyOsX/zaXdQlId
MMYqd629+CIEv/+s720cVEKIzxVBU3sGjzB4ptdN8QEUx5ugkyQceh3xeJl79BFpjeE9VwLM+8pXkX45

yZqERb3f2gMcfXa538BQ7840BX3vwbbNCTnW0LrP0KeKgAmq9b3FlZhp5Xu19aNddgT8Ci50PeGvLDrR

j6QieHiOQ8O07qTJkoMKVNysUCgNcbF8Ys86ojSzWI
uVph25e2ZBjxCfQv9HTpIKN77itYTOL0vphXXUl8V7PKqQCZQh98Sn0SxKEqEAlbqxMl0tenGe5qDlTm

ejSFckPnQRYzQHCM1cD3/4+aar8FdaJ7qs9T/mXHFPLUUuCqQ7wXoE/QYp+Kr3wfV1iSshVkGavudzP/

nExClxLRIdxAwxxDhcCdIIVNOdjBQFB/owSwpcCXgl
ijiZ3+Y3m30dC/25zqqVS6VmyBsqki1PO5GObs8+WlLtyu8emWUhrs9ZuOApu9fO/RpJV2YpOQFPCBIb

ibaz/MwQnsVuifZfh3RqcZh8wSX7EaMeY8cQFP4dCyS1aSz3Oc23st+FqpIiapNQN5KEVKOv/s1Pqqmh

450v7TAT1z8Vbeva+U/qX+F8LJT5ix/Dhx8oZzbgnt
3GpjKWuWcvrBs+vrKJpJDeA4uqk3s74S6kevnRiGIJvyMGmAC6ZXkbIIWXnJkctSf5LY66xdm+WWVEcJ

gugb2WdaX5xhPjgueZiHLMZLjn9XdIFii8luTDBPfZsovjh3dl0Hx2XAk9UbES9zsYDG6E814YmrnqpG

oEybsDfG1EsgTUIOHNEzlTxW6ZLaM7fSldi15K83Hv
vYhtiw+MyTuxvOq1cAtNyOQCHisx4ida2XuoKFyFiiJ4EDULydKomISrdGS/5R9OVWibuqirXaWcbqiA

kuCiqE9SyFrwFNLMbq5yM2IYeYs5mroXnYwcYBrT+ICSiAgMg8C8GyA0FjWw5zw13HeqMNmRIkVSc3Yf

SYMOH7T36SgdDyUxkriXPxmtLAp4C5p7OlCnezXF3k
u8yr1d/0ltUtNVBn8j7VOwVYnATf4Tk/YNnnk7RgVr14twLYdYWcKmt5j5fnH9Z5T7+xKzdg7ivJLarV

SrOtBGJzTCfylcNmdKe6IniJ0E6mj5Isbi2sFZnVgI8EmDEPlJTq7ky8Mkexif3ApdgnwHj/esW7A7P1

DBIpBwka9yCBpe/m21icG3GdapIdPueeJ12/hMxQZH
IPdB2jZYFX8yZgrjI//g+Rjv1SPAkuemEfuRIcHF6LCcEcXT5vsy9SAPJaUH0hgm9QNGM2FL2ISIEhTG

4FaTVpJ8DhZ7LOSsOcDKNwWMYjJT11FRBbVOWDLFwcLCBjW8Aze905idOphmZyKHQwIn6OFFZeDN1IRZ

MgHAUgiPKdLSFwHSJcErE8HSEfOPgoACAhL0ZyvxKl
xkOhZSLzVJPNOLzwMKEwM0bcx7PhCRy5LG3KUK8DxcGbz3FmmcOuG1waQ6FDHH4JCJTuK1QDZ9PdJ6pc

KwPyl6MaA7tiYsTfa9GaNj3HSzNfLa2QLbKiIE8xko9XSzAvKR3mud0XCHF1HG1ZhYf9QDCrJ8QoXLBk

LNWmk5LgIB3EWV2iqJtnYNU2MV1+IBweISH3arGniL
4WCBVuMnww81wA/16g/8N+qoFt5shqYrVJhDL+smTCrojqhlpnnveJis4idCEUoR9rJ9hiAoihO+NZkr

Lqx6tl7qPXp2Zp51yH1hArgP+/c7gs1PihYk3//vJBFvG6D1/9z6o6A6U0d67scMQd03Qcc/p9qOWdh0

ePD5/U20Cq58Fy+BD7AB7b0dC55vjp8N7s3/hiMB9P
Bj1hcy6JCd+9NL5GB4ZyS9xEk7/XqLIj80Rk5pJfpA+d/Mdjc/hZOukUlrKbBAbqzsgvx3hdzcZF+Wvu

B+y33HXRU+b4N+RP4VQkCoSOwZkotSPe1LLlFciShTYqvfYrdfmH46pK46a58Fh73hiaJOvFz5G2lx7w

zlbNNEPj/ftkicSyvTlxApLDTSl4NOfAJD4ANxUt4D
tB95h5Leaae4x7Pg9i2t6B9Cly/ptPwIT6r5rytYoRVlnf6w6UjElxkOU0FZTdnu/m46nUVb3KgewqrP

UqqfX2KVNuZt/SJqONU/rrrz+x5sbI2w9aZlVJJxPPA4Ohgb/zTnBLSEnLYeyEMezCx5zY2h/SeDHLh8

dmk1HEOobgl5ZocrPTFkhSMs55EU7ub2XoIPet+X66
eZaWz+Q8lzEel0Dw6v+4n9LFgYqPAjrv46J67PDcOo+fdKDvuF1Qx03J8kGSUru1ammOlvHJoEFBFTbF

ZqFqdzh5pD3eTaWNOUrWf/3hLVFPohjDq8xTwt9XPUg3v6Cs3OlZxZbHAZ1ShP2hllVVmy/0K5/Fvkx1

PeUKzW5xIDwp1IklAFaLzHyeZoj83+VwOS1tER5MYo
zBooWbzbvHc0Kd8AyRF7wr4lfxKAFuowUnDcuCyQeV85ZzRE7XiKzxcFbaBKZ1JhFQb8XC7jKrDoVZ4W

QYZaHVU9GhxfGGQVlMJCuVfcIuWJmpXMV35EJKoVTrjXc2M97fSip6dSPuTdg6FaQ2DjQOLfLunHEmAT

E92UtZBIKGFdYcXjVcsQIG4LlF5c2/dVk/MGChBX7O
erT7YBdEGxTkV9g3ZynNTW5bQj3RXPggaqoINtEnfuo7CZCFOikF8IT/BmSLuKpsoBXrSlGPjASIObDW

EjdPRNSldSy6gG7aurxTGNhnLbNC7DRbSQP1mO9CmZEvJMztjknHYiDtF0s9ZIrWr96+h7R64C+g2IFW

R+uOypPTFjUDQkCO5+CsHC4S+0ImKMVVdoR4apRVPS
PUQtrb4XKzHmJEQKpryhpOHdZXSh+MbzW89c/+kPdZOZRjboB84qSvdrcw3fzSgtpti1HKgdBoApBsMj

EO4Jy53bfEEOn5alZun/Ok+evRLpHfnb4D0Pev8rn6FExVPJqN5WKS0p1Auu3L4Dq2JJr7XkPTiyfZd+

JX7iouJKMOy3iNimjMLN6bQ010fT2S3h5uzJqg9U9L
I7KE1OKFD5OiqM51vyKnfWGoVeV/UqPVTb6BosnKJWCWUXCFuZpXzXtuV3R+Ac8sSNYdeiKNx5taDqsj

ReP0IRyPkRkvn7SwW2oRubyx9JI7QgQx9h+MUcP6MlUQIxT52kUwDsBr3Y8BhqB6R9qft1EOGyOa0GMd

NJPyL1XBD/I9NZ8uZNTMEyaD51EXcM3/dLdH2ymrCd
IFL23RHFpqDEmqyS3oAMPHHBt/KORTw4UFE7JRB1wNotmmC/FanRKyPsRLA7IgsQCElWZYJwtOh+S9dl

br2cJKQ5kqiQ9oeBgXN2mHkMHrG40xvZckUZwoxpmX9KtlYQEtKRBCa0VV7xa88Ij0QVT6nQdfN277jU

r38DgvZ26xpQzeZV1SSpU8VH/E8n5Vei7A21yJjGMC
BOkCWMgL37hoQ2QzbrLPXqLb6PR2akJmY9MgdyFwJYzybao1aeLKJ1pchSZfSpxLK6E6HJTmVAsSYYuO

Nnvitld80ta0obucaS3Pl/JYYd1PsoTvTDCbr0eZv3UNV31LBfx37FpQAmSexXVtSmOUDE4ogI7v3O+9

skN3S5gW9MLqwcf3zrj84XYwEt8fLkj6nA7bEMSy29
vUfdxB9+eh6v4SRIZS0LPzU+Uqq64+ocNA1u5Mj6xXXYO4CYuomlyU+nT5PgsmdwCxtaQcBnIj4JF2cT

pvMfEW8433LcknCuTo6rfJilKEmHRY4UmPbOYunJylHMEKSW+rwMJS4+bOXBVGVVAa+YmOCbhz6ACiDW

UfxK7ommwjN2oShJ14eCVjWhS3foRq3hG1K7VwVHXk
JFEBFPDWr7sw7EtgWXBFopwFupXgPR6FtatR4XY8Zv4BY3tDIZxaj7HkAPDPExdR7SaRnycabLno/FwM

uwWnUxMAI6yrd7UOl9j95eKj9lIWK77f/BqL2HLxRGXvHUpBMHOHTMMSzv7YH7tt26ndowTdR3qEdhkU

j5Ndd8h2GDGwCZ2z9ClaTpjocm8N01e6g0nkh2jh22
8g03UoiL60SopUJyTb2Gf5WdIdbM8afFm2b+z6gqyvedzUuzZzwI05vP03lv1lVYdTTIfwHo8wy04Am/

62au60TQzc3IArCAWQKqOoMFDjKDP6BMN8FnXQs1JTlpSO4TiPxpk8fROR5vh9mCVANFFMNiSxpNjVCC

Ej8ohbwHbDPH0gOsqk6a/7mUHh2k/jftKqyx2L+fdS
s4AmSGatDT3xb8WA8I4b1WrQSsErwsLjl0t3x/qbH0UN6+1aTPpPUICV0xCHBK7VN1ronzVoKGNcZmmJ

reix97R1HCDxNYa6mfenDO8YeSFHfrC7AWmrsvDYIrYQnDfovlzEISxJvCxdMythUytuREK14x5fcvp8

lLZDpN10x5+R1gFo1cGrC7c+Md4C8sogerK8Dj1sxN
znRrXo0uLoszeXM6ZuNyltwnA1CcKds/p+a/9kbDdJdC4Tvp0hR3sfuKgX2PJNPT1fwYfca3EIZfXySw

VVL+VP3yjCDoUnMLmFmZcYfgZomA2oNyAND2hU2rJhPiXUcGyBtREXTuL5PNzSO2siBT5H2eQH8xJnKw

ohn92hQuiQtnAO9OOCspZQFz6zXy6RHdx1P1YP+8t7
2EVc3PgLZqF7HhDYd6u01MbMfdSruiV7hKE0qPr+B8/tL8kN1GGe2dW4ouT/BuboKZkerF0cazFN5dA4

5Ibv2swGpSkg78PccGcUXpMiF7+YtI0oxGJIRb1r+6FiVEFuldObxtDgzL5pkPmr6z1SwcTGDdWgFS2M

o7sFEyz0+/EGyZIP5dr4yazQwgKtTto2lZm6/kg8yN
MondgD2Q6QDdYqNvZwxzoay/K7R3DokMLkXxeEIU11SuoARw4AHOtDw4EkDvoJsSAniXhcIddqgjLg3t

8d1x+zLEaMr2a7+wbyj9AAj/fcaPvnHynSV12thTUt+i5g8FSfLp+jKUH08XvuNE44jQ+RaaAsfdHqZT

lt/T/BafSdemaH959Npw/RqOiJnreCoGKJehlQs8vr
t00qlEv7i8bxhensVYMt0Y3vVz5dGLWC6YkX8gymNGP/WgJFBxCfWMIc4YgOMqWdpsT5/3GR4jAgj5wM

fRZM0b4GiYf7+KZNoryc95bIcHhBwGc4hfYs7pcOm5HiJwdnfsb5mV+ePY9WSi47cWxElQ2yEY9Xy6mt

5eQlJUlnCbXXVWvkP8xuj1hd5hFtOMNwtK7XOQON02
SjoMu2s6zTTCmbRFAlmnHO+u/xXdm61AEqj7Z789yQOyOraDQ3JKS4GBa1VwDjw9U2SQgG4LV992CQh+

z90nvZqtR2RQmiB+NHo4Wy7Fmr0iD1g+vWq1NcSTh5O8KVeP/sHsVG3mE34eB2mQ/k0c2Pdu60d7EfDl

YXh2kq2mYbX7ItVeAhLMnVh/fzvT33BNJ4ejaxqzUn
/EGrtWP62y9B2O+E4mQQkL85Rim1ILNRHkPMzbugq706sw4TvW9VbNWAx6gvaK7I6FoD6UyHcHDy+eeA

9jSwMmETvQForYSbE9PeNKHrQtoFD8bKc1LF8Z3X53nwrMUT2Z49akYnctfFlh0G6nKdxVGIftP4g98F

sYHAIllDEcT3EeuTLEOD/bJvkSF3z8/i4NpvOLQqaz
7no/UN1fvmqkbwuEn97dkKKi1AHq+z4JbQOnIbyUkvm2x/D7xXxkxYRie+O8u8SulEhW8mNt+k1Rurfr

EJArcxwfvxoJDlVmJAcgGe6l+LhDdotFwhsIGXO44FhZq40jWSMIOu4l9khKqDGkqwBk4FuthIqVScNg

oj6Y6OyLM7Zi+DLy5LtZYIcvHQ7YU4L8YtQUf4x4WD
Qf0pJZ4xHI0jX+OVytkhSB0rTOvdMTald0UTwns7kZLAZmYePwnbOhY1VVexRwYrrAmLOmrqLiues5bM

XGnIKKQTG5VM2Dl9yGQ6/VzMrD1F4/FYRO7r86vqD9twy2LTmp8PdIxyXeZDQp7IAV+GPJazvRs+ZwzA

dTnhApXFO6TAav0Um+2XB4MwJAUDR0IIW3qFGZ5I5a
V2pTxuYtkTRu1CU/G4H0vXk/rONb2btjUoCDVbuKExqRIj6WfgueKeUuaBpw29xraZEluVBn2xcL6Ge/

HFaCHjX0/U36KjerQcH5dKGc09Qm0mgOyEv2XtTk2SDj+LWYTkwTRKf2HPsehF5iknCJSTBXk4SQuiws

5R66GoN/u1DR+IeuVMub7sjYommyefbUrWT4g4pEZG
nzdqJ7TYe/aYOhE/sLEb+aSCioSn7qzHHmNN24tVKn9FlqxUgEmLgjlI6nvJNf5dLPcSZu0kUC/iBTV5

WWjdWBQupzYlsX6v7fEsXPAQKEX0FZZ2IYY19UwIbkzcVPp1sSqdJ0HIulXuQGGrf+L56B3x1oh6SASQ

nLLChp03bP7t00/w4DHVrAuc3GilzZ48HFuj4+Zsun
huTkdn4+Z61gzRuh1s7pMzu+U2DUzlu6Tg9rd9OjdxL5RLGDVMkYGxPNhB9QUH31mMdC7JvUOvNgfI9/

Pr67IvadqW9S3Y326LjtipCkV9B/pl59CNhM35BDMg4b4FEw6JDJRSNOA82tb0AX0DlEAkKpVHQbX4ti

t1bNUeGsOyc6HyNy2aDG9mEP72Lb258KaQr6ga07S6
tzS7JLHGmXFWogenue7id/6B5jP2tGkjcrkXJK6mmtzDxEeZybVDY9vvxtX9YeEguHXklkJKbes9PKCQ

Sv8fBg2CqN9gkChoW/gh2hQKUS7TuuBJsazMuHmRXFOJZ/mIgKgeEPsMG9izpI6+KCJX/HKiCI/DV3WZ

u5sRUPbe9jGdlvdkzUrfW/MSNRpeBQYG9L01vtYRWD
SmUvRiwin/EIVc8qTIOCHFVPu0/9qmkEhcB3kOhm1ZSjdYP2kFLQb292SO4EPaBJbRmDy5n2iyasJ4Jo

HLBidc+fvtw7T4WYePTRUuRa9F6SvNvM3PgDzl5P/kqgAUetv58olxjDzDMMP+iHOqDmImwbrcy9lirp

V//h/qB1YZZZkkebJodEUjFO9SJuVpOF8tqo8ALjZe
XE5kep2CIMT7YM3LZBYsVZ2HsFIrP8AjI7OBUxApJIXdYAGbUT40RBHeOKVXHMvfIBNiG7Rmj974koTn

vuGpOAJoHp8NELHlDJ6YLVMyMWXvoEIfZLXxEJNaOdM2OEBaEUomFYFvY1UjnkAscjDiYRXvOZYVNBgd

DCDmU4tlv7IgMDt8EM5OOK6VyeLec8XkigFjC6wzB5
SDML5ATZMaN7VLB2TnA1rpTdOak7NwF3dlXgLwo2DjEs9EIePxXu2CCqDqBK9fpf9HPHRvVX7enq9ECU

H7HO6YbSs3WHGdH2PwPNNkUIMvp1QvIM4EMR6vaOqjWnZdTU7+HYndUBP2ynZnaK0ERVPKT82X99ZH/l

6p/0Qi2kXiKgUjh9qpf3QlRozqxt2I9ZnYI6WvvG8J
U8eB41/6V8yyCxbRi6TamwstOVMRl16ZfO96BEeW/rss0gEdIlkXa/qnn8vY5Kh5/514mOaq+1wSN7xq

h++5ulzBR8O2Ww8s6DzNjsYD7Lt67nyG6s36r0jn8ktNZwsWzYrLo94n/Lxa/GxBhceb3mBUcSM6dLZJ

jGTYEq+/ay7z0naW1SqlHj9EPT0PDEo8zsswgLFU2P
tvU2cJ7Fqaqh1Y/fcxd1tP1yJYZJhdBxfgHmMTijXH4+6aLa2108Ge0t0fJ6jY+uhtC/wRBrWc4EfxIN

GmZMxfmn1rTcN4lPzEIuuxDGJriKqL6/mJLEiGU9c9EvaiQZJt5id6WnMDn8FXcHJGpiaflHQsjcnMMQ

dIp/9G0gQHzsZNRvknUeu6pIaAYYgjtunX3cdb4HUN
i3cRHdc9Y9HMh5Om+SzcLxakfR8TFGZ7BAElbKmzRXe8O4cgjYQJDRKGpXU9F8H+qjEqascxWasrqYS7

6JjkeQb2+VSo/nqGxGMvme1xuahukFNn0x5YU/in2NkDD0+WoGPqXlmKCuJjO6O9lAzJPrmhcTY1cyTW

Jk+SvURCEmls1ta18YJ9bT+o/Ky1Jp0QFuxVvWP8AL
8dxtzQHD7PbXjWr1ZuQnadOqISlDFPbd7mcVXz4Bv7xiIHym5rdyzt9O+sWrtTgKDLTpzQc5pTu8lY4g

1acU0X3VVwPiRbfA5jzSa6+5H2PNHz2Bg0mAvKiuPtF3/laCkycIRkwrQIaytbN/KODC/d3OhJFbjcjB

3EndEBS36wEFSSXUJadbB9jy0h7OsiH0RrEupU6okk
SS5ARYybqEgOVU5J2m3EeeQDCDlBuFpn6/Mc8SONyxUiuBtvj9StfQvqKQq6MK6BOrEVYKP6b5VbwJ7C

9CqvMS0Y7MM6wNuww5Lc1Wepu5oxpCxfn89c1d5jl+fJCaCMdAIidK6ctSmQ2eKQkciBjwAGND/iwTGS

zoX+l+vin0uZQGkbmff3ufUb1M8MAhu6tI+kVP9N7l
fsZpzdZrbVzXWfria3whtId3u8jyXtRrHXiiPPbpJJJT5ms3lebbehj9n36gi7IPO2VWutNjIVY+p40x

xbLuOOLfBvEAWeOpFAEl7jROcRe1d8ZgDHfDnXZXrcMCHXTV2qGt2VKDMMKLRTLsX3GeviJBZM5isyUv

Thtd86Qfxlf4T+VFm3vhZ73eP3LKwj+SuEhZPL1hSo
VAOjqAbtvU3H8ba/iN2H7sOKdXyDeJqxNiQ3gM2SVv079jWfRpxbAvSkzr8D8uDl62SetmravK5enQyh

JP68SkyInWTL9TkcGeFVrVT40SMICJdBpzsLBeBui9KguYHVIqU85NuhhkKS5ppPOEdQLBhoZez3qu85

mDiNQJ7girgmzzdmxmfU9DZcnJb+btj9yjNYch0dfq
QgNceToNMjg4EjpzJ5RfO5llBSzc7wAA0TDj6kOF/LanLTeKguoMALRXAKEjfNTwxOrmfVxsMqWtY3Ao

y4+AGic0YHkcwSKTOfyU6FtFGozazEbg5v1oANzPICmDAe5IXH7xL0+fIMNNDi/wrmjmC0SlMIx0t7v/

KfrIvsSdLDCcd2Gt3FCrO/9kzZHkTUdPenFrNW0DtK
AOeiGE9vQmU/teAT/HJGGkhDU198SqiTULt39XX9Nu0Kn/ISih+6EQRqU9pQMlr3Byr7W7elordbPWB7

D1WzE83VsygLEqiN9ll/ghckbq0P2J4j2/QrN1mVKs0CQCqLmsEaqpHoVbSlZuAF3ApD+aNBn4fYNEhr

cwwE16Ewp7HvSO6oxpT5EGEdTm7gyopZNereHZRPa1
ELXJ76qTN4Ipzo9DiW2D2obLppcIWC1am5VbJx2x6//etNVqG9gqLlt0L4xuANxDYk1/lkXNmmFXmNP+

sKCBnqttMkahjsJ+trMaUxugBh0LuERwgs9mOWpgiTvTi8vdo/cRWaoO2y5lthouKI31H3/ml/Txwc7I

rTFfdQjx0DT24jf/JUE+uOi9pgilLkybntABeHThPg
qnLp2EZwepilcnaPcZ19iQEZc9bczMKjUaabVJzg+YxtHoMmUavTnHSV3Ucj9L8xPUizBva+Hue5SK+y

0Qvc2L1Ihz3vuYVgxeFOq7gaPXMAvcYRCmTlp+iATJ74iM2wEBTRf64MPJ6jp7nKckemmteybXIWK1Hn

Def0HQHNKbCerkEG51KJM+aeIDb5tWRKDijLRwlTOW
8EHL9jvAdJ3G0rBr7BALc4j77vzvBbZhhNLiCbHjsUvMhY3I0H+SkgltSsE4RMy0RVZNb64QPBwnLJaS

FB5TQcMfRfxtrBVg3Q9ZMimYQqp9bNz/uB3tPzhLDitZT/Z7DX0HcrshffpC7/BrDWGuelCyz0tkVDiS

uJzNG7qngH4EyGCXLS1A5Y4o8dHnjBdZ45IbW83Tpo
sofNJleMeWTTzZSvOhkz8KN12nyxkoIlkm9tDHy0AjAA6EWKxdwCxcGcqzVcuJT4WMMqcRlJ+hcccN/b

4NwKX/bHdfLGhTGOc/XKfFFZ9UQglN4LTtK7X8/ZC2jQXYLA83PYfluJtV4Yjtxhw/fqILdHvCu8EpOh

QUEhgvfPSi3NvelBiLo0v90tmz1n1r4zU9yFBIKMfS
lI6wTTQfYDJFaQlhLDOwc6PL/KkLA4H0qvKSfA1R2z1yv3BrX+KQGlcXiDjV+GdOWeTy4TtauhVsXBoG

C120XSCoVx0AYgJsvo0iy3T2u6UBlgwtT9oDf+7fv/H1+EtnqlNprPmswfA5LaPcRYAmo7CVI9CONrY/

U7G2RBhPeGEsJcUTU0ljYiaF3XBJ3af5UrDBl1BQZu
l7BtDQtpKKa0KFsvTWCjU2V3rYEeXZLwPR1PEVBeWL3PFKCmklHjWtUaMJCQCuByHJXtXeIty1LbG2Mx

YAUwTIOSUUzzCEEiB32vSNynWq24NGjgRGJyZfAeFSf3Gi7GTrBbFHEuZ89giPQjkJQyOMTlNGKTDkSw

BRNcZ3NafICfEIsaB1WuE4DxYD3jdPLbRX7zfKHzH9
DuQ4UseedtJKKINrEwWWMsQRbqJEYePiUdZHvcKCM+Wq0QOMW+Qe9MUO7af1RsOPpkZROsXU6uiw7UTN

HjQXf8UFW5KSDuHds7UTSjViX5YoCxXVW3XST1IiT6LOjqQqJxXGOtDYOvHnTzAjAdITV0GZV3MVBnVc

k8IYUnDgPcUqqgSzl3QUE3JxJ1NAFpYBN1KAT8CdF7
VRZjSQE9THN9FwR4YPPwLOG2USG9EsBsAcQuEtNtAMT6XLJKLaFgOFj4PYM6XZA5YWGfLNa4TUpuZbO0

EfWqClYxGYonYpK0RquyCcNjOWq5WXJ0SnToIbo3AMP5KyQ7DdEmFgApAXwhYpS6FmBhIae8YFR3ItG7

XxahPbOtSUO5BmH5DIJiWzGbGKH8FlS0GEUiDgX0BW
R0UpE5FCGaWDdzCXDwLbMoVvbcQdUbLPB9SCK4MCSaDjZbWOK6FvH4HZArXFY6MGLgXER0QKWjBqGtOU

RoYIW8DJIrLmz2AXA8ESG3REXyYoi5SZb8TMD8FXXiHlYbLJXrZFD0QPZuWsz5GXT7XgKgJhZuYqTnIY

F4MqN3QuggNLN9JC6TBAL2IZIfYXXuTCO5NZPxOPWb
Msi4QTT4JUD5RVSaLcFgAOy2DVO3LKPlMdWcDCd8XWS5WRCeChOuVZp9BPN3LKBeReOwGLw2QHE6OAAp

PfNpZNg3RVZ5QKNqJsRdWTs6KNL3BWQxHtMqUMq1CAS0PGZaZvCfUWn4GPJCItUeQvXfAAs7KVN9HMUq

VaWxICd7BZE7QPVhVwCqQRz2EFO3KOElRak9DCOeOg
J1HAIbOYE0GRT2UmR3RYJkJmgkJOF2WrPzNyJrKbN7REC6ZHX5RJGgIQp5NQOwTcJ0DeblMKKqHYMgNL

N1UMscMhSkEDIbIcLkBuFoKOmzHQY8PzU5BKBgNoMtKXJoOcVgHeZwIoA5UPM8IiP5DvMgRYQ1NJjsSI

Q2BOEbFqUdNUbvVyB1KjXkJxFpTTlfQlU5JoBwLVQh
ASQ8QtDwCmK2AKV3MmK8AjghLfJ8HKR3TMSyRxizRvv7MGW3CMB0KoVjJgOrLGg5OGJHAiSzFha2GZv5

WFR3RxipCzq9DKX2FIW4KwuaCjPxCSsrXlK3BaLoMfGnJNQ4RpE4MjwoVsWsTTZ4FzP5SBCnCAZ4AFQ1

XiN1DLEiPEN6POr8SQO0AUEsIYN4MSR9KhA0FVWwIN
M4LYB5IQGsLqquRwq6DHC0QGN0AYXgESbdXCJuAGB3SHEyNhUhKOAbTPF6BWFiGwGbGLH4LFF7QZSdRj

MjQGOqZHC4KUBgMuDlFEJ1RyK3OBHvXPH7UX7bALdisiHuOtpQUqVgLOCig4GbAZkaTFa2PQyxNGDeJ0

C3oIFnIo1xrPTte2GwrHS1n4ITZkAaOINiCy1aaD3x
kFKcFXNtYPiuBn1gSK1AVIVxQR2Tf9OyqsXxSIV9T6FruQionZhqnFB9LRHvBSZwF7XokEMxYvKjVJuw

XRJrW7TvCBymSIKsGHqmMAGxH2LvwxPOFe36ZJmaOM4nKLLsXUUhAST0VYExWIamFNRzR9x6IDyuH0Zb

Y3ixAKEcY1ZotORcIF3JLAC+Jb0BBE8ep6IqKGnfGv
TiHN4ogs3ORSV5RQ6KRZYzML0SdBTgD8RpodLtX4RorNamDC9LdnHeQRupRV2AUADmOk3jyC5VlgeqcZ

gEs5odS0VuK25myK2pT2agcwOjl9dZdiVpQTtyBj1VUFStNB2BwJHwkGYlJTIwZbKeZMAtiFMoFTUcRc

O6DXkyXVVaU9luXVTvnoBhSARlOWTJEuHjGTLzXs5f
iKFdj9PbzAF1b7YbWYGaSOLUEXybPI7+XNknajOoZygDYnQzPFJxu1NhMRdqAAxcTlKnMGb5AGTvUnhf

Pya3TTV0UEX2NYYkDMZ3YHo7GNT3MvtdLAgqHDLiOuZpIiTuOjh4OFE7QKHuWjwtVgOxFKG4ALCeJjuh

RVZ8MOZ8BgH2MLNjGCI8WHB1SxQ3CGElLSM0EVX4Gh
O4FJZeKMQ9UXB7SOQsYwvdMVh9QD1LQPA5VJSwBJr4MUX7JuTnBZK5TRP1WlY8ZphoWjPzWNzeCtZ0Pm

meSbIyHCz8CGP4IgOvDkm5MUGeCDA1ZzzuMTB6FQwwWnG0GnSvEvx6ZBC7WbG6UwuxBqIbHXH1RcV4FF

LwHnIuXXZ9SkH3SLRjCqI1DGJ3YuG5NLFeIEvhTHO8
PRFtNuxcYns1ASJ6DCU4HSDmQkRoVPR7QrE1RCQeILUsCZI3XsS9RZGkDvr0DQO5EsM0ZNUrMnLqUWJy

PiQ3VTSzHnAkMDvaAgI5VVEjLEC8ZWJ3XrH9RPZjWdZrDEGwJUWeFbvmJCX9ZDUlCJE8QtXoEYMuLS7M

MEY4UBOrFJXgQCRnUOUjXgLdZdQ3WCJ6XCR8XSNxNi
MtWUp3EHM2ZDXbCfJkCXn5TXZ5KRYhXaLxHKy3AYN0MQKpRzBeBIv4SLW1QOOnIhDaGHr6JXI6FRQpPw

ZzNTu1YZC0IYDqYuCmUTk7FYI8TTAsAlTnETb3JUJAMbPuHpTgDXv9PSA6POWyElCmVSl0QHU9FTSnAy

VoYCo1IAN1LGUsXqc7ZZNyVmX8EMSiWTB2HJI1ZbJ7
CYFiNbCiONC7CzNpBuDrMoC8ARB8FBC0OSLvPMf5LECmXvO2AbdfJGMlPYMoKAI7XExjDiUpFQBhSqXe

BnAgWXcnASQ5KjR5KzcgUmYcLJTwHfIdMtIwNmZ3AHN7UjF4AxVcHUF8NSuxEOZ1OLDfPcH4IPK0ZjT8

ZfzzUrM7MPJ9IwR0GhlzIZHzRPJ7FkWvPjM5LGS1Zb
Q8BldjTqM2FPN1HIFqKsvgEdx5VOQ9DGK5FpNaFwRiBAq6BEZKHjDqKps9SGy9KVV5DukeAts6RJO2TL

N2OdeyYeYkFZilTeB3OvTvSoHhJJM4YkU8PacgHkBvKJZ0MzM8RLYsHQC2MZP7QvH2SFEbZAC2MQk7NR

L2VVLhDTA9FYQ9XsP7QLYeIQX4JXM3PNCjPnhhNqv8
BSZ2STH0BQOxVQyoNKZ1QcF7FZZhQAG1IDA7PxA7MWNxLUB4NGK9MVD8PPSrNQD1MWL5WwN1VUVaMOO9

EYSmDFT9CBVgJVSrGE3aRMfxuhAnMbvZSyY7QZOki0AoHDzySJd8PDpsXZTzD1O2dBUfUc5qgRKzu6Kn

yVF8c3TJSwTgUCAsUb7tlA5uzQXwQDOsUXhVBmWvTS
BbWYBpDG30CSkzXT9MGNIVACtkpKGeYQN5U2Vhg9BfftNrLXRfTm3VDSOgRE0BzIWoueFcHh0FQKIxPI

8Oz001EyDssHDrLQSiZkHsHZMvRXHtEGW5EB2DKIUdDG8MaDJqdFWPuivpMGCvP4T1MS8MIHFBTuWcXt

2GWbXmFA3ntc8JZQPnRZWtHdzPEuFrFNrBLiIeBXIo
DVvuGU4Us666C6X8CsC5tYGzJEH9GEX2eUFmZaIpOIJjjlPnKFNmQPgdZE1aq4MwhwsmU6psIZ5fvAKm

E90raE7tTDsvXUVvD6NtdeZ9C2yfebUoJA3XABL1Q5bzgjKqFTMYPrBfGBGmP7kkvSpfSSEfIFRhSm3Y

MAIuVM0Rz623ZFRjW8UyiSTiknKdPGZoIFXLGuRqFx
1DDuJgFB1fmy3YLEDbAFYbWuuPJqGwYwE0TNWkEQijGVZ4TgVkOsrwWDgjAMJ0GgO7HmMyZWF6TVi2QR

TwPIQmNgU8HUM8GVO3JSDuOgM0TAQ8NGQ3ESPbQplyYMM1MfF4TkEyNOi1GOS9VOV7KrFvYSq1CBU2LX

H3JxZhEZz3IIR7DZZ8LdJeZriaIZM7TUH7OEryUJrk
NJtxDrV9HQonQDJ3NSp8UCH8IASqSzLePTNcDOS5SddwVORzQKVcUJD2VQDeFnB4CBRuTrP5MCWoKnW8

EZAbSTK1YAajXbbyXUg4XVM2GZViHkL9PUM7AlC6GuKtGQe3WDweObY2DwfaEHOoAWK7ESUtDcH7ZMHb

DqCEZhBkWjZ6DDMoYyRbFeemNgY9IBZlMIHwKAFmTY
I4AKRiPIO3TDHgRcT4QZC8XDMrCTYgGpF3OKAtFdLxKWJdNkw7PUI7YIMyOARqMXT7YXPlDWW5AYGkYv

RfRWZnLSQmDAjeXPXvJDF9EXE9VTQnCWLwZDq9IlU3ZQHeAYmrARNkVPO4CvnmQgK5DVBoQzWlFptxGr

Q4HGSzXwY6OVYqFcY1XWG5JzJ4WKDfLtR3CMA6TmT0
QODyItF2IOF2EzL9IYDjHcZ8ZJV8DgW3GLNbDwL4VQL7ZsL6ZYGkKoB2RCJ9QdR4UATpCnU2WSL9HfX1

OFHeWxB9JV5IPBM2ITWvQjL0CNB7VrV7KAHvCfS8RIR8TmD5DKBbIoP0IEG0XqFoBiDvUnO0IGJ6AJK4

NkEwLOH4SQP2IZLgWcJwZFt8SJE5BxZ1UgMeHMNfBZ
T8JQU7FTezCWX0NDz8FGU5QIYuFoabQNmqUcQ8ORWkKVX5DOQoAmHUKqZzXtSnDPUyTSEwBaJsJtL2DM

H2JDO8BBZtDMsgVUy0HnK9IvWiGXj1DFMsSVY3NXJxPwWsEUtmMaV1STMkRaMkAAcyOcK6OhBzChR0QP

NyWnE2BwGqKAXvVSSzKcOoXYntAhM5EVQzFdGdSCkm
OzC2NIo1XNK9OAanIWbdCQf6DRC6WKbbDlL3JJb1PXB5WFdkAoD4CBaxCoU5MzUqHeJiIPnjSfF6IOya

GuU1WEFnSES7RlkgRPK3JYPnDFL5LzfwVUO5DDXqYBC4TyyaADdcLPN9FDD7PLFgSYYzXEZ5HJQhULAt

Vbb0BIV3DZLcRRVwJGmmAL1RNUE1UPOhOwVxQALqXP
K3KXHuVcAxNGLkXCO9ZWalUhWyVDXmKGM2KHBsNqV8XOHpRJI7JylpBnKhDSTmNDTmTG4YVP3wr5MxER

nwPwXbXZ8btp0PDLI6WA4SKLZoZE1DcUXnZ5DtkfWOUDYbnlrupC1oLDviPSShL1FystLZVI7zB0KtY6

2wZAlaNm9sFK9WZBFfRV4Xd3ZbrkXtLYJ0HU5ZHIBQ
LCigqQLtPCF6CG9KEPKpFS40YT0qVRQFPwEbPDEsPwebK3LeMvJUXvCgJTErNo7yeSZUj9lzUhKjREV0

CGXaNGC2EZRlGwwnSHiqMTJcO3m8PYvbZ7FuJ1rdWQKnE3GaqFGzPO3FGBW+Js1GNV2uk1IhWJrdSQAz

KJ2hen0TDFF6XW6QBNToLC7KxQSqZ0BczrUxM6UkaD
zkRF2ZltVuAHjfMT4SFVDrVe7syB2UKCmsUJAAATOxjMFbRG4nf4VbwxeaL1jfBU5vkVZoU96foM4fNM

qjSJDiJ0HcrbZ5H8lkjnUhWJ6DOPL9N8ytydDtCQJSTsLaRUDqX3maxKenKRW7RRVaVs7OLBMtPW3Xz9

97RRHaZ1GynXYaqvZfUvYpDNPQYiJtBi6QJjLeDY0g
la5UCGstPQSfVklYOzYiOrF8HIRmRWBbIQU8GSJzZOZlEcgbCIQ6OVS6YoifYJN4BIxcMSXaZrKuDqRt

WMRzOpO0FZhoTxu3PUGqUaX9WULbSkH7FNY5XYL0BbgaMRQ5AJXrIFC4CcppJJM8CIGpLZQ2BacuPVT1

SAKmGTH0FrabMiV2XAVaXqE9CVFnFMyqYVR0JIH7JC
NeZEX8RAr4BPD5SBZhBWzaPWKnPGE6BARyEZY6RHT2UXA6XJCnJhAzMSS5NaLqMIljZKgcOSW8JXH8XR

woQxQ4SBg2VFN5ZaVbXxL6PAU8KUM7MvHbKHC3ENW9VeO0XlRhAXZ3WUV5TEZ1HWPjYMrhVA2VXNPcAM

XpLbm5MSRjXxZ9NIItBUG9YNN6SJA0FFntCXm7OFS9
GyR6UTzoJOKfBOChDdW4YRhzYWX0CMG7EqDaBPEpIHm7CLK4FhE0OpVgEKZ1POU4XiP2ILvkMEk3ZVM4

TMC7FsBeWnB0NSW7MyT7AugwFjTjPGI9TNHPFbXjSQf0JQU6FfGcSLCrPyG2BAEkDgJ7GIYwLqAoZWI7

SbT3LKIkEsI4NHA9GwP2BCPgZfC2MXH8OmQ4WMCqXp
N0MRJ7VyC6SGXyWdT8CYK3SaP6NKUeXmX2ETN8EbP4KGPbArB6CCZ1KoF0RTWmKvK6RWV9BwH1PTMkIK

uwPGF0HqI7FDUeXeC9OKB7UnA3MCVoWnP8TEJ0FaZ6UUUqMzI7KTY4RNNuQVOgYEX6HETiIOV8MZVdYG

H8KAJjENE0EHfxKZO1XBr4BBZjIGUeDST3CHJ4RCB4
RTQxOLqoGDEjKSFoExwqDxl5ZGM0SuJoFoQdZtqiRC0PDYT5NygoBPB7ZMPrYtVcLNZnHvUlOFGhMGB1

YLSgOMN6AUwtBZO0KSSdOTG1NMX1QXV3PMDjLqO9BXD0YDO3MEMdSkR8FYi2ZXQ7DKqcRBN1OSLaGxI3

ZNHiVPU7WQF2WeBnViFqHrP2UNV1JtT4IXCtNnZ5GC
d9RTNUOiTbYxA8SIk0VWT6EUIrOgV0YAJ4HZK5RTDmTxk9XVY7VhX4XMeoXxv5NDP8UhR0QwEhGZC7LZ

WpSDX7XCinCPC2WZVwMNK4CCrhTEE5QJllXiP0SvBiIGSkSAHxNlB8YqDfFPUfDHV4VbVpIPQzZsBlYA

F2DeK7HNibTBcmUIO2RyP6FXGtCGx7RJE4DdP0GQVo
WYw5EBQ5OYT5ECZgIHG3NUU1ZhD8PvLlESK5LQN7JQU2YSodVAe8HV0eDXovuhXgOsgRWnGxQKTwr5Mn

DUlgYCu9ZTruKPOiA5P6dQCgNe1lnTUhw4DmoDU2c2LNEyEfEFOtSd1xvL4ffVEaF1Ifh0ZLQX9AOFPv

IA4Tr5GasbPpCPD4MY8VJVKOOJtqqFDkUUP9AW3FFY
FeWE89OP7kHLVFTqOuEBJkPphpI0YeFkKGQbZhCZMeTu9vzDFBc6etGtGxFQV1CTW3ZMZeLAZ1LH0YUf

BgPAEtISPbdRpvZU9fiVCeOV8RdTGaPkGbUQaqXB8+CQzbexWfYxwCVcQgUJBbj1FbACsgCZu1ZIykHU

CmU2U0cLGtMp6avN0OvYY4wZAwD2DmuXWAyPPvN2Ce
q5DDu242O6ZyS49bBCupYA6kh3OhyysoF9ifHK5ouOGwK62bcX5nNAooVGWoJ1BkcqT9T4elboCmYU0O

YNK9S4dnucBbGHINUsVvACCfK2ttjMllFXU1MBJfOp7UIIXbBH5Rf910WWEbB8WdlUSspxCgACBeOGLU

KsPjTp5ATfOpFS1rbf5ANhIiJEEeXvzYIgDgFBtfRe
iviXYhBC1UkNI8TCLuV48oEZBtHUXkB8DiZTJuPnPcEI7KMM1weVbpAYCmIam2Yn5VSiTeb0TvJICzYU

qYlmr3rCyTItK4qrnmqNfvwbMwHOYTQXcOOoGQNRQaDOQSlLAAyMmyBiHbz8ltRWrBYXjmBCGEQBWvgT

RCXVOFNRFvHBzCl94rm9WtQDxOYcc916eu6w9EYZDh
3/t+Pu8fT/C8F537boaPuKXk6Drh43yViZbHLokG5nM712V7n8K4W+FjYebRBypyJregHX9USJ0tqqlN

CnEI8ilMFf7oSl4PaOZkWGJUyLiJiRBd8n9T+uV+7CEYV7rwsHeBDG94KQb2FDO/wtfge2P2ZSy3bdZz

LASg67zUbz0sQJ5LndFkP9uSb3E5aPOii7TRQRy7fK
vjXP+oCbfclvVC+VghD0nog+v227zp2MUnYR6QLfC5LgXvO8Ob0DgIh586hR1USzz9XD84xqRKcc/VX3

/v3LAtpucpX0/oCdOsbUY59zzWyIu7394q/J+DnpCVX/+A2Zzs3Sc0Mus5GFuTIP/l0Y4eByN4dBSV+i

mXdB76BUfi1L4vC+rII0PRxq+G+odahn+M0Ef3sxqs
HAt/xE9UIKdu9M/DGmgVlH2X4rK+SiukOFpPm+g+wpQDQ5hlX+oYEAA9aJE9a6MbOH5Vh6bBhCf/v+Gf

//8nqnsBNvb2ZVhEAUcZVg8//zAttjibK+lypM6X/uW/T/WPRqxr8dcxlhv0qU5yXock0CJbOoB9+gf6

C7lcFYaNJmjc+b/G7ID5NB6i1d74VuJPjaJIxqgpn4
nfSFa8RFKtNtuNkVh6jSMVfEl3m0H0CvFnPKpG6QraE1WXzh9jK4QBS7eNj5Y6P3AjM+T+P/97kEbSSj

lB2cc2Ll6Z6OV8B/rB7iEAg75Qq5zMkIb0qUF73omD6hd0OZwXe0gXpJOznvdC1vekEzUT7T7W7/57Rs

IZIcr1J24F3L6ulocF+j+0/sqemWgLnwI5A9h5tF8W
saMP6tLey+1///7373///vfvf//+9+//wb+5mEeX0/Lw/CLQRyuLHsGclOrzAsOLn2GDFP//AQLh74T5

IfLVm6YO+iAC2Bk+mSUqpcVln6tJeZ4isR7PNA3GvU2BpoqwkEo9udOvMPNWiVNkSGXzPsCZMCbWwXFJ

oGAINV0kD1P7BxgbKXk1Rd0/La+d5dMdtw3ICk/K69
/x8C05aRyy57JSM/L6irz+FQ9EbjaNgS8jo6th2vU2LbYPimYrvpYZGesX91H8mD0pR1ymDucwWJ2c9K

PelG9F48rwJebaZ1anRGbwj6ma0mtY34j95jxT3o/KIhSa8O3SuGdZL/mzD0yOhR+1vnmXhmIsodht9I

2/Li6aXx51EwZd3Y354Mc+bbDc44sYXW10HyCuiuwL
BGyOnkPc1FjWlt72/7onPLpw8vqrYnV61YncZkKRhbm9ffP1C/th8dSROH5aNC3x9sr+7Se0/Wy0Nvut

w7V3pbB8tPXvJInNk9YNfO/ofHTMOJd7Riarbu/IwtL0nVO9FkR7QsiSG1g4v1pyj2/htwozN8CGMjFP

b35E3HhjrMBPhg/s/LkQODpFwV2dsyPm9yOVP5x6jc
KrQrrXUgXExABb35+aoeKPpOVKq037YkoEBs1Xf7yIDAQekb4qHl0RehBnivHEeEwyKXSH95kdluVrMX

AWBeNupWTi88V68Q6w/YsFBUC7Qwis+CFo3AIuy4SNy3H5WlzpYtw4T8LP0x/3L3J25Uit6t4/xAkyxy

S/L7sgzPi6XbGXVMctxwHlZ131SzCttXcPddRN0/fx
aoLwTynQzrmrE8qDYljNakjbKc0CsPTzapZe9ekISl9gjRjG7jcHEEGtWcMClhWvpwN0SKfghw9L85OP

n+8mBphX9SYXKpnGrgLfDFfDtMBBzxRCyAdPT3Cpo+FYstVxahxqndQkj7Q4CMFYPDyqR2btMvr5RbB8

XEeDjB5rkwqQgpahCAsPAmMtfhyTWhSdxLmagwHxMz
ksOKNN803UPm1JI0ofMeprvPIQVF5SrB24zOgcYtCdbSZX78ucuw4E0h0OAlC2GuhL+qUbbZssk9l/ma

eMmSeazxONOviT2/i8Zm38ub6Tlq2a5Ytu+455kyDLH2i+rjmit+rDbebOdPWdS5UxTOevsFZzqeLjem

tQZwIsXUIiIF2X2Zs7YfZX9r482JzZmSWQdTS8mmZ8
9BUonvKNB504RjofO5ZA607c9RgmfysHJC/gOeBfwM+IiNaI5BJq+bLHSKJzBVRUEZEFr94Q+6zZ5n5N

pioCMW0WkvtGaV70t1vUGq76BgXb8Wv1w6hsTIAx1PsjEbrXOhfSGUmfEimNtUq3xtjk+cwPo4rp5Qpq

EzFmVUG1oxJVtTKXh4bSUsFuHN2LIwyXi4FdUV9C0N
P5MfyTCZaUoxPPyxSCoqqbUh9HiTckZRv7tzM2Otfsic14T1TF3KfvmZNt7S4+iuYyVG9mdE6TveynLP

R4TMJZ6fK+P3dCzxXtta1eBhnD6MIudYplZXi4hCRZLNz9DIiOFtFzo2s2E5ICFs8e9PzY1KAWxvpeIH

W54oqEl7LRHWaoha48PHRO65reDaaHje8096nuOy8e
hMeFrXwZgd8xeM4t3YBpKurRO+Q+Qu6D0pQiQ03dHteYyb3T5O3+vCUfGV3A9TaBKcIlziI3+25hN+8r

znpealNcM2As4I83ifP1O0cB4+5oxwyfk61wIsWsdiJvvHgZdXGmNlN/1R5r7YGd5rwFilnw5AeZYRjU

mwz9XAyq8g619LlFddXokdsIqFdMk/m7eJc063xXzK
B9CCvzzQjsWHWRldndIzQNxEhVSMlEDxTscEMamETxzM1JgNbBav1sw8l/V52u+pspf2sNWQ+jaqueline/q3a

d6i65FoLO7g1spTYefRjqjvxpFajEcC8GDnl7AK5vZS29YmeoaMnN4fekUy6boDb7gVgJGUhEeWushs4

ri/F/Ff/3NiD5BTJl14/QoCRg3Pi68oRZAKXL9g7U2
x+VZ883AnS6Eh0fon/Q1y/Jk4etkuavk8YlhyG04m0g19c91AblvXSS1HpjfoTwEulshgmFWoVC7i4U1

RXVtDvyfmLQalKDsRD96vbuUTL6cfTZB8kIzjtebqd5ODlzP6pzuF4rFd7n0AYO3NVM/aoU2QggkFRwW

bdasSHXs8+6Uflfj6fYqamq/Z5+huot7HpMI9jKhQ0
Fh5hKhp25Z+RQ9CGk3O94SsXKs6KcP/Z8DckDzqVvlK2Xaglm8KjaQ3NiHffzyK9ouZRqj1C5xjcjS5X

m4YrOvv10r5gY/3hdfieDkjs1nM3w63jutS8VXh5mB0o49yaS++jneh0gVc02+Yvux/Lt5m1CQaX/G9A

WL75q+XUFWgds8f8EQ4h9/3rYDx97Fpbp+a+qC+2fc
OQ+JyPSrcjPHhsTJcD1PmW2T8FS07soSX3RG/jRqEeG93Cc04g3Zu+0nVa/O093XYUL17221FcGi1KAc

ER8k9sxUNS8KtZWtj5o3c5SJW1rKFDBcYWA59NbOHdFicggGZA5j9+m6CVbt7ixWAB6Muu15eBhskL8Q

wKf/jD2oiC1SzzlBrTV0lcAY8aKHuVqW5q6M1kiNj8
580qLOQAQ0RX09rZMbc/xBiSv6hC4gtf0h295Y46V54e4bR4I1ybr2yazxuTFmxNizWlc8JV9OvzysXP

Ee7v1GEyeP7t8bCLGSQiDw3VUqg8PO4WsyeQDLbEiODAiNhtE+AAYcjKz7UtkJeExoFS7RZTVPbPi701

B+A7sIPdgyor91TBrvf6GbA8Nd+nr/+MFprj2UtUfc
oTTFImGshHT4os7ikR9b+77ClLNW90VaV2wJ0yQd2SoGYAJ3IGoVt8mEsAIHu6rDWS5LV8jIjf5XmWd5

vO3jctgFw4IAMnnEnRK1ljJhXU4ah1fYAQpJflsO9kIo8meRi8zBmYaiKQi1x6gUFlDReNVVKnjYDFPT

CEZvAqCE5zLPvN2hFO9XTOiSsku9iMOSe2P1eJxdc6
DYZpUUMEg8kMsiUrCXmH2UQUEN98DR9Q2wNOpyGefUN04r7rU3e4n5sQ78fXnfeBixQnPUf3fn5MlBgl

ebfEljhNd6MqxazGymIFb2DJOMJYeZ7sno5UHxc799q0gWckHnD3OcxFn51cBaDAhO5TKLlo/FkySq2Q

MMJ3ztysV5zF8lI63hMVDanS8HH/kKlSUohpGdlZOj
FLWLa9++DGQ4Bqb3EUTu2ie1Nj0813UIsOv+dmI257bOd8Jijt5rhma1Q3Ub+FT77pEUMDnVuv6W4dcE

xxzzhs8XUfnNgRo5EvPbkgDXWPfcLhajRhBRMGLwAs9JjwpzWwdtr98M2xVcalZTcXNs9jz4+GiK0Jo8

/fXJt4BMrGxt75n7bwj5Ytj9DXUAcsAGI0GkDpOrEH
p6/QQcira8KwtNjYngAR9dsazRiqt8DF01LheRlmlhHQmwjU0mYYKUoBWuXmOPeegAwyhyUnjRYhrYSO

2Q1oHmS3ZnFuOrTDmYyslbm1ieP1RoqvD38Y07xHk0hini27yoNSkb6P1VvX+cu6/F0BXj+z5ZQuHdls

0izrUpX7spk+VVhSOWDLt+8H8XJlPlFABH8cYzbc0w
KelinwTBhNToB0RDR4V1N0eE1Bv7NVQ+gXhRlbJWsaOKE7YryQtOhbTkgznKtXZcZ0qZvfiDTOzQkgap

50wjXuz52fy64wXwsnhtaIAam2cmZ2kmTGa8Gfmry+scS02SVtNGpIFpucuLG5MaOd0H7DPDUG+7zCz0

Xnbeq/vTVfXZq+fvmXBs+tLn+ky/TF19GyxysMdTc2
swX7b5zLtj5tYtG970iDEihNuo20F8j66I31nEIJQeF+jsd1tgiCOeuG9ch40z6GtEcghCT9eqCoB/8F

NJMF0V+rKp6C54UW+CFiZ81REPAcXJ7EzLEMbAXGpODpA+vAWJR67CfsDvEcKtbX4m6On6xi4NeAtvF3

ailu/eva5u+SOTF1tQsolSD/aoO785Kd4i91lMIxVu
A2P/wmax925gx+yf++xBu9jx2eEqG3PIDfUAA7fwJf0KGzSakT1EWa8EnDjo4DdUveDryaMZSHcghEJ+

qSiYfT8s8Zpv8g1ESuH9hFt7tUi43om+6anM0PDwbo1rR+Kj5jM0RJfBfz57CAuU97ppoSL4olpuag5H

qPw7odeG58HK9dO8ETERPZL59lGxwgd81umYgtkS/x
BzGwPdEv0w8yoxRafEeF3EfCLNv1reQG8Xu+aIt9ktUNkN9BAbOLptfHJwRum8HP7qY5JI11niiNnu+S

Xf/nNJqu2oCD5CoIE6tTf3Y6w1zyrdnRJHTfGv/PJCz33TVD6+Gs7t3eGU79etL09Nw1o9Ut288LwujG

FWsX4cfTkwcq1/1eA/fv/IcyL/xAuoVc4a+GsK9c8D
qAvrTk8cH2qkWpCmkj4wIjlfTOmTTKeBs58CB2WAPnfRVEgozZkp51XjDreG0pJGiM2Atr4SmNXH/Hph

et9G9ZTeuoPMzzZyuVwpLUFZBCsN9BNtHHMMlL6Fs6PEJoVR8a+8UFQ8j+uxZP1y2vj5zEqp69IDJ5sf

RVbX4xvcD2565cjSYeO7jFs+1Qvx5dlRP5Wd6yac8Y
zeajGUvmu3mT/O51titDG3NNq1BmanQJr/2iCOfp6Nj16cfXNjeCQY6W8qWGNE9Y7A8bsbthy1aV3pPy

QccU//kU+0Hf5iozC5+ZcbE9xkVw2ihjQ2Q2taH67a9wQzsTY+ydcRsIe5qFjL0GbGhgMAuVIECurGYx

0kg3ELIvvT3ifkXuYUg+DhJkCXD6cH0UCQyBlWdZHK
xYEoH1hY7+xo1ocdaTBloexcGdoUk+G1kl8TI1QYUC+V2uxIKS64lfBUgvh/tMz86TUV9hG8l8AcPlfS

hHBr1+Kb+uRV3TwOy37+ioEAB8R70qtKW4nZTM4vhN7mujy46v9uzxnmDxn2tpIUcjQizo21eaP3ey4K

5uBFeeBs00GkXHGSNfYougadPpbpCIdrMy4fhgShnQ
oMByDrwUpuMXZV8S5y4QuOIgfkVnPxtYqlh0WtBM9Ox0kuKHlacijSKj3snHvxsyjqYxUtD21s51sqkj

SfkUHYRKZC83khpN3I33MWD6eGT/Kukt1ZbLaTrzDKVu5eIDcdbRKZaC+vkr0FFK0kY58mzprZUbjsWe

i7QXinwJhTOmyWR6tIn4Ilc8hxtfmOB6YbPzb2IL/b
l1pu2MDQGKlfRtqA5EVQ9AVYXzDT1hHcEp6co+y6P/PT5ZnmzvyJwCLHN7Y/J19BBPU5mFkIZOZsO8SR

WyfiU36p6uFpOHjONdKabsv2BpoLIOoTsToinRBj366QDJVjWrEH3sUwWN+PGjfWuZg+c7Zf+DmCNeN4

q9QcmCGYgxzjcYRnQ+HoEk9W8Yhky++DvBKgzCCYCX
Eg1WQNDDU2YE5CrpU1fhNFpysTJnJzI3Z0zRw+jU3VHZ1i5otlKhIO75nJo83bY2sqpxHIb4o5F5cO+u

wE07tsRCIj9pbM3zfgC/D9y9c4jH6X3i+f/dZeew31fEmK0KZXY4/QFmPE+EyRaGgQG7vRzwciNAuLzA

fWCDxeAjMIuOay8KvVJ6Q1++OV+NDROqr5OX0sbpPL
LK2wa+JmxpWXKjh9KYgJmV4Ganqk3iJnUpMg5HAVuzRFCkgDZZjbOiyVgCLSIV8DQ44XPw6zFXNegEy3

NH1TMwWJNr/5DiuT2DFjTmC8le/5su7XeBE27j96pGyKYFNrEbzLL+gCDNiARzV1ZfccoLWD0lOXBMQy

2ICFfCfIxWloepHASyfQhppEgLNN3bxYpBsr/p7VGG
HYPkhhKyZHhKBJoWpcRqgyXv6as+3JLSls4wGHfLTSNO9lECQL7clWCy0wO7LqYwIaO5qgHJu4QFmkqS

iXTfL19O4/kMKMoS7TGq3TT2NbgExs/LxS5Aq/0hxaRM/VovaGdrIO6hnpf9YE57KYJ2ChMAf4AmkoVD

w6c7xNApvjS+szpm7ScRLRtCpJdfLCpfXJFDP9SrUd
GK5blEvfrn7NnE68NdGdwKtb6ziRLzXlyBAnL03YVE3yopu8ZWaflZnUfRqWg5TdChEsYtCSXD3G41Z4

dTCQRqciVJNqfkxs6iDVsNkUzruYEzcL5kvmmtHxT5uiHQhjpJsPwYNkQiE0aqFE9T2In/xmkjt6fJsk

+PqDbNZPrVKtfoFjvOgjdbP4o0s7bNK16M0RH/e4mr
mU50bfD3txx64t6ziRKchWNuBZEJWeG2RSj5pLYsJ0gUKmWUYZjEgpPTI1DtBHnoPzDQ1Mmhqp59ozJM

7wMFU6t0SGzSy1RwqWxg+eFFeefGzL1/6LcIGZd9cgB9TFgVydThEU4VEzvVNsAiIeFPjCx1VUtfSHEo

u17t8L8hO86CkQ3798cWtjF3NehO8CRR0KxISfyNcf
X8dS2WSajB2ejf9jIG9CtTeRs8GeasR5UdPx12TuVYed3m3qOCLI7US8UDFupiogDzbfJP0ecClMSlNr

d12jiXv+BDOi0Am04LlIu9IGJXve5xBIRhTgW39t/AdAdBiHV3EciicRgvJySkq4Ul6X8e31b2d9uTSl

VfYs2wRPlMLhL8JnxP3ML2MnCguXVK1/EEIIMGtX41
8nrwW8E6rjJV2DPZd4Y6bkJvtCFrelHShMDU09QufIkqQh1/RyYtTheF6Lpi1dUvB09rIn63bKwG0akU

hyNovNumqKNb8a8tL79dMkSeS+M0neETZya34pmY/MPNO3akBc1nGZsR+2yV4tPDkwha8P4KA6sc4PMR

6Xq82Swyj68CHjJKl8hW/MGqz19yqX1ml2m0QNR1Yv
8te05G0KHV8+S8Y6IVS8ecU7wr45D4WafXBV3c5yhGnvHeGtPaZBj/ICC33uesgcNLUX+hDgQG0XJoJ9

TnWPq1rjgNW7hPHnRCqCm+QkQgY3msTmcRjnHKEacwpjaaLSJ0i/PsuP+MD/p2C/lTD8XWYXXtEoT7Dm

qojVI/d3pGXvqt6NWGDLOSMugTDGz4B1kFbToFxF9k
AC1sHWS9Gvd/dK2X08XSFT0cHf7OKFn0fu9x/feC2wGd078rfC8WnrPdAWRvzDCihvmMS9mgtBC4VgPe

jLKAQWFSQN9UMe7l4zyzVCIse/HPlDZ6vbbUyK9ge5MJaLv+Sb10BquJiOIz17/Z2auvEcKRyKId8uLO

RbDOGySFxaaF6QSurdqBzEpfFTHOjB0n30fJCeljaT
rcJZulC1THQ+9R3e8HGwjyBGAxGBq/vwz9/r1T8/0nZ1JLY7fBtTa1O4snpgYsarDPnZGl7JK8yEtyGN

0+xQb8NDobyqjivOwnW5yu8G21/i4Tc8/IaH3/ZKKGrVecervG7EyREZp5iuKwy2zmkR8HXCZcQqFvj9

MgvtiIH5wKby3cXr7LegwL9X1T+yIg3BvRQsPYmHul
rQhNC+5EIe9XlIkR/oJ/xhR9GBcyHn0qdEowQX2qJQRR1EgrddKKOi3Fvvqzz85tbJr2sQSplaeG5EQh

45IuB9ZI6YyTlzpDWlle+39sD22O1cti64TsNQl6KNpa+1qGhyq+6HzCQiy6hTgEDhgiAXxMAlFRIrTt

ALXzu0cD15mLf8vsr1H9sP7ds+06s/lhHJ2BkgfG3L
u8uBVVo/RcLB3YhIZmz2RTAzi/en8tyQWc0FfhkrDw+Ux1cIorEYO1xEFNtUbV/gBcYPv1RpBPvs1eY+

UpZy9yu9FEHzXcCUEBsv2tOyaoXkyx/fBEjtyM6BL87mL7nFLkz92RiqWI8XWo1jD/Gz++9fuOjDU/fe

D18l3pKqQHSQF59eYsFHaAK/O/CdDpS6vtctgS3QLH
U6jMC50bb15Ua9GlNdrwPmqbd05cVLruSQTBqe78pxevj6UQs7T2xFs4dHdzYKi/OipiYVI3KQsphWyF

AR3XEMKkmr5bObkgEM1JNm0a4WT22ZtYVxHZMRvrqcilafRiO4GHxQQCIVPf1i0fLgh0GK/M/3gvNo5G

AKpxxlZ7ogI7B+dDVJ+CVzCvmQR4vzP0q8yBp2YYhe
/dxuum3ap6r475F3oQ2gcv5vtkK/3eA1j7UqzuqQkatzJ+dVPljZZ+fRTiZcm5QSv/LU6+aE1Xx4F/CO

NasZ7CNI7YSkyrf+ZmI3LYY8o6TKIgA3dmwdD+7EETi2FtfWgECgNBlPyjBHcFVCphnjo0uiafvrlEtZ

QzkBouXNHP4iVtVdakfFG7PB5Gf2ciL3w8MKlLWUWr
QjmDxEA367vkMteww17bZb2SL0DTJatpExGEneGIZEtGvbP5179utRjFvyXvt5REcZhohgUU0Kj8lZdu

JYuvabvvw4bftIg4pcUU8mqO9/J8qRzOYChhm4yqIGE83yo2apx2QPqtq897aHO2SBM7Q3a7M9+vRbNL

vHckfkKmYZUXvTv7LtzAGb0shAubVSVXnx9B7+tWzE
1jF1edsJSoJpz2HUDchJTalEAt2ILfufvI4h/yfCXKrVcXuvWQAKHFpci7FS6TN4JPjWGu5oBbggr1Gj

pvZur+lyujhoZvCPf8FdlHgju00j6CSNUK54E6ZScNs0T955L113gRKBXNt/B38RfD3O95uSEOFjv4qD

HRqxITRS/KvpgC8bYcxHTxInyi4nL+cKSW5bcVCzNP
+iLv6RoLBD5NG49iF1PUr/SnXmawpDBBgk8ea6fA7Y9mnYgSZW8tAocaNXIzz1jeAoTb3MepB+ivfVEV

dJMJg80pP0OifVNcWABKq5uoIAO/M7u9bOD4uJ1j/AcRyk+yaILb7LnQZeLkEX8twQjQ9ML/RzceATY1

MC9fQv7ih6KeVCM2Vh0MLsVGQLDdfqcGUaQRuExmFZ
4rSYXEIqeFLJdWfe7lwTRx6Vlg1MP5c7QEVvtKZnY/mJUvnfST5tNMSja6h4dx+lCMA8EYPdwQAaOp7B

DOeQkgkeUWt4up3Zp3jbuX9wB+/D1JtqQAXrFUzyV+oJf5NY9AzNtEWLuNHiw5jsmRyGywF4MHWwCTcC

ZRk3jSFKsRYEOuqmhMO+VMOlivFK1js9Y2WEYn9hxl
QEbrpfqK0SxqXLvHIE/HA8mHEOliQK0Z9JR5p7zIzaYeZS2pTVM0fDjcM1I+ABxf6BRnv/cqbb9OExHR

YimZEoqkMWDO63Ko4lJFpB4Q8mUPoPoBoD4fGAMzxciuYXZzI1YcBoetYPPlkxBQ+RpLqIGrvyGiMCgH

fOzO6UHdX/obW49j4Uh5/5I8k0U5nLZ9I/+pjPorbK
3N5cbq7V5rBdZvHMl7EOqiQ34mxst1meh9h6HyhvZYhogG7s+dbexzn2WFm3009lhJxZsZ1/2hbnv04A

kpvMfeNKfvvkGZGtJoq1J71MwaRM1HLOHOs2AKc1A3PZ8/067F1mGa/cVrWlAVLuRq0dmv4R9FbSPVME

OT24E/cMxhdRynN8VSQyOWWahvC1SRqKnulzsrS5Kl
wU7LCFVEAxZAmBysFyl2u9eIXjwZzNahdPmtcnvKDKGaan7lUgfjUV7vdNH3Vmn3csgGX9vKRO/0Koi9

VpYFbirv6HsnsY3UK3iQRmzap+xifpc5LR83ihdLzQQTzGGH1Zr4ZWaJukIYC6Hkka+eQtc5NgOXG6AU

8nMtTUsZ690HbAUYSXi4wKwwc38Mtrq0NrdWPNSpIM
cs2zwnBmO0RUj6wbDpTWdXmXFQdNk/xplZYM815j165oy4qXFnIWS0Vn4zjShewNV5pZIhrXTOOec0hT

pvCGzPYRFZuecoacuHN8ofYtHdq64SWdfRQBV8jVixORLRWkyDEJ3eKLUZoD0guxIsbal5V/bDZhmBsm

Gp+cSOb6bKQWmMf1zK1BNzdLLdP6vHDS5py3Jaa9h1
tt9/Tfeeo6YGQ50f21l/K0N1ex/ipR9aO7oSSAeaFx7A3PWLgo7dsQULrLL5Emn0xY5GhWJNteEwmbq6

iO63JglluRHRdI9LVw1aTVUlbRO7drjOIhLjE7Vo4tqDeSsPQrzfzOem8ZdGCovon1E0Gwj/4m5bCkuu

y63ACXCi5w5WmwtxiMs4yw85rnfCUTLlLgfHbIa5VC
DZfoCbUKKd9R67Pj/8vRr5iPXfrsip+u9VomVdTFA/rQy+ECiIcGILEjusV39RxyT1DGA8j3qFvk/t7a

F/b6lYD6AcbzdHe86a6o4RcxSvw1hkYk2awfR9t+LOKzoIL721hAaqArXtPrgookVUfqEfCbb1MUZK/N

ZkQ+3xSibhprgw1Qz4N5KAgv1HDAwadYoZ1b7tNtB+
68iF6nineSJjxMwf/tNAi5s8zS77lz+xu7dP6CgpRe7uR5NKhfYk8GHhtilYhwE18Qd9PIE2HSpSZpIY

XDq1HD/e+8imnYWxyQN1dz/ser3lRgmlXKsaZsbXh1eqcMjSNUi4/OgxZajEPIf0tMvGqEThIL6dDDDV

BZtt/c+fgayVS2BEyk3A+pZGcZor+2fr02/csXPz77
1vzrlyf00n9iQxm3Nskl/WPb7Zugw56CfQGpR4d/AOon4Nh7KJpY2cFNKvmTJ/DT/HGOKbnwqZP6ohd8

XRHedqICNzSOmgJjjCG1FQBfZ9RHc3HhiLSWlI73x9Rx38Ohyi/60HhAz12dcPO0NAjGT9JpXEi1vkih

CKzhFr3PGAktQybYjz3AymbhUsr7Z4W+XXDLxLv/cH
TfY/vDsheSCT3KmCui3HS53IZ0L99jZdS2urmF9dHYwa1FHttRZa942zxeAYa/pO3Gosi5SgWGZNqquL

a115YQVTI3BdwFTo6PAW0xDhgjtOlQXQyBp8Kys6uD1vONOqPrnzfFNPychptwRhBW8UEvVoPHK2eT04

1HTf30jhuQ2n5wZhbgB4jv5wycJ4nfd5JX/1VIFNQo
d/68t5qu4yp9t0tCDTK2hC4INoHNXl25c5bVCjy4I1KGEwjUiLxI2MQOpqP4QPfqVGWHRHo6gzyKrikC

YfwBOrG89dImJ2MMw4DEzColcLuBcWU9hLztQTlbue2fwb17kdwjbVvlugztzbVHBnJeCF2bCdyTA0iv

nQ2NbrpC6ewZU06bZObAQCM/gFzy4VI/vXTBR++Juan
6jR/++GCcLFbpliUic623B6DEOPa50za9i6OeEJ2aaeHk8pDBOFJ2yqYJmzai6MZTeWkCtR9oo5oFh7B

sqYjxxCjyXQIfZNPPwkpvceqyDu+Pltmr1A052AhqhNLCqYge7WycxsHn+uKJohFpe0zAv1Az9nns+fN

kof92GXv2srW3sddqNJRXkt0kSAng38pN09Wajsh6j
c1+i3lnDBo+bRPekyTgppp6mp6OcIlTcsjhbFxVe4Qxd44gG9KSHcWoPjpaJ8GXhYdmq2bqb1k6EuLcz

fccGopbDQhXQQZ9U1T6MHFU3AT3C2hBVfuoT1apB6r5WVVIA++0eidUo3RmwtzEVX+B2RyPjGiBiG6m7

r/l5Q4/s7SnGtG77M9yDb3n8pjQkpkMpFabG9JokDj
7eODb/qlffZ2x8/wNP/0HcqU3+1/Nf/vz+a1UYBE/WJXA3dTiKJO8g77at3JDj4BHRnzn79FQ6weglIk

h7ag5bbcZp5PI2ezpv9Rr7zN2KSkioIhH/vZrwO/bcseJu7Ewy4ein/J3gOQBuOerIbC/vbrgjOM+hmM

XCEyMT4CGzvwJLFg90fWRuujKdfDtherkc6ZNSBduC
7G2JzeuOcpygFhg9EzQQfYR31nGKGgekAKyEqs6X3a/DGs1D/CGYZEtqBvGsRFuWLaswwcd4wCuarHos

lDyjeZmX2LeRz+UJZ7q0huPZ5hNTG/2WXpc0Uip2gNV9u3PB0XUdzw8mxe1LPaldM22k6x0QC+FOoXTJ

spb0dBrbliSEoBVqC8wNtXvHMAqaPdlElUQXwQ2DEY
tDUWl4HkliwkgGO22mtdzT8QEf3CHJWbNsWRtw9KXJoAuXz/PoUoJf1E1ivu9jcfz9KQ6JfAjuXKdlwF

ogjVRssUABa6VNgZOsnX86y3rcVyaOtGrVBepbkbYbe1t+y+ItAcWpC7/lw5tLngmlmtkdRPDaKZ2g6M

a3yF+hfk67bdrfv+ZhQniXnPpipWGYszufZQ+zDjZK
SoVbrsxesE8tXbinHLMB2wkWE729Qpqh5uw1V6cTTxodd3Qd6OXf6J/RG/9BvZCNp9miPDiWOyWgBN13

gHbeCOSXm/G8ztkWolsp0NkOq6CFSav6qUaniFs8zCQETjGhYgXGl7QzOP87iVwzxu2CsXQOBXErtVtt

tbt3DnufPV4pgWuisssbxT4JgGsJmeE4VQY0R734Wb
I3UilzVZaLzc6UxK5bn3UJgFSAKpoHsTntnKanLZwUdHWNJJEAzlMF8Ncoa0v5oXUognghC2BxEN/wxr

QIjDlEO88/+yXfv3GGJWWYySvMD+auMQ+t/3LcYsj+zyvCaS353CSUybCctIcOWTK2tJzO+2jkitd7As

hz1r6xXFSRgIzRyBlvrRe6rtES0StIIuQBp/E5Cb/Z
3mXumuaGMkjTEcegu4+beemS1iAIm4+bIozklf63Z/cq23N7iobSRGd6lCoCT3JTXKWZ5l/iVlRQooF5

7WV15UQpsBEN93MnMs654L6/JEMoPoHgSdbjrD+EpHdjFEFCaMDk8kWOE8WUV/iJGgKJpa1nH0Fe01VT

8n+E/i1ST/9TiNC/Plyze8/rwcSjRzBQoxVDqIv5vr
xF9RhfnGx0ig438g0XZxbyX1XFY6h/KWhkPUf3RD8aafe/IcCi17Cf/V9G+uyZalYbu93goaYrF/7o7H

ljBafR6xzZ5M/llGM0m0ZARlnXyU4YECoN0Nn3Mb0rYBh0OulQ9mEjSQS1qy5Oyqhq2wIA/O7IMLdMyN

MC8IacrKfu3p6V68Wm4hPKHKQOrSe8RGXnHSQxYurR
C18kzLDkt9e9RZ2YLKwYQx2NRj11qqlr8g76dks/3UPkVGLlUTXFORt+WHAYxly8YMLXbtR2Y2GiP7s2

Twve1MPFQBlrsWVibjuQ1T8u8Utt45T20Aolbhxd7RqVc+p+5DxOctJd6BkYAovkjMQd1iFeag1JiBmP

hhlJd59i7UoXGtOtwbZnsNlrCkcrquQvcbxovxZFFm
CELQaUw10H7bOO1oQxFgEzZQmjIwekv0poxMCJmqdcn8N92DMmx4aaYDjlUzvqtBUOQcUyIfH9lW3YQF

F7/uYvp4cvB/LmP+T+q1v/o+95/G4kvyzla7+wC1l82fm4mG/mdw/z6t2m2UamUNQ5fN4rDihgNcllGl

Bwl1Ipon8m42vJc9QJ8Wg2I07dDR0/TlFGi/atshJS
O/DKYRM3Kx+mLKFA+KVsE72GPO8S3VuPUsvPQahVg5zMa6NSq5uf4a0HRTHDD/tKeQyyb9CyhFX4A5PM

kzTrXF+cehd2kKZ6LN2fVBbbvlRxX2YTReePfR7O1R1efnBiSadB7UVl2Gut1nDGQYT1WtqKujcfCetF

T96kIa4zcU20UhDvbG/9leCRFSn2fKS9NfnG/6oxUr
YjUobU5IqndflBcFph55wVKiTM9GREalkvV3qUutdI6/HEN/pCrO8HSD5wdUyKxC460H4/PGKOtfopka

iJsAjtcM0WEmFr76UPoskol+FEdFoWm9kltwpCuvxKm0q6iJSF46HQ2gs6o2YwirBIAHM6LGzedoXVMK

1uamXfop64rI7UtEMBeSXRwirufhiFY/X2Dky5NmCe
l9NWtZ7n697rfowGiTzgWvzUu0qG3T9/XWx4opcdeHf4+bDNVLtY8msmRLGduFiabITQLR6vQ5sksxv7

LqmZ17c5ROk/A22oLPdzn7Nwb1qzjruso4h30zJO46/hfE9I36KTfHnhiqu37Zew/P4gnl0+U8LLP4nl

+UfeD+MpmOfIVoeD46oBl1g+lEARq2t5XU0k/Uiohi
2ef9D1RRTOwEWS3zbShxm2OKUIqmSH/YOWdMfkJTv01HVazxuWO4EFTItmCIaaGapfgdjgro3Et3Asqi

+c6NcEC+nemZW7s+D6opR0kg3UW5ei39MrP7ncqBhplYxpL5HH0FwBGCEgXFNgrJYqm23NNZSiyMGQlh

mC3uULNTyo43HSIEpUu0pCXyOkZz6ISbWcWFZwQPIE
T8bT0c9uHI22M/95F//clcGkhIIyO5R49LV0TFM9lfvjqSSQRUBwf1yNYvH4XF04RgoJLUy924XZqEwC

3rYtPWAaCijXj3zcydgroQcC4/+y66HAILggRW4cCwb2eq8VdV/dbg5G+gezw9vxKwR/8TJsNEO3TIHv

+wGg7yphVToX4sN2wjxqr55qw+s+NONFJ849w4jA0p
hPy4HgOtlt2RABxpvTD6OMZxZYkSRhh3NSnPrj+cJPm2DK25lfKLb14CAmBzLs8li8gPo7Ej+g+HsJ82

g+9QO7qqf1sYuDEgWyHeouapQxRwmy4zFnjOI8BCaSG8E7rg2luG5mvaiGKyWAxxh0KXbbAN5Q06ZTOP

ofQdyvFFINDuUicZ6Vce39FP2BmQAFda6qcFtUQhVG
7w5hnrqloeW36718IO4OR+J+C+1VMoEPKEFbj/vYcJfHi4awiQ3kx+NcBNqxcOmQcw9oJ5n4Mr3CylBa

cJptijlAvJZxSdFP6RNZdf6/EplHUe6Bp6H2MqFTDVAkmkQh9D7Imh5R8wgCpISCGp3IspAd8Oq9xVK5

VqZrjONUw/zyPO2kDU0hQTQ7S4ZzXGqF8HbS+jbF6k
ecZi1IwfeOZBubmj3SAKvbB7k4yH2P27g1Nd9kHbc7IOnilv3SuHNvqMBUr7EiXdGC9Bb3ai8LkeVwZr

VVNRoH/ewGxrI+1JDxKikGDV3m+kP727sEexPurRi09ba7myEeOr1YB0EF5pMbTl/GQiqX+cmiGxX864

ag/V7UX/sYClLer0Zr4Xi5glfSom/hpCpjA/pSS/lo
78GI/um5QhJ5DDl5y+nzvFQuBVyY4h+jhQGIzTz3dhG47DnL/7oZ+jAWGC/vqjNDuS7M95LrQX9gyoVx

yMP4crTQ0HRNHaF9Hph/1t3kaqE5u5IbD3FQfeXiQNdJ/U0ZYjNaiW5o2Ag96jIpCbL9UkPS8biG5sbL

PgQPRkSpomTjSVTz8nEksbvFffTN3CgNsKa4ltq3o7
vG7pa6fjwFGSQeKMe/yGsuU6sj9ztnW90yBPcHvvexoV1dHNguPqLXH3FoSjV2gLn5dOTe3+eInYs5fA

T0M6ciwba+bo4vDxfanoSAEh78/8mpPsTdrr8+dZFMHHodUn32xO8G5mXEfam6R8kNTylNuysaC2zWWC

9N3pzHsPATq5kFqFjwSI6zmuqsPmfQ07yII4T3DZfJ
6Z5l10WqybL9y42q/dh4+6ldW2tjK9Xy39WlOA63V6/3G+VykpTQ7fatutho99hC6pyM1+VwkawayCBC

I3K+fL2lOLBU2mqG1cJM7lKor1R+4kG/nU8W4HRfe8YDQhnvrB+Rvg3+RcKUc/PLyfUi+AlIy5V5TrKh

Y5o2HydHkQ94hN7BvZ8dljVDdpRXHr+Q1r+PMcfyZx
+gRtZqlUYjk8qI7uzfrVawqNz1xeAk/6/g7juGncs12m7B3ScGqaeC3MF+rOqIf1EASR9sWexdIYCO1+

fiTOC11WGnJ8EouPn9qXRHFAI2bXY4Q2qwV6oMv3YPHh++8abrx3jh4VsHimWxuIhTpMwUVphBw4ijeb

7SmpOuLqS+vF5qu3eiu8oi7zq3TN/0bYYUqbES4os4
0sfVrXSDfLc/tD9jXfNEtro+i2W7pGGrIwRo2YSBfPMNbVBpLyQbyHvuf+K2eOOeZ9Uj+lRwzm99Khjn

ZQI85Z7k2WyQn+oroufa6TcxKyKmzEy4wDXjWGue6vbXvMPDIkiKixC0jQRP/ULX7sZc1djJq+8g/SS1

NLqjLM+20KL8YCF42YUIsVRralt79lZSKrIikbetwH
dDlPlaTY4DxayzS/Vc1KxPIPBPT9ykGSdAWmnEmkNsh8CehlijMjG0Ats3Prn0pR6/XMkD3Ygua6V7wB

PGfGFTZonQUwTfhplFjmHfu5JaPK9oCGX/cDpUU3XZXoYK/9LlWfRLIx/8Ni467eeiK0MZ/HO3DEpQ+S

vIDdwUnlhX3nTTmkXCy0M77vnMHTDCNqcl9E6LDOUP
7MeBjkyt+02XyufyF/vRC6z5kH1//1A9RS2SVPYK7Fg8cjGXQSyjK9kc95aIEsJNbGmH84HCl72k1RFd

mf8arZ/1KvDK6QXeuukTEL+XHCXQm7xtGZGbXHqy9IqjfHNiy5TtbT1tl/NGTY2ZxRPB0md2eOwvSrlM

vSjFT7v19NIQd/OoPNJ/7LugHxlDIRi3TH/GY4UZtI
wFJjidUM9/YU4xqMl+BdhLYgSe7zPs7VrTxppSJ70aYOoS79OPgwQ7ceQadkf2qYmQ2W7U73fDPm4sOF

coEMa5qRVVExEv4jcwNslcXto9Z9uLT3kPmSeaqf9Qz5ePX07z38wLjQBb+veorp3lj7BNkk9b3AZ7Ej

5a+cbPllbew5xdmsPQwN9hx1A30MKj7/4yTvoFROJp
/evwXmB/B5yL75uTZAG3OfP/sv1lmfxNWJjT7+W8+Srj5bs8QkwuJ1G8gCYHWfV4694k1nparHuMtB9R

Z5VjBAiLYHKYnuA+LL81FpEF+UCKWdlMy9y1L4OcgqYDE7jS22BrcLL3Sq0hWS/vZI1IZHxr53snLcR6

D+mtvfoScQmhmBg8Erm/Sn2pLg2p5G/yuv1F2qLzbO
ZFuhjArEfYM8t2PR0/aT95SG7K4EaL4j5HtkGcn+f10pD+jf1zkhg6L2z0P/HeMvxz/Xo9VzjH2hn8AI

SNe9gBEXt8B3RB/G8GDkn/YHg8Y205SilTQkZyw8HDKmte4bblb6RybdV0zhRTgN5MeDIsHIISgBsA9/

WnKYJScgU9HOTp1WhNfadOhv9cumsKDRSFSUoozr+I
RxsCO6GqNQWL3TWDk0F/HI57TUzVofjMxe4/tsotPg/GPc3x9UtNXfVfNFkQ5O1Xhf7mCKKjuARhHLBA

CPn0ZmB3tUrF1kcbzN7eWwIVVimM8a5SnAD8p++Hu4M+DgpQ1pDX2kGyFt8eFlxHv6b4Wkc2OU86zW56

CX4uVR6a1kcCBpmFl4fmH2D/trkrq3IQMf4TAI/8iP
lyj7l+vu4w8hEadn5B08woVHr4K1BB4NTsnk2l9mf1BEQV/6gojfjjzJvpKDl7fUY85hrsGde+LlzN9i

c3SY7lDYcPRXnY6FB0r+Xf4iySOdKc4chhKA/PVXZjTigMHzMpNYe+R+jfoxw3DSE69Tv/jnUWMBxrjn

K9P7LvlRfSRscS3eB9f3AlDIi1VWtXB33Gf0fC30kb
mLMdabTXXgi/9e6d9VQtChJ9UJ6Bee/tIRVZrL8se41/wMTkSG82hbHOFhbBCDDw64Fjne9jeIlD9w+a

u4QzYDi+cPPtuS5q32c4WOHUKz6tLF5mxKFXNTLkqQt+C6GXJi6S+kxQ6ODIDyvgoyAmL4/VLne96XbJ

a1Xsbx3Wk9GdvJC6PwKA01mfyj2n6nE7P4wccvbLyr
HyfFj8R8RukU8KApheUIVVtXveH2PnDm9I+yP+8y4bDAypqc12QM+oQ5SQhvMChOdmE+Y+nYw/lWRywk

hNP8ixw5JUEpXw0RZ1umdxWYlvI8KqnyxnkpHfnNvV9OJQPRXC7wS7o1OacDAgzi2I180Jb0g3iXeDtz

ow7fRznW+5Bcxo8n5o6reb+DLM8AJmUEDas6azlEHv
DoS+8eySCIwsXGtwaCK9OEKUbtPg9Bol1NMZ70Rhmy/4nWgA983FK4x2DClj1cYCuVnwIBM2JBR0UjZ3

+ijlEILz7IvllRIx5UxVIqxaYx9XTqCMd0VLstfMts3zR6shF8P0oONA8ffNhkKxysxDRfAlf3V2JK9b

JEu3ZakNl1uV91bWK6UTOt0R0g8+CSdefA6ThQtNH/
cEwLWtec1zg4/gqSDR6abK6hih8F2F+GdWfIH9Ff4FCH/P7xe9vo1y1wtKyYyT9BY91jni85Wm+VirTJ

Br+xqs8/PVRygT6/NRRgIQGcMHMf+yoUmQaa0FZRw+ZN0oTqMyi/HlYB/V/Ba2fBYgvDR4b0FWfNs8Xo

7hS8Fx+U3rT8RPHKoFl7YOSXtw9QLu1Ga7bGfXnnlX
du7Duq71gPZpNDbpoe5QB6kBw/5xLCkyQ0Io/ZX2iaH4Q3EkmitLa4KJqu6+Jgb6aFNQ7en77GZIzq6I

cwk9x2P84sGSu04DQX542zmO0hzrD7S/lmKVWYYlg6h7hAkfYDBV1WGyXpyIu8j71uVv4nS2q9EM3HDW

luS054H2q5oWL4A65MyfYuZGq6ugDbv5AVHkqx9/DD
8MTGXraUVeI1XVk5OitkKKLv3CPCCIFQ48W9AsiQ+K48N1smq6SKDaemyFUgngJjNrbSSIRX5fFolEdL

C3AGUnmUZN3roD2P/aU2dCfbyrWpriTJsvnGn57y5qJgSz4QhwQWAFLL6zCJPX0HX7e90KAGQNuKH6ur

ze++yqXtzdEUkUj57i6UGc6iBF8kLIawNc1VsEUe3r
+mUd72bvRyvFpS4fW8B7YKng0xfOYlFRntR+Z5nRQmjRnO3XJatsWQMwaVNrGYk/jSBYu2QpUkZ/2yX4

uFy5y/FxufychkB+ak7ZCv4FT5DXTP/5S3ay4zBY4/Ky1SL2EBqhGTA5/QU++uvM6ef08AP6p65RY9ay

zjc5dE9acMXcKVtdm6Wkp/bCLzxihoSmDbcaH6DwHZ
e6q3prVm6lKeC+ZZpJgWvXR1lUap+3Ke5CQvZLk/fck1rV8V6zA8jL53ITShbvf3qL+b9W4Z2HWScT3b

yvXMs/zKxJ7KI6AtoVYx275gmxm1pPBpnap21L+mL+NUt3R57ERNfsBN9iPx7V58c1otWHdjwLaa+o+j

NUQfWDlNmkB8jxO2ZvHWrA+9hs0hdwki6SM+G47vPw
p1ilaqP6YSdrNQzHxnwAKBdMjASIvDcM7v6gq4eBKg6GkWrzGyJUfZB+d+AWZlEr6LRiyTkiVmFzql37

zWGU+Rj+0uHCImCG1NQXQISpW+eJ/jRIGQv+z5r+NhqLSc0WcMj2d9+tCp69XQFXvabTEv0Z4K5DT5nH

0ITNN3Jy1k7BGLD2T38F/aUsz+FaAgVIlVbkak53de
w4mWoPW1ee3P0PJycl86dSBDaiO987PoCVW7yyaew5a5M9fxa0oUtl/zqVIEpDn8WtxgN+AXSj/gw4Eb

FENJ9odx7Ev6S7NtVcAzV7wLEu+s50dBQFvpdg8IvcmL7mwqOV/plf9viyUEMciUH2Ki4cEIRMhoeGen

ScXJ2VsNZzeV/x7iy7E81vWWTyfxTWLMtOdXxs5+Sf
BB5ReEii2MOP/Xl7iCIdtGASqJ8K5uoclbDI1qenRBW+5KNuwzR5Ve/Hv5Vy/wNzs/4JlfNeLcfIHG/K

18gT6ITvEBV8GvIQ6cj6D6Jef/IOl4rRD5SOV2uFjjiWD9V/rDU078m13hVMIdRBp4C4bA82Fp8lYGyH

wlPg2kusBjnKed4dPPs9YxP788SQQSExDOYS/pmfR6
3qwC06VW+ECVtgxsqyKlNaiZgA1tGJMEgBYwIlbQJ7WOYoSzZ/w3zn1/Z2oIf/YPQE1Bjgt7NO9vl9GB

aZuROoDD0911GD2yQ2Zf/rIcmh64Y3dWO2XZcyYUKblrIRkdZG04j2GO+pi0el9nbYGwpX44vlL7tW6A

vfNXpizMVg/E0bA2hXiiitA1YHzA1n1d5P/gaFvw/Q
GmB34cgYa5UHkjBw7gP2AAN/D77RQouNQaXUzKuW4SNKjf9vpQX4aiJuJsAcY3oLc95F65NgQ1y0AzsQ

KV69pr00JtblTBM9Hop/j7yrcZ/SDfAr9JBhwMPvWDL3H58ScbZ24DZZB1Pu3AX0Z+t1CW8vsEyBL5J2

i3b7Le4GeV8OMIdW2CHwubsEJph0Bzq/QbeSahNr51
OCmOT2DaQZ5pJtyCi0wQOPCF1BhE7XtoR8DFQ1pJRgKdZlqT9TDKKmMUeHHv/xiA17rskFLJYuB8Vu5L

dK8Cx2C1GWiq+05HpsWepK4L/c/A4pfR77y6RObucXB19UENGs4Wl1Mz4c/1vjIBL0AP7OZo1b2zJGz2

HhFmwdRBcE7k/9x8ofp1uKAhZ/5/mql+lEzDsTIxRt
rJJ9e4bXiMgHGvxgIHjvhgVhYEQgw2Z4Xa3DDqxsyDC4efl0aiG0ZzcaQWKFM+vbcz4kdQLzx0A6/Ro9

bxf/x4USRnPcoabvncHgkwDp3qI6VZxleLq74dH99lumcjWCY2kklbAv4Tokn5ZKyjuynxWxbnu2gqvC

theW6W+jGnS1VLyWdo2V7RhDGlnk5rzvuoQ4CDz00b
1OF1EVLHIBVTvFx1MP/PtzuU3ID2uJojX1KZgd3jdu5Tj2vsRAkZfzRa+ZwPx2mozV4ovgK1W+aILaWe

ttvsaYfK3vYRI3kEW5oI8RuUZplPb3l+teg+9wCj4UmLJF3dYad7OItF/6RwMQw4PD1qgjP3YEMsy8n9

jv1pwLpfS37XPrh1CX7PxR1wAvefcZk4n+nI6l688X
9otYFkh/PJlgxg6MmtV/476oA3/7uLzc+Yw99eNRF4Qu/Rv73iIFvK3/CY48yJU6/X17dvf38s72Ecqx

siy2AwEDwDNg7JgmlvdlFT20JxYttereXpZ8FVXfwG4sy9ZNhFAmjn/Frz32ZNov/J7E+k3RhPtd+knU

cM4mBzZM5G3v+NbH1FmnQ6MK5UyYWLUVI/SvqEhdjT
TZd2WO2gRVgssJXLyZBUjjI7VmwTc/qp2c0APSKlPiMamjgO96JJV7/LbCI8J9C5OiXkzQH3BPbTQLJM

tDz1MTRHl/g7FgronF+H+cBd5F773t27J9/5Nb/r/3ipfMdLRf/c2z6bWRloHbTohYsBOrixw+I+fcP1

Rv9nSYg+l/sRdawF1C/rABqAY9uZgVkzMsS8cown+q
I7NP4R9Gh+dtEj54Rx+iX0+KnlR6fb16tkngtSgbtUOGAGteQZ0fGkQ+o1SKX6Dt/JLZFp+31OMMjeSY

I6gqSxs8lYv/F21sUR/Vvcpv/H/jhq3/l88Md2CDjEU3e24K/IM6cVpjDwqqnDIfu5dz1WB9BzcL6wkv

lK++Iap1JEbaUDG9dwhW19+m/rmFOca07sHOAEJqhl
fiSjS8aD9rfvz5tIK+CcCi2Z0GRbrcStr0zI0AO99UmnImeGi0rrEPn6a6/bs2eA2zqLD/4+T2rpgczA

4uS0ZlqmWq2qkWL9F4IM+tzphlaPFPP8SzebYKDM81Ptyd01UyUT+QvqA/7Vj0EoP2tCB/6O+fuF5+j8

uzJwxMKQ1GT8oWeZFSBmnm2Cvi5SbyFTokNtMXyVjo
J0ImLpxTt3R870nmkay9SLDXnMB3sNOZ45X4Botw9/Jvw86g79jmibw/M3lU356aV+aLRtL2N+9sl9/s

OvHXz9iiqYfhFMTT2I4GQPWKaJmBRI4Ng/le/2fX0q8vqHcFwacjg3wtjfLf7wx13MCX1ngb94GqvZ+A

jwA9Kr+jx/e3IeulsE5HinM34LIiOL6G413nO4Ftek
bD/585//8RpDQaDMB2lx76GGS/Ne/gm7KyXHBV3fvbzjvGyTeP17roQYkX+E9zTHymCjpDf/3LfBdrtw

M8veFF7DixTjkIkxr/SwuDEI2nhE+f9dRb0u70vxpm+wjPHbeSInqQaZvPNvbJe3qtlHh4Lmrcv4wu4p

ruOTUyDAWeSfj2p/A2e8tDt+bGWFkNTKZunM/QJtLv
snsoLbDY7ldIbqrlQpmtvNSvRObv9eWgG2OwZDbzl5NmKiII7P48YKMrxwyg6bqfgiE9koHYjk4kaH9L

vM4vOhspn/yfUC6acVI86n22Sr+MvE18H/5R5r+Di8XEFisnh5Iw9ByfwV8vNm2I5ayhFxefS1JHSjzB

Tl6me+gvsExYrSgClplUgYUqryJ+OW6dO/K+4p+tPZ
0C3upaDE/ROJA1PZkzk0jmAD5zy75rzFmP1vZ6J6YYlZ/SEiUnPEGrDf+f1Ru5fY5eG5uQXA7qA6O6xG

SddVjvNy9m3jIaxm+LuqP4l63Ve7X/J2B5BUoUOZ9ctnQEtmqecTUP6iDvaC1HpTusLaUMB0F/S5kpbc

q4LPp00caI39F2YRXlkjaKFCcpCF0IjlQeq18+dy6X
r49/K1RMioqr9Y5iqikQayirxI9O/x0/TZhfUqMc6RUA/Ruc1XSScKoGzBzfZn2QTEV0ybq/SbWKBswh

O8oZPXvOONiD/SRimLQTTHaE9KtiDtUJ8wzvhN7Yrb3lszmRIcrsvLQ2WyQ/oJb6Y+Ah2JiKYTfOWKIY

+c4zxQLMsB/mdYeUN1zZFTSltW46ZGP6JhfpwzfWSW
7vgOe5hXisofbp6uYkWhz3eEnOEjSP5kBmG3JHAzLlhPRJYvs/Q8skCrqpMAlwxJ+TIPKpn8iYenVp2G

pRJDyZlMLUMEXJjTwJveT2E/ZbWlDP1mWI2ekChLnnh1sVqqI1oSCliwN8GP3uDO35RS8qE4wEQ5xE/w

y136CwxUcQdnprWPVCKkEJ2bTXpwxf7TnEC42+P2Wp
5IKW7Fr3wszR7drN2/yNAepHm45Uhdh0qbhxB+Oe6/7C5E++zuPjYYyMmLmJ38CruYcl/2cDW4bVJfNq

6cbEm6FPi5jU+HF120jSah8a6LAMwN4r6MTHWvbl72euVe/0N8PJWxDkqLUwkwm5Q63WnHgcl/zGdoZE

ufx+lr+Tw7qp1kgO1wClw6B+8oneU1uxvstJg87eXI
n9AX/GKr48frA0UPTmK3VOTjvDNVveue4Ma9AusHyM/UPxxxw07IL4Ky96WdZ9753+/2x2yLoSALCDvP

xDv6El/DlhhRmQiykngqrWC6HT5mH3DLjhNhqWBZq0BYwYuli0809L83q783DJW51GC/kmJ/J9LP8+J/

KFAYrFcjyCPXq6133C8DyQHQAtnE3aT+hdWPNPQbxz
KLUUczCdIPgH7T+A0aXK5BE77vh5TvBb6qB0jKjsE8a0/L1GXT77Mk3/dxm1/oy+m1jpy30L99BLf9Pw

au5gdsl35h2pCz/9rbX+uxeu++W+5hlrr/Nqi+hX6gEDkLN68YiG2u8d27qbhg/zv98f+DV2vb4zkLTk

Z8BJ4MTtxMe+RqcjOvs/vd8Wve/LGZmkc8t6ylRfaw
WzhRAAW0f6lzeZXcBPnjTetgbg5/+RXi+dapXYb2C7DRk5mcWfKECgmaSM6gPadqwDerfm+8iHVZKa0J

sFF+iVpw1qrHLc662hx7aqmWAhs+He+q/y3jFiqyh+sIhfzn2tZjEsSVTldxQ+eY+Xp16IXeYtMc88M7

KSzH1R+S6M88O1pnIf65pLSdfuNPqt5PaeZPVY//CR
/ekoEow5KDYj5y0TaO0+lpZ7sOvjogKa2/MJXAQkPiz2UpQCPq1RYyj3lIB5e0jJ7G5DR6EDxSt/y7Xl

KRPB9po17a+85iLGl2XrEmpqHDqGhNthU8CsFK+31k4V1JM9XslyQ9y2g0KGLCGmGSrM0j6diuNAFPVy

CTG5fs3OqZUHWyEnAd9P3QL9kQbVpyenvP8Ka+/HkZ
aNZ4q6hZwvmZf6CbMBBuF2YQcWHT9RXMYB25PQPUvy4Y0+y+lw6D9lrA+LOPEZ+5A212YLqIVrFunMcdE3a

0rvJadXzWOv9Zk0WlqYJWSHG792X3qHb+saHh08333IgUpcAKdLbiFVhuE8S/KdIFMfwTZnqNJxiHIah

SAHuvOKhSiVEkKeHy1N0nwM9RW+kb7J84bwiPq3Ume
V+qIjX3G065IyW+lqu7swG6Tka7NngKhQcUYgTQE8AXJshXAmyO3eKh6GwGjzyp131Y3aDeY1zUI/wH1

NFGTUbz7fU2HfH87AgdqU55Y6fU+XW7FiiBV03OHyn2HG3vx2GqnbJyxMnrm2U4CHIR74e6Z3wgi/gFg

KmI6iBPp22VyBPux/i4SnuWodc90TIrdOHoy7RurHx
JIgWJzLgsxviGp8t4u4pO14T3f1z9GDW0C/6odvqP7+lJLpXnqKzRG+eMT371M2XQtG2RsNnLs/QHiw2

etMbBbXdF96Yafl/6MDsZz9CAaWx0MZzlD8uGrkng3KXezx+pfoKQalMopt3gG8D3cXi1qwiMP5BjYTH

f0/W3w+ubjTOtaeOxtUF83V6qKRVE2r9AD/q0z8B/+
Kcg3f7l70mVrkcLr9X46CzjM8Y6ir+ZREUZo1jgWAxXzSbrwe56p3c6E1Lok5b2jEn9icVFDEb8GcfkH

v+hEYh7zTQ8QTgoCzlLUPyZ2PxbK3BUEqSyIrlBvGpg/M91YgqnvRkY6Ppf6ZUhWDrlBYTd7JFsutBER

aRdnlnFsQDVTYyYtrQGYy5FjcYI5PRAoRMS7yfgiBm
GZ4azEAf0N7Z7iBpxOXp270alqVe/KIctZ1Vz6Tn8Z6X/JYMuSfyfU3nRUsnxK5zZnieBHh4EL4N7EMd

j66ElDXsbF4d0N/15IS8m8Eg6qI0rA70Y//H52pNulX1hQ35rvtZk2N0UxGU9heXR2uzcfYetSAJDCmQ

EwJf3NrF7t9V9YvmUgktayBihozsrF44OjXL67R+Ny
guUIvXpGs7BRfBpYUOhkuT2G+p0VtjqiUUHU+NqXLO7aEZNX92EeATg4PA+g6PL18dKHG44WLzZ3nD53

2h/+ZkvItwwCTcFmoeXdWTxFj4w841dli7myiiJ2wI40ajJ1PdcH/x17T0Edxc0+PEdPx/pW10/Dbo5R

yXHKUAz0PYyFsmioEF46s0t//8EpCev2qu591K0Jm9
gvts/vmzinJp19D+8TqL9jB8fudIiIBNku5XIbfEuAmjly8eXRydaAig9K7N6vttCC8iD11bcQ7YhCob

ALzqk8M0AMt8ntdLvjH0WImNDdyc+p1v9Iei/O66MVgXGC1B/GHnkgIowCj4ASDeuIXeR+oXoHtbUTfc

7g/60XVF5y9hfkWs9pgbPsUwX0wV24dV3xJDX0D6FC
YBpWDkPHKvLucTMeRCheWhYoq7UVWMnxMzbDYzWfaS2rKPpLBqSvlj7X9hX5ErLVZlOzD31UngU7Zyga

mRTKR66Ay0HhJ8cCCztu5Kv0lfHxxIlqOEFNoeoyBR8L/c6oPlJIMNbKxoOMnVzkUCBf+amgvYBsYzQ9

H9IBuegrlUQUXWG86SBc5oChbBdnOmXtAKIo1WWZMH
wJzAL+Btyv/78F5ZGVUBeiOUT6PJIKSLceN4+mniJNzuPIx0IpnBYSo8AzDyAphBv8YmoRhH65Q/Kvss

p/kSV1VT80F/h9mmFPOPJCCqLwY/fRHG9AC5tQnBa2I0UpOaSurOhZW7aIZ+t5d+8lnWg52lQPg0MkTY

+3fzM4mUtRsKWXbWivJoXiRZ3Rg9ItN/RBUBeQofWF
ykcGWrJ3OWb9AibQKPhmNJfHG/Ap9Oh2sMiI4OIGBQH1vNY80YKnUARCWT5ZaMwzhYKq0sgnUkS/aK8D

aCFoGmiPBud//Q7n07son4/k+JFh2TQ144CXXyC015+j9G+VBM8a3RHbza5NYXiVurDqf0/vG9aUjkqn

5s9cfWtsqI4DbaHtmWMYq70Vbp5w5TjEqAriGsd/Fd
9P2DP7QSdW88hF+hTgvZuX41/URqtyc4se/ozCE4AegToNjugro44Z89ipKS5R6+vSFeKj64nu/BM0cs

18Jz38wgQaMEjQb+MmslZI8KnV+jga8Mm7d+DBmQ3QYv42IacfFxwoIFfviPcbTS/VVOtvkM5xhMu/DU

/RmlJHrRVdqYyhFryXpTwGv/ZHyKuf+Vad5RvzAYgA
8rOm7BRrErc105hc05W/3IaLIbX3NVomk+Xkl/z4Mpa2Fjdw6K4aia/Hf5fmvjhapK7FvOocyxwF1Ocd

0D+z9YQoyeF/MyKIu5uCotW7xZXI+SxUd0Oe3Wsjx0Po+P+em6fBBL0dmGK8039sUnntBugUYmBM7wlS

j/AX6FCOtM9JMPQ1miyTJ7ShFGk7q7XAzrRiEIjvws
GWmDegPORESBHyDOIV6CESi5sZgXPcdltOX8DGy/vUBCNt7VYIKMGoKKS9zKvgrzZW/3mifjk1sJTj32

5c1aDhWFciNW7Z5w6vgU7y4w7+mdR08Ibo46qH76XP2sRmGNNy1VWkEZ8t9bLpjE8Q+Y4HaCC1h+h/G0

fcK08YgKQMfnIOqdfEC8FryN9V70OihFOV8TGn6jkQ
bw4dCG/9hvs48SgQg/fOVp/iNI2rk3v/NBnwg0o1YD2IKqa2M3tLcGyp4fIFAq2fchrksNjTMiiGAs3a

a0qCUPm/OZ4ro8bWV7AUgqycCfyc/DMU4R/BPlKwe/99IbaRWhVj/OlROKx2mmQ/lE86rgs+/UTrSKv5

WeWOc0SZLSO9Wa109vNB3ze9nSpVTadNVYdl2Rh7Wh
36XaxLakJ/aF8S0EaEIhbtKnis+Fz/CepXLlCN+f4av8iYAQIJ85t39wUC6+On9Mfry4bM0US4uB8hbk

Avv72dh1SK7w9ImDd6F58MMOVSQGLaZ+yFkTuvru5h52AjOROfLg+1S304E1oewZDSgpnCQJWPPlM+bp

dn1+McaYelIH1zbef9wyXE+V2Om3FFUukx9haMRyro
qyVyf4vopnlarU52uh5/23WU29Q/VT/uf9txcmdasr0v2mSvt87fgdV+khnkQwWpTg8O8CB/2WQXeLtg

X4Jm+LtgEl4E3PaA54KhKgm1D7wKbOaXkOvH1B76OXz8YeoNzp/z+42L71H+ogvbz52UcxC07epsf6sl

h54DcrDta7/hM0Hy+w3DqiIO3bb5Ls1Y7K7UhuDeZD
v+9MP9m8Z5ztNCir2OZXi/XN6VyHEfcF1hNSE12ZH+RqkgWsr39BaxXjmpC1swwYnhL+uoWk4OXx+lI9

O/b/urfh2Xs7E9466Us+WBEw8EB77c3ACTp90Tkr18O1e9RRhv7XqEHXvz1xyFQ20qeVbqpyb+W52ohz

+B2kHi6C2cCeqnIJ80vMRyPKzrk9yylV/iR4KUxcy4
tcrd5ZvNXYQ3kgTugsrVlRZzuaak88VYbFBgF8RB+MoIfaY5nANnOs7WpjA7DJKCuWgS5y5uf4m4tCXE

p8yzSrjBofYE/wtkMmgjn+4T9mXJboy960xn+8ApcZyezQhBnOVNHt67B7mkk+n7avrJ/U/3tbDv03p4

jz26Z/eunY/4cOj3Cmwj22c4F4NWumi1Lc01mO2ki3
1RmCV6g4GtGBx0woH8lh0nAgyRBdU+ImwOVdbdnw7jlXOuQwUxwveF5GXVSu9KXmOWdsl5s6muIufVBO

G2OKVlnW4mjExlbaYlCMWCIZAvSyE9xU2iWDqHC+ysMNqxJ4KSV/yDE1XSeJq6NHvX9AXzqY7/SPg7OG

ePnOXYnIy3vN0TitzUxJVQFTUTFvi8FuorOxWzMd8F
qKbE31Olrc9jvarFoScwrotoWNmC1Qshk2znZv2mHeRS1EVHeIJNWAfnGkNXPXhEubfbd+NuI5L9dVXi

LQ4zYlMCiIwKbvfBH7Tx7PifeW7orxDrqLM5G7PP1cBgyTakbhGNYmdZlmzYSFqNDpqaVoz/IgNqLLev

leX8YhOx3RvxPIHUqiLNnYcZthG1ZVBfwmrji0aQGD
GoyQt5lRA1GVHrVcKYEulJQbNGpXPBnZvX6KiTjJWs0uSELUKTYKhQKJErj4r2wuA4UCgK4JQN7u6svH

MbYhtt8EXCtE9uXjM4mHZ+JI6luPtzlaRMrB3nkcGHcXRJu40Rh3WIiZ1CJeQJcl2jllRk350yeOaFcr

fI4NtDlBPJp9WOMyJgvnt+0nYfbY3F0xaiuk4Dlk16
xMzRaU/3JucC/TddTcQ+M9E76B+tvCn4NQrvin4h4XA5HtyvxNXOFGGbMFDph5QuSrwQWcE3MIvM03ze

lEWAJtRDFUxaOcg4AmHp6WXao+MqTpZQe6/UZWvNy51PHnosO9wfH+MXEdHfxEG6JThap5nQdW7LCUas

ZGsrb3ID1HTGOckwb+RiywKpFaqTs2P81XOFBYKiOX
22XqgZ+6ssQwmQeOaHd9iiRRllgu9fu0IsiYqEzYcBLs5VzTgR1yYBd9Hyn6f+36K/r0Pqaefwp1aUuZ

u4k/PVmaOWoVluJRZzT+QcQ1q+NqajGS0e18cZa4zPEmIa72s+wIQgux0j4+NB4uLlIN51uUWnxooYj+

NStK91u1YA59nYLW9K2M8Rp7YgwRLx2/AKutBSbKao
uMtDGgkqjDSsEQ5xfOgfEydMR0yhNL5Q7PPgCljgAqlCGYFioOKq31c4uueRClR1jVBY3XgU3zit0rma

TDJY4HQMsQbmg6jcK26QZyg6Iv2ztr4p3Dg1Eb+P8eaflgQKPsSW6nrLBkyp5HOtB7rux3S8gJG7JWrZ

a9oUuMdGHtINrjJHX0fAQ2jPwx3Vy8aYOri1M7ObHX
lWAy/9ifWmyy9KacLc7DmxJ+OUdnQ95JZRQRtDkjJqVKfFxhCA0meAGgn+vGLPBpL9oqnbn0E5sc4Gt+

T43xBvgCg8KU2ICoUQ/bFY+8d9xN74irnetIQuCvLe2Tyfn6RivCh0N6R2UZi+jmGSaiwByFsY29EzX4

+6Q2Dsh0duIVh3jhntZgsh2jxVAaUGQBofIcM5qKDQ
eZdizvm3EMaeg0GJnG3VxtS3VVaJpgwdvHYbSg2/9Hj1EYTXN15wrL+CSbnZ0e/ImOwmjHg/VgUsU2ze

gUP/pj0U/ZsK6Zo7OZEDxumszY0yxPRJW+M2Rri+YhdaJwjK3ZlvabkcokWfD+p9XNIWCIrFNdyDkdip

oYKCHC+tIwsiL9eWjFglRPEf/Ki2JtVQHCANGEkNCZ
zeOfRpI6yiAFcuTmdQ8eX2NWqho2mDUEeqGNy/FZ3HL9T58LmreFaBdFYLotnmETqw2GlbkOCLG9c3YE

HvpscmMw17VXNlTLm1OB63QXLYnP7CwDUEEzFPH/0f5giFwCN5iZCKJP76BwAwZoJ3w2noc0kJINKG+y

n3cPxmBXMpxK6q5jFL3NkJLcLGeostTcQ3QluGlNKJ
a0TIsC76zAiT9UqEC0kV6iZ/TGbzMtkpTq0IZiaFZ5Smbhhd29C+XRCgdMAOCA9Ez+6diSoYjt59Of3d

gZItPRG8yt2+3CFqOkfUfJlo/DRzKV9OigccDCdPDudO0ANsyz9FfCaNZJZfV54WyFhnjJRXdwsUsPNM

Kk6tIXfMcWoKlOfBoPdAdZExyo5fC83XGwq3d9u29N
kt9COs3gf5bTSf7j7USNPjtJYdG19Q4DxYyPfE1H1d8dD64VbrsAHoBDlM/95//H9Aas9/TCy8o0xYPv

OlSIB7wgNgoO2BAA0ef8TiDPggHeNsMG3hjx4OJVI7JH2UhYv5RNGoV8MzGKCoMBHao0FdIO8ZOF4bxS

qzOMpaJr2HInMfm9MyNXDcOTgFiNHIhW9yZRMA5O0w
faAzT6NHphjxZXOZLpQvvLU/IJsMWJuDFPx5VhtIu5/xiQI+Vz3VQQosxiyLuizRggm9/qJb7yCvbuKw

sUuDq+jb5KUsZhpWrbz8gBg6g30jgqy1RL8FWZ8ut8Mq6sYdm8rG29DG64Qxu/l67RY/e8pQS41l0b4l

1+dQm0Ev8+/ux9d96ctev6kBC9/yng61Ac8RL4tz98
zahom3A/w9467im+QW8w++vikfT9jfb2+RPZ+uOxArn0qT035o2N57CW+BB36Gd09dSi47s17Dquyuh+

Ip7/853cOsZvlYf43IxV6oYfWWzNCvO7aUuBoXKoHkDmnP4LwWW0gRtQ6kLOSDK6UrIXNlHSoqC1FLBN

GGwTuqfMQblrlRqJZQQmhyILMW4LdwbA1TfOBe8jfw
zH4ZaENu2kegpF2BkLWxXw+DStqcehNCihZsQAMwXRQ5uiVUdREGGtSBKSPfvCrkRDXNXLLOOPyYVYSQ

HfEdJYiBQDHMxDtDVyMHRBReLcPZNNPJXciIqNVGEDeyGEO1dhp1aqoZbDhsGqBXGyyRFGYVi9RtPlGY

XffJUWQOc0ZrNuRBKpwCJFCWk1DiSxTZEipXjClpTV
WDNmqXYFr8GZoXly0Y7qXis1U5wSZN9F4bfgFx6D9sigBG0S7qrjNx6H0wtOGQUXiLxLAuwU3Ed8hN3p

f+xt6pQpCuzf5a2u4+2aHFue/HPTut+ozrXqtp9yVPypi1urz4y288Eqj5npDHHbVaUEK9ssWmeF3TZD

8de8MtKTidFLEdOL5auz5VFZxOBoLjH7Z7wNFhMn1n
zYJhb5OzvFG9g5MTEoNvU8YiocEFSG6dW6TIBGAUWCpUXCygUBHZXXmhTS1Yb6RdhtAqVLQ5ZI0ONSBO

BLqygVQvOCZ2CR2YKYAkNL94JS6mDIYFWvCxE8GcHUkoBAWbJJtfQY8Jk061SnZnaWYdMINyQtSoGKDf

TZAeJGF3RG3XBaXcQ9u4GHyqC1LiH2fzYADhY7EvgO
ToCZ9PHTNwWi2lcOLuxLEhWOQzNOQdEc0GRq0YRnHaQN5iza8LYiBdMFOuDeiTNge2NBmwOW8ZaDRoC7

MdrxWxB9VnkDlzPZ3OWQRMy536KBtnUANrEnBpAKNxzgWnHONCJOZGS1LzP96pTCtoO0mQQP8OZEVQVE

XrVDQvlyWmoDaXEgWpR2PCAVZ2a5CcnMhjTp9vUZid
EiHsmEY1lieaDBWxf5OpXX7ZvuBncotqPaAlVUHiqhJadYxwMI1HtQUqtUPqQB33ZJF+SyWLWrQuP5Ye

rvGQTSKdrrrhvD5zZUZ3BZUoCt8YDOZdWCaoZDSdJV6BQrJvXoI4Yv8qZaIkDiv0IHH4KKq8CXplSGHd

QSepHZKtNUNwUQnrCRSqLEAnBSy4OOlhEOP9TPusJD
fmPXW8MMc6OHOcNAE4YFwnYYehVQB5AXqeJGGyPCM8DNh1JjvnLZSaXEw2OujhXNNdUMc2CareNFDmSM

j8VgilQYNdAAh9ZbybVCQ8PTo5MfvhTAN2FFq2QjyePZV7QSc4FhasRYR6HRm5GvsdCHZ2MYo0IWPnXS

Y9AEk9UqEdVPR2FYy5QJnjMBE6LBm9AWDhTSBxPLu3
VXJaTWQpTDn9MEnxXQPyGFg5GOZuOWJuZUw2JRQyNEP8ZNftHjDcWKJ7QMr0ZTMnZTV8VLm5YkLoKPR3

NXf3WFczKGP2IEw5FSxrSHMxCHd8VSExYLEpCWk3PbMpIBYpRYi9OTNnOXNjMSh0PGgeYWT7FTa4VbFx

HCM8DHf8ShPkPBY7WCk3QYuoNTB1VSd2GoAvKLX3VE
v0LlSsKRV0CDi8FGUcDXExXQs4QKXzCZTnSXx9DbCyBELqBIqbKSChSQY9LWzzIUQzDZN7NKf9NWotMW

J1CWa8GYdiUOxbURh0ZpSyKXrmTIo7GZzsINypBFu3JaRgDAqcPKykLGGbZJp7CTq2MTckTYj4OXf2Dv

OfAOx3ZLcdICGkXIt1XVyxANKkTSj3YJm9PPmcXYt2
FZmcBOLlOOajMPs8QbNnWQsgUAp7FzQdRPodGCc7FOBgNZxnJHq4PDukOYx5JWk7WSuqVRu7WPnrUIzy

FXl1WUq6LmToTHc5SJmuBzstEXl2WAv4EfWfVBn9TKd8DddjARraQOx1IRbgMIfxSWe2VJTuQLiqUJy1

FJNmRVGmJHTmZMT7RUWtMNn+Mz4GIJ3ry8LeIGeqTc
IaCO5hos7QBTtJThOkQ9V4vATnCt8gtU6GfMH1sSHyF4L4yULaM7Yqf1QAw752K2BVPFSCXAiQYFgoBQ

NPU9UrU18gvY6mS9cfHF63cIQ0ZUmLVaDzG0Lda2BhenFcmbODx324vlNqWpMlSZYYET3SNLJeBU1Amx

nfe2MeCMNoTGIqZd0ALf7QRqMqWT8vio8ZJzoyFWMs
YegSCwcbINswORW4HhTvQzQkZvA9POE7BcO9MiWqXMc4UYw2XJZ5HHWsAuohRGD0YWEuUuQlJZN3LSba

CISzScIqOZY2PFM8CKI0IJErDPn6CIG0YoY1AgBrFZy1GYE6OjS3NsXqKPx5MGE7BrY3IyAvVHi5MHR7

BXJmKvLgJcK5SL8XLoF5ARoxASP9SKnwCBP2DKmhQe
X2LYwmDpU5DEq1ClP3YpOxWYCiIUlwGCK0BtGwEgN7HEQ9NcY4QYIfCWFhYPl5KXH5XEsrTbA9FGEqCM

F8AWeeKsAeMDAdRxL2PKviGzRkCPY6HyNvWZR5MTZ1EZX0MEomThK7MD6PSdwgXKBbNnPxQiUwPmB2SR

XnQDZ8VxdtJJhkIKY6GdA8ImBxFZewAJKtFCWrQwWg
YDnvBKH3IWSuOSlgVvOhPEE4LyRcXPnoUUAtGOK6CDY7EyTxMHdkKJI8GiP9AUIuVST1NJB5WEG2VYfj

NFZcEZu5NJU6SZwiVHu0SbpwYEw5XYK6JSJzRrXcRzthKKekLAEgERZePhZ4XOF3AsM9REIwZqV2RGA7

VjX1QFCkFjT5CIE7RnB0ISObNfJ8RQR8KpX3ACLfVs
D8WYV8SaW5GQCdMgP6VRH9GfM2EJInFdG6OUO0KxO8WXVvZtT4IAO4LwBYThV6RoW9RRBtHmT5NSD0Od

A6SFEdPeZ1FJA3CfS5LNCvQfD7HKK9LSHmIMBpPNl5DJM7InB7ZSRdWAz9BTPvCnT0KFUhBBX4SYo6BX

I5VvipMGm8FLysSSW9IUHrGCf4TMIhVJLqTRKpVbZ8
XXGbUpT5RFRiNID7DM9LOsS9HVQbMQSzGpJpNSV4XLF6ZDP1LKBtJOe2QYc1UVK8AzDiRWz8DMI0XKL3

PNJgRcJtEWueNtB5YPYxHhGySOzuKnK9QQCcWuH2EGSvNCB9WkXlUJGkIRZxVYYnWRoxMxB3ZUFaXoDu

PUtcZvFyITr9TBX5LMzuTKs6SUkeAtJ0DSo2KZK9CR
ttRsJ2NLh2EIC1YxUeJwMiLOqaJyG1XgAsKwX5SNMnMYP1VBXoFYmmEJC4AmT2QFMiDAkcDAG3DmL4MG

DzGKEzKHF3AuH3XjHlVMZvLMZlZDZ5EoWtXmS0GZGtVdToSIdnVxO3CME9DBSAJnJ5KWA6NxOxDPk7UF

W3SVP2DcLwUMv3FJgpCWI5YvHxQEt3BQT3UXV9IWws
THp4ZWTlKKN9KQSsYCL7EY3vCJjqfjYkSmtOEnH3SSVla7OoKTm9RG9HTNEyIGovDP1Zr119MOCdU3Mc

eRHsnu0OJNFzVe4fxY7epFVlS8Fbb2YQYNoIj0qlWIw0EMbuHx6SPHUtNVQnWY95TOFeDqofB1FwXZZe

T9b2RODwUamhWCEbB8RyfPXhVjYfAWlyYU7DtJYdwt
R6Fj9MGQQkJi8opYSZd9dvDjYqFSX7RUFyIVQ7LYTrCyyhDDhwDV4AgENjwXXKqrelGMUeL7K1ZK7VJU

ANCj4+QSgvnpBiHpwNNaG3JKPhn2OyHZn9KB6JLHGuUKshZO0Ho224E6K0LmI1fDIcMVX1FDI2kDTbJs

WtDLWyfeOvQ7Vrd9JQSIyKg4sdLTg4TRkxPz8DgpKo
RMqpFp3AtS2PtmBxZL0km4JqjutFJyWbB5FqysS7U9kjorAvOX5MZUT2G1peveApPYHJPqIeW0waWXRd

rzOaAjKjPUPYMlQzT0BueqXZTXGjlmfthF8vVCG5CWKfYi4CDv5DDqXeOS1ehf0GPmDfTAOoDqxMMeyj

NZgamlSgIbiTGpHnHSIpOsuRCbp1GKymLK0Joj3tI1
F2MPekQTKYU6LmfBCiXR8hK7WUS2itBPnxFk9OUmUuB5FrcgDfGFwwK0NoWSAaOLGbOv1XWYBpZF2NPV

WeUFWgTAYHGiYoOHIcIhBtBMjpBYYFWCysLRJuO8NmBRYpVUOqVs5JDCEhSG3VORNeRjBeIZZVDyOsYJ

DaCdIeNqnvORQTFx1XIgEqS6wFTngqS8QlRIqnHz4H
AiVhL9G1dEiMwTH0IAH0ZH9WUhKGDyBaPAq1V5G6lPHnI8K2mNtPpIS5JX1SIY8JQYQzCE2+YgLnA6GN

XAiZVGR9PC6OcIZlWJ0YcUEKI9ZhmBDxXr0vMDZzmUqxlFi+FnMaZ8KTBBTRMNL5HF0XdJHmIZ7RoNUH

M0QseMSrGa2xRPzxGpGpZP1wNJ4+IC1KWYXPFeNXVk
MbZVbwKSdlEFXfVEl2S6R3CNEtX6LJM3B1O5s2g7ovvn3+QP9GYEHtP8UHYEHLZiMiNKahNQndDMMqQE

b8U5D5APQwF4OLY3nwC9p9UK4+PiANCiAgID4+DQo+Ue5BGN2uc8HrGBrsWJErUY3dwe5PZBwzMJTeO0

IfQRJsGZilY8LcgSguRL8PSUwhCSwcYZ6DDEZbSQM0
YT4+HYoxsBCaVJ2LVws/dJJvX5bsgIKfYVeicu6h97r/WmUtRJ5yEzOURB4kF7HsdVk8xeLNfz4VI1dp

YzlkJz8+BTlkWXn5DjbylG7erNBgiLk9cYW1wb9rMr0uWYdeCQnmyO0yajN7kK3xHEAcIcP2brG1cTC1

MP1xTy5JLOApOJbxWHU0GgVIYJlpbW1xQlFeQk5axE
N4nGouY1o1ko57Tj8fyqhtVWw2SK7fOo0jXd8gZVSws5ehiWP2HY4wRpf+RJntIYSlEO2mAVA7WqGFLw

5ORAE9E0d1bY5swZD6XX4FPsXeYKOpWCSoLURyVTBbCCCqWFPmGTEuRHGaTQSaNPXeYJJmLVEkXHXlFS

AgICAgICAgICAgICAgICAgICAgICAgICAgICAgICAg
ICAgICAgICAgICAgICAgICAgICAgICAgICANCiAgICAgICAgICAgICAgICAgICAgICAgICAgICAgICAg

ICAgICAgICAgICAgICAgICAgICAgICAgICAgICAgICAgICAgICAgICAgICAgICAgICAgICAgICAgICAg

ICAgICAgICANCiAgICAgICAgICAgICAgICAgICAgIC
AgICAgICAgICAgICAgICAgICAgICAgICAgICAgICAgICAgICAgICAgICAgICAgICAgICAgICAgICAgIC

AgICAgICAgICAgICAgICAgICANCiAgICAgICAgICAgICAgICAgICAgICAgICAgICAgICAgICAgICAgIC

AgICAgICAgICAgICAgICAgICAgICAgICAgICAgICAg
ICAgICAgICAgICAgICAgICAgICAgICAgICAgICANCiAgICAgICAgICAgICAgICAgICAgICAgICAgICAg

ICAgICAgICAgICAgICAgICAgICAgICAgICAgICAgICAgICAgICAgICAgICAgICAgICAgICAgICAgICAg

ICAgICAgICAgICANCiAgICAgICAgICAgICAgICAgIC
AgICAgICAgICAgICAgICAgICAgICAgICAgICAgICAgICAgICAgICAgICAgICAgICAgICAgICAgICAgIC

AgICAgICAgICAgICAgICAgICAgICANCiAgICAgICAgICAgICAgICAgICAgICAgICAgICAgICAgICAgIC

AgICAgICAgICAgICAgICAgICAgICAgICAgICAgICAg
ICAgICAgICAgICAgICAgICAgICAgICAgICAgICAgICANCiAgICAgICAgICAgICAgICAgICAgICAgICAg

ICAgICAgICAgICAgICAgICAgICAgICAgICAgICAgICAgICAgICAgICAgICAgICAgICAgICAgICAgICAg

ICAgICAgICAgICAgICANCiAgICAgICAgICAgICAgIC
AgICAgICAgICAgICAgICAgICAgICAgICAgICAgICAgICAgICAgICAgICAgICAgICAgICAgICAgICAgIC

AgICAgICAgICAgICAgICAgICAgICAgICANCiAgICAgICAgICAgICAgICAgICAgICAgICAgICAgICAgIC

AgICAgICAgICAgICAgICAgICAgICAgICAgICAgICAg
ICAgICAgICAgICAgICAgICAgICAgICAgICAgICAgICAgICANCjw/dDWiY5onwJRtjcJ8W3skOj9WYw8L

TA3uf4RqOMSpVYaqajOcLaiZEiGtWDZpUroGNlu7FMswTW0IfVHfL9HyN1CsEVqmYB9GIATqSYSdaIDa

XCLwBEAaHvK6XSElZXxxRI3KpMCdOVydVRWwNDYbLf
UzCLKnIWYaRCKhQK3RRGTaM526btJoHx3RDc3ZXtLnZS5ffe1AMeQcWPIlWoxPDqc5YNlzPG5UcVYnyK

XxLzHkMEMPNpAyS1sbh7SmAtCdWSNTNPczJE0Ie1LvfUXjLJg+Yn0PSU2li6LwOLoxXsOvZX3rel8DAQ

cAYcMfX5EsaRmhGMznGAMuqCUIwgdekoVLUXReCDiw
PJ5TNDN6ARfrSAQhYiMwZKXlOjp5TLTSZUeCWaAhP2Zke4DxWmP7GAOqNzVzLUkoPGTbDxN4HU28ePmo

CM1PJBFgCKNdTJ56NKDiDSUeSa4DQk1YYnFkBW3wnr4VZuPwKMBtIobSOts4ZPaaZP4GjVOpC7KxtEUv

u9pPNiClK0REJWKfBBFqRa4TYVLrHjYgLBGxEEgcDA
0iZCGdJSYVjVnxdpC9CB2YFY8ctiWhIJ8OLcCxOg5nNc3HAiUaJ6JkY0RfPVHgAQRQLHwyHT2VOIbdQX

5pWP3Xo8GRpZCaqQ7opp8OQBHaKKVfNootkd3VSfukI5Q7pCmcDBFfRkBcPLUBRNscUI6NCTIkCNU6VC

EkMHZqTCSIYgFpZ09lAT1EB8Opi23wEuO2DAHsAcPa
UTanUL72eVuzatGdkNEnrSviVV8QGd7+DQplbmRvYmoNCnhyZWYNCjAgMzUNCjAwMDAwMDAwMDAgNjU1

VqElRi2EIXMhLEIxIGRiReCkDRFwHXUxVPcqRIYzKFIzWPX5HZLuDIWsNY7PJiIiHLNsSRO4DAYbXQUx

ZALzxc1JBVOyOZSlQXB5QsVxRWWnQEXyTVnaIMJxXQ
BzXHV8HOCiATXpEA5UAaScBZQiHKW2MsycOYIaDTTgbt4MLGJcMQNjFfm6EMCzBOAtBOLqKTujXQKmLR

B8SYq0GSKjAENdFJ0IUxZgMLBsYTSmPZlwCKOeTOEaru8WNSLvEFWhFEPpAgKgAWEaPYIjBUuwPAQfHZ

RiSXNcUEAlJKXbER8UUxWkDUSjEJR8HdQoNIBcBNQp
zt5SERTiESPyNAl2KUEoTKQnKZVwOAupFQCmIQZyYGGsJXUhXBHxGG8XHcToFSLyCCVgDYlyENUeRLOo

hb9CLOQgZXUdPoP1QQIeDRNsXDMcMGlgGEIrXUD1FXMoJADjIPEaGU9HDoItIVQkLGZeSDxzFORuPHYi

mi8XEUUiTLZeTWT6EmUzFBStJPRmVNqtRTYfGTQ0IU
B6AAPrJZOyVO4GOjKaNSXxCBN0ITRoLCUtIVGibo5NPZFdUFHhLNp0JRUcECEtZIAdFEumKJPjRWR1Aa

ZeABJrHXIfDW3ODcIgLBJwIfisGsNdDUXlEHJylx9EXYAdDQElLXS3XOPzUJXaOBGbHXzeMHFcNQIwEI

G2TLQdXJXvSE8MTrFaRQMdZPT1YJBdRVHkFRVlyb6Y
LQXsWYT5HQHfPVElIGTbUPMaKPvwQAEiCOYmKhK5PWJmTGAwKM7JFeApOKSsOCC0YeXxTAMwTNNywn1B

RUTrRPU1FmU3OOYoIBIdMJVxNGulPBKeYXCyZURbYORsKJInVG4PRmIhSQFkRKH2IMZlFRGlTAErzf3F

LEDuTCE6YADcYAMzGTMnINOjUPn1cpYdsRHnQZz2TT
4JR1BgtgFlBqRMFo5Uw844XFC4MVJrSs6XR8zjZw4dNWJrFOSDAb2GLWx3TGGqJ1TbSLUiXPE0JZIwBr

GgGNF7AJx0PkWgZKshNMG+FRs0ZMUkOGF1RMB3BUc6EgF6GwVbOfFoTfcqRGZ7JOZ9RU4pEPGGRm8+DQ

cvvCPnhJdvDYJKQzG5FhnvWJnjIJTGYg9T









                    ID                  Date                Data Source

 

                    [id number]        2021 05:53:00 PM EDT [facility] [address]

[address] [place] [id number] [phone]  NAME                          
                    SEX    PT STATUS         ACCOUNT NUMBER  ALFRED WILLIAMSON   DIS MARIS              [id number]    ORDERING
 PHYSICIAN                                LOCATION                 MEDICAL 
RECORD NO.  James Alonzo MD                               EW                
       [id number]    ATTENDING PHYSICIAN                               DATE OF 
BIRTH            DATE OF EXAM/TIME  Bandar Ogden                               
      1970    TYPE / EXAM  US Echo complete 
   REASON FOR EXAM  chest pain            MEASUREMENTS: Aortic root 2.5 cm, left
 atrium 4.3 cm, left ventricle in diastole 5.5 cm.    2-D AND M-MODE STUDY:    
1. Normal sized aortic root.  Enlarged left atrium.  Normal right atrium, left 
ventricle and right ventricle.  2. Normal aortic, mitral and tricuspid valve.  
3. Normal systolic function, estimated EF 55%.  4. No pericardial effusion.  5. 
Mild MR and TR.    CONCLUSION:    1. Normal systolic function.  2. Left atrial 
enlargement.  3. Mild MR and TR.              Reported By Raymundo Anthony MD on 
21       Signed By Raymundo Anthony MD on 21 1331                 
                             <<Signature on File>>      
______________________________________________   _______  _______               
                                                                          Date  
      Time  CC:  Bandar Ogden MD; Raymundo Anthony M.D.  Techn: BUSMI           
  Trans Dt/Tm: 21 Trans by: KATLIN Arguello Dt/Tm:    : 
Total DLP =    0.00 mGy-cm     [id number]: Total Radiation Dose =    0.0000 mSv  
  Lifetime Dose: 2.5134 mSv   









          Name      Value     Range     Interpretation Code Description Data Renita

rce(s) Supporting 

Document(s)

 

                                                                       









                    ID                  Date                Data Source

 

                    [id number]           2021 06:48:12 PM EDT [facility]









          Name      Value     Range     Interpretation Code Description Data Renita

rce(s) Supporting 

Document(s)

 

          Progress Notes                                         [facility] 

SHSOYc8eSjDMSgOj56/AFCmnOKHvw3BkHMdcESd9PFwvQXOxL2JbTOW6oV3dARV8VVqOXwXbAmDxSTPz

lbm
IxCldWDsLaEFNjIdfCScMpCAmxNsyexBPoYF0DmKA8LVHuI41gITCvAHHjK5HuGIU3LEi+Yi7DNLSbrW

ZnHO1LCwaF3Ahck9j2MI5ewV+W9PMMiTiXFYOPQJR26PSh5I4rObcv2KX7iYvtQ2DEzgz//VGXmnlp45

WSlRRd+1IZWA26/GjaROR5o4/FQRJLKUX1/+bXJNVi
fiZ+/EG0ZJG+IWt/TLKvk9KNKk0D+WukdjccpFGiY/XosNffXyKyiyZWZEvhMykspVMeGr0Tbg9p3wSH

Lp2D6EhZMK0UbnVSsFmJdjBcg+fIB54Mc9Qfo/R4yHu8IqwYDg5onS7LD+dHhV/O3b7D9LSJ0t9ZGU+8

HZhqKbkAg468XFGLuQl2pNNgWfCuEZL43OvvuGvmTu
2zpRmAJMaRrZS3JTB50NtGGW1STjJgLxTHyS0OW7iQi8/aOPQpNBzSIikwFJDUQdJDsKjgouJoNVyUcK

lZXuYs51QHY2QRwXzUZ6vYotsy7GDThmbdOaL8AiOPqa3GFS5tsoboZoAteHIucBg1AicwRonC4QGFeH

0EabpTc8xZPz/444lWFlAkcycNcVE5DLb+erfWGetc
K/5XHUcTyRIUsY9FLPegVo4AXj6AzuskOJyQiLOr+iC09i0Y8+eRNn53J2+Szge806/TyuLsEJPrL/mY

DVNqlzSV1u9dV0ig1cS0CyI+hrMC61mHuq8a2ol9QEw6xWIfgzvJZN+NJ13RJaiq7l7UL1cDnyeZgGxT

cQKnXLOz0rtcFQlE4os6yqpSF00tWCue/bRdlee8WX
W3yQizau+IMOjCt2aq+ua3KTfjWR8TqyxHG0fqUJ2/0WNSNXwOqwnyMqx19vhYy2kbWHWxkr8+ahh1HL

lWzB/fDhjeOMlXTehilUxRPuIfxHVtgiz4RZXLydLveJZFpllH4pDkjOviJX1Yn21D75sQ+igwGCm9vl

8/ACqhy+qC9S/Yo7SclOurK1dPqo0+OMDDBLKFOYcy
e81re2h9F77dlPS9itI21sMTdVvqeYS2CVOkzRQiz01izYrfXoBbPHGVshdalL4+1DQrckf3qQuptXsL

iWZAE6tRRf6E8oYqVYqg5/M4k8y179Wq8Momcf5OXxay1P2ZdURxAnNHwEWJkOWqUoFdaWHKc576c5Tq

ICn+WiSdoNOrdDFUeTRkRQuH926BNaaW+Y1u8Jmt9Y
wIbE4FB4dpQYYURDFeyOrcdKASTAkZ6e3OnUIYJw1Q7jC8bGT9/j6kut0pH1P/sF2RutWfvRAMFoKsQS

ZEt9k4JZT3hZ/S/i6ar/X2Mt5Gn+tsWZyup/o0BQx8ZeuHkfvlt6ioHEuTMNlkWhtDdZmDer3tf/Y/j2

HgwXcHOnSdonGMicli50q4ICSSOW3ibOHuvaklT2LM
zME5Ai89/GOjLn0MhBfYOa10wthP84dXc1QwtEE1yggEcGD9gceH/lSzrwq5BBoWeoo1+Dke7BhcxjeZ

btjMw08p0w9QB+RtLiBu13PZqtS9NMxt83EnJd7Ss1aMP30Cz4cWbLbA0dHHguao08FzipzG+tntL28s

0EJufk42XEtkeFDTUq2omhw1Xcn3GBC9LFSJNmnRWN
hsr3hlfmVtv3Xs1sm1NqNPs2B2xmfBPIYZQS5SKVnbYcfXkdmm7b04GWMRIY6PfmTQLSXM1/Ko6UWMGK

TBGFonIjHxmEs5822ymk788KdMiAUYVIdcwXU0LilUhPAgmRyTvaGFwEIDpkydLG737KMq98jGEn1dFc

rzxgLL+p2tNRTHulp2U8O7467CTKmdiuWaItX/qCAP
v8UwWGq0n8KLipw9qp2iKAWLuPcE3yZPDECtvxDcgFugjyC17wSC5b5j03HM6zAUdVKlavaq9pEWgw3D

tFp1Zl6qWJ6e6IYerUOvelPA8DkmFKzshx82oE7uw83h9NdKmCIMr2k/ed78eaSkT8hvo51G0RrIa6QH

btYgZ/OQZIeQdAvW2fFv3f6eG/Qqc+mCtSnqviQT8x
KtHEKppQk5HKP+Q6Bnunyv7T5zjT9mlmI6qVC2zzifGz3WMz8Lnx8kiNK33sJAdcyCF+74tIY7Laj+e7

3aaQSAQN52UswAi3a1NDfL/G42zyHEQcqoaXcmWUQEPtOnTugdvaWw5+ItdfisN7U33LdoGJuVeZmNwu

ys1fXH4Zc1yA1DDQyjdycHez1urLYm6p+2T8x+OsKd
m4hBHDyk5hSXlt1bpBtu5+ezcATpw+Giz+umzwXve/vODF+doMdg6CPqLquxmxAhm08EIH4IsA9Hd+W5

+BdJ8AOP1W1QkN4pJmMYVv8x8QtYmZsmiM02UcSJG5OOioBdoULjKzoSLKo0XJ6waE//Rk7ECv0EKyYz

GMD5irJnoU1MXA1wr3RgWYm1POXhx5XfDOpnPBm2DC
yqZHAbY2Z6cWHpGACvUO8MHYDfZL4VVTOvptAgWxZjYPWNHcSbGVAlRsVcn7HlE9OvFOGuQEKJBNwdQN

OpF09uLFokVi13SJmuROWqWuAvHZj1Vg0QSiFfWKPtC76llYMxuRRrUIWrGRKSHgGfOBOrX1EirHPoLO

dkS0UsS5BrBU9osTSaBR4hgPVlO4YlZ4TshbdaAENF
QiAvSSBmYWxzZSAvSyBmYWxzZSA+Vv1LOBE+Lm8OKC3zr4NvGRv6EHJfx3KaCCjmUAxdHzFaZKe8HLHl

CcjbHrZzVSW0NUL0IBToHMV1LQn4BUZ4SuOoFqT1HEWiFoMwQtRgUfz5FUB0JKHxTllsHqHeQXN9HIQr

PcivKKB8UQE2OsT5ONKiZUW4ANA1NjV1PKEtRFG8HB
S0JlH2AOWoOJZ5JEZdPuBrQfOnFFg1WT8JUHQ9JPBcXWz7BNAsSJN4TkGbPkMkYRtwUaY7SiFpKgXjHW

E8XvH1UXMaQyd1DKulAhIpGtjoPKY8EUoqNsP3ZDTkKXKvGJpiXmV2NvsbLhE2VCj4GIX8OuLiLdT2ZZ

GiVQU4BcCjVlX1XCn3LTI2AvesNgE4SUDzOBUBOjAz
QdLgXYQ3ZLRjOlAzQQm6GIC0IzCcWvFgVOV3QTTaGIM8LMDyFgWiNOJ5JwVeAqJjAjQzTZDjVVXsNdge

Sdk1GLF0ZgAqYbmhLLy3STQrKWK0FZIfBkYhRQUsPMOvQWyrIKT6TINiWvJ7OZHiYLE7LHx8PRN6QASf

SVvyIHB2CxB7ZCDdWkx3RYO9KBUkPRkjLIj3BQn8RG
A0EOGqReMrDQw7UGW6TSRaOnFvEBz8VSY7MVWuGwQtIIn4DZB6FGOyHqHmZAf0IBJ8HTUkLwEjDGb3OB

T2VQHzDlMrTDp5DEX4NCIcYuLpSXv6DMU7DBCcNqKtZD2OWEQ2LGSqIxAjYFg1DUQ0ECCtRmRsUOw3QB

H6OJQtOaWtMUi0JDAhKxpbHoRhRGK8MxT8LOQmHGF4
RXE9CtXpOVGxAXC6PKLfFwE0XukzEiaxJML3MyJ0SEBaLfZhSUpoCuX0ZYFrWFEkINH4FOQjHkHbDxHj

KCXzGiZKOtLzENd7NPB1JyZbZnJcRlTrBNRkJlLeRmLbNAS6WVsiYQD4AtGnJLI1DJAlWDG5MzLyIfYw

OQawZvY8SwQtOfHfDOmtQoFzVDCoAWffTsS7BhmjTo
T8VIO7CfY0HeraEby8HJY4KEZoYjjlQde3OHxvRyI0OpZbRwv7TB9HPCY4GhqxEbo5EDg3ITA0LdgiNI

k7VOi7XAU5JvSmCaWeHHufXqL8CtAsZsK5OKJ6MqG7KSCoIVY9VXC3LxV8WTOdGVV8NCC3IpD9SLUbKQ

i2PLK7NuH1ICYlTBL7AWO5LjO7ONUcBaf1LFH4VFMb
EufkCck8NNNdVVCSFeBsVlKcJMTzOYX0DPHoSdLyBXXdIQE1BKGqVHG4IWMoPAR1FDZyAvGvZDZrKGH0

XZLtZJB6RGEdTHU9YZNbQASCYfEdFF8zrw9KVeCsAM6kdk8EVPO1NK4FBAVqGI8LeENtY0WlirFGVBXr

mfcluH8aQHtxVMFtZ9RvnqVZJD4dU8CyiAMjSTXojA
ZVKcHcAUGpNQZoCN53KTsqSF2XTDNQKHihqWUqOQN8M1Igk9KtpuSbYXVaEu0QLGQnIS2PuQTabeCxDr

1WBELcRV9Ev175SoFofGEsXQBkOpLmNBVgAXVoWSV0UZ4EKFHyPE0RvMFygMTPjuysMDEaJ6B6UO5YAT

EIQuGvJo4FNyLgDI9ckv1VACHmEP0ngz5DYFW3AH5F
IIIeIX7XfPMaA5BbbzYcG2NcbKmpOQ0SkaTuZAgyDN4AVAMsJb5lnN2VggxelJcUq5nqZ7NqT35veW9d

Z3mjleZiu7cNqbVmBLjpDb0AOCQvGS7UgBIzkWUxSSVfPsFrNSDrzKPtGPAuWcZ4JFdgFOSxF5ycGBVw

amH4DQOrUg6YRDPpHD1Dk414XSFaV1BycYVpvaM8TU
RrXy7TTTB+Vd7DBG2uz5ZbAGi1AYWac8HjWTopJAsqWrPeELh4RLSzWljmYcm2FPI7WOT6RGMlGWP9FO

e0RMO2LoszMAceKRKhHrDeKoWlYoi2NCP2LCPnNyqeNuUnIBO4LXLbTqvcROU6UIS4NhM3UQBhRCR2YR

T3VbH4MPGoXXX1YSX3CsR8VDZzNDJ0UUL8LAOeGybn
BQt0YY3MTEC3CSVqMNk4CYO1HsUiZGW6LSP8TcJ9EqdfYfIeNEkmQyX2IkzvWiZnKKy2BHQ2MtZsSad4

ZOOwCAY2OxkzKRL2UYsgNwK6DcUhTzx8XZP6KzG7LivsFpDhTNM8BfV7DAAjTaHeAQT5FpW7WQLkAnF8

FTB6CrE9IRIiKVmvIUX9TXHjBidyPuj6AOH8MGT8FD
MfDbMiDTB9ZjI9WLXcQNIuNMJ2OzG1YSWdCid9CVD0PgU5NUExJcUmEOQjJzU6SAEdKfBqESwcSlE1CG

JvFSY1TZI4JcK6YNMdLeBdVZDjIJFkDuzdEOZ4BSDhGKF5IpLtASGxGF6TARQ6GZNqPCMyMLAdIUIoGj

QgXtP0VSV1NFI6PCGiItSfQYk6CQA9KVJdCxRoQNu0
KCS0BJWtOvRjRZh8WIU9NZIhViCgNIs4DMJ3GJLjVxVvZUr3RHQ0WEHkTdIoZYa9IRG3QVZwOoWaIZg4

AQO1QVZfErWiMDv5RBAQPsVkDvStZJn1FOL1YDJfFdAmCAm5YRC8AUBmFaZvLZo5BKV5HNIhUkt6FKOy

TwX5JFIgJVR9YJB1StW2DKOtUpXfNYN3QcRdKhKrLb
W0HNE4ENE7YUVrTXa9JOIoMxF2OlidZKPxUHLwOMF7IHjwUwZvYAWxGvBxPrVmRJooLII3MfZ1ZeayWt

MxQLVdEgCbHlEsXuU8IXA2IpK5ImTmEKJ5VNagDTM1VLJiZhH2RHB5OeK6TrosLjP3GYV1LnQ5KovtGQ

GbXUG1HeOyWwW9AZY9BiC3WmacPqG9QPC4OFHgZkdo
Efh0ZYF2FWF0MvUtSmDgATm7LBYURtIkSfl5IXc8FRF2YqjtJwx4WDA4XBH9WqrhWaLuFWwbBrY5McIt

VxGvYOZ8WbU6LocpZwSeQHQ6XeW0KUKjSPH9ICE7NcG8USWfSYZ7PQg0ATC9HNPeNYW3ODP1WuF8FVQq

RTO3UXP9AZUmRcnzSpk0FUD8MZJ1RTYmZQdzHDN0Kb
X2IIPwPOT0WPD6AwG3VQVcNHQ5DXZ5RVE7NIUhSHM4FEX7LqH8EOUkDME3HPZnAXP5BUImPOYpPT0bOX

zesfOcVizOBaReGTWut1GmYFcuZEi7TVkjNPEuB7Z5rZFbWg4knTRma3YszTR5u3GJTaKbQYHlMf8lfJ

9wgGAeNSFuTHfTBdNuGIRrDPBrTV43LWirEM5XHBIC
AYrmxSSnOLE9A5Uec9LedwOdBFLvTu3DDBFxOD0CfBQpetGpYi8CFANiTE2Ah980XaBgbCYgVAMxBxRt

AJJxLRElJAF0CB8AMZSbSK3MuDIubZBRvqglIJFiJ6V2SG1BCESFOmCtBh3NWkOrIT6llc2EFDAuPIRh

XydXTaSkGVjCUgFcXVVaDJopTX8Sd444D4J2ZuM8nT
ZvUFR1SEM4aGPhOfKrCIAfozOrXKGxKXxbMV9xi6UvcxkyV6geLL9lhKNhS06xyY9lTCscUQKtT7Rvnq

T9B4kbybHiJG8GPZI0M4qzotBnEZARPhEmPUNyW1bjdGioEQwjIKMUPJhcYAVpF8TemzOGVEUhaweeiE

6xRWQxTEIaUx0HMIA+Am7MPS6gi9DdIOvxSkIpFF0g
yg3LRAJ9RM5XpDr1DFWhU9RtQYKuALJfr5GuJZ7WKI9qlLlpQFWpYHWrZ7dnqcs3dDTsGqGrLCW+Pg0K

CRWxmNBdGE5WWrhJ7SsPxOYQ7uhjply2unKEZORR3J5ZudRbNRNYLEOPmCJ5PCRNSAtWcvKGmY7VVALk

u10BFIIXLrYJRGK6FjlkgPZ32f5F+g0njpeS+7+n7r
2hacDofPM///N8z9+o515oXxDs4i1svhclNyWPTXhNhHj2z9tb5S46wW7P0wWp3mtWTJfJeKs017XLfM

WLFmzzPmQQiQT4/aWXLPRv++qpL+WdV4MvmQXs3zWhazslYcVDFlTH5IUwZwQ8yAzGRMRhl5P/0ZnzFi

3sjhtUk9VztcFwuLKhEr9piyawojSC2GRHO4prRBhM
eubFz6D4/+tQDkwF9t+HXa5RLWflU3+47Sbsv5UzpEPzuJZhy98/fs3EvZXmEg3rCbWy0P1cf+zC9K93

l8t7721TY4CBtrf8UbGWlmHQx8T71ZPxuo4l6iyoneYJ061sS2rO0dQqEhx1gGLFIooPA6jQNxK5XKN2

vxMedE3lAWHGVvlhXE5XfVS7mkp5PiszjD4iq2xRBD
kjNWKARpB34IM5tpKsgMsocDghKAPCTPBm3IJXfDAZVEhYdoBjIFpJ2iwyNjw4Y2zJB+CdRFfTbmopEq

mk8Wwvnd5HyCLHL6zKe1ilhxhZKbM3BU0jV/UK2RuX1gP4RXWajmWK1d6R/jDI4dVmnU3WR7dXcnf+Yv

3DwVd2a2ltjJ42gp3Ih5T0ikoVkwca0m1B9PvlXI7L
Z7fgSEGuVPhMiHjEe1x6Ao24h2NsMqOrcQ8VZhlQLjcQfZYQyXLC5oMl8PlFafxFSFtG7Zo9HGhFyEbf

qqGd+NbSX+bj5xw2hr3qHjFA4XIXuk925SYZp1Uoo7XaGb4moUU8Eu/EzKf/doftCMYTC4c7BqTwajpk

y0izIyeqCAMI1cHf/Ez8JK/S0nvwMB0XmvColOvumf
XpWfpQtBHpYoyYKDvL+PF2fqY4LNGPcZnkQxx7ZXD/C4VAtgGvEGrrb1/Jm4wy3m4xXvMfhmUJ9zhyAT

IQ0Y5xoADfQeWtVSIrzoZhS+0O/IT4MZ3Niw0+zaOVtJV+YwYvudnuxhSrmDHihzmKhLMaaCsJA5NafM

XwgCFxGS9P5D38Fp75qmZ+0Vnk5Ov+JRjM59mwZ+t+
sjKspTQO4+FaSH8nrUfNdtFBehvfD+gjYYhGoim+fpEsCsQV+K5tCmiSOnY9YBmPTy+Jz1OC8puqfesl

JKEwFRIcsrrXi0KvCB+Fh5o60ZS1U7NflI+1Fxc7BW0rqQ5QszA7eCeImyz+k7pp0m52v2iTct4eSjn3

XPfFexuFjeRurxu8Z8qp/u2+796Rz53ElxvzMhGX5O
4W4icRV1KYN/frh5FSSr7g4u+pGSB67d0IJ4UnF4YIQEBy5KLTwZfarCrKdgcbnLFMTPegIO+tjf7STj

LhU0ctEB2za9edVYne18R5jDTwHu+TO+Dj5diLqvFYDgL3aGi0JWlLA3gMamfhm4WN0cS5x+9r8ByjU8

hsRKn64q52KM7DF5oG7F/W1+ip912pC3nEzK/NaHOe
udSsNf/gZooqmWFYY52tknrid40O479oxP976Cl4Xh8EnVYhJi7Od2oach294iTYLOGnUWERqNWFR5Cc

KasuUNBG4NSpVhjAQYN2KrBD3ygYtArrz3XTTpklfAicDMdgpJwg6L3mH/V99K3+OGn3PMeeHzpDn+UR

riSSPPC1LTwnLm05GPlF4mJ+FL28GijLbbzqrLzqJC
5nEnXg2TMAELQda6Sk9vFkEjmT6uRFS/CphVurDeWUT7wVLlSjmBPOveSu2lG5TXzlasiKveY4Hg6/Arlette

a4XAMX5NLT3fo8IuLsy2AF0hAXPl/IUEkOlH46qZYkMx03fN4XG8bT3zE6yz02AI4WI8NORMdf6h8J2F

flo9oo/qupRlzRYFfAgvRP87i8wNkPRfHbINPEYCK/
HK21iSsjY3AtP14rGMggQ6k2icaJpjnrhFGi4CeBdKlEtjxH15srXI7huKzo+FDbvHB9g1lih2gG5BQM

4nBqL1FCe1kSEkcfhsSPiXdaU/RDLnaB6EjFKyXpesLUJkaXQzB4wHhyh1WXeekq5PeyESJ4S+bLVSf3

D9q7PRaoM/g+Ya9p3ojy7x+kcUu0DjbSMZb7RtYqrT
y2ec8YAk+SPaZtz5rN8KsXjuZsM0VoP7sb8iTcR3L147rPZ8JSNhBghCVHov6IifnP0jdzQO70Gjkkmu

WOXmglIPoudAc81qy8Nf9q/smajXvnLC5jGfDmG3PLy63eihPyoNOuC58up1sLJbyKFfjkAn1OrrfGU0

p51w8u2Yp9Tr8sNOqw8ujaru7ee6CUq2gbtJhl6UGs
4+IzYgZ30/oY0pIU94/kyLLrlRwIlrkECoSciG4WTQRkqF8Vy9FXoKuE+mZ7uO5DPL0Y6vzMJY5i3MCA

TuunxL3aH6S0csETCQoRgpBOgKYedPoDnkX9uWr8TAO+rI5jYhiaDtNjs6wjxwX8LoP2AwS69pjD2T0a

mYRTK42lw3ktRufmQcDC61b1iwraO8gY4uGm8iCdFi
ITOudCIncO1Fi3jfKNkWtUaCkjGnEsLVZNnoDVOV9CaGGGCrKknW2vxCilas1PK5CQWPR42Zv2DCARsW

wM3IfhnVTQXi93H/mtHSO/1z03AgA4r5pRr8lfxNiG3Nn4zip/yCphZR6kELGXa9M2wTHvZOHTz/h+gO

/78s7CjS+ndCATmArcA+wBjgAe+Xc8Y+E2jWvcD8rn
ASnfwzNWvotqvYtnjHwHrnfkOxDtbzV9+cRiTh48pUoeF2HmxGByN0mSjRt4Yn6gwPZsBrKXXTPJuZ3r

NQXM8cuo8YMkA4rTLHeBLw/e7k/dyRjqL62MKVTUgbfH3ctcR2QEcwNExLQcIjfofDwdRaRDXJGxvwOT

BfxyP/Bh9Fr2KVJGPAGvD75ZIiYaJE7fil6xK8jt43
EV+tFz5gEPD3xNBbcVuMkEB1Uxcb5Ze0F101GdVjircEwCtVJAU4tma9laJy0yNQ3e57IrwLsjM6dFDN

RuvCVzhd3csX7OSy9NNRL2BNQ6OI92nabc6ltu1DwqVBIPSpZtH5e3w7TRv0q227RTARrcNlLiO/Wm2+

DiR+r1K+DiR+wk83YKm7K7x3PMu5Gy1COwV9VaMPw8
qYwyDra62Gb7b7hk1BnxKk7wS50Ur9YGUlRkRYyJmbH1KGZ48b54k6ovmlkXzAjlZBwbQf5Q3eQqOBU0

RwKuCFGwooXxCJghfSmrKa2ZcOrdMedUvuVAJBN3YIb6MBEmgCLbu90czdtlIbefMLOHedINOSgBwa7g

0i/1IMjykb8mBeouGFB1PV543XKIBtrVaBG9gGBEwX
C8fq86KeVA6eKO9L2nN0fpd8L58i5Cmj60/3e7iJ08iNMlzeGgbLQk1LA4OGi5vUs+ZBCDZyYwFdgLHA

CamVK1hfp+w4gJ6PbOBKUFgSkAA2IdjSyYDXxetBtBJqWEzjdNjdI0iV8xualKVPLptErUsTmlNI4tJQ

OXMnnrF5MQaeavB0zisg47fViCl70/Mit9xhILCJqA
1uMkFjUEI8qcF8v1WpPyExjsYgQpNL/+g5hsSRmqT5IAOMU+VBA6wLtVx/QsSpJbQTNqkiYiWUxNalff

btGUU+30/ZL0ie/UcXbA4ndSV/xb3NucvhN9DVAob/mtX7sR15A1lHaoB9lGOjVatbD1D4do03F1pdBz

NAym6J1EBCewlzCbausSSsCvqva/Rz6vpI135kSnJM
FUsJJGQ4pKstm5dKvg+wdgOE10gG4qE8qFKje1UMbCXyjLZAjP9UK5ZrimIrpdYia9FnvONORkuAT4jk

hKZE1PK1+vi5C9vuxvjyyq3N16mu6pN/cNrBnfvu2PwBCtbPOy6O4A6GikD9zr9Vf7a2PIsEUM1SHyQF

/o0AvXqCMbLbfp2g/9mB+rEnXzQktTMZTKjvLM5oSt
UN+1zIwWZZ9CMy0e7Ao6nqX2FIjPQKFuo6aOjtYZJpnSGzIGbhhJqMxNiNFeHZXKGrjQSkPXMimRInEj

2hW003mvbXtYicHQF/KTWFhdYyo3Yo+/rTr4zb6oo9nDIjspHCKwgY2X498j6WXk/oxH2i3zQyBWzri9

PxA2gHKpsb8BHjmBkiBAUX/5xT/Mi7LoM9JkyTqOyS
X0bnZ66ye3TFhNex4GfTeUMOhMFgXqDoInqmreWrirFPc99WX3BvlqJpGVbTNX4+jHDR9BizPbPXv2Jx

/+rvZPRhnfQZOPvPEjBoIH4N8JG2Ymj3hhpcJIz4lqvRv0EOWsqJY8SJMHd4QsJ7VK9Gl8f8/fhXP8fQ

1NEjpPuCELPfHroprrCl36mudQILX1x0+6tIgj/CEt
mIyx6GeiZaJmybV9dye5cN3jOnYkBMHWSBlWFKfsjxXULHcgHyiVvtgUZJJQETym6p109Ia2Cvjolzeh

fyk1bznAwxYisZqU+9xilp6FZ+Wy+dDWeoxxhhVcvraIQ65pSfCXhbtIEVRlsqpqRvI6XLC2MKci7LOL

muuGRM66QzJ6ZAzK0PztIXL5oHLbUzYJp3RuCzSWir
FBRBXspNGXjDQK7JUcSaGLYMbjWrNvGut2rnGOaEMnJ8n+p3CLZMvt8vh1s3xOV/ogLicULMmuWEiUF+

uTeifJBVp6pKbwQUnjKntB0v4j9Ggu3jseI7VURT3O9PI0Vc2ftDGVER8hls6/iHZT5IxtiN5eO1H4Bw

MQGOannnrEZKXXpZwo02WYlgybGYEiUGKGHoMqL6Gv
5uGInZZGtp+8mo9TUoz1pLGL7IV3RJFAvG8D/+RSWzmbkSPxY0TvxUPVFelObwiXlDRw5m7o8TM1sLkE

ZmjpwbhUnyb7+NE4pVuPxrQ7FwKs8+FkvURqIK0zCDWEuJGXI0A0nQSFweTF9xSkc0mD9xI/EdTaaxqf

YU0VmOszcExVeIXCDf6gbKcv5YkoO910GbFb6I23SI
+fWESaIEaSxOVUa81ru7o/Wwn7rUi8CpU8CHkqyW6N2wvnfxRJaONGAxb5eyC7B806O00CF8QSa+3ZI7

TjMQsboCn6RLkRBhwmt7P81mg8LgdUjpf9PhTmETrku8Y1EEdxNX8eWLQBmYtoio72GJOwZ1I4WzjOoF

nhYcf9q1SCjeP8IRkKQXChATCjoJVri/7z4qHoswqS
dusYXian1hI0K0D7TPNvHy7l8iN2uZkaE0Rthb/Ng/9+qByBhaDSuGDsvowUGmW0WvZDZnc9dND791O6

Oll0SL0jr9rziXDjYf8J+hem2Lis5dXrgKhIS9OjZruyRBRFAHrQzZMrwYZaFAmZHq0y0cY0fbMjOTeu

yl/D3EA4P7mlyhIKm4C9QsEwozzO1NINCcEDGwzFf1
mfbEFPgqaj/hFfbcDE32hbh51rSR8m10knPccUB2y6AtHC/lbFJnCGEHgQ4eyR89gBOqgiYHODS7+hef

wTFgmyPikscDBydWA0uU+yl0oUuBiR4Hv9moYn8iXeAHhi8/zt5XG2L22Ni9s7QnJlX3e6HU2KJohJqZ

HT7ViWj0rwmo9zUzm+nlZVHY0V+d1Be+xmhsW0JLaT
hv656bGsG/+08IyrCFl9IvQR+twuEVyqNyuJ6gB8uS6s6kjojs3doL1XpnSXmvaqvN20AgjAn6enVc58

5MrT8qkkkGT0QuoAaZbZiWJfXz4R2ovpgS5RPTrvY/ApsBGYA/EP7aFDYIrJppxQQ9SNw402LdN6qAzT

rkTJpWh4NneVd4jn4k4yPq6f4HmX2btW8cp0yiFVgT
fUav67d6zVvHQQ4mfk1A+B81opwj8x6hzo56NTJh3ByX3K4ddXuRhcTriAYm9ev4HEgkqU14DbQa3mla

0qUpk3PBfiDvYsIbLVNk3IU+cNii4o1Uj2C0iEj2SnLtiQMB2eSxZusNn9RcvECfY8genIAV/whuTXNN

04e4swX7oIudmZmqC4WjbDup9Gxfap0fDvvCg5MW3v
2+jJaQMisz64IJyfnOFOO/fiUFVirt4PnBJe81yK/e6SH7ta9Lhpgg+MQ5aF+bzMx2RtwyaK+H2T1Kvj

EujiTROrvXNEraeAYrH7/r3ZyoivoiKvRfrxUsC05DmXEHSYPH/SHm0l+qxT8jogsMQTKPkyMRIQLtor

LPkGLWZw/uaQCTkYMuhgiKGCvz46ebSe684o7SE7Zw
8OOPjEwY/SDgbFdPsRdTvgZO1s9jtjyGZiQ33l14/PF+wW5Cvd4KhqRtzSbP/HhnYfjcs0Hpzeewmx5u

a3/vrfFsdepmG2t4yB2V+cBjny5HbKM8iNm0XlI853jkCOEa3vkS9pGdV4LYmrt99ZaC4SWs9L5csvnG

5QVXvFjr3S5d+uvWvrh034bukNW8zayvgv7EeqQpws
ztSgbaU32Bt2HekXWH1vq4et5KlbADZzBdhBgw1u6hj0ND3+vdfjXa63LZkKEExspKdfpwKV1UBW65Ck

TfDjj6VkwaL8GvyGSaqImyn1GwEwLvPKWAeIPOCeHzjnhkrHfSHJYSkcoVHqQ16Rx0R4971CcsaeoZhF

UU/7ThSWWqS7Bxw4DGZNdhns5sxq3qJAxnAeEAtXCb
/oh21IM8F7ht8xY95y0sWN7KDJdSA9cOrjq+yJr+ikv2NmCP0z52qzv38VyOb20ys8CfU4PDuS7yUV6/

HnoU0n42WrknJTJhKF/1QjeJSD7YaKhI2kjClrrAIfWRGS13w1V+h4ue8C99rwSja4Eso9kEoZbzvZbf

SYw+BeAivlarR5A2XoRnA4ExVyIxksLSgmk/Q2oewb
DJ4iX4tVNOwD26tiXT8EVjSEfZGX9MYFg97C75pheAwX0EAvkCaBKIIM/3YBqzp199bIhW1YVpbz8I6r

E22ARxzIzSrwTl0othIUdFcwQ+ZuM+qm/cUq6Adld7MTNU0/eaQruBW06niGNCga/iitF0J3GFhE4hZ6

+FfIRZ5d3f+nC/SpVKFVY+q2YuT2kIG74nc7GEf5RI
gvqZ2N4UZS0mGjhd1I+xesg+BNHvumvmM5XeXOvKhIudXOqUvcW4DcgU3HzNf+SV7IWi+nK+Zc0DJ63K

aGbab5nLOA1dMcpKRHmlIfQX3MDaz8vYt8kk9Vh35mxPEcVSbI5+k5yEGTo274QW8pU8dF6C2Ydyr+2G

832R2aNkWcY4up2zRDkETzT7TPrjC82NIwRf8h51Xp
5XTMcr3i+R/NCcRG603rpXc+qEBWON6s/HhiPwXzrZIGfZKixI0eCmlKvJgHfkq/vXj84Ww35Xt8vXNX

fg/03Mqiy6M7En1+AG35B/UeKVXuypqTo3v2owij22FEO7V+/0O1acsLm/CL4ThcFTYIWtFwaseS4S5D

d5B0Rc7+QvjtKjyi/2LYXtcnAgfo8D/b07s2oxjLVR
a7IydIm887pUJrrf+J2NptwCTP23yHIBacD8+tHSanItlnkqdclNGjnkVTmLEQZxK2QayYbNOl/QehIw

SjZelz6FYGv75G4SS4WyQb56exJQepzt3blwJT0sBw/7j9Etk/kK+mdaPFf4SH8T+aDzrCpeFzNnLeRo

h4uvE9KVTaw+oU9fzkAEmg/cDzwHP/n7zSeMCEeHCn
y1NFLxHFWzH4tr7pDjuIXkOiPbJPkxvD5WrCb+wl4jwgRuoalO0TrXsr3J0wK7GsBjFjdTbFMrbvgT1v

nT8fc7Ntkk1+9EeBw01PyZLBT3vCbYj4qpTB64E/L64GkBG6e6E533IsmbNNL3I/EuBJ+L+Hoz4QBLpo

iOYhFEKfLWeNg3GcUFQHIcM/Tk+///dcZSLUBzX11O
rJ0LFLu1x/TN3+cZRN9nEq/m+Pgd0JQBD2UgGc+iz3Mit86/Q7ujgRfr/xmtLMDPncJdXoD7ayGursxd

3oULV/F8NklzRe9LJ6neh+DmfJ4XdYx2MYnmFcGzI2xiBYPWiOsje3vqDNeKTiQ+mY1ixV5DeU8XWK2x

4O4pk7B+HDKPVDQgaFCPgekXncfv25SaWL+NuC/uCu
yd6cFPOJWyAx/dh1r9mH/CvR6CjFLtPgfrCTzX0LH5XzeD0QLj3g//FvxvJ0Nxed/p/48KQyWqBnnnU3

uqWyRoHshexh9SZ/ITv3z/oj7Y4/7Y+wS1x/BV2Tnja5gf6NncgFQ2Mn/Vnw3kJ/6DNm54nHSC/r55vj

FnpxaBG4iIQwcb2C/pYs1JzEMvWqRi/V3t8ow/sIZc
C/OVN1kIJGDHvFmrZQiobh0uW7y1N/Wn9L0RF6GCtbLZkRF4lWmP3QMtF02AmSm0EHXMDgXA2Zr/ua95

Ao+0CUxob1ZD5a4PX+BNslVTmuoliM8YE5T+IQqF8vNPpZ1n1Qj4ylNbvVSu74Ug2GOZ259Spa0FlNB3

sLaRj0/RT8ZS51up2AP1c8FBOjxO0uOAMx9qDZ7wCB
57Mhj5/VA3sk02my9SbQ1+CzgUjNgYay9HldbxAoih5au4T0jElK0Auau6QEqjd1yqunhgoSwiJW5ZRm

k2QrV6SaWDW622YI0FlC3rr4V1JDs9841wIu138bKY9A1vv6B+w0MFVejZk6j1qs2SVEMKfN6ja2w0gC

00oW6THS32EVvL6kDJzk+qCUl08YMvJIsvmV4VYM0Q
DcwJ4plTEniTKy+uzyCnyBr6rMIOd/r7B+PP15p/Wyc+6679dbL9xw0zOW4omXVU5l1LndD9sXtN/Q+f

vVwEyfBYuewkAim43tX8s6JjaS5Bqw/q7Qt7Xod83Qv0Oc/KXqr+OqT4AGhMHF4h0zk86/f7wZX2JfIx

m5FHFnVyr1y6e2PPmUl5A/PdubZWlqOYM38yez2kMW
jOfoQlkq7p/NmXwvN9GQS3cm/P3OPN57YhgYlHUmBHY6pxNHccO2nF1oZ9LkTFGgq6Vn0YC2TM4ijrkg

kLG4YrjHzrBSAEldjFu+ChydLuTnPOV1EHgd5gxnf76Vryq9mNDOYliW78ngaa7EsGbBoVvzuf7nzFhe

o2CqxeY7X6HyCqUY+6ki5J7mXRwTBPeWgy9SN6DTVi
fIrxD+Dnyk5CV45qatcYp6yt6+WlkZ9dWLKLo5zZrdsyhlzf21rODVn52oByq/9zpl8qMUFdWYyEHGtO

E8E5pZsiDQnkI7OFkRr2Nq/hUAlcL33NTQL1HnbIe3QrVEBcrUwpmD1IdUZoSUn6nCsrxoLCWfPutH38

d9A6KnSQQksNY53u2LgjJeM1hNldC/F10eTDMk0pcm
ZFQ4c3rXvhb4wlngG9Gn8ZVwVJ8hM4h2QBroJ8C+3nb1F9PLFTWpt7wGj7VR9Q0/TOnhowCjc042ZYkT

RpQN3MsN23FLEB9IfT+3sf9H7BbyZrXNaJcGqJcOZQcab7Cl702oq5Lirsqdkdp//gjoCxH+lsBndnmq

sYg3t1VReFocdtrjzbF92DM9o+RoL2SweIJIDcKVfP
DM9pIAZ3Y9y0tF+2OaAynq5AkPLcr3g6lO9/e139ZFV2Ot+rsdvUIliJEmNTunyj6AT5KRPRlBNrQM7d

LIyyPFvjM924WXGKuDQfulugbmCY8vmDr+H04rxL9G+wT8biLOTcZxO2oojA7ond3IJQg0RYlPmr9FAv

s8GPLmjy53t0RUuTHOg9c2OknmwKovPbG8bP7PT66O
+jsRkh1bpY2lvWCJQtdXB8v5i4X2cc4jIu2gh83xeW08vW+Z4qLwVv8fZjAwuvDr7n2DjLoVCiXMLP05

pgLRE0sNuxW//xjuFQ3QXvdusSIo4JKUbu7128FXXX7yj3+r3Es6bsxIIQRXYiavZuxVZuz1N6J8tLJy

Ih5a0a1vNgHcJukgWAqv2bEMSjBfW81ZJlJYNsLR2E
CKdEyg2siTq2m+B79/9anelbXoZmF90kXioRlECU6Yln/upkcPubvQn9ziwDy3Jrd2Y3lvco03ud2WMn

SapZz7liLz2m5N62du/sjYlI1lf1/98WobgY7xdvynuA6yW8r2Az+tqZLQKno4Md5mfoWnn6w08SvWWz

zCjdyYBt9AiDSnOskobOSyQQcSbO82JOmbr2ogzEDD
nWW8PpMsZJSzg7Yi6+/RzSZeluEU13gBXTkH682mYurWo4sXIE1sfEE2yhM3e3U9/S8vimDNoBNv29Nv

4393+G2RV2d3vVxxz/sP0HXtQwASH+iJd/m2xrNolL/wJL4nNR+pOs07/kh6O+cR8y268eHY9vrX0s+Y

da4kOZD3vf/ZKaS5UmFYEZapxbL95PSj0R9KH28h+R
266u2dQe616frNy1ggit50yTOp+O0XU6BzFZ/WS2d117j1eRJthr3w6AsxI5f8tw9vpUk+Py5lP6k+H6

He8/PdnN9B/G3c5mKTGeKGYf9rJG5IkNJQm9jF1cg7H3T6F9GCIg2zkC7sKJR3Myv1Mo10yUMuIgveWh

3WSFj/Ujj9/lmf797EI3n34kH1pFHG/53MpDR4tUG8
9kW3pB20PmXXUWwTGWaVaHVx9YB991QoW0PlI4E5Fw5DuffVvx/Mbrek1id/qt6/A1q5yEh2FwR2w+Zp

5Ub/AWtJvhlDg0CmZmWVoq9RG9C1Te0qJZ807ckBHieZS9j7t5zo1pDxxKK4V1nrg+2zxGYtZ3+CPQ/4

3z1AQnps+9iP8wZr2Yz1e4c6D5dnx3CfXDz2cnOj6x
kjgmvm07aH1gFzxbtW33mL/hEUG4gjw/Lt/lW2HqsoQu7nbhMVuQ0E8cuHLxrxeM9dchBmMBPo1b361F

MFbefZ82g7bLg2R1kNKxaN7eyrVZjom7ZEW838sKgcNEvqJxvUT3kz5b0fQ1HcMLEG57lYlt12Mh+FPf

0gea77oStJK/y+NAhxsnsygHxTsqK1E6cc+7J89vA+
cA4puGhu8lmz+c33a/mfAsEa0AO/QTn2pPfaR+nd/X3bzc92P5u5PcNmiw+SP7m8iDiknZG0HVSHhrsQ

sUBTdSGyd2jhRQb1+Ar5HFRDvSpgurmrMim++FXYO/JJsbABI9vPbPxYSCpBSAD9ZzpvfGWlcR7bQejm

obgS2rwJ+8XBChscr/ahG1xNLmKL4hE9Lvs5DbylD8
+wdnf9MEXIVsdwwunXSkx63YhredPiVkm/zucKzK/yOO9bHstzvbg1zpxyssjJ39HYWWU4ri3mkW5Kfx

CXjiY53cmUlF8D97jEIipzzl05+4RkiMZ8YiwFqQpfccXXnFEMyf39I+JyKC31etP5IrK4AmlmSCuLOh

q/nmP9enMk4cONTxuf02NUBOHFx0YQ4BKr/C7Oci8d
/tXrIJ1P3h/NtongkFqh7v70AinCdybXuvLBe0zOUIsx23Q7/BupLUb88s1r0uj/uQpxH7telffCNpuG

s4bmqdrqG1C5c54KtyTCrm23sAbB86JCpW35H02vxy1wp+96ryWp9NcQlNO4Y9d4w6BM/1gih3ODx0ty

jA6l1V4GX4g5WvC/xEhqjGf1IgfP9bF/4p1LQ+/LMF
cIZd6NrcYT5YSikl9l7Dfh2/sZOlmWOh+11dVvyAtO2zvvKo2RLFmaJK0awYk81N6hpd42n8m1UJBkvs

q3lB96lekwpxnV2iW7jrn0DCP3tIhH1wj8/xHO+a030RD2Zw4KYPN9daTuJgP5pnl0maQ8Cjje9JbQH/

0g8sDpWKJIC2asjWdCnUCpjekoip7GmxiZZ7X72LTH
N30rxJDHG4llpHalCJ3l/qS3SUnuah9/0Xx8QwRbe4fN60ZF/J2A03SyS861o6rK1aGdrTdKByNfNMqJ

71Hs/VMMz0H+ORxF2vLmQ16FbW+5lZP87Spz4CwvIx/k2iBaLMK3Wk0Ebbrqz/2+l6oRcez0fdjgDLG6

7xKu4Fm4lS98U/rIfrcvIvcF/D6H+7ky99caLyOmt1
RnTuB3pa94HmftyYl3V1OR7KBruV+F9b0jJ+9UYsBf3zu/05Q7bs7im3oUwf6x96mNPusAp6VJtj9z2X

03hgh7J/TyWzGyToXh8XFcKqAFGsDNSQm2MmxU+Vpelg3Xmdxu/r6Io3j7ygzhmi3Ip9n2L+42TQN9O9

Npz0zcg5aToNSsnoX2mnDN7qbhP7g2Gy53KoLEsNrQ
+/ykt6Fm2kTv5fuB+73iUcchx1NJE3c9Mts67W6Elhb3knz5pyzevcg3XFtX5brwgRB4OFbyrk0WzgI5

ascje4ONdNfhwlE0NkZ+kz3EO1kO8R0zuMPTVWb0I/gArAc6BIlLb6P99wyTWVbkY3SSXtoMLqYJ+DlO

aubPyneiRJk2fQv3FWY+3OM0vi3/AJegxaVOSqM0HI
ken+bu8Aw3L8CqhA/r6YbSFj/9SMF5v7qv7AltNQsyd+jFY84RBWXkvV0G2VsS0RSUaFdget9LmgmYxh

UpeK6p07bNZnmnX+o6fANQ2V0yEUlGOUm0moGpEBTuxp4uqMRLR3InmZZcAd+KnkA9JhXrmAim6cXfqS

Ka3FGRO9flke1Sdmk6pQclq5to77sg9xIiadThSo9J
77kn+zZttPCfH9KOwteAFpZS0DT1O17VW6YvR0iUGLhh6y/X7Dra09aP5hY6Pw2ZxlMnRpu7JI6CmaUc

4cW02zInVjUW+RCn+qGg78lbWl9AAP+rsTWCy+V4bk/qk5xbg1BdQZHCDzaePDPpx11NwvrU3cjNMahN

aJQ9F0rhGsHFgKjIHzlmrqFCmw6ShsKAx5orC2zO7E
cdL30NsRkn+K+JTmvbaBY9uoQWrcwj1kNLpFY+aB/+jm6ukJNKnu0fBvOTwvn+lu9OLE5c4Cf+R/Gz0g

wnuYxjhV1SHAPnzM8Qpweln/uIFB2veY3u1L0N9wEcsqneENXgz4UFF2SoqC9ccADdXh/RueDUZaC7i9

m6siqoy5f0D4kViWiG6ALhZ6poQ7RIS8OtDpNQ9wOx
CS1xQFcanXULq+j8tOFcyyjnj3tgVgCb1Y4fO2Hih9tpI/fWx14JHgAatkkvtIN9rNM1qEdfchJyaj45

012XM/nn52SL9xkbGW1FcVVHw050gQCAHPm3SmUB+clFK47uqIwhlVqDF9Nv8A+R+i1NxF6GCk/BMQeo

YBop5x+99/9F9811RGIL2Ou66+W2eUue5OXoq8AKLT
xLeb9/HmgmRU5FgXlGKlGsTr/1Sq8zPWBE7ln1e5sQygtKJ22xNvucagW1oACMK/s44a0OV/BGIHth0J

titr/n1k2oQZSqT/IMAoeEEBaB1gC04o/swrA57OY23V7SLM6cjOjuzg/Rs93q/HtLq0X8csoMNmhUgF

03tvJiNzQnLFtx4QT5lH+QNJkSIx1dE3ruyJXLg80i
zqTkvqqn2sKj+JAGqjTt6kdXevsJ9j1cUugD6om7TCdoOaRcymJ6niMSBE6p53UrVG4F9wwowfG9a/vF

tCv9Qn9Ko+MLzFMb7GGFXMO7W+MQOiClkD8eWcWI69SaWR9Mypu+P6gCuVYZ/q8LqDyY6lVCnvRmJBWg

43VLNELM412meE44i9/6d8GXas+L+/wmJTe53leQPZ
3Hd0M9nD/pMC5Cq7jYp5z29xwNvqrNceR+MiW7Av9Gts9wOyx76LGA/RjfC+l+FZdDD+y54UVWKK7NFm

4PF1GwmDdSMrEAeRSkbQ0BcYPV0V6HPecdFu8xZuk5dubRt9I3MoSbQtNIExfKJedSy6nQhjD7tHYVcc

iIVH5Ugl8mHiYhRYfDUHUdkimVSgzd3EJstGlkpMxK
exvORFc9utk+H+eao0UmTSQdxBGWLVLUQAxYrakWs8wOuY7L/el1PDkFBAKcJNyIw8507ywKk9f4j98U

/gjx/F+mZReOvfqpXlu0hMjCXJ60R6YH6zHQ7c+wftYXoD7/It32yWt/B18x3o4IfubLv0HaQRBFDbpr

45lzEFjZMo8Sp7FvSLtMxWk3p0dgBdg70TAicGwrw0
36u+zP6jom23tca7l88MbGxlK5OMkfvm6dyN9CGzjp46sUlmOa5+AAj1A4qwxhX/ylULhl0pDLdIGiKq

Huid26soR/nmhT7a/ZL3y7EqoC8DN/3TRR4jvNm4DW/whUhCjEXqWtBSaa59s/5X8DoEgAXU6QsKAe4/

uHbFAtG7LA9wT+o31+NjoHkbifRmPXNe6Qh8W1hCJn
v4a4I5b3zXDBB83nmHMp0MBy5vWhNz0i856wr80z2jwe9TC0a8srjA53g+lGxmDl8C4Z03uFCtltEkrV

Gw9dTaFVoiBm79AA81tJCA0BbllnCBE1G1De7T+wZpr90HBtBd3XUwnUTTiIXXSIm1Oqa9vQ/mLF5j+Z

VvrG5HFmUTadYR5vHi7EnZS9cn5fsQ/0SnbAXysmjy
nCk2X1oYvo+ed7Mna4OjtlXB3fFRVj3uHHyYySPDZ/P2T1rehmbVFfAGjbMWtDTEy9dRwhEK8uRR4YsD

StTEJDj8tXnPFVXhr1paK5i5OrVBhFfy3ipXFdLZczWPmqSzRm9mcvvfLOa0t7fW2JLlNXnEAw/0uTpl

rofPrrMG6KfHPd/phoB8eCP864s8XG7fzbSkqHkSfA
FyYjIIVQGMTFbdGUevgr8sSmSTcuGyO8HCIYcxCYbompsKy+3vANEmUYFewvOD3K6nnNVoBKDluVQ5gE

p5IXnPDq+D1XdwNSgyASTVLc/VwTrpVdP/sf+6QUz/rCrfYYkOhVq2yATF4pZfQ+hpShFs4He0tZ4SpN

MiCkDQBoylhGFMIvQ2X9tNxgk52xTuD1JwzlO6kx3N
E7l9L8pBZpsChZX6zc00XDkpYvw2y4zrdYZDQiFpNiB/MMscVT0xWQR+N8oef9V7csFPTFEZzDc7gNMW

0prortz+ShMmWlpPb6QR0CZsOj824FYjixJmlZ8V+QgTlK4mrq2g93RB2o5LWUBvCLAFJbFJud/L27db

6rJfZQS3B9ASd+Oht3CMezp18dJ18bKj5hGoxD4XiD
jiu0/H9jihpReqK5ebYp2zc0lqh1GggNfvP6cVlEHPx70X/I5kPj3Qd/lRhZlIVb4p+Oda1IGV7A9+UH

9bEWVyCeXLM5cGb49LQjVn+4Bj8hsDdUWnZIpiKIvLeSec3loobRQ0ZsqN0UQ5NT1dKvzpfxToKVr37x

tniY/SyKTk7+a9fqstFvAhEZnJypI/3TTvYPOMl/kn
yQhpKQtUuOjViZs3vwQ3sVZJYAFO9CiOcmEThyCpXxFcpDJBkJEce4HjeLP1W85KEdzHrtkrhaZUdWhz

sXAh8g8ubpLnWBFbRL/LlVxVUltVbltIA/OcY9Uq7hy02nnTBzWqpuk/rEekVmNESnu6kvd4hjKS0ykz

pqSg9ShVDMm8Hh3KusF8OfTLvVUFXrHKKjKCnZUmw4
U+KtFeIE5CTHSW6Tg9sRY3Fa/CfYs3PbiupaHQcoyQjUaTLH5KZpuow4EKrZzYNWQbAWih8YL8U9DpBg

+BN5KMmnGtfmSzZ/aXlXXsS6FGTVh4TpHxkXsDvOBvZWkE6NXR3oUQRbF1kX5p9paJjGP3kif0I/i1KR

9qcangmFweg+kb9RRrFDOhQiPIfoJoL2vGqK7GTLEz
LbYzNK6Oq6HaOKGggi0dW4aNe8I0XWDBR2XXPy7xMrBqz3rBzZ8IUfqhT7FPF93IHxAsswEQwnpdhWBv

d1qvfqSXyFFQq8BZCsAr91KHml3h2MLwBNvaaZpDm9S55JSKcNhOINQ30PrnaogfBDFoEXL4INJB9SfH

24OvSPLh5S/K/hBQ4GDYoYN18YSRhB8qYo3bL9bT4s
kfBZRxLGsHjjyKZ3qOY0gHcBy+dIlB24WhWp7aCC20SBIkntzOCOCDqFTQdyrFu2iUuoIBiMD5rNKvuU

POPQ49tiUvTKUBySKM7BfptrQL3jKligMzcCx98isEvvl0uRlTS7xMdza5JQaQCiJzWbs9od0FpA74Yd

i0KlpljuvxTrIFHocZRurmnXNoSBMM6RZjwJXRvcAy
ysXsfwrrtvDv8kLM4UOJwtOnOLWBpMAjIaST3QJKGOIngC6+kL9NB1tn9i5qQp86G6YjblgRDClePBBr

tXZIS9LvAv/cVF/gGfLXOLkM7oBgnw/yovvmTizQjK5vZRJ+QUChAAg2EX5YFQ5wTcCW0kK8BfSzC3b8

hjGeGjVbUAtgduzjuCSZDPE0GDaMpTN8vzrIw1I0NI
UIrwu810W5gcsgqKnHVjrP2MVIPpQsRBJYZYmYbYhAq5JH6mh/O78iO2IOGglSt/vypM+QFh6cPxfYoZ

vvaN4oz0EMfPDUTHLmbEygPaXUGUmPWa3vy/9IN8kFipMWewKkKd/TZt9mnrKQZMtJvZ8mX/cFyYFpKt

+iKSKy/ZD1obZwmJE30UUP4yGPBYWVYqoDQTgwOrfi
/GRJIaSyFOLoOQYOTo6vcDwVKw4HqgZyfxv/v7cZkG2/+FzP3/+P/4X4BH/w+xIxBZDQplbmRzdHJlYW

9UJmHyJM3zmm4MSFWqFAUkNipXRwGkYGtzXwfnfFPhPD9IiDC7DKPrX89oPLWrNYIhS0HzXBDqMO7+DQ

zsERW6hoFpyJ2ASWP6IjfltPBOpUetJ/yJfjwI3ZDW
An1DpKMfOl6WwVhpmAEqQc16zCO8JsrcqbBv3C3/AM4Qx+lkMccErSHUUFJaKJhUpO4oYDonJC8cCUHt

h8ZvAwp0qIKf/QsPucT2VEbjyz4QUwCUc0s+OP7f9BAx+EAxz91MWC2UGRosxwP29N1Wa9TQFSLjTDl2

vg0Ts0e5vlqK/ZO2HCjYeAzul/BX1PLKIwpE6LypVL
VL9mCjLPfu29+LQOz2ob2FnVb2oDCsEMJhnc+n+iaGoMBm5xqgrjZTv8i+s+o5l2fTj5/0zic6O+cXM2

+NjY1GCZ4ij3NlYLNbZNcxpcSyTltOPbJ2GBErf1UoZZo0CV6UAWCzLErgKA7Kg006BMUwF3ZwnEMsgi

4DOAEgMa1foS3viPWvOOWSABIZR1BoiBWnFCzoAQ5T
a9FmuoDwVSD2H0HmxDhdrKeohUJ0JBWoATVeF2UasASvTgIbOUiiGN6QmYLkciYzFz7YIVPqUv2cpWVZ

j1ssKqYcOYUkMvBjUDXiPQkdUU8OQePqF9t0NKpoH2DfN2arDLYtI6CcbQAnIS2GOGCjRa0byQWfcMIu

EYCjYDIaTk0ZNj5IHvFaJX5wvl5VHLJjIKEbAfaSOb
r9SZjiCD3NxKZeB3MtytTvU1DakZsuLA8BCHABq692SNvfFSTmQvCrQLNpzwUzTSHLTXILG0IayVJcF7

OYFXKbB8zXFBKuQ1ljXS41gWL7DTiySU5JISVOyLL2NN4NeiLwSJl8P3NlT2xfiUA4ROsJHD2pJKlmT7

YlMPHazfccCBmtTK13mLX7SSOnU3DtoPjndAVtiGUk
Nw7kDPenEV9Pd430XVYxD9IxpUFjlnMkUSPjMNWXLcAcJ6KWULTmDYByV6xkSDj1MXDuHUV7SUNxLDMy

TV1bSI9HBy4IZwHnZO0byk9JDWFqAWMyDmhJJel2YHxmIV8OnYWpS7DaslWyO3PzcHpiQF0KhAOwBN6W

UBIxBl0gdS9NQZQVKADjZONcVOioVD8pq6RxqzgfGO
UjhfGcoMkiTT9PJAOpREIeG7OiKIPlgRBmmpTjEMpwIHWsJRRbRDimOJ6Fu6SisONuVXEmLIJpKOXLTV

o+Jm0FDB7wn6ExJZsjGaGtLB9snc8MLuN1CZZyFZAoGPG8JLBlCSBxUrboEWR3LNY0HfpgBWE1PZvnTM

SbXfCdWmNwZXJrIfL7PElzFfl3YUCvGsO2ZQEeCvZ5
UXM8MEP4CvdhOKF7QBTlWXW0TxwqMPS4DPGqVDY8PqmvACL1GCBkCAU5MulkQxX5QUFjBoP0MTQrPKg2

IFIaXhf3WVS7DBV6KRQpCbC5SIM1MoV3TDbwXwNjCZYkPjP8VFaxBeo4YCbaUXDbIlCbXnD1KBQ6ONE4

DaIzFCh4CTc8HVK6RGEfMOXsFRd7SVO3XFsrLCNeFM
BvLLO0PUwoOiHoIDeeIYU5JHUgNPMbPJZ8HVBOPfJmXxZyNmkfRwNiGRO5VLK2XSKpLiJ0BZHqJIP4FS

jsMQYpTAP5XFV7NdOqOtNlNNA2CQY5NgQeCaSsEGQ3MhN9LFmhOvYpEQv1EIZ5UmTlHBp8KMS8IUI7JN

fxHnR4EUIaJAFuVguuHBU5OYQ2NTH8QqSeYUE3EB0J
TAc3VMJ4UqBgTOTjYpD7CKKdRpO6SYDeAlGuTGX3BcE7DVYzHlE2WLT6RrZ8LYUzYdO1PEZ7NpG9LJZb

GxF3LZZ6SyA9MTJeCnW8EFT0OiF5ASTnPeW1MBM8NcU4KJQuBpU1DMM4RnB9KTLlEqM5AOL1BfR1BIUd

RRq1OAPwEwZ2LYA3JeD2RNDjDdV7CZW7HpQ2XYDhWt
P0BRP8JnAgYwBdItv3RIJfFDW9ArltORP8UAGfSVAxNobhFUR2QINrDRF7OJGnHnxtUWHrZcP4TYKeOH

DsHBt8KGZ3YHGuPka2IQV9VFSdYqXcSrN3PJR1XlKQBfT9CzH2BKheVgR3IYAlZGVmOUZrRWPrFBHuOi

K6SQKyBVGlJSw7FbK4ZVvpOgX6EMA0NNI5LHJcPoV5
PAK0ASY3YHGvFCXtIHBnWOI0FPRwRSS6FAKpZwQ7OFPrWgF5QFS4ExAjJoFnAlJ1JCsuNUI9UBnxIlE1

GC5DJgY5OMh1LNW5CCEeRpZ3XCO4JLX6SYVsYho2VVS0XuP6CRbrCwp1NHQ9XfR8FdWoKZW4FTWxLCN1

PUsxIGW3AMYaOLL9OExmKLW3KKweCuJ2UuDbMBIaWU
BpDmB4KgRvULIwJWD3QdYdFWSlPiZrVRU5ZnQ1IVebHNk3OjMhTXi5XRD4LaJ9KEGhDWs4CFU9EnB5KT

AlFTl6TCN3JaU4BgGlSYI6NIV1PgZ9GDFwOMa8XWX7PFDzZI7CYK8bs8TwFPhhCBWjIR2wtc7OCDoWPo

JcP0P2iIUtAs6ypCPrw4SwwHX5x1MGJqWpY3CxtcLG
TN1hZ6NeF62gNElFTeMwH7HeJ7NgwKXnCXx0H8BcsChnmQaooNScHKu0J6Nca9IewxSrGRT3GU0SFUKj

QyipQ3IuMfSUXwPhQ0JzxaVJXe02DUwkXJ1bNSYcKVZ9JDMhTfgmQUbmOJ6HiYDnbLKAehmlBREhP3Y0

PH0JCFTSJd4+XCaxcyEwKzdATwW5PUJoz7YiVSs7CT
7CCOLcMTxlXY9Rf844L3D4RyS1nDJaHQV7XOB7tUIoOaJcGUVqzpQzH2Jik2NXQJ1JrrXmKIyaPy9HpX

7AuyVdRT0vu7GmneuPJfNjZ3ChwvZ5G3nvyuOzMS5VFKZ9D1zkesQaJZROZuAbZ2gsBGGwqbGtDaGnUJ

FQCqMcH7NchnNQAPUlbgrymM0jUUM8XOTvFc5COe6R
IjQuEF4obk4PFcFqBPLeUsfDKgNnOMzqCxzgaWPoWU9ZwOA9BKGhT74uLDQxFDQtD2UgPTK9Hvs9MX0I

QK9dhYwhRDE9HmwiQm4PMiXca0LoWJPqOJbTup50qTtT0Nc//Pa7eIXiqPd2WABQYUIUAoLTnuKMJBAO

NSN4XaORWaEsNgNAEWKI0FPUTinECfOTNHHGnyu3FY
XwCOEmlqFibKlzMag3X4KRkx/LivaVO5ye66/v/bx/cIF0v4Bie10coUd12pey6ecKXUBawVwiTzo1uz

IZMN6eL62UQkOZPCJheakWtjE0JZlyTz6yfEWicQ4tyoTC3NL5MmMfFLjDfAnevH4j1f9LShF3lyFOZx

C2iyyIpOTd0yG32dgBX3WBGn18ylwJkcubApDE+hhR
6J6py3yxIZlv+mqs8ubLG9OfA6+xWn0EhATmBZ7/zKSbbmn++/LViLdA/JOp3AqGbLSFOr5sX0X1oQs2

ozDvo22xYcw0h+QZtxDkYo42dWHQ2n/fo526ecVCbn69zC1SMbB1vPDzfs09FH/k+/7k4n/z5vRd1jBl

rdiM8iQoZBIrH+QihfyUT/hzyMSMOXHB5OdkX6DVeU
7b362wV5jNv436Pq/zJ8k9CS+Q5an+LuelvgX3Vs+CkpR+3mciDOWj3CtyGGjnsCQIZabdQk8wuTTqul

FG60cyRkWkE/k2m94M/COn7Qh3/++v+O8//9FjJG6sf0dTosy3jn+vbsB6z3WB9okzxLzNGj55ta+P8N

8eZmO2W+ViyHd6AYkX+gPk0Z+C9MI2Wu27nKAHWxJx
kR96b62gtUtTtMlwMwGEmOmwPZroamz9REcn6z2FwWrXe+VFGwvXOLRjhL7DzH5SpAWnnWoF9K0bh2vb

fyimcztPX6wRrenGQdyHrq/3vwfpBlpOe+rg8e8S6pBtQhsvF3Z/gXS34hA1QYD9obafxoBmGA6UKWAb

2p6gRbaLEGL1K9ReAGqjMpKzVC7+D3dIpRigBn7tfp
p5tzlP/0L/h7YpY5yGnSV9xCGiKN+u52tCO3JS+ovX9r/f/37/+/3v97/f/37/P/mGivp1rVsmB5kUWR

3xxGKroIW9r73LTyRDjZN+GyFX3t/AJqlPR2KqUxLIPFy4+IxaVmG9/ytozQU0tYVS6KeTAluYtmC/QK

9omFBIPLUKX5RwDCc7frUHmRhKBRaoXiBW/kmGTHmt
a8lY9sdsnF6XOi/J6zfy+gQ5QeFA2+K3DWo0e6z+Yq5Z5vZCgh6xfB3N5/3y+pE73oZGaeJ4Y314cyfU

0OU36IBfo+N2ggiOt+x31ewCTdnC78h6aSebq+K3RumqGv+w8qMIHnpUnuFf34wqT2GIk0U5nibO+zI8

KiGp+MOPMrOa/EMvPIqqqZfi584La/kO7eqDxLlsNp
2XtiGv6izKPLtQeQ885RYo3mIw9LHcydQxwMpbvPlIiLSG/EYxz0ia1+xlMP9HdNrbehuNxma5Wmhj7w

6wxY4iKHm5y2mVViib/MqdmoLxVs1J7tOC8zFrxcVeEsmuo+Qi4RsxNe3Vm43zxN7Z/Ht175SeIM+/kT

mt1m8dX+08W7hP5Fb+sqNzh0Um8xGH7KazBpS4tSUD
86Md/9leKeFwsE6m8uEj9GRum+2a2r3z7MRkDvCEAwOzvu4NP+HN29cfUv+eg+WgsAbY43e8gedjSFBL

4Xfatiu+qlwJlX+vlAUPbZUoN0SYJuiKPdcKtoE/vqBF0qTdSj2srgQ0QK2B93IUW7//gAFIzFNwg0l8

xeG/LoBHx5oqVd8aiHoqt5ooSFHvQVCVUtIFCiVFcZ
+z9AkgTui56Lpa9oBJV97P7TJB+38rYcRBKJvaDuDFZvWjvG9XBBHCvBcNSbHQ9tLYquroJQi746nTby

19AbmsiG5Odfr5YFCgvYIb2TVmUCenanzBTw1c9/g1aav9hD6oIxyfBN6FUinGqYNqsW9J2b5zyRfH9A

k9/uMfd39pX6lrEXKz/HG/FCepy7LjF+0AYd5Pc67C
370ciHEQbbyZoVdHdZVxdb8zgN6qH/rhivoVZ0BJb+ElVBmf0qjjD93Ysow2IrdKf6bEApPfVw8777ko

t1qGZp9Qjyhqv4NHsYUb4jih2QM0b60Tsq4JtjkOyN2W6J4gPtuoxV9C4GeT0OTuPRUnJY1b4MiR8xwf

lUeKEwsePWrEJ3Jkx3G/iOi5xVz5zAztjvUFCIHyWA
rEVvxSYADwCqAhpQtSutBwQCW/8N95aS6b5yreBZGGtvoqICOevfAsoO6p0TfijmfoKmbWeca+BH4CDO

J3NLSIVrZnZIWGYSNx9oqiEse5n0vu4oXrE5frF/M0Qrz5OEbfmmYDzwooTULXMdapINwEOEoD9CvlXb

dJ9hrMCwtfpa3dkJcAAbQ7C0jMmtKm8YT0ZQ46kjG0
tTaboUg0t5nH4rq+qDxE22xa+FAqHtAlns11Skc15RqYhUhR4TeegSNMQawZ6nScs3J3wdNjjLsxvjYf

xH4ISZvakSoWRhrbZM0cnEMYQVDcBFZMMK7H0sTVMBZei0pkYUiqzi3FdpNUOhVLDo8PSTvceU9b3TwM

P2TVsDb46cxvHiMfcMFGYmuAD47DYbul6yPzQiPVes
6oWnB/YTuVEChVuVODpN6FoytfX8A1luWE3oBq6Pedz6YM287jca1CwjlUhzOoVRrzGgyw1j78rq/MnF

NmyTEdesIXCd2HkgA+hxTdosjACwrts9sM8Li2N+b5ZZfcSfWcMqz9LO774ipOg671/SzWzuaTfxQXvt

cFcye1zbilc3KwSWrgL4O+R+vtPNf3moWtG2RgdiQs
kVSnRVR3zGLGnZfgjTCU7oZ6SmoraQf+sIKuUPPGUqALtJOsCFFHxaS3iPnphEyX5OYCC9ZuIZy3AiKH

ePFCB4YeKlpr92dunooc2spaHKUZsT3sKoF7Qm0C2bW7WrPPnsV6PUQBbsMEGqLUiuW3WxIF8sGiH+6K

zIqMjpRF+iCQUp7QSxm/lf/7MUU1PzRD1t/Mx4XwfT
chGHOeGT0PAM/RFZ1vtkS+JQIwVcb1UpUctqUSK1ygDd3+jdvp91V/dymH4wGzHMSN7jRHBg2XAiNLr1

VIEU1uLAhagctkxgDeyOL2a/yYeoYKOmU1b9X5ki5FhZkZ6caHHS+/vGN+q49DcGb10W01S3luuNLEj5

vOW8kdPLlaWDVosyDCizV/40JZnrSEPkCHlr07ybcI
duhOopPVqwOqx7XXaqYknvftCW1gzzfYdLpX2FI0FGrgK7TlfjLsfJVpqXbWLhWB1HbajgrrpyNejNR9

NDCBCyUj3+6UYjVWfQOVqZDSndo35VJkv9/3CqYfbU+S8VF0GLIB3mRLxopDJP88fvapjH/cPDFJDMzq

xlefvGaFuRGfvOr+CBXKGxxrLdqrYfhhWLK8xVHJke
QKp21lOULxosRADaKXSmtEqdQIt9TPL2zWT8XysNNHU/WyUqqwGnqsXaV2mlzGJtm/lN/HDyMxCAes3j

vPwB4ZAUKj4xPMHReqKpXPhkHFEDdV/c0v1r+WaubWwu0fu7ol0pinHVX8wdI27ArFuh/YhEpGZr94Wt

yW3fvK/n1/+kxf+yKA6fDdy1kBa7gWGEtZa7Ow9Shu
08I1T0wmxTjGP5Yagp8NdvAnBshDKxQyuFMwapEkeItplSSy5M4NZnoGEen+n8+ArLv2cGYCV/B18im+

uIDicSqPW56PeWkrVRNvjI0z2tW243FX0vVB4qDm14k0ApbrKJ7rNq32gdEQJ5HAS/P6V1PwVK0tiUCW

uxFeZxvblZXvXkuE6DheJc2JJ3GAGeKtaL6qQ8+wua
jaf8LLzbdcKNfabPTPVXhd15VfZ0/+l1zEqZx028roy94qfm673+FjJ2pu8xsw3rB4qOv8LuDvnc0m/u

1woz9Hgdls/nA6yMSAGAxY2L+Ne5rLidQTQVTvxBmtd7mzwTSP1qxRwAmEtmLEfOg76AWanpeO33vuXc

W30rzoc7mu5D+RBfHdY9jTrtBc6PFzilvNL+erKB5O
JvLwZPQMUZJhg9McqGQVAr5VOniFQCYfUrNXKIoVPxYvTsyfIRRz/EqwuWp3/GVXcnmMMRtmJ4lO+OLF

D0+765sYI7jb+54qfX0oDexZG4wu0bO6ng1oT+btJz40xPzTB32r3Ejt0ojXsWO6cGWKJiZIF3znmmuC

pyRShvCqGRlpStqySiUzMTHJjQF3+ymAWUWUyEOYqI
X7ZKyfvzGD/dxPP/fTz/10n4ARbwHnEQ6GBN3xB09LW32RJ9JS7KhuNYRi66rWeR2mvgO2KkwiwGllOp

fAoJNpsh40w/137bmiiNlTkpoJS8T7tt/U/F+Vcj46NWbA99b5/MkE8hy0XKXmpM+A2tZuno3Hm9sGRn

SSo+gdLacE5CdXeQao12rtNiIboMBd0dbkceZfzM1d
a+6rsGjhzl5fbGOH6TQa0hS3LFwZAfevUpWJmti+ITP0HUcQwvU0WeLp4dgoTK3UuBzP3AnRburNfPU/

/y4ljl73vvtmdt6z5FrvXBChdl5dmgZb6aVdpVVEV/244T6U2+/pgeKld0xoRerXOXeYA511zkXxw46V

DonJ/SnZlG4IpxwFcQXfuoCnuwUcZmN5j94TaRkhHJ
s5ApqEQ4HcB1nArjQBSbFKIDzj7VweMuyfGfZLEzDnYIAWY/V6ULapz+uVQ08WHH76QUX7kxJXoyOmSp

rBGwb9RWd1OSCrZ2UTKIXgfwCL1a8WXG77r4UhxHqKhz1r+Pd2ugkldFTrediVe71Eeucn6xos6QLoQx

apb0SH+gFau70cmr44mcTqseR/8LPWEauTFx4EW6cr
EnYqGYRGOUOkA3gzyAzq+okmbnyqItLriTXAqNSQGJnLnvoygT7WRac4tGfHbrwAsl2MrMJB2n2Q2o3W

6pMBPqwZYulzpgLjEgkgU4s9DV56oI0+/Eue2WWQ6xHM6LW0twA6in+KIjWFrh/Z0p/VvTFxPCd2Q9v0

uxc5uUjneymNyd630dgwOg12G0/51rq7O6hLfZ2mkF
LTk/dWP1V+6tV+41yLv7N3dIXLaM1mjThg6MaM9dY0k2y1TynOqit5BkM6Ca3FOF+3UfwC//h2oTKC2t

Z1EkfXy76342wno8oeg5Z9yf9csw1o5o5e7VayJP9LvIBHtkzvzqoDJw4mivdhf/AHQi9dYFF87DmV1/

zUMzCg581uqsbtSL1QCUPId/SgN03og8Nyv7v+emjD
ZhXwdZ3IpS8gIN0RzqrwVUB4rdGXq1rjjcLDjzYek70w16Hc01yMqOV81f6BWVTT/5GMaHdCX7JP4eZi

qO4X4Pi9mA+hZ8/ecnN1UiHs30VHwWmq59CGqcn1CvCjRCZ/TR3bWBIKMSBex9Mm1NF7ZhR+DXGsOWIJ

iwoDVHvUh6rYCcUTODN57ZchQNhG4lCLeFLTqyMqb2
C82DqW3Ce1Mso5dxyHCk3jPVerOMohOUN5egmyVn+qxZb/XXXY2HNSBQ0Eng4Wn11UZ3PUziq8dw4Def

Q+YH53uyr3cj8L4i/oj/5o0O3Lx3uvZq/bmK7JknUC357UwknBqk/YH+P9owObUAP7rFTs3DlSB05BYT

66+Mm8TXxXvBU0LE5MfCzSfiptNEo9JqR4llUn7i55
f5qtXbz6p+Zg5L7mW6YYFj5KY1ZejKRdqVL/iqO5ZJ7edZsF8+cfAU7SnYKYrFNCyH+OczgKn34YB553

vYkI18ex1DCUwbuSrgd1aoY0gakdUQF+OCMMrnMU9CrlssNzkaBjfkBCYVGA7oX5m5sltHlJjWXuuhV3

uine1CbITvL4p5cyitSTxL+TgiGlqwS98ufEx8MMuo
8/qYpZa1ET0R18J9EtmrGD04/yEiP9fAA4vFw3HdN3iV54n/7c4564jjrcSZ6YXG+fxlolpB30RFn09z

3125R+ou+d43GiYip490O6eaewX7oZEtiFy83KgfiuNn5BEKaBr1su2P42SaLv5LQpl6nOlN8CoRKLwT

4uF++MjEY+LlS5JmA3+HaoCKGob1vnohsChtHVthx1
N6U7m4lXggNwPmubucGlmIUf7VvHA8E9P/X/EP5OySwB/CfpuA51hkWqLfdfy5BrIQA7b//sxA/176g1

b+/a+ec/0r7fziZqCq9VHa+y4ygiVjW60tJEpmO5FFyZTU7ZdBj3OWwYfyycCJuIkUbj+Y68WTryzLgp

LOskqPrPokbiFlnEkG1qR1hTiBFVhEMGsoBQesoJ2B
CuvvYz3qE6nkjm+lGjNyfMSGYdGgv/aNfAq0Kkjq4ayISsLtUYy9pJDO3FPdO2bHIqB5bjrhmz3Drnop

slqRnHalMiyMklmy7XEDIskhnYS9qPYchwBnQ3rH7e78OcxNtwlKA1DmO43XshpyMEKxASLSGTgS42ZM

yoeLM6H4ToxUWCx7y5PMNWx+T2M9q2eLXQ8LcXknji
jlj+dmw9c4gbUWXjCic8xEdO3QAFragYd7AtiRKOp6nSv72m5c7unmvhUjUkh+lgUedNCpVJQHUw26zQ

c53vQy7gDphy1eB5jA7yWZr7w8KJc788/SZwfB893iBajUhlobBAydlo1U4PLmahXeLq1svZT05MppMF

5aK7oiaFvi5fg+HTWsYkT2iB4iKkYyJ4M0kkG0I2lK
G4Cvte7iw1T8LOeLmaznAs3Tq5T57MUFMapp2SqRfVMPkxJHmQqwMrCQFIatqt53V9qVjX0Wrvn7eaU/

4lJNqXAAY3sVy66DASjUiBrWfSeU9/Y/FqF69ldM56ENGa/gQLUgrGuMsVhsD/yqVtCE3jfIm0nF3gws

6y3+i2cuHInzmddUg6gU03AxmZC1WBeVuN56E7O4q8
oLnHqHeVETQKNEvK4sFH7tDI8I2xvcmIDEDDVUuP4SzSpp2qEmrzJ6JhX/5a//Bvjk9/cW2l29UFEDLl

N22i3pwFJpbTIkyjuF+KXPAoAZUei5MPdGvAHNc8fVy2FcYRMSuPHZ606yApCoysTprk9JPLBWITtAe5

zCaSFAy/bvKxBRHRU5BKtJBQrRzg3SJFU1qndU7Mq3
nwts9eysX+7BP1d+7QBsJP9fqYYRIPgRBt5HANMhYWhI9ci0zv0ZbI/rxHiT9gyzRUdM7f8dYlJEEwSU

O4xkp0nlbY/26g4qVbHb7m5Lqfh5T26JFBPcdvnnteTYsM82Ino67HbRqaJNpYveIBKUohFVXVV7xfOG

IT4wDRn6X6gvPbtFGO7OqysqFsTctq8qyErU5lJt4m
qOJqWyNL6kTZ3ixgB+jGqbhIq3jb0kqn0n+29P3eeh81yh1/j8v4P20ZGUMEH+06tyg+tOlynxT1ayTD

J0+01Ou4k2olLxqV80osFMVHx7iGlQ4eVPmZZUvRzKxVa2xtHbkIE6z5e+mlnx51bds31Pa8JaVcrAAp

nezogjASVW+7mgVuawMyMeQyaykYXV2zINVocCd8vL
x0pF7ZOqyuQQMsiD17HjvSEenyC/Qj3xgZUxLpn2vK2qeOS+Hj+P0zR4Rn/TSifm/KY5vM76h7O8/6em

ZfomZiLRyvfq+h7faAX1CrKV6IoO4bh7K4NO0GQM4APOGmk0JxR19/gno2/hx4n6xuWZmxf8485A3mQq

xfrG+l1aK8K3Sc6Hi0tt5dsDSUES/yfMkrB5dpbKzS
5JonXVBZD2I06Nj0HukTp5DB7JbS+oj9VOKMb0STvaDGXaEQCccAv0wLgwVuGwbJKcPVSALtCUuIf/O9

+zwu29bCjubjyiauydArJlj8qRz89+h9qKGtn+iFj7Esn0P5I1IcpwG4aU0mV/o+qnPlI+9h3Bieyb1v

caHIfY8US5TKmfeHpp7pwBk1bbRJQf8xhQK2XJUFsk
xW5sNwgC3Pm8fzUwxnotejUj/RueVAge7zsG7JQwGqw6vwNC+kGnEM/Y1vfatBmEMLmAskOL3JWs/3cT

llP+6/mcBuLceLmDOeQ1Jt56nhiFT7Sy+mPRK1aV+EOEKbQHuh/Qj583Kz6Or95dxU0C74Ysmdf/lnAj

xVgL+jwJvAgRhvDQG+1bOQrrNTtpKjeBNP97yvEKee
uaZq1vX1V+Drcqk1fJkRR/MgzCSEGkmuSEkhDBtEH1yCkJz2Albrk7sAJC3cjVKt39wKmZ+oCGXqtCG0

G/vQkg3kgQfLKdL69LJOVOdpGPn/ZB1FUn+kLc3f1R4oIFYATVvNQ6QNqwh5QaptRCY6E2aor+jDeGCi

aYcFTcR3Xu2hQKN801wL4zC4Cr0HHTDWDAfIQQKG20
/n3DnRlgivQlVZeOiVHczkEmG/23G97xSlI4GzCT0k5Oy6WT2PjcGJvSWMGyECFDC3JApbquFZbxW8Pr

JWUFhw+dNiGd4WG69sJFcKh9w/BBhVVwVUrkco3Y/jElpTUQc3y+dBN0vBcbLqcKyw07cDyufBStxFVQ

pHIqwjkP4m1ELnumEgTu3dsxoUqr261Fq0NaVi//Qb
zNkmvS2oQdPqka253HDgtl3NW6RYbCw4bgNOpES+nj2cWvH7iFxiEN9fG+A6QCFXAHhkP/WDmK4aAYg+

RE8NiXo1Sj3KTKKNrQg1m5LC2fzWVF5k4Mu1N7ansk0SrXa2PY8BQz+Rz3K673f34x3JqIVJxranOWoI

sWohOBWoYirOs8osooFUxvvdLwfrzhPlLxFrHIpMfd
cxJ5/AmQBPfbP3Ner8aRyKcs4ZwlHwexEJ5gYSliXe98s9ZRb3oCeJjLxjePtVOpv6Vx5GZ7n/5bzG/L

JdCBNgxlk4pQL4WssZ/zkDjTX1pylP+auE0WpBc49PJmso3unNip3YP/lO6qI7N1b4yKuDMGGeXM1zZ0

FT2r49SeY2exh7B4kilmuvQNhh1KYjKhFznHk2uGqL
UN43f8b2U9XfE3dHutTxyV5us3VP6gOpi3XKmbV/pV5qsAzP9kQkSDhlx+p9PrdX1ecmUZulJ8zZgJoX

JPv058PMzqo2Co/8NS8hexhI9cm3Rm58wXwW0AgiTo2N71/JtKicu+ylGh4FZhl9lOl4GiogpEfU0wlq

d/0defvBK/qoAPTCumI3W51mma2ySaaw5rAxkvznJy
0c+xTBpFkmAmNBZ9hec97vSYPl0ANakTX+plFfnW2Kut1W00dA9yHwcI+jlvTlqLQ2XmlK5Zt1WiiSpc

T41Aer8I6BKFBNJdyqa2nMP3QuiIOhJDtyhg1Lo4ThC35wV4gdfOBuK4wyJ2tZFolCYm7qaPJC0ygy9S

qEn6Nc/SlQtyw/xQqRhIC95KehRD/Pa3sZohR5rUvl
auZJQlwOm0JC4XUiS/dATgGqkW/Cn0C/LKsOxidRgXpHFLEbG9nuhtFFua81hvgwFx69AG4ylHWe5liE

UyXinDVbQGO9w9mAxGB8fq356O9wKl14frLrs6Z6JRXzje+sw3E5EkhGJ5UQswgWkkzvGFqeW4aX/ZqG

IqKK5IAybYdNH2Gm4NHe5qjyrwNwHFUFYocE12iMNq
o+vzWnhnaXa0KSUs/mmYRYLL8wsYkUDQgw99Xsvc2TXflIS/1prH6L+nQF0gynG6IE38Geu5/aMr3a1d

3rgV2YbACVFEZl1kPjMe2aoPV3UK/RwI652qnqdJ4OJb8141Kmw1TLbl4Kv39+6weWhXA5MxnySe6y6E

rk+yzot6B/dmilnqEvp9EP7Xmz50N7/ZDtFvvRDWnD
Ne9ia+DXd1AdL7XkgF4S6sWaKspwjR5PpdeG7poFm6R0Fz0VJFvCnnsgEs3eJs79gEIXoPcJCscPgo1y

D81Yup4K+hmTcJLI16+oafSuJuJJ87cfBMa6FpS5oq0ehjq3mF0NL1HwyVFPIaIiHXplGbDF8VQsZy0y

26AZu0aMU3NLfbQbJaiL3Jxyb8Ff3k0wx4cr5Xv9E1
hd4biBkM544Gvneb3R81PGmKlA0z8NU0iFS5lVYkSgCBPih741kArXEi/kD5BnjSFK9ute/ox8bmn8v5

H/rHidI/aQUK79iZ+XSF/Y7wos97a2UpeXKaAtomaHPfpvTb1Q6LvqxBeUy39/6+L63ZDW/Sze25o/xf

qHGBBCtZ4aIjRFyQ66B5pGOsC+1J6M4GylG1Q9M6jw
LlROz7ET5wSkfNj6qh012xqU1yO9ig2KwX3ZbBIjYECX+UjBo1RBRqPD5wO4CHPqHIOm218glFgG+7TG

dpvCF+WmnKjV5YuYWLNrBfcRaG3ivwYNPBlbhdZGA0URC/93EP3lpYY+aA8b/DnhFqVCdZsjEKnv6FDw

U1FyuFTHMnqAB1345aBw08lvu1oh1qnfTa27XeiauJ
9LJprj2p6cL6BbNYzKorrHvvtJwM+0jXT1LuaJkdnq77b8ClkV5dtmR1vCEc1N4LV9wugscnh/Ytv41V

Ti146G/wOXfGkQQllK2W1tyWu5+zswr+dGNQszKNxtQwZK7eohaDARNelsQuqYajVz7cCBg/QHF135hm

libbEO1tqHH0tucUj9UGce8lmoe7xvPl+aJeV9nQc7
ys2CrmZ3fBGpWoG69TUJVnC0Ch2YqhzW3NSGmLRl+orhOip9bfU+RXwc1tHycpqfw5rgziIvbEpI1oU/

xBSlR0V3FvAAPPlaItW9l8YN5oLwzixkW/i+S+CC9zeaq2C+zoJh1YG/8TrrrIs/e32Cj+AX9RC2R0dS

eXO9mvELw2TNi7u9m/kB+5IoKDm0oJuQI0/nvt68ha
KL9bSzGaEvSo1QJWPVj4R+nkE7N5nU9eQUXtBScuPwH/oBVmbjWH3Ku3D07QlxR/8i5Op66FH+hbMes7

0lEDm8AOEYuOyf6a4f2ZPzThAHM4tkGii687evCB4upRwG/ntb7NBk94d2o4g8S8imENhaZ0K01BmznP

85q9l8piEaK1NsMNCaXE7NPqLv67v8R85hVJasFVB9
0ct6npl6AU3O3LUcckAXqqnbZWFt/8RJm3m34BfQo/3WCfNhxW+aXzstZAwwyg06dGiJ06cph1cM9yA1

afc9pD6pkz8Vi1P9A7FNnYBMcYJDSJBMrdN6Os67NAxWmihzzx9AvekwUu18Mt6+Q9MwsS3UmsgBDzU3

bfYNP4Q/t3eDVmS5jn5umBaaAp+aQkOC9aE2HLs87D
S3yxeBAcFTFZ+EsSXsT6+J38Rac01hbQKhsXTE1r0XjCx5gZh4wHrR8Hej6ufV6cNpYWz63YaEX7XA/d

JM5IR7lLcrpD5PkThPTaV5s7ZY1e8FAhleG+uDTmmPJR4D2nDWKJoz8dfo53EswI/ayBcUy9zGYsjb22

oKA4V8izU1v8/BPr9A/RXlYX8+iauJwGg0M+vPGuxR
cKeBPzk6v0AVExZ8/wmuQStjBf0dW2x1ZIcMMjufTDoVxvI87YMhhU0o+pGxEOZPdKFpUf55rYzgEllY

hkkAnlLCJqmBUwjPiGbzyfzEPilkQONJGI3YoyuTzOXS9H+Yajao20KxcG3PlLIt7wJUk5x4YvYSuZ86

h3t9nVqhfCgV3uscCbc3hUMx4BQS8q5P9l9T9QLh72
AphAA2T5w+3n7WWyXR+oykNJ593df+wzqcCZBdh2p55OKVYLKcSrM34GpSG8zL+ty7PEXyRLQBTp4sci

ynoe+aRRv6FNKbLvA2weXIc7UjSzROQ/cTpVw8ynpxFingnW8D4MJ8dMR7BGJmj/iBZiDtO63t1aqokO

fylztOtApXAcCYP2CdHZYfQob7iMzUfxTgMI3hsUhJ
+79ipCwmhmOdBk6MRKqo0HlMsYeAWYbm8PbHHrZlTNeAOnKus34UQsI6mm3lHwcahJB4CbVgETacNicD

ezeksWSiTPf9kaOlIieB54F7ZFkGilTby01tUH+YV2z/rShehPC3nifZbTQ/CWZFxAFM3IqYHxWlPWz6

DBH9oWIS82H0Xv/nS2/JZQgm6q+Sh/PVGWwIpAcvwN
lGukG9fU+Aj/MAnITBZG4QKsgC3ULUD5aKH+tjTHOohVO/VG7egeAiIWgMecuuTjxn32FTivmJ2H/D0v

QB9PkRp+bH25JTT5SedJNYzwq0E/1TbN/1l7qisPTw/cbvsB5BL76BeNoIKGEsELdCaoyOjac4QRm0BU

J0/cmdI6L9+QdR2Rd4+XygSZ5nbQeXxunfA8ShmkJS
CcEb9u5joX/Slf5wXx6LqqWch9S+EDmcxiDCT9u6WRU8MzmJQXxdp/SHzeyOtvwWb/J8bUkkL8k4FaYl

9Xb8Zj0ulTXgtqepK34AZ9u1R4jQYrIZYmyfDzx+hfQ+ELOmpOlHrpvSEAvu7w5F5U5AmdFnvH6pZE8b

VkNQjKx8eORimy46ZVVSIGF2eZchGhgEl8dBENNUO/
2mXMjl7UTuLYCoMyllkVsxZKWfdWWv5jLFJMm3I6PHhbJ+QfiVvNM39KJCms9U0aF2hCM2eSYXGUjuWV

REfl1snV50XOvNnsnzWWWQOz+t8dCUnJhlbTnqT7qbn5gTaq0d4OSXZpQtNbgTLaovY/ToYDyi6F2W1L

e4P31ZmMIVaPtlF8sw+MxJ+pVI0XuiElr1xjnygtBn
KPK+x10mScFiQiP9lrd5eXJv0wrKdPDkLz1rmGVaK5VQiES6hlctH5O/180+5tc5L2hkiEjNRajCXcwO

/tgn/aI3yS80pU83Ez0952zxmucToKeFvbNawO7dGTgoHOCw5d/mo44ny382U/u/FlV3K1pa/lSM54dn

+1zdh0jk4Fex6ju7s8ny3NPOEojYxJTgKLLqTx5oO3
uOJ2cNVA+dIQhP5kPLJ9aE4kEKK+ZOQ491uK+gJd4dbJbP3kov4ajfCjI2Z2dcaHP3GvRPCiQUsxMJv6

RJ5wlZh0oc8o19E94cPqW7fb0qAWuqHyAM2DMCQ1bTrbIz2AX1kuKKDbl/keGQad68HB6/kpsA2ZNUs7

AC5doIGNxTqPjRmuddCwTr0zE5vUU+tea7jm3sSEIB
1qPxXLEALQy1Dy09JqMdhcb4qQ24WVZh3fejczv18Su5ixt6Ow/JoUGHhm6cqt337INMKQ4ZjBvG/wLl

d1D4/XDbPp1k4s51LhivK7VBsj6AljkkbDpM+lAJzG0YGXnd48l3fKjTysMYd+8aHG0K7wPOg4G+B7v9

PW5PeHfkMXIw67RlhM/VNUFisS4laIom9rhAmedgba
20PawXMck1OVuCcEcumfss4bK6Tg1Dft2X0r86ycCQ3u2aIx6YXuUpy2KxU1Krw2W6esckfZ/B/RvB7z

Yz49yXE6AY2Lg9Ocakr6Eiv4AQVjX2ChZ+YjPBp9aEkg8V5LAhXjcwfHQEzBocE+hhwH8ok5Xor5gOQj

ZDyFvJwEKuW+lqZPZ4VPm+BbSU+wj+sa15zgo40y9M
xdHhndqu5NCVB6rF48b5W/1q4leC9pxffp92PeangruItan+krDnMJ07yC/cfqwtWzZO/09gFONmWBc6

fu3EAcek6knTptZZ2RCgFgusID+vmezpo1zjhbr/e+Z4e3AY9+98XcHA+W5iX445sQ+Cm9D22ovc8B8Q

hP1FwzgK/a3lrDteN+cEf0dK2bl+dwhIq4HiPD5kmd
xGiZ8LsVUYe8eUc84C8aPr4mPJgE1QIf2+N22oa+kliQ5n059RJ0JbTHiJhg3F0DNgrhH+/UydJyfuUO

gXn8c+Tc5tWkGeo9F7sobfI6Ny6rvUBLKH0j56TTS5LDzX34L3C03CRuqK+z4kCubxuiPRWscoE5N7Sb

SgGdsgOL6C9MdJU6FJaQo659NW42CRpmLl5DfDERBC
gJfJTDEH+2qyT+YB4At+xFw3FsLpOrdKOuW+Ay673mQYufUvZ7aHQiV+VjpQg03gAGk61PVmYlqAexR+

3l/XaycFBdSqYr2zkEmPo6t+cV/Ua37+vDzf/Gw43+DfX2JfbKt5tOKP3uuWdtSDaj8Xg13uJnyDmGzq

q0ATirr9TpV1sE8r35y8OpOdN35aldrTD7muEpB9Rg
Av5XPuT7FWSl4Y59wJ69hytDRC+T6J/X4BfO/I2absx5f3+AH1NY22q9+EeHuFu5StkwLETQCh3V8+FX

YMG1SAiS9mV7h8pNXikm27GzZ0Lka1iCldF7d5DhxJvveF6ZTfF9Ub+Ak+6c7SpaiL/w7V7lx05D+0Rb

5VOZu4tX6WLEek0V+LZAr/bSbJT0gb6rqa/S7i0L1q
8l/3BA3g2Wr9+rMxw4tImQoUTLrOe8tuUo27l16q/7HD1RYZX+qVMycutX0xj/D6UKnkieHgg0n5NS1n

71Q65tDK/ipgGdfPoVKO9PBu79CmCKbPj1bNL0dPn/J57Ti2AVRMn810oNj+lgklu8Re+XGdEC2sPaCa

ryU2gj5iIkylmoR2JpPuBR5W3fc/695lxacweDS7vc
hBtYj/SfB2k9Ciids1EbpBjR85hG+Xyh7VtugnZ7+wHoW8JNSBiPxyZg1WvtSFleUq1hsLWb8c6+FzjK

BG1uuq0P5H+n43wgQYCvxzcbQs+jgZizovsv3HiYY3hVxD6D+kXtoMyjd+BSI712TQI8XvM/U6UzhXuA

1UEYFKxILSI12Sw9GDkTtpd3fjYY0G4GVuORc7IUuq
Tzdjf/RieYY7L+Ez+oO+tWyu2DNc/KO/T3O9fB+L6hgPMnEHN3vzQt1CpOg1HMZf1nKQmYbSKnLxyvdR

FTEAHSZSJulZYO5okTZ2/iBCdFynz7aeC2/BCwXQyN854k5aj43ySLLn+86w3X5z5cO3fZE149V18+Q7

v76uo5kDSD+Mg6ZxTyrtUIgG1PlSiiCkqaMx7p+hEX
p/YiMhw67S/T1NO2nqR6AjhAt2SSJ2i827C3cZtASyVemrAv3k51M6K0YIhFfhpfGSDbXK8p601uTG3h

Zi2P/jp9+nwJQlDIkHjsyk5WM1iHN1wyR866OTVSgHU7QnlJ1lCo2ZienqaJ0Citf3Yb+zEhdQrpk8k1

TGukGm8ZpjTmvWAEb/ZkmPZc4L8ffIuf/r9/pYG9dL
/fTRBOFhgbNc6P2sHoM4i95OXT3Vpp1ygJCm/X1dTwrrESlGjGhYAkm/pTYMlxdzZRVwO/OtbMK7wY0D

V+9wIF/acxpf5aeEMWRP7dAmdU0yWzNmqqS0pcbPp0v4Ux4b72HcqElO3cMZFh1gIfKLQVz74csq0gGH

4NdZtli2pF7A+B0JjtA3D9Su/SWkbeS4+gEVrg37fg
pFdLe+IzWFnLzCzn1O+ukB60iFTT71Ypf3ZR2rvAj0M/6HwbniOZ9Pzh3pMCRZJ9A/OWQ/d+RzxT/YZz

nOqJc02vkf6xkygtwKmu60FkkAkzo6C38dlgwR/teyFhvOuQHuMI0MudbFTfbwWpTcfOPgqSFcAKKoew

OQRF4a53kI0Mo2cujT4sKf+RAN4LE34bDyEi9JppaI
+3pGPlktgKtCdG6Iqe+UDNvtvFAA0Wnuk2NULvtf21EvfNi3z8uiaoZwNNljxCXNC6gp7EBg55uMkx8u

r4+/j5ew2ikq7zjLgRMccMbZ2xbp4/mM751A61kojh5jDGxLl4iqHwsLTeDWcKPZS7fi5h1QJWVPqQXY

ZSDXqJFKUfg/Oe9cntRzVxZcsDkTP5EE8KhebEUptB
6uvaVju1sp91Mzgtau6bwQ70YXDzmfgs9anrhuHfeJiwNf3QFjLS+QuxdmqrqIQF1T/CM9fJCbOBQmjC

A+GtYtsdTqN/QKmT9XlM7ao0G2mE9s0n/sJSQ8F3lKAJ1BteYcZO/mMYZuZ/IrZeeAZRgPyDMeDfvkgG

FiOmrzJ1P7Q+31xyTK2sf2qZkg2I/tZH+5zBcWfzzu
Yj99HA9cY9oGG4wncfDR+JWg1HP2Zhr4KzeS0weFLOm0tzsJY8xGx9Grdnpda/Gv2rCSbniKo8+aq11A

BrDyiOo4vfeSeKMZqfsjgYgWTEyO53pQ8kAeKHudAi/u4o9noSTGgx3Ewa+VObKW/UgebbXTl2oehM7m

21meazrF4vqvwyDv5XwewetKg/6Vv47yHzpvo29HDf
WIPwk7/KnrUXR74TrSC+khU7h8J/Pq54qQ9pM4FC4bwgSwn/dA9ww63EB28Ga6kg7eXBsle73h2dc/wO

+zyu/O2E8/OTu9TjHSb0Ttybipz/BY26JUr2jl6dRCpx1yvlRYutY+JZLr/deO34fd71GmkN4KjY/6DS

6zc7PbAev37G/GfiryRgDXye+2MEoXHExl9iDeAfDh
4/xtj/B3jzgQbxbjapjAZkrL+7U6vM7hWaqCiTCyftDezybfG0w/PcN3IH2/yTE6Eew0y2Pz6V3Iehv4

NqKWZLiO0q6TnUTVK+1G87QqQj1678pk0csY7+z4hnwvrJj2aue9/4yPN+TbNdO6F5lLLomj+A2zmLAb

n/VR/N7f3uv/2rp0guvVET3ufS6fRt1ge5PXK4uMGh
N5p9g4xI2065b/+yaDCLUOR5txjr7KXHTcpL1a8cgvE4xC2P/5fKk4xHfZwCow8jkbQgtjP9ywDnmAKK

trOA/O1BGa2/B8LJ7X/9qsr9TF34ofmV/2+L1wyLhIBGvTaBQfyx0dD4uPcR50sjj5Bd19NlvlIOs0PC

k2Mhn/8GtWQKc4IjuUq7AemkzJFDrEsN9yg88tpJGL
rAzb49wxh/mMjp+x4UhHku4i0SZpOCrxyyWgBn2fpTlAMA+XMD8G3zR1EoUfxihM0H5i//8NuCp/HhRx

Eboni/AXgu/m+HxzpsLBlkNYlqk0tQnT6s6NHD06J7ogfdjtIE+HyK29R+f96ZV6jY91x8p0dACwmaUIX+

jj1YT2uU0XMKW1VpgTo9B/4HpCo5Voz5c0Xh5olL6V
fUs0NtMtmeaot6LOP+sTkw4nnZ5aIV+k9Fggj5Ey0Bj+BmYXBJie1kvz0Gomm/gs40Z5sOjlOGY+z35q

TNhNjutMkUKkW3NIfFJDTplAdDqEDZnSvAqAdJrnwa9ZA+YI15N5H/LL6wd5uX5nm2lqx4wPA8z10kSH

pV6a2Q2zTYLBPl2BQ0aDMZk0Xw2Dp11u6NifAWM4Xj
0bQYKNMfk+WJGm71JUqCtch2AJwcJPD6rGdCY7iu3Igq2/Drlk7XLmPR524c296uZn/G/h6rf5S2SE61

hoE+gja/wyp6n4X3UmxCA00HJK7DdjaDPvGEWkMSSzEiZ3t+RJssV0F7E4jX8ZI41D0Pi4DujyVbF+m5

YMgOg2Hab6N5hJ9cM+J9ImWPyQ3vj84WuUWeZ/KvAR
BRxkLVOK9PIGEviqzhuu9O7ItJ+IQmNXxvUvMjeg9KoZJJc8M7ykRSPcYUgFgA/hN8hMOiuvHuSKzCSn

gR5dE5c6Ugi3jQEfGP4Py5t60gZXrQW2eEfq52Yr2Qjeb40zxQQrGpCJxKxVGoy/OVp6V48PDl5RMkKR

yD+lJheFsbk0Lo1cS7LgNis+e7KjJCmKz+lu6GD3/M
FYX60Gzc7HS+GH+EDX4m/DpkeDn6/yr6bAwvFHHkgBKdSk40C5nnEWOSpC2ny+sM/Exm59mcUz7Ovn+5

C52m1NSgYX2Cbxu42foILGrrkmCNRZOeBNtbwgPrxmWigKlaRXpyYRf0XVWujahwfXsbRBozA+z8dKSZ

rm5UFw/47I/z6KDWOxIuEIZApLfgq0+6HOPeEeERxg
mI59pKz+l2tRp+hRe+APvzH2Y7cG38fgrSxxQk1o06Fwx9N7qefP4hDSdM8V3xdG+QSDp3ecma/pbu1t

QG+0pA1fm0oj8eIWZEGV6WPpG9WdGDo9So0AD9rCvhSAE5uH/YMlQAAroglPs6Hado5W5FdCNaB2wXqk

d/8+Cc/t88nadEXpJA5K+/4E9OOLupt+j2ba3sbs0Z
mPXlMqoOwRfwgwMLTQMMOUYaoieL1oCrQlYVw2lpbtJ82mi091HZOihXhe/4Y7f6eVhJ7Y+243cs33SW

Rtq/eHITMcEo3ptm72Qd56Pya0V8lHxgAlycjCQ5gtNyUxiztUZ8iw9CV3YhKGXcTwRC031hX+0W2K7r

pue8bikIm4ow+X1rN/YZeZZN9yqzrgsI7yPxw2JR9w
vZWymX0/N24RZ1HTzske1Q5DLz1MEJg31vDxddjgn6zeW2c9i9x3/9fnHCTrSfi/uNyWt7njDW6zj9QE

ONnrX7m+nvMTs2LRYDzDL3MUm9SOuSds+8yp1LqoaUyaS7sDh/phWYqz5+O1szAw5u6IpEalcpv7aenJ

dmXTvaTYRr3fIPq85ice6N/x0U13BEpWRG3l/T5wxj
TrIOo4huWyQBvdIwF/U26uQm+gaTz9uG9ROK0rxsN96tubiGMbZ7gQm8aX4SIhJ6UcmFqAxcEWQMEPcV

lFY6DdEiRaMut2KubJRSE70EU0AzmAZwWm0l7AVrFXxBm5DrrAte10DOaFKGkn2uJ+8DNcBYoAPgtcOD

oO1FJH8TPoY5VYcWJC5CN6xUXaOSHv7N0ld7AkoKsu
TdAaUqgCdNAtKEoxFUtrFXMVoqONuCycAXNT71GlUF+BXi/DBtlKaxvye6NngI+ACoAnoDdwF/Be7X3x

kKxdVmy1pLkJN4ZeCKPMi8S/Pbfi2UUlJoJfZ/QC7QjbcsxqYxvL0F1zI1Df3Ez5e4M+flU3fcB8kZxp

Sv3TxeIVgFTdt3kBUkG1B8ZAUcJhCQ2T+wjc8IxuH8
sEeTXX2z/X5ioDmH76RpHF4PX1EM9UxqukHgZJSQioRmknpP9Do2UwhAvD0FT8WFoRC1MIQ1zMUZDUDh

wIvARmAGMAVYD/xJ309/6GnZ1JMsTFS9EBtS5FQNdIUHD4Z0FHaUNbnj59knuXzWs5VsN0Is5JashB3u

//D3k67oyH/+IX7gIaRqAoPQZk1DCyv3loP/MXd1zN
LbsLz1PsYKzsIDlTu27vtgZm+mzkhevpNrhcJqAbBFlG4XQu2jKWK0I3NbYVE+4PbVsku0Xy3L/V1H3H

lxFrk0aPoj9vqaQQl7ENrwXu2c72Y6Ee0fuqxE9RJa8dsvYcIzuuX4ND6puklwSX+COs95/6KAZg3Zym

Celn2aw1xQUmtNaUC8V/Zz0IvsRnrz2jA7FjZ4uck5
4n3cIJSaWpUNv1scH3VW/TpDbyDmuj6np3vVrxgriH6wXMeRi007RxMxVF5SkV3ZIIV5WryxXU/cbpXq

tr/1Xof+r4XW5ug1TikGr+WUIc/mzyLi5LKoOVv1Hk/If5hnm1Xu0m6R+S34bCpPLvvL+jc8xDjiC2Mv

QsjiAHyQ/RMBmB7Ylf9ugTmjKdTrU+fMFXgAI1P4ji
cFk9515ewTyxqarcElJDQ7BAz1j6EafLDxF1b9pCqxlZS/hspMLY/K5Bmj/egi8QjoEuu3oHsx/87f8+

V9zt4dXlzwqjaOpxgfDBXObePmXzXBh74260n+90HOPjtr+E4RcfHYsudiv8/ekW0C9WhIpe10R4xlAA

2ulf3dln3Lk7vaxGKliLUTR+tCAy1IER+X4/w8FHtn
7JQ3KThOOD59YoG8C/OjG+a9/m5QrtVSwvZ+DHF1sA1f83sU76dblZ3/5XajV96GL3Do5nEeKD/kd0/8

ndK5s2zEynRfrZ3wq/T7URqVxJ2BgPmhauzz4aq0vbZOfSvsg+D3+ifRFWIzGcq/pGk0Al6RxrI4lk42

BEuGB5D0wy/fIiAutZq7Fo/kd1UFnMlOOeP/DRcp5r
v2EfpcW180NPJL+8cH9gu31CjF7BxXk2C2OqoZ1YMjA3tP5PvESmUpjFW69Tq+vo3t1st8hB/1GT5oIJ

Ol/PsS/aGLreSzu+ugn12Z8s1tg5/H0siMc2SzPvwCw5lqQb0ZOrIg/S1V7dKC+DZp9tkc+62q8I4Wdy

kojpk/qzgyspZ7l4xIMEwU7fkWq/dtqqixGrq8hY0a
jxIZ1JKwObApN+T6d2qi/gY2/loqcd4/5+3jL71GofzU/Bo2kQJS1/b8fo/DFIhpDfL26a/vz0Gm62/Z

RhEBuQtonXIIursG/nOoCRS7tw7YC7XDzKTJXoXoAuE3xPNKq7SJyGyJhpVN4NZfeQN9K/ygS0cGFHs5

xV9h1hR5iTnphYwBHbN4lCtVahD3i+10DXomaT0FrX
UVLAoVYcqkU7IaeYZ1zr269O6Olhdezawa2wCjttI9gWC44t9gYd93TjVSl+zuC7+V/9PJTbjv7sb1wg

9ZUxx/lHRM3WE2hauK48XcLma2SNiLPAaDvd0bVsG878pI/kLTGkFpR5f8sfJ/DZ5QDj81uGm5O2qiP9

TQtFWTKbj9tp3JR4JxxzpxMdRKqLbs/Rj5JENW/Haf
jQ9U0YiJP/3CVy+NNA9IEbR4yoCOYo4yo7W/X/Ny3qudpE5mp2Dk9u29I8nvsV8bYlxloZ+xQ/tQu7Zt

Tmky9CKSZQ/XJLmma7sNpmYrLzMY15GY/L7kJG+xI3FpLnYJye4aj+xodu+I4jILSt3fob4ey1S/EPUO

PfOoR2gqPNrMdPYJtkLGj3EPI3AgxSR8okcnR0D5TR
rYQPFne4XhA/+3nco6RW3gZEd1mI/B/gCv5GotGVuT/81iXwaRcXybqSqOCy3TMVfD/60PFt2aQpsWsj

kSgG2HA3OiPlEoEeat+Mg7EBB5oCearcxonW91yiDmNR2Lcp7rtARgZz0l0u5Kw7+X1syA61QbKHIaR4

4vWcXMRzHn3vyI2sq2ZQC+zFPaQBJQIGL6NuR806mx
qgVE4yy0RDanIXUoqDtf74MTUGCA3AZv6ycRJcEo6iZb+3iHeI259G53noKZLUYQpHORjGVROoHZr1JE

vf7jX4r2sKvBirmEWP1ha6bKNS2MP6dDMHgPevLN5nzxEkJK5mpffs7TLI5NFCrWyj4Gx2KWEFt2MHjU

iwJb2++tLbeae6TWYA6zISs7csTatkYVxqI3FU40PA
yhUVvxOREgmNwurXd5c3vl4YEItucrZkglWpeK+/Iou3AxVHujNouHzOp8ZG7E50oCtYvnfaxksmEHPB

CVhXMwIOFxWtohpmypjTi6GJT6GcfJ6ctdHQtAEfsbW8OlZCDPgy/KW5RdFzchgSuI2KFVI88CRXtLLl

/m8bjDwm3dFutz1r/rQZrXD5ytn0HFNuuagpeJD6Ol
VE2xdE97w+mwWvy77ReeaRgbCn9O3algowcxczUqjyVYSdXlUFqgU25diyovOXLei6mUpqZOQWMtxRlP

0/zznMgJCfDTOxoeKgkNlWOAFsOj+xBaZUsLZIzm11Z3uwiWn6NoFySzocb1lwQMRokssasOU/nz9iFv

F5r9Q4ryIrdfeC9Okn7Fm09OhaBOpfLVHrzlT9Ck31
/EjTF16AaumZLKKeg8BhPQcCdgoGHfEs5kxwoO7TzMSn2s1WtBNz61MPsfFOR1haqiDldL7Take0k20L

NUgrBs5kskO0ziG4s1xLTQTjQFsUita493KS4BBTzvsLjtx4OxGPaM4NvktauOS1y6lrLzR2emP1P35T

3Ps2r/KzZikNHNHrWa65KCu7BV9IoqjMeEjq5JEGsH
6dt7WR6smirllMbwcW4Uh03wSzeehOtP896kaiyofikrBaVnZlwdMMdZDAKamXS5NocbpeLBFOygkfpg

3BQblXbarMyZ3QVYgFP2/tWpA3RpcP7jNotlBcmqcjjdwP1W0vjUT595d8v80xSd1mdLsHBrfXRtciqE

ebmuhFfZ1N6aH8sFla0b0Yco3f2L2z7Ye9YHXbE+FJ
o3yJC2FalbXASfCm2MtDmr2J0BAZWXtxzASxpNbonVOMci8QtRRgqG7vTgHHzJzKz5c26Ctb9Y41Vy3w

ymNb1uz4Ej2Tl5GI+gAii5Hlf+qsOfxvpE9MJpLn8ktvcICN8hL1oVGKY7JEXQWvfw9uLDWLXYvYAg+V

SUNlT5TOdG8Djixjlw7tB29jte+Bxo+GaWvDPe4iOx
O/Zk3JeZurhqpLLUZkaoplZkD6trM03Xo8dYEYU5zMzRAEVMTh1rNLuQ2oxw+1Bchg9HYtRQ/BehnPho

vwbZVzjZcHZbBlKuYKFi/dtKs9GjcebpFmPNuLz0f6kCHcPbJkB1ZsjaYbrxMktFZ5ofN/8ziR5kQpED

MQqeny3ND3yzu+YXVMhuijdzQvmfXQVBKyXA9ztENp
ncJTklxxUpMLhz4gnQObIwidfC3H50myWxoopgFPuJSms1Y79d6MRj8qiZ1VziFUqvc/SbD9cZu/SwO5

rM3ogVJz1YhWabuAWliJeqbTeASJmirqrqKUdPwieeE3O7Foq8LUK7WBg68kMSMk8wZdoSfMm4ogXp/I

U532KzGQrQ/J5RCw55rQEtapHIT3Jv/Gsrk2BpMzrr
cm3pWsSB3qTsHwdafuAD7XAlw3RiiyOT9VP1+j+E1k1Vza1/g8Z695ZUlR4+Yrh8W4uAsuPoDquhkRzc

nBL2W/ChHR6ZqEtWw1cH/noayeOxK8Z+xD+OKAR0nXnfAZ8dSqh1W/xfRfn/NbImjChWPoLoEJQ2oqZl

gZ2BOW2aj2QcGPtfWTAbWF9ntj2HMWW7BQ4TxYr6XW
CuH5ZhKKDdKSSov8SaJF8VEY5hiIroKaInBz7BVrHxw2KyEOSjAJyLcNTES58NONKX2B4a7nGVtjltUX

lWmNtv7qDf5Xko6yWxQgvSVFRnboyEJLZAF9RfuC7rV/75KSnIA7peUcgNsbvSsZ2GSFZ7pUq6Z0aKZm

sen212gPlerGEjC7kxJS6le6toz2zbWgvfS7Zc33vz
+gtuN4Iqb2450X6qofn3+TYaGdWtLjJPtQ0YoWm1/8Li4vZRvQXBSuG+oMo4z98tdLt64gP0z03FIXGX

+PQYahXepf425PD7zjlO9OHRLZ6xMJAm6o22gHCWa9s65+1yKy9X1wXqLTjYpKnfPG9FattQ3RKDFPkJ

YoTeixZRsZQiPuoBYiELmFDOG3X6IsRphtIyN3XsHH
grYK9EWfiEcgBtP2hAgPQlZC6SXsaiREDnG49uqUFBQrkBftGeAPAyVZTipPYWBuHnnlQChY9LMAJTUk

R+5kh4DDIjkEFBjwngqrte+7YDH1UfvVw3nrhEmCdzJ/F+8oFSOkEpZBB2jsEsfU0HJZ8qe1HkJKjaXx

TsNK0led9EIDvLPuMbM4T7iEKyIl3pgOSep4IjsEY7
i6NTBqYuX7KfhnLFRA7bT6SKJXGHNRgUNJstRVAUGBrxTD6Hy9GtdtErKSQ2NU9TEUCCYIgbvHAaBJR5

MJ5IXYIjWQ69IP2uGQWEFzAdK1QjNOqfBGMjGItcFR4Kn001GiEouLWfMCKhXaOgKBMyNAGmCNP4IZ6O

OyRtS5e1EBnnF6ClM5znTQUxG1AibBUtFM2VDZFdPz
4cuRGyqQTbTVBkOGDmVl9FWz8LJtGgRD1fli8EDkErXHGpHfjUDwz6ZWhoPY6TbTEjG1DpguAhG3MaoN

ngPD9ZJCROv958TPxtLHBdVaDdGALmcqCoTRFOGMXDP2FfK70jQOcuG9eAKO5MRVQZHMFiUBGzfdFqjM

jDCiMoI6SGBFA9a9LzuXjmCd2oTVzmAmHaxOM4nrjb
BQBjv7KjSO9CgyJheatfNpXqRGZxneNobXprKA6PqIRzrVYlNL70VYT+CtFITzMpC6SoeyYKOPBaykwk

mZ7iVORqLFZwVs0UHTGoXEroYQEuOA7GDmTqWvX6To1kPnIxAad0VDDtYlNqIyG2BUS9KZXfMyK8AFT5

VDRxNTL6QWW1MUNmEGN5RHR5VQEbBSt3EES0FTApDJ
NbSDH7WFVdSAz4SRH4BfVxXLBjIOT9OVPuRTp2KJQ4THDbLUO0RPH2XdFfKuThRMT3BMRjBGf7KYZ5JI

ObMqLiOII4UOKpWNUkDGE0QZLcNSF0VYS1RpLpIXz5OGU4JLKiHvY5CAY5UbSoDAJ3LYR3BrAnUmH0TD

O3JRQbVXU3JEG4CDThPPh7EFR6XDHnNcNnTUW7KfJq
RJy1ZJNyZXOhGbS5U87xPK7YQc1OObOwTP9ovw6KYbDrRUJwOetLMav2NGufOO5UuLBcU8SjajNgY0Uw

pGmhYS1ArQUiVQ5DHPFuRm7zjM9LIOXDTINyO8Iyf6CLUJ5OjwBlRWjiMz3GQODkoKq1fC9AJYhfFW9Y

CIKuWZ3ePS11Ia9lzZOfGdYdFMQsVy9ACoGiZ6VwED
6dQ31vAHRnYAFjBWOXAk9+XFlnrkDgIndKPoS4VEJop4EyRYdwKT7PTR2em0VtCDqrOTMne0MbTVa2YC

1XAIQmHRJuH7DfmDEnV0DOFy4JBNo4R9foLGvmRe4XvDNhKZSsKWfdFE4Xz269PNb0IZ6VVIL7ROFuCl

1PPBWaYN3DLUImEGPyPNEJBqUwVIGtLfJbKCNwBRNC
HItxMVFqE8AyGJL6ISKkSr0UZJHlXX3THMNkWHAqPHU+Kg5EIKZpRQ8woqCoqYR4ZNA+By6ADQZdMGw6

K8M4XWLeWMs7S5ICX3JUUKMuVPizYXaoLJHhJWi4Z5U5QNKfB8FJW4Gdtknlsd2+TR6PZ38JSTSeQIg5

I7K6sLThT3F0fUpJzCV2JS3EDR1JjCb1zUKrfP8+IC
6OW4XWRhAcSPm0K0A4hQNyA4U8tIlAdIH8PR7FSN8WnRZgJIMlaxTqXr1cJ7OYMMfMQtWIUGV8ZU7RxT

XnHM6UvLLMP3RuhYTjPz6vLHyieHFlsW9uDg0vRGvnKS7YQsFOGHiXSDX9VG5MeFZnQU3JfVVUA7QzgK

EwDz6rXKhebMLbjm1+GJ7BXMTsEc3NXz3+DQplbmRv
JlvXXuW5QAUih9CrHCm5SV9RNQ4bgHmvPFR3Dv1AqVD6yGReZ3wCMN4GjLUbP89oxDEnSNQmUc1MHiW7

uaZqlV1WGI36cXIzh4Y7QNRgW2bgOXdpn43eXMrcSMeSHB6oWVTCKFqfWJbyGJB4OuHhqbxkFLXfHw9A

RdKtUNe0kB8gwBH9JPO3TdbnuWRyPMejBiBcMkAjEy
E2yXyqjnc5RYyiUY2bZVfrmejhYEWgNmk+MPvxZBVyUZQkLfsKMEUkwX3hbuI0pdHdFRuswXXkGa8ct5

m5ZmjmXu2wPc4sETx4QhVqJuQjBCKfTw7zbV63IGulcsRcCy6ROsDhDKA8B5HuOjgXMAH+DQogIDwveD

p7gOFtHFEfMj5BFMQtHAWeOVArWFDpFGGeFOAmDCLz
ICAgICAgICAgICAgICAgICAgICAgICAgICAgICAgICAgICAgICAgICAgICAgICAgICAgICAgICAgICAg

XYPjMQHpZUTuFKTmUTQqIVElVD5SAFEeFZFcHMShUDHcIRAgISAyGOMhTTXkEWTeLVGbAXArVHXxCPWe

ICAgICAgICAgICAgICAgICAgICAgICAgICAgICAgIC
LyQAKjRPOdMTNpUOGgIFPhQTPxPAKqZDCsZKRlPG1POTVzNXWnLLEsSIKkTDZpFVPeVLMxLFLpLMCcZR

AgICAgICAgICAgICAgICAgICAgICAgICAgICAgICAgICAgICAgICAgICAgICAgICAgICAgICAgICAgIC

JuEPWlBFZoZAQvLM1SOHTbHLSbHIHmRFRjVFDxQHJb
ICAgICAgICAgICAgICAgICAgICAgICAgICAgICAgICAgICAgICAgICAgICAgICAgICAgICAgICAgICAg

TKWbJVAiDGAoTRHbMMQfAWOtFFPiSX6JWIReBRMeXSTuJRVjDEOeBQSfBBGqQHLkWKVaHGBxOHYkGGLc

ICAgICAgICAgICAgICAgICAgICAgICAgICAgICAgIC
JqDSTfJRShFLHzVCIoFCApTIUqTTEnCZDbKXYmORDsTD7WQFOoKJMhECXcGCUzAWXqZJEeEZThFNYwOY

AgICAgICAgICAgICAgICAgICAgICAgICAgICAgICAgICAgICAgICAgICAgICAgICAgICAgICAgICAgIC

LeVXZsZKSdXOKgSCBjOI5BEWYkLSBxVLJnVDUvQBKr
ICAgICAgICAgICAgICAgICAgICAgICAgICAgICAgICAgICAgICAgICAgICAgICAgICAgICAgICAgICAg

FSDaVKEyZBLyEUGjXVYcTHTxGYPwKGHbRG0ZAQBzDWXhTUIqPXUvHEExSRLhRJMgUSOvYVBpXXAyLEZg

ICAgICAgICAgICAgICAgICAgICAgICAgICAgICAgIC
AwGQJzOKSySJTxGWHoWYZnSSWgPZBmUOFwEGJpDGTnRBZzBZ1RNWRuHDZySWLsIHUcJCAiODQkGIJlCT

AgICAgICAgICAgICAgICAgICAgICAgICAgICAgICAgICAgICAgICAgICAgICAgICAgICAgICAgICAgIC

OtKGKkOBTvKNUoQSTlGQFaJR2INQUjYHQyZEFaEKUw
ICAgICAgICAgICAgICAgICAgICAgICAgICAgICAgICAgICAgICAgICAgICAgICAgICAgICAgICAgICAg

JOBbJUUtNXCtLZWhREJrDGHvSJZdUBEoDBMsNI4SQM25kFSxs3Q0GJToOM0kvfs/Ge4OLKlmfmRxzMAu

KK2DRuKdXQ0ejh6GNwJxAU3mxv1JXCaWPqCvH0V8pG
AdWFJwBOKOFuUaE67vSQunAw62VKfyOHIlKbLgAYl9Kw4VGaZxY6luUMCpQzN1FANkRxVkTEjjDP3Gw6

VudCAxDQo+Mw5MJY7gu5OhVMumDePkXC8hvx6GYDfIZoTlM9RvpuU4ZVC2NHZhMn4IRTErRIVzgHXfAD

VvAFJTIiLwC0ShqJ55ZNGZXp5+DQplbmRvYmoNCjI4
BWVdd0MmOYr5YD0CGTMaHBi5yKNqSERrL5Lct9HuHq89JYXzReykUwZryTfbONhlgjJwDCJiYI3FJWL6

LUgfRIQhCyZoQWSiArp1SVGKSOfHTdFhN0Blj4FwDvK2HPXuJfNsLGlpYDSqLcM8LI19cYzsCU9UFDEs

SXAhXP46DEY0ESKxKr9MOm0JMrGwPI5lqi9EAkfqNI
NmTnrZAtm0GHllZR5MnVJqW3HjiGJsz1yOJvFpU0QXGLH6YTBcRd5CYXPpGsWlCYToJRuqFT7nKUReWA

ODhNjkelR3ZK2RGH4lwvAiMK1OFzWrDe1wHq2LIfCaW1ApR8KrLQNgIIYSGNhwHI9ECLgkUI4qHX2Zp5

WQyBVjaF4ddu1VPBGaBOFmAjsumc5CEiaqB5S0rVcx
UVIaMytoXCIWYVaiFP2CTPQkMBI3FYIqGwNeEKJFZcXzE45gRY1CD8Ngr31vHkC5UJOlYcYqPFotRK42

rBzljiLyiNVhiKmdPC7TCj5+IKhbosLnGlkAGpynZRXOWaOsMjULScEiZULuWBSvXTCmPyH4VwQsEi6G

MOEdDOHjMUHjCdEpMWDlJGItXLgrFQYlJJX0HTW3KU
QlCBNfBQ5JAuVeXOVqVdnwRCimQRYfXCEviw1FDTZgIHTmXBN0XkLxWZMjVIQbYVoyFZJrEMVfNWK5QK

XgRUVqGP1VFdShJPLaSDYeCQytVTNqKCKxvl0OWRIsVRYgBaEtKqSfWXGoPPNxOJlhBOBlCECkTdW2SH

DhZOQtBG2DTpLpPVGzBIA6BMIoRZVaFGDqzm1HRLOz
SRJeGBR3WiYsNJUqSFXkXUbyUDMkATH2PvD5XORaCUUfBX7DJyScSVLtXZK7OHAbFYNhICNgjy7ZINZt

WNDqSnS9LVTrEMExRTWtGQhwSMYrEPK2VGD2FMItYDRyBC4UMdIrKRIjHTb8RDDrHLEcDSGjos2XHIAa

XYVzEje1AHJtVZBqTIVsBWuqRDErPBP1OMGgXIIvFA
MlRJ3CQaXnBMScLRzjUERhIHNfQFFvxj4GBTDxJFLmGZRxPNQbQELeJEWmFFdxDNBaHGB6VHVyMXXmUQ

NwOI1YDmUxKMKyZfJvETroLZTwROMmlt5VIJBhJYFrNjNvCPVmANTiFSVwLDlpSLEcEQJ0FXTtZHKbPQ

PdJQ6HIbYgDWLfYjoqNSslAIOnZKIvdf3BTMWmNULl
CyT7QpLvJPIhZJFxUVxjUUYwYVY1YSg2OTWxZTXlVW8UOmUpVSZjFhvdLLwkZJZsRFNjka2KETCiGHFl

TPH9KWZlOBJzVBTiIQpdLLKwRDL1EFX1QHTpFXBpFJ2BIsSbWAidMUJGUvn6CRrsN3u2OXGxAV8HB5Mb

a2PfZndaNCWXCLdlWC4txcZoIWAtVh9OG2zATwv7Md
L4V2E9Jkx9POR7SIE9ELBrSlZ4AEdcYXUaBpJzWI8jOGHgSdkyXiBpDVT4MmwiEeX6WcUlNMG7YOW7QB

TmZSPmCxDuKB7INf2AEpW0AOZ4lQWiWk8LCkx3XYBOMgIjZQ2WARr=









                    ID                  Date                Data Source

 

                    [id number]           2021 05:10:24 PM EDT [facility]









          Name      Value     Range     Interpretation Code Description Data Renita

rce(s) Supporting 

Document(s)

 

          Consults                                          [facility] 

NFLGHd3hXnIAPxRa47/VPLoeKWAyr6ZxSCcbTJx3TMsrRYWnD3SpNSX3wV0lTFP5QQjEWwZpTgAtVIOx

lbm
QtKfqVSmWdROCsFcyZYnFzFMbdGxadwTKkTV6EjLU2ERTsB67qXIEtQUKoM8QsAEEqRSL+Mw1IWMOmrH

LbAW0ORdtD4I9Mbgd76V3q/COT2SzjJmJPA71wk9+AeIHbLqGTkGTusFd51doYdtHb1xUqT6W13T2DL5

ik8PNTzEE5C37+LdEg2m44OdkmTthxBF4pfsvi2XbG
/uziMh3R5C0i8HkqXO0M4EJQTCG/qpNnM28tXIWMZrjVw4ivh1HePbxMuU1qdOeRkbD++DZglCosP5gc

huCvZ+oCJ25mmhUwd5uh5QfUSfJ8QlqRngB/ef4TO/cMWzUmURWl2BpAoa41jXUcE3eakYB525SE9079

aMowjK1TdHBtZUdqr4E5M/Zy8nbKuJAtHrCGBoiK7K
5kW6JGmX6JMSUvl9nGAAuSSuWLxF3ilsmsCBqGyif4riyFbWmHC1UfT4apisXuRg56RnzK7PQKMDd8SB

aDKkDk9c2UyJ/UDWTMC3P9vUgf2qAU4fEYSrsXrfvTkTVnWeiGL1HluDNN9Oxi7RMw9jtEX+cKha0O22

H61cvwUrmoyNmtn5gI4pKD7LS1Suuw9xapX5I9W2cn
fwDZyrYfPJqEn2TtegBIqv7KzoFsL3rXbsvNBUUl/DzV7vHpieodMtpdGcWEzerVMc8gsU4bMXA+RcUp

WZYqj5igFfsrie7rtS/VLkXIBA3gaPe/3PiGz44V2CTfFNdQbidDOu1YuD0C/A83cmBiRo3Y8KYgbIEl

vCDM7Y+ncdzcaMUyJ5KBgT792kUJUh1Hr4OpGyaelo
EEfrbZp+elzG2bKajvjVVPPg0uuRAvzU/pfvtlf3Z88kmerUmlLTxuRlI0BcSW1n2wVjkm9oB89+x5sa

+u8nops+LBpg1h+/ntCkx0HF/623ABYpUdZjUx12kVqzYp7PeBV7aidArHrz3X4DEwWwT8bp+iCNJObY

2JT1QQpEQ0YiratI4krFKjDgdmAY1O1zKr02eoE0xL
D2eneZQSzLLK+gPjLvPn0Ebg5GaabD9HAAQspxs2IUNIy2Ibc52lxG2ovMJHW708Vgqk4T4Cw9NFLRKv

OVSOSvWwpwC8dvK8Yp94TsmL4yElwGgK4aTODURguFNVxfq/5HaYw9X0HivfImzuTf+g2HoMIIQcDOUW

bl499fv4fq1FqFNPyZF5iolCNKjQAP9VV1fXcW02nJ
od0OJSQGtoiOUXGdPbABo+zzI9uj+PmvqLMrZI5TCNs0TqOg0KdLZ2+zbC+rHOwsxfr9INtAOAExpzt1

0hdWfMkRjaFjXyjyw4cVhD9iz8bpVeoBjRG3tGTyAAL3NvI5RJHANKyXZnYABBpEEAG9JdroNOtsS8ub

bJBa9AQ7H0uX3oxGZoUH2B3dPCXPdROKAYpdsIhuWB
w3tW5M3sDSoMOtkzmiG16+rJWtXztPtYuI33JYnadztEbwuDYZiEOvZDk7o8VIjLlsqXkx6T092I6pCa

vfj7q+AfqYcy9oKgAuHint0Exy4NijDsr1SiFBli4CD/52bvIE8QfjkaSyyYDnL/UrDLfiarZiaX/UxY

iA7ONA1e+VhOBlAhnnT8w33WMReH0pdvikYdB/b+ol
0PcSm9Y/Ymz/llJNr6Q5PCzJEj4da0Uk+CLSopDgB0vL00RPsLeV8NXVvOC9SGSk6Sn/wQjQAbNwLOjl

wDVObM5eSTqhZjHA9BL9+ASS7mKWFcmGPMwtWtu6LZxn8UED9ELs58QdNAf7OWrnxpY5rMfZQ/Sj0SrW

ZRTOu5WMnno1WGC6dl/Wb2Ldw0pYow0Lc9LVUHCYSO
eJl1SjsIIkVxHv4kK1AMUzV7woEHYbVRrCkCQvpyRUklvdH0Wa2kK+zCFT9jcXbpndluoJDeYXVAuclm

7vJMjatgxG0tGQhG8nK6d1zS2G2hZw+gbRRGQoyb+bg9MebABwQnZPMnsocPYAb1q+fn6DKyZczeoYg9

kFyc3hkta5CmnmdCO8gcjPFr8iLuhzqsKr9HuOvTTI
/oXDTxhtWu+Lk6zT9ImiELXiE8lFYGL8BkTD2OZizPkVRfCUQ1FnbP3vCiMnGvaDOCb6qUvxCgtcpWw9

WYOoHYXsy5s44GdfuxEjfCGbmrVBgohaA7Ju6QY9xYKbfREZJuYYWbZJ894KJmpJyXXgMY3DI7H2ENR+

sGTP30Mz7t/1ZkeNugiTqnc3KDlf1SxplP3wx1Ocmv
lk5Y8NTHtsUL87apvZYXG8q2PVjJzTznM7KvoulPG17D3gBeHa45fa4KpXSatcmhMYyKVEn+6pZUQ5Jq

WPZPswXqflFc+ayGWBeHsa+BCtWMGljray2ZVJkY8dkX2FTmpLCkygxEBvc4LSzILh06SvlE/dqCaoBg

u9e0gAYF/rvdWryl70hKJ9bcPVIcY3dFjYbY5gBUMY
I8P+1AASmodDnIwK7nAm7L3jr5fH6uyHDmBdjannGW4qKDp/UKZtWDYI6t9+OMTJg2y4gOAoJjHwJP1r

g2v1MOrj/jdAoq1V/eFg9kLAwEnjI+ioeATFu7Pr3AgL+KHzr9OV2Qd6CVrTKFGb0Qq0B7Of2pHcIXlf

+1i/ORP7YwuKTqD9bwKWIKaci8dn1hTz7fi3juchS/
AMYyt0nXnVaatrj+Z4SbCfDaF/aTYihZ7wElcJfEO8vFQsvPLVmHeUI4KL9OFsXWPNz0gePC3VoBxwrL

lswWdPnET2zwE83yD+2GKsyQG3yRmCSVZRDgddl0LxaoNp8ln9pBv8UGHqp+nFj7bFEIXT14BBLtVGsx

8qOrq82vdjj3t4GG1+8AnJr9fjYOGUN9qd3fkoTz7D
c67Me16HPy7cEN2L2/ZIf/VquEOo7SiuNZ6ZTvnUIhCfETHwV9d7834i3aLV/ff3pa//l2ebq94/B1uj

uKdJpUB/XiRRtqSIe4bk+jUN3d2+qlrbPrrBYA34aseUS2hKw6h+wAlT0CTOq32KlQuffJyo1JXIUbZx

a5HdqCoKa0XpisFLpCSgd8mz02H5JFkWnr1bAkHtqS
W8Chl0sxF27K8hxDG5fGUn2hLkpyE3LJl5H1hrexbAWkTVCQtW0kzMWY0Nigmz20hORcMnuW//nojprL

0136XZcO5vD32Rpn5MJTxVPRW3ExmCHwpycK9ihRJfbGKzkbzhdOLyVssc/DF0jIOWTYAfAxZXrSW4kX

BtKOIgEknSTFH/ghZEMe0dJnAlQeJXDKPrzWYEVP0C
nTRNgdseja6nlHPCA3TcDraxHrO+espnjbh2YSopsyDpNhoiTFHhuoPCaqt0rJlWS9G/erchMw+xCiuT

RrsgmJmL3Xl4oGMWAkkCrjY5ak/P+rZcfyPPXIhivioWgjI6AP0PowE22fX9hstEdRMWV9jd9gj+oS6Y

LWZfPuMDXPGbTxTfSBATUhM3aDuD0Mwf5UOLwE6PVf
e74ro1mkkFmTsEDNaB7i/LkPZeZXPZzwneu2kJgOR41n9A9zHUknX7A5Pjsmsgg84fPRMPr4PiAFlE6q

KINtUkwYjPkxvgyYKSB8nPQHOfcYQtPs2PpZ7DH2Az3qXbVZGgVgLhIrHDQHka8J/7kt6Joftwpn8jH3

DGZGZy65gRh2uiSOsXsO2vbuCvF+IwSufBYRo9RaQJ
lVKVL1G67xaZhBRbeipYoqTxCRYiaGbGHYjX2uzEhqt/Z3K5bYoT5Ex9HhY8MdQ+MZzsZmb8/6yu/vUL

zH3EH4SGO9/+AzDxIumETqY5c3NfIQ9ZUmhKJSzC0kSA0h4EX6otlUoMavFkMWw/4tD3L+1f6qSG1zu/

ptilAoJqMfRfesLrmwML+//I8jBa1rSxUgzplGmitb
util/ryBe4JdRjd7dgPdDv4KJ9oJ4Kmsyi3Sf6kr3uUfi0r4uLnNzbRbSIjpJfxdu3ROIjbbiUD3C4g6

97Kxo+85++L28RGq8wCi1p557N3dmN1C75B6C2Mo6GlxQy2wlPyFhSRw/clFT4G0kp1roxz8PqL1EdZ0

PaR70MCuUD0YlKSSKKEqDrcFTZiHEudSoQQn4J1JiZ
ckuI0z8ZgCYWPSss1VkT0CsYTW/ejuIDxWhBs8owJ6gtL78qR1CeOTo/2vyK5z2Mg6Pd5FfyjH2ZyAEt

+Zk5hCzvraMdiRwAmahCeAoqN7FXHugeogkyQRujL0zuBSq3fYn+f6s4wxJpt0Jdttd9qn/zVkEiKLa+

RvguvMeW7IqSfTmf5NkT0B0e2LTC4de1VzPCGsOZfo
adFzQjcKOgMjCDLxJtwXJzEkVGtJGjJfYSQuCNwcYN3XHJbbXJktCTSmG9JpnoGcnVHcFYMcIo1BXBBl

VZ2CLSZcqPOdUXRgJfPgQHLTHwMaYVTtOCSdnUVQj5gqTwAwBDZ3BJPyRbncEE3QHSEpCO8Kb688YN28

ubM4XFMqDc1VANYqLL2Fyn88hHI0GPQzWgHgFKYbtq
WnFDFcttB7RA9KEoZkXMJ7bYVsSvgZIF2BVKWqbDCvKA6HANTznLKnFP6+DQogID4+DQplbmRvYmoNCj

RuGKRaLndDJeImGAvtTmsmpYGrUY5OqXL9JRQaX35vOQWxKQNuZ5YzMSW1Rky+Id5QJNQxzQPnUO3IAi

iFzVtdU+R3SQ3yQP+DHtmZhUiy/CWD1C5QuWjVATFx
pc9thGXad0oh0YqhGVDeg3mTc6lQdwHJxeIcPzC0dnt51PzpYrnPp3/HmVbuuQP2Z5+8ArARDp82A3rl

ZdN30UVyZ2DWjnchuXtnRKNp1grPntaEjD8B0c+dIF/+6Ft6h8rmSYkBNjOHcfWX10nJ/vbeJxaz3x30

Oj0/C1Dp9jYuyyusYr4ElzlgabsN+VyObQyIeI8VCj
4UQvVqyg26EvHlHCf2d5O/PKBOZ1AxrDbXTsvQ4UpDLqLjiAS3H8M6HpbfOdrud/2cEwRTeJs7vhc6rN

IfATuyJmG8HrHfqp0fqe1u8YRZw3D1MMtOqCEAnSpnWBF4pn16R8sm0Y8oPTJtrvz0p0R9BSrd15f+Y7

3mtplDPXNox+vsWlkOFWivgukw6a2rdB269+bQnwxb
MyxoleqZ3u89/SmJsH4qRRxtcHyGrflCYY4ErVXaBXrAv/f9qUy1TaParqpPfWhKbIKnP8U3JiAKhhir

W/xa5gGpVgBKEv2oKAqX7dnsPmP6DBH59kcWrLYSASCumQbA1jhFAy7kaEB3QJZErehas+LnoPnRQS6m

tzGvfSO0gx5oXoe5OmcWvCLQk4g8NNYsk7jtPVM1FI
n5wrtKOMDijqvo4lTNv5quClgfhvx4sIFAiLByLN1fnkxRmzDal3nZEn/b4G6r1qA/hQG4bXohRHn33+

F8E0GKWEbnEFPxF2pqojf/6lgcDy46sBdQF+sjFdDOWPwypvycA80tJGAPz161DXjXTzioruWo0Kr0q9

CL1QMsbhqrNqtyQwXRNgkoDaudTT0408EaFw3fG5ZX
JSLvCCoSitGZ4DlUhscouBdBlQTVxxThh63ctZDjV0QeLpzIkbm1rySL6bxMriLFQfmmBxQul+7ydcGD

hMdP4EdWbRooLr1sNyM4lN44Nu7PJOwZy6S8NLJmXq5PqOi63l31GeoirakJ6UzZ2tGAtCPd2jPYUMOl

sMN580/0ZfuXE6gw7/VcG4SWKuMdeFRcPSvOy6T81e
r+sfUAInoQIzq0vtT6yuElX3Fe1gIPtbeJBheQ5HzoeYsrURFjGQbxIPpCAozghDIrz9SSnj0E7xGqRQ

HVyxZlNge0ieixsfOruOsiBDy9l/wNIi83oGENVgsws4k77czOcZk92bIpAOgyCYyHe1W/xLeYb/Tpx/

GwO+T0kMhExLAFFfTjugGqqbDikiwbKzUFECrmP1Jr
u7uVrOV2wq9BOmVD532I18yPbuiY5lm4gOygLp7gb/SICiaz8m5UVVEPiV8KKdKWeMAo57JlJ2QdTmKh

2enE2K1G2DD1lRtFL8doAAzNoU6gx3zKHc4i5ia97COsuILMoAO5fCGS1IyCFs70xZRryb96LymoC7Uk

mw9cnNBeKY3URhCQXdqhXzumLVxe4OSYHr5pjJq92t
w5BqaIPYWCbd3C22ZBnGQ+yrmHxbdUaeqf4gaMwvR0sQEr3V7mhhj04alN0idtWkq8ZmDqD8L9xJ8Vml

kjUiRzMhr9Hhw6vkfBljhZY7Igi7tAnzIxtDCicwt9rBkMoed/b0cQr25wUnToOgqstwN56fvrQzf2v+

6o+5lHGQW+8iTLU9GCmZlfixylbTKCyCeRq4LbjK5u
1LVQxrS/scrk4deck8+nnVQQLMYtwbHP8dNOFP7d32g8G1emnXA1nMvcBOfY2/4g6J+hwah7+B6kuo27

0jUSScZBJx96vbmd/qp6v+YSNfd7hsotSl/JhdNe2r/skY2gJcHQZRiGuOpmXElh6ohk8mFW9F//dTQB

UFUP779JRNo10dYrsk/jsf8g0d+tCe1GfDzuDvw7t1
HuKVv4a7ZNerYoGiQhpCIDniwC2qg6KzhLAESb1bCmgRiav+8VcSvACKRXAUW0A5f0/JsWqkVFj65lBL

qoDK6PVSWoQfOkMWqjqoZY2oLKJ9hWcMJOTTW3boX/NWiQayK8XZ3FqRRjlvq7RW0gtKzQdIlo1Ziex7

GBoFPMWpH/juxvpWblLlSwhl7lUmy7QpkwVjoQPICP
XvnbQgVxk6N1RbZ4Hw72aPduwwzq15eGviG/iN7u7IPgN0QmR8pXAsxPOPf5KoipjiaFLHtnmUlmsUiV

6kTOsEguB+DhKRBJPe7dYQz5+lDO+Gn0p6lBTBDHXrbog5lzRp8dQAVMAj8Odv/3IS7PE4lUNftb2me0

ygsUzeMHGKbrXYdiusZrkenMckUJKfLV+k1siaYbNc
XIK20BHG5ZqslItGa2+ymyjwUfInag0qSBiI33R/24ge6wZC5sqC1xhGlB/Ue/VLXigY/5PG7Zbo8IvT

WaoxspGj+Javd2896z+Q+mbMOkUGgcegPzvCEoHJ8APxGpXE3ykz9RJfYtXD7ldh1DXYX1BH2MWQDoQX

8LuTDoP8AiH1DBXbWiJYPcAJZjMC17QZJkRRNCPXnt
LDYeB6Ajh439zbJviwZmUWIgAw4VXVVjIE6KSIIsFIAkqRWwSBJzUNIdJxY8CYVcVUgxWGMaY6JghdWu

pwPwCWNvCJXTJMziLTUkX6imc7FcGQv8HB2YLH6TunEbz5MqghEgT6ifA0RDVK5PAJOiX9CTJ6DyO6oy

GfXna2BoY9nsTmJxs5SyZp0HLeLhVe5DAvVuHZ4ewv
2QEJFuUE8ggt0APFE0WZ6PjEj6RIOjT0OfCMReJZNov9YwTM6RSF5nbRnzHbE1LN7+XPnyWIL3eqTbiL

3FYZQvDM3l70iV/l6g/7VvKOTmcRvb3a66VTF38p9Li+MZZrNp9odfkn4aROGGDjtyR5Bb8XC2DAfJnG

WIrnif1wVH2wWKxghGeFSiX4touP6xq9BWsAQfmobm
8msQSTG+EX/+k1g1c+Hw2xmbc6T3ukx/S3BhqRpZ394h2O/PgEiUTsOrz2E84g+5a6yxYXy4lr2xlRzf

dTh7cWqL//VjEk+S+mwlj3mLB5Z2uPQn1FtCK8p+apm9IGxhE9WYPiFENyVuSn82Tw6Qwb+KbyNbitFk

29MntR3KydlseuVYl6GGPKmACJCj72qs2JbfudT6KO
M32UzjxfqT5HJ843NRJ8pl+0Y/cQzYccIq6RMo/by0vFPKXu9wpw+VRB+q65zQ07mrlVFO032njeAYls

xTFovjPfMbY5HkEc/DP1dDUf8q259y3EHXcwQF6wkFNYVxPCRdj0GqHvzT2hMhfo/zgZV42vGMA5GPs9

IIDKsanwbeNGXum65LoTijGlBHA/XgEO5kNe9ZPpjs
2F65rDzowh+jN+u3LVmu9B2HqUsEKr7tW8SDjQlGVk3Nh4pnlDr37Ks++e2b/W0R66LwqfyY6n9Dn/cr

MQ0lfVVKgL4H6e6LBumtdlwM/dmtxOQ0JqmNv8P0hHlhjG7lJmn1uzwBOxj+tdEl0PA7AOSLVXLN2weP

5BST+NLdx0LfR98D/o+l9SV8zS3D6B5MkiPtp+N4Jk
7n+Z8qpgIX7j1pKENueSLJjjSckyTkkAtpoJ5+9Pb9/gunk2YmTYGh5IrKURiIF76NdymnslVsW5ki8G

RBFw50p4LSNq21IIRkyK1UpVtz22BHN1e1dcmEmmZl+Nb3A8EQVu7sW150y6lnn3HQGZO5YjcLbd4p4V

E1euCDb23lhXc0QeTp5j5Z3r7Ij887xnXzuRuYiAKf
NeoeCFsNu8+IrTByObY+uT2j5d4MYolyR7vx+BYN9tmbfTOIlQLXGkz0IIEz7V+8OPy2cD/CmDjMRZaX

wzAP/Fc4Kh9CxFGfeoVqur4oNMrIWpYHKfuSqD3vEByEfvDeASyIM0JUZBBab889Gx/vx7dkBwV961f9

Zb1WQzuBe3S2rkJuSAeVke17Vt0Wdsj5ZF0J5Ou00C
DICIu9ggTnLY3Xo6JaoerCqrAvHFXa3ctcleOnher4QA7pIM2xkiaWIZubFqKuQgpVDOyzG0Qn+oxybI

eNLnwoU5KcsyHfhutWtLsZcmVtAfQE+qPqB6ImVLZI3IVGoFxDoNnjwlOk5RZ5MLhiw6oXuUpaqLLcd4

9nLtbKZIcx4/URs6ZpuuUCoTSdhPCW19fbbQZ6G96H
zL32Bk5ZfMtxmf6V7l3O9+u7EX4MqtFSVfrqTS7yE9ximO4BkNKY0aHSJ/+OBH643k3khoWtCkaKKD+u

VBTz5FaXDxncl1Sl9P5C7jS+Uj2zxdO7OIqi0Nf3HbV1ztw/O+eG53XARkrJQvoPe322Ajjz5ADxQrOY

R4bca+pBOzjJO+ER0ozgdD6liJgCbU9JB2OVDpwquV
8OndUJpO6rAwTGXL2FMLkuVb072XZOM22Pn3GkoGsCanMU6Ht9H5jP52nNy1VPo7wMBiJ+8AWTrszB6X

feiWWQ0VBevV/U3HT8hhlF7eZJAZWZQcLtq4u5Eui6OmNIwV4qF4FTuqShqXCjXZ6BKr0DJzUqXrBeRy

ZwFQNjB3WDGwS3gZZEoE8taICLmI7+hrLa7By55iUk
ckMQaL9wEFFdWgTPAJSmSTLWxc1qpYBQTDGTHq4INv1iUbK3UWAcbmaRtolRp9mOfHZivyE1UkCB3hS2

TLVK994SYx6k0Zn/ihlPQOSBXlySQL2fNSLRFQ/Eun7BQo0R0wSXpIuJzOrPrmZuN6oofVWad7UtCyre

+O70bEh9RG9JsPYVFbRzQ/or/sxRb4Dr0KonK3a/yW
Qdy5MVgGlAGu+gZ8+tS1girPAeZFSi20Jsml1h1H1o/VP3EDgDujO41GBp0mZolvtR1yMg5LQZg7zZQr

greohrYZX0QYY9Z+iV0qUeC02q/UJcokaDaG31IFvy93ewJShmQJhBrm14Ub659U6a8sUh2+0Glyhu68

BlH+SJmCnLa9STdoJErv0mz8xZuTpuXKtXrOE32MSs
IPVlINDYQ1LCyYXXrdfgMcnvCn1CPpfgeFbZR4V7faHd6CyUWjnUZAvhWNy8By827rIh1XG8RhTVyEOS

BKVSYYbzPV63dL7XZoCptyhhGyNUX4bH95RKRFgS/H/zyv9LKKFUOWjQyZE/K4SCGLC+z26ngiLbxO0l

VrwDRB6PuRomGnSptm2JsydsYAC2vDixpv/JOi0geC
hWVLSFBgeTtHDPGIQeBKu9Ke3hyw71GNoPGJvnA6fLZtz+k85OvBL5SQbQwoQSItnijAt0L1hAAUj65c

xws5tWdSzoX0qp5/jzd5g65AbRyEdbSZf8OU9CrPlM1/rgiDn7gTvUedodrngPcigLOn/H9zcexlsTVH

nemM10WhEUYCk/xQPqjAWiXuWRliRCvRSt0W656A7V
q1pv9VaMXmovRmXKsHdqnC3Ki+pud857rGcPXNoJngCQGxa7t+ftCpmI5bfNdgOP/CqoPDYSHSZNvecb

EQe6Du60IYy6cXUADJ6tu6RkMUkG9UAg7T44Q2Qfwknd87eMWih57BEY/pBMOXol6PGg2OLujgOprqW7

Ori4Bpo9BNvgledid0tdaHJ+hXAO2CDTZ7Nir5s6hH
yn4+xD1Tc/HZDnbkdFJWELKzUnWsrfxzGrqAUqOKX4YGk2cWZjOA/J2FrFV5x5mj9+TQ/Nbnszok3/oj

2F73uG10QbfKdq17Xjdc6g3GViKDtjtPpdZ0AugCSMyBIDd2R5f1nZZ6iPYOqL00wlL/JCFVqQpK/+1Z

sh1tb423HM7vLdVbFvPqotaQE3lUodx5wUm0Q97zPr
4j3nU5swI/em228fWfB2iQq6bxwPlTKt7bu0p+qMnv2Da6/yig5oWTMR9weqYG3yBEYMs/D3aJ7FOdpE

BIkt2MlUjrRZvgyje7BFwA99SJ0SD7VShdrctcV372n6DXjDazTU0YR4FA+oUn0WJuZ/ggIZPLUFRDW/

LAIpqGKTKcWWpOhTqpIbB5pWAWFk/Uvw6EiNiTz8hN
NvFgKRzgsUEx4Cxg9r41kONbpprpYeYnbaGatihNeGP+Di1evep+B8ezleMUw02QIViH8P90W8cKdUiC

S+NOZmmlqBX2rNESNJufuK5oO/nePnmi5GtC82yF9z82ojkx0o1p2lgC9x35r6AwkHxkJFJBOj6PJIwr

BhomB82FhE5rce4o3ztwLm7mRoMxLzn3tw0DIQ4UwJ
YHGH9beKRDsUdOTDsSB7jI4HsZZ2zyWqKnRsUCPWCZimmi0YOTwlHI/vyNf/HRAXQAXJVmBoESI2ltUz

sI9QGA1qi4CuAMn6VJFhy8AaMJmoTGb3RBbtKAUpP3S2xJToKPKqCW1ODBKgOF7QJTZeocBlUlVwGRJO

EwXpXPXdRlAkq0YaD1PaLTIuWNFUVCihPZRfL80qXX
lbAk86GCyrOLItBoHqVBz9Fi6MZbSvIEBeC19ptDQdkQXcYEBjRXRESlHgIBMyU3LuwGVqDJbtK3JwE0

CqOR5dxQPiNT5hmDSgB7QbH8DzelxsVCJSAzPvCCJvWZuwACCtHqFsUPllZZC+Tf5WBMQ+Cg0IYI2xg0

ZnEOskKBGlZG0ksw5VHXA4JE6WlHo0OEFmF0MnYTIm
XUVce1UaDR1PRX6fhIayBuPcJO4+CAgwVKG4weLeiY9DZNOiEV9m7sLWoi+w/4GPLbbFIilSVFAUcNos

7dCmqeM+gZlFfMFt8z/CziEwe28nXI6sxs9suUcqjP4T+Pqd6olyMm2BjMko/vzFDiIVBAHb/Gw/RrFg

6kt52Mw7F63L8yR8v7mJ2smbe4SWX9Nloi419/XzPB
bfaEX7v1+0sD8qJ4w8PamvldQ5qhMC8qK9is3ah212iVpBwFM0XGy1+FRQS1QNmxy86XXy+1NNnfz6k0

ZGxngyvxQwmG5Uog7kliQc+nZpek1wD164BhC/GQ/FbsTCFlgGlOqoGEZiAC4atx1NIpOqhOMqNuDo91

SXQA0FXuyAYbRxeH+rwZrvFAyOoZQryDeBoykkofZQ
bM6rWP5lQ8HEXxXzEEtM/bkilXFquCrJyA7+TOiLjPSSUh/gF49Kgxa8oFnXUBwp4IfNPtSDTmIWO8rr

4NRDsBYkJaLYnkGfInvCuR+fADEoBumEgtaGwYymomRDsTGBox+nwyJisN5ebnU3QkSNAuVXY8VPL3qG

9/vbO5gQFGdycRSKkRwJIJmEteRn5kHToI8NXf8XBw
Tw3VHOYki5+PSbFSIFZ/NCdpFJFTFj5+8nUSRcDacoADqm8YMtNL9sCIKZ3MM3WlCeqGt+QOgVyoJwUi

6N2Ba8KPYSGmzSQfsp8MquwAypQUoemUosly7SgIDb1aUJPQMjXECxwWTx7P1R7dRS0hrk1cAqLUhM7O

r72BxItvCCpIoL/yy90BGzf+tbGugDZDYUHRGX83U8
eiydfoCDVNz+O+CDLDWXVkUTtT6QNsJ9bdXUEHyQPASNDBdr+Y9zA67OCeONGSqKFlYOyZM8uikACFgc

/tZd4id/dLKSMB4FDKZbQSwzExslMCDWa1MiB/eVhSXTrOJ6h8mn48Mo1/mLEm8Q90MQfF4xilhYK1Wp

vl0aPFvK4mFmmEMO1I/b1HLX1vlYmNCw/A3TFOcE1F
fWL4Ymmv/k+n4F7bpIOzhHqhJCcjemOe1aWFl8AM61NkW+zP3iSPbg5BH6O3VvSWb8fYk6p2TUegAa9y

G+o0m0vX90cZP4UzV35x4/n8N1pQGWS3E31NeinxND1MhBbMwvG5at8Dg3BWa9hhv/oR6rxTXklzw1kv

OVhJ9+WE0t6e17K2zjNVzGb8GeUw1LXJnW+OjRt4TJ
Mxx6IVh6UdSDN4aUPCGUetu0IyMMrTkS2PkqakjT0XgiUQug+PZHk+O8dilxgjVuHaExK7F6hY+MLBla

7p7oAMeMbknBI7NcCD/X4SuGKwIpchQaoXQ6vgPuzdW0qFveJRJwhffzCu34uyvqY53IFa7aKpSEWkYD

J94onU1oolms/cvK+wLlXIwImQDaGYKCS6NzuXMvKl
foS20CsZElzf0+/ih44IzDvoFrE5PPM8c7BoRJdVP00RLNGNMfvtuzJLvrvpnqse5+QYgB9CYiifVLJD

jLgraAoS8RH3SoUysdXtjRGxhO6e5yFZHFo9yBdw42x4mbNNr5tj8N8I27K0YNRYuDHE0ejGrD1djCgM

JjBh+r6NUhDvnQEsA7PfNo05gBb89VEmsnHwrYhpfb
jA/cD5qLUeBasiJP1cuv9l3JbH2or9LcuYeum7zfQJZcvfJna2H1PPZJiusNf0o4sc3M2GYCL5NIz9oP

QE56gy6BRIIeJhz5n2fGm4iwdq2l2S/OpAleQJkMRo4943wWx8ZcgwZjx7KRg3XC0yW4EYVTiu7llUmN

R7pGIHkgNvOFYqfDnxMNOwM2pqG5wbExg1x1fjswL2
zoGjF47yOL5Qxc/XBqQty6LEb7xkT2KgzG1m8WHWbjd60SlhRPMzCNDXFypuVF2rs3ZD8dHu7kKTBEhd

dHzZklw8w79NYe4VFWs5qRmdcOrwLaps3hmEBMya6N5Cj4RBU4I4GQfrCxniTSL1os+4yi7wowVU+xcN

H8zT+nkrzCiTbf0TAkXVfqW79AG0oq1TCB1hqAgj/X
/unkRR8m5VDXQlv+KrWPQyloA/4DbZyP3AVoHt1VLI7AIEeWZwCwSL+smr5/5hWK8AsUkI7PRJ5Apwoj

Tz1v+qsqN451XaOFjlb+1RxZ7rslXpu3VdUrB6l1jgmpnjeUhg4S/uEPedr3n8JChlH0FyG6pJEtJtox

bpwQRHJ51TftHgKKtdtmzeldzQn89AM2Ep+/8KVdzi
VLiNewv93B1Elr8G1VsofBCPSANd9ktXJuE4xh/tGdMPcM4guizGwDz3Yrt92q7rDTIwfrTHRoQk8NEJ

Qxub25xACyrMvUMfc5gX73T63UqoD1dlSOThvB1UjZacAcV2UYe2MXoUkKtN9uZQarEABQINOOoGSxeN

BFwaVRwZ1YG/v7RtrvTm/jEjKlloWMBZ/5uwpD8PpY
PWqXqVz47lDAIkBAIdgsMAbRwhXV8lafKp4cRDu+jho01KThuDJFXVHFfx8dCb9X6qsqNBeb3+xhzVyX

FP2A2wdEU+HVauvQwggs5OqP2UozZCX6LDZHDNs/Wp6z6b4ivYw7TQlFEXyuGMcO8YUKGdjOcYRmaR+0

w2LfyRe2bseDTxM8WaKRtiaMdni+lgH1mQdXYrJzrg
jzPfYw1+8whgX9HqW3MYITBUugWAudvOlDPTeNVfvex/oEdxOX/O08c5Z45nwydQEQTslPJm6bBb/xzv

4KXiDHwAkmJem5Bx+7to9G2XvG+i7Q39PGokOMOu7KWIytheMxhXHfTM5AIuDmST8kzd4JOFMwHSPzCt

oIOmDbLKzRFtCzTFDiZQlbHW5WGLjmMZznSZVmP1Fh
lzMypMYbZSHnTn8IADVxZS1KJDSghXHoAAKeHnCrYFXUEnVaAMBeETScwHPXs7paLfOnJYK8UTLeObkf

QH9WNJBsDM3Bf483JC21qeDrPIUhFRWVCpMiOXIrS1XixGGpGHpsW4NbJ5JzQF9feKWmOK1riUIeN8Uo

Z9XrufbkGCVATyVlZZTkUHegRPTmMpVrRXxdWXR+Pg
0KICA+Oq1DGB5qy5LpGDciUlJxYW3ojl6OXTW5KM7DvDw1ECXaU4JrYJDqHAHju7SlNF7MYG0ffYgsKk

A2Mj4+RYkqZVJ0djFdlZ2KGNN9DVnG9GALlz+Q/6C3yNYi8rxcKPB+cdWQJKwJLAutuC5orgIgVpltN+

f0h436u27mOrQT6ifiLbKt2+xtf6fjpn1ScdOi1+Jz
zmqpEpj24YY7Cnrdqe/+obhkk+Ip2+Vnluny++ppbfcpq28LjNwr2dzFMN7kbo3v28de2xAos+vHU3X1

tvkYSSgWeJH79/lmiP/819sjL16RBxZtjMxuYKdgn+xUePgp+jvKs2MjUZ0OruHsqF1UWEq0IVqmEkYK

qOLPnoviev/i28+L415UhxASCuq1RrdeNKxB0Gwf19
WzEL5ef8Y8B8jFO9bsgjoJqZpI32CTYc+G76wb4PZag5v6VYsrmX0r0rqgYUjW4KaCFACuq0PATDmV0C

jAlxhfrnU9gnz1QLdpwp59+GZC+e2UcySISpeN9oSPH/ZJj9h0W3+VGhkVbd0wBhEkCwj1BzTaM2Jjiu

6CbZrJE+N09vDTm+vwytrE9mbPmWTBIqlhT0w26U5z
Y/r76+O0x1hXpriHFrsBi5s6JjpTFy71Q51rzSVNWO+vwOdXz9BuOfa39tN9H5PqdL4Zp2EaX8TBkNrD

1RsM1vfE8EJX38bRwjZPMYW18a9SLKgn+MICNn3gkvO93AOGL/mGxpHIz8yEffU4rvmEvorRgNWyj4HE

diFaeAI+N/2D8pqZFGBhGmMHHkEK1rhmol7jbc9+bg
5blh38VkoOUz2LNpx0VJG6bIzZ8N/Vrr2/d7j6OzWfKvmIjJ5IeZL5BS8GedB60sxfZGTuC0PZ/odZp2

k8G/H14Aml7mr0oRjyFKbJV0AUBupkVLWB/H0nM4sIAWuNi0b0zynmQo/nuHwO2+Sqaa1twm4G2p8IO/

yrIbpZfzoS6pSzc1Pdxcgr1lgSpSe5QpwBJIKgVhU3
BqZzH3UKKT3ZTf2QkJa0VLgxaAsfOmraxDSEzf2fTwAFSCflW5iira6xvECviF8OQ6v1ikfG6tKV0bUL

7koKtt5gutIfjl6YjY845G6zWwpi4pNmwZKZWoPP+0QqXd2/udIk11IFqoXXhN+Fi5zjnb6VAZtDqFlG

RDLlunop8bsPLWlfw1drxvbLTSW8XelOQ8smdkEMFh
3oDVx4f+cGiq0ja7l/3rLoh6PhLSmUzuZL9Q1wjqC6EzTBQQXEBsxZUhBjIsYwAnvW3mRuZemc/fuoK3

uCzDbuHBp1dPmZ4HwnBBJyY3MlZfeDBQ2mmrTMZgqnRHgw9bl3LjXCj62izmLFzv/YmCJXOiFd/XY6ln

DTYYwLOZaJSDxh2bfi8DhlHdG8swQIosIqpPPuIvt4
JeEsA+nDQIiTaGGDKNdrHiDtR1Yz9D9osf3qFHDJ2fxBX/NU/O3UuVYNVCwc4Sz5Gd+mLbRjM19Rw4Y2

Ub0xEsQ0fMaOEQQBb2cdjeGz0zAHn7LlqRHph659WyEOcIKkJQ1v75KueFeVHaJiOy0UCWtneGKZyriV

4i0mhC2lMGjlRhjT8mCXvyKqa4ZIuMAhTlOqJ/K7st
+gd3RERQQ8WFA4Q9ZP/fRpBaFfxToaZ2gRLT8/kKAeckuuXPnPf95GhQm4kajhwlRLth5WIZPx+gK8a7

d9IRB5GFsaQNxK8HkbJB6HFwTBEvfFLobPcLTRSjex7NB3nSUFtuWMDoaXqQjo/alTx5lHwFNdF82QzM

5r8q12hERYZpuDaPltG5YF8/gb8ftL1ZOrVPoSXnGp
9XJ4QaD4T5YztRVlbVN9pfVDWarOlLOK3JtjEyAsW18MYQTSiIMYhi0ZIfZHDcSRZR+Ax2qWXRW3le2r

wqtEP6XoVZOUsUkv7JK61mm34dlN+1IC9YCnwt1FLLPN6vkXLYHABSXMXrJQUnrZVaNx1yZQOxFdyajR

68lzV9Zg2lxYxLu0FerM87PSQTDi+fKjTNfVJ4G5bI
W9SX94nNrwYzZhXP+Hs6fvuJJiSMSfNZmSUXEPGTkLeXcxUQE0WVKmWW8ZA8N8s/8ofzhmKuq4JOgSEt

d8kDJpkIQiFwqJGV3uXRWGEoEw0CaplXXCo3QZcWe0P03TOW2kluWcvICChj34REsXC5nTOoUlH8NOK5

e1t4K9rey6nnoT5u2apu7AKRc8YlN7yDTBCStU5v25
TKkLFnc+CUDkTlG9CGiXyABdU4bXZuVMUW8AuP5SfPAFbmvM6X3fAQFOvbqUW5TXyyiQtyPc0MbpRzij

zLLWdEAgQiXn2qG5IwgzqJSBKJJPgdm3yzGSHY7wErXsBAMtoAUI9ou9nsLVKpedMFUieS4QTrS/McMq

LWlUxBmTAO/ywtwCkSd4NfZFT02cEltGQkibt3snGQ
PDHbNlCgnsYiI4MA/iIWTSUeYTjWNUG6bi9sB5aOnKB+QubhFegO+l6FVZ1kLVfxz8Ao41ki4FNIRc13

Uj9GsH5tJnKRTtanaSWFkhU75j8oPq6eAXJROd6/rWZnQT+He0CAqsLubbwDEThcDnn/LOvN1iVuQvYN

0WKVkFZFsyehzYSVieUPGpvZq4THDtU99UGQIBCtAN
WqVqMSA5nihsQaZlkw/rm/nVX4wkBPGs2GwtXaX1f2brpJ09MXQG9r4IleCtzjH4HdYd4zdDP5dX7uMZ

mkgqqEhq+9rzEXP/0uk65dHmE2jNtH3lO+AcUv7GZgqq6MK36kOqBYLqbUlcfXMcJavyyu/Ar0YL+Mz7

TDQOsOjdp9D3LWAmnLxsMXRThBXwxc1kKkpimDZ3xx
PveTe3R9KDdtyTK+65mvekklcv98xTG3Hf+1C4mUdIwTuhUIVrTHFgzzHjiTZJTogEJPiVldzXmg2YYf

BZ5yAVtBtayFwbGkvW95Q1UwS4AeEVwB3gkGnDz2ytsDMx4jpDRCviDL4XG6kaTTwn1oZm0ZwIKa0uGV

x3txSDGaGYeOXGAowpcuFicExBeVzXTkUeXPxQHSvM
20rjCQMeJeJfvkC0HUO55qPBtKRMw8kKZoOTNTOg2BCJV0CXOQG80zk/YYZqmtNwBuYENwYUWpFwNjJu

12sDb1kM4A5IEJSHl8pDX81WxQviw51slVqWL8hfgW0BC0bQ1loRM/X1jleWf+yNHexsTYeZxlKjte6D

C0VC6EANaFrxtIgse0oAsCis4f8FPsKoDZ4mnPLt4i
StJuNtUt6/uffsWu3zydzWrJfKZGnx488Y/ndk9MFrvAlvmXBByi6rvf/BhT7lRvercIJv7o7zQYIY29

JO7mZ83VwN7K5Z3GyA862ZmyocXIxKVCPr7plabQxhbo6+iXObs7b5CrdpGyDa28sylEVyQyvpy2YLeQ

gxvWR3zAZIcuw0QhAAom1JVC5WQU0+TRiPL5GHXhGt
RUL9sjJExETzjHt/TFnNehNmVYOv/hwk7LH0l54bKOalkmcTl5[id number]

qknh2WDTAZ3ULIfKmSv23rGb16f3PyH3NYB+0/Qm//46zl8DnjIEIVs5ZihDTc+sNH0IlYeJ6rnP4k6O

MSBXJbPSxUkSOkN4nXVwdZKVhEeIp1cjMA1WA0j2k4
PTmJGkC4JssdQrlTSPz5AbJxaFirTrP9vnmCfy/g1KWIq0u3rEGcdCTtrIYV3vBrZQpX8SnhxucNr/pz

qW+HS5snKGGy4epRMJYh/livlREzVdVFpPbwIzIu3d+NAEdVgcTijfLuTOwFngBlVi7tghLvvZQ2m6+L

ZXbWDwBJ6SwG/dbWfz4ktGnC5ZkjfaMn+MZOajOq2J
ZinWa4t1J4ea/MbL1rIvF1wOD3WjXJllf7gs3K7Nz+XoAmpKp2sjbzzNo6/YdFsxWR6Nn8x01PArX62U

16aAYGqrNSGBDDRrXCjoTXaZHszOfOvS+O2Dyqe6JrxqvQifyFfY5SsPYQhB4pXFdUXZgOU3JPJvzoas

IR/VeFfPxbHyn9JKtpCNI/wYBoxRvb9SPIP7Bee5+N
cmFOTDp9jYhuKm9x5jA4Dxcip2HQJvPfdzpTARTELnENkPTz+h8hk/MOg20wDgN8J91E2VD6bhNe15la

hBOgw1Qq/C7rCwSdb39K1DLItu2C03/QwTj5v1BW6rLd3Dv7wDqez8DK4+W1sPHCTBHePaWIL9uiIdyY

0LBS1jv7FdVVzeMkHoSM5ycb1ZDTJ0AD2BECNpFL2W
cWOaP9NzU6RZJeMlRMWhKAGeUR34OEKmWYNEXPyeFDDaF0Qqd047avYpxyFoIFNzOu4GXYShWD7XQORy

MSWoxOUzEDGmKZXkMyU7DLFmIYrnLSVvI8GvmoNolyFfYFPvRZOaGp7CNCBsCB3Ble77yUK3JFOxEqNu

XFQyjjSdTETxgzV4MX5NIaAcPKM8hSUoTbdSJM7LYN
OakCSuVH6UQDEoeYAuAY8+DQogID4+IFawtnIrMbjAGuQ2OQDqz8ZiYQjwIVvzElIqXAl1UFEmWthaXp

PqJMG9HVQ1HDTeRXY2SDy7CVK4GoBiIeW5NZVuVwXwOxXoUzt8JLV2NVUaBandUpXxZUC4XVPmZvocQD

E1LDN7LzR6EVNmBHB0RES5KbP4RGQfDAV3GZC6YaP8
CTDuBNA1TCUmAgNmVbBqYOh1RX8EQJG5NTZfGTg2PDAaPBH2BnVvPeGmYEnqBiR6LhZcMnBjKXM0HqW5

ISJkMcy0GBbnXoPoTzavSGC3WVxdHtV7ZWWfAEYwCCabHeK4YabkUzD4PHk1RXW2ZvTsTcN3RUMnQUZ0

ChCaYxQ3ZDx7FKR6GhdaPmQ7TQAsQALFRvWkOsZcSY
Y2ZGQkWqCyVFn4BLU2YxOwJcRuHOH1BKLlZPP0AHZvXlDlXNC3CeWgJjJtFzMiTTHzULEnPauaDit1AV

A3MtVnVtezPGo5CXQsBUC2TTSsMoDpDIQlBTCuNCnyJCM2JJKqMiP0HRLlEPM2SVa7KPA9QFTuUZjlLH

B5VeL2QAZcMda4JKV2IENtDLqlQCo6OLj4UKJ9FGHp
QjSoODa5WYG2DGQkSnMiWCe2QES6MQWuByXdQBf3BKN1YFFwHsBkOKl7TKJ6KDLeVpCpKLe3MHN4MECe

AlRtUMx2RUT7WKDbQyZwXCj3MQI7YWLkGsYvTD2KIKJ9SWQyPyMjFPn2FYN9GEYxHlXdUNh2GIM7DGQm

ReZfDZr7QXZnSxhuUnFlIOD9TnZ3OIXoQEK3KTK4Ht
OgKMLwFRS7CEKjNfR9TitwNoxmPIQ9YaZ8ZVUrTiIvIEgwRtM5KBZhXEKlLIK1SXMwHqGxBnNgOPTfQa

NABpPcRIs6LPY3ZxAkNyFrSsDcMPNnExOqRmDaEHK3HXbkEMK3KbZnAOD5RFQtKQW9PqWdKuIvMCdsFa

N8KbOuCaDfOSuaTrGhGCCdDFwaKuB2OrzoTkT2AJP2
OoP5TpuwZzt1OBA8OPQuUnhnMqb6IGqfKvQ3XaOsRgl7FZ3XTCQ5SbimTol4NMw4NYW9MzjpDOz0AYz1

FCH7PmEmZfPnWFtaCrA2GuDyWjS8RAV2PsC9HKYeJOL4JBX1WiQ2MGGiHMH3RGE0EhL1LCIvQEt2ZUQ4

IfV3OQWmMVC1INY7DzL5SBNvChg0KJX7VOGfDmtkHg
l4AXIuEEIUCsJuSjTsFLVpSAE0OBWtDjSoTPJeEPR0ITOlHSM7DRCeXAV1KDEmKyWlEKAeIHH2CESePW

Y1FYAgMTR8EVUuPHPHDsAcSA6slx4AUVJyTWUjRzsWWlBkCNkSShSbJWDyOFywDU1Ec234JAZiT6HwlP

Ieut2LVOYnSK5Bo364KiWjCO8XfuvryWtKb0bfQSqc
FYJjD0LtS7JliRR9QCWuR1PnDVTzE4d9ORluDQ4AEWPdTG98NT0eAQFJWvAfTSSgVyynR1TkMsQPQjFm

UMCsXx0tyFQYd3jvTiQpMSVpMiMoIIAqFGulVZ4DQdMuVZVmZCDmhDcwUF0ypDCuSR2ZgBHiHyAmLAdp

ID4+SWswoxLbVjiKScV3WWJbi0QgWTufNWq3UDueAJ
SeD2Z3tECqTk9pmI0HuYH9sCHlQ3PtzCKJaUOzG2Jqj8LNw441N1GolYIaPOFaxPWmLX8gd5QqepadP6

bqBZ6drRStL06qsT5dFLpiGUSeI2PnazF3C7bscaVxQA6ZVTD9R4rbowKwAENMOaBcJMRhL7dgcDfySN

E6OYAgUx8OBSFuKQ5Pp397DNVyH9HoqABcpxWiSNSv
EKOWWgAdTs8FByNzTV4lxa2CTGugDDFbTboRIqXgCaT3IJTgJuSgHBY8HLNxZwhaBjV6FLU0HjI9ZPEv

LMe4GJE5BeCzSZDhQcZoEZWvFyTfVJxcGAq8MBP9BJLsBhKeCwe3FFZ6MWG2WUMzLRY3NDR3OtZ9LFRd

CRN9MGT4VaS2EZXiKZW1OUD1CfX8DXAeRcf1IGC3PD
A4WVYzRVytJEW6MTV5JWZeWHV3EHPsBFKaGlV1AKJ9JeS7AbTxCyOfURB4IyQ6TMWvEkh3VMzrErExLf

ezBWKoMQR5UlE1CXQlCCPdUTebAoG1CbknHcA9NXy6RTD3DcIkCjL3ZMIxWRP7ZyGiMmR6XQd8UQC9Iz

fxDpL6IRKzRXLKEuTvBpm7FUP2CWVxChwvXRQ5JEB2
OoGpIwVfAUX2SNT9UbH7HJAlZNP7TNS3UxPyCdywRNL0PQE3MlWgJrWyKsQrDMJvCZCgAyTnQFSsMFA1

PjM3VGFxULZ0GDR1IyUjZsLxFSElLDK6BJO8WADbMRKqGLpxZbQ2SPApHDvcRJBeVSP1RWRnPmM2CQR2

GNHmDuOvJLp6RYm5MEN9LLYlQhFvLLe5WAY5YJMnDp
JoOQo0JDF0RKJaXnBmWWq9URL4EAUjJdFmQTi4KKM9YBNzLeCgOLt6XWN6ZZJfYuTwZXg1WIZ5NBPuTr

BeNAa7ZUL3AHHlPaOvEC3YRHO9NLZpHeKaVYm4KGP3QCXyPqDuEAl3QOQ5YVRcZfMyFSc4ROQaHtgyCs

YgLFB1UsW2BDJtIKM1EWO6CvSkPyLbYRD1ZDQbKjT5
WgquZizxOTU6GtL0NQDxSvVeDLkiAiS3EXYyMNRzXIR2XZVfUsIxEwFiFORqHrKOXyJmFVq5TYNhZqAk

NyZwSqQcQBDyLxNoLwOnFEV0LRrjFSH8RlRgCSF2XRXfMBQ7UbdsUjR1XTS4AbE8XedvZuJ2GEH5BxQm

BWIjKVptDlS4AnauYyK8XHL5YnF4SqrlOls4IKW2LF
OtEoieSaf1EYheAwG1RfOzZtc5VD6SUJO9QndaLon6IVz8JXI2CmrvXQy5DIq4VJY0IeZrNjRzINbvQq

Y7XvJcJvF9KYK4FyN8GMPaGES4TQG2AfG7PQUzBHG6WHL9ZvL0YCVvZFr2VZFmDKL9GRHzLYK9MRZ4Og

C5PGCnLsa6ASZ8ZYCtOrndQde8GMM5GrXIYsSdFYE6
KCG8FrA9UZMoFLY6WXL6XxG6ANTwOXN6AZWeGPS4RPGjSKI4CQE0CaV4JDElLNUaHZK6EkE3HMOxIFUS

RxIoFU5ymx8IHBsyTEKaYrxAAsMfOWeEWmRpYWJrZZrgYP9Px348IEDeT4UqdWKflm1QHAVfJP7Cl948

QxJvSC8ZrfuihR3XQIAkDU4Mr0BmdyWhRWK5G2ToyE
tdqQukeRK3INCxFBXvL9HzjFKuJlBvZTlcXMIjH1GiPJlcUREhMGeuEZByM9YuuoLJVv23SNiqNR8jPD

YxBWLxZGL9YYFmOHlkWZTaZ0i7JQfdD8FwZ3zcDUMyW9EzaJPtXO0DOFZ+Ra9EMP9yi9PcQCjyEUZlPY

5qak0JAXF9LH8ZWQOxIB0OpYHbX3MvpmNqN1JqaJul
NG8ZqjWdDGuiYY7MOVYvRb8spQ6HfruawE8XacNyZVorCp5IvP6SyfPqGJ8um2JqyqqNXbGtIQDfTxas

e8FAlCOiLTTvB0dfw4WIcUPdHYR4VL8CUNCgWE8MhPQ5eNLjECnfKTFQUPtxJWCbN3UdhjJAIUGffdgs

dN0yVYK6YGWzCx7ECZO+Te4KFG6jx4PaKMylYJAtAD
8epn9JTNUvZIP8SjO4GSPqItj1ZLFjAdN8WLHzSMabIXG7SFH6CkegQXUqFGQ9JxAuZbxqJxE7ZIC0FT

R6MXjvSvmeSKEpPBBvRlZzUZVdBLN4LJR3EjYpHQd6WDP2JYN2LaCkGXh3SJZ5XAJ7PfEcWAq1AUL7UT

DpApEzMwqqVOxkMdGENqYjRrM5JJyeQvJ2CvjgTXN6
PNywLmW2GUJuLqG1JHukMvS7DmMfFLMzIHnqVCX1SiVkHfC0NZT5LUZ0YEwbUGVrPJd1DlO6IOAaAvF8

DJMoCMD6GFuxGsRbVHA6BZS0YKRuGqMoUDE0AbJcXMH2AOP7FLA8ODftXwW8XV9ZZUZeSomhMIU9LON9

XVD5LLpyABT4OWJmKRW2GbgmLjEhJNT1KUG9SIasFY
FvQPU4GpF3VSvbOkHqKBP3YLGnAQZaVLI7SXN2KWX1OPRoWwA6LZZtERC0VdRpZjE6BSK7EKY8VQDdDx

y2NUDgDFA6ARSjFas8PRZ5WjSDChTyPXB2JMI9QMSoBeakEuO2KOE1LND0GTlvKnYbGGB3GkE0IORgCr

G7AZJ1EnV2DWBgCvW0EWZ0AxV7ZDUtPsL8VCV5YjH6
RBDgQtC0TIG9AxE0ZWVtTdZ7MFC5VfM2QMAmAyO8LIZ2BfU4FYGmXdD8UMF5LaU2NTCoSGhtPPM0QyD2

KANoLvJ1YBJ8QvF4HDJqQkE3PSZ5DuN5PEAjNnI4PIT7DsNgVwveQZk0IJP8BUF5AGHtVIa5TYErHlJ0

QRAzLAMaNHm4CJF2RKFwFGvmCPjoHwT7JIZsMMx8JH
GlZWXtCAWvAxB1CTKiIVC1VWBpPgQpXZ4DXSQ6MQIqJDF5IHD0MCNpUDEaNiz9XIV7XaM4FDJsHPZpFY

r9YKW3GbadKNC2IAayGkI8OHXwPtKbWSavItH1ZAJeFgNcEAooERB2CdSjZXMzGQYqOlQ4YzWhUCAuOA

UlLxXuORpyVcO3KETlXjX5HcheOhwkUPi3NTGLJvMk
WrC6KKr5VIM6ROnzIgM8UCehQcM3QZepKxHnAJqkMaI2XyTnGsApZJVkNhS0HOOcVzK9CMNbQDL5Qxxy

JJS0SGLpWGV5ZfraTGQ3HPvzRDV7TUXnDKIfNVT9KMF1CAIqDRCyWBU3ABJcPETiXhq6PKJ3ETJ8ZGRo

NDhpPXNmLFR7CKKoKjCfWTAkXNY5SYBjTdSuTZwnKv
M8QJTzDoC7QNVqMWS8WWGrKvA5HOLwXHV4DnZxAJM8AI9iPHkydkBaFwmRDaKkHPSxm4HiCAnhSQy4FN

dpDGXfC8V1oBUrNd9cvDZpg0DtfVL9b7GUZwVxDUDdKp7suD4xxGWsU4Rxo1QSNNeMf0onOAleOSQzH1

JjN7LppNNqOGl3P9BjxSexyPrsyQJdZIi3R6Ami8Kg
bgEqRYF4VT9RJGXnHC0KhAWqcuZxHd9ARLAuNQ4Fv977CvZwsOUmCZOfNmRqRXMvPLWhRVS0LA8MJvCl

MGBqVUGezAweAS3mvZCiRM3XeIObDkPpNSuwGW6+WZdigfGoQcxARrIuWMLmc1ZoBVexGSj1MAlyLOZm

D2D3aQUaIs6qyP4IiXT0tPLyM2UhcFSGfQKtL7Zmp2
OHv442F8CgI81kTHcmMi1uQI8OojEhTVhzGb8DpJ6IntNpBK7ga6NvondJNqAiBFJeIymgn8THvYJxYI

CyQ8whu9OUwWMoOAN6VR2AVXSfQA5XmPI8xDWyIjPgEZMODRkfQHWnH9QlizMKXKMfhmggiE5mIGRrEG

IaNb2TIHL+Xt1DVM1nr2ZqUYnbJzEbPJ9izb3CQYBu
OQSuMBB7GGNaLte3NHT1PIDsKYDuPMylKYRqXHV7GJeyCPKsKLYxZWShOHXpCqUoABGzCxV3PKIqNhC7

HZLbIZIoAFieKepqMRDeXBH4UnabSTJ2WWKuCQQ0EvjoBLG9CVXlGFD4WezcOFE0MBDtFAEqIIsbFmB9

LCF2MAHAQcJaQdy8OUY3XJL8XGcgDAS9LBA3LWU7Uw
ErObU7SZayAKF9OUAeTNk9MUO1SwC5YTHvLgW8JZZ6KhY5ETPaOFmwCVc5TOA6CHdkYhO0XNC8UCA7LR

UtUPq7ELWsYWW4FoOkBtt2MHPeNZX5KmJoANrgVGA5IkL3VgPcKPrmZZGhTdWwCbbmQhLbNRY2CPA2QN

DzJuB5CFZzQXT0SFtuGPAsYFD3DFQ3IuSpAyXcCIW7
XNR1UgNjReYlADV0EpZ9PXpcXrZxNUp0KWK0OoErLVg2EVO1ZGM1RJnyIzA6LVVaSPPfOjcqERX3BQR3

YLT5BsPwNXD5BF5ZDEY1KAUoLBWbMHCqVzSpWcoeSlKwZJU9MLDmSTGmOdB7CRL1DxV0EZBjTvW7KVB4

RhO0OHFtVqI2QDC7JyZ7MOIaPoD0KHV2OwH0JTGfWp
X9YCF3PoS6VJHbYxI9BSN8UjD8FLJlDtP4UHM5UeD4JZUiAjU0NZN0YlYXHaDgEnE2VPQ1IfC4MCTxJz

X9THW2IaE1JUNrEtV6WYR5AeD8KRTyUdf8GKL0TZT6EbwmYGW8EEW8WeL1VcwkQyG3CUK4USN2AHEbTT

ybXNQ2NjQ8WQOyXji5PBFxOGJ7LEHqXGK0CXZ5BbH8
KBSeWeJ6CBO5IiMgAGAfBNtpVDG9NfM7DZzgMvN4QLLwFESnHELwTSDaZVGmWuI3NVTrWGCwKWk8LjN9

BOidIcZ7BPU1PJG2FIWlQmT7AZH9OEL3BXJpWLCgATJoGZK9MJFdNXH4XJFkSzB2RWLwOxS1UKG1BdVc

OcObKbB6ERxoRII5UUkdCdS9SU0LUNY7HFQiDiD6QS
r7IRR3AJXuTek1RDh8YEJ9QNojVqa7RTM6MxV9JRxkDJBjREP8FXW2PJQvXNI8HVLpBHY1HDreSJU8PH

XmPPL9FZrcEUIlAZZ8MdH3VgWhJBYlCBJqLxA0LwJhSwDdQHS9DtJgHMKtSwFjINO7GQIUClMoIIK9YP

V3LvF9BRNtIRi9DSU5HxH4HILnAej6NXX5SxY7EtMs
DMQ2QLQ1RzH8SuYrZPp6HHK0NGT0CSJvLFWWZrDfOJ4xxh9VVoHzNAXoUubEVmSsNVeCYpCkCMLrBDxl

XQ0Hn511SONkW9VnwBTrxe4DMMYjYC7Yc863AqCaSJ0DOXrpYYCQZXjeCOUdH1NfX1KrtHZxZJu6K8Az

hXdtqNtrtHVnBTb7I7Vlg9VycxObIEH6AY3GQSNsSH
5ShMNgtaRrQa5GDYFqYP5Dn982UqJxoEShQHSvOxWvAKp6QTOfGCy9EG1SJXAjDQ0FaYIxbDUFhkesUE

QpF3F4AL3CJCMLZdSrOb0CZyTmQU1jiw0UWgPkLZLiOvcQZrYqCIuLQcWzKCGfAXgbIN4Xr678F5D4Ix

J0pTYzMWN4PGJ8mXBiSeBjDUHtqnHdS1Nmz1SUBH7P
xkOcIPryCe1RpO9KxjXsQW9xc5SgfwsNDaSxSQLdKktyo3YXwXVoOYMnQ3nej0XBnTWlYDJ2ED9HIDKb

GC7UdEF8sVHrJoHfPXDXEJafQUJyZ5HislATGACgerqfiC0xXJA5HVHcWt6BJVD+So3RUU3qg3OfFFjt

JfBtGN4xqt1CBWFfHQVrCNK6QANsFba6VHG5XHVbGO
KiXBxcBIBoOBZ3CUyoQADeQPIzHSKxTKLiUvJtZLDpBoZ8WHJnSeM2HAVhWNXvOWwnKgdbCUJoMPS2Fy

idFJE6PYWhVML3HlesHCH9WGTdFKF8ZyxoGUY8SVRwLPDlWVsjDkI5SPD6XWSIGgHaXnd2UEL2COW9PR

rvWCL3DUG9YBT4AiZdHtH2GQowYOY2THIuZYq8HNX4
TcG3CXNfCxL9OEH0PmX1KSNuSDnqVEi2DGA8TIwqDvZ9YSL2SBV6EVCzNRr5OIReOCA4UyWsNai0JFUp

UXQ6GnNrMVjmJNH5HlK4YtNiCFivBMYcKoWuWcdcKuLyVBY8XPR3FVAdAdO0BPBaZZE7YDciUZGqQNZ0

ZJF9IpMuZoTfVGY0GCF5AkFnTiJmLLZ7VpJ2SOrvBy
DpTNs8YTU5FiVdKPx5HWK1RJK5YObpMzO7UFKkTDYtJbynGSW7AUD4EYL1IqTbPKP2RV5FLBF0ZVIpTN

OcTPTzEoBfXfjdAsTmSQH9JDOqPIAjAmL0XLO8ItL4GOYiWqD9SWI0BzS8THPcEpQ3PJR6EzS0INNfMa

Z3TVX7IuN9VJOeSqR5OPI2EdE0IKIcOjA6YTW1MtY0
FJEnFeR4WBE2IoQ5FFHkIuT6AXX8PvNFBlDgQzX7BGR3ZpE4LHVzWuN6COU6HqU7CWVvUdM4XFO7TgA9

IZIzVpy8ZCR8ZOP4OrluUSK8AAF8JdY5MazoOeY5MZJ0AZR1CEOmBBupYTZ4TkQ6FXWiQbc2WQKnVLJ9

TDCyYXV7VCW2PrU7EFQcGzA1QOF5VsDrQWOsQCyzEF
U2PmH6ILpvQjN5MZOjVMSvBGJvMGWvDRKuCiP9XDVhETXlNRs2YoI3TGfkFaE4BON2URG4YCVdDgP3CN

A1XTE3UVCnYAHjWQEaURQ4JBHfECH4KQSiIrX0UCLhZcD4BPL2DeKdCqLdHbL2PJibLTA8LAchCpN4PX

0FWPG4JDNwSbA2UUu9UPU2JXOeGaf1ZSb8HYD5YYyd
Bfh2EYA3OsT5TAczECXwXPD3JFA6KHBhTVM1RDWoUHF0ZWdeQIA9CYSgISL3JPrfHGCfBDA0DiF7OaFb

UNIjBEJmTyL3CqSeXpArQTI1WjIqLAQaZuSbAJP6BYGMWsSbOJE0RFD0ToP1BVJzIEc3KKC3RyF8BAGu

Xgb8LQV9JuR4QqGeLGE6YEC9ShB9NtQjTZz5CXM0IZ
J1WRLgGLACObQwHK9key2VNmjnRTOxAcpIMyJiCKbEOgSyLKTsKNagOK5Pu017IFQeT3ChtJUjaz2WOJ

JdYU0Pn402TnSyTT8UQFgsSNUFOHmsVKKbK7QqB9BauXSjUZb2T2XqnPeftObvwXZpBDe9Y7Jzh0Fhcy

FkUDO7CX4WOGIdZP4YpRFshuMaYp5JMOIhMO3Hg190
UcYieYGwAHDnBgEdPNu2UPMyLPe4VW4SUOXlYN3FdAOvcSEQgghoGHXpS7O5WZ9JXUPFUdXsCo9IBeGe

FT1ytz8XRciiPFGeClnSChGaYFpYBpQcXLZyOTegOO1Qv689B9Q1MpC6sGIfVOC9GJS2aDTfQyHhNGYc

ugAaQ8Kua9ACQQ8YjoLiRGyiAx9NgQ8VwaPuLU0xy7
IxkliTCaTiSUNuRhjfa6NFrRZeZOKaN5aeq9UMcCYxEPD8RG1TIICuCJ1JiVQ8uPTnXdReWVTHQCawWE

KuC6TksgGQYUCbfafjcA1pOPI8FGSqNn0NEWE+Yg7MYH9kl8WfMFnvQPLnZY9bdm5KKUBsWZIjHWP0VM

TyAjo0BCHcKJRkEzPhIQquPDDfKUB8ZuHjEuelJKQx
UeAhOVThZrP4LGOzUQQ9HOOqJdW3INWjRHDwUVyuMkj8ADDqOWC4WiclJUQ0DDPsTQY7OuzwEKP4RBMo

XZN6RuveJFI8MYHoHVTyYXgiJwO5HUW7TYSELbNoDrt0LDY8TIK3BzImUSYhPXWcGrH5TmRxXcS2DRld

XcY1RWrpKYt0MOH3EWU5RLlsMzY6NRG9IoM3TRRbXG
AwBWduBYY9WKVvErN3UEN3MRR7EAWdJFqaUTF0NoR6IcEyLku7LRJsTGW3YpirDIweZCO7JJA1EuDiWV

pzXYRpDROnANKgAmO3PFT9VCO3YWPgEoL2FFC3DmP5XNCmWNX0GHZ7LkS8QChbBpt5JIO0AuX8RXyoAn

A5LRC9BUP8BUEqYgU3RNh6YTL3NsDfUNH9UQS1LpX4
RHOsDvD9CXE4XGEiXdWzPLI3NGW3CcO3QbBaSHS9CC1QMYH8BGLxCPEyESAeOBSjMJMlMzL0ZUL6CJJg

BQOiMsJ0OKH4XzX1FJQmXqR1AKE0PpA9WQPhOjO2VZL7NlE6EVNrTeL1UXC3DpA3JKAjNyM7AUM2WyY8

MFGwRtK6DSL8KsE9RNJmNyU4UJN4UzF0GEOpGgR0AY
B9WgIWViAbAjB9KXT6NqT2HRFkSmI4GAG8McW2WGImIhX1PKI1JbH2UDXiJqr5OVZhTTP0LebsSDB9ZT

N2EwP2MohoPrDoEOD9BIJzUDvgUCH1EMJwJbQ0KLDxPuj8LEDyPFJ2AZIxRbH1KWP5VnW1JNGaPaI0NB

P4VfHePMjbMMuhGAL2IZL7LEgfFjN3JUS0NIRdNnJe
FAG8WRMjXJU6XJAhCHS8FHd2LSD0IsDgHeVyWLVtIxT9FeRcCzSiIWElFvS0UtMgVHD1FECvPDY5ZVhn

HKj0JUS7IBQ3SItpWpO7WFC5ZvJeMfOvSeB0JWhlDaG8ACWfYrE4QM8BJHH0JNWbQmU2AIx9PSJ1BZUi

Joh5NBh4CLT6WLapIeg8QOV7ION8BDvaXVO3TVG5IE
Y3UTQmPSEePTV5EtV2DISwSUZkCEJ5WaA1XAAoESL3WJEsAaR9OVmyKRi6VEX0KuA5QCktYaY8MBO3XK

NtIJtuWxV0DGSrQxAFBtWwQUIaJKCmEZN4QsIqLRL6NAWyEIZ3JjMaYzi8NPJbWRA7WCbiZRZ9DBP3UJ

U6HWhrTYd4QLV0MhB7RIRhHRNNLoQuQU1uce6FYrTa
WKFeOnoJYmYaAFnTAeXjPXJzDPdiML1Le044VCXbW7XvyOVfqv8WLQOkYJ8Hx148RpAjEU3BCCkfTKCS

AJu5UZvbYp2AWGStMV1Dt3CavwEiQIP9EF7YINLPXKkovLPrAZM4KI8TKGMbMY03QQ1pCGXHYhLjMMYz

OmgnA0NiIBWTGeKxQLFjOa4knMDKx8ehAnWgCVX5TW
M7AcFbXKS3WK3LMrFuDEMwHJEvdDklLD0xoVZtVS3JzHBlUeIxOCprMX9+DQplbmRvYmoNCjMxIDAgb2

XbWHiyVPn5SJspFUIfY9G2cFNjAj7ezB8PnVM9wHYeK1JcaZAMqKMvV0Ame2LYl203W3OnR90lNRouRH

LdeCakG6IsJ91qjB7gF8sfktOkj8nAilWyIKtaEt6V
ZPQsQD5HiUQeiKBpWDBzZaFaPOUnqSWnVQJkVfT0AXdyJCTaX2kcEBVbmjAcAGQjRSMFWgXrMPSdIj0t

lIIgt3NllAV8v2RzLkOkLGWWNOtjMX3+AMtiecOeZdxNEuRwCMNdj7HxTYhpYOw9KTndOIXwB3U0mHVh

OL2wskOxrW8QsJJ9rLLdO5gfLGueE00wePXogU6eVH
5KkGEcANXwQ95rpR9dWE05AZtlL75ec2HRrFGtXI8QBRXdF8CQufF3P7pyUEAxRON0Ei9IVQlxtDSoLE

uhV7trnfq3vLW9Rs1VwKo6UHEoWf5KaXP3BFQzM95kJA0PKxHsXt4HIrLzr0HdTRItBTzXlf8GUM0FBE

CWwO8dra9RBAKPQLSDNXTSSVUSYJHINQMGQKYVOTJD
FWZLBQXXZHRFGMMBTDVFOOGSNJKMVEVEAMRXDYZYJIYNFFYQLFYRLKZXBQZABWJ1WPNp9DWQWwOeOEV4

sxGoyA3DVG0bt0SgANfbDlSqNE4ywo3OAVfERnPdQ8U1eLQuDM9agmZrbZ3BuJO1hZNtR1riFMdtE10f

nRYniB7hMX6WpYUyFVTlD26kqQ7jUK61EXbvH55wh5
WUpZJtMS5KTJDkN6YXX9KlB5psiAmcZyabG6vzsHigxXUaCDYxWOUmM2UeIPNjLNZ1R2UjfPRvjzPpK2

OpYPCcWLTfn6LiRS0JFKBqR64cq8xzUvTtREFLTQ5NHm4WGjD9cyXgdM7ZLUYmWUnrfvjUunfwsIWxjN

dxwyGEQAXPJRCGrGXUKwNQBPTWdhTiuwKRfKm5xjrW
Ph1MLJ1jX9GJKjrzSWKbcxo1B0XJy2wiQwgzFTF2/IPC1mYU8tacGU7cZY7X26tu4XykFdxm0Zj346C1

f6ZGi119WT/hb1gkz7pHJ0+4vIYnGvLFdr0bVv5K0GKafk/2DusRilhS0xnbkWPzsinhf++cYudkezc/

mSWJl3D5weNLvaARs8d2JQVX50NhNylv8t9G2Mw7r0
/yIWorvRRR2WKuUeHTTiperI5emSHIzMrzeu/k2EQhvTGu0TfwOYAVpzvbJUjCSKEJ3pfNwadEqC5SAt

sW1QnbQnbdSVtoUTSHjLtel7iyOF7LUFOtHjkuC/JTlHtBKs/fx41zJg/64fxhY4pbgUQQIHGIjJVDmE

u8sCmK+DSPovjS2PGWQyb0nXN7WtbijFBHW6LoMWjF
hyxjC2nDLEzNcPSjhFGKWl08ga/MzII9ICSZje6Pf2YmeCkEwo/lmBmi7GaF5vEoPvN3n5ifwszv1wVA

OFZZR2hbbM1Mq51hY3Rxkj/YNlK5O6k9sOw+YnK6AwoplKE34jQMKl2wu3jtZv+3cVhzQahd0lAVz1ph

w8SSD8wasx2lpYj6juh8wdeJ9rdz/EYG6lUMMU7QKC
HeFa3LZURjp3RTKYSwr5gbhFu5D44pDN96vZaKDZ0U/pfCnrbCCbhtjFi14FYKb9Ev8IO6bjSiQPjFLN

iYGkbQPUB1W9TEqxcxjHVpUB4CZTM6rX7/mHDEDDcWY1BeZPtr9Ezhe9/1gacUsbvlJYf2RYOtwOrJD8

20uNol6vKAPT3M+PZc1FUYkHssy1P8IAKSnlFH+SrX
DvqAcVZrMtnQ6oCma9qik46TljUx94xkTxWJSCbOq7Rg4ZFT/PL6JUPe5sPWXefM8UQhYcmtI4QzX4bm

99jQm/jXVlyn1ZzWMkk7WSQqPSQXBxn5XvOJWEhDME8jJ3XxQ5Rb3WpqBSQRY0DTKFiQ7wb0yRNQR44/

RkdeGx5uXyAD4oQLp1dSDqWbobIVR5dQdkiQ7WSKdN
X6PNYS8FfnErfEYdSkVZe5tCQ/xGBS1WfQt4CeGKwbL1rwn2Rp/Ri9iKm+RPJQL8mhRNpHqZjSQ3tSqy

05t04hMqjTTzKlL8vruXmiL0BDAUrBBj6eWIhq7j5X3f19r7rDIneX/WXjhav9Paijl3I9Y2HlxegU2H

AaaLCyRZtcAdwzcdSn8pFGHCPPJcOSz7Y7mnwQCVwh
ntfu/PjzgL4kl0W5zV4lZc2p0rNub32+vE7ODONPUYTlNeSVsCaNRAwbw9HQjpAAqmnU4HK5Zi8gxHeU

HXCUCtzuRZesXOpuaXsc/kHgouedLj4hYsRJhx8Jro3bW5N7ilPrVHe8OW+Hr9cgd3HfneOj5iMZeYBf

aI1+Jja4dRuabrcKdIq97CJlXMraWdeNEppYB8SMhe
LdN33H3F2IwhXAL/oyj2Ph+DjKg08+UEkF4vngTt5JO7MrP90cukpxs3M8O92KtXBcZVFfBPv9XIxnQV

4CPDstn4eyp/M5zgVtNOKRLCOpnYBNr7Jmk71An7MlZtltFye0UXzaSH3nDmDzr7BU7AAtCpmYjRsftR

95Ndupj4f6A/QtkYUY23gMH68D/nJp2/zo4RBU+Qjy
zDuztCK96ZouppKP7FvDx8Vd11ES1WKRQNe6oUBfbwv2F5btbhN81h2bV3oN0uKmry9Lmdr8p+RZx3lq

2i1JSjdl7WyL/zPatwVGg81794MMXgJqz0Nhkvm83Cyiqv+tU86WXlGELwrUp0HghAqozo85P78x8xOS

+nYrjVLvxVgEnkhI3kNJjgkuOS8/KGuFLYwzE+UQUl
0kvNY05fuvC1fbRxIomiBqe5/8IM+mxUot3h/F5jQhP86GPPkuS3Fjt6N0D/McXEWRIq/dtkw1nomZXh

Vahn1saQ2ovI856yt00SRfAMWsEkBJ2uOPgusNk1QZIU8RGp+Ue2qDSC0OUtrVryqXtNRhrLHVOfyQb+

RFa+LdeENESW7XvTGMi+qyB4uOZW23P+KI59DleHp9
S+NYKiHF2pCy6oEHUfMJ/SWiUV1ca1+/DesmpJNaoHnTsWeGEr6oJvr7OR6f3h5Ywichg9TfmGM70k/7

RSPPn6fbjm7T8gaeaW9qbcrZxw7Lm0MJYMEcmeAoIrpd4daMTzi6LXuQfHkbKGho1beYAoZLkQEysDoQ

4P9j4CINV++FQnoLbJKBe7L73RIqN1Jc3LpIwiLrVX
Fd4XRafJqy2AzwQaN9CjyAXX4ZrP9OHhM9FIpgqJYGQlAlRInYQcXZhcCQ9XS3IoxdVK2W9uTRDlFbDI

hVH916cd+QV8TYonc+eYmn3EmT3rNrcapKbdggKTCCKS+sc4OGfd37QGayl0dnkgKe3HffnTrgzthQC4

kzl4NQfwqvC1mjd4NII7y9cJq510Kvl86cKkDLoQBZ
+6uHv2fWo+PSRaHAU0P1HhpVluqPP2WV/hk8GY12iODSdAfomjrPIdkcUFkCaUXZEdnWgafGimmtKUP1

xxBm7ZEdgPjkcOm1QextJJqKnFFT4zp0KL9vHwtO12cxLB7YWGMy4jN5QHfDXCX6mQQ/hwizTBl84UbP

jw54Xakcy5SzQAht0lgCnNXx9N9JIwKsgncNy2wfsX
cO0EuC4MnDtR3uuxgvT28+/ggf6TCpikA8+GoGG8yyEAtVl5e2InQhBG5sXvygJ0aSQcxqz0Dv3yown1

FGG/8nsB08ym/hjWgjifA78t7RIS/IE/n+JswiAYG94rJQXvtAZe01GOy21fSXZ9kVrKHWTQhm5OioLm

MHiQiD7bzwqiWbbceLo1yFrxRKT6Tf0rR4ideW0dcm
l58S9HNzQaS7XfZMlYxHG2j1gxXig9La0PtByTDUtEs6S0Lu+zEztDPTW2XoaZa+V58qsnLuYoddOV9a

7j7uog0yaCAsa8Xe06J3yv27O06z8bgiQjFxs9pKPKyPKCVGDBYvOIwmQz8YU/9jfTMjmQ46k/a2zmkR

Mu7O26BdvSBAl1s3XWDOf3ers+6fZxIgZp96pmYaGM
OYQ5QAzbuOJHDSW+LRrnHgPDSoxRfxZ69acOUuaNsz9Ir74K+gjyEPzSVdyvp0/ArytDvZOYeQpysqyl

n+WghjCfN1KYEsTEdM1z0OdnYEhVQvmEWrg7fh2Na/u08Fdjm4OVuMzYty3aYGM3t+xDDZpEZLhw0i4V

eibGCDovIJqbk8h6Y0Zf9kgnk2LwgXxofyaO4zTG4g
Glf9xN812bV9yD1Kjpp35PRs/XQ0A4uCHrRxwoITRr6DenqEM/Eub2G9Y0wNkk2Dsl7IaQUWLKe8Rz7q

9hQjdbrt2AgzMymwDW3tmXmzgWr8OqKwdzkjf3oSfJnz+3IARGD0ZqoiiJ1iX9WhaHLafMVs1H5ZeVxW

QX7waN/mNFuHSZaHS/gR1n2x3H/JqK1LSQXh5z5VFC
z0lhafSjTi7srS9s/ChlfNlwc/8YN1mh/vwomU3Cfxxw8HUmGUXwamzC8J63NHvdixqBzFbh4qSZs9/z

MnpV5N8il6Gb9kf9iLqNVXlFaalWMNrYs5C2AscNN0Ylgkw5V3/JfNuagJY8nLJc+RsgqbrGBX6/lzN+

KLtMRdp5qSC3pBJHwXyiNJf2x5wS9xqIOjfIh53+kr
5TZlnxZVfH7oX/qVbIwzWU9+DYf2Khx3tJk1xfaFCR7l8ymXp3XfpX0hT+wgVDXVH1q0W69pT5IYa45h

49Ta0GJw/EI7jQufFO6gm9F+W2oE3stTqzlEAu/U/rygjFmIpxYnhmnoA7uKJqi6ZTrXRySnsKhuTYze

S2Xben59KFwi8q7wI6k/d1iirPtiAJhodY9YLPjqqp
0ezqCUsx15+ok2R9bf+owX2lWusrs1FM4kK6s5exPOR1W5e0YvsF7KvTo6kvl6S9ucb8NPKPe5wRehgB

NoJd865iBWv5nhuuUprGflA7tr9XtXsciTcmt9oxXySDauO21KWqv86EfXU+/AfslqULcdLW78Poqbtq

NfyoaEB/8W7YcEfgyfzE8ld2ciodgaAXohSOpJURti
wokeugekYz1a6atwcijsT0JG8xmSAgAIWduhZF1zUp0B7/N5soiEUAda7SR8OBusoaUv9Q+mnzilcKzP

6hDE2uWnzQ7ZAOBDrA5Iq6nveJdFJwa3H92aQNV4sT66g0T3D1yp0VYducsx4GHybYjVV3SQWbHHFJ12

EXWBhGdvl3ct91H1elJFYcoOlzD97ZS5pZ9wZUZzn2
lL8+Qt9NTxDOn7bQwebj0vaXSHBf4yKMDotWp6OW2dWbOC2TGevy/BuhHGvQID1IpD9zs4rf47oUTPoO

57ukhD1uaVTVDz6gsg9XFRLtK+tlkiIi3FF/6SPWdhfwXcJ0IPOM7TzoTchq3Y5GhzsQg0sM3Uv+8+zo

KqJLj4asFPqhSjMmBYS0/6/Bw2NA0veYf1NXMJqF0P
xCsKW804ktUz4IRJcBNWM+x1RhPxK4VB98F8rCBek074+b32cGkr11qlg/kdJUdss10Qh2XDSwBiEHTi

oWL0HK8rdP6Lks0PxLp0qsX5i8fdVSGdQxifA8dsfHAPrNp2Lvh13knyWI3zBl2V93Yfh8SJ8ZrW39Nl

URv+aJLO069DcRzUOX/jND6ol+DC0pblTKVN+bGRx5
5/53cfexyvdgE+vFeqBJjUe//BMBFvLC6tPJMturmPlTf8+mv2NWjPDYNJgmnJgF1GZFDOSItQVctI8P

W4D6ZxnRn0d7cWrDrjmAWXwPhh1qTPa2pkQx16DbmQO1V1E3NOR9Cg9c2toYzStj2Ei9cRITMqk7IU58

y/S1gzwk8AIc8mBN+QXm+NO1LBHvu75VhydiC7yVem
3C3c6N8bjkbKePyLC5itaNW8Co2oVgjveWa3KxjUdwLCY5u4ypO75V1gMBqGTpAlD7O8fXsWrU8FA7rr

KMyzsL2zTNLUe/TLzxEIsBm4hi/g28nr3M4vgp+1QdBGG/15YGj33QBQlvfPJweqxeN55dt2r0BLscfd

ymHzoN7Auw+es1yk5JqKF/U6zjqzH6xAVdMlqhL/aB
16XqU+BXr7FbPFlRQucKbqd+DI9vpY5f+Gp97R7q28G6LZcFuupv6OFg13Hs1GHXX2pDt+MuZk7X4/nS

pBj6M2de94eDmG608+M/qujm6FKCb3eVjCi06q3Z+g0B4RL6ddlqwqWiJ47gkGIFsUZepsS0m0ke+PdJ

DWbqYYHDr1tpmeG2ZT/gmadMqN+NIthfkr2ztEFaZh
Y58j50dsO5kbznSXP0DhyY5tdEqvq7t9GSrB9VxgWa2lNOPOenBEphWiVFQqNUqZ3dD8x7jtcSFlo01/

vYA92ansQ3J4me2xdATfFGr8iSHNZqblnx2XYG3Jddk90xaVqyhHOjFd7VXr6x3L7Lr03e2+/N8kUeWL

P9u+cgZ07IP4L0cJTxQ6HlgT1gXPkzUWlzP8ktvGwd
JhAvKJX978h7WU0UYvrHxwTY1ugvU/O+UfECqUpHfhkVMoOwpQH6tbYnKN05z65fewE09V0lIR2Rbt0R

VF4cvetjY+kKjexq1YAgIKMnAtmjpK8Tuzao7KZc3W9h64cozMvZfambyaylVoA1ROWHgw5yKrFuHgY8

TwLpaRPCCEmQ5BfJ25UK+4Tx9Cg9B+B3G+x8CrwKkY
jeOdF1JCOd4zq5x0LR8a1q6m+1WzQbyQFo53OCAebKH5YMdSMkzKGi63Sokq0eURvc52XYoCwRfb+rTo

5zhud3QhKZTlW8NbIBHHzW7YJCRzNR9AIKvbwK1DfVn+KzMxYBG37fxoe6SqKI/murxaFxvGQm0mCPf0

Zs01fVfuKh7jT0XqM23GI6Nn0Kl7erk4MT3RvOKVpo
dNiWLdIaQQFl6BjsxK4rRD3echf9mz8Huf1ZJdWg2AWvdzuIqVtPTrHLf63SwdfNYECHGX5s+phIRS4u

ik+UfXzIHCTJEKHUz4wL8Me/nB2Gne6MVRXbr268LGfVOs7DEMnnoeSZ0vIz2Wm/QYthWTcUfnwzk4Ti

YfptlsblhsQThEJp6jLlkuN7ClVkWDZu14Kw9qCzOm
j2lHwNyx3/VZy1mQyHhDunu01SHk/zM9W0khnoVMv0/G7qoM8ubC84Z+Ul91H2BIf98WN3q88q9sT+xt

nq3cl3XtGN6Wht7Z+KrCsHh400QwNU72W1+NNtro/8JTF5HKHSaqwY6vA99ZiPjKX1693mTqSjbGD27D

x5TcTYvXkCinDjX0En6Qj1zGvv1ceVhiLIQ3vym/Rh
zm1D0Lh4XaFElxffNSH526rBDickybk5IX2ljInV5xDFB0KZ+0dwA+fA9qkvPiPVLqmVwvpi9QhJaKU4

WVuIHk+EV8baF3mIzB9157ILQDndyQUxXkj+9Ejk/IwhF6Ve0oxCt1Tct7QYxny/2EOtNfmEryGbzln0

rk1soROjO4xfhCCrkPZpIoJBl6r9soDLcrRh+QZ6ON
/xzrOPYfk8a7K89QAUzQXQPsBSAmhVrpDBU+jq5U9G4dtQg5g6f/wSqzky9IO2ljyOrdrc7xsAsf2sLG

mu0C+NRhHO+uhaOOqCl4k8NJ5Oi357Z6Uj7GeWp3eLP/vrJVmz50+o+J1k3AhnAHjMj8A1sS3a8Z3edB

UAQflDqcHLAdAo92sFMldZWT2QgNhdv60D5bWcPIqI
P/EvFLJCT+bdybkt6vg+yZhFeIgkko5FiPl9W0RFQ9qbs3cls3w2IrrZKUMfv1Y5eBIdl+7fzvvNRZnh

om0NuaaFVuZvkkjmPWIGz636iyidmI/D9tAoE6MkZpckq5B8nrsgvaqKPPf6DGIcuRlRbLrOzF0QHeu1

V8jawprBu95sr3t11Fn31fMvROS89fV2OmOvjeg5Ty
xwBCQjLn1R/4X+ZqwVKnLyRmFBlz1ZGuZ3ENjHVEt/e2Z0c/5ncjYvQoAAOQVKy7O6YHFzizkHLmdSM4

TTkCASQD2GTxj/pwnmHBG3/MXnyo2Ouf2IIyG4foxUZApnnA1vuoVZghpKAmXVRZf1v23VnRh91xOwl9

J1fgT9a6CmcA8ezzrPd1x3qNfYct8hekZkWGtEAHzk
Vy4CbVkaePX6e8di6azWwe+C7c3m0WrbpK4iLxj3KEUVeu9MquuAGEYf/wUhb9SvNMhyyZa2UA+ZjXyd

2Sh4az6k8wXDk25aZkAz4ysuO3cev6unOPAvk5O+feCkccCVKyz1W2yQM1thF3Scx32pI8TH2IgGACn4

yuu+9tuSUPJct6vC2cviWMLhgt8XQ6Nt7RO2563UCn
lrABDohfhCHFP2133RZnipKPMakrtRCXC8369VJfjqVNHeduvTIJZ0823ZLsiiWXXlmhgJJVB4637YEj

npUSSonvlMTVM906T5fTK73qe78FF4yve2rikD7nCI/M4dqyu5d4jIu9Z4/thfaEWi/xUWNH5wErE6z8

ruAVG95fz53Ch9k1adYEn2fHccs/eI6m69NeNiEj2Z
ZNP76q+TjP/TABc5rFlp+yp3m5B2pgx3QN/RpB5ahscYED3xmJgH7E0q/ZBZ0QiAYxxKjf+r8eoV3M9X

1N/TWM1CYY/TjmFPIhkyoF9yjhae0Io6lYQFX6TP2T8aq+m7uEheXFehkz/lupFg1r3wml68u6+doNY9

b7tSFmnu1c8CtObe6P3JriKe+txMbx6huihe1fJW3E
KCA1ctgF6o2Fh2ylAloE2IgeR3qb+fqyAb/Uh5cHCPmxucV8Td9h1G68wz1qhjYvRL2U2fxWm9tyvRwG

w8SjMCaryepr1evy8o68m1k8x11O4vc+b3BqF9Kp7WWrbI+hCH71mbNsvZL5FVCP2WFtrKp+BuHRD/au

F2X5+pBrlTGzh7a+twmg1Xa2ccbNwDx+imm6Kz3Okk
2VHh33QGX3tlYgOfA52IkZSb1hwZAVASag3Tc5BWUS+CVqycUQe1gVMdNxdva1r/MwT+yNHqi9VZJVWs

AUTRI8LkrlmaV7UlkUAD8YcRDzEEsoH2kUw3TMkpLLevJYV5YQTK2LdO+EdIQn1/VOUYTz8/ID861HRI

0dCGzXXnoATlbWPso/xQIL+sKZe+Y5Z/Nm1jogW9oo
K7YtOskaDVGICD4RIipWOu0WkPCvcTFX4MTYCPqe6SjNV+hFxf2bxx9Cdite9AP+eb38dAH/42JRGo7Y

gf/JeFyZS5x3eE0g52dbW9q2fgafI6REcfk2QrtfpkZ54e7bedWSDcfkDMVvkzwj197BPtw24IlrTAXt

egJ8EdeKKnJuqb+AW5rFG2c911jccFhejEQY1/Pg3H
ZtNbfusdQ1M+pR4Kyc5tKM+shH2k2g0uxS1hBLDKxbLy1bN1MNnRJ0zfgcDJJjpxfUjV6russ5UIz6U7

m8sNM6kw1+D0ZNu2E/umfr9cyNd7TpuJBdyRV0r7mac493jib3LTheWNnb3G4N39FshDzOi/DyCnbeqw

2Vis3SMMfcMzVp6FBNEb/ETqdeIw/F0xNw9abpXmdO
4z8Ks5EpHYsiYXMAQpfnSjPa8RMgSDURFIpyFRCN0GArEm2y0CuXpxO3fYATl26DZ2WBEhPpbtCjl+7b

btrJUV2yvF7jL7l6Xtz8TTISb7qnfauXGCE0jM6DUZ/fH/Ir8vI+etiOeGIFaOGSb9CiQ++xha2dxyYP

kxkeFui5aj2bmdqSKs8GMTFWx6bxi3Dny4V3BuKMPP
kE94D9kkGs9VBQAx5depLbhY5C77M3MzKixbf4Lgac+FFhRvr9HZrz3xz4Z8Nw1bqmYiS3wmwHaHk++2

4Wfl3EnQ+UuSmq0iMZlQP30rEqVQbA9cJCOZaQXX4Qet99DoBJoTHZEHXAp7gKLmRjbEZCGwENjQLQw2

Ey3cs+a4XVbzdmQ1kC8AUVoYvw86i+KRdMzI+jhxGL
j0hPYT7nzoKa3u351KOaAGSIHPxA8oNChVq68qGuk8HQhmXNPiW9rl3E989fM/PXupZVeUpSYJcDKzpK

1WWwwn2y4/xX5OV9+ctt10TaFNr9ORlgtkb60NZDDO7EfP8HvnhoWSNE8k9X/jS8NyTXeO1tXbTf02Y4

svnDW6IaW+32pMhF5yHziptRcP7BtrV6sQUmkR3JBe
KQkezFFdfvXR/Q64xzgMBEAhkvAOLjXj+HUQEaQVnD48HQlR3ehjA3euQ0lHmmMXVQ98vnOgl7u/EHbp

gFl8rVnVUP/s5dLjNYjqSh9SoK7/J+/z/hyJhDgPS2AmX9hFMReRT6v4r+tZwR2MEvEBuwKgpGy+PkJc

xOU/7mUNpHNBdIfVi5TIhu6xNz6/OHwMBgpDoS2X2W
l+1GPI6DDaRmcAPEH+BXxwOF94T/fnl5rjpSiTfunAA6gtMeaRgXqNszq/7b9Dnp+hx+jqOIAgy9hOnN

FCCGPlainh6oMFi68ldW5vFzqIT9XHhvrL3h83nmbKzBjt/70cxJ4zoE8hi6FqSegQJhqrj6Rc/1twlm

uvbOtH5iXHQQnAhPVbhFIqny78v/rkE/OEriIAwHDg
qs4IugYe8jYRdQodO54J8+rHonWkqCh7AVf3XsM5SWoDD5I2wKDXER5erGi0FhwZaLyd4doiFhIkUj66

+lvgjmfmGuEH14sResUg1gi3WoEjXokEnszu9WNG4TpFQTU/oMSyJPku9VDqkW+ziJHmKe8A6iXA/2wy

IogNk1RX+QXH8Bhmw/N62gOoDdlV8jIrI8FfOx3Xkd
/fwAI58m6KLqAJaoBD+68VijBpWrnqPZxVvjpc2KkvF1sEz1ai9i7yK0Wc/QAzmMOvynu701ce/I40J3

yElyH0a0xePCQpafMWHiC2h0bvf+oE5GChbtLI6u6WOwvstnl6W+XRPAY3bGFp9z7Z1L8nvXC+GP90gP

98P35EyjJqHqTEmNcvKIJ9U8UxlBBbTHzQEIbMtlIM
ma/VwfJPctXlNDRtFQ/Z0xHa22EUhaiAGJuNRLfr/htug40ydV2yP74pJgv92i0En4tPpHc4isX4KNgU

Wjyqr+SYzp2rYzGW8UO0eNAvIPZxtgfG7TqvAHu1v5Ka9TN0cjuL6e7RrIAP+AOhD9646Vs28KtI1wY+

Lf8qqpQdHdoL0VI018ZcSUKVKXCk65em84lpHohFyS
YynDhrGe51Gxo4ny9fnfrdfL5j2q+Dm9CHJ+zWcoX2JXiD4Z1TuDytfmO1wue/AV/xHFF7t896kAwN8X

ptPKSVOaTYK3QGUFHb7okBdkjLgWw2X+wB/xfndrZlLAeK11sCCQY7Pc0MInc4yxu6GjtboRz5cZIRIy

+jTB54/esi32UB2qROfwGt8RhdtgpsRzx+qNlzaPqQ
mcvijNgDw+eGVhVqAleflXfchYIX5X0qS9/pPorh8Iu7al+I+TcwXjbTCGKVVASdHeAnFFNxDYRQs7mZ

eJEc/gzZAqrMKCraJsuh3RYv0Tzay80arNP3C8Y10FYq/UhhvER4/F10DZRoS/PF+ykIrFAyIRn3HodG

9vtpDA4zquMI5fIG7tfpIWU00hgZlNh5xhfO4qn7WF
E/10aFTEKAAQhKn7EYHwPM5ithFVQNi9xH4btGytJhnQry/RX0ohGt5cy1/dhhC1PBalZqjqXd/ND/DU

uBisvyCabuC5W6ne8fIhSC1zuyYh06xK8hcj+SFscvClaQac2u+ezgh4kg8mhM1a9F3VbxtjGrZg2cV/

zIIplpaCBOYKSv6GFiP0JvwuyQUpWkyOzzdtqdKKhB
SH33es/SdjDC+u0aegMN6X0GByr5YHh4DKLMyCBZQonRjAPxX1uj4hLPgOJXCmDW+AlzzB8FQ911YiJB

O8hZ0iuEHR53xL0pqheDyZiKYmCr8m1LyqOkAa8dC6cyq5fIb2wvYV87zpwPfUgOrNcGhw855zV/Jt0h

pWJWhQwt0nHIM04EsMyGXqyy74XdGTnTrdKjX880uU
50EkhMH5bVCGhKOi1wwGlfcaSs3JSvmUeQ7nUR/Hc7SXMP1d7EN91RS/pRbTbwSG9hSHCJP5T+bda/2N

/plDNMejSkIcjY8uqXyJ365fuaKFqKUdaxKcYp6VVQwbHGmfOymtwYUNqktTiy+5SGmVTJuUEaQ161rY

ynAaGezA5sXDTme3z9On1Ay7gxnmOOTmHMPx5T+nw3
4YpDzWRJJZxaKmcHstK4QTf7WD75efJUQ5aXViuuF3VlVNf9kz1ZActKuPK8XwKkOYU6j5L1xMPyGZF8

EdHUGgyDMHV5Ahb1ecbx5aV4UM8pLYyPah3sOImYW7hnDHZzrZhQjrhHAhlxTPB+q75YfYVolZgtrV6w

QOkWmmNBIslwLomVFU43sQGHAD9n9TAV44rTZ5tlqK
c+l/arHaCfLctlf8PPvXs7GqNenpYnalCslYaKS6mfLW0z1b1te93iQj9rUU01Z5cB7s+LzwJNSkZwdW

kScIdfGiRPGyVaE9Yj2X5yVTo/eidFky8w/vnmi9DwtfL3s3TBFb4kfLtFVocgTiUTe8k4As08pOwIRT

nHtZUfI9Yg7TeZsBn0CbSToh77Xb+H7EbeTZYDKLfZ
4c0mnW+muVqhoOlCeiu/UqFOpuQXR8e56Mht2QHO8WeubAcNUpG+JSHvxRM+pT5OH+Q3qEGroH4XEBSS

avTSsIFEDsnYtFLqJ0X4nM8yYaMK7eEMZcSgclE3IoWiWCsojuOZP2orwZDM4ylsB8PBphy+E5ZDmdLX

F77sczkRRURGxhZAavTLL0uhIRGPVFsiW1aZ8W68D1
A/HBwQOhgmAAdqkl08w+RZk5d3IUxFeerey1WULLgJ8PhSSgXcUA23E6skJxRNpBtbXpkVYqDdWKmONR

REk/KatZebTasop4IWa+zogDj7IwWkCNnpVYjMhVbkD1iyq7utxlX6SdCcpZq991TRhsJrxEwWTNG4g9

naZNpYju9oAAJs6Oj1mw1pyrtpLUg3EqgCcG7MNJfu
vVnhtYyGYIGcUiYGmoAcWm627/CLWFAz67mixBQYzRfneICieguUQGTOj3JCgrH9G0ImMFoSQr78Vwhd

Q+LiK3hsYY0XkZS6ztpESVboWICeiIH6jNaZX6BdOAJ9BcVLzkPS5qDwUuha/qQb8jshF0ZfTBLrOAKY

UYiAjty/vJgjyotnnJqxrcGDBfQZbRwwfHBZODw/VQ
EuZkWQF/veRcmOzN5GY3XefbWwcRkSujtzOxbUOPFraP5JSu3h1ZRnHoD+ZIDqOtLzsBy6CUksjjl596

s7t4NV59HvqQdWZY6hYGMz9klqDlxtlmLcx+Tj5BkwIoHEZbON6J44F7fYnDgiGAiWJNYINvAmiwpKju

auZyQAslz2zcLJuKMZ2wDI6EgNuqX/s/UR4DqCQKyd
SA9oEo56tr1ot0Ma3ZSVNecP+bYw0kD2DalSlCKqzC4Sydb5clhrAWyiFLWJUD55vje2ylGqOGtD1bI8

y7cPgFMjFtl7cyC2xAaaOZ8OWoOGj7S6Dd1qf7CSagLEwZijJiyzlDISA3EWncfqRmGW6ZL99kZ2uAmE

FWbNMld5E2AAGYQ+MRoT0qJr+0ArA7LKHUBrl6PLai
7cCvZDEPl4aJ6cEUboRavBPFf7CmAKX7VPIQq1HqzVraPP1DjaWFOa28lPEjut+BBQaxc/zUgYX1Mq6W

lHU1lNDjQtLKz38kZcTSHfIvuv+FfpSh+Sj1Ro0rDKapjD9vUvvIo7IInOzttQTghwdSbTq33asVIVAE

BTuP6zJxJXJDsPCZn0HsDDa3u+49l5lyGY4PZFVPNy
GvY87qODTfsmeTIBgCxuKnJdiOBJTrwtMKmglbM+U0aPA9fR0vtvH17c4Ve6YTIqG35eQz6Wcg3eowjG

pN1w8G07dKeCUXXpL7l/6MSFZO3b2Mcf5LZz8dVhVyfsaSDrXHQcAlL2tqFAIxjjFRQCAphMIklwtLGC

ZTlatmOithsB1zlrWaNu0KrOUZohjk5QFfURHecyvW
5qMmtpMEyyw9qozXWsXPpUnrxmc7A/wDBbAGIBOVDEIF3GhZLWMCBz7yMN0dVrAfPr+hsbuDAxBDIeBA

bVMuorF22Ci/a0sS/QnYcVwHKAQOWV/mMNn2PK4s5JjguZllrVHSDdWeS9nhwcQ6ppiDloBPJEgsP7Gb

p+bBDtSuQBMBImuDCZgteRQrYxFSLK8ThaTRSepW3s
PSMoDhEXhWfuPUtdTcMEUYT8kwPAF8SrVEiRnBTYYcPOlfx2tVY7AxbWdjqC0MdoZJyfCePvPQ02QNcR

xmwQjovGkW0hYe+0Ijj+seOLBQgOvVldGtRGI01KwqDypKA8+FTfW2tHvK7+sYbKUrUnBdEOIkcHJk/x

DSKqaI19OFIi34P7Vf93B+Tf1OZrBLVDQSMSdgNXeW
Li1G3a+SBaTt0Qdq14K9ZX1KJ6vmHaAcJFVJRxYbGFFKawm5lVi3TdtrBPQKaWQt3OIopiXQXcAO9wgv

SInc6K+9chkcOh22s5RxmRpkOuJAo6TyoyUzBaEmv5eTA2a58WnZSma7vC7asBxscHbUXn6q0HJ9ygmI

lAhWahDMvGSQIyutC44mlQssrwcIQUPWa0aZpjvo9X
1wYIfgnE05gh/gbOjTWk+AGxpAWDy0gy4Fl80UU0Z2FX0TuKXqhTfOelZ2YTihVmIU2Nf9OpXAAP3QlO

9DMQrOIIEroPjiMrX8Y5wyvnj7uotvhUnVfsRIR8qWVO2dEuFZLP/anIznSa2/MDytAo71kKzZ/cqFVD

bVLlQcagkjhhDS2EhRJd8u7xdKNBLU7DEje1w2aSfA
wvVkI4Q0vMoXg6cMNfzj9FW2ZEKbzWbxmz/p4hUTysHhVkqvR7VEI5kaOaYoRJls4lLIgWICXP1iuPro

I9vIk8CTMyoVNPYyiwOi2KcSM7VTSs622hD0UCySOyL/0xuDLwGAv3UjcU9r7Vl2T2Kqu0SFVJO5SOfi

kmkxuSnhc130MfJTS0APgBLtuYSe0UkBoAgxPTM1AQ
HuDPLgodJ7+QGN4MNVCFdS8TUaU1mxTRjcimdde6A2p4IG8w4yCmQQuBjBjT1eJYO3qbay4H/TL2SClo

4ZJVka22+AO9DZOrzVTu+DRE8q73HcTX4w9w8wkVHCEA4bW+zOD6TbwN1N5u4vGmEFE0MWzEv2sSka5x

XGlPvEuyUEVrtuYwL5h+gfgJIXqRzm+jsQOTGe8Rnx
5RVaE+NqbhHmZVWTCxOlOz/qx7AdLzck2/XI+UjnoeXq5QYHjJn6glImIGja1qF1TcyPvNGoFraUyeJr

uHl0tZez2lJJcsdAOHtplB/8CQFND7QSezWbYxOufeP+vFkG0XB7HHkKSH4FediLZ+NrVWVZyWlUF2xF

Lmmf5aaPRjFUXD+DAMTsaSX8djaHVFOIA6EIkQDJXB
avB6AAj+4i/bz1DCPWdI6gnETcavGNwtojNJZFO+ykuOS+RSKVzPlzWVV+GGU2PljDHwVrYkCLIATjey

0FQ2mBxjSw9mDzlFTnLoIx0xZI9MW8R8JXLFzIfcOmStCUCcfPEdXNQBJdsx04RakOyrAXVlxwDXEoOU

bb4GGImAKqSKiGe5L4K6KoD570M8O/2Ocb1XJt/Ci1
EPjgTdQoZ8QbMbBrNtgDfmxrV99bHZ7QGcKGjO9IWnS/NUAaYmAyfoFh6GnbiRAdXO/nnvLQF6E3UOhK

x9jOLsUIEWe5DzCuePqsqZdKXbLVckoF5gl040FO0WsUDiDBGKzEvXzfW6R14rNOYCMhZNec67FNjpvS

zs9hIenQSDgU4tyceYSAzctkdPtUysaEIMiZtOFQ7K
cw7MzzKmLxbb51zSa3NY5m+E3j1uGU3FKVcRTZTU+TtOOmV5CoSNixg1JU6BYOjtoflk0/fvL5B0kdeU

hw7GuQAPLJJrVY6Gjc0IQ4tYnJ3wUXiBEb9kFcyAP5lmpd8hXXxVFdrliSOODopgzXXZgxd7Ajc/BbWZ

7rzrEoZX9T1azTuOk4T8MDw1mfldwE3RmayiR0LGt1
S3Dw3jmP1X5la29QqYRIjc9oddr6DFQnJKZtGGGOYlYOkDpuOPcBqFPyADCX0FI0VNXGwiIpWeiIMn6Y

byHZFsqKP3YRDBJ7w4SOWK7hgf+ypHD0noRjNLymuwPctIpTHujhIDCRyM095agpDPZCWlA9GSUtUW3+

rU7CS6K+A1TWtVPRwmFQBN0afak784H1JgXGuY5DCa
nLm9xgWCXgF4Z56JjafyTNBgZUEP7koGSQwxoFUCmAjBWUrSKkSrZi6EQusyB8KIqvuxbeGeUiBWWdsx

spatT0n3XDEqJZJzJ5Qu2rbHxqOLStUyODTOf+HAkjkw1qrnbiQmb5gHy3sTfk3vB22KnDZR3/p8XLvA

2Qebj5EY85iKczZ6JTryY6eifTuJT1T39XUjgq6848
8+xrMSHgpRxEsqQK25pT1jUQpa9kygVPywvTTZBPqV4gr7LtZWXkHU5K/nQ64EvLLzPIggOlFJtCe0wH

dAj22TyhiblpGoeaA6u+hftSuXm02BZiM6HYsp4Q33bHTOIsJp1UrdiTsGisFgljoRXeVYGBQHabicr8

6Jg6iFmXrp2+EdGGJ/2o/AktEKrteJE0fUFh+UAe02
2f1IjyXSygg9h6O6r8R3wyy1+xY2llTl1+M4DjL5BcNjZgd0MaYW1mcZ8yFSDYGSpOfeqKcATGlMkpD9

BByPNRo/RxPXGA1TLBmT0VDI6JPHk7tueSFvhQPdBPE3towBzHGmI2PK+tEC4HX9Qze0MUo3nBzPYtvF

yblH2aLRwyhAmOjda1ncNOsBvX9t+H40o3eGqPsyvY
YRMNQWxHiU95DpoIOZGGpUCzi3LRQt08cd4c7wWEdmn2i1vJyyjcH9SOwh1RyCO+0njtCRMtR2E2BDyL

z2DmReQpV6hYJNcWLF/OfXsdVIGOKyv0QFF4flrz3yrvvs3xJ1oINhFihxCmRg6piXEacL0OJOR+6Gda

YuEFxA24iLdbf2vnSSTGkenJSUAgeSFu1lkKUsuc1e
JUH/jqnuQJ4kQdQr7EaZ9GYKpSeG7kR2rNg5wPo86Q5TN4x7UTXF+u5LzZzV5ylKwxPJ+6QrivUkc0AN

sRY4Dc8IXV+CcLe6ZIrjTCQA38zE/BmaV7UEhnoHOTFaQZmLt5rRF8eM/5qhagXQzfxn9hPFsMdg0G+R

JGAUMHHh0dUgfRkx0dFpOPfhdTZ/mQ2UZyCaep+IZu
kF8krImh7vxKOVGM6TryGyCAvF8qZT5Bn5KtVgtLbf7auKww8C0jUrZSjswrVRPmdbJpQBCWZhQBayci

JUDjHzOTCzJqt30fkgxPqBZnuMtKUHJ71fMcLVJDJi6C/hXsNeCs7E4rJAKWVIkJjM9IBREwmNRQhHHB

3REOFvyAeGlt2qBx0ZC5nS1giIjw3VQQw3aDDOyajC
j9NIbnyzWqmFR1UQplFESjESQSNXJxOlnexxBtKpOKRLSvUiytzR1tE6j8mKfh8gsVVa90l5lzNoWb7U

xrVbmv2UqANaCOoV8DCShJwYbH0QhdxdqdyELfbjBRWTpfFeJWicyv12qerxUK5BMKHcpF+4pRT+7ji8

crmtHVZ+bzglwt0mLTJaJMhin03BuqKGskBYXobfHN
gHoJnJS4HXxkxgqz8XdAuFozlGCzuWDJl3sbfKg0x1vCi1eLu6MytY5Nk6P+IE6mTsOjP3IZXB+LoiMq

WRXJXexB5RfbVolgnTiEqJeL6JPYmDCLojqRoZQTm5qlebISwSWGQ6jNnghnZQ6vPh9sD8iUO5UiQ5NO

Iv/GmJFW5rw7BT1TfhovkhOpZIuHFusqN+tO01nges
n7UYjHZ/xyHIOB6Aha4A5iFF1DcqAFZG3Pe3zpXr00C+HMyISvNsw0+IxCoictmGWOeeBe7jGi1XdOUF

mqJmuF+07S2y1hjZlnPcKB96uBbOE5UJv3qTUzmdr0UruHVaaIa+ugB+gRQLmvRruRpabfzBujZc3Mv4

LiBzPG6Hn1hPCXBPP6s4vlNGyeeuQlJtzI87DYIHXl
fF6BizoPq2m0SgluUfot+NqxY1LVdfchFZncI8bAzBvsGwDj7EkCBKgARx2EP31lNvYB6mLzMPcIiRRZ

flIKHY+U4ENuZPDCdAEv8TGjf8hzGSnhrkkq3ZSJEJq839BaWCufqwmPwWbDahFZjSW4ITc0S0QKlxDB

5eVCnoNB0LwuND7nNP8UW6GnCu4PewGS4NjXJlZyqy
sqNBSaS+jv3GI5bEYt3vaG9QtxVS18YOMuzB4ry0k3GdEvMChdvp9OrbYVylxBF89Wn7Zg6huvf+PU5z

WqjP6WVVbQgkURNE4m6mHKtGvi1s/O5eGxglaI3gT/1p1ZkS3YPqMva6BbhSMmoroiszDSNnyxHOEyzC

TLaAVNEg4AxgvmHJMcfcD4u+wFxSLu2aKLraC1UdHX
OLPLzRy2hQZ+OZzLKWo5m3upn1VfzxQ2dmfyfY1XFH6zSelfcG/Xwu33GTxuka5fDYVE67BR5liHngEm

8NH35DK0M69V501WwKwg8sEcOtrNQ5UJ8iJAzeI5OSiyCVWZD8j10pJE06xfFGQ/oAYk0QFZ/kHu7VbG

mpkac1D9rITnTl8eSjnXxYjr1cA1Sf0foCnmNM8GiD
g7lW7Lwj9TS1eSK51nGjI4zXjsEuTVQkG4vO9tAPXSoSTPwgHqgkmzfq7FvfYlWrE1BSZueIsQrwnMiZ

ucMPy6CYoimsRz7cgqfe5OtVlLXdbnsW9SMiIv+JirjpoP1BSGsGpvRQHN33wJ1x3o5WbiWqMKbAg8gz

nF0tm8lO50hrKNI1QKUKLNYjI9HBTNj+HXlqitu8G8
CUUF+EO01RKwYv2n5iSDXBAqmCZPEe3nN22tXZsT9KE7JYwddEVI+ak6XYkQudbyxtQVYQUXRPdK88gY

RPliOITqDKwKUOlkTNSfIhkvTVvpkgjuZDJsRykermNQOgj1oDCan78UtwPsLDxsW7/7fWL7oolsnGuO

WgFKsI+CeIjuPfwejbfXOsWIgUdLlMXfZH93inPIrj
X+M33MbMiMrMfStJGqNn8CcXgOt4cx92Eb6nhHdXtPW9uLtqI7DPGTHIzrDoxeE6+ADLMNVP1nN65ecL

fGxAyDpl6EzfEXPu8uS/4D31XNMCZ1RjzmjcTVkKlAUM9oyWqd1wVxjXF2IUvB1ekoR3dPWHKQzkMDyv

BmBYnJVcM6A7CNHJRCiFXlcC0YjhFX/X1nRN3T0Ger
pGgYnnSv1pFWK8u2jkAbMw1wHSA3cmmCilBO1XGi1vNjHMQzzKZIN/GR9xMaAA12gK0bduHV7XQ3d6qJ

jiuBP1Gu6R21nhHHRGvPbs6HO8c3iZARTSZMrqjyl6lgqDhyXa2JReZh4BboVWhAzuPSdY6m0xzzgdYW

sG+Tnzmigq0Zrl8cvHdSHkejGgTHuXdJkTwYNEwTZi
/tKYQWHZKCyHQcMW121GdJOfhhhOUGp0qD912IkeYopx7y3T0Lc9wJUfnvKcvPg2qshrTGgpISY7K8s7

e+CWxUu43E4bF7qZiiOKCdqELrijphODAEQ3qnuKD6ZybJS7VehKo+ooaHIZs9/gP0Ui8OFH3n7ikxdL

P7tvgPcWLOb9eAHR/xCldXP3nfOdQneZao5IeIolHU
FJ+GI8oLvLTWULS9E7gJhbFRlZomGd2rtaKmuO5nzXBQ2TUGKuF4hzulMK1f9yxji9s35dA3u4QbKrHp

3jhgfVhVG5kYbAeBndEfc9LgZC6P5I5dEoC5lRXxS6JRh7etB50R3Oc3IByNMM7lSYOBiPdsXFE0mh20

dpCw7GKjupEj/Ykxoa14HmJDPqVPMEaohZ+9NLcd7B
5HfdPcNFtCrwsO1A7XHgO4JgiMyk4II8JhGNyYEaEXcGImuqWZzfUpK4IJUkdgTyNR7RSSmOgZNg5Ju/

iaT6lv7PKpfObdEFfMIhQbq+heRSo2gzjOBfOxXmvIej5P9kCC9Z56r0w9QrMirwu/Pc1zVydWSmSHnP

vAckQiCYXtFknB0xMvInW/8SVNL2FeZdhWHjZcLzux
jZzxCJJu+wXp47PiMdB7YQoZfqMZcuu+ZIBg0pLxQnNDp7wU33dYG9OSgfdAPPjMXjLMY4SgBvEA+X2u

B81upm/QAtH+98sH4b4Of4EmlSmiTjRPbkHBqox7Ob2ocKkI2TEghXek+DyfQMDzPNrOFiYbrRRPs/QU

yGoNqc1DQ9fYsf2p0S7hQNsPQ7BzSQqLvClWGfmYq2
PA+frlWzTULkaCFyX8mJnlVQyOB9xjgyWlxVHvuK2DtxWNLEXC9wmOF8qGYffo3xGBfhVElpX2WQSj8H

7EXuOmjCWCFHlEV9D6SZZrVNWLSAvwj0+8kfDcH0UHqSe+8loeLTebdLm9SYDIJx99iQSji+4t0W7Lzf

xRNhkwfhMCwv3LqrqfxwwpoOMNYRHLJsLF5bdMdQlL
X2DKFVnlt7nCosH/SXkZ4mPfKDy8cYgtsn93XT5UFuoz8ldF09Ppncu973QFiNer6/hsJ1EXACyCeLJu

IjgObddB7+Gi2KmUJggqxoggVxZ7n9sgnCThsuQpSKp/XDtm9kxDEpjInjdK6WolnXmDd/NF6v5IeZDi

DKq+666xnORkOESuYXLZkAJa897lM0lgzk0H8QtL0z
yLalIg7CTneGOldQ7B2m2ddNjzocInvoNYkRpufOAHhSe3zoSm3mCs5feyAt2cEXDRjKNJl3eevKmQNu

LPIz8L0G1hXnFeFML4SNyJLD7kBtiI3vP505V+7g0wuyhB3FgIFvOW7ejjPekgYGHij8YoB8waYQ0Y2k

HU5GN0Nh73S/4ZXJ6FgR0K6iDesCtKP4KVOQrWTHmd
0DxBgxkMaJsUFV9rwWH392XXrfNxAsPaSTU+9WCurpMjBdjjfTKnIDvUT55qAa9jp2iaUSu4iwiNIiOY

cHM8lLdhSa6Lxd8LXq1OIPAigmfHALleL7qpm1XHn3eFKntIMt5x7xID0UeT8jXW87VnrohPgUJL6uHP

s1onTtKPI7g8dFIvQtmG8waOVbAf3PJzGA2kQzOu8q
QIpevmnR8n0zvdiaic5EJSL3uialy9rmOI6PKuzbzjpCLMUZM9XjBlmhXIEwq8iWzMZHrY7s2Dm/tU4H

KVV1xGoApnAG8IMqMekTntSnaN8Uhx3g6tR84ao8jHNo9XXMqV1ndDWpTlk4oIadF/VYXz4GM02+vh50

FGVRoiA27CWfBn7d27DpErWmudwSD7YwySvjFjdfiI
YySgR7ztZqxpaC8Lmwwp58Ig3r5+0tKrl8DONfIc71OgmJcueRnXkKMbsrQLiChVoEBG5ClHgm+TZLtB

OpFWTlrAKlHMsQ3KjIDq3MJqJDN1CjMpP5LHsaJJG+h722v7dsF4SECMyKedSrBBWn4sKsy8wpXSILmK

9fZJyIyFTw7dNNbEZ8Retm8FIvX0V0dS8ilrbSDtpB
tCBdN14Xu1QdUMf/gBtppzj7mFD1XKfjJLTrFPD1RNRz8GYeI3PhxDrFYfQ3gfTcyVVtbM30F6wgesIn

nHvFXcvaHcUBhVwt7ZGRTTCYkKVyQ5M1cFp5QGyU8cMFOjuJpc+ILFAV2If3wf1Gp9Sp5W/SEXBXfdNd

JnbgW44E0uUqUxdrLv64Q1zbHM6esRHeUf1UI3pZOk
FO4drTHN8wbJWACqo5FVWeQ9+yWvDTW8eMkRPon/kLAG8efYoW/Nhxd4WLaHKN4zHDicX7i8NMHKodGa

1v763U7FFwEcPMwPQwMTGauzOpQ2WBvOmueUxc1EpPLce4nvabcrAr1Ucod+tzvTsiavn2IoyJDMdFn5

fOsk872jOuVLyrNm30XRJan3aVYok7xPQ/x6j6m8qY
3yo14wv4ytH2TuqwyU9tn0xwZPK1ZSqxXZkNs8h5rzhbAR5PBpqidUy0kEeQg740C6IEbhUqt4HdTXqP

Q6osRymlAdhN3/6HLp6gfdW9XkE7XHwu9y4qQBWI6lu9ID8sSk4ANNRzi32chOhBZHm551oh7ePZDRHl

jExu8P0FS1Q9qCE96kpDY+VwD+iYod7UnxugHPBMcn
5ptVRtdRpEF6BjyDS+VzhlSlGJiVe5ivPgWQ4cuNI81iFMN3wkJU6uhWeUHPlW6XDX2HJ8HtB1V+bi+Q

AQON5C9ienFZwKYWRf0Vyhk5AKa8pD20FoHVA/aiyisI0qLXXV4zsYQ2h4a4XSEZ1wj54Rs7roKOnOGI

8rzvpVkHxOBLpF9NYwVi1MIB7DyLnfJ2ZThaPGpZmU
oAMDqZDtXl6hxYnzfvk6/Upv9OWizdOO3ZhkNdE2/vwKw8xvxoWNApRVrBIhIUVJuTIqk2l9/Hm0VZBY

7qbD//nUyiVd4zew+5/lkRzwX/i2vbtdYq56+Y8Gr8N1sp84/05yP/hG9hh5mgyakv4W/1PjWwAIh0fF

dK2ZNyIeBwyo6u2wl+iDNMqj7ij2D8qyKUcMuKGbtX
CI9A8UG5x3w04+YrHUDi5vZwwVGSuHN/eZjw8wdT/oXpjSh8Opr+7tfrOb3O8Cm8+j63sp48j+aZI0Kw

zjtdGPDih8v4OnU5MQv6yNjWJ61tkIYc9UN3rHfQ1KaAm2uB4tlCT5qpSd6jOTK8rTGwrU/ZexKnsmuo

WyLFWQHJN+v/nK20Tev0y8+C6i0C6kg4/+XF5jzKBj
745AO+v80xuu9dhoYCkp2/YPNCL3z1Ez4ihsrMrzdprtJxXiCn567vxNIPkynk08IJeXjgl/Pa+y39LG

8Q0lN5gkUmioHwc4mR8Ihu+ne6klk2ys287JmKUiW3DYN0r7hRX3D81grgquE0+evELE3w4mFpUabPJo

FpfCd95+N1/v07yCbRoWGQyeHCDc8G8EI2Dr0+916r
fkVSM2Knz18H854g3d9HGW8xyr+5/td66/9jCggk8+hxtm8w98l/PdGMn63/5+LiXhd6d18nBQ4wpA7Q

gpessr8R169e+LDy1vQyu7eXInDZcFkPx8iYtO/Ukj49tj5RV/E8vG8um+7diby870lF62/9iBR/Vwte

3ft+CR+by06fn8nh1/FvIM+22NVh4iZOSfnQOPbiC5
g5ME3HTLfuytPBC8XvWrg4UrVlXBrus+ytmr/T3k+q4uMZ5Lp3w2WGI4kp1a0DHz1WMfvMwr7//SbeVu

dDXfX+Ez9by0/W/oN1uz/7+IS71n/Ubmote3paVLELYSawT2Q5tfn+54UB73kTVkcnkT9yn8zQea1byU

EAlFvFxzZSb8CJQ4bqbjRerRNgrWttQxbiqGzmjD+Q
wlEGa1IgninH7ymP8rufbzEtcQAIa885hrjGupUmZc++l7tz1tbx577Y5cx00cg6wpdUj5L1l4nPh9fp

dNRw3humer2Nr75rUwDm/LLGgLeuNaocNmY9oO2hgNVYQeuuQOx42pjl565s8su02Rg/raww1Qsaow8+

+xxCske3vXd/34fSFal3Dyx+/6Kwm9Vox1o8/mNDnu
3Yju34/hbukRTjI2yWqmOtN8khU1deeMmhVA963kTZStiyjNxzCO7kC/Tnyv/byvnp/itLqr7BkWxL+t

C1D5832GH19Js9Zw/0g/XZaKON/bb4BuRJ748VrisbbRnlYa0gz/GPHP9I/X+af76r/E+X88pZ+aZyvo

sxKchuqdav428geIAzJd6y8Q064pvnx7Axf7Qt6q+X
i7xCo+/lUDNGbzH6rgBHt+XF8beV/1dZ45H201P5YKCi2wrKQOmwPi39um+8rhgvWKlfHIckzT3p+PTI

QjvnF27uVloka3/K+bPHv17//1g/hTDejdqW26jqbLjUYDWWXHutrQZMj5npEH/E/HAyzwfoV+79aOjp

O+pp2unrbr3Gpz8xf28+7k/TT/fzZ8v/6X7+6Xb9dD
nvXv/dYc3cw280+m30CqD+FMZ+F876Ts0Wp+157qoj38JM/Wn66X7+bPk/3c8/7s4uGgaaychx6qsqiE

ONPkVfwKWNNtpoo7+AigU6haWx09Keyf2CN/li5now0/40/XQ/f7b8n+8wv06QC2np8/2p7NDhhpXd9I

RT2Nzvy+ftk+a033GUntc2tSbZo1gi4SRA/MxE4yn7
/Q9LyNDV+n51YXk45Vh8u51vbr6dFchQTv9/6xcZ/oMs+vb5p1d+nKXfforp/MfBss/AN8xtj1j1wUJa

bwz4bLyEXEtUo94lsPCDaI0nm/kvS6yN8/pyjwVf8eypcj9CpchWQDU3lw4M1a+0yk0Fm2T66DS/8lAb

LRG3mAx5QbH0h2EW/I88+d8FpBKqZ1qjY+bM2O419X
y3nqk0zoU9t1GFLyzh/7lE15s0sUq7yb5/gj5Y/b1X61lN6Np/93LBRrmR757K9+0n/n/PE2jgQh8bo8

d9AJcQFS7Pm5X9QrRn4dwwT4yg0jcW97d2KqV00+9uf9cQ+kl3Ci7cDOB2eGPqF/j0h5to4CfE2c88vu

chatFc6LpMSK56vj3fs66//KJqMoRPqdH9EndkCsSG
8D2t55a1D1taveS+6+q/XMkuhdNgCJmgheWVg60N30s+noL/F+bflRAzHxw2Dw11Xmrafg0+/1qVDOQ+

bhr4TeT+NLG1LhCNQTiWGEb/x+G6o+XBJbd/WrmN9p+vTj2LbhYhCt0L0yiJl7q9yqAvpNA9SVtwNVz3

artYNuSdzWPoUek/BQOsixCICtjuJNO0JFwdIhYL5Z
RjGjLP+siD7Hp+o0S/6BJ5hvnmFXLf/NddNe7z+b6W4Br3YJqhjs5lqSU6vDrbNFK+0Yy/GpEBGFNu7u

ZP23Vx1o29/vlMzIU8Td9ASPKmvkj9Wwz/LZeM0UWBio1Qd1E00cWuyUeklZ+HRo6yFBmJD4+6CZfLBd

wBhM1moKXVJEmgacJucmymloGin4moB3yqyEZthU8S
NXidezIwQIIpcHbqVYpcg6Rd6MGuz8JBuiZ9pGpUiq4bBB4lRlZl6MGoo+JK0SBCCH+sWrhwi+DnHbTK

XZ+p6C1y4Ixt9/v+7PlPwyaaYm6gdSCUc3xX22v7vmYnWUKemFmJM+AxXI+j0zzelti5LIBkS3MNJF07

LSdNCgMU9n5JaKVIBIkeqGUaIHKkTY/4iN+pRWFRjB
548FH7QKs7H+vxvAs9U/ukg1G2m8QMPwYrf0a+WicKuz6cxaTpk9OwCWWi4R+V/Snb8pyEaBP/4WrgjN

5EkX7c2yy0MgnfIZzY9e4EQ8SwI3SuzR1S+NZ3xQvtKrsu+I4zOI+p4eW6fi0CXfIq7EA7c4M4HpZbWo

mT3nz6EGqotq9PUQ9Eu8Mn/Sfydz9Qwnr59Bi7fdUq
8+ftpV7wDHfiqso9k+v69raqlYWDEDE/3z+2yH2sKs0+MP9zw6Aq23tb8QA5UTJfKHmEXu869O6dgCYz

16FMT4Vlc4kE7V/kO934dguuNgd2BVBYh0DlemaInRQ2pv6d4ihJj5BKQlAfBiVtJxYC0byOhBbluAzz

3vo1+qj1uZDguzU3rx8GId05njmSDIpRjT4kJCUxV4
oaZ26WNfvnH7JXdjkhfphNhrgviWdmQh/gKPjY2EI5TpxGEMzI9UDYRBDrcFzbR2suSz9xCR5+VaJXbV

bZjzM+HbaFrgs9KopFdBWTdBG0UQSB20mmt5ukch+UJQpb1+cmu4c5neSrqtBMfUYte4xNUgWeXBHzIl

0oNWXVtgX1aurVa2YDAUXKZiCs1ffqp56irRCFxiAC
8OEy1+7S+8L7kYgo7pX/t1Z/pHShVJxpTiKUhJl5Wg3GBG57b5BKq7dPrXK6LmVvDdYBi43wZM/PC9P3

NzFJqXWQYO3s8vF82v4RPO9X7E1rM1tWIAkB31zBVLS4so276O0BQqlDxPNrE4jvVI64bZHfdLDCtwpE

QUqA6sxIgBWei4c3zZf4yU/LyNAphX772It5PaloEh
94nw3e3zz5iOREnL1ec3npOBWrqdE4gqtNOeGbzW6w5261AtnxSSW+qwSTldhcppt8Mv+PlMAL9+p8U7

GtP39Q6LDZBzwlz7hMfsFzGY5j60WAiKPQGZ5V2SbuQK+AVGY9VCKpxiLk6Xv7tfhABtYYG2u66TtqnO

8dbD673vbulekui34CLSwZf9GvAs/kRxyHXvZPp8Nb
lxxndXjYD6c+AcFMShwkfBVIWoLNwfnex8w5pPWnV4QNOHQrGR1zHqX0lMue7E00nAYdL33q8xTu8BYF

hGtKbZM1R+BPlETFns47ZKok0D1RyxECQfm9lBsXi6sfSv3tBsyyWQiSkQn0FVQeSFDqiOdPCUCi1hBr

V7lnBUj1DS0y7wAEDPbwfVscfp+Mc1AJMLEzmQt7oy
oNhd5V42RZCAPMg+0a83WyKDmrw0fbsWTJhthUXFSE0UOGhh7Q7P99ebvF7HK1GTU8gUJXAglCYuDiKm

vyKfLgHs/wooQKOC4g5SahhtoRi7GmONyIkyr6MIipQcC7SFapVFyFSjplKgj4t7deFrU5IekCETu5ny

JsnrooEOrtXu2bWM+XStfEcvYuckz55V1TAmjZg1aq
WwvIGSNCpfIZ9H2pAKEyNqb3OvN5YWElK3b1LMXLpPwyFWcl5DdrQsB/zvz3eKHAisoWfm5J7aLPzfOd

loA8w9+NPKsFBdIhYGAgj+2hKow2hXqrPTrsfgn+eTbV2A1zjwJCx27hlIiSXdykj03WB4FzkH3LX5Ii

TVGCCxesC9YPVbRoaO/R+Hw1bT5BDpbR4qWtF7Z2uq
3STvCh55F9yIqOFvMVxT0QdfGLayTquWAVD2sZ8x6u0bqrn+A2xM0gNGW2fBf90VmhSCBjegeG5OhnJG

dwS3/pxUeTR+ZhDMVomuFvznD4nwoPDYOOcu/b2EFojBwyxaAlWVK7EUjT/oyUGJ0ECVtqDnW0UWeoY/

UUMGIHxJBlfietwhSyetQvDLPRIwQP8caP/ph2XuwL
feGxnq/CrM5WEqHLOxARRl3YBT5PXXUxI7EFTX9lUvmgKL6myJoPmj5DaT4poegGZRsmIHDxZABXNbKT

FKUib8nR7goMj+JuSGh1mG5XwwIQOqPqf1XEWJlraUqX9QL5RnIrV9DO86TC4uJ1u7Hg+Gij+CU0JUpA

ocXb41iGh2HFfVgVJz0lILxEEQqTosXsgMl3o/1BFc
jXQRvC2/dUnC1jS/gfR6QLiTBWkRRhXjptQ6vgbdyeYzyLUU+3EUWILcbaTJbdIQ/Snx1HoI1w4cHzxz

Q937b6ZInBVjQ5BTS0Y5vSWdNfkPHHhHwTknVB2NEgq3dadPQ07HO1vSXHLUsSu7p7kIWdR/u2pLXqQk

hIo2HEtEkJCgOGNlm8abFwE75Z4OT4tHc5OTCZVFGK
eKJ2ShmGmHUXtqR9ECpYo9CAGQhzi6ss0vR7Csfeo9vrpW9XThQ6P/kCmGKqjYg0P2s5wCv87Q54Qksk

QiCLc5UHe3en6O5lWdi6QBIhWGJIdKiQKhgBxWI3pF1EnMMFq21QSRPsSNyESeK6Ho7HnoOPjJ/Igy8S

9KnTQ5gRmHniIJdyJ6evXuhEFHVNhhulTjDAGcsPQe
VpRjRUnwSWtnPonoOq9R3v4VBnsymfU48qKJQgwH5/fSY7rZoGJOwFh+wNdwbII/bETxbpM7rBjM23cd

zQ4QsJurgNBFDutavcIhyiu0FHJcRcpMHYcpKdZGSyk8fWhPe69fTtBxlmXOTXaIt/TNpmBl+hqL/f5l

bgXfSKAGiG4E9YZyrD3AbWbFmFnmYnPWpjcsL6dBHF
3bl9A7JlmRZVQOcwo81bwPXC0TuPpPOcWOFLlLCnmgKyNGLnAVU1ErIlXKE5HBLSIsv39Ld6WqMPoQY2

4hiFvdO5Dk0W8dvYj+VOjKr86rywXqLXNyqcdfUdfZx5JVDBUvGrFaNZlUjeKGsHs1OZy2q+VinoE5nb

lWIMZlJQzAeVrBxwUUB4SfYJVYs4CcwYL+Ta9G6JPJ
jD6IQve3RrSqDOfl27HjQasVCkvLSnpRtZkUyTN6zNKHKuHMthQt8bU9KFSixmaLWmN3m7CJtwX7wumV

t8nxJxOW2Ui6elhDvjU+XvegnLhxSLqCH3nYnqOH6UPLNPxOtJKyMHTUXLXkJzQkeJQHrZxSAQxF5jOv

YVaDnybEgPnTgfQmSvtO9yJazgEfKoPG5SHkXoaVeV
kWuKNvAuyXPPwBi2ULTfPRv6aRUkIe+ydYGrqMlwG+EAh9XjDxkUNunlnVWtVfjmUXvPAyXEeYzFjWFb

d0M9bBeCTRBExPW+E+bygxFtLRbS8wMB5uJ+WOVIVFnvruSokXWjqyjTvnLIhDWSrpZNV7ogG3lskELW

SjyQEjnKGRqICS1Vd8UuKWeYBACfK7oKaKawdhvGGK
XN2rLi9OkWEucJdCuwogVhEnqwS1zSwwIL+S1D/kjODiC9ao5mwt8Xus9pTRoa6D4FXhgoBlkfVQnWBF

jzHRgz4w1NbhkuCrITmwWLI7xDLAU9No+wwPUGt1GfjIJp851wCaGSbEHVd1NXptvB7egQyXhcLZEEAw

OuZ+3V+vAsEb7ZJ8vVFx+CBFB2AJIDrYQeAmegRZlx
zQwGwa59QXjn3shtc0T8CWmeMzUk/ZU1IqCDQ9HOqRoIue0OJ0zRVuRZctA7vv8K6lQfaYZP2vbcZFOv

XC8OIJXK+JRO2Swm8oYXe59UDQkpSm6HxNHm9AMIfiB/D33T0aqxhFGqi3QPwlZI50iNZC+RlqWYBrji

PR2lhoRHay4rwU/QzuIAjVaVG98bZ7RHpROpVTRifI
+iFnJpOM1M2NdSZNstxO3xZsO7+Ceseg5odakStIKIn4brUEJXNH4NxJf0KimKbXU/RaKvUs/Gx5Usgo

/K+xgAqEsQE337fISffsKTjNueCrB6h+ukqiYAwYPV1iYwLUnlla7ERtmorBiFA0MpYKZ1HjWAYnD2Tz

ePU9JlwTDTKkIWHjXJ8ZvzMzfphEyJuCrFirY5xchs
GisSJd7sa7NASA9zYIdKNpa/nLbLjOVjRQYnIBeB1uKckoJQX0nK3k0LqTHDrkUsbttIgmhl4tuayPVp

EvQyUqa0DXUcnAetfu5eegwSWccHBpsM+PjoGQl/SebYYwKYQRieOiF+G9HaoTQbnMc29IlLYcAZGQkc

5rXgussPUDUoTBnIwPyl3sz62xrWtsQvJDExyzIQRe
JDPcD3GJA2GaesiXgy+FbmE+LTfK1nQ8kHTeDkuA+zjpywO3a8l/SJdctNeERQMCafJWxGW4nDjRMKuD

Ig6qKnkEhR+yExy3qKXlmmmXdHpEXOmWKHWFnRmMeNm5OB5iCMHjeI6Dtu7utzeWQXEBNZFDVwHeBaiv

ST2U9ki+H2rXCN7bGobcV+7Iywg3WE4Vogm3DwV9vt
gUwsSQdTMRTRSnQ8erKqpXQ6OIkMyZQCzAlqLV89Ro9NMwzvj45Jhcxu0yVBGbhZEOmFSg0B2rsn6BJe

c9SBSkTbPbJAFIQjzhpJ6MnTJbUgE8lO6Kw1Zea8JP9ZMfD+xQBZCuGUWbNjqrYGarJMpaasFjicaciO

IHDs8gFlNxzEs1og9f1XfigAIIyEMNdpeWUwtuwGtd
0o6IKJaxAQJtVFdVAwQHWkjbwwFL5mB6VDW3cZCXV8IP+rPC4jy82S6b9/174xwVFYsynraggccDo8gF

wyGZJhqk34MAZGK2wjO+byhAxFZMNEy7BVs0YuXGibhgAWPS456Fo8WmKyuBomg9WgERbaVvOgl9lfMf

SmOp7ONk0IzaY1K9QEeFVjB/C/RIrMmwO4IgI7i6nK
Dm6AkHK7NbGfB7opxZhNcFVDmQQfloksFfTTraMySETk8TWaeqQ7bks6xikYbhjN7AknX9QYRxMPCK+C

r8ZxFe91R+IGk9cN3OYiZEdruzgkoyrBOoKzHAiErzLf8APqMkIkr7C/83sFxzXLsHVdZIQTH8EXwyaD

vkNzbp9uvA4K5FHw43kxso+geCM43MLPXOAaIUsVei
tDmoYCFMneaG3py7hmdfV/onsvbDhJMj/0cVMXkKAk9Bn3XNMKNfz+QQ0vE4pb5YJVo3Dl6EQCvwVIKN

PxEcZzSzjBxRdb1uHO+XyJQMcgc1CQM7ym2iSBUNsrHVRTSS1AssBqVt6MGvXxWSP1DM/Whb9cuSmpri

dZNyxWzcP9d37jxjkICtR3IyYl9Nc0BX5Rt5C0kEYT
yEGx2dIYL8Jtef2Jk2b1ofQlpodDIFPcBsFNe8h3vV9er7KJ7XBdoxABDHEQGPM55JuiZ/gQ/WmH9gdv

4Ta0miXxfgmQLUjfzq6EME07ZFeJSEFYtRmvx1Dek8r8zEW6rPIrWSNDJBMYWubv5bEUBJVSfeqsyZWY

g5ikp/T+DBCC4JDYCiTvjKfSYoTAM+JDZZ7phgwrHt
W+A1Rl+aXbQQR9ZVUGD4uBm1E5x48HzHngrzJbdo1Vsge2IQORSMqgoeONHFboBNgWoAXYdiKvtGBNOQ

zzGYWUF2wwXFmXg1LsmzFb6CzraAD8hvJVN39SM9HaSoWXzOpAKJH1ebAa5fvrnwYZnaE+I6+p2kmbU2

RQn+EWRtmZg83R/IhsrrXrjLJYUiPYHaJ1Q3mWhRKo
CvSe6U/jEOGJgDFejELDjqejFyQWp/VcPmBVn5ZpOnusbeUoH67befheZCzYfqNK15XcbGY2JVgb4mD4

Q0NlIA7tzXh1iUyeor2oAxXCellYl6b/PF/bDc4N3ocIEb2FLHZ9DSr+0mwduIKzlGl+raKwMwL4TVvc

hkm9rhlSqB63s7orKdFRGm/Zlh6HtY4eGcGqiCVeh2
zMZxctNucRWmFZeWgV8b2Oqmcy9EcX6v40v9cXT1guci0sO11dJom56o+Nz0JtfNDPuFoig++y+1g3UZ

8wZsoyX8KLu4mMFcjNB5cPSrbmbaQAo6RVg5bQMiKaJ/h2kHqev1tXxBUHQiYPdRZXAJ6rxf6FDuF1/z

QvKD+QKrvgRPrJY4s7UsIgnrhYoFxTmTIW+lyYJOYj
JHf8aoVrS72grLM6HZxQdQOJmzQZOppgYZ5kHWMGGEUHONjR7sBL59wH3M2BwPdtR/ewkhVXCfsbFlKO

z+4xaZrX56M5Q8rRdMRGrUGT+bmK2VCSn5dvFahCgc2wrHWdzDO+8Xhtqdy19opkA+PfrWGhic6+b3lC

pSYpPqAcX9GWYILdmNW0VHBpStxi4DcxdrS6zJmM+h
c4XE/w8MMZ8yaPZIQPsrgMuND8e6revxX4K7Q+yoCusZdkFRNG00sNeDddLaDsF4BN3xt3N5y0Do7oW1

YiWBI5IvNO7s9zbCe/9wo4jq91uDa8GXimxVTQoro6GCG74vQ+rRnvX5M4f8HQU7c79aB3Q+ypbPgc64

aLqf3o0IVZij96BiOvx8Wjvx/TB5WgaK2p78UvlGYx
y/zlwy8vlAH8Jn5193cO7+pxRpkuVj/LmkwSLikEgiTU9xG69IjaGN76a5OxbilqQxrSU/zd9jb6c5ex

eNBTF39mwhZarx96MhoKLORl71qHMeXbJTcANKKAxsV8ZXkhHBBRq7ofkdejjCl5Guy+hXd+uqnhaqX9

fGGJscOf6co9yHWOdBNu5EOjBSZoyRA6jGKRmYsUle
67RWGrMZ/5Sa8mqe5vjb+ykXpioh37iArBgf/1nGXF9HPqNPEEibETMSKqopkAN9HJ7uijyUjUgvxiMH

r1AxtJXrus6MCvk4LfmTMb5MsQp51aSLdwZ145NGyIL5plv2HZ5O0dsDbeSo/a8tSX27drrofZQFkCKb

mUYqtlm9HJqD+rzPHNlnMAKEnil9RV4i2oj3GCDLgP
MfmuPK/2reY8ynNbwS0jW0+6Eh+lo1Se1irkIrsPxWPakZQF05oQmU/W3UC3hkxraHalo2Sak8Ay0uzV

cNo1T5J8ik6X4V5YpyNnx/nV2yuwzgHQdsRsQrLF2bYiWygIY1Zu6wedbwTG5pJqxGTeEnm5l/t8mW4H

Hz7s13WcmYagc7rkiQ+z60j78y6jd9cicP+rUj9wu3
E1NJ57YfzmSAKqdmA0I/5Q35wzCR7LABZEFbi59j3gkp/LrQTZ3+90jRicKkvkbuGz/iI2cysF4yG8lI

l/wPixh/oRNsuSpvG6KucRfREImW6jzVkwuNBI1sh7gwRLDrhVn6m3Afs2UK2aoSfreeERLn3PqfctkQ

VAVBvudI9JAXFmaZ8uNaa4ebH40IEzRTuYJagFb0AQ
+QderB9uk/o6mjoknJqZyHaE3jv06sbjozH6WbCHRGEzdgvgs1ah4yWKHSxTF3QhF9Ruy47FNDidk7pw

x+MzT7z6Q7aiZpnpaKwdAS5oeqt8oEYBE2Gip42PoValO6SBVJcwpBc+bjWqDRA86tw0iszQFq0zuRLf

kUkybge0fWE6s4k5o93Z/J6mLS+ZwV5qX6nijUQSP2
fVoDxytjUksyzN5h7YE3UHjp3pHi8u50vO1XMfWllcwAidHtztVquWVrJFjWplIgsjyTAOohI4hmVpgd

P7Goow3PA61BE2BQ9xUmz3Pmz4dDKvr8v9UDX5rf/Xc8vuxoj1s+70Cm9KDcsXqYJUo1UWCtlx88u4mG

rp1B4J62sE4gOetDUut+VMMUMf5yL6hkqMeX2skdTn
PssKSy4WdBYppztbJi6ygu0QDZv2HU3r/0cm2dn98S2fcPeYaF+99u73OuFTWF7UVUEhDPcG2FFWOCrf

g+UliY/4FQvUKDlHalTdh62mDMqCf35Ya+YbTrAAx/5P5P5QVtgpaCnlhgjqCczredqqkppoFLqcFvf+

bqH3d3cT430KYlJcAV/4y2N/Z6H1JIO89Kt23hFrII
+oDuOjTmL4K1R1t3l484srvW+az27SF8THMw+uhriiv5ln8/TkEW/AG7iuitozUDz/ir9D3ppt7CxMYq

31//yxzcp+K24ITSD7khD/Ic4Bvtl/89APOkeSMtnlPPIKA02aylhsudZ9f9u4DXxU3x8b/zUYyChnuH

g0qEcPFfwpj9yGpw3gUxoMey2S+WTmWewr5+W6SsWu
afNg8Z5VGichEYgtcwt1dI/r45NE2zSz5Zi2ujzeyp0H0HJH4BE+I8Bbwnu7Kcdj4aserMc047tibeh4

BFI65dRcOydQd4wIdvShinTqUFebzgu8hJHYVCzq68McjWYZQ6RI40mod1TUfuw4rNWcStwEppdUM08b

6u7pFSm+nP8c1UNP8WNXbvStLDE8vh4lh/IDhzkH1m
uuI/f7aKk41QqLEZCrCtJya1cX19OincZspNjuE0+9lY1wlLnIoj6CJaTcwuv8yPwcoxY3OgrZKE7rVs

16ADVV91VTLbII6mSZ4xhix7uxllrN+xrCBrnXnBLb3lrSl/1NCfn0J10Xjkxu48iqEUw25j6YEi02Bm

nibfKhMAlPIbWBhGnWqfrqi1bymN9hKddGEYfnTifq
TbvEtC3+5M2a7dxmmExbuz2gLieMvu5f9kmnWl85DhwE3WSGmU46k6jzdMhSyjED96+vCLlUvgzai7Af

Jacl6VAqh54omqCF3yD35dv65Sttm1h6HvF1onzXBnJGTvCcEPh/rrPxrmC4ym0yz9mBnMZFm0Fd+fAV

7LV2gYnvodFV3MgBAObkaA/wx0ynh8NF1rNpZdRkRX
Nd3+42b9h32h4Em1EBiYnxLZPFH1mJsg3QsOkpB678SPqXxcGzW/q50nNYOt0nBtS4s8QGZHx2qaKYul

oxki63IfAhb+zdjUSHRVV34+qNk10Sn4MUugTaZoLOtgh9ChdcNDDclZTF7RSFFGr/uakveRlWSngKZQ

MKGZeyqmJ+21EHKHdj6ipS0DCiWG//KjRqbxOePO0y
KgUM1zFccoJkKNIbE2yJ1NTQ5Yi8Q2AfNGOM3SUDqN75iWi4ZoXqRGB1r9uLzct+E72zTRk7hyzJlH4w

Rw2cNQ5csGKPF3xKZ1Y1tfQdATYJmVUQGrd58AQSZnxyf4vLjBhCG6CZfwGBUwlpbAMXuG96/+YAPCS6

F8qbFNd7Ag5KuNwV4qH0spzS+RdfzfJzRX6Fj2YJs2
2JwXtpxFsGPVqeYANUQk+VI0UBdm66ylErrRjYtKpc2C66+59dapVGrkrgPKs1Dy5AZ1+WAnHegICQvK

uY0tw6w8nRV4kf4OKIZiCKen3mMEvlKP8yz6YWW1w93L48aWOi+2BrMuOzUI5MwiGATFx4hf5YOyF4v1

RInolpLEVLloJ9nk5MM8yBcBze8dGk39sPVPpZ89Pd
4gZ6peUCNl3t/FqJ3yAjXnmv8CU63oBYFBLJ3T6Rv165K0IeBLYuN56ES05O93A2buXLrqOU7Drb2GG5

2+5jfEXIGMiYsMf2SbqZyVDSR1giVwa72DWvUORmApBZbRT/yFNUL6QEQ88E60gLXd/rwjVrzaqPk7Nf

mdWBckSPVwP3SVQNbgkoVBe7hqUZwUjpAt8NNh0NYz
RmAW77/AKEIk52XHRYQHUiW8AsXU7EqhbrHjioYOU9er8AeE/lmZWs3kmllPFegVVVCx5K3e8sz9COWZ

4S7CaQ1JPjdLOhtJodB9lguEMmkhD8bfkfzZzb+NH4VrFNXjOYPaWKClYYVS+0Amg9VwU3wsqwo2uxAd

w0eb+MwCCusd/LS8RMV9t61sy98cqjc40t3r8WTp3u
Edx79V75YqwvWnddpjFJBFyarpuhgNSnm2ZEqzLPaEMj9rMLbxEKjrinEmotd852BvmbPe6PhuXGcDik

oYtjDqyYdLzrXaI7Rc0MNUR70LM5IJ/mYN6HKbUE5PkU4VpNUJTC1OGds4tuQL0okmQTWXDv9lHoSXUG

a4PHIwhmojWWw14lCLgP1cBmH4jW6fyZpaHbUdOYlW
539DRJx5SXDhgJZ/gG7tLLy9dZQAp+LS6n56LY+uvkkRWmh27OkLH0RllSwAlNKECZpLmiQiyC95jc8G

TuWYuCFGytVfTC0vsroTFajOEonD/ZP4p8KrBk1TcIm0lX2WN7ZiOTuYdR8sreAuBGf0+O8UwJFCnVuM

CP2C/35f15rkJ1duJXdNeVnA3YZVCws3yS4ETN9G52
B7UFoUAgtkw5ir61TYvKuEkFqiN28VMQGf9+1azzlSX/Rj3uDmTZrg2WkzLy3EW6A/l+xnermSZdQzQh

1UuAL/zcU3wdmOzzcXhjYpaLi4to3F1j2DwpCHn38Wpv3JS7X+eIzhmthXh/DE0AbZoMJpGC9Xx+bRI8

z4ZhXGWCVxOiVUXsImsDGkq3FH1YaJ/f46o0oHpbbs
YbJbFwwHPa3dYL5TKNVYcxe9TAhMfs3aVhw3t3HD8eSgM4U9RTvgrlHxS0fFoyqJPxSu4PjFhLuJiTo2

bY6FH9OgE6xx8tpI4Wm21y729sx+YmjSCAstPJM7frotS7DC1ZSgHsKSnYKshNSmRhdfa8bIeX6hbvqD

9Ae6Krd/H5TAjnhrzJDCrlj8u5PGf+7CG2p0ESJIGf
hJGEsmF1vByHiXJDOF3wZ6eky43AgE0LdqpcKHOYHG8koGCe93E3PdH23A6J2A/8JOaNqYmsczR/QtBs

K6YD8qxhYfAKlpo1puha2iGUQQwKOWUP03NZscr80IcnAZ9Wl9gVpJQavgqLWuKjw2281y4UzEZ2knUs

7gvs98pCB+Vn9fGB5J21dRYMRkSbF9Wg5Y+91TmJGL
2WWbh4jryVq+vsNJNEvn95S8Y/K+kt5JH+HchJ7uD7NpT69wvRm++WAPWcE5vrZ6hFjuqze+nE/TBSEI

AHwMHeOf5KmpPrNRTbMz6bdnd23y2Tkg9ic8x5vuvMMbtWlF1l2xXr2EIEXN/54WUejZF2EYHp+z29Vc

t8uSzZKWHGedxauaEelA49GiUWZAnHEmkN01EtX5QK
pwIBiCn64JUlQN5IM2xQUU1cDOhoM6TlAjQRBGIzGukXTLjDKLkEskPH0h0/2ftl4RR0UXeNKBdSFQmO

YCoHFKhYTDpUCGuXLTzPQLnNYOvIEFbfwls8z9JqW6nO+7158/W+keuWfbme57BSwtBxPy9OE3T8IqCY

dD7kAgnoMSjbvWiwLP/7/QmDBSfIzy+vuAb+oO2gdB
Xpt81Ey043yGV2obn4uBG+ZExs49rwYzFqMZ0DQyrXzbtOJ+rxqi4c52btpnO7bHzcNm6w80E/8axtfJ

NWA240gQkOW+mBI3yInj+Cfuke1wvvNL8v4CFhwk16bi3KqWJ7jZXFmEP5MdAU4uF5e7f+MPiLAhbUJ+

5GTtcrUzLTdPD7JucLV3cH4mM7NIAdGh2paxsjew42
pG1q5l9CFL5e2+6APMDVH+FN25gSMSUWYNbaLIRvY8roVpquNItF2X7RRPFpfVfAzd2Jl9n8Wlhw5QSo

bxUyrUgdPb5G+h2Ojmu4+POdjucu5zDNvqneZ1SWDYzXLwFvW8sQcRQ/PAwxA2iOvDfaul4DYx8owvlc

bhhaXNzwT6OueSVIPhsKEukgdJ8ocE1YWjn3KWeMjt
2LweHIhVGEv8LmTjlPBJDyP+rmsDa4Si77StCBa+NsahfN/i02CaBVMIP1Xb9YMuBSMdZ6BJCc18JBuI

47b4nhMCpJiyCG7ZV9BRUUtuWwF+xtUngFa4hjbZtjHTgASk9nZDXS5ylIT+w8T0/cvZUpy2ezMRL54S

W2DoPVFRAzEbBKGtGzeE5HfKAqyYCqtRnBhnO5DD7B
GUhcD0lNdOPxOL4825RdjVV143pAq6DR0OaaSkOV+0YYNDWl4ZAk4syng/E80d1HQgPfyooF+kyjNCvI

ND4aOa0jM0YHpbTZYXOmpa4L8DDHEejXxYOYvgVnaMnc0jqP0HcSqUqlCmAfAjAlIk4lZYlq/r1RoDv0

2GwhwyEV/UkhC7NkS3BjIsRNzBe70iAxWSy1FrdEBM
FcQzsAoMZZQzC2Sq/HTyADoIbUMAQKiDQE+9KNgu0IjA5yojfChWtm9pb7LxQUwJxQsQB5CcNCE9gHTL

4r+d2P1oYgM2VIDGASvtQAIS4XpL7+UVs7jODEj4To4m9mPDlQgznRjNmGk2BDEfDOdoR7hxUzMvCQql

h2Y5C3cexObRp3SLGvbGUCT9qgWNH5wYRZZzsZ+OLb
bJbZQl9uSUcDlZU8xmsuopi0Be7iTM8zAiH1UQknLgDmH/Ik3D5+elocOrTscDCmF86GFElBJd/TUBP9

EHmg+jsI2xxb3uVvvC2gVNsFoGAUgiyqudMFYAl81ML5slBctvsKMVStllIc0oaDZ8UqCrroWz5bAP+y

xvFAplg6Nq6rumW+4W4TaadEJwmvaMxHXgZZ+Ox3U/
OCHB/jpJ0odGRcwcIIE9FYpQU4D4Fv9zdkHTHeVXIbMul9anhSLLxQf7wkwtnf9gk/qTgYB1iqlA/sOf

jqJ5YTkkJpE6WsZ+0U/d+VPF8tu7byuBQEzNfq5hyMLPTqU0kCUQGqYnnsScKQOYPRq/Ar46A40V6gZx

uESaBcya9iPqitONT9a385N8Kw96LBYXPtwIl+klsH
Az3+2Mikyb8P6QY24i6wKsIjeacfcP4ED0xdCKdaxmAIa4cqeVO2YMJyd0fUnZSxOUEKV7oJ4gQLNlsG

kMT9MDPP9SfD1HOxX1QPwruQUjlnW1kfYuxz0GSQtRX1rI13k2vX+pJaCh73k4IKBGNS+IDWQXW4CESX

6MPJ3y2kcWWcEZA0ZEtbs1lOSe1OgRHpZWKLqsfYop
rcTKDPMd0lR9yL91X2ktR8DNSzNqIpUIdLwRi0Hlm4jk2zp/9K1VBVEJRMVNw7gpjzCeIhVfSLTIFBX8

brAMRnaGsQNM4HHmQPQzXYZeGZZAXpz+Jm3qR4a+xvudBPpBWIIFJnnC0oLhgkB/CZZ8oD+mglsSnyaQ

X0Ija0bcUXg9Mf4NziHAJ9Hc1Sdso0A3+tW2VdMJrX
l+V/+97Ad5KDh6j6/ytE6f9SB3pqI/NO2Tod2uRy+cVF2tFsSk+oxK6jjIYOLFRcN1ml26ES8OFVfXO+

GmVIw4jvMHOLZX+l5QhwdlNDglCE8XkGkIvw3j3GnFKhJbMa19ZmV7b8J1hH/ZlaQxPW2Jt8bMZrAHxa

Ji5cUM4Aoq17olqfJdlxeeYNYeeRAvXFTJmJP8JOHo
cj1JO/vsJXmvS88gyhTOz/cBuPjentmXfM/ABrb34rOZXubccPv1wBbwclZlNfX4S5X4Z+Of/lYk5nVt

Ak58UT0WJldpuollp46NVLix+kNFHauhKkoNCYUVUP65cLiJF81xFy109l6qHkds00dF3+mX7s62w1cB

tiXiz3AZxi/SsVsettqzDuxCKV2KB6lTFAsQoiLG2i
aODBfpnL3pjO4BMCw9D0voTbLsO76QIeHW5YvmgJm/VTouSYwsXFaI84sIHxpCc+bOIvCjXmjCGPsjSr

71toFR3/x9eZ5aHwUQWCiIpIN9syJdPV6Y3JL5KHX7zVJRIW4g2MknRG1UprYvQcmHkjK91ucML8d5x4

hCanK+qFJRK0cHE+QSLdMYsdwOEq3KaoDTtv2KKH6a
xkToz3OYUK0VYLJ3GWXP++ImEzjqMWbrhoIFzpMIbceB3Yp78g4PwDYru3fI1bOSdkqB0QhkaX4uiUg4

Sqyj8xyLrSrAWl5DMLY7L6jPgUnt/kEZahE6aDzXQ9OtuZ31fFehCpgBKUxZ0FzQXQi4iBzZKthtLm7c

4xVL21ZfRwqLi3xBF3ySZ9beQzVjlRsg7QP4m5KfAu
Gefx5DNKr+lrDp3wRhRPvtIzPHlyHi9Vd7epO9Adu1sHSLrf5YiKFzP82TvDVnsfPdqlXgUOhGKHbw54

+HiPfQ2gGHxHQAvXbpKdVtcQtLYcSgs22dZATl0VEJ4k0AacWln1obTcg/UlX9iBrYylAHcwHbWB96

AKpQ5/gDLoC4NemJHkCn1Qsf9pL8Fi3CYiEUPqlFmP
y4j7b2lZbxtgDR49bNi9HSANaM2g557gIUMhZdp4SMGQYRaGKWG3YaUDDRfooOzMBwxDEX+ayOoxV/VE

0MuXUcqBr2DaloRgJKZGq/tMx7tRvejEbOfyJ7/imDzKtNwy0h1ROBfcWp5iEeT9d7H+7UGI+r6RJZdC

fZeN1fKNypZFgG/KD8LOJR5n5pahhtjScCOFN3WHYT
Suf2xmbY8Mob2k+7PP0M5D9tdePoMtEvMtt+mlPv73ISi/ZOgmCSBfQcrnJt73P0e70li06cAh4CvsAk

kvW0YnVa3ySaGN1IQdKuq9pR9pOPRWLBThPKte/iXqljYxcNokMzJYC7+2Uw1iNbF4kQRrQT6cKRtYxe

lUYf+ISuX15liNSWL8n0jwdV8lvH3RGllT4Gs9NEh0
t3InHKjVQFM4PkX0cy+dHmXr+fF4iRVVj+/rRy6epAdiSiJlUDf4AnU5qV5SWCGhDIyaH5EktX1xRNSa

1M3dWSK7H1C+D+vUBiER6QanHl7aQ35CUw9Ev8V4ZM5IUBp844pFbTI+FGlY6l0+MVC7FsbLXNzUx+ap

CQdMwRwkAVClMJdsBFRdR9i04xjmSgQzcr9w8N7ngl
RYSWEAUekeA+DMmrkURBX7sEo9DRtGWDUB7K1QOZTyDoFfZtFN9HIaJkxqlugK+ryxAHIA0avw+t7EN7

cf8VCxQihmK58i0grw4Y8pJ8tHhd2ml0A6hEgcZbKmGvnPj7YnrkfYvjYNUFGU3JtCeONVPfLzJwIA55

Jg0xzzDJRMOIHLojSZpeyR8240ZIqVN9gNlNTlxgFb
gFrCwDuMDHPudYQBYrrd54afD5nr+GgRAzUrGWSPKC/4cDUaDQaB2HwgD4Yd+dIkwki06dUeHYhnpxLw

EhbV2VF00/5HodYQQv7AsS/UflUYuYqobDg3lbnFTG+OOKPA3aZTPde8NRchm9uJHCk7A0kCS3GP99ts

oqvdsEgzxKaBd+TnYcI7fsby6aUqx5uTDp9shZf3gC
6vGKnfljRhR3bSRhiOEDRSwKKVKQMmq6j8yR2deQ6QhauzCbVr9us66YZCysdcAIueBbXsWA5SctGjLQ

Lz461JSSjx5QBPrFIHvmYmZxIbyRwjzweIOekPSlaXAoE/4GZ958mLu2Xe/jMePyK8RDolrgY7sGLP+Z

JYbODQYXeXAwkxosPZrKek9Z8Pz+Vo/AmejXzb3eBt
qh0rEnt37Xc5C1VBQIKCNywsyrv5IBw7qwvjiF+XYI98wwLleZu3C+O+kk4dxy77DQn127hy+6j6RE26

Dah07F6nelnf8nwHUdjLY5hqNni2sYqGs01m+pM1l/RhXkznzb0h/4RMqSrjjmlhH8Igum7aFBrIl+Lf

zLxbIvbaR+uZgWiLeLeqAgtMeCDuc29kvIjdbZfH2E
8wFxOTROz24JR0gAMBV1/j9I/JaQ9N4v40Q+HS+6ucjkiQNpZtl84SxGzD4gxmiglGomgzVRr5ksdQzC

r8LaGw5htgUiPECjb6tqP/x3aBSUs9E3QNFRYCPoC97CALREU7dK9N3uCWGwdbaJYZMf3zqsie4i1ny3

hWxEJx7+NTFS8NqftLbblYz6IzXrumuS7prQF1sVio
7t33q8mgaLNH/frM7cxx7O4pSkNS2lUi3Uvirn/lykppvckiU1rmPtveM/utnJKryGIF/5Q6/Ptyc4yN

+B+oHYaCDH03O9FM5Clt1PrLwcjky798n07ja+Gb6CNcukUHDE1mHRbB5HpOpioq1SP1fdLqTe8yCN6A

GED1s4kEGq7n8nXOTdx+0RtO/nYK2HUo5gT0eJ2fxm
zqvmIdKcXAuyf0ItqKP9i3lN3UIZEb195z3Bw49fy1DbCtvZo3OAFhstmofFv43K1jy7skCTHF35QGpY

WhE427eN84f6pSzJCZDGc7KdJb/16A061yz7eg+pWkkL1rzaRI5O3qp7G30JZqApgEhIsuNmof67uxj8

20WX6HTad8bX9sFyqHLItCHWo6+NryVpdwj3ufHzph
HDtyFK4lfl9CjVU+RVYBmH0/hXwwPlyxkFBBEutviz6Ew17aRmmoNiieBFgXVC2DX7bskTMJE8qZC36B

tvCy/waaxvvAVK6UBsNYy185iYETA57X9TLD3WWsZJ/6S+G6nUtSvI0O45mgqxwEVSfC0wEq4Yg/11AU

S8CTVAX1ewS717hO+ooSEy41kPseFWHO6SbLmuFgvt
MoqjfVfXy7L6k2V20whxK/RknfDkg8r8ZYqerS6FK7NpHy5wyh9BtEkLA9nXOSVshFiVTJcBzKUTpyYE

DZzzRfXOevIiPQZaGTZEH4vPYWJqbp+n8B/f0aySYAmpabCxzAAaJU7XNxTfSR4xtv2GBhPjIEXwFubM

StzaWSaragDsRnaXGdAbMSMuJgdLBsw7SScpCH1Ecz
1qA5M4KNdiICQPB0YaiFItDT3iK9JXS0ftPBjiDk8SWwPeL7PohsWlCEgjC4RxXNV8ZZIeRr0VZHCdON

5HPFMqRVPpLWYZIePvQXArTtAyIhWnKYVRNHcxRMDtD7XoRUF7GHMfRb9OKPKaYE0AEGXtCAYrQSJEIx

WtJBKfWfTqLaUaCKEBGh0SLuEoW5lZPoncC5FsXUwn
I0bgCrKyPyLhDSRLHYzjTCEyY2mfRuTcPvVuPSQBWp9PYrSsJ4T3jDrEcAR7UEH0PG6TTtRIMxAvCXa1

T0V8fQCeA2J3xJbOjGT5WZ4AQH1PKIUxUV7+HiErG6OFXYvTBJC0SU2EzEHpYC1SuPDRV8NqsHYwGp7p

TXVsdGlwbHk+FdNeO8TAXSOKVNU7FU5IqMFnNG8SfL
OOV9SnaPXwBg0wXCalWlWpPP3eEG4+BA1ONJYMRvYXNkIjEAxtDVekFONoYKu4K3C3VISoT5RLH2N7U3

e0l3xrwa3+LP9SEVVaB7EBHEMPQeJnVUasDKheGEZtBRt9T7L2FAKpI6KZL0lfV5t5RO5+PiANCiAgID

4+DQo+Ih5BXF4ri2HaAUmoCGCgYE1mqw2RYGkcTVXa
E1EvCDLxDNgpR6JnwXzeEV2CHDooHQzoEK3NCYDcHON5OI6+TGpkfHStZA1PUeo/kMLtY4ngdQMeTFjc

za7m78l/UpMeZJ6uEfZMXB5cS4BigMl5npRXex0AY5ksGatcVo6+NSegRLa6DfuxjF8kdHYacSd2kFI0

li1cWb8kXNalIZcymP4kdtO7iE4gOKGdGkM5ydL6oH
N3DP7kFn6HKQBlVJyyDAV4JcEJKGcncF7iDvZiJq8uoKJ5jEbuN2u3gb54Cc2iastlRQi2ND8xHl5xOh

5wNPJsb1ihcWV7UN1tRxq+GSauXKTpZR7mRKP4QcRENb3UINJ9W0x2yP6imVJ1DE4XNdLjCUBfMKDsJX

AgICAgICAgICAgICAgICAgICAgICAgICAgICAgICAg
ICAgICAgICAgICAgICAgICAgICAgICAgICAgICAgICAgICAgICAgICAgICAgICAgICAgICAgICAgICAN

CiAgICAgICAgICAgICAgICAgICAgICAgICAgICAgICAgICAgICAgICAgICAgICAgICAgICAgICAgICAg

ICAgICAgICAgICAgICAgICAgICAgICAgICAgICAgIC
AgICAgICAgICANCiAgICAgICAgICAgICAgICAgICAgICAgICAgICAgICAgICAgICAgICAgICAgICAgIC

AgICAgICAgICAgICAgICAgICAgICAgICAgICAgICAgICAgICAgICAgICAgICAgICAgICANCiAgICAgIC

AgICAgICAgICAgICAgICAgICAgICAgICAgICAgICAg
ICAgICAgICAgICAgICAgICAgICAgICAgICAgICAgICAgICAgICAgICAgICAgICAgICAgICAgICAgICAg

ICANCiAgICAgICAgICAgICAgICAgICAgICAgICAgICAgICAgICAgICAgICAgICAgICAgICAgICAgICAg

ICAgICAgICAgICAgICAgICAgICAgICAgICAgICAgIC
AgICAgICAgICAgICANCiAgICAgICAgICAgICAgICAgICAgICAgICAgICAgICAgICAgICAgICAgICAgIC

AgICAgICAgICAgICAgICAgICAgICAgICAgICAgICAgICAgICAgICAgICAgICAgICAgICAgICANCiAgIC

AgICAgICAgICAgICAgICAgICAgICAgICAgICAgICAg
ICAgICAgICAgICAgICAgICAgICAgICAgICAgICAgICAgICAgICAgICAgICAgICAgICAgICAgICAgICAg

ICAgICANCiAgICAgICAgICAgICAgICAgICAgICAgICAgICAgICAgICAgICAgICAgICAgICAgICAgICAg

ICAgICAgICAgICAgICAgICAgICAgICAgICAgICAgIC
AgICAgICAgICAgICAgICANCiAgICAgICAgICAgICAgICAgICAgICAgICAgICAgICAgICAgICAgICAgIC

AgICAgICAgICAgICAgICAgICAgICAgICAgICAgICAgICAgICAgICAgICAgICAgICAgICAgICAgICANCi

AgICAgICAgICAgICAgICAgICAgICAgICAgICAgICAg
ICAgICAgICAgICAgICAgICAgICAgICAgICAgICAgICAgICAgICAgICAgICAgICAgICAgICAgICAgICAg

ICAgICAgICANCjw/qHVxX5ewtMPdfuY3X8fxCf0LPu5ULY7th1GdSZWuBErgjhHjNekERhIzOPQsFxsB

Iys1FJppLP5UuSRxL9GuS9WyQDwxHK6ZAVAuQZWazV
XuTJRpEWSbBbR9HUGzRYqaFT7AfNPzMWbtNVWxHPUzDtIgNFQfXFSbCLOdZNCrKDFEKRFmHULgBvBlPV

nvKL6Lz1GbcPG7LYg+Mm1NMB4tg6GoLDynNrNoAA0gjh5YUJmSCuVcS6PcuvT6TUR0RJCxWi7NFAPbTU

UvsUXjIjXvHKTXQwZtS4OezC78TUSDVa8+DQplbmRv
OkrAIkK6FVVtx5SbEOm0RX0DTYAlHEu0wMPyX93vf4KdtWYvBuhoBSumxWJmkPFHOKIcw2Suw3TrNHQS

REVyuZE9GuPvRiFaBrKpVCK1VUqcYJ9iRZkjLI7JSYI4RFmkCOQeZKDdU4oICsXmNAjzFCGoyLoiWS0U

MoKbI8SvcbTbvAKmBxXnYSOMDb1+DQplbmRvYmoNCj
Z7VYVjc4TyRFk7PH7EMNYdUFbbKJ2EGEVwpP7eWQekEG5CTpPoVNQqJRXNSeJgT05eeJFvYVv9I1IhTm

VkZGVkRmlsZXMgPDwvTmFtZXMgWyBdDQogID4+ID4+CQijCV4BPRhknmTaVZFuXf2NVOOmDGGhNB2jNR

QdODLbZ5T1kIcuXUHWYbOcD8zgiyhkKF9lHJBrY251
zDjxxpUnAFB4QYOoSq7GXFZfETE1GAMptHYqKwOwWJHLLHckYM1YsMFzHUK4aY9sXPhxEMVkZWRfG6lQ

QzLxxApxPS31mWchxeIciNTlQId+Nk5HVZ7yo5XoLDa1bsZvRBdxISU9QYifDBSqTQDzGYZkJOW0OTJ1

DKQTMbQjZEXiZAOtWKmaXHSiGLKuwx1ETRVkZUH2Lt
g9KGIpBVXuNPIxTFkkNCHyCXJ6JgMrTBEdKOBiNN5YAmLuJUThPMGvTFkyKPReBSJcij6SJIIuZVBaUm

U9KTHjHZGiLWHgOUveMRNcYIPtVSH1AJDqZBPlRB5QCmTfNEJcVVS4RNIoDCElGIEyhq9KIXVnJMGiAZ

LcTsFhUOXxHDLrWPbnRBTxNMT7ZePfVRZrKKNhOI7A
FjCtOYNiGMf4TSXdYBHxEUTlif8NIKRqHMMhOIS8MpNtGKAaBPGwRSvqMIJsNHOuZobzHPAbSMNmXN6E

MgXuDJDyPTV6SUHmXAYcHDGera1BUSSgXJZdJNDdOgDaSVKxNPTcXCpjHHYmUJS9WFpqEBLiYMXpAC5T

TlEiRXRdAVJlXBAqJCVqJQJmya6XZGOmLBEmQSD0IO
NkZWMeEFIjPGapREKrKIM1HGJoCQErPJApCR4PIcCrPLYoJCT1AzmvQEFmQUVmnq6NUMKnOZSnFypeZG

QtNJUjHZPvXDrlYJPpXBJ3ZvcfPPRoIRBnOY1WCyPoLLFuCJk5OtvwALTzEYBvgr7DFYTsAOTmLOA3Nx

JiFVNmIORrSScpKNMaVWA3VRB3LHRkZWYcZD3YYzNr
FLZwYUrlRqCxTLFcZDCqut3WYMBpJBZvILKkCPUoKQVzPUXgCVemTMAoVEWxTRI4LJRhVCNtWC3UBvTr

MJGvQpZ4ChBsBUQyFWOttv1YFFJnGJXvNLf7SASbZWKpQXCfADirEUJtRZJvVYOlJBIyZFFsBB1KIzLg

OHZnRuOiUtCfILDxMNVsnq4KCFDsJSUaLqAlRfOlFN
GrBCJkBDoaHIYoVCUrWRm3JGMsESRkFA0OJcSrKLOsUAE9JuZuWROhFNXmeb6FLMLwUQO3FxUtCpFkBO

ViGCXuTPaqHKGhMNR7Bdz8ZFLkWLBsFQ4DPbWaUEFnVHB6LKtaQLTqLUSkny4LMWUrCMJ6JBy3RLXmZR

YoTYZuDUe4kdCnlQKuFNf3CC4HN3ZhcbFjJlbVEv0E
y560XMI5KYJgBq0OS3zqYd6ySICuGTSPGw9UMVu1MiL0Yfy5CBp9BGQ4LDFpL6RfDzR4IlKxVHXrAyC1

NGI+GZsaOIxpHQhkTkS4KkhgEtE4SHSeJwIaMlJ9PlDyAZr7Pz8jKLLWOd0+DQpzdGFydHhyZWYNCjU1

AfN5XTmcQKGQTa5S









                    ID                  Date                Data Source

 

                    [id number]           2021 01:23:00 PM EDT [facility]

 

                                          Therapy Department     ALFRED WILLIAMSON YOB: 1970                           

           [id number]  [id number]     Attending: James Alonzo MD        OT 
Inpatient Evaluation    - Therapy Evaluation  OT Order Rec'd:: 21  Date 
Started:: 21  Time Started:: 09:47  MD Name: James Alonzo  Diagnosis:: 
SOB  Reason for Evaluation: New Admission  Rehab Diagnosis:: difficulty walking 
 PMH and Social Hx. Reviewed per MD, Nursing, and ER Assess.: Yes    - 
Subjective/History  Subjective::     Pt is a 50 year old male who presented with
 increased SOB. Pt states he has been unsteady when walking for a few years now.
 Pt states he was diagnosed in [place] however his MD in [place] believes 
it is orthostatic tremor. Pt states he uses a 4WW for long distances and a cane 
for short distance. Pt states he was supposed to start outpatient PT this date 
at [facility] and is recommendedto call and made new appointment when discharged 
from hospital as his neurologist wants his to see aPT.   Prior Level of 
Function:: Patient lives alone. He uses a cane for short distances and a 4WW for
 longdistances. He lives in senior housing. Pt was independent with ADLs and 
IADLs prior.  Type of Dwelling: Apartment  Physical Barriers in Home 
Environment: Elevator    - Objective  Observations: Alert and oriented x 3    - 
Transfer  Sit<>Stand:: Independent  Bed<>Chair:: Contact Guard Assistance  
Sit<>Supine:: Independent  Supine<>Sit:: Independent  Bed Mobility:: Independent
    - Ambulation  Ambulation Assistance:: Contact Guard  # Required to Assist:: 
1  Assistive Device:: Standard Cane  Distance (ft):: 150    - Hand  Hand Rubi
nance: Right    - ADL's  Upper Body Dressing Ability: Within Normal/Functional 
Limits for patient  Lower Body Dressing Ability: Within Normal/Functional Limits
 for patient  Upper Body Bathing Ability: Within Normal/Functional Limits for 
patient  Lower Body Bathing Ability: Within Normal/Functional Limits for patient
  Feet Bathing Ability: Within Normal/Functional Limits for patient  Oral Care 
Ability: Within Normal/Functional Limits for patient  Grooming Ability: Within 
Normal/Functional Limits for patient  Toileting Ability: Within 
Normal/Functional Limits for patient  Eating (Feeding) Ability: Within 
Normal/Functional Limits for patient    - IADL'S  Laundry Ability: Within 
Normal/Functional Limits for patient  Meal Prep Ability: Within 
Normal/Functional Limits for patient    - Assessment/Recommendations  
Assessment:     Pt is a 50 year old male who lives alone with his cat. Pt with 
decreased balance at times and requires frequent rest breaks when ambulating. Pt
 states this is his norm. Pt state he hasn't fallenlately. Pt sitting to perform
 ADLs at home and demonstrates ability to perform transfers and bed mobility. Pt
 with no further OT needs at this time. PT recommending outpatient PT which pt 
did have setup prior to admission to hospital.   Frequency: 1    - Plan of Care 
 STG #1: na  STG #2: na  STG #3: na  LTG #1: na  LTG #2: na  LTG #3: na    - End
  Date Ended:: 21  Time Ended:: 09:58  Elapsed Time (minutes): 11  Elapsed
 Time Minutes: 11    OT Education    - Topic  Education Handouts:  Asthma (GEN) 
                  Therapist             Melanie Rosario          21 1323   
   <Electronically signed by Melanie Rosario >           21      1336      
    _________________________________________________   ________  ________      
Date      Time                         Cosigner:     James Alonzo MD         
   21 1510                                                                
                                                                        
_________________________________________________   ________  ________      Date
      Time   LAST EDIT:    









          Name      Value     Range     Interpretation Code Description Data Renita

rce(s) Supporting 

Document(s)

 

                                                                       









                    ID                  Date                Data Source

 

                    [id number]           2021 11:36:00 AM EDT [facility]

 

                                        Therapy Department     ALFRED WILLIAMSON :

 1970                             

                    Date:  21  [id number]  [id number]      Attending: 
James Alonzo MD        Physical Therapy Inpatient Lisa    - Therapy Evaluation
  Date PT Order Received:: 21  Time:: 07:00  Date Started:: 21  Time
 Started:: 09:46  Diagnosis:: SOB  Reason for Evaluation: New Admission  Rehab 
Diagnosis:: difficulty walking    - Subjective/History  Subjective::     Patient
 presents due to c/o increased SOB for the past couple weeks that has worsened 
with exertion. Patient reports he recently moved from [place] and is suppose 
to set up with a neurologist in the area. He also reports he was suppose to have
 his first outpatient PT appointment yesterday  Hx pertinent to PT concerns:: 
anxiety, asthma, lyme disease, neuropathy  Prior Level of Function:: Patient 
lives alone. He uses a cane for short distances and a 4WW for longdistances. He 
lives in senior housing  Type of Dwelling: Apartment  Physical Barriers in Home 
Environment: Elevator  Does the patient have pain?: Yes    - Pain Detail  Pain 
Location: low back  Pain Description: Chronic    - Objective  Observations: 
Alert and oriented x 3    - Transfer  Sit<>Stand:: Independent  Bed<>Chair:: 
Independent  Supine<>Sit:: Independent  Bed Mobility:: Independent    - 
Ambulation  Ambulation Assistance:: Contact Guard  # Required to Assist:: 1  
Assistive Devices: Standard Cane  Distance (ft):: 150  Ambulation Comments: 
patient's knees buckle at times and he states this is chronic for him due to his
 low back pain    - Balance  Balance Tests:: fair    - Assessment  Physical 
Therapy Impressions/Assessment:     Patient is a 51 y/o male who presents to 
hospital due to SOB.  Patient would benefit from use of 4WWat all times due to 
knees buckling from chronic LB pain    - End  Date Ended:: 21  Time 
Ended:: 09:58  Elapsed Time (minutes): 12  Elapsed Time Minutes: 12    - PT 
Orders  Current Equipment: Walker    Strength    - Extremity Strength  
Extremities: Bilateral  Extremities: Leg  Muscle Strength (Extremity): Mild 
Weakness    Education Topics    - Topic  Education Handouts:  Asthma (GEN)      
             Therapist      Jaci Montelongo           21 1136              
             I certify this plan of care                         James Perera MD            21 1510                                     
                                                                                
                   _________________________________________________   ________ 
 ________      Date      Time   LAST EDIT:    









          Name      Value     Range     Interpretation Code Description Data Renita

rce(s) Supporting 

Document(s)

 

                                                                       









                    ID                  Date                Data Source

 

                    [id number]           2021 11:23:00 AM EDT [facility]

 

                                        Name:                 ALFRED WILLIAMSON      

                :                  

1970   Account#:             [id number]                      Age:       
           50           MR#:                  [id number]                       
Admit Date:           08/10/21     Provider:             James Alonzo MD     
                 Room #:               295          Consulting Provider:        
                                    Dictation Date:             21        
                                                                                
                 Discharge Summary  Discharge Summary  Admit 
Info/Diagnoses/Course  Date of service:: 21  Admission Information: 
Patient, ALFRED WILLIAMSON, a 50 year old M, admitted on 08/10/21 23:17 by James Alonzo MD for [facility] No Family Doctor Provided         Discharge 
Date: 21  Most Recent Lab Results:                                        
                                 21 05:23                                 
               21 05:23                                       Laboratory 
Results Last 24 hours    08/10/21 15:10: WBC 9.7, RBC 5.71, Hgb 14.2, Hct 44.8, 
MCV 79 L, MCH 25 L, MCHC 32 L, RDW 16 H, Plt Count 201, MPV 11.0, Immature Gran 
% (Auto) 0.2, Neut % (Auto) 58.9, Lymph % (Auto) 29.3, Mono % (Auto) 7.0, Eos % 
(Auto) 3.6, Baso % (Auto) 1.0, Lymph # (Auto) 2.9, Abs Immat Gran (auto) 0.0, 
Add Manual Diff No, Absolute Neutrophils 5.7, Monocytes # 0.7, Absolute 
Eosinophils 0.4, Absolute Basophils 0.1  08/10/21 15:10: Sodium 141, Potassium 
4.2, Chloride 110 H, Carbon Dioxide 28, Anion Gap 7 L, BUN 14,Creatinine 0.9, 
GFR Calculation Greater than 60, Glucose 122 H, Calcium 8.4 L, Total Bilirubin 
0.5, AST 37 H, ALT 52 H, Alkaline Phosphatase 89, Troponin I Less than 0.015, 
Pro-B-Natriuretic Pept 546.00 H, Serum Total Protein 6.9, Albumin 3.9  08/10/21 
15:10: Magnesium 2.1  21 05:23: WBC 12.9 H, RBC 5.92, Hgb 14.6, Hct 46.8, 
MCV 79 L, MCH 25 L, MCHC 31 L, RDW 16 H, Plt Count 211  21 05:23: Sodium 
141, Potassium 4.5, Chloride 108, Carbon Dioxide 30, Anion Gap 8, BUN 15, 
Creatinine 0.9, GFR Calculation Greater than 60, Glucose 94, Calcium 8.8, Total 
Bilirubin 0.6, AST 44 H, ALT 55 H, Alkaline Phosphatase 97, Serum Total Protein 
7.0, Albumin 4.0  21 09:35: Troponin I Less than 0.015                    
                            Microbiology    08/10/21 18:20   Nasopharyngeal   SA
RS-CoV-2 Rapid RNA (RT-PCR) - Final                              Sars-Cov-2 Not 
Detected                              Influenza A Not Detected                  
            Influenza B Not Detected                              RSV Not 
Detected      Hospital Course: Discharge diagnosis:  1.  Chest pain/acute 
coronary syndrome  2.  Dyspnea  3.  Probable COPD exacerbation  4.  Sarcoidosis 
 5.  Anxiety  6.  Hypothyroidism  7.  Lyme's disease  8.  Obstructive sleep 
apnea  9.  Hyperlipidemia  10.  Questionable parkinsonism  11.  Hypertension  
12.  GERD  13.  Neuropathy  14.  Fatigue    Hospital course:  Patient 
hospitalized with concern that he may have COPD exacerbation and all this still 
may represent part of his clinical presentation, his EKG changes and chest pain 
are more consistent with acute coronary syndrome.  His troponins been negative. 
 He is being transferred for urgent cardiac catheterization at [facility] in  grateful for the expertise of Dr. Anthony.  He has received aspi
rin 325 mg, Lipitor, metoprolol, valsartan.  He is stable for transfer.  Patient
 outpatientshould follow-up with pulmonologist both regarding COPD sarcoidosis 
as well obstructive sleep apnea. Patient also should follow-up outpatient with 
neurology regarding questionable parkinsonism.  Tobacco abstinence marijuana 
abstinence strongly advised.  Patient was also given 1 dose of prednisone and 
Ceftin regarding mild COPD exacerbation  Review of Systems  General: Denies 
Fever  HEENT: Denies Headaches  Endocrine: Denies Excessive sweating  
Cardiovascular: Reports Chest Pain  Pulmonary: Reports Dyspnea and Cough  
Gastrointestinal: Denies nausea  Genitourinary: Denies Dysuria  Musculoskeletal:
 Denies Muscle Pain  Neurological: Denies Weakness  Psych: Reports anxiety and 
depression  Hematological/Lymphatic: Denies easy bleeding  Allergic/Immunologic:
 Denies rash or seasonal rhinitis  Exam   Condition  Vital Signs - Most Recent: 
               Last Vital Signs        Temp  98.9 F   21 07:17     Pulse  
72   21 09:32     Resp  18   21 07:21     BP  160/70   21 
09:32     Pulse Ox  97   21 07:21                                         
           Ht Wt BMI        Current Height                 5 ft 9 in            
                                  Current Weight                 255 lb 2 oz    
                                        Body Mass Index (BMI)          37.6     
                                              Body Mass Index (BMI)          
Obese                                                 Classification            
              General: positive Alert and positive Oriented x3  HEENT: 
Atraumatic, PERRLA and EOMI  Neck: Supple and No JVD  Lungs: Clear to 
auscultation  Cardiovascular: Regular rate, Normal S1 and Normal S2  Abdomen: 
Normal bowel sounds and Soft; negative for Tenderness  Extremities: No clubbing,
 No cyanosis and No edema  Skin: Skin warm and dry, Mucus membranes moist  
Psych/Mental Status: Mental status NL  Discharge Plan  Discharge Education 
Printouts:      Asthma (GEN)  Free Text/Narrative:: 1.  Follow-up with 
cardiologist and hospitalist at [facility]  2.  N.p.o.  3.  Bedrest  4. 
 Follow-up with pulmonology neurology as outpatient  Care plan: Plan of care 
discussed with patient and or family  Medications                               
             Home Medications    B.breve-L.acid-L.arleenam-SLydiathermo [Probiotic] tab 
08/10/21 [History]  albuterol sulfate [ProAir HFA] 2 puff INHALATION QID 
08/10/21 [History]  aspirin 81 mg PO DAILY 08/10/21 [History]  buspirone 30 mg 
PO BID 08/10/21 [History]  clonazepam 0.5 mg PO BID 08/10/21 [History]  coenzyme
 Q10 [CoQ-10] 100 mg PO DAILY 08/10/21 [History]  gabapentin 300 mg PO BID 
08/10/21 [History]  gabapentin 600 mg PO HS 08/10/21 [History]  hydroxyzine HCl 
25 mg PO TID 08/10/21 [History]  levothyroxine 25 mcg PO DAILY 08/10/21 
[History]  metoprolol tartrate 50 mg PO TID 08/10/21 [History]  multivitamin 1 
tab PO DAILY 08/10/21 [History]  omeprazole 20 mg PO DAILY 08/10/21 [History]  
rosuvastatin [Crestor] 40 mg PO DAILY 08/10/21 [History]  tamsulosin [Flomax] 
0.4 mg PO DAILY 08/10/21 [History]  valsartan 320 mg PO DAILY 08/10/21 [History]
  venlafaxine [Effexor XR] 150 mg PO DAILY 08/10/21 [History]  vitamin E 100 
unit PO DAILY 08/10/21 [History]  Lactobacillus acidophilus [Probiotic 
Acidophilus] 2,000 mmu cells PO DAILY 21 [History]      Time Spent on 
Discharge: > 30 Minutes    Quality Measures  Tobacco Use  Smoking Status: 
Current some day smoker  Smoking Cessation Education: Patient refused smoking 
cessation information  BMI Screening:  Current Height: 5 ft 9 in  Current 
Weight: 255 lb 2 oz  Body Mass Index (BMI): 37.6  Influenza Immunization  
Hx/Date of Influenza Vaccination (from nursing Hx): Yes  Safety  Future fall 
risk?: Patient screens as a future fall risk  Diabetes Care Measures  
Glucose/POC Glucose:                     Glucose last 48 hrs          08/10/21 
08/11/21      15:10 05:23     Glucose  122 H  94          Discharge Plan  
Admission/Discharge Dx  Primary (Admit) Diagnosis: COPD with hypoxia, marijuana 
abuse    Primary DC Diagnosis: Chest pain acute coronary syndrome    Condition  
Condition: Serious    Discharge Detail  Disposition: Transfer - [facility]    Med Rec   New Prescriptions:  No Action    multivitamin  Tablet     
 1 tab PO DAILY RF: 0    gabapentin 600 mg Tablet      600 mg PO HS RF: 0    
vitamin E 100 unit Capsule      100 unit PO DAILY RF: 0    clonazepam 0.5 mg 
Tablet      0.5 mg PO BID RF: 0    venlafaxine [Effexor XR] 150 mg 
Capsule,Extended Release 24hr      150 mg PO DAILY RF: 0    levothyroxine 25 mcg
Tablet      25 mcg PO DAILY RF: 0    tamsulosin [Flomax] 0.4 mg Capsule      0.4
 mg PO DAILY RF: 0    buspirone 30 mg Tablet      30 mg PO BID RF: 0    
valsartan 320 mg Tablet      320 mg PO DAILY RF: 0    metoprolol tartrate 50 mg 
Tablet      50 mg PO TID RF: 0    aspirin 81 mg Tablet,Chewable      81 mg PO 
DAILY RF: 0    hydroxyzine HCl 25 mg Tablet      25 mg PO TID RF: 0    albuterol
 sulfate [ProAir HFA] 90 mcg/actuation Hfa Aerosol Inhaler      2 puff 
INHALATION QID RF: 0    coenzyme Q10 [CoQ-10] 100 mg Capsule      100 mg PO 
DAILY RF: 0    rosuvastatin [Crestor] 40 mg Tablet      40 mg PO DAILY RF: 0    
gabapentin 300 mg Tablet      300 mg PO BID RF: 0    omeprazole 20 mg 
Tablet,Delayed Release (Dr/Ec)      20 mg PO DAILY RF: 0    Probiotic 3 billion 
cell Tablet,Chewable          RF: 0    Probiotic Acidophilus 1.5 mg (250 million
 cell) Capsule      2,000 mmu cells PO DAILY RF: 0    Discharge Education 
Printouts:  Asthma (GEN)    Diet:: NPO    Medications  Medication reconciliation
 performed by provider at discharge: Yes    Follow Up Care/Instructions  
Diet/Activity/Wound Care..:  1.  Follow-up with cardiologist and hospitalist at 
[facility]  2.  N.p.o.  3.  Bedrest  4.  Follow-up with pulmonology 
neurology as outpatient    *Discharge Patient*  Discharge Orders:  Discharge 
Order  (Routine); Ordered 21     Ordered By:  James Alonzo        
Dictated by:  <Electronically signed by James Alonzo MD>      James Alonzo MD      21 1510     _________________________________________________    
     James Alonzo MD        SIGNATURE   DA    Report Cosigners:              
                                D:  STRAL  21  1123 T:  STRAL    21 
 1123  CC:           









          Name      Value     Range     Interpretation Code Description Data Renita

rce(s) Supporting 

Document(s)

 

                                                                       









                    ID                  Date                Data Source

 

                    [id number]            2021 11:06:00 AM EDT [facility]









          Name      Value     Range     Interpretation Code Description Data Renita

rce(s) Supporting 

Document(s)

 

             Troponin I.cardiac [Mass/volume] in Serum or Plasma Less Than 0.015

 0.00-0.09    [facility]  

 

                                        Less than  0.09 NG/ML     Negative0.10 -

 0.77 NG/ML         High Risk0.78 NG/ML 

or Greater     PositiveThe WHO defined the cutoff (definition for diagnosis of 
MI)for this method as 0.78 ng/ml. 









                    ID                  Date                Data Source

 

                    [id number]           2021 09:22:00 AM EDT [facility]

 

                                        Name:                 ALFRED WILLIAMSON        

               :                  

1970   Account#:             [id number]                      Age:       
           50           MR#:                  [id number]                       
Admit Date:           08/10/21     Provider:             James Alonzo MD     
                 Room #:               295            Consulting Provider:      
                                      Dictation Date:           21        
                                                                                
                                                           Progress Note  
Subjective-ROS  Date of service  Date of service:: 21  Review of Systems  
ROS (Free Text/Narrative):: Patient states he has had chest pain on and off for 
the last several days.  Symptoms occur with exertion.  He states that 
echocardiogram or stress test year ago October at outside facility [place] 
[place] and was told he has no coronary disease.  He has sarcoidosis and follow 
with pulmonologist regular the last 15 [place] of state.  He recently moved 
here.  His dyspnea mostly with exertion.  He does smoke tobacco and marijuana 
daily.  He denies abdominal pain. He does feel better this morning.  He is 
thinking about leaving AGAINST MEDICAL ADVICE  General: Denies Fever or Chills  
HEENT: Denies Headaches  Endocrine: Denies Excessive sweating  Cardiovascular: 
Reports Chest Pain  Pulmonary: Reports Dyspnea  Gastrointestinal: Denies nausea,
 vomiting or abdominal pain  Genitourinary: Denies Dysuria  Musculoskeletal: 
Denies Muscle Pain  Neurological: Denies Weakness  Psych: Denies anxiety or 
feelings of guilt  Hematological/Lymphatic: Denies easy bleeding  
Allergic/Immunologic: Denies rash  Attestation/Length Of Stay:                  
         08/10/21 23:17  Observation Order [STATUS] Routine      Location: [facility]     Primary diagnosis: SOB     Isolation: Standard precautions     
Admitting Provider: James Alonzo     Observation Status: OBV less than 2 
midnights     Anticipated Length of stay:: NA -Observation Patient          
Vital Signs and I O  Vitals and I O:                       Vital Signs last 12 
hours          Temp Pulse Pulse Resp BP BP Pulse Ox      21 07:21   98   
18    97      21 07:17  98.9 F   58 L  12   150/90  97      21 04:30
  97.7 F   56 L  18   137/75  97      21 02:10   65    180/102        
21 01:45  97.9 F     180/102        21 01:15  97.9 F   96  20   
180/102  96      08/10/21 23:46   70   16    98                                 
         Intake   Output Last 24 Hours         08/09/21 08/10/21 08/11/21     
23:59 23:59 23:59     Intake Total   300 / 300     Output Total   600 / 600     
Balance   -300 / -300     Current Weight  256 lb 255 lb 2 oz        Results  
Results:                                                                        
                 21 05:23                                                
21 05:23                                       Laboratory Results Last 24 
hours    08/10/21 15:10: WBC 9.7, RBC 5.71, Hgb 14.2, Hct 44.8, MCV 79 L, MCH 25
 L, MCHC 32 L, RDW 16 H, Plt Count 201, MPV 11.0, Immature Gran % (Auto) 0.2, 
Neut % (Auto) 58.9, Lymph % (Auto) 29.3, Mono % (Auto) 7.0, Eos % (Auto) 3.6, 
Baso % (Auto) 1.0, Lymph # (Auto) 2.9, Abs Immat Gran (auto) 0.0, Add Manual 
Diff No, Absolute Neutrophils 5.7, Monocytes # 0.7, Absolute Eosinophils 0.4, 
Absolute Basophils 0.1  08/10/21 15:10: Sodium 141, Potassium 4.2, Chloride 110 
H, Carbon Dioxide 28, Anion Gap 7 L, BUN 14, Creatinine 0.9, GFR Calculation 
Greater than 60, Glucose 122 H, Calcium 8.4 L, Total Bilirubin 0.5, AST 37 H, 
ALT 52 H, Alkaline Phosphatase 89, Troponin I Less than 0.015, Pro-B-Natriuretic
 Pept 546.00 H, Serum Total Protein 6.9, Albumin 3.9  08/10/21 15:10: Magnesium 
2.1  21 05:23: WBC 12.9 H, RBC 5.92, Hgb 14.6, Hct 46.8, MCV 79 L, MCH 25 
L, MCHC 31 L, RDW 16 H, Plt Count 211  21 05:23: Sodium 141, Potassium 
4.5, Chloride 108, Carbon Dioxide 30, Anion Gap 8, BUN 15, Creatinine 0.9, GFR 
Calculation Greater than 60, Glucose 94, Calcium 8.8, Total Bilirubin 0.6, AST 
44 H, ALT 55 H, Alkaline Phosphatase 97, Serum Total Protein 7.0, Albumin 4.0   
                                             Microbiology    08/10/21 18:20   
Nasopharyngeal   SARS-CoV-2 Rapid RNA (RT-PCR) - Final                          
    Sars-Cov-2 Not Detected                              Influenza A Not 
Detected                              Influenza B Not Detected                  
            RSV Not Detected      Exam  Orientation: Alert  HEENT: Atraumatic, 
PERRLA and EOMI  Lungs: wheezes  Cardiovascular Exam: regular rate and normal 
rhythm  Abdomen: Normal bowel sounds and Soft; negative for Tenderness  
Extremities: normal inspection  Skin: Skin warm and dry, Mucus membranes moist  
Neurological: Normal speech  Psych/Mental Status: normal affect    
Assessment/Plan  A P Free Text/Narrative  :: Impression:  1.  Chest pain  2.  
Dyspnea  3.  Probable COPD exacerbation  4.  Sarcoidosis  5.  Anxiety  6.  
Hypothyroidism  7.  Lyme's disease  8.  Obstructive sleep apnea  9.  
Hyperlipidemia  10.  Questionable parkinsonism  11.  Hypertension  12.  GERD  
13.  Neuropathy  14.  Fatigue    Plan:  Patient advised continued observation 
with telemetry serial troponin echocardiogram and obtain outside records.  
Patient will need urgent cardiac stress test once MI has been ruled out.  EKG 
changes noted T wave inversions precordial.  Will start low-dose prednisone and 
Ceftin continue inhaler regimen appreciate respiratory therapy expertise and 
tobacco and marijuana abstinence strongly advised.  Continue outpatient 
medication.  Patient full code      Dictated by:  <Electronically signed by James Alonzo MD>      James Alonzo MD      21     
_________________________________________________         James Alonzo MD    
    SIGNATURE   DA    Report Cosigners:                                         
     D:  STRAL  21 T:  STRAL    21  CC:           









          Name      Value     Range     Interpretation Code Description Data Renita

rce(s) Supporting 

Document(s)

 

                                                                       









                    ID                  Date                Data Source

 

                    [id number]            2021 05:59:00 AM EDT [facility]









          Name      Value     Range     Interpretation Code Description Data Renita

rce(s) Supporting 

Document(s)

 

                Leukocytes [#/volume] in Blood by Automated count 12.9 10*3/uL  

  4.45-10.71      Above 

high normal                             [facility]  

 

           Erythrocytes [#/volume] in Blood by Automated count 5.92 10*6/uL 4.3-

6.1    N                     

[facility]            

 

           Hemoglobin [Moles/volume] in Blood 14.6 g/dL  13-18      N           

          [facility]                                 

 

           Hematocrit [Volume Fraction] of Blood by Automated count 46.8 %     4

2-52      N                     [facility]                  

 

                                        Erythrocyte mean corpuscular volume [Ent

itic volume] in Cord blood by Automated 

count      79 fL      80-96      Below low normal            [facility]  

 

                    Erythrocyte mean corpuscular hemoglobin [Entitic mass] by Au

tomated count 25 pg               

27-31           Below low normal                 [facility]  

 

                                        Erythrocyte mean corpuscular hemoglobin 

concentration [Mass/volume] in Cord 

blood      31 g/dL    33-37      Below low normal            [facility]  

 

                Erythrocyte distribution width [Entitic volume] by Automated cou

nt 16 %            11-15           

Above high normal                       [facility]  

 

           Platelets [#/volume] in Blood by Automated count 211 10*3/uL 130-472 

   N                     [facility]                  









                    ID                  Date                Data Source

 

                    [id number]            2021 06:41:00 AM EDT [facility]









          Name      Value     Range     Interpretation Code Description Data Renita

rce(s) Supporting 

Document(s)

 

           Urea nitrogen [Mass/volume] in Serum or Plasma 15 mg/dL   9-23       

N                     [facility]                         

 

           Sodium [Moles/volume] in Serum or Plasma 141 mmol/L 132-146    N     

                [facility]                         

 

           Potassium [Moles/volume] in Serum or Plasma 4.5 mmol/L 3.5-5.5    N  

                   [facility]                         

 

           Chloride [Moles/volume] in Serum or Plasma 108 mmol/L      N   

                  [facility]                         

 

           Carbon dioxide, total [Moles/volume] in Serum or Plasma 30 mmol/L  20

-31      N                     

[facility]            

 

          Anion gap in Serum or Plasma 8 mmol/L  8-16      [facility]  

 

           Glucose [Mass/volume] in Serum or Plasma 94 mg/dL        N     

                [facility]                         

 

          Creatinine 0.9 mg/dL 0.5-1.1   [facility]  

 

                                        Glomerular filtration rate/1.73 sq M.pre

dicted [Volume Rate/Area] in Serum or 

Plasma     Greater Than 60 ABOVE 60                         [facility]  

 

                                        Alanine aminotransferase [Enzymatic acti

vity/volume] in Serum or Plasma by With 

P-5'-P     55 U/L     10-49      Above high normal            [facility]  

 

                                        Aspartate aminotransferase [Enzymatic ac

tivity/volume] in Serum or Plasma by 

With P-5'-P 44 U/L     0-33       Above high normal            [facility]  

 

                    Alkaline phosphatase [Enzymatic activity/volume] in Serum or

 Plasma 97 U/L              

          N                               [facility]  

 

           Calcium [Mass/volume] in Serum or Plasma 8.8 mg/dL  8.5-10.1   [facility]                         

 

           Bilirubin.total [Mass/volume] in Serum or Plasma 0.6 mg/dL  0.3-1.2  

  [facility]                  

 

                                        Albumin [Mass/volume] in Serum or Plasma

 by Bromocresol purple (BCP) dye binding

 method    4.0 g/dL   3.2-4.8    [facility]

ital  

 

           Protein [Mass/volume] in Serum or Plasma 7.0 g/dL   5.7-8.2    N     

                [facility]                         









                    ID                  Date                Data Source

 

                    [id number]           08/10/2021 11:41:00 PM EDT [facility]

 

                                        Name:                 ALFRED WILLIAMSON      

                :                  

1970   Account#:             [id number]                      Age:       
           50           MR#:                  [id number]                       
Admit Date:           08/10/21     Provider:             Tripp Rios  
                    Room #:               295          Consulting Provider:     
                                       Dictation Date:           08/10/21       
                                     History   Physical  HPI  Date of service  
Date of service:: 08/10/21  History of Present Illness  Chief Complaint: SOB  
Emergency Room stated complaint: Respiratory Pulmonary  Admitted From: Emergency
 Dept  Primary (Admitting) Diagnosis: SOB  Source of information: Patient, 
Medical Record and Emergency Med Personnel  HPI Free Text/Narrative:: Mr. Alfred Williamson is a 51 y/o male pt who presented to [facility] ER with c/o increasing SOB.  Pt 
states he has had progressive SOB for the past year but the past couple of 
weekshis SOB has worsened.  He reports exertional dyspnea with any walking.  He 
endorses occasional nonproductive cough.  He reports "severe sleep apnea."  He 
denies any chest pain or pressure, no jawpain but he endorses a history of 
costochondritis.  No fever or chills, no recent illness. he reports a history of
 varying amount of cigarette smoking over the past two to three decades.  He 
reports a regular history of marijuana smoking over the past two decades. he 
reports improvement with oxygen use in ER.  ER reports SPO2 decreases to 88% 
with ambulation in ER. ER labs and imaging reviewed and noted.  No leukocytosis,
 respiratory panel negative, CTA chest positive emphysematous change. No PE. pt 
to be admitted observation.   Allergies/Home Meds                               
                 Allergies        Allergy/AdvReac Type Severity Reaction Status 
Date / Time     No Known Drug Allergies Allergy   Verified 08/10/21 14:03    
[From No known Food or Drug         Allergies]          No Known Food Allergies 
Allergy   Verified 08/10/21 14:03    [From No known Food or Drug         
Allergies]                                                       Home 
Medications         Medication  Instructions  Recorded  Confirmed  Last Taken  
Type     B.breve-L.acid-L.rham-S.thermo tab 08/10/21  08/10/21 08:00 History    
[Probiotic]          albuterol sulfate [ProAir HFA] 2 puff INHALATION QID 
08/10/21 08/10/21 08/10/21 11:30 History     aspirin 81 mg PO DAILY 08/10/21 
08/10/21 08/10/21 08:00 History     buspirone 30 mg PO BID 08/10/21 08/10/21 
08/10/21 08:00 History     clonazepam 0.5 mg PO BID 08/10/21 08/10/21 08/10/21 
08:00 History     coenzyme Q10 [CoQ-10] 100 mg PO DAILY 08/10/21 08/10/21 
08/10/21 08:00 History     gabapentin 300 mg PO BID 08/10/21 08/10/21 08/10/21 
08:00 History     gabapentin 600 mg PO HS 08/10/21 08/10/21 08/09/21 21:00 
History     hydroxyzine HCl 25 mg PO TID 08/10/21 08/10/21 08/10/21 08:00 
History     levothyroxine 25 mcg PO DAILY 08/10/21 08/10/21 08/10/21 08:00 
History     metoprolol tartrate 50 mg PO QID 08/10/21 08/10/21 08/10/21 11:30 
History     multivitamin 1 tab PO DAILY 08/10/21 08/10/21 08/10/21 08:00 History
    omeprazole 20 mg PO DAILY 08/10/21 08/10/21 08/10/21 08:00 History     
rosuvastatin [Crestor] 40 mg PO DAILY 08/10/21 08/10/21 08/10/21 08:00 History  
   tamsulosin [Flomax] 0.4 mg PO DAILY 08/10/21 08/10/21 08/10/21 08:00 History 
    valsartan 320 mg PO DAILY 08/10/21 08/10/21 08/10/21 08:00 History     
venlafaxine [Effexor XR] 150 mg PO DAILY 08/10/21 08/10/21 08/10/21 08:00 
History     vitamin E 100 unit PO DAILY 08/10/21 08/10/21 08/10/21 08:00 History
         PFSH  Medical History (Reviewed 08/10/21 @ 23:52 by ALFREDA Pepper)    Anxiety  Asthma  Hypothyroidism  Lyme disease  Neuropathy  Sarcoidosis  
Sleep apnea                        Social History (Reviewed 08/10/21 @ 23:52 by 
ALFREDA Pepper)  Does the Patient have a Healthcare Proxy:  No   Does 
Patient have a DNR?:  No   Does Patient have a Living Will?:  No   Smoking 
Status:  Current every day smoker       Sickle cell  Sickle Cell Screening:: Not
indicated    ROS***  Const  Denies body ache(s), Denies chills, Denies fatigue, 
Denies fever(s), Denies headache(s) and Denies weakness  Eyes  Denies diplopia, 
Denies loss of vision and Denies eye pain  ENT  Denies dizziness, Denies 
headache(s), Denies nasal congestion, Denies sinus pain, Denies sinus pressure 
and Denies sore throat  Card  Denies chest pain, Denies lightheadedness, Denies 
radiating jaw, neck or arm pain, Denies palpitations, Reports dyspnea and 
Reports dyspnea on exertion  Resp  Reports cough, Denies hemoptysis, Denies 
excessive phlegm production, Reports dyspnea, Reports dyspnea on exertion and 
Denies wheezing  GI  Denies abdominal pain, Denies constipation, Denies 
diarrhea, Denies nausea and Denies vomiting  Musc  Denies myalgias, Denies 
muscle weakness and Denies radiating pain into limb  Skin/Breast  Reports 
lesions, Reports erythema, Reports rash and Reports wounds  Neuro  Denies di
zziness, Denies headache(s), Denies loss of vision and Denies weakness  Psych  
Reports system reviewed and no additional complaints, except as documented  Endo
 Denies fatigue and Denies palpitations  Hema/Lymph  Reports system reviewed and
no additional complaints, except as documented  Aller/Immun  Denies wheezing    
Vital Signs and I O  Vitals and I O:                       Vital Signs last 12 
hours          Temp Pulse Resp BP Pulse Ox      08/10/21 13:31  98.0 F  67  18  
156/101  98                                          Intake   Output Last 24 
Hours         08/08/21 08/09/21 08/10/21     23:59 23:59 23:59     Current 
Weight   256 lb        Height,Weight   BMI:                          Ht Wt BMI  
     Current Height                 5 ft 9 in                                   
          Current Weight                 256 lb                                 
                    Results  Results:                                           
                                             08/10/21 15:10                     
                          08/10/21 15:10                                       
Laboratory Results Last 24 hours    08/10/21 15:10: WBC 9.7, RBC 5.71, Hgb 14.2,
Hct 44.8, MCV 79 L, MCH 25 L, MCHC 32 L, RDW 16 H, Plt Count 201, MPV 11.0, 
Immature Gran % (Auto) 0.2, Neut % (Auto) 58.9, Lymph % (Auto) 29.3, Mono % 
(Auto) 7.0, Eos % (Auto) 3.6, Baso % (Auto) 1.0, Lymph # (Auto) 2.9, Abs Immat 
Gran (auto) 0.0, Add Manual Diff No, Absolute Neutrophils 5.7, Monocytes # 0.7, 
Absolute Eosinophils 0.4, Absolute Basophils 0.1  08/10/21 15:10: Sodium 141, 
Potassium 4.2, Chloride 110 H, Carbon Dioxide 28, Anion Gap 7 L, BUN 
14,Creatinine 0.9, GFR Calculation Greater than 60, Glucose 122 H, Calcium 8.4 
L, Total Bilirubin 0.5, AST 37 H, ALT 52 H, Alkaline Phosphatase 89, Troponin I 
Less than 0.015, Pro-B-Natriuretic Pept 546.00 H, Serum Total Protein 6.9, 
Albumin 3.9                                                Microbiology    
08/10/21 18:20   Nasopharyngeal   SARS-CoV-2 Rapid RNA (RT-PCR) - Final         
                    Sars-Cov-2 Not Detected                              
Influenza A Not Detected                              Influenza B Not Detected  
                           RSV Not Detected        Exam  Const  General: 
cooperative, comfortable and no acute distress  Nutritional Appearance: obese  
Orientation: alert, awake and oriented x3  HENMT  Head: normal to inspection and
atraumatic  Ears: hearing grossly normal bilaterally and external ears normal  
General nose exam: external nose normal  Face and sinus: face symmetric  Eyes  
General: appearance normal, both eyes and all related structures  Conjunctivae: 
conjunctivae normal  Sclera: sclerae normal  Neck  Neck: full ROM and JVD 
present  Resp  Effort   Inspection: able to speak in complete sentences, no 
audible wheezes, no pursed lip breathing and no use of accessory muscles  
Auscultation: clear to auscultation bilaterally (decreased breath sounds upper 
lobes bilaterally), no rales, no rhonchi and no wheezes  Cardio  Jugular venous 
pressure: no JVD  Rate: regular rate  Rhythm: regular rhythm  GI  Inspection: 
Yes obesity  Palpation: soft, no guarding and nontender  Auscultation: normal 
bowel sounds  Musc  Cervical Spine: cervical ROM normal  Skin  Lesions: lesion 
noted  Rashes: rashes noted  Trauma: no abrasions  Wounds: no wounds  Neuro  
General: patient alert, patient awake, patient oriented x3 and moves all 
extremities  Cognition: normal cognition  Speech: speech normal  Extrem  
General: normal to inspection, full ROM, capillary refill normal and no pedal 
edema  Psych  Appearance: grossly normal  Mental Status: mental status grossly 
normal  Speech and Movement: speech and movement normal  Affect: normal affect  
Attitude: cooperative  Thought Process: normal  Thought Content: normal    
Assessment/Plan  A P Free Text/Narrative  :: SOB  admit observation  RT consult 
and treat  supplemental O2 prn, nebs prn  trend labs  continue to monitor    HTN
 continue metoprolol   valsartan    GERD  continue omeprazole    dyslipidemia  
continue Crestor    hypothyroid  continue levothyroxine    anxiety / depression 
continue clonazepam, buspirone      Dictated by:  <Electronically signed by 
Tripp GANT>      Tripp GANT      21 0003     
_________________________________________________         Tripp Rios    
   SIGNATURE   DA    Report Cosigners:  <<Signature on File>>      James Alonzo MD      21  <Electronically signed by James Alonzo MD> 
    James Alonzo MD      21                  D:  FERNANDO  08/10/21
 2341 T:  FERNANDO    08/10/21  2341  CC:           









          Name      Value     Range     Interpretation Code Description Data Renita

rce(s) Supporting 

Document(s)

 

                                                                       









                    ID                  Date                Data Source

 

                    [id number]        08/10/2021 08:09:00 PM EDT [facility] [address]

[place], [place] [id number] [phone]  NAME                           
                  SEX    PT STATUS         ACCOUNT NUMBER  ALFRED WILLIAMSON   REG ER               [id number]    ORDERING 
PHYSICIAN                                LOCATION                 MEDICAL RECORD
NO.   Milton Vaughan MD                                ER                       
[id number]    ATTENDING PHYSICIAN                               DATE OF BIRTH   
        DATE OF EXAM/TIME  Doctor Provided,No Family                          
1970               08/10/21 / 1815    TYPE / EXAM  CTA Chest non-card W/  
or w/o    REASON FOR EXAM  r/o pe   CTA CHEST WITH IV CONTRAST      HISTORY: 
50-year-old male with concern for pulmonary embolus.      COMPARISON: Chest x-
ray performed the same day.      TECHNIQUE:   Multiple axial images are taken 
from the level of the thyroid down through the upper abdomen with and without 
the use of IV contrast. Images are then reconstructed in the sagittal and 
coronal planes. This exam was performed according to our departmental dose-
optimization program which includes use of Automated Exposure Control, 
adjustment of the mA and/or kV according to patient size and/or use of iterative
reconstruction technique.  Postprocessing was performed  for CTA with the follow
ing as per hospital protocol: Maximum intensity projection (MIPs)      Contrast 
Used: 75 mL of Omnipaque 350      FINDINGS:   Lungs:  Lungs demonstrate 
emphysematous change. There is mild prominence of bronchopulmonary markings.   
On axial image 26 there is a focal opacity in the left apex. Bronchiectasis is 
demonstrated.      Pleura: No pneumothorax. No pneumomediastinum.      Thyroid: 
Normal      Mediastinum:    Aorta:  Normal.   Pulmonary artery:  Normal.  No 
pulmonary embolus.    Heart:  Borderline enlarged.      Trachea/Bronchi: Well 
aerated. No intraluminal masses.      Esophagus: Decompressed which limits 
evaluation. Normal for the lack of distention.       Lymph Nodes: Subcentimeter 
lymph nodes are seen in the mediastinum.      Chest wall: Normal.       Axilla: 
Normal.       Osseous Structures: Normal for patient's age.       Sub diaphragm:
 Subdiaphragmatic abdominal organs included in the field-of-view do not 
demonstrate any acute abnormality.         IMPRESSION:    No CT evidence for 
acute pulmonary embolus.         Reported By Xochitl Wilson MD on 08/10/21 
2009       Signed By Xochitl Wilson MD on 08/10/21 2018                     
                              ______________________________________________   
_______  _______                                                                
                        Date        Time  CC:  Xochitl Wilson MD; No Family
PHYS Provided  Techn: BAIAB             Trans Dt/Tm:             Trans by: DT   
         Prt Dt/Tm:    : Total DLP =  426.00 mGy-cm     : 
Total Radiation Dose =    2.5134 mSv    Lifetime Dose: 2.5134 mSv   









          Name      Value     Range     Interpretation Code Description Data Renita

rce(s) Supporting 

Document(s)

 

                                                                       









                    ID                  Date                Data Source

 

                    [id number]           08/10/2021 06:32:00 PM EDT [facility]

 

                                                                             ED 

Physician Documentation      NAME:      

          ALFRED WILLIAMSON                      :                  1970   
ACCT#:                [id number]                      AGE:                  50
          MR#:                  [id number]                       SERVICE DATE:  
        08/10/21     EMERGENCY DR:           Nataliia Feng MD                  
                                      PRIMARY CARE DR:           Bandar Ogden MD                      ROOM#:                [id number]          HPI (Adult, General) 
<Milton Vaughan MD - Last Filed: 21 17:29>  General  Chief Complaint: 
Respiratory Pulmonary  Stated Complaint: SOB  Time Seen by Provider: 08/10/21 
13:40  History of Present Illness Narrative:   Patient is a 50-year-old male who
presented to the emergency room with chief complaint of shortness of breath with
minimal exertion progressively getting worse since last 5 to 7 days.. Patient 
denies any fever or chills. Denies any cough. Denies any chest pain. Hemoptysis 
sputum production. Not wearany oxygen at home. Complain of generalized weakness.
  History of Present Illness  Timing/Duration: 1 week  Allergies/Home Meds      
                                         Allergies        Allergy/AdvReac Type 
Severity Reaction Status Date / Time     No Known Drug Allergies Allergy   
Verified 08/10/21 14:03    [From No known Food or Drug         Allergies]       
  No Known Food Allergies Allergy   Verified 08/10/21 14:03    [From No known 
Food or Drug         Allergies]                                                 
     Home Medications         Medication  Instructions  Recorded  Confirmed  
Last Taken  Type     B.breve-L.acid-L.rham-S. tab 08/10/21  08/10/21 08:00 
History    thermophilus 3 billion cell         chewable tablet          albute
rol sulfate 90 mcg/actuation 2 puff INHALATION QID 08/10/21 08/11/21 08/10/21 
11:30 History    aerosol inhaler (ProAir HFA)          aspirin 81 mg chewable 
tablet 81 mg PO DAILY 08/10/21 08/11/21 08/10/21 08:00 History     buspirone 30 
mg tablet 30 mg PO BID 08/10/21 08/11/21 08/10/21 08:00 History     clonazepam 
0.5 mg tablet 0.5 mg PO BID 08/10/21 08/11/21 08/10/21 08:00 History     
coenzyme Q10 100 mg capsule 100 mg PO DAILY 08/10/21 08/11/21 08/10/21 08:00 
History    (CoQ-10)          gabapentin 300 mg tablet 300 mg PO BID 08/10/21 
08/11/21 08/10/21 08:00 History     gabapentin 600 mg tablet 600 mg PO HS 
08/10/21 08/11/21 08/09/21 21:00 History     hydroxyzine HCl 25 mg tablet 25 mg 
PO TID 08/10/21 08/11/21 08/10/21 08:00 History     levothyroxine 25 mcg tablet 
25 mcg PO DAILY 08/10/21 08/11/21 08/10/21 08:00 History     metoprolol tartrate
50 mg tablet 50 mg PO TID 08/10/21 08/11/21 08/10/21 11:30 History     
multivitamin 1 tab PO DAILY 08/10/21 08/11/21 08/10/21 08:00 History     omepraz
ole 20 mg tablet,delayed 20 mg PO DAILY 08/10/21 08/11/21 08/10/21 08:00 History
   release          rosuvastatin 40 mg tablet (Crestor) 40 mg PO DAILY 08/10/21 
08/11/21 08/10/21 08:00 History     tamsulosin 0.4 mg capsule (Flomax) 0.4 mg PO
DAILY 08/10/21 08/11/21 08/10/21 08:00 History     valsartan 320 mg tablet 320 
mg PO DAILY 08/10/21 08/11/21 08/10/21 08:00 History     venlafaxine 150 mg 150 
mg PO DAILY 08/10/21 08/11/21 08/10/21 08:00 History    capsule,extended release
24 hr         (Effexor XR)          vitamin E 100 unit capsule 100 unit PO DAILY
08/10/21 08/11/21 08/10/21 08:00 History     Lactobacillus acidophilus 1.5 mg 
2,000 mmu cells PO DAILY 08/11/21 08/11/21 08/10/21 08:00 History    (250 
million cell) capsule         (Probiotic Acidophilus)               PMH (from 
Triage)  <Milton Vaughan MD - Last Filed: 21 17:29>  Patient Medical 
History  PMH/PSH from Triage:                        Medical History (Updated 
08/10/21 @ 13:57 by Monroe Chi)    Anxiety (Medical) F41.9  Asthma (Medical) 
J45.909  Hypothyroidism (Medical) E03.9  Lyme disease (Medical) A69.20  
Neuropathy (Medical) G62.9  Sarcoidosis (Medical) D86.9  Sleep apnea (Medical) 
G47.30        Hx Drug Resistant Infections  Hx Other Resistant Infection?: No  
Isolation: Standard precautions  Social History  Are you in a relationship 
with/Does anyone hit you, yell/swear at you, steal from you?: No  Substance Use 
Smoking Status: Current every day smoker  Tobacco Use  Tobacco Products:: 
Cigarettes 1/2 PPD  Vaccination History  Hx/Date of Tetanus, Diphtheria 
Vaccination: No  Hx/Date of Influenza Vaccination: Yes  Hx/Date of Pneumococcal 
Vaccination: No  Immunizations Up to Date: Yes (COVID)    <Nataliia Feng M.D. 
- Last Filed: 21 05:52>  Patient Medical History  PMH Reviewed/Updated as 
Needed: Yes  PMH/PSH from Triage:                        Medical History 
(Updated 08/10/21 @ 13:57 by Monroe Chi)    Anxiety (Medical) F41.9  Asthma 
(Medical) J45.909  Hypothyroidism (Medical) E03.9  Lyme disease (Medical) A69.20
 Neuropathy (Medical) G62.9  Sarcoidosis (Medical) D86.9  Sleep apnea (Medical) 
G47.30          PFSH  <Milton Vaughan MD - Last Filed: 21 17:29>  Medical 
History (Reviewed 08/10/21 @ 23:52 by ALFREDA Pepper)    Anxiety  Asthma  
Hypothyroidism  Lyme disease  Neuropathy  Sarcoidosis  Sleep apnea              
         Social History (Reviewed 08/10/21 @ 23:52 by ALFREDA Pepper)  Does
the Patient have a Healthcare Proxy:  Yes   Does Patient have a DNR?:  No   Does
Patient have a Living Will?:  No   Does the Patient have a MOLST?:  No   Advance
Directives on File or in chart?:  No   Hx Recent Travel (where):  No   Smoking 
Status:  Current some day smoker         <Nataliia Feng M.D. - Last Filed: 
21 05:52>  Sickle cell  Sickle Cell Screening:: Not indicated    ROS  
<Milton Vaughan MD - Last Filed: 21 17:29>  Review of Systems  ROS 
Narrative:   13 systems were reviewed and negative except for above    Physical 
Exam  <Milton Vaughan MD - Last Filed: 21 17:29>  General  Physical Exam 
Narrative:   50-year-old old male is lying in bed he appears to be comfortable 
sitting in bed while wearing oxygen  General appearance: alert and in no 
apparent distress  Head  Head exam: Present atraumatic and normocephalic  Eye  
Eye exam: Present normal apperance and EOMI  ENT  ENT exam: Present normal exam 
and normal orophraynx  Neck  Neck exam: Present normal inspection and full ROM  
Respiratory  Respiratory exam: Present normal lung sounds bilaterally  
Cardiovascular  Cardiovascular Exam: Present regular rate and normal rhythm  
GI/Abdominal  GI/Abdominal exam: Present Abd soft, bowel sounds present all 
quadrents  Extremities Exam  Extremities exam: Present normal inspection and 
Full ROM without tenderness, capillary refill brisk  Back Exam  Back exam: 
Present normal inspection and full ROM  Neurological Exam  Neurological exam: 
Present alert, altered, oriented X3 and CN II-XII intact  Skin  Skin exam: 
Present warm and normal color  Vital Signs  Vital Signs:                        
                       Vital Signs         08/10/21  13:31 08/10/21  23:46     
Temperature 98.0 F      Pulse Rate 67 70     Respiratory Rate 18 16     Blood 
Pressure 156/101      O2 Sat by Pulse Oximetry 98 98            <Nataliia Feng M.D. - Last Filed: 21 05:52>  Vital Signs  Vital Signs:                   
                            Vital Signs         08/10/21  13:31 08/10/21  23:46 
   Temperature 98.0 F      Pulse Rate 67 70     Respiratory Rate 18 16     Blood
Pressure 156/101      O2 Sat by Pulse Oximetry 98 98            [facility] 
(comprehensive)  <Milton Vaughan MD - Last Filed: 21 17:29>  Lab Data  
Labs:                                                                           
                        08/10/21 15:10                                          
     08/10/21 15:10                                                 Microbiology
   08/10/21 18:20   Nasopharyngeal   SARS-CoV-2 Rapid RNA (RT-PCR) - Final      
                       Sars-Cov-2 Not Detected                              
Influenza A Not Detected                              Influenza B Not Detected  
                           RSV Not Detected        Radiology Data  Radiology 
impressions:     IMPRESSION:    Minor central interstitial prominence which is 
nonspecific possibly pneumonitis or airway disease versus some vascular 
congestion. Follow-up is suggested.    Medical Decision Making  Free 
Text/Narative::   I tried to get patient up and ambulate around. Minimal 
exertion his oxygen saturation dropped to mid80s and he got tachypneic and short
of breath. Put back on bed and he will be sent for CT angiogram to rule out 
pulmonary embolism.  Patient was signed out to the night provider Dr. Nataliia Feng differential pending reevaluation  And follow-up on CT angiogram    
Accepted the patient in signout at approximately 7 PM.  Eventually the CT 
angiogram ordered by my predecessor was returned is normal.  I made an 
opportunity to go and talk to him.  He has had sarcoidosis in the distant past. 
He has been short of breath with exertion for a long period of time.  He informs
me that he has severe obstructive sleep apnea that is untreated because he 
cannot tolerate the mask.  When I pointed out to him that he had COPD and 
emphysema on the CT scan I asked him how much he smoked.  He smokes cigarettes 
only occasionally but has been smoking marijuana regularly for over 34 years.  I
did point out to him that he had a "focal opacity" on image 26 in the left apex 
and that he would need to have this carefully washed in the future.  I asked him
not to forget that during this admission.  I pointed out to him that he 
desaturated into the 80s with only minimal exercise I suggested admitting him 
for further evaluation and provision of oxygen at home at this time.  He was 
accepted for admission by Tripp Rios and admitted.    <Nataliia Feng M.D. -
Last Filed: 21 05:52>  Lab Data  Labs:                                    
                                                               08/10/21 15:10   
                                            08/10/21 15:10                      
                          Microbiology    08/10/21 18:20   Nasopharyngeal   
SARS-CoV-2 Rapid RNA (RT-PCR) - Final                              Sars-Cov-2 
Not Detected                              Influenza A Not Detected              
               Influenza B Not Detected                              RSV Not 
Detected        Medical Decision Making  Free Text/Narative::   I tried to get 
patient up and ambulate around. Minimal exertion his oxygen saturation dropped 
to mid80s and he got tachypneic and short of breath. Put back on bed and he will
be sent for CT angiogram to rule out pulmonary embolism      Accepted the 
patient in signout at approximately 7 PM.  Eventually the CT angiogram ordered 
by my predecessor was returned is normal.  I made an opportunity to go and talk 
to him.  He has had sarcoidosis in the distant past.  He has been short of 
breath with exertion for a long period of time.  He informs me that he has 
severe obstructive sleep apnea that is untreated because he cannot tolerate the 
mask.  When I pointed out to him that he had COPD and emphysema on the CT scan I
asked him how much he smoked.  He smokes cigarettes only occasionally but has 
been smoking marijuana regularly for over 34 years.  I did point out to him that
he had a "focal opacity" on image 26 in the left apex and that he would need to 
have this carefully washed in the future.  I asked him not to forget that during
this admission.  I pointed out to him that he desaturated into the 80s with only
minimal exercise I suggested admitting him for further evaluation and provision 
of oxygen at home at this time.  He was accepted for admission by Tripp Rios 
and admitted.    Plan  <Milton Vaughan MD - Last Filed: 21 17:29>  Plan  
Plan:   Patient will be signed out to the night provider disposition pending 
follow-up on the CAT scan and reevaluation.  Visit Medications  Administered ED 
medications::                                                Medications        
 Discontinued Medications          Generic Name Dose Route Start Last Admin     
Trade Name Raulito  PRN Reason Stop Dose Admin     Albuterol Sulfate  2.5 mg   07:00  21 07:16      Albuterol Sulf 2.5 Mg/3 Ml Vial  NEB   Not 
Given      RTQID SILVESTRE       Aspirin  81 mg  21 09:00  21 09:19      
Aspirin Chew 81 Mg  PO   81 mg      DAILY SILVESTRE   Administration     Aspirin  243 
mg  21 12:16  21 12:55      Aspirin Chew 81 Mg  PO  21 12:17  
243 mg      1T STA   Administration     Atorvastatin Calcium  80 mg  21 
09:00  21 09:24      Atorvastatin Calcium 40 Mg Tablet  PO   80 mg      
DAILY SILVESTRE   Administration     Buspirone HCl  30 mg  21 01:40  21 
09:22      Buspirone Hcl 15 Mg Tablet  PO   30 mg      BID SILVESTRE   Administration 
   Cefuroxime Axetil  250 mg  21 09:30  21 10:34      Cefuroxime 
Axetil 250 Mg Tablet  PO   250 mg      BID SILVESTRE   Administration     Clonazepam  
0.5 mg  21 01:45  21 09:25      Clonazepam 0.5 Mg Tablet  PO   0.5 
mg      BID SILVESTRE   Administration     Gabapentin  300 mg  21 09:00  
21 13:34      Gabapentin 300 Mg Capsule  PO   300 mg      0900,1400 SILVESTRE   
Administration     Gabapentin  600 mg  21 01:45  21 02:09      
Gabapentin 300 Mg Capsule  PO   600 mg      HS SILVESTRE   Administration     
Hydroxyzine HCl  25 mg  21 09:00  21 12:55      Hydroxyzine Hcl 25 
Mg Tablet  PO   25 mg      TID SILVESTRE   Administration     Levothyroxine Sodium  25
mcg  21 06:00  21 05:26      Levothyroxine Sodium 25 Mcg Tablet  PO 
 25 mcg      0600 SILVESTRE   Administration     Metoprolol Tartrate  50 mg  21 
01:25  21 12:59      Metoprolol Tartrate 50 Mg Tablet  PO   50 mg      QID
SILVESTRE   Administration     Omeprazole  20 mg  21 09:00  21 09:26      
Omeprazole 20 Mg Capsule.Dr  PO   20 mg      DAILY SILVESTRE   Administration     
Prednisone  40 mg  21 09:20  21 10:32      Prednisone 20 Mg Tablet  
PO   40 mg      0800 SILVESTRE   Administration     Tamsulosin HCl  0.4 mg  21 
09:00  21 09:24      Tamsulosin Hcl 0.4 Mg Capsule  PO   0.4 mg      DAILY
SILVESTRE   Administration     Valsartan  320 mg  21 09:00  21 09:21      
Valsartan 160 Mg Tablet  PO   320 mg      DAILY SILVESTRE   Administration     
Venlafaxine HCl  150 mg  21 09:00  21 09:20      Venlafaxine Hcl 75 
Mg Er Cap  PO   150 mg      DAILY SILVESTRE   Administration          <Nataliia Feng M.D. - Last Filed: 21 05:52>  Visit Medications  Administered ED 
medications::                                                Medications        
 Discontinued Medications          Generic Name Dose Route Start Last Admin     
Trade Name Freq  PRN Reason Stop Dose Admin     Albuterol Sulfate  2.5 mg  
21 07:00  21 07:16      Albuterol Sulf 2.5 Mg/3 Ml Vial  NEB   Not 
Given      RTQID [facility]       Aspirin  81 mg  21 09:00  21 09:19      
Aspirin Chew 81 Mg  PO   81 mg      DAILY SILVESTRE   Administration     Aspirin  243 
mg  21 12:16  21 12:55      Aspirin Chew 81 Mg  PO  21 12:17  
243 mg      1T STA   Administration     Atorvastatin Calcium  80 mg  21 
09:00  21 09:24      Atorvastatin Calcium 40 Mg Tablet  PO   80 mg      
DAILY SILVESTRE   Administration     Buspirone HCl  30 mg  21 01:40  21 
09:22      Buspirone Hcl 15 Mg Tablet  PO   30 mg      BID SILVESTRE   Administration 
   Cefuroxime Axetil  250 mg  21 09:30  21 10:34      Cefuroxime 
Axetil 250 Mg Tablet  PO   250 mg      BID SILVESTRE   Administration     Clonazepam  
0.5 mg  21 01:45  21 09:25      Clonazepam 0.5 Mg Tablet  PO   0.5 
mg      BID SILVESTRE   Administration     Gabapentin  300 mg  21 09:00  
21 13:34      Gabapentin 300 Mg Capsule  PO   300 mg      0900,1400 SILVESTRE   
Administration     Gabapentin  600 mg  21 01:45  21 02:09      
Gabapentin 300 Mg Capsule  PO   600 mg      HS SILVESTRE   Administration     
Hydroxyzine HCl  25 mg  21 09:00  21 12:55      Hydroxyzine Hcl 25 
Mg Tablet  PO   25 mg      TID SILVESTRE   Administration     Levothyroxine Sodium  25
mcg  21 06:00  21 05:26      Levothyroxine Sodium 25 Mcg Tablet  PO 
 25 mcg      0600 SILVESTRE   Administration     Metoprolol Tartrate  50 mg  21 
01:25  21 12:59      Metoprolol Tartrate 50 Mg Tablet  PO   50 mg      QID
SILVESTRE   Administration     Omeprazole  20 mg  21 09:00  21 09:26      
Omeprazole 20 Mg Capsule.Dr  PO   20 mg      DAILY SILVESTRE   Administration     
Prednisone  40 mg  21 09:20  21 10:32      Prednisone 20 Mg Tablet  
PO   40 mg      0800 SILVESTRE   Administration     Tamsulosin HCl  0.4 mg  21 
09:00  21 09:24      Tamsulosin Hcl 0.4 Mg Capsule  PO   0.4 mg      DAILY
SILVESTRE   Administration     Valsartan  320 mg  21 09:00  21 09:21      
Valsartan 160 Mg Tablet  PO   320 mg      DAILY SILVESTRE   Administration     
Venlafaxine HCl  150 mg  21 09:00  21 09:20      Venlafaxine Hcl 75 
Mg Er Cap  PO   150 mg      DAILY SILVESTRE   Administration          Discharge Plan  
Admission/Discharge Dx  Primary (Admit) Diagnosis: COPD with hypoxia, marijuana 
abuse    Primary DC Diagnosis: Chest pain acute coronary syndrome    ED 
Provider: Nataliia Feng    ED Status: Discharge to ADM    Time Seen by 
Provider: 08/10/21 13:40    Triaged At: 08/10/21 13:31    Condition  Condition: 
Serious    Discharge Detail  [facility]    
Diet:: NPO    Medications  Medication reconciliation performed by provider at 
discharge: Yes    *Discharge Patient*  Discharge Orders:  Discharge Order  
(Routine); Ordered 21     Ordered By:  James Alonzo    Discharge 
Date/Time: 21 01:02    Interventions  Interventions:  ED Admission/Handoff
  Last Done: 21 01:02  ED Respiratory/Pulmonary   Last Done: 08/10/21 
13:48          Report Signers:  <Electronically signed by Milton Vaughan MD>     
Milton Vaughan MD      21 1729     
_________________________________________________         Milton Vaughan MD      
 SIGNATURE   DA    Report Cosigners:                <Electronically signed by 
Nataliia Feng MD>      Nataliia Feng MD      21 [id number]                  
D:  JESSICA  08/10/21  1832 T:  JESSICA    08/10/21  1832  CC:  Bandar Ogden MD   
     









          Name      Value     Range     Interpretation Code Description Data Renita

rce(s) Supporting 

Document(s)

 

                                                                       









                    ID                  Date                Data Source

 

                    [id number]            08/10/2021 08:21:00 PM EDT [facility]

 

                                        NORMAL RESULT IS "Not Detected"Cepheid S

ARS-CoV-2,FLU/RSV is Multiplex real time

RT-PCRNegative results do not preclude SARS-COV-2, influenza orRSV infection and
should not be used as the sole basis fortreatment or other patient management 
decisions.False negative results may occur if virus is present atlevels below 
the analytical limit of detection.This test has been authorized by FDA under an 
EUA for use byauthoriShriners Children's [facility] laboratoriesSARS-rel CoV RNA Resp Ql RAHUL+probeFLUAV RNA
Resp Ql RAHUL+probeFLUBV RNA Resp Ql RAHUL+probeRSV RNA Resp Ql RAHUL+probe 









          Name      Value     Range     Interpretation Code Description Data Renita

rce(s) Supporting 

Document(s)

 

                                                                       









                    ID                  Date                Data Source

 

                    [id number]             08/10/2021 06:20:00 PM EDT [facility]









          Name      Value     Range     Interpretation Code Description Data Renita

rce(s) Supporting 

Document(s)

 

          Cepheid SARS/FLU/RSV RT-PCR SARS-COV-2 NOT DETECTED                   

            [facility]     

 

                                        This lab was ordered by [facility] 

and reported by [facility]. 









                    ID                  Date                Data Source

 

                    [id number]        08/10/2021 03:23:00 PM EDT [facility] [address] [facility] [place] [address]                

                                 [phone]  NAME                           
                  SEX    PT STATUS         ACCOUNT NUMBER  ALFRED WILLIAMSON   REG ER               [id number]    ORDERING 
PHYSICIAN                                LOCATION                 MEDICAL RECORD
NO.   Milton Vaughan MD                                ER                       
[id number]    ATTENDING PHYSICIAN                               DATE OF BIRTH   
        DATE OF EXAM/TIME  Doctor Provided,No Family                          
1970               08/10/21 / 1510    TYPE / EXAM  Xray Chest One View    
REASON FOR EXAM  sob  CLINICAL HISTORY: [facility]    sob      TECHNIQUE: Portable 
chest radiograph      COMPARISON: None .      FINDINGS:      Heart/Mediastinum: 
Heart is normal size. Mediastinum is normal contour.       Lungs: There is some 
prominence of the central interstitial markings which is nonspecific. There isno
consolidation or effusion. Mild apical pleural thickening is present.       Bony
structures: No acute findings.       Other:    None      IMPRESSION:      Minor 
central interstitial prominence which is nonspecific possibly pneumonitis or 
airway disease versus some vascular congestion. Follow-up is suggested.        
Reported By Moshe Purcell MD on 08/10/21 1523      Signed By Moshe Purcell MD on
08/10/21 152                          
______________________________________________   _______  _______  Date        
Time  CC:   Moshe Purcell M.D.; No Family PHYS Provided  Techn: CARRENRRIQUE          
  Trans Dt/Tm:             Trans by: DT             Prt Dt/Tm:    [id number]: 
Total DLP =    0.00 mGy-cm  Fluoroscopy Time (in secs):    









          Name      Value     Range     Interpretation Code Description Data Renita

rce(s) Supporting 

Document(s)

 

                                                                       









                    ID                  Date                Data Source

 

                    [id number]            2021 12:29:00 AM EDT [facility]









          Name      Value     Range     Interpretation Code Description Data Renita

rce(s) Supporting 

Document(s)

 

           Magnesium [Mass/volume] in Serum or Plasma 2.1 mg/dL  1.3-2.7    [facility]                         









                    ID                  Date                Data Source

 

                    [id number]            08/10/2021 03:18:00 PM EDT [facility]









          Name      Value     Range     Interpretation Code Description Data Renita

rce(s) Supporting 

Document(s)

 

           Leukocytes [#/volume] in Blood by Automated count 9.7 10*3/uL 4.45-10

.71 N                     

[facility]            

 

           Erythrocytes [#/volume] in Blood by Automated count 5.71 10*6/uL 4.3-

6.1    N                     

[facility]            

 

           Hemoglobin [Moles/volume] in Blood 14.2 g/dL  13-18      N           

          [facility]                                 

 

           Hematocrit [Volume Fraction] of Blood by Automated count 44.8 %     4

2-52      N                     [facility]                  

 

                                        Erythrocyte mean corpuscular volume [Ent

itic volume] in Cord blood by Automated 

count      79 fL      80-96      Below low normal            [facility]  

 

                    Erythrocyte mean corpuscular hemoglobin [Entitic mass] by Au

tomated count 25 pg               

27-31           Below low normal                 [facility]  

 

                                        Erythrocyte mean corpuscular hemoglobin 

concentration [Mass/volume] in Cord 

blood      32 g/dL    33-37      Below low normal            [facility]  

 

                Erythrocyte distribution width [Entitic volume] by Automated cou

nt 16 %            11-15           

Above high normal                       [facility]  

 

           Platelets [#/volume] in Blood by Automated count 201 10*3/uL 130-472 

   N                     [facility]                  

 

           Platelet mean volume [Entitic volume] in Blood 11.0 fL    9.1-13.1   

N                     [facility]                         

 

           Neutrophils/100 leukocytes in Blood by Automated count 58.9 %     41-

77      N                     [facility]                  

 

           Neutrophils [#/volume] in Blood by Automated count 5.7 U      1.7-7.6

    N                     [facility]                  

 

           Lymphocytes/100 leukocytes in Blood by Automated count 29.3 %     14-

46      N                     [facility]                  

 

           Lymphocytes [#/volume] in Blood by Automated count 2.9 U      0.6-4.6

    N                     [facility]                  

 

           Monocytes/100 leukocytes in Blood by Automated count 7.0 %      4-12 

      N                     [facility]                        

 

           Monocytes [#/volume] in Blood by Automated count 0.7 U      0.2-1.2  

  N                     [facility]                         

 

           Eosinophils/100 leukocytes in Blood by Automated count 3.6 %      0-7

        N                     [facility]                  

 

           Eosinophils [#/volume] in Blood by Automated count 0.4 U      0.0-0.5

    N                     [facility]                  

 

           Basophils/100 leukocytes in Blood by Automated count 1.0 %      0.4-1

.3    N                     [facility]                  

 

           Basophils [#/volume] in Blood by Automated count 0.1 U      0.0-0.2  

  N                     [facility]                         

 

          NUCLEATED RED BLOOD CELL 0 %                                     [facility]  

 

          [facility]CLE[facility] RED BLOOD CELL# 0 U                                     [facility]

[facility]  

 

           Immature granulocytes [Presence] in Blood by Automated count         

   0-2        N                     [facility]                  

 

           Immature granulocytes [#/volume] in Blood by Automated count 0.0 U   

   0-0.1      N                     

[facility]            

 

          Manual Differential panel - Blood NO                                  

    [facility]  









                    ID                  Date                Data Source

 

                    [id number]            08/10/2021 03:41:00 PM EDT [facility]









          Name      Value     Range     Interpretation Code Description Data Renita

rce(s) Supporting 

Document(s)

 

           Urea nitrogen [Mass/volume] in Serum or Plasma 14 mg/dL   9-23       

N                     [facility]                         

 

           Sodium [Moles/volume] in Serum or Plasma 141 mmol/L 132-146    N     

                [facility]                         

 

           Potassium [Moles/volume] in Serum or Plasma 4.2 mmol/L 3.5-5.5    [facility]                         

 

             Chloride [Moles/volume] in Serum or Plasma 110 mmol/L        

  Above high normal  

                          [facility]  

 

           Carbon dioxide, total [Moles/volume] in Serum or Plasma 28 mmol/L  20

-31      [facility]            

 

           Anion gap in Serum or Plasma 7 mmol/L   8-16       Below low normal  

          [facility]                         

 

           Glucose [Mass/volume] in Serum or Plasma 122 mg/dL       Above 

high normal            

[facility]            

 

          Creatinine 0.9 mg/dL 0.5-1.1   [facility]  

 

                                        Glomerular filtration rate/1.73 sq M.pre

dicted [Volume Rate/Area] in Serum or 

Plasma     Greater Than 60 ABOVE 60                         [facility]  

 

                                        Alanine aminotransferase [Enzymatic acti

vity/volume] in Serum or Plasma by With 

P-5'-P     52 U/L     10-49      Above high normal            [facility]  

 

                                        Aspartate aminotransferase [Enzymatic ac

tivity/volume] in Serum or Plasma by 

With P-5'-P 37 U/L     0-33       Above high normal            [facility]  

 

                    Alkaline phosphatase [Enzymatic activity/volume] in Serum or

 Plasma 89 U/L              

          N                               [facility]  

 

           Calcium [Mass/volume] in Serum or Plasma 8.4 mg/dL  8.5-10.1   Below 

low normal            

[facility]            

 

           Bilirubin.total [Mass/volume] in Serum or Plasma 0.5 mg/dL  0.3-1.2  

  N                     [facility]                  

 

                                        Albumin [Mass/volume] in Serum or Plasma

 by Bromocresol purple (BCP) dye binding

 method    3.9 g/dL   3.2-4.8    [facility]

ital  

 

           Protein [Mass/volume] in Serum or Plasma 6.9 g/dL   5.7-8.2    [facility]                         









                    ID                  Date                Data Source

 

                    [id number]            08/10/2021 03:41:00 PM EDT [facility]









          Name      Value     Range     Interpretation Code Description Data Renita

rce(s) Supporting 

Document(s)

 

             Troponin I.cardiac [Mass/volume] in Serum or Plasma Less Than 0.015

 0.00-0.09    [facility]  

 

                                        Less than  0.09 NG/ML     Negative0.10 -

 0.77 NG/ML         High Risk0.78 NG/ML 

or Greater     PositiveThe WHO defined the cutoff (definition for diagnosis of 
MI)for this method as 0.78 ng/ml. 









                    ID                  Date                Data Source

 

                    [id number]            08/10/2021 03:41:00 PM EDT [facility]









          Name      Value     Range     Interpretation Code Description Data Renita

rce(s) Supporting 

Document(s)

 

                          Natriuretic peptide.B prohormone N-Terminal [Mass/volu

me] in Serum or Plasma 

546.00 pg/mL 0.     Above high normal              [facility]

spital  









                    ID                  Date                Data Source

 

                    [id number]            2021 04:19:00 PM EDT [facility] ([facility])









          Name      Value     Range     Interpretation Code Description Data Renita

rce(s) Supporting 

Document(s)

 

          White Blood Count 11.4      4.0-10.0                      [facility] ([facility])  

 

          Platelets 233       150-450                       [facility] ([facility])  

 

          Red Blood Count 6.18      4.30-6.10                     [facility] ([facility]

[facility])  

 

          Hemoglobin 15.2                                    [facility] ([facility]  

 

          Hematocrit 48.1                                    [facility]  









                    ID                  Date                Data Source

 

                    [id number]            2021 04:19:00 PM EDT [facility] ([facility]

[facility])









          Name      Value     Range     Interpretation Code Description Data Renita

rce(s) Supporting 

Document(s)

 

                    Aspartate aminotransferase [Enzymatic activity/volume] in Se

rum or Plasma 39                  

15-37                                           [facility] ([facility])  

 

                          Alanine aminotransferase [Enzymatic activity/volume] i

n Serum or Plasma 60 30-65

                                                    [facility]  

 

          Alk Phos  100                               [facility]  

 

          Albumin   4.1       3.2-5.2                       [facility]  

 

          Total Bilirubin 0.6       0.0-1.0                       [facility]  

 

          Total Protein 7.2       6.4-8.2                       [facility] ([facility]

[facility])  

 

          A/G Ratio 1.3       1.00-1.93                     [facility]  









                    ID                  Date                Data Source

 

                    [id number]            2021 04:15:00 PM EDT [facility] ([facility]

[facility])









          Name      Value     Range     Interpretation Code Description Data Renita

rce(s) Supporting 

Document(s)

 

          Thyroid Stimulating Hormone 2.040                                   [facility]

[facility] ([facility])  

 

          Free T4   0.88                                    [facility]  









                    ID                  Date                Data Source

 

                    [id number]            2021 04:15:00 PM EDT [facility] ([facility]

[facility])









          Name      Value     Range     Interpretation Code Description Data Renita

rce(s) Supporting 

Document(s)

 

          Triglycerides 304                                     [facility]  

 

          Cholesterol 209                                     [facility]  

 

          HDL       27                                      [facility]  

 

           Cholesterol in LDL [Mass/volume] in Serum or Plasma by calculation 12

1                                         

[facility]    

 

          Chol/HDL Ratio 7.740                                   [facility] ([facility]

[facility])  









                    ID                  Date                Data Source

 

                    [id number]            2021 04:15:00 PM EDT [facility] ([facility])









          Name      Value     Range     Interpretation Code Description Data Renita

rce(s) Supporting 

Document(s)

 

           Albumin [Mass/volume] in Serum or Plasma 4.1                         

                [facility]                       

 

           Calcium [Mass/volume] in Serum or Plasma 9.8                         

                [facility]                       

 

             Alanine aminotransferase [Enzymatic activity/volume] in Serum or Pl

asma 20                                     

                          [facility]  

 

           Carbon dioxide, total [Moles/volume] in Serum or Plasma 30           

                               [facility] 

([facility])           

 

           Chloride [Moles/volume] in Serum or Plasma 106                       

                  [facility] ([facility])                       

 

           Alkaline phosphatase [Enzymatic activity/volume] in Serum or Plasma 8

2                                          

[facility] ([facility])    

 

           Potassium [Moles/volume] in Serum or Plasma 4.9                      

                   [facility] ([facility]                       

 

          Sodium    139                                     [facility]  

 

           Protein [Mass/volume] in Serum or Plasma 6.8                         

                [facility] ([facility])                       

 

                Aspartate aminotransferase [Enzymatic activity/volume] in Serum 

or Plasma 33                              

                                        [facility] ([facility])  

 

           Urea nitrogen [Mass/volume] in Serum or Plasma 15                    

                      [facility] ([facility])                       

 

          Glucose   110                               [facility])  

 

          Creatinine For GFR 0.76                                    [facility] ([facility]

[facility])  









                    ID                  Date                Data Source

 

                    [id number]          2020 01:53:51 PM EST [place]

rdes - [facility] [place]

 

                                        ALFRED WILLIAMSON BPATIENT IDENTIFICATION:[facility] Site:  [facility] Name:  ALFRED WILLIAMSON BMedical Record Number:   [id number] Of Birth:  1970CHIEF 
COMPLAINT[facility] f/uHISTORY OF PRESENT ILLNESS:Patient is a 50-year-old 
male with comorbidities [facility] . Patient reports it was social 
admission.Patient reports discharge paperwork was unable to be sent to our 
office andhe will tryto collectPatient is currently living with mother. Social 
worker follows up withpatient. Patient needsPT/OT referral for home- [facility] at 
home. and referral for home health aide. Current concern: Skin lesions: x 5 
months, microbiotic cleanse given lastweek by hospital. Reports lesions 
initially were on legs, got a couch (floor model), reportssymptoms startedafter 
that.  Lesions vary in size. he has not noticed any discharge fromthese lesions.
  heis constantly scratching at those lesions.  Lesions on face, legs,arms, 
abdomen, upper backlower back.  She has not had this type of lesions in the 
past.  pt has triedbed bug spray/powderwithout much relief. Reports "sharp 
white" things , is what prompts him tokeep on scratchingthese lesions.  Patient 
denies any illicit drug use. Neurology: Will be evaluated in [facility]: 
Currently on Valsartan 320 mg, Metoprolol 75 mg daily, hctzVaccines: Received 
flu vaccine.Medication refills, does not need.Psych:  No delusions, 
hallucinations, suicidal ideations, homicidal ideationsColonoscopy hasnt had 
done since 2013- polyps. Did it at [facility].Smoking cigarettes: 1 pack a week, 1 pack
 every 3-4 days for only 3 yearsAlcohol use: NoneIllicit drug use: QUit 
marijuana about 6 weeks ago. Cocaine in 's.     NOrecent ilicit drug use. 
REVIEW OF SYSTEMS:Review of Systems is as per HPI. All other systems reviewed 
and negative. PROBLEM LIST/PAST MEDICAL HISTORY:OngoingAsthmaAtopic 
dermatitisChest painDementiaDepression with anxietyDyspneaEchocardiogram 
abnormalGERD (gastroesophageal reflux disease)HyperlipemiaHypertensionIBS 
(irritable bowel syndrome)InsomniaLumbar herniated discLump in neckMemory 
lossNumerous molesOnychomycosis of toenailOsteoarthritisParkinsonismParkinsons 
diseasePolycythemiaPoor sleep patternRLS (restless legs syndrome)SarcoidSolar 
lentigoTourette syndromeTremorUnstable gaitUTI (urinary tract infection) with 
pyuriaVitamin D deficiencyHistoricalAbdominal pain in maleAcute 
gastroenteritisAcute URIAtypical chest painDiarrheaDizziness - light-
headedExposure to potentially hazardous body fluidsFoot painGastroenteritis and 
colitis, viralHA (headache)HTN (hypertension)Lower leg edemaNausea and 
vomitingPROCEDURE/SURGICAL HISTORY:Procedure on wrist (Procedure on wrist)Biopsy
 of LiverCurettage of Calcaneus Bone CystArthroscopy Shoulder LeftBiopsy Lung 
PercutaneousCyst removed from lip as childSOCIAL HISTORY:AlcoholAlcohol Use 
Current. 1-2 times per month,    2019Alcohol Use Current.,   
2018Substance UseDrug Use Current. Marijuana, Daily,   
2018Tobacco/Nicotine4 packs per month Use:.,   2020FAMILY 
HISTORY:Anxiety:  Patient.Brain tumor:  Mother.Degenerative disc disease:   
Father.Gout:  Father.Heart attack:  Mother and Father.Hyperlipidemia.:  Patient
.Hypertension:  Patient.Sarcoidosis:  Patient.ALLERGIES:NKAHome Medications:Home
 Medications (29) ActiveMisc Medication (blood pressure cuff) See Instructions, 
Taking as prescribed;dose verified,(Special Instructions: take blood pressure 
daily) Misc Medication (cane) See Instructions, Taking as prescribed; dose 
verified,(SpecialInstructions: to use with ambulation) Misc Medication (held 
shower head) See Instructions, Taking as prescribed;dose verified,(Special 
Instructions: to use when showering) Misc Medication (shower bench) See 
Instructions, Taking as prescribed; doseverified, (SpecialInstructions: to use 
with showering) Misc Rx Supply (right wrist brace/splint) See Instructions, 
Taking asprescribed; dose verified,(Special Instructions: m25.531) Misc Rx 
Supply (Adjustable shower head) See Instructions, Taking asprescribed; dose 
verified,(Special Instructions: Use as Directed Dx: G20) Misc Rx Supply 
(Rollator/walker with brakes) See Instructions, Taking asprescribed; 
doseverified, (Special Instructions: Use as Directed) amLODIPine (amLODIPine 5 
mg oral tablet) 5 mg = 1 tab(s), PO (oral), qDaybifidobacterium-lactobacillus 
(Probiotic Formula oral capsule) 1 cap(s), PO(oral), qDay, Takingas prescribed; 
dose verifiedbusPIRone (busPIRone 30 mg oral tablet) 30 mg = 1 tab(s), PO 
(oral), bid,Taking as prescribed;dose verifiedcarbidopa-levodopa (Sinemet 25 mg-
100 mg oral tablet) 1 tab(s), PO (oral),qid, Taking asprescribed; dose 
verifiedcarbidopa-levodopa (carbidopa-levodopa 25 mg-100 mg oral tablet, 
extendedrelease) 1 tab(s), PO(oral), at bedtime, Taking as prescribed; dose 
verifiedcholecalciferol (Vitamin D3 1000 intl units (25 mcg) oral capsule) 
1,000IUnits = 1 cap(s), PO(oral), qDay, Taking as prescribed; dose 
verifiedgabapentin (gabapentin 300 mg oral capsule) 300 mg = 1 cap(s), PO (oral
),qAM, Taking asprescribed; dose verifiedgabapentin (gabapentin 300 mg oral 
capsule) 900 mg = 3 cap(s), PO (oral), atbedtime, Taking asprescribed; dose 
verifiedginkgo (Ginkgo Biloba) , PO (oral), qDay, Taking as prescribed; dose 
verifiedhydrOXYzine (hydrOXYzine hydrochloride 25 mg oral tablet) 25 mg = 1 
tab(s),PRN, PO (oral), tid,Taking as prescribed; dose 
verifiedhydroCHLOROthiazide (hydroCHLOROthiazide 12.5 mg oral tablet) 12.5 mg = 
1tab(s), PO (oral),qDay, Taking as prescribed; dose verified, (Special 
Instructions:  patient-needsmedicaid provider) levothyroxine 
(levothyroxine 25 mcg (0.025 mg) oral tablet) 25 mcg = 1tab(s), PO 
(oral),qOTHERday, Taking as prescribed; dose verifiedmetoprolol (metoprolol 
tartrate 25 mg oral tablet) 25 mg = 1 tab(s), PO(oral), qDay, Taking 
asprescribed; dose verified, (Special Instructions: Take with Metoprolol 50 mgat
 night)metoprolol (Metoprolol Tartrate 50 mg oral tablet) 50 mg = 1 tab(s), 
PO(oral), bid, Taking asprescribed; dose verified, (Special Instructions: Take 
as directed) mometasone nasal (Nasonex 50 mcg/inh nasal spray) 2 spray(s), 
Nasal, qDay,Taking as prescribed;dose verified, (Special Instructions: INHALE 2 
SPRAYS NASALLY EVERY DAY) multivitamin (Vitamin B Complex oral capsule) 1 
cap(s), PO (oral), qDay,Taking as prescribed;dose verifiedomega-3 
polyunsaturated fatty acids (Omega-3 oral capsule) 1 cap(s), PO(oral), qDay, 
Taking asprescribed; dose verifiedomeprazole (omeprazole 20 mg oral delayed 
release capsule) See Instructions,Taking asprescribed; dose verified, (Special 
Instructions: take 1 capsule by mouth oncedaily)rosuvastatin (Crestor 5 mg oral 
tablet) 5 mg = 1 tab(s), PO (oral), qDay,Taking as prescribed;dose 
verifiedtamsulosin (tamsulosin 0.4 mg oral capsule) 0.8 mg = 2 cap(s), PO 
(oral),qDay, Taking asprescribed; dose verifiedvalsartan (valsartan 320 mg oral 
tablet) 320 mg = 1 tab(s), PO (oral), qDay,Taking asprescribed; dose 
verifiedvenlafaxine (venlafaxine 150 mg oral capsule, extended release) 150 mg =
 1cap(s), PO (oral),qDay, Taking as prescribed; dose verified, (Special 
Instructions: TAKE 1CAPSULE BY MOUTH EVERYDAY) PHYSICAL EXAM:VITALS:T:  35.9  
C (Temporal Artery)   T: 96.6  F (Temporal Artery)HR: 68  RR: 16  BP:  
138/98HT:  180 cm  WT: 107.5 kg   BMI: 33.2 General: Better mood than prior 
visit. NO acute distressHead: Normocephalic, atraumatic.HEENT: Pupils round, 
equal and react to light. ENT - WNL.Neck: supple. no JVD. Trachea mid-
line.Lungs: Clear bilaterally with no rales, rhonchi, or wheeze.Heart: Regular 
rhythm at normal rate is present without murmur, rub, or gallop.Abdomen: Flat, 
soft, and non-tender with positive BS.Extremities: Full RoM in all extremities. 
No clubbing, cyanosis, edema ordeformity noted.Distal pulses present and equal 
to opposing side.Neurological: Alert and Oriented X3. No focal deficits 
noted.Skin: Numerous circular/ovoid lesions of various sizes noted on 
bilateralupper/lowerextremities, abdomen, trunk. no signs of cellulitis noted. 
no active drainagefrom any lesions.Patient was actively picking at several 
lesions during office visit. Nourticaria. No erythemanodosum noted.LAB 
RESULTS:No qualifying data available.PATHOLOGY RESULTS:Pathology Results No 
qualifying data available.DIAGNOSTIC RESULTS:No qualifying data 
available.ASSESSMENT AND PLAN:1. Skin lesionsDifferential diagnosis discussed 
with patientEncourage patient to apply either CeraVe Cetaphil or Aquaphor to dry
 skinFor pruritus, patient can take Benadryl as neededEncourage patient to avoid
 scratching to limit possibility of secondaryinfection/cellulitisGiven nature of
 lesions will send referral to dermatology for furtherevaluation and 
management2. HypertensionDiscontinue hydrochlorothiazideContinue with 
valsartanPatient to start amlodipine, side effects discussed with patientBP log 
advised to bring to follow-up visitContinue with low-salt dietencouraged 
Physical activity3. Depression with anxietyCOntinue with current medsNo acute 
concerns4. [facility] send referrals for PT/OT and [facility] at [facility] 
followup at [place]  No qualifying data available.Medications Affected this 
Encounter: Medication Changes/Renewals This Visit                     Status  
amLODIPine  5 mg, 1 tab(s), PO (oral), qDay, 30            Ordered tab(s)  
chlorhexidine topical  See Instructions, Rinse area        Discontinued with 
water, then apply minimum amount  Misc Rx Supply  See Instructions, PHC, 1 each,
 0           Discontinued Refill(s)  melatonin  2.5 mg, 1 cap(s), PO (oral), at 
bedtime, 30     Discontinued cap(s)  Misc Rx Supply  See Instructions, Use as 
Directed, 1       Discontinued each  Misc Rx Supply  See Instructions, Use as 
Directed, 1       Discontinued each Added Patient Education Heart Disease 
EducationEating Heart-Healthy Foods Follow-Up Appointments With:   Sergio Strange MD, PGY-2Address:   [phone] [place], [place], [facility];[phone]
[phone] This document was authenticated by Sergio Strange MD, PGY-2 MD, P 
on2020 01:53 PMItem was sent for review to Monae Francis DO DO 









          Name      Value     Range     Interpretation Code Description Data Renita

rce(s) Supporting 

Document(s)

 

                                                                       









                    ID                  Date                Data Source

 

                    [id number]            2020 09:17:42 AM EST [facility]









          Name      Value     Range     Interpretation Code Description Data Renita

rce(s) Supporting 

Document(s)

 

          Progress Note                                         [facility]

rvices 

GZUMSu0gIxZZHnEr18/KIKuwZASei6JyKLcxEZl1QIsfXKSuW3SiRZX3lT7uSJT5CIvPCaNnJhXxHuL5

lbm
PuIoiPGrDoNRDhMfwATnKiQEyoPxwvrTAvJP3ZsVS8IYRbV41oRZMwYYRbI1KlVId5WT5+BNimCTZ6ss

OkgW5LVTVzPC5f4lMVlWulI5yPRJEe7WMXMGBlBVmAVmS8gGY5cSmgjkMslCcZ+ENjaeZ7HIR8kr7nkA

/N0b0+95KLmAhM6qg84bZkQleu4sUBvv1q4aollaOj
gPVvYm7pOZREUhoIWA1R/VvOjtMk2sEa7Ob9CsCmjRtmT7dApzWzkK3zK3837ixqK/EoqqgqOKthqgy6

WaSxHNbxOI9cSHl9hR1MBVAYBeOvaWDqkCuRGAQ3UHF7U0c3izCGUg106QQq2aH3tp2P/TCaK3bw6OLy

U6uBTt17lXKauCAupyZ4WqRomkTnS1VxrFrintF5G/
rEsHNmFEaS0G7VLFWam5kxEFQYEfjQaZHMlNfoPWti5R0pSyn6JqCdIfVX7gGuSpyzsrOjxkNsRMe/Eq

gDOx2s8qHWQPkwX55qneMwIe9IzTEiqKxZv8DWXwgtHJB64k/QCPyHjY90eNcSgesnZ/5oCkBTVEXhBv

Vc6hbslG2lzWJQ6cSia1z7WGuZNi4jCl5NRPEZkBPD
IHk/MB+FGzw5PsSxe7vJ2jrcbPPsiU1/LJfMrWyG1uxQl90ZNJv9sHPc24xWsq4pmuOEgrhtwJj0TE/T

dKW1zr4lwQg0ww3uTZYpNvoYufCYccw+dLhNRZecV4NUqz4G/r+0vzW/Aw77Eikl6KGdEewpGPWLDjBG

pMxSW28rCjd6dRbZa1pWBApzooW+YjLNx4/MtXu18G
oXZcdHaqrjlv//kfrkR+svp1UYa4ZzSqEIc6Vq90dxke3DoMtadYJDWxFgkJYAIi2+w77AcDBw1v5kjN

VhdeWfEo6dvu0TU3X3thqRnYiwVuyYkdM4waEMO5rBlYCIORdIBgcOqQ9bYMsHDGUq5FT/La5VvfpMdN

IQmruTkl5hjLBnuscbOCK7kUXkcx5V7VsLeYeobeuI
jVadtyQ8KHtqClQhsd8qzRUKbMGyV6U8A27BgSW5gBYvqERLR+7UAORUAcbZ7cttVi1YZGE38GSMF1Qc

e/tvPoP6WA75Jqgou3LUp8jNo/Z9C8QFDHiVWlOfNMH6isYonM7NVZ9qz5BqZWv8OGUiy6MwWDbpPYn4

FCjiTTNeM0K1fFHdYZSiQY4GWZPwEJ8GHYHcuzGfLa
FsYUYULvHxUWGfQvDld0OkG0SmNNSuRDARFTmaSETjY92rLPjaCp62ICtxLSAiCxIpBEh4Dp5VBfOhTF

QdS36baAXxuIWbLKQyHNUZWwKvVIEyP6NsvQSrFKckZ2LmS4HeNC1beIJoLZ2bkUXrU1KzB7NqfavrLY

JHQiAvSSBmYWxzZSAvSyBmYWxzZSA+Ul8EGIZ+Pg0K
ZI0ay7PnARx9ADMwj1HeVTnkHYxmJzXfPFb3KCJuDnhwQtQcDVJ7YKB7GCReNNK7RPw0FCW7QfWkZjT7

PEFgCjJhNfDuHvr5LDU6ILSuVppgWmMdUQH1ICMsQrxkRCW0JAQ7XyK6ARSbJIV3RGA4AbK0RPLeCDG6

OPY7BvO4NSNtQKJ3FWAwOtPzUgBbSUo5OR0OQTZ8LK
DyNZc7YVCsBSV1RcJqMpKcMUufVjM5VeGoXwEoTOC7UaP2QUIkPml2ZVpnLtSpRwmsLXO6SAdpAsI8WP

PcFUGjWYpcUxU2BzdvYpL4DLh7BXU2NqVvWmC7GTZkSOT4PkAaXqG6EEl0TPO3UmfpDdM5GBLiUBKRFo

OtLiBtFMI5TILuPeRmIRc0QBB5IyDbUaZvTKG9EXDl
ZYG8XSTsFnJjTHR2FpXsMxBvQxOsECVeWURqUlvbYri0MEE6ScUtVuwnTUd3SQJdTGR9LJUsQnTaZHIb

MYHcDRdnHIL1BKQgHmQ4OAHxHFS3IIz6JYB5DEOuVSewYUV9TlG6IFZeQlo2GQZ5BTFfJDnrKRa5FTx7

SOR9PMNsXzHrJEp6XBL6BQTnPnCiYOe8FOA4TYXlOy
SaFCn6TJM2JXUaAiFlENd5ISQ2THDkSuZxXTe0WTG5BXRrUuUxIRp0HNJ8VLJlJbGoNMe8JWV0SIZgVu

RfZZ2UVXN4EGRyStIzZUt4HAG6GAZqIfOfURo9HNI8OYOoLbKrFBp5MQXgCtjkVhHzOZG3ZsA8HKNdQV

A7IQI5AjZaVQXrNIE7OVHkKoC2GoabXxcwKTM2EdB4
SLMoUsWqMYbtTxD1ZOPxAFFgECF5NREsJyIgElFnRPOdHbCBQfJbKVs1TUN1IkXaNoSfIsBmZDXwSaSh

KqIeMPN3XEdeCIY2ByIwRHN4EWBjKWD4FkXsBeUbLNxvTsG7EoSsAqLvCVqhYvHsTZHjWShfQgI9Fqbc

GlM4WVB3NtX6MoofFca0UIA5GTHnKzxcEtf5FUfgDr
X0KtCwIak9KN4ONKI6FevlGjd3NAc2GLI8OcqvEYa6YCg1QRV1JjTtBzSiYJssTdH0UzJpZuI4YCB5Wa

J4GGNvEXB0EBT7SnG2CTUqKUP8FLD3GgW0BLEhLGu5JRX7OkY5FTIlCGZ6YAX0IaD5EWUzMmn1JLR7OW

UiLqytFmj7CSGsKJPVRiYsIrCdDLJpTWE0XHAwVvSz
DIQeDPQ3FKOeBBS7SLZsLZT9OJPxFpGrIDPxEOB0SBLhEKO9FTXuEKU7HIVaRKBJPfFrQV4bxs2HKmHf

LL7hwt5UUOL6ZU8MFASsNX6ObTBbC7MlotUEXTZujiczbL0rKIfnKOYqH6LcakMDAW4tM2BofOClVDRp

xRDMGhPiMFTuNHQcJT22YXskBS5TLXUPBGfqrPNsZV
F6A8Fuz7IdjzCeYXIfBw7IRNRxOB0UoSRqyzTeKl8FDZOpXA6Ar464VgLzqHVsYLUqAyIcMGMuNZSlKC

X5GH1WHRRlVJ3NpKKmqIEOhsxaZAGqU9G1OU6BCXFBIsNnLs2DYhXdCU6yxc7RMJPtKM5uyn9XEHS2RU

7CCQTaQQ0YfQSbF9PslkTmD1VscZdtJE5UeiCgJLeb
KA6AVJRbGa3wlT9RpfhcpYnTr8ykQ1LvF15fhJ8bC3zhcxWle0uLhlTsVWraPr7DPZZiEY8KySIvjSRi

YSNuMtDuVSCocQNtWDAdAvA3GCoxAVNaU3pmVYFhmoR2FXNlFi8GQOWcFO4Re289XZRmI2GflILnfzY6

HQEzHy4XLTB+Jb7XYN8ws5DjQRx3XTRlt5VzOBalJX
plZpJvLOj9MTUrKucaKio7KAX4ONW9XOXfUBU9KOg1OOH7BbcqRKrmVRLdNiNbHsCmZkk9EHY5LIKzSh

qbKvHwAYY2CPOrJwbjCOX8LGQ6VoB2MNLfARH3PVX7MdT4ADJoFJY5XPA3ZuZ9HFWdSHX0ZPC1HKKaXp

xsCSu6FO3VLDA2NOOqFPs0ENH0TkVjZCT6KOY1QiL7
GewiVbPfELxgOvH7NylbAbBlILu2JCN6HxErCgv6LXCdFVI1AfieVTJ6HNddVjV0WkPvPcp4ITK6JmU0

KylhHuShDNU0WfG7CYOtOtFkQWQ1BiW7AXAgAdB5JER3CeZ8CYZaYKoeRCR7ZMNyKrwpRwl0DXU3JJZ3

HDHqZaMeBLQ7AvC2SYHpYGLlSJK2DxK6SQEiUgt0JV
N3VqR4JKVvJvReNBSwRkX1UGFkQwUfVEgmEwY0LASjYCU9HQO8XiN7KZWvJdRbTJKwKXSeSprbNQL8CB

GeDXB9AbKeUHAtRL5UHHR0IQLoSRAsZXXlJCChQeQuGrX1HKG5JHC1PSQbTlRmRIx4DXP7FTHgRoGpLL

w4BEU0BKPrHdRyMWr6UFJ1MFTsDlSgYBn0PIC9MQDm
JlJjGIj4BWO7LAKxVwYtJYi4WIN1DDXiLxPeMTb2PBW7DFBgNiZkGAt1GRCDClStUiEcOFg1DTW8QYRc

OeFaTYz3EIV7ZQMaRzMoTGo4UCP4BCNqMtt2BCJwHbU2DNJqVFQ5PAQ9EoV2JPOvDlLfKMT0FuBdZvGp

LjG2BBX1HNI3LHCbMPm3MQFaZeB7NokmHKSzREKwKX
W9ELjqZrMsMBHdKpPbMlJxCAeaZXT5OsJ2HsbhFuSqXGHzVtChVeQnWgT8OIU4BoF2WlJmYVB3XLzfIG

B8ERNgJqB3YLH5WjN4SlipCxF8XJA2EiH2CkrbWHBaICF8AxIiJaP1JJE8KsN3BdzyBuG4NSG6ANHwOe

wzPke0GWF5UOQ4ExIxAiMiBZr0KEPFOzIvKbh4YDs2
MCP8MuxjVse4MXZ9JKM9VyznTaTpTWnoTcQ8YxBsBgUpMKW8RpC4JjsmCmYqKGG0VkE7UMDxBRP6VNL8

CsG5OLDuZQL7FHt2EMA4TOBzRPD9BVE8JmI6YPElRZS2LJH4BHDmDkvhXfc4HXC5DJF8AZIiKQupJUJ5

MdH2IKIwMBM2JMT5TcA1JICiHOJ5ORA9FON5FBQoJF
J5JRN3GuC2AJXfYXQ6NJEtCOL5XSHvXYYzAL8cEAbrzdHwKcfDOsNuEVQry3EhQJvyHOp4EPepLBSuO9

S0kIWqYu1jaTVwn8NabYW7r6DQEuFwJIBkNg8jyR7faAWqGTYxFYiBYpFlLBVgXXVtKG48CQwvMB5ONI

DNOJtskOXuNVZ5M6Nho1EyyoHxGVUbVm3SVUTuQF2C
yPUrqfVbTc8QBOBsRR2Cl366PnUbxMVcBGKzCmIvHVGoNFQnLHJ4DC9BYBQdAS6ZjAUacUIQyhgoHSZc

C4B6ZD3OVHMYZpXoSq6KIjZtVV1ddc0XIECtOROnZjkBKlUzTGaPOkUyOJBtBJjvCU5Gq546L0N8FxU8

uQFoWIT3JWB8cKYwUyMjDSZeqzIcMLObGOtaJC7cj9
XmeqnpB8qeCR0ukDTuQ68lfW4dSFtsSEMqV4GosrX2K9bbtxMnYW2CVZH6O0unkePuBLLQIsVaXLKsD3

jxxRpcDOehKSHWPBbtAYFcN5QzwuNBKQRxmuwkrH8eFTEkSEQrCh1YIZY+Ci6ZYC5gd3QtCFlyMqPaGL

0ihh5ZQPL8MN9TzYj2IWViF4JlZGYfYASdh8IeGY2Z
XV3eqCmqCMKlYQYgU0pjrcj2jJCjMfIeBLs+Pf3OCFDyjDXmOC6JKrzJ6ZaGkITTfc+bjzr0abNWUGBD

5vDmQDWZGCETUNfAqa6VVnWRHCyfypWUUwuMpPRe1YMaWbYIpEGTxTPNH8oyGw2dJNBxXahj1JSTmVIa

iOS+13x2d7ncYUec/+//nx913jokC4dacpLaLKTd5F
PrjfPBzix0hGgLRnbfAI5KMgwJWNv7MoIgPSU2j/xixM6xjKzpmK5nsnDDSVt9J3+b77/g2OSP6H3VDF

0Yw311zlt/6OXJ7YvGeQ730mozcy3WT7Q1+q6wRk9faogZe/5e7eljHfdPfOVkxoNcWzuqNnR1lvkus+

bNrZi/mjpZ8N/F+U+hg2Mdl75PquK/CHm+cOXcqZN3
YR2vxdiNdkFBuk3d+/d405AQzgl/eMxOe1M8wok+E9PDV4++/Ietp6+CRi2mfLRCRaIFin5AhBChM3P+

2PrDjbHkhNd/Oy4Arc7+S/9qGOaMMozjDvuTjhs3QqlovK1QJLWXI1Tivb1cI+fN6LmIvjtQMqoJXJ1F

lEmdIP3xw3GHxli83FsrCHGtjQ86wA4AwEwQEeQy7D
7yCsKTIAgWOjAvgxN0KMV1YhE/VsxrNRofxFFuxlErunbpukKi9s+Aqpr1jpG3vCbQUYliqbuM3bK1CA

1EaObTAZZfOPRNTn6c8DI8Vh/LZz595ntfRCc1Fz+D1t+OX3rfJQqajY/J2vcgcuyoRSvFEkU3OM0C17

KTwWXhYc4NSYJymwqt55D1GMnGLMO+lT0ASPNttkuj
fm2FrN+RS3Wlpkc8lrkLijMHVVZfThFan7EWBZbCdarldnruO8v/oXdEY+GG4jc5nnIeUl0M+8xyQ8Zp

Syo2GU94yjdRHPNqVqvJT/3By3DpSxErhWCXighFITIxrG4xGoIcvqGnA2x0G/W7QNsmktwXyoLTT79W

U9admyRX1LBOPamuvAdaW4k1bs35GCaUEva1v6mb8c
pSpa9tUMmwccM7mJ6g41FpJyvFKyDsnyAT5RK4aA3WYjZjrrGdagYiNU/LXDlJPiA/4b1Ti8Qf87kGO0

8Ht6wGhaUnXBemVFyKU2WPcrXOYb7Ks2DX7Wn1ZP6MA+BkzSxzwS6p/RoTpwaAx9JCV8aT646Q8knttw

k2i7vmgPjxVM1Hd8rH1v+l2BvahIBG5zc5W/pIGKKR
rMvpI3LBXwSVoooALcDNrSSUxc2h9RFhvetaeN3+Paepdk96DwNQaE4cGwsp6NHwFqkoWDevd/Kv8qTW

RyiySMc6bf4pdJTKYb1M7xC4Sa5QQVy/uYbo2gtffxRsllqJDplfxKJtw73cMr/vf/q37S8j51+juuV1

q+bx3mJgZ7W38lRBqjENrNnUd/NnvkFgjG09ee2rLp
TgqqFiMMZGT45BBgCA9GDLvsR5dN18tpG/KvpSCw8szdJpZvEJvwdU5kF8ibV+d3wp3ys3o8vHtqjocn

04d1QZk6TdcF8fOA9pnVp2R9uNI65UiU3lq3J+8r9YwzTG4tO4m78TF7fs2YZ9RS8ezt4+XD7zZYAwZT

37w7559iDe4my082NHER5vq6aOAY92SDl6a9mV2Jh3
1lH9BQkKXFw9QNc+9izdIXvoifKQPIT+IGwIksYMWWPeGhcdFTV5oKkfTOoRhzTFuqPUOtcai8mro7Um

QyrFtsAgEfwzbWqi6P2g/fV99JW+G+78TSwtWRbHwiI61LlbBNm+BBFsmYhaih9C33ljBC/+EO8jT9mH

6un6qKUIjhM3+fPfeWQ1K+iR1KodGw2i86ZFR5sZdj
+RFPekN1kHXJDHkSSAjqLv8pK7XSynvfpIsKKlnPz+N2JdW4Zt5WLmJMhnuPpCyeOGVfikCkyVF/Uq2V

xsJ6k/zemuW2yOaUbWm2oEtIBffvq0IEnN3It9G1yS9n+Xk7iL3NUXTEOZG+CMTrjVG6hkAnZOi19rSE

kNI0e5QIv3k7GqOEP0MmwBneUqOdY5j7PEVLOKSmHD
PWcY+yCkCPR2xIxMX+ncYTYczqyHgk2q5dNFVOzFLF6ZuC355dw1T2aD5Qcpta9jm4w8fGd2qMUeKnH6

vI1dW5hWBYA8N90sVlk407pmQaJ2kXKWBODKeEkBtaVxdb4sHs4b5hp6EvWWKFL2ubEsdyUPlyJoV3E5

P1ARmyYsMAR+wVv+STLZ4cBGe5lPzgal6XbogC+g0d
GSof5R2vzlDnmMjnMzq6DWUKlp56P2Ck78V24BG0o78zP2regKL2XGAcFBwzV/K/Kr3yV4FmBCcVdc5t

m/ua31Tp2MIbhGCR/AqjGj9bnXAdmMF/S1nueuDFsVdLHfuYbd29v837jV1XIPc2EPcno4rKhOQKT/Zr

GE0vVjKQfLIgkUoOayvFO6EZ77WhGUEdIU1DkwBhSS
jP7bXmDueCEjqOEgRc2qNmoodGnKcWnNVWZsKofCDAXQQDwg7GT8RytX4Bm1KNbYe6SvXodLt0ULHmnm

hcr8rgmzx7KlyxLvIq3RxikCka/Ey5R32/sVpk7vblR6WZSzW5qp+4J0pCijs9vnrTQ4lJkOrrXLypBT

vXw9nJu4sXPt8ZDmwzNdGCnyPw0mAjEKOEqpuXEja2
ig9bWrWAtbQC5dmlTQsqWE72IW3wWwlpzoJSg++Ji3mH9pb3hDfXdEurKFsp4uO3DM6PJO+L1kA0Da5G

NpssytbDmSPiPh3VvticJ2tyPy+/J9ipF/azxfY9uZCHnePC3ARixnOYVgm0zztWp6ybOl28RlRl5M83

sMrsk8ZdaoiWfy6j9qjiNZ1vxhAPhVq6vdNGqnM509
mxk48aGkuc2Rq1YO8jsDg7zxvWG22TrXcTRV06gt6P/TVys/9rvR3N2H7P9+CCNTVxL4PRAr9DoQKMST

UCIW99345iGT9X3aMdZR2YpmfXS11tPeo+NS0gTBpJWDY34Hl0Ow8oLj0VkwdOEEzx/AAPLJg/J3iv5e

vqzoq/QuJbjHpaN9TD+jksQteM1ZH4fOOdoSlqe1L4
7twY0gnrkMRI2W0ehacSIciVJUKWWVsOZ1VNpqeg4mQnzeAwR76Gfn+mtJj5qfh4GlmBK7TZXMKo/S+1

4CFx4P+t3Ws2R42Zde9Cw8TsxJcD/021bCxY/0GxfBxY/0K6+Naheed/n0dGt9le5yJmh4AS04Ne82FwM29

8d684Fe+Nk9A1YG70JBG1EJpolfAC63EoI7bKLGgb0
CT7jKairetQSdq95aswS0I7EXVLLhcNF78XfZiF2UNF8S2PJr4yXGXxbQipOajDsmJ2tnHJALjc+lrdP

ALHLGpFYU3hTHlRIUslAEYD4AbsWtXX6GxYkHQ3G3uUKGrLMLmerB7R4SpUxgqGNxA6VUmAJighCN/mC

ZCI6zOsS9xYeb14Joz4a7kqxNveIIhOny7nMRn1QMn
wvN0xUXyNiKvaS1ScUYNI4VemOPbWH5MpD48qpRNjvYrmHKJCU4LpqNzNLA8XzIhRrTVFj3EoP5ZSCAn

1Yxw2uOUhzgY6hSqsQI1x3L3QJpKFd52ziHP0e0NKECJecp5Uj0BFI035++B5VndlFMg+Rd4Hljdzh3c

wuFoK2gmB67q/7BsaL/8G7Timh6XWtEc93B3Xku37T
7SE0bPVsWtNixoJhDMLPe23Lg8v03ZP8fLxXLsJ9lxWf9las3PhiX/0xNdG3ddEauy8mEryweRduc67Z

1Z6oE5P8+CXnkaEmcztSZSBFlNnY90vJSXgrlbVYwzqiJtHnLNC2NRpBKZ+Y9Am+uodmC6OAR7zOEKr4

slMu9/J7bHgdft3+qePaa4D5fhNoZ6WpaFIxg+JetW
JGsLNntafEMxJWVpansyfV4/T75dF3HouehBXeoj8P5oin01b8jj+MUotj3rhohQFITIdGBddOCu+Srt

yxkp/ZSxVJ9UMQpdAGxDMVOEVhXmY3y3XlDA/es2NHyXujTIKaNEZLCH2KMvitxNksWEQ6dOEfVwzCSh

+xN5igayraFARtlpfG5PiTHiZf6WNqVIxxzS9ZkoaC
WzeTXnzUMHdPOG0um70HppnOwE4R3aqwIt4j55cb2Rc782CsdnLQ1RqLljJthcaoWctW3wf1H2grxGW+

Ij9vp/c8i5VMqpkL2YV+TN6SHtHTeiyRuvJRy/zi7+BDj/k29aQosdX+2ktj9KyqwM14zMyrDdonm5ci

NMWXFhWl+QWTzQUR7wRijdy9Rcrr/FSheCpa+cN5Dq
JILb1Y6oYliFEM91CBUnHXAXWqAOokoVWgNPBrg4NXRuRLj3cnXAsQf4t2rkDmKC6m5VdimZyobYtvGD

VPxs/9TMvJyBDb+pVOnZg/ALSzDjmbKyLUsx6qKtSnhvmcKz77pLJ3LK26zRbNZSwaZwxLZrtaseLJ5k

zV/WgqY9emQ/pK1EYMeRogQeNBv4MuultF++UHJueV
cvn18fWkM3V6ak7tc3pbN0ZZiGiXNPkqOpbR4iVNq3qhIsrCYb7yGUFNOchPJl0WKv5poSCaoVhtSyrW

o7+WYuiW8jIOkFrJrv2+kZxLo0iJNryLeSWXT78JVoXsBHx6FwqxVLsnJYCNObDSAXFAN/ggYa2Lh9gj

WAQ3C+ZNtnywQ06ppYdGnlGsXZ7Nbb6oD9u4pPy08G
dx+iOb6YaB+HPVxY2MtqVg8FTCst9yhIngL2J0otHQJdUWCberdE1D3ZzMsmoAdLA8URrwkrT3v/2vdW

guj4JK+cj2CQox9fYLiGGtQlsVXuFJpWZ4qQwjNJovSF9tHasazbKLGVJ6fQtjV3HU8Xt+Moryc0SfNt

2Gk8vsLzqp9Aic/2LpAiBS9UtFhnaSXBUyctZKeQYW
EarWEqQumklOig99H1E/RKNoj/1NaDYdpWsyvx3MNIrVkgJvLRJTvth/dP8Rsd/2U187T3AKAogmMd44

pTZLbZaGB/Ql/sbZ1a5OMHaG+bP2IlQZ6ICkUkLIJCwRnYVvwSLFq20H/PpfFtufMr/t8y3Yz2M8Dcjd

rrfzZZucqKrmNeJuMvfca0I8PZqtYW4lnCVPhDgucJ
62A5QdSIN1AkbBgSskJxw9o0KBwsj1KIiPwIBDtSSmsCPkvK3z1valufz1grkFQnnz0yVoR5h7LeQrIn

8f3dW7nQnkL4Jdis/Hcb2PmEwK6vgMgSPbdadF7mA5LzGRWxh89NH1A0O2NwO8g1mAjyFYbWGhDy4V+d

gclC548RN7GM+hHgC5oLCHsbYSaJICtMTtLI1r5Yuk
lPxFspwWiJ8IT6NKIH7XcpD6Hw6AdQXjXldl4dC/AG7boIAZIRvZYdLZR6Fx4FX1h4jo/xqb4g58NdJ2

bmzwPiI3Es7PT4vTaLIE3zRF4td1NFFnOFSOHV8HFM9RWHUOApYG8pTqfhPYuGS4jHM+LC6Ek2ga4T0u

Ag7lHrKtJOpXgR0DwZ9KdtBuavSkhRTdO+gHkQeW2F
oXwzM1j+A7GGB/vV3wKd7C/pWH/SvH02KyP3w/hNHYzC5NaRo+ykpMH1NKTSr9I22kV/AvQruOQVk/8q

0u7btRj8QwquRZJS/Kz+W842PW7+i0nKoH3VqeBWsOXa6c+wBt6zQKH96otgnAUUiFaqEb55HwPbYFVe

hQ/daE9w2MCdjHYwBKugVwKmhA0GKjwVCbjMHZ8Kmk
t1MZvB2R/EF8d5fNyRlBLH+044F5xb06sUhh3JQoeWdZ4UfMqUvhewzgz7Dq0v5bctC8XQpr/j1b9fu/

3Njvf2feGHe7irAFOMJhBL8XzVbrKITAq0BtmFGg+pHc5z4P3xyF86A0abiQvXdE8e09769xfVPzOTZi

os68uHgS0KUMECt9g6is0LzwdPZ+vyPcu3XB/QTlaR
5sx4RBCMhw8MgymuUMe8t2qY9Lkk38jn6rxT6OYtDsgRmG2MeUnek4poO+VXxYeesP4OF9joWUo4nDS3

V8VWvTcduAB7dRk/uj9p1B7VgXOwtUCdFXybSMpxtyj4VP70SwRniblxHyjXJe4A3Jd1izZq6rlBCaVY

9MuSxEgc3DzA2IFqF6mDv6cDPWSwxd0y2eAy7Nv2tu
x9oAgXmtjKkeUcqL0YbhI+y2eEkMu3nHczeJSF8goj+82D9nsNN9hnYbzVFmew7lY502+I94Rz8hUS9t

qxyI3l7zdyKASrV1/XVWff/cT4+U0e07u6r+GmohzqmF4DaJHE9w7N/xCYKEX5n0H/7DQq31TYz41tLs

bkXOO65rC4Xtyq8+VAVcsn3HlYVQ+RCm9aDM6s6eDo
vlqX9ad8elgZxxh5jM386Xr2bRzePdgt2wufvy+xCvW0fo8wADcXoZPwSH8HuIFk0hIr1c3wWrWb6xbm

qqcwuQm2HPChk5PDNF1Iz3L+uvxNY4cWRpjhdbl9zGBMXN95Q9EAEVzmz2C7i2o170EivFp7L8MQ1j9z

2YQK8T5UZznq9VUWhPsYXTXX79mv1Ulh4VTntNfgtj
+D5SKw0431ADhQzPYEQYfhbPvtktehM9FdHFmO3H/YLTzoZVhrrwTCUP+uwI8HB+z8m3TLeb4tLSBVti

PQkI8bNfL7qDmRgxms65da06kks8fpLgfyj8uSaXaXgrDjjxAqJyIvkXZbiK9/iZaWhat1TzllSalOf/

W8gD9et1kjEmoMAxWM2vkCQhXkG8N622zm5z0ZB4lc
IBgJ10eUrqctoyanuySxrNq52kCSmqGRptY0e3FMY5Ots0w6fayoL+Y31Rsi0TdE7gnkYY4fx8QxqMfi

0IRswMdJizi5B14GV5UNjOj2DTN5mbVi5glJ86jv8UY04+OutQ32QGP4pE5qU03JAX7IjdduU30qqszs

6OsrTuGLvNqIv+B4zVk/NUGkPN0CJ8jY1IB0yoP2gL
Tpwuap85AOseqqFfHg+Tum9X5Dhp/BR6Wi8NYMi7+p0kFN7ZR0mxlIJB+rj9gU9w+ZdFR5ZFvOlK7xV2

T2MmGD7qJ4c7iKCByj8gLEiPhP4uLEeGeFcouQSRj+4w7QCj2ZG7Ax1NQyOigH/SMY/+YOIAMx4FoSz3

teMeVRtRZtCshcVA7w9kk9JPbBvDptZxrw5afYqrgz
wq79CYrYwK/W9gq+7U1yyX2zHOUV+8dWa8AfPVGXOfK7WgP0NNo3au2hPnkebeWePA0gTT/0nFEZR6nw

rFiORHPV+R8v9CW3V4HcFSdhSWD5zN9whyhqTGrWgbs/RpfBzZk0G4ntstN2Zbh9XGn4Q4YCx/o9seoC

1/Yv8Aw34dxkvuE4m4pxAq1sepsxb4pcPs4dXxXc06
1gCgE25tZR1u2ULM/q2vX2rx132ejO+clqUxV78OsW0jvtI4se0iyV3mp6rveKqVaS+4ukGd9NasJ8E+

501abPZnFcfK5/w6FefJQFbvoLBH9Ezac+d9C6U2ullAQTG8CjXuMA0/RC8JAH2G99JJQVveXrS0mVdM

+ttvkD7Pd3mBnD87qKo53t9bt1S4K+Trrh+igaDpoH
9Jg3CCatR8pRqJ71arZ6vopmrzhBM2QZ2/z1RcejK7WSl3/9PPmgEHgBvBjU29QFdpcCOwIj0mhTNyuX

t+pRFaii0cX/rSekI846YVcxToAJDAeZ6QyTvywR3E+MQgpySBgKKvOuBonQnF1lowW7CvQ/U7oh++Ab

ac9X7pGnwU/XcA8/mpP4O+ALoG/B5q3L6jE6uqfhbZ
fx2wG/6z7m8TOJF4JnT11T8QydXB2Ndz8U49Ki65D0erP3/5GQukUIcp9RWoS+wOrLxKe81b1hsNIy33

7NTgaCzjVFQFyf3kVbrKbb5bdM2lP4akk6Y681FeK7hdQBW2AnsqaYz6x9F06CE5hzY7VWkyesRQix4F

cbDrkm6B7MmSkuDu3Oumyd6Ij8q6qpHw+JtNF3DZvm
Nng+pamM6bmq7TFm/+FmujRxnwo4/NTHhJVekgYOxd2xOnslt5NX8VsU+3k0swH5/WjZ0Ffb8l/y+dI/

+FMWFViaJHJLdlDQ4JGxqGn/AyZJBb9906l43pvz4xm/0aoqfPy8mkVVpZM7CiwYr4E8+wYutmW3Sq/l

J/pN3xi/9Qkyhzm7J57TJ3f5DejacbDbJp5aaH6qb1
b8yib8yXxISpC43iQ+cpYovWQw1qSWPt9A/deHJK9czbQtfBsg+kLO/TlAX/npBixlILwGDm3YOyTz1j

W6fh/sJ4qdxMlZflhCme/hzZb9k+V/Xj7xsrmsuPkcrG+eSiCPJbp11tYyHf4HVXlq8qx/WZ9yn7XJMV

KdqO4rAjozrce9D/Nac4ghoYutcUD/B+NZm7lBV3++
frb7yTtsKMgQLX3L2Es5HP32tfR9anc/zr1tPvkumGL0EEsioonZ/dp4M/aiaARR3zL8Vigq13OlGjuQ

GcGB9V0agSorjjK1WDLt/hfy58vG7vi6qMddEjVjT+CB3NYeaSDmbJ0vjAD7Z8bwy1EQBml5GL2/6L9q

k6q1nG++7aCNrtnG+FojfT+hnYnN98iAj3g8wxfqYf
p0GSNF200zXXpOl7vbQsjcr7J95j12/1PhGc7zRjo655KSO5P5kW32aWr+ed0nTqKt3t2YPT+9qK3AxE

25zVvji9pkmn/Pud1u+mai1Iioz/Ur9zK4vEI9TEyqhf5pK3OU4NjUOv51nGJo3IwcYWrCd2fDJuqFCJ

9E7u5dki1o6zh8uvwe9ZyEp7p8AtX7hMsADUtvGy/t
YD0U1zq2LEmF9Lsu9ekRgl0rai3v3H1+Xb4F+ERTrByqj7nY8wWrT3uZI6najQfyrivxWyLIfVQLXGks

QiZexoHSicdeWUA3VF8adtUvrM3a2Vt+Xv6iusbadGIKVY5xa6o0jeOUFF75U3jJdmHqE5Z9MH/O3Uuz

rL6YHV9XJnlZjY7uuoHerH69P2NSjcTw/nOSrwBNFf
O6XZjN6jyADvw+kEnobR3o7Zs2nJFp5nvtI2lavxGocagWLghRsYNPY9Y39H4YzZ24wrm56amefyroZ7

b1cOTKdNtHO0P7fJlVz7HaX032Ckuig23DSXN9bx2NzuxN4LC/aie8LS0o3QnYEsbvFcqBPiKVgCmhyF

TQUAdwSQMItQVz3s1IhwSnBmyTJFWaVG2pGccSpAPq
LFrmBF5JlssFIwnrxEcuSk7WvaYA7/ESxH4q2RGmXTr2MwOIQMJ/wFclyonttFAuHtfkKOEZFA+IhIOa

MguM9kwbgv27dV/+TM95+YWrDR12r6Rx6vK8Ym/49pmt7YeP258uRON7t2SpFkKD5FUbX3D0P2mEJ0DL

z2x9lCe+UcYnd8TTascI4Oj9083l7vkIyxofui/vTT
oL+L8LcE/nWEl1tr0eyUOABkj33smHDwmIb46j1x/OOl2mwjbYMabWEkHHH6GRtZpJ5KU9V+1YbQ9ip1

wVSF3Q6g+vE8bw0yWiTN2Kq6OYCJXCPy4gjfcIuMv1RlQumhGwkpqfg14mh8Bxb3Y5xdT5KZYWEMDBX3

dUpoAhvaLt7bqGSZBKG6JQmZmwrLlnM/RInGpzRJv4
cbpZ3oxctS71SMxB6XxKzqj18eovUulYufHTxKkPivpMQHm3o3Egp4BvpACW1c8vP938QrdZMEz57x2W

+Tgu7OYlVvUJf378Em2wYbTYCYQY/5zEnUx/uUFZjDifSbdhHdlc3zxHeLZpW0Fiki0gl/oZSoZbDrXq

QXfGDgvih2D/ceaoHwx+39FdX/iU6jcSCPNcgPD6nR
2rqFe2/OtXv9Gh1fDeFQyL6bQEr2Qq9j7y1LHMyTlF8jcAiOSx1mo0B7qDYOF1X4/VUi2sjj+ZPnCupu

LKN0d+5kboH0FivKSrSdPTf/ALbbI/oMZS/GqXMtZz+eqpk97AvxFb9poiSTla0wE4MfBF3fOk6al/s+

oDx/1r+/J9UnOGnCVI/a83iC8GH2vArNKP5E9RlzId
Vqi1417CNdPh82iWjsXvHiTebciUdAkxDXyImMMaN83iIdi80My8CGGczhTXDFtgmHhAbiIowdXuvlT1

Z5z+9kUZVL3+AONL6ArL7rPwkwdhRy8yzFEdobWk8/f4CogI5CnS2Wi/rPDv/GOQr4P4uE4dD/uJ3q3K

Cwuq88h2dM/tpfDtkk86D7GYBudjIe4OwLd1PlzM5N
iW2pcV8UzO1MfT6tYPzm5/0KqnchA9kFKqPW2XlJ7mexmurAWbJu6MfmCGnfndKlol2ffoxz8gU8ixen

4fdkzlDLcvxNf+9dpcEmxmVF76l/sWlgonRyzUWPJYLiLdxly5G7AfLl3/PdnJ9A/M4xE7COqU7mFF6Q

fV9QmScDfuhRjcYjVsDrxHlSyI9BlX9PrAgMkXjJKR
fEcWmUJjKVEeP8H6U1X+H89+mtbUrEBxNxHgKe76rh/52YoNWfYTs4idza0cn4x4WQlbUxj8K4V1pojR

Ow2hLhJ8y1o+K3D2HL8Ava2AvwkkV/2y7/qff+CrlB6tdFqnuw4HZHIdmuB+WvViNonf0W2BRNrn9f0F

PcvSXvS5Mhqt6zbiKZs1el+9a9ay9kG1tsWlQ1gel+
rMiPmePu1JYE8/6QoKKx6A6qzi3/WF0jiwI1juxe33V9TtOqbk89O/x7nnvUkoj2WM9rXrKw9tivgl5J

08+2+tdyya8phaV7sFT/xYJbQyKx6h+BQ6MBXen/V69BPYMlpUxw2Ttaxsp3yY0CBNsM8JvbNW+GA2vb

GpgpjtwZ1kBP6rL81eikgikb2VEbdxjX+mig4zBmed
ulQbW+HqXPxJp+LhUqOrUv0tGjI3xZyXNFDzfEmQ51wl4J070m0d98B3phIiiccXYisWzt/Rq+VwOoOz

epOr4vYlE+i9B8d98cTr5bjRWqy8i59k1N4tBiOnSWi08EVoGugzMXoe+KLu0v9g/hNzGJFx1MFEO9n8

CZfMd42gFiDwds6E41gD5FxZP7QNUSRKUiFL7k9qu0
FPrJD+AHNB3+01QcAEemRCvhWAPLwmcj2S3CAkl3VHkEeFbWZepoeBpEBCcjHb5vA1/GKmkDIcUQR51Q

8UuQbo+2uda1LvMRqB1Y8Ekayj9FRS9rJuNEuTcwKgLtc4Wo7C+qXW3Sqi8MeynxZ065gtoLyaIApkJv

/wPz0l1Gb07qCGHxesnrHs9Z68QQ5pD/OEr9je/oAS
KsHalrXU13XlyU53xk+6ErNiJH62V76+2LPIZ0iDhfCJnD+P6GS+UWsn+zVKzmI/f84Kgp9PJ5U1X/2h

5wte532HBrxTUHIGCb67z6dN4VKxXjB5koeRuYL2NWgDump2c45NkX92WXvlY+70Ku44vlljXSmokrWz

Cp23OxORmoztlL1ILGu4g6BvH9e/o+eWlyHnx2s7uV
+M96guWclrJmcB0T7G4Dlg1glTK/iUcosQp3TmoF7bX/0bulbpAm1JP7cAtlu+ozD8lJ2X2GdFs18HNK

Ozzb1lmbp52O6sbyst1KwEJzlrJrryawbdK/8uru0P6+Ujs/pi6ehREpsZe/Jp9wQBsnyEjArglUhT6A

shQ6wsCxF+qi65pgNvPs725SfnpaRxjlFJQbsvXnPp
2EllyJ5eja6hcwQ4qvTgI4DR0F0tMANLLGI1FftZcDGtdbkFr1vBik3g9lv9loZhfr4XcQ+cdbb57vIp

gMqx2Ct3h1GL3YTiobKV/dNO4ql6YckyvjV5r28IwnSkhfqSeQkTcga2rwpoubm/h2fSsgFwd50VGYq1

dxN3qzl0vUb+ButqnbFlUKisTra5Xd+W5v/bqLqIe6
G/3SyO3YuJl3Cr2Y+e5+y4zysIY5ud3f7sJ4rkYi852p8A1piakD8RYses8gmcoUqokm7+6tPEE5uIzg

qbC+zcLj6Ill+unt9Ws/zd1owwCB+hpt0VStZgmV2wwXlgnvp7ttod3eiAql++kxdZcZFGEHwTfYnjfU

ZLLZ8SodA+VirOn0DbMcr/u9Z2op9arri/n+QN1O4z
5G02kD9L3HllXj366QVbfq1aja4Q3VGWx2Na86198wkDxuJ0DinZLU66BuRYyf7NbIDz+YqMXBPhiFfl

BJo4RhUc0M3Gagr0pS83Bx6MjQx8JAgOGGa9hqX6RwpR0DcMwoni+RfhMVGy/UOKFVKMD3It8rVXWwX2

1Rs2RBCKg/fkK/trh5cEx9SsdUsuljbvRqggyQ88mD
y6LkAT/AdQY1Mcu3JwVL42s5N7PeZT/ohAu9Z4Dj5ARt6NiJAkVQmzf026z6a0H6P/mgy36LeDeIT3Uh

+eNxmy9yt6tKqUJLbQtEPWRodmo8zY2ykgz0YgoepbgflDI73K9JfcQLQRrmwOEBxTD9D2zrCJ1HmZPQ

EgklRzhYjsi8w3HMkSeiPAbXRTi4XvSvqSvKXw38Bn
lCJz3Twes6V4tHKDlIcgp7qqqgXf5dNFiN66ZTQ5kowcSg91Zh460wHKgQV3DBfuJgifuQ1sXmm0975x

xzwpaR+1PpXqVImm4EMwvTkf2GWe8mSE8pQnlLoxGMDTU5H66mi8f2XH4+iMkCc6gES78KdeC7h0BXWw

SYZJ0ihyJuFLki+K8GdWdD2c0TaSteuZSODJAaD376
THcV5QAXywhTsI/mwl3Wrmvxiyf0ettwi11kx1BGfrXJF4gTxqqkGsuDKJRAC/DfDLuS+7ve2VBBhSnX

H0DzONfXpkP7/HczIOjvkS4uJnAaOEGg/ZtxstW0y5Ox/rvsp6lbzIJNsyY8QXDQaKP0Z+upcD/rD2CE

iGm1fZT1RvpJaVAqs9pHqmzCT15NmoT4pNMuvTh7Ih
vHBhofVv+i6hhfeD4ADsMzS1ZrVKCGqI0hdFxYOb2JrqwBDCuBUVX2CfeYbzwqves8y+8FOjReTQVNG/

McYO1q+tviwJsxHFoSm3tRANLZO0s4nl8IaYe3eD2971zW+jnokgvddbmQP/IuTeSdmIbkOscmjbzXtN

12qtW5UN6pjuN/Sa1ceFzl9W9K8fGKfU44Zas/0FTX
GXXyIxyzgQqmkXL+3Hv/P/e9+G7aynmY7xuoDvcjK6SrPXwojzBlThwc/YXwt/C2S6IhPJWBB8j19z+0

cCHwDCwq3V4oOkkwYS03nVcuvagd0ABJGAnG+cavYX/NVXulzdVvidn+wvz7hWe0sV4MT/ZzE0HxTvqQ

kpQ2qTeV66MVePx/B9viLz0b2211Ws90d/NsEs6K31
ljZ77hDO796GwWsprQyUKYu/LOWrO+UVFbCsy1A+zkZwQacV7zGJ2HgjW1kMXE8lKjIY1hkzZ7MwxtKq

dz2ZymXbaxoKzjUpItlDci6WxV2boi67+PLswjYGhwwYQbk0SunWi0+g3ewW/gzn2DY+Q4uZ2IY49wTq

KWnltKtR9mJrsDT8QAot0pl2yvdkRjXR/lQPZ9Ji1S
6L6qjOKarE5iGSV9a+17XtuH//gOkizozMn4tj3H9tIgE60MWJI1YRqVZ/l95bIE2egKdPQ9ow9NCDWY

n8nMnTo8ihyI4WgkWrP/SF3K0uZmI4RX/YLTXvwtXw8VNk0Qe/vABY8rffG2gL+PX8tTGt61N3NIpOQg

8TssubY6NKtQX7xwE0J61k9F7TmFDCN+XPHYgcHsAZ
i223n0K+9ePS/telF0S6pDHz2lCwSc4V02iQ6ylnty37W1GpWBXgFbzbDzeck6s29i6gHyd+sgt2u1dD

4U8yrjTEsfw3PZDcx5LFMhRrlEEAeZ8VZVyiOrUDNa98N6OLgLusKnENmZNoxOKumgtsAE+vtT/y6d/f

kBf+dmMpPuLvlGLsG6QQ8s1B/6SrmHK/fjhs7M9dIN
JRNqT7qN91xIoX2/GJUdrGs9rymkC16sPfgABo76K1cige3MChJ8Yf2L84vPblVLZJHo/U2NukoNIBBj

SIIYG3sS5FDGRN5qb6E0sAHzvru49UUI0a5dWjN8awSGxSfppgGeUcyAfBKQ7GQCJoSblT6Op/urO++t

QdthL4KZBgeWC9mhGxiNuXVKmSduYZc1C1Foej9Jwb
bBlOputaXg4XWaEj6fpz+9VVXL/GFKl5gGE7Q9FFoWVNTHwcZQGSKo2MHrfAF+zoUlnwB2LVOQjYm/vw

m/lf1+1a2JZGvk0D8GHNDTO2zbzhIqfvdQFSnW3YuyqLPW0zgl3nsnbqLZQqMTeWHWdWrc5S3yhjtI7h

KD/mMtV9j3ll0pKtqzzgRKr4M7EzwtAt6nclr2tjMH
dh8KgUZOmgSRuRbA/FCjQXJ59a3Bgz1tjizicRDCYwAnVU2EuOvAmb6zyy6U5Ol+KTOCBcmQ6gN9OUOf

e81qIroPMtQshcX9hn+j25tDl34gbNwFg71nNGp0rTOHMXU6u1fzgR4yFZQkcsrBWT56SbB2tqUxWwb/

NZUb+MwhZU3IU9T8hgP2+6uAab0ZOrUVF7ZOQ/a6Mf
YSGLYiebDkKageP31LRjMWP9aC8/8qGsx0b6/RNyRa/kT0NDu+sChQOHpCiT+/vpmr73VuW/ns+N71cY

5n4Tn6BThNznmrymbF/srjAot5XP5Emao+FRJyk9i6LwYQHWB1SzSBkrU8leO1YtKxTcm0CpMeCa7it7

XR2q7551/iGA3N6fVf5lFqid4Iv71hkmp+Rb8mzQ3s
vQGL36eiCqm/B3AYUHdp+Ic77aEtqSvHplotNAEF/8IZAJ9KrRzHwyGs8x1iCymN0CCJEIQ9FLOgDFJa

aqznPT1isJyboHJ+FTQuf7cnTQRYbszRZbRwP1JVD5UrIOjGHdUQuGs4pBRhD3kYtkDOpq377YCqXAGv

ZH2qgYyVxze2G3+nLTvoHXelA6fbHOLrb8AU6XblNP
JVqlhdBSj/9HaYWxocadhqZpkjuWb3qQJUQSTbEqN4Etpv4lya8kWYRvfTq2wScRsB6iG+Z4+KtD+snH

FQwuQ5UtRWXKA5JydzODWQbMundOdspeQs6R7YDCWS1FHPkgUu9ExbJ3hxCBCKuZ3UFfqWBQciaQtXj5

8LhB4lP3G/GPs8ErRFkvoldmjd2GOCJFd4WPTwhluH
ibGazoNr0SRmUSfcvZQIF386z6rGTwHn6yo2E+ZLE9iEd7rXjMpvDRPgVeWQiHgxi4TWFVI9VCnNXSWG

XTsFtma7vo/D/+F/Adb/D+3pOFlDXoDhNYL2pzCyvB4OUP4ib9PkNWdxDfUmXF8hea9LCTY1VE8OfYf0

VXToV3GzICJmNGOxy9UcSY6NZO4icFncIjH2Pn9DJd
Enx1JwCWReABfHkHRCm30NRFw0T+o/+Upwf2BUC7WfdCBGWx8x/BVXCGzqbXDLLhv6L4wvGRpQmnYTAu

2RwqCF7gErF6W6hnM9HbgtAvtDnpmESwol3a3syjLPJYuAt6oolq/5Sb627FYB1Xip5rzWILtkqK5Aro

BhHAE5bKHhVj1vWurxG+puAvXo/Sw0bLZCzAHHdSeb
3pR/Gj5L8Od7xxV0iONZGlAZ5E4c8HHGUpEyUh8znlYeSeZyqMWmY1S4Csch/dG2K3PSxS8sgQRBQ3l3

k3W7NYQw33v9GF6mSZ4n0EqgdOqqI/OMOcu92ctze8wIBGbdvbBumOBpLZ2XOaZjRH1qdp7ICGEnIWAx

RytTWix0JZxhSG5MlANoN8LgnqZTLUZskuciuZ5aIB
ykDZ7Jk587LyMiXE4APEKQJZGsUKBcPJbYVqJkP3BwS6ZnvJH0NAFyC8IlAIVcM1z9BWwhXT7SULPkKZ

45XS6bAAMRGbReT0GmGRctFVWdIElsEE8Hc367IoKgbETlYZDlZzQiXBOhTQGhREL8AB8QCNOiBUAjhX

duCB2aqVJlRH5VyDGaQlYzZNfxWT7Da255MohaLWDq
MTIgMCBSDQo+Sj0YOZ1yk2OuBWdjDNHqEY3yaw2UQClMYyBcG2P3qEGmHo5ntZ4NhDJ2uVUhJ3NJCEXe

eiRFnLVgTl2PFVUoGi8bqV5RMDGFOQYdYVWkCKrmV6aAZC8IZEVNVEKkCNLvwqSmuBoHWzKoA0MJLUS2

o3NwoOrlUt0dNGwnDsVzwHS0nptnELZvg5EaUQ4Ewp
AoksylYcSbJVJkipOviWmbZH9HiRDodFSmZI11LPA+AfFEFhPsK2XwaaUHZRBxfzwclJ8aXRI6JSHgTw

5NJQOePDemITAqKO0QDbMjJwg3JC3pNIj4CNtjTFWkSZSvPFl+Ak4DBZ3qu4LbEUmgIoEjQK7yxj5YAS

fQSgRdP4B3vJNqQk4vnN9PoAH3sLSiE8J5lEQmZ7Nf
o0QYw019O4OFRMHLHljYnedkmG0YjxHuWRzaJx9WUACpoRw3xJ8OABgmRB5YCOHhOA6tKH43Lv4svBIe

GjE6JFXlEn4ZScJuD6AyNG4mT57bRKVmKmGkJDCTPm7+DIrvvtPrJowXDeD9QFDzi1CmPJiuRWHhVRQ7

FBZqGsb4SQN6RUQeBBFbBLbhUQAlIQR5OHszUDLaJC
CtXXGqRNDzIeObMLBuIkP8BGPuWrA7ILCdDTApMKmgGdqrJFPeZPM8NuaqJDR7XSSmWDT3DsfgLUG1GI

QdSFP5NipiOBU6YLRgYLRzORygXgO8SMY7INTGDxW5FLF1NEGmNZB5DYc2IJD1LSViZXfsURBoGLZ2TX

RlISI9YCU1IIE4EVKeEaJeRFL5UkOfJNkyZDchYRB3
YQX4IQmjCrJ3DKk0HXT0PvPcKmF8YIZ1BTU4QzOwWNJ1CIH7YoQ6GdQsMKG9DMB8UFH9ITToGNukFV9Y

GdNwGZZ8FTRrVPZfAfk4UFSzQLYoJvqbICY9GNS5LRJ0NaZsRPk2AZGlZcEhZPKqFOv2FGA7KiIdMQKv

DbWoMDZ7PlMeAABpVWBzUFT6WxP0EHRvCWn7HLP8XK
QjIJzeBMA9NDXkELC5WqKtCCl4BAtlOgW4HEmuYPn3YZJkUKYrLFCjPzQdJgwbKoEvYPP3KSTnOPDkZv

I3PZQ8CgY6FVWyNzB0LOB7QpR9UNYeUkB0JYF6JoT8MXNaHeT8DYT6VfN0ICSzAaE1BTY2GsK9TKXrIc

T8LPT3DxV2ZXFrAuS9BBB8TnU1BWOlAmG1RZE9FuGC
NlU2LgG0QUJsFbL1NKB9NyJ8JQUtUhM6VEB4EmV9RWOkClP9NYL5WAQnMMKtRFH7XCJjHOQ2TKQlHBB9

ITPaJEQ2OSseWEN0LKz9XWZtAIWaNEJ2JJY0JZO6FJTfNXglIKTkCNQyOuueBzv9XJF0YcDrRbJrEnua

JZ5KETe1ECJ3OCWeVAtbXlX2SPC5DJY9MhXtXFM2XC
udXwF1BpKtCZHbULY4WYM3WJTtYwH3FSH3IDM1IVZbPfL8YNF2KKJ2HyJhBrO1SCPaYjM1CNGpHNO0XB

FxGeXxZxXtOkJ8FGZ8MdBnYgBhHoNqYIo1FKF1ZKJqKKv8PZVaSlX7HJr1ETY6QIGdOfb8TRx9CGP2YI

hjEnp0VMV9IcW1UCtdDSLoHQD9JSB6VWAtAUH1NADz
PGD2YLnaQXH0WTLyYGY0BYfqJJIsXDL0PlO1NoBaTEGbCPFoPzG5QsLjWtHmMPB7UpKdHKLfEgCcHXQ2

HAASVpQ8VnO5KHIxZPk5OXJ7VcF7LOCoPTu7RVA2XBR2CIJvPZO7IIF8ZuF5YbOaNEP2DVW1XHR4XHcq

FQq2SJ2uZFfgceTaYeqEZlN2EVQxz7LoLAy2AP7ZHU
YjLZfpFP2Rp497XAWwP0MltFQqot3QNPQlNl7idC3xmZZoL2Xfl6CUZS3FCTRwWDGtRT88ERZrUdzjC4

QsXPUqQ1t3PNLhGygvINLjZ6JevDJnSeFqMQmiFM3LvRQtwsZpIo6ABXQfQn4jdBJMq2diMgKzDXZ3VT

E5CWDrTBY5BA2WLwUnG4o7CVirM7KqA4laLNKnR0Rt
eZWyPE6ODp3ILaNwMN8xlg7TJRgxZCIcQrlENno9FLodTO7EzCGtC1ZfknZyC0LpbQupSL0ZzsGoCLdk

LC9CJWCqMv4ivR7YANmdSPTUB1YnH18yxS5tX5aonxJso1tQpbFfOMoyJt8TBAFwZtpdm0IZoZTvVTKh

W6wfv4IWwOMtNIS1JO5YPAAiV9ggtUmgABJ5SLGwWu
1BVPMfIv0fkAKhf0WfzCC2g7WuADrgDGJLCAe+Bg7QFH3mi4DmKMrfMJEqQA2lro3KV04ONtMwKM6caj

8SFjAbAI6uad8RUGyHSlZvS1Ntr5DWHIMoYi4CCQApBGN3pY3MjAQfCSVdDB8mZ8LEDO1DNCMvAa4gkK

Y3ROPhXuLhDSNnLMPJWgEsZFJqCyZiRVZiAINLNKgv
ZMIiY3FeFRW7JWZcIo2RTZJcXO5VZyQmPGVtUVV+Sd3FCWUlIF5vshAyjDX3GOW+Qb0DYUXkJKw7H4Q3

WQMxGVo9S6TOV1PJHCKgZHhoOEskRLHiAJy7G7J3RECtM7XYL6Amatsyls5+DZ2RM13YQMXiUEk8V0O4

oCBwH2G6qTzNuFC8DA7ZMS7ZtTj0zLSbtQ0+IC9TU0
CZDtXjZSg0I9Y2iZSdV5Z2zLwTxWJ7SC3VZS6QrBTtCKZknsKiNo3sC9JVPGjNSzUSZPD0NN5NkCSyDA

4VgOUUF8NziMNtXw2qJWvgmCXibS7mOj0zFZurTL8XWtXZQSsHOWO2YY4UyJIjLQ2KrGYHN2LjeWLoBa

2aWAfwcAJzbx7+GE9WHCFpNj8UOd4+DQplbmRvYmoN
TvOxPELsb4QsHRo1HG0NAA9nkFihSRB9Rk8AqWF1ySLqS6dENF1AkACgA48lgPCpGBNuUe5MQkP7mwDk

oX1IXM72lFVye3J9NAOxU8kmVIwjp24kUTfoFGjIVH1xSMTYXArxZTgzCCK0TtVeapilUWFgVh9KBwOo

JVe7sZ9khMF3QDL3SdvgeCUjEHjwWfVhCqStPtT8rT
mbihg3GBbcSN0eOHmtfkfyYBWjBly+YSbrAMAdMRHyVelCLVZerC9axvA3zpOoUNkmlYFxVv9xt3p4Hq

emZa3kUk6cOXc1KbYgXwNrBKDdMp2vaX74SIkwqvAjDe0KZoNzNOE8N6IvDskZEWO+OCzuEQnvfQv4vM

MuLIPxMk9MMXSkREQiJPJgCHBtDYCxILHhFVMbCKHr
ICAgICAgICAgICAgICAgICAgICAgICAgICAgICAgICAgICAgICAgICAgICAgICAgICAgICAgICAgICAg

WMMdXORiBHInBEUbWBHmXY7HHMMpHKTaLHTfTNGuGIVcCNGbCBVlGSGiRYPqSJYmFLFnOINkYDSyEWFs

ICAgICAgICAgICAgICAgICAgICAgICAgICAgICAgIC
EpOOUdQOPiYCScJSUlRSQkPFApUSDrZNAyNC3IHLVhTNLmZJZiWOSgXLPtVGZyKBRfJWRqOGRlHFAgQQ

AgICAgICAgICAgICAgICAgICAgICAgICAgICAgICAgICAgICAgICAgICAgICAgICAgICAgICAgICAgIC

DeFOOkKIUkUV3UDYAeUGSkUMAvQGRtNQVnZUAzJUAu
ICAgICAgICAgICAgICAgICAgICAgICAgICAgICAgICAgICAgICAgICAgICAgICAgICAgICAgICAgICAg

LEYmAJAePZPvIKFrOXGxCJJfFO7DDTFtEIYwOTGzCIRzBMZaZAPcGHJxXMCdEDBsFGLfRWNbPMGdGDPs

ICAgICAgICAgICAgICAgICAgICAgICAgICAgICAgIC
VpUBQiNIRfUIHfMDHwCJVwUOElVKKhRKWdYTDrAC5OXSOdLVDjPSVuTLSuWZMmRYIdVYFnASXhUYOmOX

AgICAgICAgICAgICAgICAgICAgICAgICAgICAgICAgICAgICAgICAgICAgICAgICAgICAgICAgICAgIC

UyFWCkWJPxWULfMO7RVIMwLDHfOEVrOOOzXMXoWHHb
ICAgICAgICAgICAgICAgICAgICAgICAgICAgICAgICAgICAgICAgICAgICAgICAgICAgICAgICAgICAg

ELXrCTCkRNDcNGEgCBOpYKCuQIUxMB7YEONeADWtFZYiHNRpBJHaEYQoBGSlTVKfBQNrGDRvWTVzYIHj

ICAgICAgICAgICAgICAgICAgICAgICAgICAgICAgIC
MyCOCkJBHyHHEaTSDbZGYhZLPcUAZpFEQyZQGxHWYlNQ3NCPTbZPHeQNCyESSuBEQvWRHrHOAaPANuGQ

AgICAgICAgICAgICAgICAgICAgICAgICAgICAgICAgICAgICAgICAgICAgICAgICAgICAgICAgICAgIC

HnIMMnHTSnCBYpYTWuUM3UMOSySKQbZONwVGAaPDUx
ICAgICAgICAgICAgICAgICAgICAgICAgICAgICAgICAgICAgICAgICAgICAgICAgICAgICAgICAgICAg

QZQpFUHfBFWgVUYbOVQqDTJuEVJkDGFwHJ0PSO31pCPki6Z8LOLgTY0pbjq/Kx6DBGmdghTxuFXrGN8G

QmGsHZ3woi6XNaGoIM3ied9AQQpYHsJyI4S4wLQeUF
KqPDORYnGmK51oGQyyRj38VNmzOLEnWgJrDKb7Mn9LIaFxV1agJXKcZrX2JMHjGePsOWwhIM5Pb1HoyG

AxDQo+Nu0GXS9mp3YtVWrsTbIaKN0klx1XBGsPSyGpI8TunnT0XQItNAEvRc4DIMWrABGrdARuYpDbZS

QLVvBvZ4FdrB66EVJFOi6+DQplbmRvYmoNCjIzIDAg
i3WqAVz7YG0QFWJeVCt0wOVySLBlE6Kku0FjIq83QHMpGwiaU1UgROK2SGFVUDGfsjcxA2ihfnwjOMEn

orkdUm6yGYExQJXoCrGvRoKdQAAjDVauMxLVFKlBSxLlK7Djd6UeBxL3VLCnQpLnJWhoMUUaGgA2WT66

tKpiAO0XNCCrHMLhED46HUTtFHMxEe6PHf5JFoRhEU
3ugm9GMsIhCXCkCigIQfc4FEnjVQ4PjMYzT4MlaDEfp7vDGdMnK2YFNXQvLYCvSz4XIWYxJxMiMFMbZZ

akUS6nYFOuYZYUcXclraT6TY5MMF8adfWsKT5PRvJaDd2lCp6XMnJwM6UsI2VcLLKlPVENGGfmTB7QDY

kmOU7kNK6Vp1GCwOUzjN5qqj3PHBOvTOMvLjvkkk6R
CryzH9M4xBzbKJGlCjZaCOHUGFrrFA8FYEAzTRG2RBOoGTMrSOKWLnRvX96lHQ6HX4Kco30nZmX2YRYs

YxQmEEdwWW49pPblscUgmVKynNzvAI0WCh3+DQplbmRvYmoNCnhyZWYNCjAgMjUNCjAwMDAwMDAwMDAg

TjB3LcSyNk7JDQFjOUIiCGXrGlBaUTDzSYTmBCanFS
KjZMA5MLi8NBXaXTAgBP6ESdKpVRKbZGbjCOobWEHoICUorr6JPTMzRYZeTKE5HkQmCDCbJVPpHQmnYK

AbXGScUTF3FMGxYCTpXT8HOhYgUFNkIAUzFbZkIAKiOINxxh7MVGHvIHQqUqQ8AKOeTWOwRLVjBDueES

UvMXLnEtx4MDNdOOUrJP1NHiPoEQVzRKZ1WuRkGOPj
ELIbcx9UPYRkWECwKrQwQqWmISMzZJRwKVytQFFmKZFpVcRcTERfPEQrIJ4XIpAwSWSeWND9DEKpILXn

MFRsmq5RBXJmEHIsQCX5JDCvMRHjCBNdWKciYJJbHBM1IVA9EBZuFWRfRP7LRdDiXQCcVSY4XsLdAVMc

BCFavf2FGVNlVOScIrJjCwFlWXSgJSGfOXzrQGBmVB
A2KVNzRCAdLWAgKF0YUjVbJNEhOXglQDJbXTWsWRUzzm8UCTJzOEMmVjZ1VRFyGRMuOSHvESscHYDfHH

E7TKLkYHBaUFQaVD8RUuGvYQAgDJz2UistHWQsIXNrte5SWEUwRQPaYPTbYyVbRCByRCUlDZxaNFHjSU

B1Vjr9BWIwAAAwHB7YHwBmSUExXKv0EIhsJSGuMWVf
zd9PmFLzhPfsqd4NHQuTGx6PjHezROL9CJffHy9ujZGsYLJzGHISRp2TkgSiJSEqEKFKZHfoJIEnJMJn

YPK2IUStMGNsAZI4IVXkUuHgUOBpWOBrErekUim0YlR2CbX4FoBaPhV8NJY7DlH1NpXpEDZpVhY7RfJk

WMG7BhX+SV3pTZd+Uo7Wm0VmwqL9qjHbJLjzNFl1Le7CAKIIU2SICz==









                    ID                  Date                Data Source

 

                    [id number]            2020 03:23:11 PM EST [facility]

 Services









          Name      Value     Range     Interpretation Code Description Data Renita

rce(s) Supporting 

Document(s)

 

          [facility]

[facility] [place] 

VOJVKs2gMfLQZwCc88/NDUpyXICol7OiFNpqZTm9OXnmYUJbZ9JqEWJ1pJ1tRPC8OYwKWpAhYyGbEfMy

lbm
LrFpzWBcOoNYYsSwwLImPvPEouOdkxvLUuKL6CcLN1RSRaB89aFTNpZNFkE1QdQTWyKrV+Ol0WAZSyoU

MiRT2REijB3Mxjb5b04v7z/Z42vM2seXTNYtGwtl11yTfOsJyTLuu+MBZPYk+uJUO1Po/+QAIUuWsEV1

yKTxad0cHzFQjs4F94rjmCKn63c5pKGLnb9h9wxAZW
kbs1+jksvSDwYaugy1l9DHiMosSO1cG2ZtWAV4t9C90mI6ThU0g/jEJXFbN4QV8GOvh4KDKCfqbhEo0s

t/6lsWOHYsFGKfaVJYRFvPYTipOlAoEzL5BvsqyNiSvgGpoisW3IOVviFB2W+WWnRZxbF0yRWiOpenti

N1JchSf59pOorARVvAshCLlVDAZQnKwDWCnR8yRsLO
5+Bwpvxd7xbbQIaIj5hJwwMW/Xz1Mlx1KWHDJjWrT+ok0H9k/OuNZgAFR9GGrCAe8sa+Hbi0z0WEKkYX

wJa4Jct2oI4jv89xP7tZ02nV70hDpQX2o4pP5UuFX7J7QKvpI69KbXbNlXl5QxpmJiuBdlszGJHQsCHY

Z6ax25zcf+p6LnHeaq3+CyyOWK2aCa+98ub0JNaxWs
k6sXb08aZ1i09LQ6+GimwUWaDkeHbgivih+lJUMpq0QLJQwibH/CPLThsIKQ2119PnTCcfwi3uscSGEh

/yK32/x1eVVmGoHVMQbg8gJbaZUvVZVw6lJ5ZveqlmDJGVS+wUvxaDEZAwHFkKkN33LYWPA3S4zmdkp3

pfN+/USqPz48Jvu/8Tj7/8cFlFLIlZ1Q2oGBIbRtq1
yw54VIDlww3VbxZKH2M9TRk72WlOlyU8tbt59EM31bD/FyXjSBUwCp+ID/zxQgfArwjTT+8wsgNZJzCV

vaNt0AY12C3nTNZxByna5jPHAGPamrVt30BM3RBpC0IwGGOWcf1SrAnqJsDZuwBWYpCvLl9aGAjzCsWy

ScjWAutEa/DsKZXhW1YVChm9p2wQL+YTqbWa6uofXt
ld/VIhvCd0jl4GQt+1uQXL2OitlxhmYe/va3/zW09a4lUtreE0BBtMQEDRlPGKs0KOjMMWUzYYUvU+v8

7OJrmWn5GKhlH6FLAsuUnVzJtilhdlXbJsuLhoty1kwIucc8++OWbSsDbzQeuCfNI7SwiKVrtYQiCbQK

CtMKULahtt2Gltm6z9bXU3m4A0tU9XmjlMBSFg095c
1gpdLpYSVwucDTJg18Q8HTBy1Lbas5qd+UubgVTRwOp8xdsempClRESIyK9VUBT2hlF+k9ybiajnb8yV

dklq/X8FaifNxf4WsXJZTl0/yW9MuXhFzmTMubVdeWQBsMuIRpsDn/gnZndAGbx0jHosgG3mYVQ/QSbb

hHESTpSuSdIFeXf5+4x5p0aMK5x1K8uyN0Z1toh6l5
KjvJeq9dVDjMrephGi0cf6grAzVcJy26EjGcQQXTFEmS9dphXu58Z6bOXGlFn0S47EWat67mRPAggAUe

7bAfSeM+1OO8kBbRGpEc8f8NJPLJIwNgGJyHEtRxNXLHr9ZxyXgDlZXDzBG2nBUjlBD5iRhS9hjJywHA

S3z4DqtdXwRE6jmLsC5SDAVAoWjGOMxlUQNnfhB2hj
vqO5QJoqcwKmU1opNeeelfuy9zqegD05HbDm0RYeUUb7PYLbp7ANl37yBnQO13oubctnfy92MvZ2696N

HNpOG7n+S/4Ac+JFHfeIhRo92uiOLSYFMwLUJJ9m5UrcKrfMOuqBZjGpGZG7sE4bQg/fgo2AdowcYmeF

GD+ab9VxcdQSlbUJFVDlXS2u2A9g9PPEeapUDbMR7H
jUcKd8iMpxUA9I+PA3R75qN8vAjA4bmVNuvGWjJisN/vJGp51u6kF/JZCAIEI6QVhIUdSD4er5ZjrRbX

JJV0AvHg2INFD0JhgvAuUm549QD1ULnx/9wQR6iOIK77j8oK7e4cikORMfEDfetHOCiw40hBE1zz68tB

fudjQVEf2Z7Y4Zt1DOKJKPoc+SzKMpCTIheGPhvDRf
RocBeWiYvhHzItgseLXNLv3F6L87vES3D6P3D29bQ8kBEHrbX8eOWBjOOuvp/iYxEd3ikaoplruQqrxq

KZqsQHh7ba/2XfSmOwDH3iPaRY6HtH9xnvivh1jcV0+fzi/Hp2+vb8/MA3y0+zjvcVKLBZLj1kavemJl

7rbDhocCUMjbfb+52v3UflKhSwQ8XYlheAkjbgU0tu
Y1DlTpBvIsHB0CcdNcx1LF3pkPc7R4D3nU1dN3S+uMGa/TRtDoEw8S7Lvz5mu/jFt8KudOvHEpUU1dgN

4KXI5IN9D31ghAqw7gW9Ht0IX3I3crW/FZ50sF1KdrJ4dlAS8slFW52zP/gWfiGMsshFfl/r5TWuXfYP

vn3ViS77v9D/V8V9Mc/JVMQjV2pT5vyEHY1dNTURw+
Rxfoc0s5C6o0BWSM/K+KRH5NEeIqZvisLNwr3Y8ko3VZyOcii2aBNp7hJNjHOll+NO//F6k2+eKdiH6K

YMl+ExYc3Cf71bT48jTVa+asQ45NPvpGzebfzFln5WeQIoqYo72RtUziowa9apwzFsM1/cpOX91BFlXx

EbZVHdAdKC5JY7PujWV5APsb3rnqSqt5mhwn5h8niJ
kb8wQnyL6QAOdZsyeLlShONB6XkuWDJsAT1Aujc4l3WA1IcWYfR8v2ZtGw+zMyJamKfwPcoIOtfoKw4e

C/RT6j3jfHtJwbnEf/UcHtgR004adVyNIvvbkO+z3EruXQj/idKZ5QrvcAvT2tBLYqE4QXfbIn+sNmVx

zpi7drqkpRYaDvyVqVslEr6o7+DO9YRa55r28Yd83l
iBarw8iO81eCk3PrK98iUeLMGwFYFrtz6djSL9/UOz/wELeRoNKMOxZwJR9Kj7P+HlAXiw1hybrQmt8e

fG/VmmYYHOnx2tBtXluofh3lCDHgijKMSwOBbYkxRAc/Dke2PjJ1cCdUSMVQFrpUCPS/5yS10vIiB3fW

4fRcb/A0k0W2eHEyj1PqGGssLUbfQMK7PFZT955cXo
cl5HxHFbuRtiM3PXV1T2Rjp+BCyvhbG3OjfKqPHBub9/bcbE8jUE5aWwPUwNxk3Oiwwigyiz5Zwmu7wQ

mmL0G6/MoP1jSsqYp297B22E0Q5UCWzG97dzJHL9SfB6KXgKVmdqNxbKAROte5GQoCs3bF0yg5ccqGRU

cg68nukVNqQap4PEP6/8JNsedy5HKsgkmqB6JiMJ8H
0CzD7HJ94StDJ8jI6RGN3mm8ZuNTUfXFaciaLySciCQaRrVAEkZlvNNkTdSQmMWsHgPNGmZGspCE6WER

lwIHlrGRCdY1FmudUrhHCbUHCiLt7YKKQnSY6CRDFabIIpJLZsBaFaCOAXVaLpVDPnTOJgtXQMg3taFa

UkIAQ0SXIqYoncAJ1VRSHaJZ8Ks641FG51bxO5HKFl
Rw4BVDMaZG6Rwa90xAR9RGWoFkTyGDBbpmAzLDThgcB1RW5HCcGoUMT7hMEkHkbVIJ9EUUNclDKqQW7Q

IGZhbHNlID4+DQogID4+NKlzbxDtXqeTHpNiNTBeKgcZGwEhDFfyXpbnyQKmHI2DwDG2ZRMiY60zOQMi

BCTcF6YzFXZ0PEP+Hg4YXCSuwVWgXP5HMsgM7U1gq1
MJIF9mem6W+8UMFON7c6NLOaWIPm8JJlFv1RL+YPpLXbfKn0fD/b3UIX43R/VBez6/rDQ0SH17jz5ml/

yjdiyARzAqy9Cy5XhlJ751z/bxfG3Gj8nfk7T46FypmhjbM3vn4qdxxU5KbmZaxr8+0IfxycLged94U4

rpdx7fcR/Z0k6LDibJf2hLUCsLb6e40m+9TJN+On/2
LN5eGpcVNMnzior02sC62qU/7ryVsrXYrkL5cnGXuzYQ/BFfvQue2UDhW0pwu6B8DQO/WDktlkEV7qss

+hKRUlPPgifh6tttRMOiz6kz8Xp0xMYA8ZvUfymbqynDj+pa28nNIZ77FmODJ5EaHRSI8uPRp6xqCXAs

7zKQ9qh4PQC8erCYj9ZWzI/RJOoUIylsFpXVg11zGv
ABYi8Qr/UlaOZYB0I7Nl7Psx3WOJHxsgWTWxO2K3jwD3nMb6O2eWSE+hVQnqPiYzqlg6SX4usX3btQdl

LptaSVvBe2ug+CbDTDlPDMHfjS4OR0hR4HqDnCClLaHrfqS3gtdR7Ff7lHs5SRwWd6D/VeJjF55NsGit

fDsQ0h9RQggZY23cMD/nv4XPk6ajN3SqoziJ8Eyx3p
Dppcl+X8ieHYIHpu5DC1o/R62jSy130tl7YkxJ+baOZoXrZUkkek8ZBdjU+g5lIw+GB6pkEmxRpCoraq

fOmfhgXWcSULgkFJG99Vu5VD4sfP07qzvvWGHS3TBzW67vdTiHnuhMZQ82AC13eRwqNhYG2P4qi5NxeP

VRSFYo77CU6IijtMQZrfVVnovIMlWVzFdS4jhKZAtl
DXgJv5kDKtKrukuFFgvif4uvRvV9QshZCMswoZWLWFjPWXzQlKPdMSVvuI8ePBLjLGXOJgEXXVk8rLUZ

/axiGNrPqC7qQDGeGi7ZYxEIFHjumFPPfcWP85CX9YTeK85NgBXK6Yq/c6Wh6cWSfXY5ZNjjqBkJKMbs

HBmiSIG+BCQGjSh/It9p4t2UXdUeSAldHIHJgkJW1y
5Q14Lrav6sEMsJqgk4ddYfIIzl2D5zYSP4qKXSZNQIkUOIsdIozBR3QJxZkBTahqbcmOb9QSsQLtXgyV

OHDNc7NkBlvrQllsUmS4cwktlASZX4y4FiUCAHe6FcBSeXDPP7HNm2osxUve2aW8Vronglq2DQUwiS96

9VFrJYRtu2dMINHUnGW17Il0dV9i9PL9JkFIE4H6C4
hNV0Zg1xeda5TSYBn4kgPpuV2sUgSUbKfAA2SLDCfGnseyuBEtHsBN3jJ2vzHdp4QKUzP0/+CO1rkvDq

FGT9doYGlNGnTkigitw98ZXxngeaomuKIkqct83K6ogfQAThThYL2GQyO+saGoPf5f8aY2nVkG1YUPFd

C7kMLsdaHBawBhVa/heC6NUv8gZ1PPswpJoxSdWsYr
fw5VF0jsDgy6ygcvBHZazZCp1689rjDTwybU1nOk8RORDE4IRR68x5KxLbTqbN7aT0QpGR3XxtP9Gc0R

0WeuCoA5DmliwNic9moHWuUcQaxUkL4cTkIzIYIG11DKRxRrkMm5tc3U/JbVDzRVVUm12ZnK2hvcXDGy

JHEMBw/VoOJCTNYtf6FYx9f9r0Exjpqt3xz1tdteoW
7LLPVXz91dzikHRkKbCnGwHgbEuPIXsohGXSlRoOioteTQchBRN3BOvSVmPzzBb4P/aHuOBbBZvGwL7X

eN4mugMHzGczWdRpZRr1gW9Y2rMKeEcxIpdGEzR7BNhlTbcVYKeZFM//7RgQ6JwpKmzyxmRzfigPjeTR

JnLJZRwt5O+R/P7TIOIRnhNMFTyUJrKztNWle2iwd0
aV1Ryuni65OifuUTETd2kWD1yPSmV4kH6F9EC+ekLVViwEhGZrhPOiUo0gKtDrjafYQCRmRs5Gl9ir+R

VEUU9b8eHueMRVo8xv0NB0RFIvF+E2sO0xmJandUIGECnZU7Xf+qxIhICIH2DyGOjnx44UF7Yz9DZUlZ

lNg2KWdt1TgkB2A7NYeOclj46xqbwUaYVXlvoemAHE
abikBIP7xyH+EFp+QuC4UNIplUfZJM4ZDWoTIBf1Np5vXRotUbDHWzyeD3FKWoDopNvfvyEMuH4myMIx

Dvv5uzuCYPKSKKKjR3bn8dHSmUtQ3SsamYmvYUvt0C/Vs0P34k4xpYZ8Z62A6cCDKfRNBZjc5BjmDixi

3JGGAD0nZVYoK5sll0guxYBaXCiGwJfwk9mOqOaZVC
Aac93i1zasOs1MTv3sjO6wclcrkUOsEh9Ey+oUxFrOaPLAq41mfb3NosD3UrFGVRC3qe1aIEuD9t+9Va

wofS0CVRIl/vuiLrjQRZtjapmYNMt9R5moGDV006Ml6LNoRUTDkaynGsm5woK8r1y1wjTQtFnUQQ6Wnb

k+sW181p0nwGhM4WMfO7bAi1l1H/kU0tgeroJIg+AI
WQE2i23MkOT5HUHNGFz98YcNBRN8mbNGuxLPjQYHLRRPL2DBOhVRsxZ1BznVbWwFeLRVuX1Eu0PjH6em

WPCGwfX2NbfoVBh0BCtReUkwnUmTCSRwE20efXJgo4rTq0osTUvgqakHTsDgKG4QMCS5HFR7/1EE4Xyb

uOu5/fpYRUDz7hE5nT7OOsOb0arnHATcQJlJEBht15
BhqbJzGVLGA7oCcg0RZKTjrceClqb2vf6h/t/DsyEowdwS5TUt/aK2dAZjtGdmTYnmb/A+Gv/1EWHBJL

fEHOmZ1SJrAjtkI/cJcSqdFm6B3AG+aCpsBKE5nJeH1bfcRYUUtSGMkc3GV3f9wzO0kRuwEo+xwLhpf+

Fg/nipZytvLR9BLzHDiTxT8hqE1gW9HDHPi6xGFsK1
WPdq65Y9A5RjkYL3kRZ6h/BPt2RIL8dmcDz+mRYz7Z9J0h99HLKjivlCurYUlc2GDopzKUvWLo1JXZ9g

26D9nlJORFLVHCqG4HR0Yc7H62Td1WrsRE7Q6+wwPMubXjakVNYEpj603ORnchOrf+eYd50CgwPCHTp1

jq3zcsr36ZyZ1Ui2nEFINksiibsZmbCCqdIhsGhwv7
PXxNVX+w7A4LBTFdeDVGOEf4CPmYl3XATPQs8xDrwcG2+Gycwavstl/gycI8FTyGgA/szAHAbSTUuG2c

nZmkdlFyKrH/9Kep8x/6G92uWnX+7qvAKAlvuNnytq+yldwJNHTuoX/lR9JEROmCHmxjSsz70Lpu/67+

YTa0boxc4u/yLmTtX8f9+5MqvMLnou/V0TeKBizVuv
iMd2JASu1ObiG05ngn70+bxEG3ZPdducFrnPSlWE7MKlSvAI8eea6CTrPaLE1jpl7OUOD2RF1ZZZBpDN

5GlUWmN0TgC0IAHdOfMMEnJIRnVM25CBCySUIIDWtdNPFnS5Hug069paKhbhSlVRGpJu4ZRTZwNO7GIM

DhFIPdtKVxJJLqIRSuBsQ8NXZvJRfoSAXdJ5VgblDm
zqCkWIZoZXUDUTzbPTShV7opg1SyUZa7QG9LKR8WkcBjp7LajgJdU7nrM2DZYN6TWWMjG7LFY6TsG5mm

BdIne4QnW2nbFuJbz2KcUm2ZRtKoNp3GBgOcBQ1glb2WDYXnWO6lia7XOUR2WI8MdGn3YAGsU3MiSYZt

KZNnu8TxNJ8YYC2gjDntUZs7Ju4+TAhnHEH2gxUrdP
2OPMUgIfcw5rVPtwyp/kHZABn4NAHIOT3tF11x6f36qx8StcS5IHZifrSPO3NtGT++JZQBDDtuH0yN8O

H6UX0B8oiXCpcTPgu9+wft+kWkGUT5SP/9KYOANM29F2ynZ8ecsuoxsCg34+3P8/bzPCUcwRawax5Ou3

k0Rn/1DsyoX04M952JftxbqUrwyNl4zbtWpDrq5azx
R/D1p7Z3rySHji2x+ApcfCXuU/L5T0R76MNVHhX2SAH4WApKu/kEceahncDFaDB+sbhtNa1CfxeIUuYW

VTkWhxIq7HUDQAYoTDQARnDEe0PV+bhFA7z2cugYHD8Bb3NkceoksMebLHbUfWA7ovQilOLxYdqPvalC

jKrXPI16JJELqJyRRqmfWC3FjlUUkVNGL7fYJrPESB
yp3M7MjtA9tiQ2Io5A6wfaxeiIwONOcM97j7J5PTtxkx3Hsq4i18j760triBRDb40AUzIyIRkLs14wA5

SaJn8KjU2zXGzyMnqYZx5/sZgB7mNhJw6c3zlUa923Kfe+AL1/BfLxt0qmPPIjFezWt4pTf//sZXEVI4

kUBCFJcvXMeexAPFxV4vvkz6WsnUJJtqpLxJ2c59Vc
QMQX4kJHW8+CXY/UjtgvRxYbLqdKUGVMXtIhvTgbACz5XVhVtGH/S2x6xmJ4RRaRbQxHsdAOYjU+ysxH

mSUAUl4meGbd63cAkYkPiARLrxYMH+pOf2dm11yiPxrEeHtVUBuiYDZ64Oso20K8O2y69pKkP0azUIGk

Z7niXsf0+hF4OYP/FmMkxwkZwYHtsubfgL5x92zHrv
bJ4kDvHTM2zBgB3xVvNZFdHKJhJSjah9jBf3OmWBOx34P1LgaO+hpLpf21B9kkIRM3PUeaVeNsSQJ/7t

kNmTp82NMUa0CSa8lxCATSZf6Tys47W+gtpv2MJmX2pUSQiF4d8lhV8yE8UCbmwyrC6LJ0kU6rrgHCN6

fq8yi+j9LO/a0uiKiR3X1QC6CmH0+1uteMhaH8qIg1
OiCvgIRibzbaTVkA5jBqLQlOpl/oZY3noH5GDSO2j9tZAbSKo6Bg8gt7qgLrENPbUI/BdDuIXlBhJ1mi

iiNIN8dKkDM31AOQI+f9s/4tW0Z1kBwsJu9KUTjFJx7B797K0EaBKdXWRnn8rBCXQ3SGNlC5hZK0jGgg

cH/nwoPvnTduGWa1vDNGmXcMdSQuv/4tITXS8RCrFd
y4SzQOgNc38qCu2pVwM4Y7DDMAULl0ixJYHAprhnPkVBFrZtun+jr4x8m2U7gSTLevnJeK/P/DAvaXFk

9lZNzqw93Jj4NWdE8nMPQ+kK0j7dCl/tCJvo5g9fZWEgaearrU7OP1WHajKxAqFwgkf/G3ED8ow9BLVU

fEwdXp3jdrmv0gN/BzeCkGaMjEGJ9pPlbg1c8n10bD
8Y1sz6dBy7b6+CK7UgggmJlj6u0rhht1Kql8owqFlCq59UA3E0/AnwKbH0pKUK6Ufxov5y0lXezrsOjc

jU8siZpJ3Z3AdcP2DA6ghPiEJ8QoT1Hl6V5rFTP4gBCarZ2VcILJudFzkLdYhaCOYEY8ASjmGTpFWY6t

aj/SSxfzx7eO6NRL2DoMUnMVTEnDjbpafzwnhtkx5h
mICofPGEmRfNyCDBF1TeWXnoOwbmqVLXSvFLBTmtYb7aXI44etRehCJfAGIqolsJnRbjN0oYplNZPskv

lrLhRFGgjUhsD0k6FC3sNmSv35BoiRUFlij9MShOTNk69ETge7n34pqagM37/z/jhrUUO2fRvDEDeGSr

86sj1U2HBt+jieZwfHpxdXF/k0Dr/Y6l4BJ5ISc4Zc
4WbHoelkKfuIiBexoT5bLT43iZBW33bvpnkvRktJEz7WYzn67GtwmJcv+VH4odxCBO3erRL14BUrIe95

o9VWAaRJsSTFxf9fnHoPd0+akgtb5zDNBbik63uMF1HzZPpLMiRrDduuA805gPvDVQuVLmj1WNrrlClN

vkYPw6BA+ckHHbfEBbX61xjY0AKgRxMZ722YRexOuA
iELygXVEHHR7rmFx2BMwKE+3PsXWVbf5BWKUtpmAsZ4Z1GaVHdhPBQsgJzVhRTcLicNOV82nWgu9hy1G

tz6l9ZbrsNAEjF33SS7pxNUz8j2WbC9E95EJz+mlgkNOkDnFONIOJyot9m0ea72mf8WhITNfVW+pcEBW

EFq8Z/Ez7luxJZ4h8wM2BwF1jcg0FXaLxEpaOIRdE0
pJYBHGUWUhJpuRqY07+wqZ1BfV5fcluVPQqPCln/madOS26jd7cLRmnTb8rcr5N1/dYV1BAD+Jcmswgm

ck5aesAcQWo3rBXYGdFZFnQozvltQF1/oDXIGPxa2ThwqlZQladvmMXZVeFGDyjg3LQVbd6STm6uuAsz

SDPeXEfng7mJvQll2bKxOZPy6FNFSEpAZBJAqNBr89
QAsMIapDlXinHhV51IOVrKBfjhkWErNUrVrqZWKjXam8TK52r6uWf0cPWNgkY31MgNsvvc8M6AuY9RO/

7BSwbKVH4IVA5fl0VfHHGtPOygdpDqFaiHAzurXGDuZmyDDbZuVOcBMxNyPUYvEDuxSS7WFMyuZFyxFP

WlD1YnfdNbnVXkGUDyGy0ULVGdZG1OJFXpgAUjBMAe
LaRtZYBXScZuONJlTWSguOGXb8xuReEzHPF3ZBAuOqzfHW9UDUFsLQ3Hm615WA99imE5KKDsDp4DHNRl

US6Dbg51iKR3NJGvInXwYGVgebYdBGFpfpJ7QN7KUtPpXDN6pVMcWozKLE9XIHZlxPFyUK9EDYEabBDf

ID4+DQogID4+JHswacWqIlzXBbDdKBSfj2DaHAsiUB
y7Y2NirUWpswZwAduzlCXXAOLzFEDwJ4emhtv8nTDyFnNyCj8NLpCoq5ScKJRhFXoTna6gKM/aTpW3F7

D/wMcWWGp+rSAWHMUx55v4qAHsc935gP+qqwNeDUoH4eVKmt/Oe3bfLPI5CHWX4wOTt9s9p5yH6gHmME

Dkv3/Th8RNJCN3f/8koUHY9+Pqz6ETdzA1s+1Hx2n7
ofCjo1PvGy/AixedblWl8+yqGltUSQgliouU7Y0NlbYCv8YuDBxPX7m9FSM/ecZiP5a66aM11z/Z4BhG

MJTg/SKAW4LwDmRfZwD/W9Aq9nkKhPQfL9hJRcnRnveseAv12A3Mvd5DDRBYcq5PNPoG1hqMXpCBaP4f

AmIwdjm8fCrKDKQAlb3h9b8AEr0WOPPelzV/mktTB3
oEkMvfQqld0JN5vKwZ77c9n2s4Fc21wvBT2cWCUfaP6mTRs6WsaEQyxL07LV0WnGUPtx4Q6sygp9Z8r0

h+ai3p759d7zsEpYm7MAjROhIT04Wohw7OhmVy+JcG3Ca41Q1RO7LbGVwws4unyH4Q86C0XrFPj+378Y

qO8fjozq3Lch7k0JjD1mq9f9FcYfpELo3Q2tKE3w1h
nCx9NIy2c0cwuf8JI+M+oeYctWOSzNTLCGGWOlXUANJApGy6UPEAno7s8KVoaZt3w0RJjBNWdHUZjOyx

HQMrfwLYfT3qk7G5NHqMnCsuxKPd8pCluZ44kIrQ9CzqAiFw4EMT62rY0bGJ+gU6F4oha9HsUWHr9jE1

W7Ve/SGhYeMw2dOqUnSN9eSxvNEbFjE4IyClXNdpDV
UB2ZEHD/CpbO2C9pfhOK7dSh/HXBGXneDVuiOrQ3C+vwJV+suWUC4sWoU6+NQhrkhzNGV0va/M05L6eB

CzV9TNSNqOw5Oq68RgpGA11Ya1JmdM8RuKSBJAZgCsZUVMKsD8KkItsgA0us8wcn3cSOSMpRlk11DbwL

HP1epHzEVguNSfn0aeC8wrYjm8zV3r4UcFU81VPEjU
tepleOJW4q7oRt8redHGKpSLXyFiiepGUxAHlUMgYvWpURLeeZVOnApRzbguQgd7GSTeEndcEglZB/NF

sxGL7D8gdDIezpE0NLk3w4cvg5fAcXbPPcrDeHBvhwyfbkTshsrscFmfrUduumc5Cib1ErGINA+9IeBN

Y0ISN8tiPTTxuSASAi9ZkhCxB8f8eZtnyRZHD3r3RP
LJDMfsTN3gaVsVyuzS9dQOOPO+GQDmV1Ukagb9N0yorQ8H6suwsP/sO+k3LKyWgIRshB0AAQasxacCw0

e2axBhTiofSNP3CT2X+mKz5prEExpaXIcuQtGfWWdg62WiBg28MjtD5idV765s82va6lHrQBTFV1YtvN

cjP2Cs6be5e7O3xBEa3SnTWyb90cEUKSaeL1LQIRpX
rLwiLvAUox3HUPcMJZXmmedt+iqqqYjR6y6TxyhtE+ogDcpsdp8N9G32E0X85DY/dQoWzSqZ0HK0vvc1

g7wlLuU98N5CqOY52s82BHmvalJLojX1XTRIa+4dDU2tWll4j/ZV5bxZYKYFtKEyZUZevdOyqkdxs3l7

3pALL0vKBYQOiTDNCUCW+ne2l/1gVzwXz4WmBPET6y
bokQr3hjETKKrAtTf3i32SMGRWrPahP/jFsjiiWbvgC46byJxc1QYmXf54uvzvquWBjBCx+kJFsQxsHD

VPzGfAhG406WplQftgruvB0WTAwvMx/tzIFiPB3M/xIuGy7nNsyuiHpcqWwyb9L2WWKk51832LWhIggp

d43jU9cvp6krWhJHCHbvoGTOeI+7umipkK2xFTnXDL
47PZ+7BZvtLAaGOEBuRYZH0RartU4Q56Exhg8cAgtoFIRNR1AWXlnDNvMrKrAGyGaq+ToyTn5WzsYM0e

K97eUdj67j9/Cp3Bm698GwwIG4ou+7JyGuBHtFzWNDRMaIMMB1bb8HBvgT/3m8RdPBe2TY281YRvpM4K

pDcyL5TbnnIx8lTeZVYHfinQvcyjX32ytT+SUX0G2y
/KyC1r2ylaDcWdDdQeH9JBbdJLIYg/MCO+pswfmBFfU+KYJKrG4fhCvUb/PVKmwGRK/CON8WVj/sCM+J

bql0XqmAiu4S8mBMadBUFQsLvHC+BjfpwETjA1NB0MdNJ6zNf6AYbAeMgvrJD/WAH5QIt/MCO+pswjmD

h+FlfQhpuKA7HQGJjBZ3qEF2JsX5uWhtnfWZj0HGqu
R1VqzNXUBjFsF9kQxdriM4kS38X4nj0tRr6jSy0ZTR5dxq9RPQ2ks6PpHJHpTNrkmwTwEeyVRwBnPXPx

s8XiFXvlYBi6WMmsNTEiF3C9nPJqLKLfKV0BUVDqKQ0JYGJqsfVqMsRvKZLVSkYbXGUwRtXpb6KlS4Pg

GQTlXMUPFKfgYULnF90jLFwmWf77ECzbRTUaGgQaLV
s4Rk0OFsNrWWKjR84lsWLolXLkIHPoHPOAEKmmYCUsM7led1CjWUr5ZB8UTP7LcwGzl8UidkTtU3uaH7

AUWU7KUOCmB1RUJ8RfW2qjRaFry5EzA3vpBoNjw3GoAu3TKiDoXn3EHoBgET6cvt7OXXErKPHqCzsRBy

NuGJtcZhrjoCXmCO2BvYF7DVNfI67lJHNiRDXcB4Kj
IBN3OGp+Ed8TVWKumDMkMG6HVhlA2BlxanwHOH7k0B/ZbwVD7MQ2mlPDSRvGwRZ45brm6fJ5dNNqg7Gl

KhLV1B/W/dn0Q2dbGMIDXhrmnJA00fS9i9jnO8PlAQZ84mZWpEHEo/pq9tJCpvVh8VQCtGlF1y295EB2

sir3YIxP9U7+xpKFo4Fqcw2Wf4YI/T4YVYs/BqP7RT
G4zG/ItK2O5Mxcuo0z4673J/LHiQx8Gc2/PoHkFGusHHnzyMh/hE2XuLSBm8UunVGSRCBhweNP6G9Mju

DabyY6gsy5/RSN/gUY0sfrGPzsOudmiMyhqNlsxT03s0Ln4xD5bM8ilDDE3vJiFfDeA63GHKjq6Drhir

bNoyo1RsezKJxu9ruYg3nbPNIeLnsyqs0UbRlYd2u1
4QuG5E4d8dKVTT9+cVZehJ4baJ0VT6Fut1UElBMu5WK2xvr0YCgvSe8R3jSO9binxJPEtRulRQWpn8In

UAHvDfbcjW3OS/BE7KbQi9kvwiRUzk1a0pmtlOn67UT1rJu/vccBR1Kc9kKZLLak3Z9Hzn6hsB0/JOKR

MCLgjtRmJxrevqoa55i9VP9LMPtOmH2cTDaEsyjw84
Ybp9wdHQaJq/L8Dp91PSLOvLDdvlikaQFW2+JZF8DgEFpkgbaQkTvkrGB+e7Lc3NsGG+p4VrNX9E1pK0

5N4milQYVXQJhVDVW/FUr1AtUujFHW833lMdT7lVcxaHMKrwFUMix5fUmjwgC8X85hRSW115e5qyxqKq

RUGPCi4Gr93NKwrfLblF7bsm7ee3JLw/b/o2jQqP7M
myFEBVeaxx8f3VL8FbBuTunPX5s5i29U1MeRY9NNwrCVW2ABvORQkdpfJzCyLIQmjTONilKHdTpnyZAx

H17nSzNnsCGiHpKUA5k9cSgpUOL9V7pPH5sZDhZSlcL09NzewoBYxFmiCeAmxqNcWJcVz2nYj/5EHxtf

6WIivhH6tAoiFeijZMwvprTMv9QWDigDL4YOw77ttv
H/W/D3ds4z1bv7AB4xfsL9hW65wd5z4jQdGo845Sf+XeOIxcGnbNaa4GgGYs5oMLQsEWEYhrxovWA25n

bR2IzeIcIxjyenuhrg1Qu83DVqLt2bjdRCpk/qDNRF5yp/vF75YxrXr446lX/jbMCB9qU4N13K6QBfiA

CFI5nLPAtpEoACqsk4UVnLvON2Om+kfYk4+OYfX87Q
C7pjxzNMUb8vnzx9xkMX8nb5EZcLweg/uOogvYVlnZSNJSjE2vAi7z3bna9QgV3pRSfBtV4Qw9jsESOJ

rPjGC2MNvsRgCUOgAIwPgV5aJfy8rxI0lk2Jz+NHZ7xqHzrLsMBd+CfEklmZpPGbuwV0a02e68ZASXnR

N+wogePcI30bClCZfrBwW1lUNyFka+tSvZiy1OQxmU
Y+LOZJIDREOqfsiV4F2fShiD9iKemHQ8+SOLkoW+e8yjmsrkiGE0UfUjNEMj7Gklxhwx44H3qUjZTBPo

XnvUj0qFKnCG1Go8itch1HeTemG+uUkjkzBLk97fgPWzKx5lQry/Xg6tnPcen/yMAIOqJ5E8X4cnVv7D

uyE9fGNvyYHj6cc9LVZNyLCPaX3jk5gE5r1YstIbEO
SJXPLs0N3BgRsZg7wugktNcX5u+/TojHF0COBcddVWaMwWq+d5y/u4jl8FX3DAkU6ArePZyqPDn4cGve

HOh19xwqucfptm3BBpWYiNQFgU35o6D4rQ1zl7WN3buSYvVMJk03Hyn7cGuwZf2i2JoTuysQnJTdzm8E

+PuM3gfsWHhM6i0t25nLcHnXxsRhpomzSdpg+886fR
UCzMz3R9BHJeeJz2Y6wHg62dd/QeJr5PKq4IeGFk8Gl2cQhmxgoM6Drl3ckWVcN/wb6BIkeGC3xETCa3

FTWi26ukdop2WXV/1Z6umqZ1fJd8xup7Cw6PmI/fc6WJuByrKTnbaNkDJbmBf4ipdNM0upHeu3Px4X2Z

ngSvmoj/vwUNqm//YlJb1/Ukm5U/5fguGsw2euBJTR
tVLkqL3YH3Mf1YMxtoMSngWPwj8FIpDAy0SxWu6wjNEWC319qWsqRaKKlLiLWeUFM42WW67DPglR9FS7

shDS5SZHMRU5475xrv/VYhlKLb5tGN2R3tqQKaUS0VDD2ryhMtBnh2Dcsfyt4ISS8jE4U/LfrjZkQmcJ

++1BLw/ZuMGO7H9mDojuPGocsoA37qv+NqnPQ0OFP2
Gsn5ltW+Q6HX5W3b4JZBh5AFo70SW1/1kL9HB5iM+aub/1VgeGaHgyA9UpUGr9XB41AA+Ru34CWwQ4EY

2JPI8vm4JqVLCxUCtqmqBcOlaADqXwBBDqz8ZxFVozUPo4KBhzEDXlP7S6hSIaSPGgKM3RBMFoKJ9WTJ

MutoVfVzXxLAHUImHwJFJfVcHdl2MpZ4KqIDUgFFFA
ICdhJMCuH11rUWdyNv72LUtcRWIvEhZkJCm3Rl7VYnLpVJHvS39yyEAacJRgZXZkONCVZFtfMRZvY3iy

l2GxBFx7GO1ATH7QxcPmp4XbgwDyS5shR1FSKV3CCRRnS2HOT1InI9uaNsMmh8MkS7xlEdBvu0AxPi9M

CeXrCg4LJhJaYZ0qwz3KQUVrUTZrLxiSPyOqOYpoFo
uqbNFoAI1SnDF0MFTbI98zPYWkIKKyI9ZwUFNrBKu+Ev8CQEYooOVrZG2SPkyQ8Ohvc2l20DwX/Qd+Gd

LVbzpUJLgKXuZv3pJFQ6yhuXiJuQPePnAmJ91qy73+/ALRDKmC1IiKSFFXL6PHQ43mhhhbQJwk2wneDr

iIc85W/66/7pHgzto90Tx8u7xhYrsPO9X19004Sl/H
93Yrvg8i4moUjmb7zB6Q527QYpjdE8pfCq5p5sF7EeoBivZ8thstF199qCpxzH53PIaz8UmdBazAhYPq

lNFtBlTxD6M5bxkdTRpbKdDfIUNzP51Bjotk55vLvJR45XvyG/zJRBhWpCpwXINRbKCFWRTCgGcQvDSg

sMBqdbP2KfxJJOhVQ6R0TYB3EKaWYTAYP49M8JGXTT
rVdBgcjTZOS9vYfL4OKqeLImckMdqxFfpM21rKX3bNDOlBIk7m/dKQx8APkWhQLDgiB0J7avDj1VgD50

WThxWEqO5ayufVb+5JiCogqoRgD+DxSiLHz9Sc9HmtBSU4yO5HUJGXWrTSKH8J9/4KeGJHwWM6CKge8B

27KiORyrd6RwM71y4rnB/Aju+I8ZWCkNeC0D2AldPo
a6C7/+pueVUxKa+2DOAjbQfLOtTRE9cP8GBBTVx/y6t0/iHbaD+Kx6VxLyxtaB9ZhwaUV5O7K4jm6Kbl

f/YzmzX7RhTdFsavcJA2HKuxwhnmuyRgVC9XrLMk2bqpvcph1W2ILimFzgCCA5EJFo67KWuhIvjo+Svy

x2ZpAdse7cquXVpm4F5fb7fD1ZrHUkziUDquXhfnin
sGGJX9B/0x0e7hdvnPVyVwcpi5sJ9n9Htwr+XW1GJaIpYsHcmr5e0nH7kZkQIefSQit0nYO2qrOywO4O

in7tRdJFxrdD5KslLe9o3k0qSZHEgi1pICB0e2S9JU2bGUwapwzsDgzrSO2u6WJNF3KyUELKsY4yrsoN

Z7PF8U1v1QfynOAkPZY+pFm8tFoGa8eFwcteQbj7cB
x4N2DkMd73bkiEWXEpQz+g8arby8sJCDKDmO3ZJrDVnCzlk06CrvHhto64m/bLyq4cg4A1kXneQY/Pe/

tOGsOyCQgERva1X0e3fQsCy5TCtnX1NTXba9+Bb10rrLrx9ucaZLqOkbaGs1076J43LMQgcgek5fhSvP

7Ty4ojiVKw7gLPvTja7vugN133iY+aNQq3rSkkot6Y
Ni7pVHPffSU+XKTsFlxUJKZxuQYSiRSoWWWP7syi0hHLaWgumpsOqa3JFVYurrICPERRjxJ1Ho5QHDTz

WFMO4wXIR2ua8rk2dZMZkUsIviO2Tk8TE+uSnB0fAHWffv/JmzhAaoIbODqw8VWGw1B8N93O7oFpOgfb

Nd55FlUopF22Scu08lk7PjIg46NhYabODrB907t4n2
eB5In09AjHTmCVKykx6UOPEKjnLJ8+DvSByrA0B/taQehK/bKJyUmq81fbop4qbpAEK0QmhtveHGP3wv

m4guKk+qK1J/FBhNkWJavB2V9Kytcqe0Q9OAw0TY9557Qaj8Oi+pUiTiLmG2dZbO0DtX9xe9PMpWSlNe

N7tcrcZzkcxDxiYR00HQD9vg8ekQfZkWTGWQD3pn6c
5qZtxXPr1TEwD34zGSXoOiF36orxOiuysO8CE+dL7asWG83XC+o72pFkPOYTWNpbmo6/pR0hEXm/nY7f

MBSeD56v/n2KTeal1gnb6G22t9So3tLTVf+elINDRi+/DLW9ECNzI5o1PeAYrK330HGZg/niiY82w5eB

jkQ/U20y86iHprCjHyve2h7U8LuP9L3OqCSsoaFpie
wfIlFBFFqXYLZGw1sYnJhbWU7SJjjie1fxPv+qpNzFtardRxiyPLMI6DCkKnn9xhG5Fz0CaCIEtHlcPW

dHUnYjWbsMI5zJ7zzCJIHOyVhgWtM9zu1QDt7u+zEwT2PHW8lsTJm/dSUlk37b3e5XtjGGCY2b3QiIZK

h5w+AUQignb1aOFt8ZOhC8rNFsD9blZBtVxxiKdSHf
ZEoYfyGFqFUQtlIA/6DsOtlROfhpohJ3VgPSqe4auKcdHKekREKPrG49RDmtzCXRJKUOK5qYDc8q8OqT

R1u//wCaoamTjQmzGnz7EuW+C+xSqoSP5FrUa0Nr1YLmBQnQtvwiMrDDCDYH3Zu18AIPd6D2QU6RQGJZ

EL6FxdcxDGJgxfPk1zypRZMdiAIviSNPsbPzUxBEoR
5s1VdD7WRMnxzrAJmY8RnItMYBFhDc4WC15GwPdNYxaZ3h6xB5zlnuCpuDzd9KqsFZlD+hB9I4n865uF

ARUXlbiX4X8DOG3lcGmPURIV3f6Vt1v21cMvkMi6eOF1/luegn43fMyRfVA3PZUTtxm3tu87nibP94r1

J81smHKs9GAYfoMdhYd+UYF8GH4W4/Q/ajA7Smxkpb
Gg0JuTBxRkXXGJRaCWIrUeEvBoon+uucNTDYCcLjLGaFfeoXYuFZkZsRQfIQeZQGK7JvS2YIiqd60mqd

ymBcBqWj5mfA4Vsiv9B1sR9HIaCr5bG3aaxkDxESqFaud+TRFp1yGv3iwZ4mqPyqDZXiM2GeeIBZzPUA

unFEmt6e8actkul0Vg6GE+fTgvLPBC8qAa1ZfnI2/7
gwRl39FrXbdruMULK9KyC0+ZD4TB6CwdiP+fF9yW/M2GV9b4SMl/uGiypTHpfNSEYBO130X9p2/jf3fQ

f1FFI/uB7e0f70IXDKvCegWKcFiZXxtfFTgJ1Is/2qL6px3QEI8kt7KkPBBtPLrhblRuUahJBmC8UPPt

m5MyLGmsAEr6FIioOVLgW8I0yDVwNOZnPQ5JZKGhSI
4ICONjdeZiXfGsJUSACxEqFSDzZnRmk4HeE9YiXSJoWWSKNIdqSIGnA73wCVfvBi36PZpgPDWpCcLiRP

b6Ub4ZOcDnKJFhO86soVXpeGTjQOVzPFRXJZqkJOVnZ1uuy9DvUXf2KI6WDU4IayMyk0TbowKuD6awP4

MBSI0UTWJpT7NDO6UjT2xiRlJql8OlT8lfEkQhz6Ju
Lu8FDmYfZi2GVaQtNL8wdk7RWOOgTYVbOffYNqQlUJacNatooONtMT3YyDS5NGJpS08fKGCmJCFeK0Ds

IDIwOTQ+Ld2VHQYmxWDkGH4JUpbN4JepDooBYR4x3P6wc5gH9K1qFGgWbIbH6GPtZoHXjXhlYnzCFdMV

X9/98nsaEv1+URgJVlA87LfTbmo59sgRVXl8/76o1T
g6FReXnh9+xqmn+lfq11/NfHaj1J6azJJSa4+m8UTWrO90qHn6iuTiumb/GU2Et1C7x+s/N80e67isrP

0Eg6kjlvMK3d2BLozclUq6oDcnZkpOlqtOjusxChrytf639eag2o7WewNgLaJR+4kY6BLAO30AHfxUOx

LylR1WL173+Lc5PvEdJAFixwlYdbBqMpZudlh0k33f
hbpXm3kXfBu5ailz6viE0HjxxfBI09Kg5Ksv0R367npOTyiAjSxz2ai50rmhI3UNtpjdlYJkYjPf3+UQ

BBxIrqfUTFIbKdKBNJp8+GQ0yNFvTs4lNipRRDlSC7XodFnwVIxC3TgcosKRcmHo8+Q9WFjVxCS8XkyF

jdLAKPquQZsxvIeTffVetu4ghddyTSEQO0737Lhgcy
QeygiE+VCRr6HCOdWtCJZm3H02c5orkfsxTTYpNeN2gdN7EFyOb0bd417sQKy14QliNbEIvs99zQQkir

GMUeyb2JHAb3v292GiyZmzP/wIu2fb1RPywZCfWc24SM0WHv2cj7Xhlu0bIktvYkKPoF2hcSmqJFFSYx

51IEu4FwedMaMVl2Z1MPOjO3OnP8pvblC8UXa/bX2V
xXsuHX8mOnNKiWS74OgQtpDL53cWaEDJw3fxtSx2F03mrQzBCYfmM3rwrIdj2tkBWLO3DeOsj3kgvRxR

Vwg0amobW81SdKqxCftt0dOVcQKvbzAYKtL8tE1kmiYTrYSsxrxa4PJlaiclp8inJMSLIJjvB6LAuLAd

D3SlDF+sZiLRJsSGYlHFcFGChCwejVJTmEJLKB99lA
K9cCSwq6rRO/NZgDlJMnECAjgvcdXUTt0Wo80HAKpAL820OwQS1xZ9+WWOinYMz7NoHBBKqa1akzJ6lE

03f+tv7uZyh0RaXdGUpSVdWWZF3LaOcEmEuIBr2BZPLasoAW2br/OsPXBXHg+TIeXNMPdGIr35zAL0oA

rleIggPrkk5OqpMsTxn3rViY5qa+Gd5PK/ON2UoQuT
0Fk7Jy3gq8pa4F35dRtB47zFq2psv3A71z/e5sWU43/58yaaxlur5jHOpkQ7oW8pTifzr6Uj4f4429vk

J7w/VzeC71woIru8P/KttiUoRzdsetG8s1uC7RM9nYRca9yluFh+8mjchJzG1nvjxLbd9dNlp0A/1Fuz

4BlxhPshhr6vgno+avUg7umSOlAa8cIWeVy7nM1OxU
OVh5FyVJnkw6myELN4SGX2Pdj28tywcpA0PPzza3SuLTbF1sWDnv6pOeK7yrRA+rMP8h3C2YUgryUL8O

AUGvjE8mKQG+USNBP7ulsHYEazqkfCyXLuUWNPhwktiPOMBSC+gs3k3MeEs8VAi4K2nJI+5ikFcAKZ/L

uB0Rd6SZNZrElWj1PHKdGmX2diUEboRrJseDKscWGW
S1RigKD1LioiWvFIqiJAR1vCCKxEnEO6tud2y3lZOrJTJBqpmyZSpB02m6WNQIDFODESJOcGcGqAhWhN

u7YjoJJGmZ9ib+9TOEJllOsoOIhBTRjmWcPBeDc9PbGVoffNSAe7sTZFAUNX7QID7w1EKhutiZL7N8RU

WLd2f0olhFepixAi7gG1Hh+jBWwtHnnxRee+lYEDgL
Y8qF59ZZ5lmBNCJxlVvc+ghPqkz2EDmonrm1pZ51JgVSHLzqNJL1yw60cV4VG1UURs1Gbz/Q7wS0YLl/

CuxZPIlNTUq5eTh9g5vebzbgahQ6KG/bBsV8nNjMNLqMkSfrCQueZ01RZ9Nj5VtmxD8LkvIslQmPSNce

FRCnHiX68iTR/RkgVqnyB2hAaB7niB5sZ5VS+Pi+CHAO
AfJHi7c/xmNvqLL+WFth24hQr+FdD15l58VcL+CjukKQdYKzhUiYCbodV4rfQ9JyVPOZe926dU/gwZuo

SmsU9H6VuqncIrQe1DAqeWarx7c4Kbyeaon4uHe08lenWfP15yJBLqnZiDeVIo4Y72bltpX80NASo7xp

XyaA1R37TsgUc/MC/b4V6C3a3fCC8fqbLZj569bwVO
fVp+vhuJjcmbfHzHfd+9gjx57SaGFao935dB98kkd+qWipsOvLrrVDLtRq6vtTCSNjjP8NS91AyND3hS

NhTynmkqGZ6i40R5WsvtifwbWPsSb3xydtZvdxglzd5Z8MIng6UTTnBmFykm7C5Jr3y2l3W1UiM05t+N

s/ube3emBa065vLdqE9rWxyeSFZsX5o4jVqX6VXvBB
hpnTpH9P41AtmcAXAtjs5E3niMcLg/04ligpxRlVtaccyrdlcdqVd4klxz6QhPTc+4EfNRkxGJdFeTNZ

fLKFt/0/GALEyOfBs8pC7AurG6YNBs2A8Sll9YWglzsTCuRusg42E8ncRNuVh6IQC/e8vQaUEs9HVRGt

N1ukJvymzndgCtevPSb3yXnPDMQykxD4AQ/fWd4IaZ
hSfDsSCnXKPm4uUpJYRVFW/W+YjyXdclaoQmcFNUdofkj5BMyvWjmz3NhN7Lng1RNF2zmbz2A7ITtqkb

NPEIUNDnpLStDKQovtLSC34uIrf3h17PXtD5PhgH+EHp6yOc46SavjAg8hFnYs8MosuHEzCTK2zYo+kP

VBUUieBcqt5LbSAgMG6lAiyHgYtX6aMKGhDf5vwSyg
cyeR4djywkpUr0KZeyp5Bg6gGuhjKTftmafZrVLzijyCNJKbpHuUVFw3C/JIlErMmvB5vT5MIL8mt7Gq

DMRrPXehsiUeLekZFhY4YEGgu7NxRNujVSq3IMzmKYAxA6S7vKAtLFXwBZ4LYLXoKG6XAAVsutIvIbDe

DPGIJzQtODVwIhMjj6VjS8YqDRJiQFUWFVdiSLIeT9
0sIPtmXf77AWshJQSbUmZmCOd9Jh2UBiWlLHQbH20dxSYehJMcQEEdYAWFXZujCROqK3mxy7EsGXt1QK

7JVV7HpjWwp6QpbiKhH6wxA0ZWMW4IZXOkH2WIT7AaR2jlAmTga5GbX8hpTnOcv6JsOh9FThZiNs8WHc

GyYN3dwh2JIEsxSWTvXwiDSoOvPPkmPwvdjHSuIS5B
uZA5FDQsJ01pZIBcBSLeU6MgCGY5VUE+Fh0KAEJdxHSrGT0KQlcR4Pvvd1z6Vk4r2U2EpoMnQhDonGw0

TqPSrh84NRRl6ZyLYXv+eV7RNfT+OFptWv+g9q2wSRUp0HvOH5Mu5hDKyNcqvYvYl+EzwyFpIfR/H8Ve

RQowX9y/tz/GqRQXC/HOq2Y9i5G07nI/cc6yk6gbxp
o+ZBeV+T638wdxtclDiIx+BGcnsn8fKN0g33BPQshGJXYvtaPvsLjY/KUnRCwl4uiwy+cH+6A06KXm0h

LrH5X/hrTtBkN15GifBtS7FLxutEpV/wrxEhwI2H/Zg9TaaWPfjOr2OityhVhVRSNSOsLriR48tU3eMV

OLj7ZgDbGaIZvLcmYimS3D+nyf5Zq2YnGONy79uokO
HXQoCqy18VnvOg+gVOu4CW70E53wrA7Ldqw2yIQf6Qx1DSIzN6o76xbOCnyamLHhlAZ2um3T9LpTqsDI

dE5GdujFs2U4eHQEc5pkfsCpnecUIfRB+nROl7QvX5xquTZZIg9qP9M5GFL6oZEl+9K+073NWWuYl1oW

Mv+n2amacowsTyaxgLi4+29b+jQt8w5GwZ0VsV6w2C
0Nt/KwAXyXr7kDRYKi0JhqIHZJdcwTSvDhzuryI5WIruCMd5nN1yIrlEFpdWCz6lKpTmEAqHIaXyGnV2

8k1GvvwS0aslpg5BHYlLQu0rp+Pal3PLFVwOTHN+bVogZ1yAvaoQfPdFg6qMRCPHDNiIZLMe8YVcSpWI

QQeE0YcvJnFOywMpCH16DhrA4ut68aom1eJHM1Nc/0
l3Zfmzyk6lLMCxUDAZo69XycASt3T0VEu589gM3asSlUGfZt6luN/Kk7HJbUMsLQui+zxo8mZ/Gs2jIx

VL+uuWwXWDv7LTGaKAPqJOZFU9BGCNPtnGzSAgfdY/jScHNeHNVJ6dD6dq6u3qaACP8sIm3EPJvgKBhW

9zi/uHIMWDJIwgawVBgawhFgGYZacfeAFauvQtSQcE
2rormt82n5csaHFBDaUOsCX4GILkt/GhwCkFgkY5dfw6BbLyw0oXg54JV4iUGCWsCN14HnaM8cjXrnjW

QetycUpKDsP9xkjGJLQcBKrWowSIL2bvHu4Zg/8hgeuqMf9VteE1ypWcgJvqXN384Hpiw3490WZ1XLBu

SFfJ7iPi6p8bNDw9Wupxc+VG0hpZliy9LYIrqFkbMz
tLPU49kG8b8r7I5YqbPJFfBWOzTIvWFIzfN9bGaE3obtKssgoAdVFlGpsWMlrR1iMkE3tiQ+h1ZDVH2c

ev/kHyP8Ld+DwaSCaQSNC/j13u9pTRFWGUSVxHOK2CHGPAioi0g9IvFEImiZYIz/7q+nz3xO5zYyk8MC

N7aWmAiFbs+J5uR08BodQHqDnJJlwtn0Q5G62yrn6Q
YtYSImlzz5lLdEXwEg1KYSpRgBQL2zSzbKuenUau4hHGeaPeUFgmkyx+HoPn8hCtEZ0dVdmDteCHOfUA

hCwtzp/v9goDjb6Cteenb1GyzCMhBmyaD7JoEOgYmCz02jdyLJ/9NmRjI9n5vye+V1Xfqcnk/3nDtkHL

XF/cS6lQNdxdiaoVhCEr97vidhY4dPswWXXbHwEb7X
NQ4du8mlV+1QuG7ksoivP/eB/hjsPRvGsmcPUSxyHup62DYvMBNVYNmMFlLSMCccQVV0hhPQtENzTp6Z

159BYuZTcffXPLfMxJhD5zz8Rnzt0kYrihigsHPFbgcZkPtZ58FiLcHMZKyxeG/kZZn6iAM/FJPi2B2O

ea5mqwRQdZynXVqIkn/JdD/wikhVWxA/uyqWjtOTMI
5u6oQXoDuH4E9XtHDv/tsyXAKolMXqaWmVjlzgF8xDJAN/V3cw/6eXdvVCe3W1fn5o6I1CPoTVltcoSi

bxcBv8u7Cf7Z9Xy322d2eaC1KRSBoJ4DX5wvah8GhCkca2njxRmVEno3RHKr3VlDXP2HcYWyj8YX7eYq

MQXvuhTMIol434Ci1Fl0GFoRqzYkLWgjUWvGg4lsby
5Htkad5BmmUlFLBopF2x6tvyHGrtD5YcMni0BmEU5WWibDGl/GRq5Gh2EbKvoN1jiDeLSpfN90rUjP1G

Cis2eTyjnnmx0DMlrfOA5G+yQDE3fmrHWglBq/NZq6EDMuXpiuQ0EJFgz74T2OOfpT0ftI/oWKgODInY

GNPu5AcrI0MaT2/tpFNoiAmAWfiFkSIRHlim2g2UWf
PDIZjLPmW+WOSwDxukUbUUdjVDDHRDqD8iK2B8SAIAnpk+ZuSpwTZeEoYXJBjNPPkEGXFvsxbz5VXqkk

53Mmlj0MYj576auAGJaF3b2e7mQ0EjVw+uM+sUS2dpd+I5Q67tn5di9hrxPJdL51tpbyCKV99kQPaLSs

QLe6ejHe55knII4aHZovL9F/CoXqkfG5hHldn6+PLL
nmInLYPN7Zx2Oh2xsY3w4bWen6O1x84Rtde4ZFeZlCFCmlcvLhsxtIuWJmsaNwGdPjLWUA0Kye+1nBbN

G1ux+iBOsqqod+vicg3Q0SJlGQSZv6DmbnGYd/OTenFbl/mpWBeV+ShofntMcfkjBjThOkG0jAuTtgC0

VKmdW88WPCRIdGFFpdBdIGwTgLq86c8s+ZSdMbEjCf
rpGxNEAxIKJctX+4dvZRDYnZQCfWUfVcvZ3FrnNDafKf4EGlwff4eFHToiHXbANt+56BcjdF1NQdMUdP

Lk2shMHeJQIYXhfPNOp8ZT3M9KtV6ZqzmfLm39/PNDdFkI6zf0kBQ2cFAoVN5aoqGgfC+Zdy+8zYRHlK

RRnjXS+u6dO5EIDHHIfktCNhMsNlRtv7XN13Qi3sed
j0Y/3h91zgBuVym+89xuoMWt44t1XU4ycYlAu9FRz42E9fury4YqTo3pgF00fkrda7+Jl6Dba6eaKuo4

v6B8Y783wugJt9emFlzjautDC3h9hvAO9+mntcLTRw5tJs72m6mnfiJvMETM4ypB0wPIMGqr6xrJZXu/

iE0XHZHQnNvWKmWfqiZnorQT3/POpEbM/aBtp45F1e
e8vBEH2Y1oBOc7vxn74i+6kLHeEZyNnY+t3d9TKjjdGX74dIEB4dzRshElbI3WNW/iYQ0WeXr7eLzn24

biMdYvznb8tK/Apa1O5QyLlDh6nJNBUpAxJlrwsoUFKgG+3fYtehXrCY5GtihadSflJJYyc4DP/PH9R/

l9wsqHz+Kw5hFAk31qW/DEw0klKBK4q1Sr+WtZu2lI
F79DM4nAoYwJ/tWDTpMgRw8KPYnE6lelqspJogbp0MOiTHCOBYPUroZFhmHZ4B+Sdo8uJOlWfJfugoA8

h/5uKn0hddxI4r00kictc3ioH1YOay0hsZ7Q1fKtxGCuHy1dR2M6OaBF2Tze2X6Ue4qO/wUTpdzZDQpl

odDmoFMvSJ1GIySaLS1fep4UTCpbOGYjFchVPhDiJN
fPPdPlMFDfAIljWX1XAUgpZRxpADGxT3EspoZlzKLwZWEzMt0LILRfVP4QKXJjfFEmKKVjMqXsIAHAPu

BiVFTbCKExoSFGb4svOfTxTLY0KPDjKwrkCA0ILBXkQJ1Sg323CZ78lrJhASDjMOKGXzPcHEGsM1ZwnY

JkHPexR4JsQ2CyMG5dfYTpRS4vuDUfT6UzG0Gpqaez
ZVJHQiAvSSBmYWxzZSAvSyBmYWxzZSA+Jl3CBUK+Gf5ECO1zz6CiPOeuBXSyZZ0odm8MJUT7UD1MbFi0

OKXcF2MtLAEqYJAbm8QuBL9IPG3bsAteHTF9Ig0WKjNzf3FgCRCaSFmYmN8TBW/CQBC9m/hs4jdWnqxr

6EQDYmVZwYEHFAFkNewniGCyxFF03n3kP7fXaTY9aW
ta69ytm5NN5IYJD76gFft85pE6kfMMGp5jxRMW5dCRxxyCvxB0adULUQZWqDnKfzAIPUtwUODg7JgUem

yK6GWwbklpqYJBu+042//4qGDpdju7dm74i+tR9HGC1hl00U/itmBoWsAoUDNGKnX6lvEixjs1Tw4ofC

SJNIx6KYcfDtUpJlPSoF2nSdKYRHSFPVS2Q9gYAXTE
hyBdtfatMIiHJGYFAYJ5pBQYxz4HDsdGFOjiB8Y9w1mM4YvcenOowOnJ8lIBQKaaQA1jY2exyCnUikg6

Sa1f8uhl/aP8CRObh129ac7WPPpK5+cv0POPxznFj7xBv+VjoUmqgNaYSEgC0R+hCGuz2bcsx5qjrtMf

IIRwOnVqz3F6gmWs9rQd2YFn+iXav6YPoDm2NbRUEt
4NtVM4gPI1M99DP0+C3m5+fWmevC0hk8bw+SHkqLhOLe1sPH/CQt/VXxXhfztCw1AO2r7aZm+P8IqCjL

fSMWmovwNoqVFlTU1WJfEsON5nyg3IMvCnMICiHhnIBwKvYIrVTfYnGNOjLNkpGF8XCZylLXbgYTTzF7

XmzsBriAUqYVLyJg3VGRXoAJ8CRQNvpLVxTWXyQcAb
ZADQXfXdPQIuSSHnsYRZi3hyTmJoSFR5STPqAfotMP4BFLQaVC4Nq770SI46ioNuNMThFTDWHjUcMYIw

D5QlgBPbCFcfI9LzH4SwFP6ikCVgLX9dtRGjP7FmR9AfoqwhCRLXLtJnUTFyVLeoEUYkMmArLZeqEVF+

Jm6DHNM+Oc1XSO3lw6VsFNtfXrVcAC1hnb7NISCyMD
p4EDU5YOWjTsa3XGXzYtU7IqZoZLH2HYJ4XyC3QTyvTwUsNEZmKUVlRgAkVaCsSPV9TRO1NSLbJpe5TI

TrApDkMmsmLgo3JZV0CdJ6VCMdWHQ5KDR0GaE9BXXbIHC3FGC3IlY4PQLmUYM1EDY0ZkSxMoKoOsQpJJ

Y0IBBLAqEbAOs6OPB0EKZ4MUYcJIa8WQokFxP5LgZi
IzIoDSpsMkG2UfqiGvZfFZh5XVK6ZpEpHqd0JSR8TvQ8VmXoPqUgGLguFtZ4HiJkIil3QMR8OjR9Ankc

UwMxWAG2ZxG7SZJnCyEkGYU4DkX2CPIqJhZ5BBW5VgN2JIQhAZejAETvRaZvSpfsEtRgTAQ0DJQ7DWSl

UuTsSVV2CsW4VUYtKHK4AGUqVUY0AKNdAuVuUASvPZ
L3DDUyPzd5QZW5WEA0WBVxGsc4SNp6RKX3XAQeRzFwLQPkKCC8MJQfVcy7IRJ9QlByDqQrBvJnACH8Td

Z4BkpbMOU0EA0LMEG8FAMdCHOfHNF2FAZpLSPdSpb5SUQ6OQN3CTIwKeUdUOw7GRH3GTAeSoJwMTz3VO

Y7PYJgHoGaVOz6JXM7PGBvUdZjCUw2ENJ0PXBpKtHb
YJa3UNK7JWUoWlJpPUg3CLL5KXIdLlEiUAh1QOI9QETtTcOuLPl7CLYVTcFtBuUrZXc9UWJ1OZVwPvNs

UGc6SKW3WXMbFmCpMOt4NQK2QUQgHki0KZJvGmO9ZHSqTMF2SIC6JqA6YHHcUzuhQAE4SwSrVtUtUcA4

XPY6SKJ0KQQzOOp9JDFqYgQ5FsuyKDHqYTQbLGA3CK
luGtGcDCMzQsFgZjDgPHvbGEF6DpI0OJNdNaYqWTDlOqZtWbMqDeP9LVK8SvQ0CpHlJXJ8CFqpYMO3CJ

ClCvJzRAhoWlM4MeEvAqUeFNsvQnS9YrUfURTaBOD5SiYkWwO7GQF2LqM0VzrlUjP5VRO3VVRoUpavPy

m6TUG4PQR5MxFlUyKmCXb1AGLSVaDmCox7OGj3OVE7
AtzuRla7XGB7HPJ6OtwgDzWgTJtzYbS7EzCaTsMzBQR9FsB7DpiaYxYfVQV8OdV2XKUgPPG9LEG3NrL9

XFGnEXL8BPr4BFW1HUObFKJ8SZC2RfW8PHCdOPC4HDH6KCXhRmcqHqt4RXC6PHO1SPXaHQfiIXDzRNB8

EKUzAxVjKSMmRIE2PQEhLuWcKZT5WHH9DHTtLgToKP
QkFBV8JRZaLtXqGBQ5FsT0NGKeGLK8CO1wJVplxrJqOkkOOeBwMVWlm7XhRSbqXJp5AVzeUGTjN8H7rI

YjPe7ozPYqn2YxeJX2q0GYQeYjSJKnQm5dtY1scTLzZACqPXsgFs8ySX7CAAZtXC3Vs3BxplLyCWD4R2

FmdOkcyFlemYE4VFDiELJtC2KhuCPbPjPdKMlyXLNo
H0ArTWgtQWAfGIpbZUJiT4ZqiyWTFn40QHiiER8vURJkBWLtUFE7BOMjJBztNSTmR4n0LVtfF1NnZ8ou

WQWlH2XrwZEeQO2TMTS+Rv9BEI8ba2MzJWllPQUsML9lph9BDQD7IU0OTLCgLB1UiMKjA3ZxbmEpX6Nx

iJncGZ4TzoVxKIfdUO0RSUZyEh5bfH3YmeqggUlDx1
uaX0BlA43kgR6kJ2jtzoOum3vOznMpBShrBk8AZHJvXJ0UsNMojUNdBIBqCaHaITFazVDoNQQiSsP5ED

cvAXDeV4vaGBKhfvSdOlUcPOTUUbKaMPZaBd0xyHPbh0TmaYI1y6HwBvRaXDFSMEbiVD9+DQplbmRvYm

xELsX8SZHfi3ScIUdiRPljFdXdEWj5BAJtTvkxXgq5
LLA1PPN6ZGKxQUZ0FHa4LEH1QfptVEttFHPoOnXeApQyUad3NAS3IOMwTetqCkAuXFR3JZOgEphbLIR5

KFO0OtP9PDAfKWS3MRE1IeK6DCOjGAP6MUY5UyZ7ZPYxKPX2EVX3CAJfTrwaAVi8EX2ITTQ6PEOmRHj5

NXH9SfAfSSZ4TPW7OyD9ZrgbTfImFWybReM2XooiEk
JvOVu7EIQ9ScHkFgn6VKAiTQH8AeqlKSV7LWwsVxX1JtUkInz4TIZ8AgQ0NbwrEyJaFDX7ZuX8LYPlVi

JpZNN9MtQ5IDGlJmV8VHR9SrR4SJAyUQwmZVB4ZBWuSzrpWsk8CJQ5AIH0KTLaFlAmCIM0ErE3FHRjDD

YoHKL0IfO9OHCoZgi4XMH7KdP4RIJfEkEhRZApLdE3
QZMlYuRmETkvXiQ0HKVdYGB0IUI1NhM2UWWaFuEcGROrTQErMyuhNZC0COEgLSD3SfRcCAAoRB3MMPU3

NVHcLPLiIPZyHQCzAxYnTjY1YIS0IMK6WDSaRsPtIAz6DJX1KGOjLeChXJe5ITT8VTAfZgVlTJs8DVF7

NUDjTeRfINh4XCG6SFWrWfLmDMy8JFY7XHRcDfPbZK
v9ISL9XJSwKtIrJZg8XHK8XMWmOtYdQLd9YVWBQkBaXrHzTXy7WBN7ZACtPyNcTBm4VMO9FQMuXbZlIW

e4NAV2EEOyHge7PCTfPfC9JQWnZYY2XDI2WxH7EDZmFkCtXDU4FlHpPoUjYvQ2YGW4WFQ7MCEsKUs3QL

QyGuJ7ZcdwTYUyOQOyIWD2HZioCaDgFUOsYzFnCqHb
YSgzOKW2MqK6IdoyRgPdQHPcSiCvWrZmWfL5ITI3ZgN5OwXwCOG2CCdoBMZ3WJYyWuR6IOT9XeZ9Evhu

SaW4QEE5XxO2MwzpIOOgDWW4DaYaLkR1PKD2OwH8EujxYmX8VYO6JITlJyhlKxf8WCR5MRK5NrSmVcDf

ZQr7MXTUFqWgNjv5GKx5PQM7WhchEis9EDB1LIJ7Ow
jqZcWbHGnyCpC6WnTzLiOnEMW4JaG8PwjaEzCrFSH4GqO7DIUnAKU8SRC4QbY0JCQxDEU8ZLz5NOB5MO

SoGBG0TKX7FhJ5ZBQpTWD8GBH4RUMeGmanGkc4BTO9TOC3WJJrYPuuHYM1JeR0ZLUuBLC3RDY9XzO3LZ

BsBLA7SHE8UVK8IYJyVUR7YEP5YxF2ZTLbUYC9TLOb
BJV9ZIEwUYIqQY5sBMdcmnPdUanGXiZ8YRVap2AbFWqjYTy9XWnvEKYyM3H0aIBbRn9srUKas4GygEK7

s4HRQqOnTWAiJq3pcS0pcAEoVJNzXDeRDoGtMKJkRBAoNR63KArrNR1QVGCEIGomgKRqBVO0Z2Jdu1Vu

pgMyZRCtLs6FEFXjTI1YbMXaoeFhFj4GSIVeJC3Hh1
33ZsTgcRNbYZIzGsKrYPUyKPDfEAP6IX4WLDAqFM4LsANlzWOOgmudEZZtK1W9SI5HKAIIOkMjSf3HQh

HcBU0ntc9ACqpwLKAnUtwQAtPlLUpPNsBuAJTeLEdlWV3Ae260G5N7CtG3nJEqTPY2XZJ6yXWbJzMgMC

NocgNkMWAaGAeeKN3zq1BupnhvM5dkHO4msKElJ12k
rJ6rKOjfDBCiJ7NiiwF2L5hkrcEbZM5FLSG3T7mavaIdEYWTOwSbEVKuN5jscRyzUJZ7IQBcCl1UQJEi

UY7Zr063OYPaI7NvvMXdofFbGbKlOZJCJxAhQk5YNtTcUB6gnu9IYcwiLOFhNqaAAiFqFoO3VBUgPEty

HPC0YoGpErqhUWcpKCH1ZcU4KtVaGGT8MMw4ZCHsFF
LvNpZ5VXA1DTU0VCRfHcU7LRS0QHP5VAEiXndqVJR6QwD9DxVnCFr2VGD8VBD9SmJjPDv9DCI3KCT5Fv

MpKRr7CZZ1LCT1NxLgKvluNUW5SEJ2RWawBZjaXXsqBxY6YYqdEYW3LCu2NIV5FQHyGvHyFDKeTHC3Tu

mwMDVuIBIeMHK2QZZaKlT4KIXkGdU8RPIsOwY9UXNu
IXV6QWrfBgieLFi1BAA6NRWmYnT9PSC4IgN4QtAkGZb2FVeeQnJ0RcgiHKOgGAZ6BSVsIqP9ZYUnIaCU

CfVdFeI8LRAsKpDmPxgwRrU6IBKpBDVgRCBiAOE4PYTpLUQ8NYUfKuM0FDJ4FBDcXQKgFiW8EGCiIgRi

JJZtTst0GVY6LQEkSYQuXTG6XKTxWDR4FRXdHkZlQJ
GsKWDpHCsxXVItMDM7CNU2NHUfVKSaZMn6AeG9TILaIFgvGZNzAIO7KmgpDxP2QYInMwBxSzstPdY7XT

NeHbS0RQNeKkT8TZP1LlK3OEXlFmW8QXE0OtH0WGRhFdV8NMN5YgL6DSLuJgC5RSR3ZjT2IALoXaN1YW

P8AdG0AJBmIwF0MLZ5McG1CXFsEmI2KHJ7McA4MSEo
GpP0BG3PWSY8TJCmPyI7JIB3McA5POUjUwY2PWY3BvZ4FRDqIhX1JUU5YtEiBzFcOrM8XRD0NQD6FnJp

XLZ0ODH4OTFpQrXtXTe9XYH3UgB7IrCvMWLsFBF6UWX1PPcpCLR8QIg2PIZ5LRRfQhxqJCcmXiK5GJPe

WLH8UWTyNeZCYiXaFnQrEUXyXWKhQxXhIuL2NME8MF
A8POMdGCweISh8YeP2CeTcOUw5ADXgGQM3AOUiTfDuIUorMmL7RQUhNkCiJJcaLqY0RrSlXnF8FIXyTy

K1FyCyLJKiHMUqPeLrLKogCyO9VNBgLeUnKNuuEfR0ELd9IFV6TGlpSVdyBNg2HTB2KZavDkL1ROy3BX

I8EIlfVgU4EHzoDeF8JlFaLmXrYBobRvO7ENazWuP5
CKHmKVE8EvwvLKG2XGDcIEL5HrtlPXW2AARjJRB8WqwzDTghQOL6ESJ5PUKlNKOkAUA4AOAwGSIlZxm7

HDU1SNCvUQXhVRqbKI7WSEG9CZJnFuIeDQLyQTM2GBFlIsJvFTUuLNA1ZOesKoYkDTXxCBH3RFSfRtE7

RJEoVKV5BbbuSjLwELNrRQPmIZ7VVW8uj8AcBZwiPB
MlAP9kfd4AARO4TH9YMGPeLJ1MvDEaZ2ErujBNIEDsnleqjW3zTGxnIQHrT0DsigWUGB4uJ2CjN08fCH

vaDw6wEI6CXRYkCA5Es1RbatZhCML1ZM8QJTQCNJgubMJrDAJ3DD4OMXEyUB07NV4tNVCEQpOlPAXjWp

irE4IgDaKXWfNnZRYqJm0ycQRUk5ppMoMyVFJ4VCSo
VDA7VPVkIuwsAVhrHKQkA2b4YXprI1IfP8bwSYAiA0EvcKMwIW6SOVI+Vt2CIO6ad4YgJXvaHBKxCG7c

tf8ETDK0GW3DITYtER5YsTRfE8NcrjEtQ2CqpEreNC6TeuJbDWmbOG2QLUQzHh2kkU2TVXdlYKSSKEKg

eFIkFJ4cw2KghgmeN5kaMU4xhBMcP46xvN2uEOsqNV
DjE7CtykE5H3iyocJuHU7CUQS0W7sjupBnLECAFdAxUQHdM8fkeVcmQQO7PLJeEv9PDREeKZ0Ug422US

RyW3AawKFkyeYtEGUxCALTMfDyGs6VTvFeTA8kwh4FPiOvENIxUjlBYmWuQdE7HPIkSSLfEUW3UPTsCH

JhMwepAIC9HOM5AwwlZLR4HAuqUQUyYkCrBiJqJLOw
BkA5LBqoFfy2IYFePnD5LAOiCrC7TYN2TWO0UnlaZQG4NMXcZNE4ZefzCJD2QHMiCAW4CjisHOB9APMu

HSQ6ZyxnPbI7ASVmPvB4HZAzMLmfJGI7FXW6BBJiFKU0WEc9FLJ7UCEjRFryBMBbSDT6PHUjQEM3KVB9

AAC6RKMhVxBeARO9SxIeCLhkEYzuNCF8XAZ0VJbjSg
Q5VRm9EOP8CxAhAlJ7ORM1KBL3WkCiPHI9TSH0TsY2QwIsYER0GXL2DRK7YZKjSJghDQ8FWAPqIYHnQl

k3QDIsVbN4AWCqGDC5HLP6BEF2WBgtEOc9ADH7BdX8OVygGMZcSXXpUzO8NYnbGVY5IVB5XjVaFHCgOJ

u9WBU3XoR1NcBbBBQ9QIN3YtJ6CTldEDc3PNX1IBI4
MnXxVfF7FEP4UrO8BcarDuEtLTV0MOTFHlMsPDo5IXD5HdEjKLAvQjV9DIYqGqK7WPErTpEeRFJ9IbN8

FHVhUbH0GOY4YtC3AGSkJxY5IZX1QuZ5QLYjRhJ9QXJ7LdY5SVDdMgF2FIQ8ZhK9GCXqOvZ5HEZ4FcK6

CIOlRvQ4HSJ1CaM0XNStDrV2YXY6SdK1RASnOLglWQ
R8DzW9HZAzCjY2COR8VzO8LEPxMxU1TKR9RyF6YJAiWeN0RVE6ZDUoTLWuJLI5XLPxIGS6TBTpBXC5JZ

NpFIK3BWhyXNM8RWj4VVKhNCTfKTF9BYY3SYH2DBGiRUsqHRChCQTkGjzcVns1UOW3BkVlMgYuVradZB

3QAGO5OdxlIWE2SABkEgMhAVToZxXhEJBaYXG1UJQj
AFO7BLssMLT7LHLwLZL9ZVJ6ZZY8FRJlGwC7IFV0QTP3AEKcGpX0UKo5GSC0GZndAUS4SBXnWbG1LIJn

WIT7SRV6WiDcAeKhYeL2YZX4BbL5PNTjMrJ7WJk8VJPLZlArRmA0YWt9DEA4IOMmHzH0NWA1ZJP0KIBi

Cph7DNS0EmO4XAhsMbi0OYP1LkI7WcSsXRT6GCTiZR
N4MHytLWR2IQJvBCO9ZRwvFEN8UWczEfH2LuSbKYXwBBTqUtX2GfAsKWArDCG0RxXnOHFfIgBqABQ7Bb

Y7UDvzLEqvSFR6OdI5GTYmNQd5KYQ2MkP5MKPgVQn7SAS8OUS8WLMhMDJ1IGK0YwT6BvUvACG1VNF9SN

K6LEflWGo0JY8tQSodgfCtUxzRDqRnYMZpx1NkHGrb
TBg7OPxvRFSvH0Q2vXEhUm2siVCci8CdbZI1e7FPDbSnDWOaIf4fzB6tqFMlR2Xrw7QHMA1FGYHzAF2H

r8NvvpSsDFN4RS0VTKRTVVxwtAAuHVU6GW8YZELoRY06GJ0gTFOUUdVpBDYfAvmxV8BmRaJVAfAhUYTi

Mx0qmLFGx8taNfCnVMQ8HRI2ZWTeJTQ2AS8LEeZsME
NxNQUghEacLX3zkXGgUA4QfRTpKfNwVRafCN6+HJlkovYfDqaWIcJpFNMhm7JqYVskKTj7UVdiEEGlQ7

J9mUEuHs0vwE3IdAM1fLWmR8TrjPGLuAYbP4Ysp8ZMr433T1VyA92vBRuhAX0tn5WswktfM5uqGY5aoF

NkZ05hlT4hQYgxXFHxT9UvshI1M1lkqgZlCR0OCNY9
I3hhqrVvJCOLVfBqYCUvE2gvuWsiASEfABXcJj1DLYXxVJ8Sy309HIRbG0TieYEhwqBzYoFwSLDBMkEx

Ys1HTlNtHK9dxx9VXiTvACVkMrrJCkKdC63NMfQzDF3ysb2LUbPeSK9kyt3HQJC0GG5ZDXNiUM5Flf6e

U4W3OAerONYBC8FrlITrMZ2sH4KFA7vaXIgmHa6ZJb
BjEEBeJk9wnSK4ZQCdBrFcEiCbKZAXBJacDWVeEZHyTtXrGjnoRPGWMCxlSWAzLDJiXtGtDrQdLXPHRQ

jjMETaBQHrCeYwTwJwRIJENg1FBhMjDSHhDS2gyeBpmCZ6AJG+Qj2RSMTjNV6WaAXMT8FixDKtCIevR9

XDXI4EHPL0XO2LxKOmLS3UdURQG5FelIYsKp9kYXHg
g7RtCo2tV0FKNMGBCISmOOjnFLcqTBOjVDi8K9Z5XMYkZ5LYP218yYOgrQt3Gl9wT8LDFAjNDrQdRFpu

WQeqCJIePGv1U2S1PIWkF7ATE5DkMlOhyoCoY7D+QiAbJONGIS7LAAODYMa0Y6U4sDBeT1G2xZhUjMM7

PL0GHY1KmAEegMZms54+StHHOuMxELItO5FNAFWYYt
HvPUyoXWxtLVFzQOh0O6Q3OBJgY5CSW7ywL3e5IZ0+KpNFUeFhLJJlXu9BLwVoCy7JEqBjBI0ofo0LPl

IuRHMeFuyFIfd9D8iyujr5pNGbWoY6S2G8FlR6zZBwDI0XY7T8rUMlGVW7FRCilWC+Wr8Tj3BwYJAtXN

l0C1ylUWEwMMDnZvBbaC16J++7agvprDV2Q4m4KBLA
iAIseHfOurXiM7xJSVT3t8F5UGa/Yz2NERK9kFn7wOVkMZHfVHm7fL8aqQn7JzSqIJ77MUGsSHxfjI4x

Phu8G3Cjd6KeSi2gMx3peYSlIy5VZwWmKKD2uxQtOnBZYuM7eKgshdjbBFW7Q0x5cUN9Bk09v9npvjRx

z1XeGdR1NCpdNAUbXuHzmcAvJGV3fdAuuV8ybbGdQt
1KGPFjRIxsxzJfQaDXEt0KXcXuLK83HemvuF6abMD+DQogICAgICAgICAgICAgICAgICAgICAgICAgIC

AgICAgICAgICAgICAgICAgICAgICAgICAgICAgICAgICAgICAgICAgICAgICAgICAgICAgICAgICAgIC

AgICAgICAgICAgICAgDQogICAgICAgICAgICAgICAg
ICAgICAgICAgICAgICAgICAgICAgICAgICAgICAgICAgICAgICAgICAgICAgICAgICAgICAgICAgICAg

ICAgICAgICAgICAgICAgICAgICAgICAgDQogICAgICAgICAgICAgICAgICAgICAgICAgICAgICAgICAg

ICAgICAgICAgICAgICAgICAgICAgICAgICAgICAgIC
AgICAgICAgICAgICAgICAgICAgICAgICAgICAgICAgICAgDQogICAgICAgICAgICAgICAgICAgICAgIC

AgICAgICAgICAgICAgICAgICAgICAgICAgICAgICAgICAgICAgICAgICAgICAgICAgICAgICAgICAgIC

AgICAgICAgICAgICAgICAgDQogICAgICAgICAgICAg
ICAgICAgICAgICAgICAgICAgICAgICAgICAgICAgICAgICAgICAgICAgICAgICAgICAgICAgICAgICAg

ICAgICAgICAgICAgICAgICAgICAgICAgICAgDQogICAgICAgICAgICAgICAgICAgICAgICAgICAgICAg

ICAgICAgICAgICAgICAgICAgICAgICAgICAgICAgIC
AgICAgICAgICAgICAgICAgICAgICAgICAgICAgICAgICAgICAgDQogICAgICAgICAgICAgICAgICAgIC

AgICAgICAgICAgICAgICAgICAgICAgICAgICAgICAgICAgICAgICAgICAgICAgICAgICAgICAgICAgIC

AgICAgICAgICAgICAgICAgICAgDQogICAgICAgICAg
ICAgICAgICAgICAgICAgICAgICAgICAgICAgICAgICAgICAgICAgICAgICAgICAgICAgICAgICAgICAg

ICAgICAgICAgICAgICAgICAgICAgICAgICAgICAgDQogICAgICAgICAgICAgICAgICAgICAgICAgICAg

ICAgICAgICAgICAgICAgICAgICAgICAgICAgICAgIC
AgICAgICAgICAgICAgICAgICAgICAgICAgICAgICAgICAgICAgICAgDQogICAgICAgICAgICAgICAgIC

AgICAgICAgICAgICAgICAgICAgICAgICAgICAgICAgICAgICAgICAgICAgICAgICAgICAgICAgICAgIC

UpAZCbVHJbMZPqSDTvOJHeIBJkTLOeCPl3S4tzBIBc
SJJnUJ8oBIb3Sz8+CEhTTiVpIBR7oyHzcD4IGS7bv9RaUTrqUWNks8RnWNb1VM4WUGHuYMjdZJ6LSXmk

fj3PUFVwFLNqeLBUi2riQrKmJUE4FYXvChucZG0FTFEeQ2upsxEqVEHhQSOWWVuaCEBEZKxzNQXEYPYx

QHYaLeKhGfQcQOXaOWQnBCWKRNZ4WEGwOdNuEBDmAP
YzFxUkQHTFHK1AJhAtE5MzaM82SUaCMt3+NEbyujYhWkaTYpB5RCIfq5ZhLYl9NX8QZYHwTgsyc8MtNd

tiWRITSXxuUT8FLMY3YIV5BOPwEo9XIKHkO926naSpTI0GYs0GBtHoJD1jsx4UFhmjWHAnJrmEJoa6AT

dqVN2YsTQdZMmMjFHhqJZsL2DwZ3GvcYJujOBmgEBV
rSN3BS7sKTCoHTHLbIdrwMLkcxjgOQJrUFUyNTXzDvHwJbOcKGLxVXliKxGPPTaQNzUcH0Uem4LjMkY2

XXYhCmHxXZlmZPVrLkU1ZH34fNuyOR1SYUFpSJIrNS97FCF9VFCvLc1CDp1TYwQbON8zka8SEwfhYVFm

BymHDmv6TEjcVQ0ZhZJoG0CoeYFer6pFEjApA0XHJA
R7LQCyAl8RWNCfInUwURNhQLvwPL6kPWXlQXKMvEryzfI1SU6PZW1xxiCbRL8CVvRfWy1oFz0RGrHxT4

PaV1FyXNDxOGPRYXruHN5YWPuuFW4iWT8Mn6MRtJJmxD8whq9OULMgVHDjCboaem7YCrkeX0H7sHerIG

WdChOiMMGSPFujKZ1WNDEiTXN0FQQsEYEgYULYYsAj
V30nZR8IS0Dob80lZaD7DDKpCaFgZEoqMY18aPaaqxVksXKtjCreUU6PNx2+DQplbmRvYmoNCnhyZWYN

XnVkGzrMCxZkCWCkVHQdEUJuZiT8ZlBkOb8ZEHKmXSAcRKNqMdFwLKImXZIbMItaQMBzNVF1PhF8PHGh

BYIyBZ3VTqCcWYIfQvziXGBsTEJxYNMqdd8OIRQvLY
NfMUV2NsKfZGOxOZIpDZiqVEInWNXdBxA6IMLdXXJpQR8LRcFqZCOxEBR9MBFjKBTzYZKned0PLNYbNO

RwCRP2JaBgLJYuTNTvXEcfIBVsTUL9OQG8OGNbZPPyPU1IShPjNDGbGUo5MSSuOLKoZHEjak6DQJMoHN

TiRumcIGBoNFOuWLCjRUycVAEjJPR6WCU2XDPuKWHs
HX1UDeCxUJNfKAd3LSYaXSRrIOJvff8YHUWsGCUfYQA9RkCmFYStHUWvZVqlMYPnLGBiFzU3SPNeACFb

NJ1VGbAtNIVkIPM9IMBxXYMtKOYfeu9VCEXqCEPxMgw1GbCxOMRxJKByLSbaZBXhNLJ0EYCaXQExGBGf

HM8OZsTeOWUgCGZwMgpzPOFbCVEnzl0QUCSwQMNjGP
jjJNXhZPYmROClFTnnBOWlSLL0GVZeJXDiOWXjWN0XQhDaXWOmGRz7ZhNuFQReGIFopo4EVFOoIWYbDY

veVkVcCIPzSVNjVJxeZNLwMIPdTsceNBDlGARfNR2CDzMfFDFxNyT4OIPyRUVpLATnuq0CVJNgVBMfJF

M1BMSzAREdTEBtMEtlLWOoFHLsFPYcXWGmUFWhFF7B
XhPgDUVwGaRvAeqpKWMnXDRbwg0AJUVvDTVvZhNlPJIhUYKtKTWcHIbjLPLlCJOtBGiaEAMtUIEtBI5Y

VtMlILCjGwP1COjjTRQaFXDonc7QAKCmVIApVio2GwKpSXWjHLWcZTnnSSUcYLB9KXN9CHRyZZXeBA7I

RyHoZQVtPlZiPbIvLAJqWXVsrw6NWIIqYVAoPIInPG
WmNVItSATeEPjiTQXmWBT3JoVkODQxZZXjWT3PBkSwJIJsEcgaHYFmJWRrNBErff2TSPEaASEeZfEnUS

CyEIZcWERoMXfqAMXvSHJ1Ggk4XGVqWYHcGC1MSxPaEFgfPJLRFqy8BJcoE5r0GHLrIA9MJ1Gcz4DuFq

gkRAVQCPjjAQ3peqFiPJAuHh9VX6zHCdn4VwJlTIGf
KiEsDDNhBQumQDrfIxr2X1IbZZY0BWNaJD7aUIRpHpF7IPXoLlW6C0WtY0VuQmHaYnvkPOA7FfhgNXA7

WpGrRI7TJw6LDbJ9QVE9dFPaBa3CAzs4SfxRMwCrDE2UCGk=









                    ID                  Date                Data Source

 

                    [id number]            2020 11:17:29 AM EST [facility]









          Name      Value     Range     Interpretation Code Description Data Renita

rce(s) Supporting 

Document(s)

 

          Progress Note                                         [facility]

rvices 

MANCJm7fOiSHPnQv57/OMPufXEAqr8WcAYccFVr3TYimVTJoG2PdQOU4vG0cKQQ5IDtTNmBcLgZbPqUt

lbm
LoJyjSThKzOUGwHkwUBsPmEDfeNatxbRNzZV1AhLX6RJKlI52wBVXfJUPjJ2WpKMCyTGD+Ay2JRNNyqC

ExLX4KItmJ0WrnbjfTSA8f3A/Az1KrnQp17fhxXhFzThlT62wh9oHrTeHINHUEXm19+xrWxpzpZsLFpK

yQ8ZYXgpocfImX92DD2y+nqIaBlFLM/6lDEwoF3226
qnFXPE2e3ItXemYnpNaitDvet652y4DjXwkuAbSUFJXpacNhXXsReLNl84iAeZZLNzhugdO0NPNblsFL

riIKh4iYP3dpkJW0BJIh7z5dTwSyF5eLREarjduEqw/9CsbZLygWCry3LCJoV7oAqreICOD2j4NlKKdq

hkJIojAGd2iV2HLIflk/uzdPiPV6LAE21QNBEfJWGn
NdaD8XR3sriMDeaQ6u/azlXEVjFUZowxy47MMdD9KQJgPkBOIwz0qnd2L41iQ0DkJECkGU4VLIvv7RB7

7bd9Gnx54GU3FOhM41QHxiuFzsWBuCYUBZgtgvh+XjVoioIQS719dpkXzjvlChCWDHOOdQ75R1ODEhRL

Ux3M8PwaWu+dY+gZQGKZDTmcdo8bruS+Q5EddAuZ8w
r0EoyhsnxKg7kKNEosmG0MJPhQixkA/HMsjwl5M89t++i+Rsf/xFWhnaeSKZPbTLbL/RY7El4Zy3R/ea

fv6Rbm7C74G2srrUuMVAlirgf42cyUmaYruJ0wKoxMANWkcQrg5M+0vhzsbMD4IqFB31n7J22ASocfUi

c+SW6f9/sOkggWY+vI1ScRsZLQCp01jj76StLK6auY
V4dTlw7vLjReddETW+twGfp51cccLm8uMv9PjEvFqQW2+w//+C1ESeiVihIS5QMYpvoIuANpvWPXDHBh

NjBSERvAQDOwKJlwcUESBBj/P7+pFr2cpT0oQmHk3wsk1CkUrI9jCR1GaiOfyAUzR5VoaDQsb6KevJlC

rznuxy76iVZSdzRiaVm6Zn41SI5uoE7KsZBK2A25em
2r8UoEHp6cSktlVgSjmNGTDV0DZUkaSpvNCotRCqiImLBh7FEOQkKowryOP3n7SQ2YSxpeZO02Tgrshp

jnldFytykO1i4Qnut4tRxAA5ZKtFaxO3chfB9SIn+WGn0uZkJmlUxhzB58qaWrFDgs8yuVa1pL4BkHuL

Fj86cgweIliwDTAhFuN6SbmI3mWewLAFB1UEzyhj2G
ywrqEkpn7U5a4onfMoRq8Dh6oHFlz2RGXrBVm1KGLBr/iYVvSlviS9iIB/k2QJgwCJTaYsOqYr97uwrK

LZzLo8vJQclT7Wsg5kZPgWPDoqSmhC2U28BThN2BErmM3DLYm+PJ4v4YNV1DiIicUtuVBj9SjCk5ESry

4aBYX6UOI+8TckQlD+qFr+TScIUpcVHacPW/N8D6fi
ttoDieQyST7Fbmz0uz7JnxfHEpO4xfxb0DiGNEf2+SzRFGxBp0COAxtXA+VbKkYMg52QOjHrRZR1quWh

aQ3GJN6sy7KhLJi5OWTgw7SlONjyBCv9UDqhQLItE6K1nVYeSRHaZV1BRFKeTP9RLBNvgaBkOqTfTCOE

DzNcYSKdKlYpb2PqE2NrKOUfDIVESWhmIZDoY48mWH
ckHz88NQhsLORlCuJwZYb1Fe2HXgSkQTReG13bhAEfiFYfLOQhKVUBPyHbUSVbB1TmyRNlFJwhU9LaF1

JiJZ0fvGZxTQ3ohJSgM3AyL4IffzzmXGGKSeQwAXJqXUpdOLYuRdWdBGmyFIF+Rx0NWTQ+Ae0YHC3zt8

ZwJHc5CHCmu0LrNUovJVdgBlPbAIv4PMBiWflhWmKp
MZY6HIE6VKGsEIJ2JRy9BLX4PxQhRsH1XHXaUySkPsZfMsg8OWH9OECtDzceYdQlHNV7HWOuXpnyFJS3

ZNB5KkD5KSJjTGI9WEE8RtG7FVBeUGM0SQS0NfD2ZOHjTGF1RBJlPvSiUsAiPDo8UG8ROOH9THEcSNc5

LGIzZGI1CwKaFwAkLEhpVlN2NsIhKtCwTHN6RgM9JY
CnFob3ACvnDhLaBaowBFI0DVriUkX8DWKaUEGrRAhpUuN1QeaaBnO5AOc8EIZ3YiZwTmI5JPBbEXH9Uf

YyHzH7KJp8YXA2OabqXjY4ZQZdXQVYXeXjBrFoPES5UCSuWtWnDBk0SNW2ZeScUsJnABP6UFXfWXN3NH

TqZzOpDEC8WhCgVyXkEaWiTKBeYXRxChtmFzw5CNZ7
UkZwOqjmLKa0SCPsMJY0VQSrVjYbYBApKJClQLyhDMC7NKMiRpU8UONlZVN6LNx7GPC9PLCoXVnkCNP3

RpM1CAHrSru2VFK9DINdKSxnOHe8QAd0CFN4VGEyFaBmQWd2EOV1ZTTkDgIaGVi9SLY1ZXIbDbLsZRd0

CEA0SBKmAlUfNRu6FOE1SKPxUaVnIRm3BSV7FJQlIk
GrFMj2SCC6LJYpXzAgIBg4PDN2HFAmGyQyJR0ISGT4FFJkEfRxTEz4UJC6YXNaEoTyCUo8GGZ4AFAsGn

GpCJe8CMWxAkwzYlEkNIV7XpH3BVJfKID1CWS5BhSlHAXbMQY6QWImVoN2CxwaDsezXGS6FvT7NUWeLe

OeFYqfQiD8MMGyIRObDCO3IPGuFhUjFfNyQGDwHpQB
SgDrBWi8SJW8HxMqDfZiGcMxANQhUsHrYaQcLYI7JQzmGSS4WjAxYNC3HMMeICO9JrWyFyUgHMgwDcT5

FbDrAnKpUCxhQuYgOZQqSLvwOdJ6KktiMfQ7NPU7NjV7YyliOms2SFP8ACCkBijoPep1FQbuAsN2IiQs

Rlc8NG3YVQV8NrgbKgj8GDq1DQI8SlfhMWs7KJh0HD
X5RhYmKiOxCInrWgX8RzBtQxZ6NJR6AgJ9YBVyDSY0UBN0AlQ9IPAsNDV3TFY4RhA2LCCwNTq0NLS5Ii

P2PDDcSCM4PDV7UtR0KPSwBks9LJR8KRAbKrxcLit1TLXjUAAKOfTyBsJaBTNgZQD8BTJeWzVeCPHkGK

G1ITQpSPI2UBFrCMK4GTDtXvMiEVAhBYZ3AHLgZUU4
WZVsTBW1EKIqTTTSCnQgHY9naz5XRpOoVO7xjm5NSJV3HV7PSHOxNF3XlUVjD9TltxMSBOUausfbtZ5u

TUtiDUAoF3SrwiLSXS0zA7SjnDBxWHWsoPUWLaCpPGEpJCAfGG38OBevAE8RBQTOIJtelQMlVHG9B6Yn

w3CykvVeKFVpRf7WFLIjGO8DhOShwwSlIz7KDADpVO
2Ch355PkUzjIPdPZBzFiYfYIJtVCYhOPE2AJ3KDOSaKK1YsJOxjBYEwufzCMHzC1Q4IO6EABCCVnSvLk

1SBzPhAT5tjz4DUCWkWV4vnd2GRYA9RG7BRYHlFI3OlVTnX5UklyVtT7AdbAwkXD5ZnrOiCSccGP8MDL

OvLr3lxO2RojqxbUjVv0ouU5CrY98bzJ9zO2ftaiQy
e4qAzhZfSRbpTo1NUSXrDK9RmEPzmAJyBRVoAmOzUJGujFOqDBGqHwS6SGodUPBvY3dpQHEyjbG2CYIr

Ez8JZXXfVX8Sw284YJUqA9VwiRFhtpH0XQRkEc6CRFD+Ah5QDS3uj1GuYKr7JKNgv4GwFQntYLkzGrGc

EFn8BYYmZfupTcx2XXS0JTZ2JXJqECD8WAh9YIZ0Xu
kjERxlVDMgPrClEwQdYut6IVR2FRSxTxboKfZxAAN6BSWuVemwCYM3XRN8ErH0YTIaHOH5ZVY5QcB5YI

TeSUQ4YQB0KgQ8JVQgNIA5EWG4OXMkWpfpWWi0ZT1WITI6CITqJMl2FTD7QnWgYPR4PJK0QtR2NtbyRx

JcATuuHhY9BricLsKfTAf1YGP9LcTvHja6RTDlMZY3
MyvsGPM3TZirHcA8FdLeOsq2FHW7PgI0NtulMrUoWQK1SdP8XRLsQbAqFVH4VeN5MSUxWqM1VLQ0GuY4

STNqDZyoQOM5SWGvKdalJuo3PUT0VMC8WHKlJoWdFTU7OnR0GSAlLTVzBKA0PxY9KCJiSui8JGZ9NcY9

EYEbYiQoOQXfTbD3YZIiKnDiXTbyIsT4HDZgTWO9ZL
J5SjN7CUQgGoVpUBDtNXTuRgpiDOC0QMGdANI4BhPeGFPeCW5RAPU4ICJfAVWmPRHlQWHzAxUtTrD5DJ

L9QBI8CQNlDgClSQi7KBV3EDVfXhOkRJd3KSR6HLDlAtYaALz1TSR2OVPiDpZaUYr3AWW3IUYvPkMmGU

c7JPW6FQSrSrJnODs8XUZ1BCBeVdQmVZb5IRD6DOIe
PvWjKJr4XNPNEcQiBaZuWXm9INI1VXAsVqZqFSk7TNK3DWPmJnNyGDo2XEO6BLEcJqz9PHPgDrM6RMYw

WTU0AKR3CsL5OQKnQjZiEZT7ZiWsLiCpEyV4ORA4VXN7MSDyNQv1JHJlRlJ9TtxoNXTfHZRlAWD5XBzr

EeGcTRYsSrYhCuSfWNdpQJF8AqQ0XpscGpEgTRRgGz
YkOnQiCgW3TDM9DfT0HeOgMGO6AFbeUHW2YXVaCrM4PPY8HkR5BgodQvJ7MJW5QhN6NnwhJVVfFGJ9Ba

JiBqO3ZLU6DtX7JsllSaO3CSP7IKTnObftLgm4JAK8CKR5JdZaEmWrLCz5VZTJHbMyLol9CHv0YSQ2Ly

hlLzl9RSX3GVE5OvuuCgHxWRekTtA1TiDaCgCbGRD4
GoZ8XploEpXyZQA1QsU5EMMpADZ7WRB1BpT7SJMwGIM4WLa8DKH5WNFfIDM5CCE1UlI7TSBeOLU0DXX2

RUUwHdsaVuy3RAE7ZOP0RVYiXHsxUOJ2QqU8VMEeRWN5FUG7XjZ8PQQlNWQ9EOB7PQW9SUEsNIU0QBR2

WaN7FBSfFZD6QSSzVXU1CWGmULIvFS8xUGxompQfCe
uFQaDaEFXsn9QgNHifIZm4IIdxRPPnH4P9mFTqFb4fuANyk7RguUG2u9FZIpFcWQSvZc7egT7tqTMaKT

WmLAvUEkWlYBYrERHkZN56VOvnDD9WLBFLBUwqkDKxTRD5P4Dwp3PdxsVcVVVeLf1EPQCnZI9SoEEqya

NaFd8AHGRuSE2Yk133XgWscJMyGZNoUcEoIJQdVBVg
QFX5FF1UTQEeUS3LxMMrsRPHepdgHEQoE5J1IC5RLCFHKsAeSz7BEsCeBC6fqd1HUHSwRCByHdhWZoTs

EFiSJjQtLFEhPDvrQW3Fz862U4Z1KiM2dIKjSWP9WRV3cNMoElCwSHDrpaBgWBOjQQbfWH4uo8Ojtsgb

O4npNI3yqCXpA47fyF1cXDhwGBMbZ0HclfQ0F0doxh
UgWV3SASH8U1glqxZuVERANuFaWHZmC2ixqDjmWPskKZRHTWurCNArE7IupyDNRXQbffhntR0eXQDjAE

YkMf0QZGK+Xf3KCL2os7KbVIswKtLcRL9jun2IUFY2KK8AzNx2JXYgJ1IhNBVjXYGib9BzLX9KVZ8qyC

xoYFYiTJVzJ8nomdr7pGPnMnIwEAx+Ue6KKYVbdSNn
HP2XHmlN4FlKrVPVgu+paau7icZBRHBY5xPdTNJVELJCEZbIaf9YGgUTCDmxzvHEIrnYbMWa9GXsJrXI

lPFLvIAGX1iyNu2aKSKvTvmy4ZADaGZyzDO+21v2u2vtRCje/+//rd000pbrC9jtcpXoFXMx0YQsxrTK

elk4qXlEVfofHB2OZmrMEWb7GrHpAKH3v/xpqB2ogP
beaK7klbHSILg0Z1+b77/c9PSW7J3MPZ8Qy411vxb/1XST1QhSxL498cthyo3IR2I9+y4vBw8kspmWn/

0u3wkdNgqVsBKkdiSsIxpeLpX5mzbin+bNrZi/mjpZ8N/F+U+jj1Ret49KbaL/CHm+dYPoyQY1LU6cep

gCvpNTnr0m+/b161BAmgd/kLuLr1B6ptk+E9PDV4++
/Ietp6+UDw0bvTUFGvYPpw6HrQXoB9V+2PrDjbHkhNd/Vd7Ega7+S/8fOUfPIxwzPnzOqlr4AxfdqB9Z

XZLOX4Bxdw4qD+oR5NnPxpyMEfaZTT1TqZarEY7cp4GIwqy44VzvYBDxsB81vC1GiMhAXrBi8V6vAeBR

XQcKYlFjrwV2QMV3QoD/QhpwELzjaSHtopYiwfqcbq
Ul2h+Hbrd7apO8vUoIEIeusspS0gE2LS6LoJkPYDHkLZYKIo0h3CD0Ni/MKx794zohUCh1Pk+D1t+MP1

nvURekuJ/B6xtahypxJInXPpO4LR4Y89WWvMQaQm0GNQImccvd56W3MMdPYPJ+nY0SRLNahrulfw4NsD

+DV8Hepcf4adiDvoOLDPQrYhRed6SBMOeLirzcvexz
W0u/oXdEY+HZ2dj6wwIsOd9A+5vzK6OfEod2NX15aymRXQAtHcgRR/7Gf8UlNtHzbMETlvoKWFJgqC4j

SnIpswRzL4y5I/M8XLxobjpWzlSVV55TN1uupoJA3KCDByapgXikD9e8gq26NOeOWqn2z7de0grKnq0w

ZNlinaW2cX0g05KoOoqGNqBzbiPS9OA9mF4APbBdpl
EhfiXeFB/LXDlJPiA/9l6Ln4Cp47lSH03Di9zWegGnREakDZqUO5FZjpDKHf8Ct2QK8Rb5MV7LJ+VseV

yboV2t/LvUlmpOj6ZUK4xW917D7dgyrdi8t3yxxUaiUR8Pp9xD1b+r0BaczMJN6zs8N/pIGKKRaIqvX6

BEDnUIxqnONsELvJDEdj6u4SIzyqyilM3+Klmoor10
LgTDxV3tRapv5HUyWqkaRUtpw/Yf6oQMZmlsDRa7zi1kjCNKMo2E7zN2Ph0ELOd/kNjr9xdqnzAkpyoN

RxxniZIte00dSs/vf/w98U2m69+juuV1q+la7tRpK0Y95wKStxCVyQxKd/SrymHqdJ84yj9fHxUztqLf

ZSFES00TFeAF2JNRbxV1sI99voS/UmeJNb8wzkDkLx
DTxtqR6kA7hgY+p1vb7jv8a2jSiewxwo73l5BLl0AduZ1yNM6jfLg7R2aYS92IaZ9ep8K+4d0HxoHN6z

D5u71ZG8gz5JA2SS9lmj5+CT2xXJPyZF57t7788aIl9sr718TIAR6fr9yIGZ11HOu3y6yM2Dn68jO5QE

bMLAx3AGx+9izdIXvoifKQPIT+PInKteESPVVuEsvl
TJC7pLpuZUsBkjMJmrOSYcwcu8thu0UzJelBrfTlDqvepIvm1Y4m/fV99JW+G+57SRcbPGwDuqB03Owf

BMk+KUOgtLojlx4P26paVU/+NI3aW0pB5me7lWJCqhA1+tYkwVK7A+oQ4YlzHq5m31EYP2cFes+PEJuh

R1pOBWPDiQVLbmPs8lM3FIkcogeDtEMinSl+S5TrV9
Ns1ZFwNTrpwIuBkySFIphfHajTW/Ta3NdmH4u/xerbI5iDhKfQk3uUiYSjorm6UZmM8Nq1J6hX3a+Wz2

jB2WGDHYGAX+IGLxrJA0ucSdDGe19aBNqRH7s6SIh5t7RjPDG4NieCseMlTdH6r6EWCWGRKdOTEYoS+k

PxIVB7vFmFN+ycJGDicgnGch6b7bLITMkJPO1EbL34
3fj5U8jL0Crkhq7uw0s6yNu0pTDkJoH9tU5sY6cPMTH3N28uDnk586aiIeU5cAHWFWIYvFtNooNwcj4j

Co7s2ie0ZrHMALH5ejRvkmUEwaHuV1S8I7JZoxDzVQY+wVv+KNJB4kVPh5yLbvqf2BlxaC+l8wKLcx6K

8vbdOwpUdpFvy2HQGJmk49Y7Kz89I50EB6q50pG8pq
gYI4ZAJdHLcvR/K/Em3xW3SmUWbSov7vk/hh29Nd3SFexIIF/YeiCx7msBUmxOK/X2dgpoFRzQrWQvkW

zv35s103nZ5IBMt5PDwwy5zJdKZJF/UgQC4uNpIMsIDnqMjJiokAI4SU53NlRRDyLK7BorMaQNsX4zIi

QcbKMdkTQbGy0iEvzcxMpKgMhFWVCoNttCGJFLYBnu
7KP1LufI8Kv8CRmGb0SaQqsVv7YAMwnfjok2fodgq5YpkuUdRf8OelzAfd/Ey5R32/bAxp7ywwV5GGId

X5ut+2L3tCkpj1jlwVE9sAbOplHFjeKIiBy0xSz8pRCy1LGggeHiZFuaKq8rOfIXDGcdrXLtu7pr2iJk

YLkgFG7fuwCXzhHD79OB7hVraaitEZi++Py1eI8xq8
pAbUqMoqYDnc0gM8DR6KCC+M3tU6Ms5USwpfitkCmDAxJz8OamauV7qnYt+/J9ipF/mmihS2cVWLsjPS

4LQzlfLUZqn5esoFy9liYo30MhOs1A76wUyyi7JshmzUou9q5xmtSG4hkjOMsNo1egRQypG236vsl74d

Fgfj6Zi2PH8xvKj7youEI87JhTsHIU39em0J/TVys/
4xhY9D9L2U8+JLZJXyL5TOFn4UyOKLLZKRUX77067rNF0P3wWpIT1QwgyKW33uCdt+UZ7zCYmJRAQ24P

a8Jq8oQx6YlmlYQXtr/AAPLJg/L5zm1fevdcf/OoUjsHpqC6SR+eztBzzL4SU2cZVhhSevo9A63bgP6x

hnjFTB2I0dacaUKmdAFNXJBCdFK5UUthsa9kUprwKc
C29Mka+dkYo7juv7BdtTJ8TRFTRz/S+69NRu4G+k6Xv1K60Cnt5Th8IvhPgL/021bCxY/0GxfBxY/0K6

+[place]/j0uOe9nd0vWac1QH02Bw97FgH079d024Ue+Zc6K0RH17OAV2HClnptET47UdX1fACPhu5RU6vVt

cbmrDNfs04kdrU0Z0LKHSSxqWH11KsEsQ1YCH1F3SV
w1qGKXysCsdNfpOfxX9joFAFVob+oovOYLQGJnZMU8cJIjOOWakTQKC9VfcHaHE2GnWjFF3Q1qDSTnZP

BhyyS7B5AdIqtbRCaV6BKxWNdmwZQ/wNSDV5yQvP2tNbm07Byq5p5mbpZufIXxUur6wRCx4BZensD7uA

JySbYzpB5AlCITK2SliGQtSP8FvQ53jrHBuhGwnJYP
EL5LkvPrKUW8ReIuOwQXZu8WzQ5ZBOWw1Qdy1bIMagiR4xHulPG1r9P9LOkZFj59dhMT7r8QRPHOptf4

Cn1KVT917++B4DvxuFCq+Sg9Qgcwxf7rlzIvR3xsX94m/7BsaL/0R9Tlid5LKeBd09X5Cob40T8AI0zY

SfUzHgomXbFRTOr45Th6r53EF9xVtMFmM1ikTs6lgn
5XnfW/4cYbT9itOdiy1xLlpnqCucp42P0Y9jU0R7+KJonzMfvmpXCZJWjTaV12qKNEbeqmXNlhtzLyDt

LAL5LIsJIR+Y9Am+bvwcN4NLK8iGVQk3dcSf4/I8bDhrxf6+blTdm5L7piCcY4ShbNSfy+JetWJGsLNn

tafEMxJWVpansyfV4/G16zS4BlyvyNAoft7O5vsq26
t6da+MKphk0drypKHQNQiBAmwERa+Srtyxkp/SGdWN2WLPwpIAqWLBOSNxPfY9e9IiLF/nn3TBlJliGD

WuDQYGEC5LQagwaLtcYMI2nQZzChtFTg+rY6onqowfXJAfhybC0TfDNtOo2PGhNRwcdJ0HtipGUqwTIb

xRRNuFSK6wv29UwrrAjE8E1zhwKn0w56du3Tx563Sv
juYO5MyLdpDdxrbqVfqI1jo7M9dlhCN+Ij9vp/m1y0HMrtzC9WJ+OB4UNkAJhhkVxdNVa/zi7+BDj/m7

4vNicmY+0dxx2XbamK10fVaaSsbgt8nxXYAMKqLh+DLBvCWE4wLfeqk6Rjss/FSheCpa+pC2RiAHRx6U

2xLumAIO60MQCkYKKSPuECgruWOcQZYwc0NSPbXAc1
jqIBvDw5a6ysZuZL7m3XiryYzmfDyhCZSFbq/9TMvJyBDb+pVOnZg/KGUuDdmmThUJwq0eAeVgwxzpYc

68wOQ0TS71qWtXTPzjVisUPtudlhFZ3erJ/TzuP2ztO/aB5SPXcHxfRiKAe9OljywI++QHWgcOcoq83g

WaK6R1ht0ph5ppJ8HBtSiJGAqpEraO2dTXg4yaPyxA
Qc4mOGRHGekKVb0SVb3vpXEmhPhfWvsZa3+VOaaF2cRHfSeQio1+gFuYh9jIMzcHvPNHY81PMdLpRPa8

GdhlIEdhTHFUUwTHOFEJC/zaOc9Xy7vgIQP6V+EUtobbN09ixCjWihOtIE0Pom5aS8l0uRq74Dwd+aHg

5LyK+DZToG6PqpKt8FNJio3zqEwpX3E3rxGQMwBPGm
nurL8I7JiFbisNuCH2GNzkqrC3j/9sxEctb0UY+xh7LBjx7tMLpOLzKyaMXuLOcGT7vRjqLDriCQ3fEl

ugzfFIIQW9sAgjJ6JU8Rx+Hlqcd5EiZa3Ia6hsDyys5Utr/0TtKfVQ8DrDffwYERFwisLTlLPUJrpHEc

XblqvErv87P5E/RKNoj/7CdKBnoFasyj1QDZiLpbQf
JWTEftf/dP8Rsd/1X645L3EOYgeuRs19eHJGcOoWQ/Ql/xuP6t8LIEaC+kQ1CdTC5GRhFbSBVEvHgRGh

wMXIn54K/PpfFtufMr/i6o5Jd7F8JixpkwjeYSwapYfjLwAvAwmfw1O0QKppFT5qzDZQwVhbpK60T7Ux

FAL2OwxLnBemCzo7s3LNqjd9BMzTzIKSfCHaaDSbjS
3h8czcymk7upcWBozj1eDkH8e1YuEfAo3b8dI6fOupL2Tkvc/Wwq9XnYsF7noWhXOkzleF4uF6SlOXMi

m70GO7M3V9FfZ3m7nSjrEDvUBmIh0A+tsjnJ969YK1SG+xTaO3fXEXtrPKgCFStSWgPN6s1OcikUjOpk

vXtK6SF9IJHJ5AqdS7Wj9UfQIfVkbb6wV/TU0ppGJW
MZcOMwOTN0Wo4NS3e0fg/xfd9x10SxR0nndcNlU1Nu8QJ7qDqGQO7uRL3da4SWOzEGBQGO7ZOA8LLBZS

FyQQ4xLdzvLObIF0nLA+HY8Kv4od9K3aJg7nSbJlPBzKgA0PuS1SkuOevxMpiGBwX+yKoBtR5NnRdkI0

d+A7GGB/uU9oYv1U/pWH/NgR99NrP9o/qMYCnB0FtK
f+cqfQP3MGYIx5G18zD/AvQruOQVk/4p2y7hxDc3BhweWOML/Kz+W916CV1+m3iAcZ8EymECeHVl7c+y

Os2eGKB28qnyaNZAuNzaWx50CbIpJRYyxI/umA0p5PQakJOoQRlhXjBpwH0USutCAhlJEC6Sgvp3URmL

9U/BA3w1cOzBpSEX+998W8zy14zKws2ZTlmLjY7OpD
jCcjjjamh9Pj2o2jdqP7BKaa/j1b9fu/3Xrun9ygUXm2ddHXVXSbRD3PwRxgQVIWq8UauVNm+pAa4g5M

3luN56P4kyfDdFmK9x82165tqKHuJNZzzq70eBkR1CLRVVm9e1lp7XrolPH+aqEpu1TM/WYigE2ux8KT

EXab7RnickHWw2l1uY5Gcy55im1iqM6DKbHtgXdZ7C
oPitb6fyL+SMwYjhkN1BX9dxKKb5jRD6R8BKfOxiqZT6tVk/nk3b3V9RaRPbzZBeAFkiCMatcim9JK68

BeRpigukYypETk8Z3Wl9grZt1vzEBqXJ4KoMaSpb7RmT2NHvS0cLb4qECLAxwm0b6pCp7Pl7rrz3zTiL

umcTzrJibE6XwhU+t7pKnYo3mHolqGZZ1wdb+51O2v
sKM1nyDyeEVhwd4tQ307+W02Gr3jFG8ddnuC2z9cpdQJAhH9/XVWff/cT4+C8g32t0o+AmlhsuhS7IoQ

VM0t0O/lVLFKP2e7O/1FLe11SHn92nTqtaRXX11nI3Gltg9+LAIshe0GjZIJ+BHv5zKD0r2gAtjvzZ1s

q5dvcNiys1wK847Fb5vNozIfpm3pixwk+hRuI3rr1z
QKwXnEFaHH1CuTYh1gMk2w6pCqYo8qtraijxaQd7VDIvy2AJNS8De1B+gcmXI7xGXyltgit1qNGCKN79

P6CGRQpok7O8y2p982SilAz1K5MO5e4m9MGO0Z8UWjsw4BFZwCeLJYRA48co2Ppx2CMwyRcmso+D1YHm

5073OSeZlXGIDWgwhJxkuwaoL7NpDWbX9D/YLTzoZV
hrrwTCUP+uwI8HB+n4w6WNtm0mMJCJgpGPaV7mKsE9yBmBnoti63hp54ftr4xyHcrim4iDiTmZarCeyj

MaQrZlmQLknN1/yWqLrtw2KtsmCxrOk/M3wA4fx4uvKpeAMmFM5wjIWjPdK1P337me4m6HM4qvHJrX31

aIbscwhnxnzsMyiXc68wXJmxCVabC3d5QJU8Qfu9y8
hdgfQ+H73Ass4NuU6xgbQN6ho4UulLmo9KUvfZkYubw7Z95QY0OEvZl1CKG5koOd6uiY44lf9NP30+Ow

hM69KXR5bQ3wD65LYQ7QhxnyV96guzyd7IrvLnAZeHiAq+B4zVk/CQYsXC6CR1oC7AI2cnW8rHZzofqx

22JYcfplEcLl+Umi6M0Pnw/IZ2Am6ORKh6+m7pJA9X
F3zjdKUN+pa3uC0o+PlUB9PVoNcH1hL6O5YoXX7fP3s1aYZEvh7rZKbAdZ2kRXhOhBdmnWSWr+6z5IWs

5HD5De5NWiRpyC/SMY/+DIGBYc1DsYs7xtRhKEyQTqRjdkCG2o5rb0AAmVhDxvReww5oyPrbraae95IQ

cHcG/W9gq+7C0ryU4pNJGU+4gCj5DuQCVIQeE4RhQ4
MBd9wl2hXzxomeGnQP1xMX/0tBAIJ5voyFcWHJNG+X7d5CZ1W5BePNalPAB4hN2gtooyWYbNlxj/AbyR

nJp0U8zogaN9Quh6KMw4U0RKs/v8cvfH3/Yx0Ce51pbgpiE2x1saAi6yjdotm9asOo3dUfWu207bBmR0

8oNS8a6JYL/z1cQ6bv722kdU+ujhJjE85OuT6cnuV6
uq2gcL4fd6uigSlAeU+8tmEc3WtyM5N+967sfJPwDjwO0/a4FghPDGpikIAF5Qejv+h0B2U6tlbIUYZ2

DcYcUO8/OG3OKJ3P17WAVLegAfD3dEzF+iixpX8Si5aTtW12hNw13y4ho9T3I+Trrh+vujRiwJ5Tf0BD

naK2eQvW89bfD3zyylxzpIS0KI6/s6RjviF4LJw4/3
VOngXClTmAzY76TOgxhNPeIv9xtDBpxQn+xOOqli9aN/tFnwY321CDzvWvBAWNkG1OpPjwlQ5K+BEblt

PNdETtReObtLxN3wlcL0UgK/U7oh++Quew1A4eFtmC/XcA8/mpP4O+ALoG/R4s9R3bM2qhogqVbz2tB/

0o0l2VXJQ0YeG67G0OpaUT5Wya6F75Ls29S6vqW1/8
OOueVMum1QEsR+nDePzNf39s0lsMXc183TPxgLetXMTCaq4gFazYbt6eyQ8gM6cir4Q102WjM2dgJLO7

XkdwoPh9c8V37BN4mrG8CNdiixQLub3UkiRrfk2C2TeHpxLv8Tusjw2Di9g4raNx+QuHD5ZRxqElx+ek

wP7elj3NNv/+FmujRxnwo4/KEDzEDopnQBfq8kTpsz
c2EA9VqT+0j0icQ6/CzC4Mjd3p/y+dI/+RAWETsvWLMFvcNG3UCdeEg/NgYLIl4326w98wie6op/9dvz

uHc3guBKfJQ4GfrMj7E8+xKzlgX2Fo/lJ/pN3xi/1Wdmbie9S22YJ4q1RoysdqGoXy3feG6qj4r2ney2

wShZPdA58uJ+frTivYGk4uIDCn9J/cjDCO5ohpClpI
en+kLO/TlAX/viXhndDVtFBg3RVkMh8uJ8yx/eA2uizXuCqnsLsd/hzZb9k+V/Iw0ssjlveHgriX+gFx

ZXSha19oOpMa0NDTuf7mf/LL0vx0SCHBAdeY4gZgsxhqa2I/Uti5arrQvytDZ/B+QLk7zBU7++gze3zW

yaLOkTZC2K5Gy9PY24jrL8gcn/gs5xVdobbKY5AZuu
hzqM/dp4M/pwfCAM9gQ6Viyp94WgRqeGPbQA3T6vuPxhafZ8TEXp/cky34gF3xb5qKucWaLvW+FM9XZl

uEJdsR6jkRM6T2lnt3NGRvw3MY6/3C8na4z8uP++7aCNrtnG+FojfT+eiHiE69yOs7j3rsvkMpx5KDMT

761lWJwPo4kqEwqxa8J12k28/7XdZt4hYub531XUT3
R8gR35pTq+jh3eAcEr6g2CQL+6eH0GsG11bYxww8wquj/Pud1u+byp2Gozd/Wf5mB2lMY3MGnuhk9mY7

CZ8GvGWu64bDEe7KcuIJoQe6vFZppSSN8G0w0yar2n7us0zjbp5BpLd7p0QoK4pNgDKQbyJi/lLX4M0u

j5BMhQ7Thx2bzRzd8rgk5p8E4+Xb4F+APAlXgft8aM
0jBwQ7hOA4jueZlywjigUaHFeGZXYEcvPrAnsfNOnwyxAOO4OX3wagWxmV5t0Af+Zb5qunzoaSBLZW4w

f9t2tsACQK52L4kBdhNwL9O7LY/R5FgaxU2GPQ6ORuqRrC3cmeSdnM97E8KLqvHn/cPYzkLTAqV6YEnC

3aaXNhv+cOjvdC6y1Qi4rULq6wcoJ7zqhyRvokcSRd
xJvITMB5G51H0YhR76fjl73qyrmzogT0a8tALTzXnCE7W2dRgFg8YrX977Xnmzh08KPGK9cg0UfhmU6R

A/aim1TM5p4YaMIhvzAdsHWmIQsDgdxIJCWOmuBLDDbEFa8r8DxzAgFpjXLQTaXL6rMyeGzUSvRItqVM

4OwxgXXolxeYmwNa2KlzZX0/RRzE8t4FPqYXn4NeEZ
IIL/wFclyonttFAuHtfkKOEZFA+LaPMrBivC1mcsxz73lN/+TM95+QBxRU65b6Fi6aS1Ne/47xkj7VaX

768cKIR4m2LgHmFD8JEyC1N7C7qTG9XJh7r1bCq+NvJtz8CYtqqH9Om6232s8kkUavbuka/vTToL+L8L

cE/aTTg6sm3ovZNLOzu44npGOaiFo57b6g/CJt5dfu
qNRcqRXvEWC4HPwVtX1FT0R+1KjH0xi5wGWG3P8z+jO8gv8sZpVB8Vd9SSHCZLYp1gjafTtEw0RkExjn

Tpybhkw40ly1Eer9Y1yeQ0ZHVNGVAHO2oYhxCkqjVy9wvQIUDJH8AVpHnvbKzeH/PAlUijKVj8oggE0s

vteG26PKjZ8TyWyfi67opjCyrQazJFeVlInijXHWe9
k2Gzz0VevFKR2z4bD407KruXTUi60t9N+Abg3NCxYcEAa386Ho8xRvQYLGEE/5zEnUx/gVDXbXtuYcij

Ktwf0tcPdNJvP0Wmou3uj/gDEaKbQyEfAYdBQamyr0W/ceaoHwx+39FdX/jH7uxMOQWvlLA6nJ2wmGm1

/FuHj6Ur5xMhJDuH3mOQy1Xo7v1k7TTBaLvA7tiRvM
Xq6nf7O7rKBGD5Z7/QNz9qzd+OMzXanoLHN0s+8lxsM8SrgFBcTdSOz/ALbbI/oMZS/GqXMtZz+gnrp5

3LpvVp4wpyKFij0gW6QdNY1kZj2tl/s+oDx/1r+/S4TyIJnJRU/w61gK3GW1dCxKYU4N7RutUlFxr886

3CWfDu61hZtzVgTcNklrhHeTgvEGcGeVIcN79gVtp5
6Ln2XTBizhNTXRcwxPtPdcWfnxUmmvH8I5y+9fRZXB7+KZOM9BhK3hNkmuevFe3suNNftrQv5/q3LcuQ

6BxT9Mo/rPDv/QTVf7K2sV9xD/fY5d9QRlmj54x9cL/hwaHdbn74W3PMSkgiMt3KzGz2QcuA3CcE2eoK

7WnP3NiB6gPYzj6/1QrzusW8jJCzHL4YsP3kbkagrN
OaYa4GzrWMnhyhTjjt4xrphi2hA9wzcd3coojgHYwrzGj+6mofOcojOA87q/sPfkzlDznPVTOFVtQppj

k6P5OvFs8/PdnJ9A/Q9qI2MTnD9hRS9ZaJ2YjElRieqHqnAsGcUyfDnRmK6PqO6YxVwDhMpTDOaOmQeF

OmPCGaB8C6X2L+H89+ntiJfRElZhVcVh92ic/38CqA
QnSHr5jkiz3ik4s8ROrmUld9Z9J9sxcLEn6iUbD2s0e+E1F1HW8Rwc2GybrpY/2y7/qff+AkzH1yyNxy

cm9UKKVaosD+DvDnWolp1T8ELByd7b2VZzcKRiI0Vzmc5fnsSGh7ms+1f6xn3oS4biJnP4eju+jWnCsv

Tv4AZF3/3IaWPn8P1pzc6/IV0sqgS5jbxw15I1WeGo
ju10R/s1sinKfbj9EX9dVgDl2lpumj3B16+2+ovrsw8mlcF4gIC/rNIuZjOq6o+OS6QUBdk/M48VPPOy

jGtw8Xnqkkm6rO7FGCmF1ZjvNO+GE7zoVeljxhtY8kRT1iE00pjaaiju8IJwkzrW+tqe9fXxpmvhGnK+

HqXPxJp+DvDuZvOa5kLxC5iFvQTHLooUiW98zi3R21
0u6h87L2vmIstnvQNthVwz/Rq+AcEiScmjTd4cGqR+m6F2l48mGl9naOIgh0m87k5H8aHmTaSPu94PSy

KwojBGol+ZXb7l2e/zBcZWHz8LCZF7g6ATcAj70nIiOakh4H90wR2AjRK1ABXFXUPvLM9w4tw0EYjHQ+

AHNB3+38UqTQyyMDloOXCTqgyh7F9SLfa1ERuMwBjM
XrjkwRpXHDdfZf1gA6/HUsxUVlUOT31A5BcVpc+3upn0CoVMqZ2M7Dmibz0RMY3jAcUObXphPmRjs4Wg

1F+tQK5Hoh8YvwyuM229ypfXacKPoiZy/dCg8s5Ih50uLXCyzwtpDp5K30UT8pX/OEr9je/oASObOpij

OW08OdjQ32mt+3AnHhKK40R63+8TZBS6mDeyKSgK+P
6GS+UWsn+zVKzmI/j72Ymz0SA0V7C/3v4fxj148BSixOGEEOMw30c0gN6RZvCoW4pegOvJN9BReDocf6

b06HaZ06XYkrX+66Ug05lqktVFwzadHwHe13VxZExswdgA5TOFm5r0ZsY4f/o+tBegCrf1m1mB+P99vi

MseoMmkV4Z3C8Off7zkIC/bEcuwWn9OodD0uM/4syl
ysWm8SJ9bHvox+ooE3qG9U1ZxAi42VLINmze1xjgy19F9nyqyh4ZiPTgtlLvmwmqblG/8emz9Q7+Ujs/

rn7tdSFgfMw/Fo7eUImdmJcOcefErJ9GbyN3rlLaU+nz64nrOeXy563OjfbwXzsgTKBapqEoOn4InwoB

8ayx4pkwF1kfCqW7WD9D7pGJLNKKE0IuaGxBQemykT
x8uYuk3f1ca7hoRlyr8YdZ+zxrf53zMfrFtj1Hf3i4SD8YBvybUL/oDM1bj4NrvycaK4k04QneErwdeX

xEfBdwv7ptqoftf/t7oCzcChs36BOPn1wxC3wit1uMd+BdxhzcUuGSkbFub4Cb+W5v/zqAqUr9L/0XtV

0QxWn0Is0G+e5+e2xrxOS7zg9y3yZ5ecWq695a2B5b
eaoA0ZGade4vvsqFvwfy2+8hKVS4uAjezvX+cbUv1Lzr+unt9Ws/wg0fyxWE+ywd3XFdJbdU8uwXacgm

h5hptb7bfDey++rkkKxNPKUAiAwAmnfEZAEK1KsqQ+KjvZk3QqEwt/k9E0fq8lgpl/n+NM2H6u2C25yR

6G7UymUo747GBjxg0hsu5D7JWSm3Sx30642rhKuvH4
KwhIAL32BzLBju9AhFJc+OhQSMLuhWyeROw1JkGb3I3Dhwi5yV78Ko8VfSq1KObTRCc2hhK8SwxB9PuH

nqfm+RfhMVGy/UNGEZFOP7Cw6tRJVdW61Gi9DFPHk/fkK/krm1cCt9UqpSuxaqjdOcagpZ54gLc9WrPN

/DzSO5Pvt2AfCV36i2I8ZwKZ/fwLk1A0Gm0POn1PiZ
MxLDovn709p0b0E8X/pqa41JzJlCM5Ob+tZmbb8du3iQcNDOaGtAYESgevi0pE6xkmb9BzaviffihBY7

0V1KhvOBZZrryORAfGF9A5nuAN0BvOPKEgklRzhYjsi8w3HMkSeiPAbXRTi4XvSvqSvKXw38BngDMg0A

set2S9nRVHfYhhx7bfjjBb4gYQwT65FEI7xhuvVn96
Li356gEQzVK6BRyiLxspcW2zVnl8785hjhjkjW+1OhUgHQmd3RBnnAbk0FWg6sYC0oYlcTmbUZYBG7I0

7ov2k9TS0+dUyBy6lMQ39EeoW3s5YBKbORGJ5ixjUjLRlx+U5KvSzC0m4TtRlpyOZLGKXjA009ZSvY9B

HIyxyLeB/orc7Ldwoemfv4vvcrh07kx5MBdgDOT6gZ
kmswRoeZHZBXG/DfDLuS+1ow6OVXdZrKM2NvLImJswT0/SwcIUwmnB1eYnBkSLNm/EycipO5z8Es/margaret

r3ilwYAMctS2IZPGzXA3M+upcD/iE3ORsYz4mAL4KzxYvCLuo5qRixeXK27MouJ8oILpkKj1YdcRUflo

Vv+z6qxkkP7PWrAeT6JlDYUAxF4anPgCZn8QqbhDUW
hOODU3QqgPkjvvonr0h+8FOjReTQVNG/McYO1q+ybmnIsaSXvCa6vKXDSRV8d7ue7IqLk8yJ4887eU+j

nokgvddbmQP/RxAwVuaHfuXrqptxcMsB23hcT2NL1yujV/Wx6yxUtc1Q0G3bTTrG40Knm/0FTXGXXyIx

yzgQqmkXL+3Hv/P/e9+H8hgqiY1xkeNwbfL3UmKWhb
giZgXcrr/YXwt/N0Y2OsLAKZR4t04l+4pQPaZRni0B0cVchsAD52rSiuktsy9BGABPcT+cavYX/NVXul

zdVvidn+trp1fGg9wZ3FL/RpO3WvShlFdfT8fVuA46MPmMg/X1zxPp3s2834Bd38m/YtCw7M15fcQ09j

QM294RuKlttWhYWVi/LOWrO+QWRxUuf7W+wwSpKjsQ
9aOF0QbmW3hBDO6wIhPL2vgyP5RaogYyhe6ZnnVcgemUfsLfNzhCfv9GmL6sel96+CXkntGNqnaMMwd4

PrxYg7+g3ewW/gzn2DY+W9sH2YD60dHnJXcazXmM7uNwaVR3FBam1ah7hxciFnDK/iKXH7Mg5L2N2koJ

WhkM7aVNY8c+17XtuH//uGwegjsVa7rh1P8mUyI25V
ORD2RZmZY/j76zRT1knJgDO6yw4AMQBOv5fKiFy0ursV2WslZiF/SJ6X2eOdJ6ZH/CVYNpkdMs2TAx7C

m/kWDS8lsiT9kZ+BF3kKTt39P5AXqADv5TskcpI2NMhPK9zpL5G78o1W0KdLVPI+CJUJfuBjZVk229b0

T+9ePS/zgnR1F0kNXf6xAmMo1L23mV1hivwe10R5Hc
GDBcJfhzWgjnt0j22q2zVzb+ncq8p7qO4H3zxgGWens7NPSlf3FKKySosVBKpA2LAQqkLyKHYb53F8FG

rNfzZnEJzTIziDNmewnlPE+vtT/y6d/fkBf+jrPrSfPkkYOkR9LE9f9B/6SrmHK/ihha1M5aTENWKsR5

qJ27pPnB4/EGUukRd2mvzoN41rZntBGa72X2twtw5A
WlN6Fr6F54yXkdUAUQDx/F9DvugIHQMqAHELC2fH8BLBHO3as3V7dBDszrt00VUQ0j4bBpF4iqWMkVpm

vdSaDvePvJHP8DNZUmZtlT5La/urO++sMvoxQ8FJVchZR9cbJhgSzOZFrXgyWMt5G1Stlh7PlahPxWme

bkMu4LFeIs8roy+9VVXL/BLEt1oKY4B6HStFIAJByx
UNRRMf7OOiqDM+snFazvG4KTLGwHq/vwm/lf1+5i6UQXlb9V1GIEXAH0ewptNelnkQSBqB8ZqccDXI3c

re6ikzvqCOSiCCzIGGiAcs5K1mddyS6rCZ/dRjF9n5ta7rSyrywmLTv6R1ErlpJy0xtmo5zaYUxz1MyD

IFhqBDhPxT/TEiSTH26s5Btp1upalgjZECEaNrXF5U
pQqJkr8qoz6C5Xp+HTOTCmtX8uH4SCWcn77cNtfMEhEgtiM2qi+v05wRk01hpBnWn86pOKv8qRCVURK1

k7qchK7dWFHqbunUWP36UpS2jnGkBjj/NZUb+HjwVI3CE8U1kyB9+1sDfq3QBsGSR9GPT/l3NfAWPCRg

qoLcByuxX53UTxVLQ0rV5/2nAmt4j3/RNyRa/kT0ND
u+sChQOHpCiT+/ewvg67NgR/ns+G07wO7e0Dj3UErIrbjrmjvM/olnHqk7ZH5Mwmc+VHLem4i4LmHIOX

C7OnLVfeB9aaQ5NkJqFyp4FmDvVl0vf6TH6b9964/lXY2L8cFz2qFvjw2Il87ypfm+Qz9cxZ3iqYJQ56

lsSao/Y9EELZgk+Fg33uHulIeDfpmtAVHX/5BLDZ3O
gHuStpBt7x0yYqoF1SXHPGJ1DHFrPJQynmbxSY9wvUoqqQQ+NJQmi4rrGWREcpvYKnNgO4WIS8IwCCxC

YrOTaDi0aOTyE0bSbwRFmu584JKhMSNgVF3moXwOazv5B4+xZKreXQngM0caCZJun3QI5JllNYFCavce

BSj/6LyAVtkbfxseGvfwpKs5bDZAXVPlFhZ5Zwga4s
vg0uZKQjcBd1sZbNgQ2nN+Z4+KtD+ohCABunS5WrBYNBF9OxzdEJGFpKdrvVogqqLn1N6ABMOX0JGArl

Eo0MeiP2csWJHOaQ5DIlaZZBexbRnQi07IaT6xC1U/FZv4BiOYkdkbocie1NFEPIi1LWCycbzMzhRqwo

Xk9MUkVOekmDYXK237o2vIEeIc5sp1X+TMP6qZy2tQ
eMyyXJIwWuYFtUrcp1DVTUL4YVzPTKHHZUgFfmw2gx/D/+F/Adb/D+2hNKbFOuXtOXT0avRztY7JGK3w

f1IgCAtpOjRwVD3oma5SUOB7GG1ZoNt4ZPHjY1ChWIEjKDHoc5PuYS4YFN2doGrkDfX1Mr1STrSwt8Ko

WXQbMLwXrCLLl92MRTx6M+o/+Ywge3YCI5ZbkBPOBy
8q/PVAJAxwaVXRIgx3D8emVOrAnnEFKd1AnfNO8oBwT9M2hcD3UfceUdnSpmkQIdcv6x3diyOISSsTb1

pxda/3Se051YWM3Dxc7qgNHYhrsL0DvhVbECK3mOItNg9zWrwsJ+puAvXo/Bn9pXDPzZQJgMoe3pZ/Vz

5U5We3cqH6uDLZGjCK0H1g5UUXTqZsJf6mgsXaYuRf
uHQmI0H0Tccr/uO2W4ADzN0zbXTFH9p8l3G3ATEe47a0DQ2nAT2v3LktpRceS/XMAoq65nxbz8pIXGjq

irRfhMVnFT0ABeQsOF2fhy3FDKJdLVOmPchWBos6BQtvZT6CeBFgK9LnztAZRADsvrklbX3qUZhoLI0D

w963UyTsUX7VRSVKTNNcUXXuZOuOXaBqT7AqA9LtoE
Z6LGYjX8SxYEXoS4z8MGyzTO1YRCCqHG45KL2wMLMLFgMtM4YtBVozDKSoDBksJU1Va056NjLgcSIwSX

DpZrSqGWTqSQJpCQZ2GX1HAVEdHZGzkZbzHP6aeRNgXD9IrANaXaPdVEtmUH1Db360PxrvRPGeLCFgLP

BSDQo+Om8MCC4xg3QsAQjsNEDcHK7vsj5VJCrUZqWy
N6G6wFKwGd4jrU8YjGS8aUTaE8AJZWIhfoQRbXWkNu4VWDQiGv9ztF2IMUTBIOIfVUHnQEmzP2mIXW3L

RQVAVMKdMYAlexWneBmEUuUsP0MSJES2m2BkuFwdCk9uKJbeEwQpmLJ0gfmzJTFun6WpNC2CtrEukzwe

LdIqLPDftnSdxBqpBP0GiZAerXSuLK58HKV+PiANCi
BkZ7MwemJHIHPtmbhamZ8hQHM9NRRuDr5EFBWdYUeiUCNxLK8BQjAjFkf3YD1qVXt6RRodAKUfCOHwOO

o+Fy7YIK4vx3IgXKekAjBeAS5qnu9YNWuBZcSmT4D7mGAbGd5kgI6BfHG4wYOmW0W5nMUwR4Ybm9VGl4

37L0EVPIQLCrzVfymcoD0JndIfTWviBg9IKRCnbOj9
aZ6TOWuiUH6WRBHqJY8dUU79Qn7nzVXpXhM0SJTrIf6DCgIeM6XkLE1nJ92cRWJsJqYvHLWXHe4+DQpl

keUwAfjFKvW2RFZcu2VbXMagIBOhJOJ4LOQfIvy7HJP6QPZdXBCwCHuhZLNcMND5OGhqHAGaGSHgCVRg

YZZoHuXjGMAtVzD3YARuSnK9YYKhBKXjJJsvYaqyMY
VdCYC3WyuaYTV7HIAhTWJ4KzzrYBQ9KCPbBYY4ZjxiQXU3LPCmPKVfLGxeQqF9DRN2RPMKPgO9TZJ8BB

JkPAC2JXa9XJF8TGZqZCwzEPBeHCT5DLKlOPM5QER4LVO7PHSvQlCbNGN7XaWsZVkzESjpPZU1HNY8BH

gvIiY5SSq6NJE2JsEqHzC0ZNI8IEV8NqIhRKA4ZRF7
VmW3MuPaUWS4TBB9JXY6ESPqOCwtOD3GLgGqGVP7DJHyDYCoWda3HXEjCLHmTjxuRTI7FWI4ABA9NkUt

ZNy0BBXtGaYgEAWvGKo4THD0MxYjWOQfTeMhNPD2GxSoAAJcJOJpIWF2WsY5ADFkFYm6NWD9LIBxFYyp

YPE6SKDoMUB1IbEpWSu5RZpnVaF3GXliGXr7GWDuMQ
SjDPOdWaJwUljjNqFvKMK4QZRpLRSeDyL1YCS8YfJ1FHWkCqG5OKV2JdZ3ORXiEvG1VGI6LgS1BORcWf

E3LAL7EiJ2ALVaTxW4UGL1SkI5KKCyLvC8CDX7UpG8YBOuUvQ2RQO7VpY2KJPvUiD8UYU5GdJFRzI2Jn

D9IQUoSvC7NSQ0OuG0CGSyZcD9JLA5QtM6SYXwBmI3
YZY1BJOnEFFvAQI7PASjEFH3LIZsARR1JNQcRPL4NYbpVJJ9CRj7VMAdLBKnEVR2LMO2UXL5QJJvEFpk

OZKiIPVuTamwRfo4EIE5TsPqKbNqAymfMI6RYCg2NEK1FTTdGRgrHcG2YIF9OWN1UqYiAYI9DXffJdJ0

KkBuGHAzTOF3HIG5IIGbXmC4RHR8XTE5KHRyUbI4MY
I5ONK4KdPzQmH4WCNoMqI2FUUqGJW3YPHtAhLwRpLgUvZ5GML1UvAoKxGeNvPkXLj2UCE7HIKqOPk8QV

RnMiD5DXi6VVH2CRQzWjh2EQb6HYA3MGnzJxh8YPJ1GpT3MOauACLnIYV5HAK1OCYeZOQ3CIDnQBL2SE

qfJLQ3UZUvGOE8MSjvBABoSCI2XuH1BaKhWSPkKNIc
ZcU3JuAdSgJnTRZ6LqNdSSKqNpXjQCU8VFAQLrG5AuR2OJUwELj7ADJ9IgM7CBRfPYt3KEC9ORK4WLAo

ZXU0FKO1LiG3LdHnVIE8QJV4NWZ2WDdyQHz6GJ6qWIlieoKpUlvYDaW0OXKak8QtSTd4FU4JIQOjHEtv

IS6Jx324ABEwT4VitBMusr5GGVRrOm9mmL7hiKSsM9
Ate6VXHX1XPZDsKXHeOF67LQYmApzbW8AnSBTgI0c5GLFfXdduHJCaP7RtkTOpLnCpTLytJY3ZgLPodh

GqJw0WXCXvSy4byBGId6haXpBwGVL2ELT3VAKtJCU4HV3UZqCqA7f2WWgbI5SjV3caTGOnU0PlrBZoYJ

0FVj2QMuBkDA5gab8JDXbgYWJfEnoVWyr0VVgaNJ5H
iRNgL1NtdySqM6LmmQmsLC2RrwZgYCcgPV8MJCZlAb7vtM2EBAkoHTMAL0MvI18yeX7mJ3arznDwd3zV

ehWhXPokEr0AZAJtXqawx2UKhCQxGAAeJ8lvw1YFxIPgJVQ3LH6TFKHuP6rptMbcZGO6STXiTx6WNKDx

Mr4rwCAqg8InsRV5d8IxGUmjODKZRVi+Er5AAT9ic4
FrTSdcQAVjMS4oxa1WJ92GUdPkNO3obk4LHhOnDI1bnc9FNAcKDsVpI0Nqs2TFULXfPl7SNGExVMZ7tR

1ReFYfWODfPI9jV7XYGH3LVMUiQl1sfGB6CNFnHlReFYSfPYIROcHgPEOeCtJmBDCrPUUCHWlzTJKgJ9

VkHOW1KSOvEd2QNKGwUH8PXsYdDCZuDIK+Lp0VYRPm
HC2mfwHozBU0SSD+Fd2UBYFlJNw0Y5L2EBIbQSj9K4IQL3DPWESnXXuwFIltERNyGKr0B0J1BCYzW3UI

Q3Xkngjpiw4+VJ7EC64YKLLySMx2N9T7kMMsV2N9tHxGpAZ4BJ9BSG6PlWa9dMPwaC3+TH1ZT8ATKbQh

JAw1N1N2iAHrE8I7yDrNxDA1EP4PZE5ArBXzJWJmex
NdEq0lD7IUUQuEZeKDPFE8RS1OoEKrDN9FrXMRZ6QpcXAjSw0wMGbatWSxsE8wKw2aABuzSZ4NFiTNUZ

aRSJG4BQ3EyLVqIV2YyVYRY5UsqLOhId0jTEbmnHXirk2+EC4VFWCaUm0VQz3+DQplbmRvYmoNCjIxID

Hok7JpDYz8KT3LRP2jyYxdLIA2Dr0JhSU1mKMmG9vK
NI2VbEXfJ47bkZLpCBVmGd5GEqN7tuZjsH1FNZ61pRBwd7J0CCYjO8lwBNwee92pNRusISeUOM8vQFUK

EZraOOmhKCT4IsPwprnhQWHvTl0BZpCwCXm2hT6izKM7KCJ3UlgjmBDzFThbOlTiDuOoVxP2gOiobwe9

QFvwBC2eBZthwhpuFFQjUfo+DQogICAgPHJkZjpSRE
ZpkS3fonG0bpZiZOgdwANoBz2oq6v0WupvFx8cIi0qHFh3RwTnJjBgZNGiQj3svD98VJntowRhDe1AIa

IsOYA5X6DnMyoGANF+AAluQCuliZj4fGTuMBVvRv4WVSUfMETaTFJtRPBvTVRdVCEnJUQgLAJsPLRcXO

AgICAgICAgICAgICAgICAgICAgICAgICAgICAgICAg
RHFzOLGsEMWvERRjFAKvVNJnLCNrLMGyCDMiTYLoPAOmJVAuLFNbQA2LKYKxSTWbFWBcQHGmTSGaJQRi

ICAgICAgICAgICAgICAgICAgICAgICAgICAgICAgICAgICAgICAgICAgICAgICAgICAgICAgICAgICAg

QHKyQMAyVPKaDVPbLFBqXTZpEXSrFS2MMQQmBWRoCK
AgICAgICAgICAgICAgICAgICAgICAgICAgICAgICAgICAgICAgICAgICAgICAgICAgICAgICAgICAgIC

TiAWQzKCZeYJDhPHDmBXYoRHHpCJTwDSVyWWDlUTYiKU7HVCDaBKFqLTHrUVXbYXQoKFTsRGDkNWGtOO

AgICAgICAgICAgICAgICAgICAgICAgICAgICAgICAg
YRGeRYIfKRXyPCYrVIJsDQChZXBuHVSkUCGmTHFbGYFjSBEgVJXgHAXaNO3FJGKaTCDoIDDvDHLzKXWu

ICAgICAgICAgICAgICAgICAgICAgICAgICAgICAgICAgICAgICAgICAgICAgICAgICAgICAgICAgICAg

GJQhKHMdUZHzAXPkYYXdIWIdDTUeCJFfUK9GPQEqUQ
AgICAgICAgICAgICAgICAgICAgICAgICAgICAgICAgICAgICAgICAgICAgICAgICAgICAgICAgICAgIC

RcCXCdBYBcIDUtUDUlLENvGCQaEZArSZUeJSRbNGVsCDYoDW9IPQHjPMGeGKFdNTMlJBOrNTMgEVEkNA

AgICAgICAgICAgICAgICAgICAgICAgICAgICAgICAg
WVIsRDMrBAPlATDdTBPqRXYaAGGsOLQvCPQpMIZxQERqSLGqNGOqIOIdBSNiEX2IITDoHKPvRPMlYNCf

ICAgICAgICAgICAgICAgICAgICAgICAgICAgICAgICAgICAgICAgICAgICAgICAgICAgICAgICAgICAg

CIZsZTRrKDSwUTDzSEHbNNUmBCViPMBdROGxPD0OYB
AgICAgICAgICAgICAgICAgICAgICAgICAgICAgICAgICAgICAgICAgICAgICAgICAgICAgICAgICAgIC

OvFAFwPGYbNAXpVQZbZVKvSQGoALTwODKgYLGjFZIrEWVoGXNbTC5EMXNnBNLoXOEbYZJdNRVxEVYaHM

AgICAgICAgICAgICAgICAgICAgICAgICAgICAgICAg
ULJmHUReQYCoJPQmKPShJWBlKTZrWGMhWVBlSSZeQYRdROJaIVRuRGSiCQNjPEIuHX9MGJ67nVFvk9H1

PXWiLN7pdqk/Oj3DRUhwwxTdqNQiIQ6YVvTgKI3wmw7BRsVhFQ4xhx5XXRfERlFkT1G1hRAnDYKqDKCQ

EcIyO00hKZapDc05NXlsYLFuRvLmLZc9Ay0XRrVeQ1
kdBTOkUcV1RTZwPgGtLCpjGO2It5JdsVYiYRy+Hy2EKZ7ba2PbJBpdFzIeXW3uim5LSZvSHeXcL8Hoda

Y6BALcKOJeAp8KTZFgVSAmzCFbLxKlXXLJTySzW0ErtS65SNVUSh6+XYwwiqJwFedUGyIuLVUuu4XeHD

q8MB9IUQIeKNw3yAFuUAFtH3Wsy3VtMx62DAWlOycc
MVM7v22aXPKPvUDivw3Oo4wpiW0mZBZCVJOkOIDpXYLyNzUiTdIsLRWoKVagGgETQAtMUnOvB1Sxb5Rh

EkV8IQBfMsPqUHhsEWVnCwO2FE60kHjrBD8JOMUcGLSwBJ54FYKrVFTrOr7GAv9AOfYsYL7zis2CQhDk

JUZtFroTUrj9ZKphYG5CeEBxJ1UsnPIkh2bAMuUcE6
XRTNMjAXDmYg1KTNYqUjOgVGFwGZdeMV2uNPYnMTTVdDwinaM7UH1MOR1rdbMuRF8BYcNtHj8aQa3VTg

BbL6AdM4SmRCKeVGJQTMnsTF6OQFheVV0sLA2Zt2YShACfaQ3ncb2CVLOwWTQjKyuhzj5WNibxL4U3mA

nrNVReNsJsQHUKKYbrVU1HKFNzQEW6YAHuNKJoKOND
FpSaU63hNU3QL6Mxd21uIrQ5UVEpRsHqHNdvGM49sBtobiGohTYwgPqkHX1PMv0+DQplbmRvYmoNCnhy

RBQQQnBtTkYXCiSkLKQxZRLoUQUcArP8AyCkPe9RLDNlGJWgWGQcNjOyLNTyEDKkDNhuHNPtLAH3EhXd

EHQqLFNiDA0XFzShBSZsEKz9AUIlTYPpWFUxgu2ZRL
QlYUWjBGG5GdHqMLHlSLXfJOxfAZNrQQUfWgzrOLHoKXAgJT5UQeRyDJOrXXD1HKvmRJUeHOUtau0WGL

FpUYRdYqR4QLItNRVvIBIiTCphWDAyZZXeOgZ5RADvYQVqQD9MLkRjDZMhPKI0FuQdZHXdZBWubs9ORP

WtRBDaOfh2CHQpXTLbOQYrJNurRQXxIIYcGWy7WSSx
QCSkVM7AKjGgPEDuQNQpRouxSPApXCImth8SXASoFBLwPSRbCXQpSLOdKRZfHZmbLIPnNRA2JSP5EKQx

ZXGvIH2XYyIvIABjZMWvIWIlKOXnICEeht9OXBZdWLCtObW8AuQdPJEnQRLjINyiCCGiGLJ7YAV4QVWu

IHNbUZ4HTeScBBXbEGwiXmScTNHnBMThhu5DBTVjPA
BtZxF4CxNnJBRfMHEqHLhaNGCzQEV7JeL9FGJqERZiDJ0KFiGyFSRzRUssOTDoDCRhUOYblq2ZDSPgOJ

FgYDO5FrSjRWTsHNZgXKhlNMReNES3GfR3TGGbFRHxBS6QEpBgGGGuQUu5SDLyKGJnVJLjsq7HrMShuN

oupd6IEIfSAh2WyQriMCU3PHpmCq0awUWlRWUxHLLG
Zp2OfmAbNRMjWDDFIMnoKUTfBXW3IVWeRCVcUFL9O6OcTKH6MNg9NMwcWGM8OEH3RaKiMpS9ELdmHxQ8

F6RfFIejHIN4AthcKla3GGM7QiFiFkSeURV+PY0vAAh+Ic9Qo8XaamW7ytFuDJyxLRD4Hp2ATTBOV5DZ

Cg==









                    ID                  Date                Data Source

 

                    [id number]        2020 06:39:00 AM EST [facility]

 Services









          Name      Value     Range     Interpretation Code Description Data Renita

rce(s) Supporting 

Document(s)

 

          SODIUM (MMOL/L) IN SER/PLAS           135-146                       [facility]  

 

          POTASSIUM (MMOL/L) IN SER/PLAS           3.5-5.3                      

 [facility]  

 

          CHLORIDE (MMOL/L) IN SER/PLAS                                   

[facility]  

 

          CARBON DIOXIDE, TOTAL (MMOL/L) IN SER/PLAS           21-32            

             [facility]  

 

          UREA NITROGEN (MG/DL) IN SER/PLAS           7-23                      

    [facility]  

 

          CREATININE (MG/DL) IN SER/PLAS           0.7-1.3                      

 [facility]  

 

          GLUCOSE (MG/DL) IN SER/PLAS           65-99                         [facility]  

 

          CALCIUM (MG/DL) IN SER/PLAS           8.4-10.4                      [facility]  

 

          ANION GAP IN SER/PLAS           5-15                          [facility]  

 

           GLOMERULAR FILTRATION RATE ML/MIN/1.73 SQ M.PREDICTED            >60 

                             [facility] 

[facility]                                 

 

           UREA NITROGEN/CREATININE (MASS RATIO) IN SER/PLAS                    

                         [facility]

                                         









                    ID                  Date                Data Source

 

                    [id number]        2020 06:14:00 AM EST [facility]

 Services









          Name      Value     Range     Interpretation Code Description Data Reinta

rce(s) Supporting 

Document(s)

 

           LEUKOCYTES(10*3/UL) IN BLOOD BY AUTOMATED COUNT            4.0-10.5  

 Above high normal            

[facility]                   

 

           ERYTHROCYTES (10*6/UL) IN BLOOD BY AUTOMATED COUNT            4.00-5.

80                        [facility]                                

 

          HEMOGLOBIN (G/DL) IN BLOOD           13.0-18.0                     [facility]

[facility]  

 

           HEMATOCRIT (%) IN BLOOD BY AUTOMATED COUNT            37.0-50.0      

                  [facility]                                 

 

           ERYTHROCYTE MEAN CORPUSCULAR VOLUME (FL) BY AUTOMATED COUNT          

  77.0-100.0                       

[facility]                   

 

           ERYTHROCYTE MEAN CORPUSCULAR HEMOGLOBIN (PG) BY AUTOMATED COUNT      

      26.0-33.0                        

[facility]                   

 

                    ERYTHROCYTE MEAN CORPUSCULAR HEMOGLOBIN CONCENTRATION (G/DL)

 BY AUTOMATED                     

31.0-36.0                                       [facility]  

 

           ERYTHROCYTE DISTRIBUTION WIDTH (RATIO) BY AUTOMATED COUNT            

12.0-17.0                        [facility]                         

 

                PLATELET MEAN VOLUME (FL) IN BLOOD BY AUTOMATED COUNT           

      8.0-12.0        Above high 

normal                                  [facility]  

 

           NEUTROPHILS/100 LEUKOCYTES IN BLOOD BY AUTOMATED COUNT            35.

0-78.0                        [facility]                          

 

           LYMPHOCYTES/100 LEUKOCYTES IN BLOOD BY AUTOMATED COUNT            20.

0-42.0                        [facility]                          

 

           MONOCYTES/100 LEUKOCYTES IN BLOOD BY AUTOMATED COUNT            <=15.

0                           [facility]                                 

 

           EOSINOPHILS/100 LEUKOCYTES IN BLOOD BY AUTOMATED COUNT            <=1

0.0                           [facility]                          

 

           BASOPHILS/100 LEUKOCYTES IN BLOOD BY AUTOMATED COUNT            <=2.0

                            [facility] 

Services                                 

 

           NEUTROPHILS (10*3/UL) IN BLOOD BY AUTOMATED COUNT            1.40-8.4

0                        [facility] 

Services                                 

 

           LYMPHOCYTES (10*3/UL) IN BLOOD BY AUTOMATED COUNT            1.00-4.0

0                        [facility] 

Services                                 

 

           MONOCYTES (10*3/UL) IN BLOOD BY AUTOMATED COUNT            <=1.50    

                       [facility]                                 

 

           EOSINOPHILS (10*3/UL) IN BLOOD BY AUTOMATED COUNT            <=0.70  

                         [facility] 

Services                                 

 

           BASOPHILS (10*3/UL) IN BLOOD BY AUTOMATED COUNT            <=0.10    

                       [facility] 

Services                                 

 

           PLATELETS (10*3/UL) IN BLOOD AUTOMATED COUNT            125-425      

                    [facility]                                 

 

           IMMATURE NEUTROPHILS/100 LEUKOCYTES IN BLOOD BY AUTOMATED COUNT      

      <=0.5                            

[facility]                   

 

           IMMATURE NEUTROPHILS (10*3/UL) IN BLOOD BY AUTOMATED COUNT           

 <=0.40                           [facility]                          









                    ID                  Date                Data Source

 

                    [id number]            2020 12:22:23 PM EST [facility]

 Services









          Name      Value     Range     Interpretation Code Description Data Renita

rce(s) Supporting 

Document(s)

 

          Progress Note                                         [facility]

rvices 

XNXIXb6kUlMQUxUa39/JJRvvXEQqt4WvZLtyTWa3OLqiOILqW9NnJKY8sJ3iQCJ6CXyFWdSjXiTbFeQb

lbm
VvJhdIOySsROJyPhxBEvOcMEdrLahcqWWyWM5KsNV6LCKqN08rFGVhQVJhV5JnQLJuHaH+Ry0YWNDcuH

BhAB6SXpfD2Rrdi6dNXd2/QP/WRk7bN468W5HXcSLiMIqZSNopUlOXJdpFIikP4MGni/93B1jdEo94I3

EbfSBsPZNA3a1QE/yiVEaM0E/+dn3LnRTO4p55rtsm
ZNMl++GozMbr2HUz+djfHr6WjyxEX8P/VeLuA6/K1xWovli8gk1p1pTOsIQbrgDk0ty0TcK4xp71/ZWd

/5WVErcYJXzxHvN6zjk7mfrHb4MEeeB+U4h+NWML4i1siO+s7Ydmk7Ablzhn96VKyV+DhM64CT66sXRr

hwzyhaqizTmEgXw/MAguBPAGtZ6LBWQREwtTUc2Flt
EbAXwJKyDSflfSViwe9fIN6QTJ/fDtzjbPHV0W2YqefQdH7VI8BZ9utk+Ys5JKYu5lUYlpzJnaAvctoP

qjVHVlspNQUME0AMqSu3Cf6161pA9Qhpb7zJkBbz8no5B5+5JVhaj4b5FH6EA5i+tx0NlZDPYTbd1BcQ

F7pkP0qJIR6inrOl4XQFrXNRgB1HbwotN+rK+KJlsU
nuBbIrotDmfi3Pw7Se1VITeAhYtlOT7YFVspwavXENY0grurSWAgRmG6uGIlGp1UiTbtchTNDMd/JbeV

/MXf3nyPiqa/ZIGDfwNPAm+oZ2B4oqbxDZoiwWHmo8+6HGYYsdgd30N0BIvlASnG+m8Kt2sAxCqWEql0

UHeru3An/fVtXa72VPBwaUU2izAG8CCpDrTM/326yS
Gs/d2kP7M+UJvebvIb+YFQ/G0acWaVV/ixNrBNspUT2dusf62jf7vvTmaBumgOAj7rMnadxT3B+vLro2

XJSje2vVNtLzga+92zz0YP446ZTbB23jc9QLiBE6rJ3KwcCrQTdl5njyKkL1mJtAUGIOj7JKupByfQ+7

0nJ7tx5mpwFYFMydr0ZbwhqPr1FXwX5sjthUCBxJc0
Y8RgMjw2V6GpcvY/UTZBXGBuyPWHsbZD2PZzyXGtnsH/Mjz5dfyEnDEBiErFj4L7PFYkT4Hhj9Awz4Wh

2BOUcRNrRpDOKg6kWIULi10WRi9rXM6gL3RvTuFqfLd7LHOIotxwmbLtWA1/8mtXAwjg2sIlzK8oSb2+

moAdQmqNrQyQOI7E5x1ZKboJ3VlZo8HOp/lmB9wX1H
f4hDjchekLD4IIa2ZmLVq9hkvT6LrQvvuimYs2xTxav1GC+ru+S6NyxZtPH9s4O1egU1s8uY2zeoYGmm

ad8Z4Ly9SH8hjZxyiTZXCcRLmua3M8IYhwAtitZiRr/Cp3PzDHMA6gKTApkN7TVGK2SbYKxsx+y3LJbE

6wV/vecTFDuiaZrXbuXfM6ZdYZ5I/VDe9W7tIZzv8P
4I/lvUMC0zjsobnV8jwG2yvbdBFfaOu04gz1lka8aiy8m3ONwlpvNyDfx0PIsAX/MfwS5dzKYilvvbVI

AbGpInejMhab0G6W4NCYB9uk5+Qk6z3FraSpCxnJMGHsxwFVC7BsIgFKpsCzCUv2WkhDbSCtsd5tVH+l

Yokmgzc8K8OrqaCCQ2iDFmKOcSxU8z24f0PG9Xpz8x
qhExmWdZGBZJQIVYrMkRAIrxDLfwvSKl0UGUe7apYFP/MqHIVdPN0pEeQa1BoXYZEVnHLx5iqnhJsWJR

uBzYVOzEVkux7VyMnePry6VtVS66pteErVX2P6zvbp2yfny/lXk0ddijDgpC59FDfscAlYV/go7MtBIM

8FhM4ZGiVRKcvsUZuF3aUASgF3sW4PKaNpC/sZOTXe
ywT1qOLLeHyHXYCMVNLiS7zNNUFCK5M+6rQrl8WFALnxOLPo00GlQ741qazlvptmVS1XVb/6/vaqbOa3

nTQt6aSsTDYnVVYp6KP9pxtiM37tIyk9ym3ujarNEezuPsXLpHSrrIrYO5MhwHhlyCYlqPTQcEJqCBrN

ebVW9bLLzBzLiOyPVcevECg2jYo4HxHV13cipc4IeF
CR4wVZ1atYv7x3Ls9A7Imr/TkX582snWazjJhetEaxOFXbEMJxdCD9A2UOUsTGAgm8c12UTVJ6ihbbu4

hcCbcncX47/u3CjOpufaWAE5Pjhb5yGEE5OwFUoOpbfC3VjVrrgNf17Xc8u5jj3gRd8PqXI/Dkch/lQ1

uPJSoZPuUKWbpp5fmL35vzYDPCgtGNANox0xJeZI1s
8TPgDgtFB6NFUpMbDQ8ip0dwppP42ULOnSoKUHi6nVj7iUpqHOCD9gAJtJZd6J41u2+Cn0kqVT+rilVW

2dGpq70uX/Hy5ST58NFmvflOR2s/D881SGfhD/sV5e5axrwB6YK7O85w1CG2s9JTzyQqQQps5lxjnoA3

O/j6P5AdTQAAoW6YbAGS4l0E2Reu5jMMSA9vqE3vTo
hj66qEE/auP7TaS561oBFyMyX6PatxekB7Wmz16Q17YHrQJVtcTLo89MzLnmLM3FhyfMfLqdhJQCI2xK

ODjZsNFZUepBfAJtH9HKs0p1X2wuCVkiM3jTztvFYNmiKOyPeVZLUay/31FaEGfM/mAq4q1IKy8QQE+p

Mlpt7TAzllOxf4T43iepeUBaS5IpSgiLVqUxZmYbQl
j8KQc/w4ZgS8WxTMo3P1BAgcLfIq2/G39tr7A3LN57W+/7cc2yyMpNMPyLcwzPgtJ/WBwHN8MzxMNRkK

PWEk0lX9E1fgGTPzAcBKZYtLYuiPreoiHpZKZsUCSRgzG1fxlPmTgZoWxnOU6/0QXhq6mYf9JJiFdaRY

NSsTjMgr5Ccf81GMeJjsT0ODP+2fQSFoYzwped7QTV
MJQB6/pA1Q/VYPim3UqjuEflZfU14xpArqIjy6y3Aa1H8lAxAFbUuw3VPPrDI2moznPSpVtkygEWSJ+9

1uf1xNDj62nJ69M+K2s82y/C3JfLjhwINnFZ8IswOYvDfklohLZpk67yxW/Pp/edeoLP5WPW6su0ChFD

FtDQplbmRvYmoNCjUgMCBvYmoNCiAgPDwNCiAgICAv
ACfoDM4KIGchJKpbXIXqD3BvtpOoiHHvOYRlDo1LOPDiAM0OCWUyjYLiCMJoSfHtULALFmZqOOSxCXPf

qJJNr3oqCiRtENK1CFRiSjxeYR1EVCDlUR6Qc655WT49ffF8KZHgDi9DFVFzRO5Nno10pBY8IBQiNyNx

BKXuvmCwIPWpsuG5BZ8RMtAoZUM9oHEiSxpWOM5GUS
ScsWFlZM9DAREezQWtEY5+DQogID4+UWntvlJwAokSIfDcMLMrSqyXIoKqDNqtRnhpsFUjUP0MdWV7ND

PeS80nKOVaMJEtZ8MeAWA3NJm+Xm9SAEGuxMGqRY0SRfzD2GzvrfJV4E2n/Wz7QHHs9/X8RjohOLQYv8

RylaO2C0uxgybEB/gCCV+/k7Xu3Luea9yIqkDPxtP0
k3O/r44Y8wlf2/ku55xt/q084ZAVUyx80t3nk2nBs6p1B3Kw3jn+NKkHd7iUxMaV+bpWCT7ijeu+6Q/T

5b/94Xop+5iCMM2vOl8K/qC8KTa/ygYTGYsOtkKlO1gAoySJ7wck9sFp9Vy+mKz2OmvUqNJtXMHNp2Sp

V5cXHR7ZueJKKBHw7eBjZfcRGGyRyQAb14n/qFZptS
eWjRiMDCjsx79UsiM1tHR36AdB5a314JeGCOu61Vx38DC4LiZXtankbRriLWejqqZja2qwRvXIrY+pzS

vIeAw2RyQWJIcx3EUUPgaASeEje4BnEVioGXjUgkcFXPbBWbTJu5+ak8wYbTa5c06PeEbRs3szz3Hhnx

LipCVzDWicP2H97WAapu0cO7Vk5eG63iG2WBsQm1Wb
QaFpcngkYKBe2klcFxjLpSmCpxUSLVd5u+Z2Sr1YRXrTpma57AD1oQkgfDesGanA8P18m8qLI6MG+uiw

lq+4r5ZBUgR77f69lfYPElOQGLTEddAbRHyLtQNABqsjRfO0fKqXVEmV4Ib+SRGPKIKAoOBru+cChjoX

Tb9LQQ0Y4HdEUGBZhcx0DPFDZa0jsAJ+7Cwma0S6w0
yjvUfDJpkKEJQEOKOhqfCRzbo7c8LBzmVQZxknu7krsw8i08xWI0/E7FEVDRN92VgsjbM5jSIXl0k2xq

sxHWKDXzMXHqqUra4zs/C8PL/kjHZX8fSIRD9iM0WCNwWbg0c8XNFjvMcvc+bZDEIHsHmZpUWaxCvDbD

lKeb9HDlPaGojA9OUJjFph3lF/oACDHqHfa0fmS7mO
0j964u8atyG7wKhf7zgsIJrtyTdWgUVCoq2txNIWN1xBWlgUAMdSKpGVd6AMXUHGnB0pPTZzEC2obGpX

MFTeYWnMPbTWv4S6dSqD95G1SJusdlWFqiNnmzIEs0UedJHx6cI/0K8zemelu+PbjNEx66u1fdZNB0yL

01tAmnTlcNYve78SwbYsoKHf8JjlcjWyFXWsm/z4PH
EMAsVZpv6cY8xTFmhpl/H5S2qkz9SwJhKuStqUmEFVr7CwhgnTo6WMZTiBYqZRwiMIO0OKXiLJeV8RUf

WS3gK2JX0krhigv86R/YvTU+VYZljfzTHYaeuNTCZws4FB8s65GpdUXA8jS/jjbsDj+5xpnp+Y0PQxBJ

qMM0e599bpT+AUWhba5t+y/+0937TWvmBYf5YJ/cju
TTPySclahmptD5n3MPwO+ur51W27vnojFYr5VCO82BVYlkjZb8XCq5VnHtlrWRVQg2DnUMV2w99RmIXf

1WAJQYWtMUwpgFOSIXjXJj0agC9GRQJ8bJ6TadaWMLXRIgpdrQ72mlk7T5zM5eDfodHzz2pvINJQkAAB

7HYCdT9C8kEX0VyqaGBkgWcpPpKaMuxdQSUwUZil1O
LUoWBd8Mrw2SPiDOok+iLZ/yBV79/EPShHqQFxIC7rY1+nlOhSwXDBhMpE7IRX6nOT7SIaHXwMkio62Q

pQEgLEkCLjPHxT9W5lWuGmG0z5knKYYGGPcZEYidZi+ykEdt4k8nvfrm46aoyk2/TV32by/aBxUatpnw

tZeksDUQUf6XixxBjFTrT4JBsX1opApPZ3Wbs9vkTz
bcR1b4emlB4UVHeewd/xTAt9cYJ4RiEo66uoUzaW9me9FnWYnd6u0Ds+3jfDB2O5itMTZfvbmVqzvfd5

2ZbiAHFnhgqvoye3AAH43P8CS859JeJzGyEcAbsrgOqaY94XJL8hhNYvf+b8fWY6iT/UK5juBYNjOPgA

jSTMWfSV6ozjWxcIb1CFVhBdt2lLZx5KEwxNOQ4eTu
huMLdq1fx7svLMlwgTfcs98Q3CNisSWJOsZtoxHz5L+dWT8MN43vz/pVd8PpHNvagqOVUmVFAbSHmRHp

LFAhpfgCPxI9oLslNH6MNrT2ImBcmbLqefSrA9lSqHPTqGBB2Jis437xxhR/tYlcROP6hUqAVoIWEZKb

SZAn5CaT4P/RtJ4K/gicU9xj7lPzumgoHIapOPCUBI
WpdKwTpqAI9rIDUqvAmfF6EfMlilpz/vgayRj+I0vEe9rDvX8f/WQEN329fu0Mwan+ky21DcyqKwn62i

NM4pp861IbSaZWYK56EZ5SEDemE3L4IaS3DqM5cfK2jyd77TsDCTRmJC31S/LGwcrF+rpw7U/3Qovj7q

6uY1YX1D4mN2E9fleSLpgktQxC38Qua/OiauQ7DEQ1
1seuJdV10dYZhCj+1bCiYfm/QGbB5kMeVHAhEHE63bbzApyY8KAOmzVRlW/VwJEVXeuxgXi2ML8fQve5

CZMdOmGXSZBwvnHoMmmSaDOr8GKsi/288OqI9a+qgUvdkPLgU3VsvU24WeSYIhqlAis86WKsfAVznP3D

EUJ4mF6qq4ARsfuvOdLPHW2FRmn01i9KvXkkxm6fv+
CwsPIjXhVgzVaM2+WoUHFQnJcsR9Sxfa/6bBPdGyBbr0/udJO5ze+KrO/Tamela/JxxytlB+5B7k1dnoc+r9

ZdS98MU/Gg5sEE0GGY8rs9KdDRKiUSzturTmWouEHlypLKRoRmiLJaZsPDyCNfWeISMoYVchVX2PGBjk

CRqaPQZxB9YotrCetTCaEGPeKg2NMDBqWB9DCTKqxX
EbDTHxWvIgZYOXXxFuPONhIZNxmCBAq6epQmUdUHG3XHYoNensMM7MRTUfZI5Nl831YV04gkO1EYMnZg

2JQPTmEL9Deo37jNF7CZDtKcXjMVPgpwVdTXDnccM7QM5MVzZzKQO3jVAoRyfDLX2NKWMstULwZQ2NFT

ZhbHNlID4+DQogID4+DQplbmRvYmoNCjggMCBvYmoN
DmQnMQbkKcekjSDfXT4LlLC7YFJwS59rHXWiYEDvH5FvUMGqHEN+Xk8SJZHzrUDcLT5JSenL2Tmzr8C1

EP5+M/ug5XS8bGNgn8WaxwZterrBoQilFzQGApNe1iZ1PJ9a18cqlKbupuwVstFLce2pMsSj258Ol049

11IZU/++if1Q38cx2bw3UGyTY30A10+zd7XpQP+7/I
mBDJ7f1Y1G4PGMiuaFl91iFIfJSBtoZ5k5Ln8989+Gz307iEdBhaAw6z8c0gLgugEiGNcf2enL9igdLZ

QueMRDxVx+dZ2rzJ/2Yk8ZB7Wa6x+VGhO36buqXSAY3PEOvWGZe/Tg3UPW/1Xh20PpAAMNC7ZFlFqBMT

NblgPTrw3Ql+Uy7dFgrufpT3n8jIz0E7a1wKrLlF66
wW1YAzlVWF4fjd1WBGDXAgxi5jbCEzOjm7EaQWj7YqqTTIxgdbPzINCzK6Jv7rM6sJQZTNfISMTNKwXo

9jDO6Zidk8K6MAK5FUVXh9I6uUWLRZQagB7EUCb02QeCvwdorC7mtgyxxjTeXx0m75E3sxMIIzMXA5MD

kMF6uakeihi/0P0dDWdEltcEJ67M/N51Jao3Po7gbp
d+YZP8SzFtUYy4vP7THhissN9M10jWCufcRBIzNzIoCmBDujwfdMZIFYqbHqDtq++baF9GN6nwrzctKg

tQf/DBEaWhf+pWHOtacGQOVwzJqvA/rwHrxAKt1cQXD6ULApu6Y61x2NDWOhNjmDtIK5a1FnnCSCIJwC

bYWz1TUsnIy+H4gYMltI4FVpSTbSQ20iUI91nDElgi
Y3XhLq2X+4UHQD8AsBs8+oGVKYHSO4OjcQc9QpzqURCV6cmF48Ms1nOS1utQKa8/CJ11LdmA2lgeBrEb

KsE9MMOL63qk0NW7KzeUJvdCOAbtW/0S6I/pHobZQLbIJUJ6TNRzpo0NrpcDZv7NjOY5RGP5WPlPKH2b

mpEWDjpG/7sXF9o4Klgd9nVjV8uk/iZL7Hkno1UqOc
dnxemyl+Bywsk2Yi07Aj5dyl8nP7YCiKesSm+iGvP4pyHIXlBG0Fu1AAZQSfevr88I8lXfRUvgGtZXyq

NlR7bPQTbbYGNJl+Zo9FC7iSxLxeHwD2jDScuT6hVDYmqqQuOMLFbFdq16iqfxiZMvWN/j9ltE6hfvph

BUqZgBRXxNfvNcVW3bwnBMahSmFUh3zboBNPVCrLOc
zXE5hoONAqehXNFSDcQWr9DQRBPKh+JOIyyHTTgWYRByKIpL+41hhfcU8WEtmLGKXguf3BoGGYh45EJu

gMBMXusDxoKu39TMCUYHKh653OOuo+/xcqPsEipsz/KK+jwl18kfwvaoi8XwBGrxT9Z0EBt7sDqN25Ml

MdPwkI6s/xap7M0pfbrWrsSczq2vakhIx8gkQctrsz
rtz2w7mL2NbkLx4RqPF1au5mbWIP3jxwFd9AypJ8k7I8WBqEf6AcQ8NHRHz3ziw22pND0KZBabE+G8BV

TZEjrYoeCo9q9M73WvwqbDlwO9HsgBK8qWel7ZAKS6RsTRgTJhP5up8IbLQokvIOWEJFaJU8tFKfrikY

L58wHkGMfqvCGZVAm8XdNLbxQQdXG1AY1IBjJmPOsM
Rbd1LrnunNTy0Eyp5tBrRjxQ3kCuqwvpL95B1WzAagCGmwRkBiUFqR10buMBcs4xz8cc/55AKEWQ2oYY

x6cLC9X0JRpVSuxvHErzH+QjBJtJLQJUWZ8g1tKhBoetcRqM/0MjLsgWGyBKaYxtrC340iBUoKTHDQ2T

uVaPNk639Ll7VaAJEexhinKQjjgs8RCGps5dgyHIs/
2SI/6EXFwGxL6DWGbo2fshwM1v48EBJLweoe3QbYyaDudx1PMS11fPvE+Hy9eAhZ6F+SPdMuO3cuGV/h

ySBsFlVWQ4jEdeqH0jODrmRWFtV9zPDn/vKJhH+7D2dbcvnfeEXX/JYWPZcIVp7lDRc+en0AjF0BG6JS

nSqAzcpXUyiTwUeQ+kT8wTOAz/ovdDMgxofwDghka+
6ywwvgiY3lk1EQ+/IdZEvRzcH8FcF5oY+0We7ccEi69DXefRcFRSMIfkfKcmMOBxK2oRgcbzgaf0SKZR

1dqPW7kAn9hm8m4erqyqMGRAvJ72a/hODAp15bvfrYTgpS7LAilApR1zUMyP/KOtnTwQQuYaBuccDfAR

PJuqHENLlLqEsXxHsthDaq25OIczc0GZ8UqNVC64mt
qQ5mUfm2RyeP5KTiGa+2J/HckEzO+nv0GoqV8V4i4muebFC6HTTEtsA0EQDwhXdbdzSXyeJusaKHBvEf

ZOnt8Bh0WCAB2bXFzsN9kzhV4ITceGs7nhNKmK2cAhyF37AYNakACS6cFVq0aujdjD1ekHhCXhk5r5wT

1tWiMacIXeODVSf2NnQIBu0jOhk4ur6jv8me2445U4
VMQG3gNp51asUxZ3EnkR3fiH5pK9VVhiO4q1nAn5c8LHpaTqO04q+lFVFszxifKlo7BU0/rT4QHqiV9w

b5J01Mbxmd4KcYj3ILio/FismcVO/Dip8hENHwniX6yNixAzpveWzfOzxQaUpvntZZCZCMBmd+2fJ7Pm

OuxXthxouNzbJLDK913el9fF2x6ZS9UGbX/m2p0gEw
CjMjzZNCctI4MbtuDfDAIvaIx6GuVvSgwqh4ycRDOQys/ROoWkabR0PNEyhXaO64KJndkmV7M9y9xyCy

7uJOncHejleyY2euL3N3cmvkD+KuRgEyaj8lBTFApBLGrCj4drcZhahTbbVuTKXU2IkT8JOwaT3baqnU

YkzLJh0hPDBwh82VHzgFLfKdb7Ge7q02rAXz/DPItB
zcVRCmnnV3eFwc40eXHzzdl4sGH+0JbDOe12sp9GVmUwitPIA7czh5ozhDYNqgX4i1m12AH0cfPW/bF9

xWJvOnjfrW2Zn2zjLtZl25JiNG1tPotcEQkeje8Rp4RKWZLC2duCucX1V14u/XXy8wrLzYS69iKyUhFa

gTzp/QbF9+Tch04P3kSTOjYpPEX4auDddZ1NQS4ha3
IxOQn7IKEby1FsHRdvJKz2YXxbLLEdA2W0lQXdZZZqQW9RRYGiBH6RDKYcgmHmGtVyXTYVWjKzTZNnXg

Mnj6LcC8CbCQNaCFYMXEgvRTPbC93tUOglCb80FHaoLLJqAsXsLAc8Kt0ECjDbOVIzG81xfSGrlDXoZI

CiGMSRUnRlIGJcV0YhvDNmGQedH6DfI8EtXI9syORx
NL4rjUKqN4FuK6NsclmvGLESDlUeBICmGCxoOCBhEoGdAQioZXM+Ci8VSSL+Eg3GXZ4rd9XxDGnxLQHu

ZQ7jwg1VRHP1HQ0QdMu0JNUyG4ZgAXRwBXXxl6NtNS4BCM7bcVfvIoW5AB6+BOegUDN4cuMseK0STQKu

BW1m5iTBbw+j58ZBQMRMOKzY+lEFRVXbO1sRGGpnAe
xp1HBumHXTcSusgN8nalukQNDMpy5gNS6MFuI9MTFOntes0q3M89FeT+bcZ8x9Mg4LK/XP/gd67QivNg

9+rb94OD9v0bZe4lEmwbuwSnaPOoeDcdr0/hvVWwkJeJ6F3otET8a9hYNdwezH+VHu6wCJV63uPzXv7d

bpMCtfvmQHP7+CwrknvdgIVx+F/5ieeu1fpvyE0rn6
x9LAYeC34Xt8GhIA1G+Jz1l/qUT2gPL/Iv1UyHtWjZTulbaPACIztZcBmh2UxsS4T41WwnvczFg0ChxL

gJFmwonzEeDD6NjLVP8V9XIgop+pKJEMGe3JFr7CQSDeRcN2Nzksw8OQFEUHYRvzaKdCYEXH+MgfYF/E

Ve3U1EhKrihTPjZTMEku61Ygs9q6B0RA18L3tARHHz
nelMlgFOMILKqACMKW9RkJ59oF67j0hinUXxo/Mrm9Ppb38DtFoXYvFp2h7Hk2uF1ZWsy0gZGMJx0Kow

ktjepf6ayGly2EKmxga1kQUKunuPb3KnwBULMDAUH7UgvDpxIDafWdICIwjQ4Ol9nfgt4NpkddIzQUNp

Lzg3ynNg3qje8mlhwpNaunSHs1c81ukUhcpqfiFuaR
VkjV3X0n+vEWbXAOzLdO1FB3MuTWSwz5Xdi6pEwO+UxjDxRKOjhtwtIMX7diNfydfLuxMdTpYxgK1Slx

Fd/LPLVup3MGUM/rYQven3MtXhzSOfEQkseHNREMvIIMcfrDxoTOJ7EKFljIl5th1olywoVrSddngwSm

QXGFAgniH+xiCICfdpyjLnP2QjKVdWWQN8fQ9izdGl
8vX7IdANkaE3fWICKWzHoAFTftskfrKgpzXbQvvVPnz9XbdZDIQ8HO4LnqngZ5AH1bp9EzA4Vf/yFPIN

8jjfCzy0Y/CnbxPR5ExT+dkg8dVVN7mwEkFdOEogGgaMzhvrWr88lcHTxrnTQDAhXZ4EVw4Uzg3cJhi0

MyQklUhU+uv9Fa3+XmwB5Cwf8TEL+JgFKrNysBhG9a
vWpScEJjGJm+w56TSHFSgfY4nginf6DrpR2p74eV8CCGbEtMd2cqAi8LcSBkV+7S2WA+SkvXcBYFiYIz

laIiMAov9lO7W4GIJ0PgMdlIfy4SHqINcU94gHE6eTflKlP+h9jqF98waq4dkR32dZ1NcgFfCYAce49F

cBK4ySoFqtQNAelhhzgF2qMQbn6pXe7+Ml6N3cR3wc
SeQu2kBCzb0w+pHVGuCt3M7xQyMmuhULyrR/mp+HKHp8enyG42EtwtcyonfMUdSqo8wBpWCNFD4R5fBp

slXZyxqNrBe81gOGA9JficVSDhXasilZD8FdfMXU5bpYrhTSB6QBVNuWwHNLRPkfvG3z+lGa8JKwBZZq

Ga9SdSy4VuP4IR68bZFWVdhFFBMZdPjRHppWwqxGWh
jWjM4Jc6ZNbfulrwIbtkn/MOy2GH3k2s/FDZSDSmpl09ibN12AevpSguj8dOckGSg0ARmaukUcYZ3iYm

ulOxNJZlC90zOZaoh5FFmNXzeDe5fGuk5EYsMHfCd+s9bkwK6KotsARXcgBFIH2aSpxPuYOrqGeRMBaU

NDCO5iXp93l4avesKd2y68D9vt7T+ro+yX8rJ9lxEs
zHjgjlnZPIB3aqefCBbS+6NhSVrs40RBZex2bNg8nTSAzILSprRaFWaszbPgcIaTJwHIGIPP1Fq2Py2T

rMiD3chyD8dxrxQMbcXQ96dTOIcVoZk2NYPaNsug5wf39WQT/vjXbWqMCrGNnnIjMnLT6bd49LtU3cEA

iAGi9Hem5EtfuQAt+WazxhjLFfOKCXETsztDvCD3Cb
SPzZKboOnMluQZzR8DVHBeRAkULnYCDLT1pZO3FAUCapDzCfRePwwDkVdPMqQ0SCtguRCK7QGMhALl40

YQIuEmKkCtJGg4lJXudy6Ot9M9xTlN4EkUxhjtOwmNMa0q5EmZVKFIS5QB10j1QQi5yn+gSyDbr+C/uS

2IQ6zfniUZtHleWeMJXpRRB0BqOsyUQeg5pqCGM4KW
87IBwxlhCJaJzsp6X9UPoVW9u+pHM5PXT9KP1nx83VHY6GpDeX2C8ECsA6wGdKv0wx2JeYZG0RrfROGm

JgZBUCKf+twUOrdxs2IXGpO1m8rXYHPNu4K2EFIVG1UPiS6jvUmtO2hXh5r8oS/V+d7O1vjAbW31fYtD

d5Si5e6AKTx0AOVItOU/4aLKo8XUnm9op6p+QbWWAc
SaIMSbPlynewiRzps8I29jxrKhcW64zAE76BMFbaYC82aqBoXIDZMYVs/09uebTbHSIdLy7/hoBIEiMe

AS+n5FvoYvMDKDi/YyP87s7kAKedt202Yzi6iq69DBnUqFiUpBwsD1QXcPtotWwnqWr7NE96YpUzpm2k

e97D/O1Aakar5YRm5ko37B0RpYqbnC5JfiCcTQgbmU
Qv9mZvw2dTyzgmsVE50FjdDc3qkdx4CS/jyPIrDKO7F/y8m29f7tb2JjiU1lSxw/IyOnB5mujvRwg0i7

e3LgGjc4AfBclmB4DNfa0NWjesSQvR6eII5Xd+88sWsER39xo5L2CaDIdQMpoleT3NXwZQlk4IbdXd52

NS1fi03dSTGFJv2CsgEAKQict75xbapStuEePh8nJ6
4ui0fNuzCLVSBOdqCXlOLQrMukHIZKR+Euh9355bXvMIXoLnnTAIfG3UH0y2bP6QkjHPA/HvB4AlhUz2

Ic9bgrEl2jm5JkEK/st94nYmE5UqVe7ZmjrQI9joeCO2JzmSkO/uhK9VKU23UwuBrI6tlr3yWWWLX+jl

+Yz2mpurIbA2rE5zgwbr7RE/aFrPn7WxsiEgLXF4OL
wMKOxBh+lbeHf6plgwmwbg2D9m2jaU+fa1rJrM05SrkT1lUG5RdScX54DiB0qAv2vleA/DWtfXSi4XTY

ZYgefrbOXgfN/0OFtdwMHFmzBQ0YPOaqFRfMcxRAbf6e2wNgj/Pk+/WCuuZu70Ud/SnQ9xyRw8t2TVjt

HCGppJy0vt1e45mFqCZ9ZxbodD2zFa9fKGhstqUTwV
MYdL53UzoSQB4TQX4pl2o+aO7zjVHHb+ud/RZ9OKhPFGZ5BspsT3HNnVfo/i+B58/A+iCXeHDQplbmRz

bTToXO4APwFzJM2eqx9WSQZcNRFkHrsZSwWmPNnUQiRkLAUdGMswNR3RLOqoJNndFWOtV9MfsyXyiMUz

JYNnJf6IGCDpVK0CMZIkqPZoIGReZtPdOFJGSvIpZP
YqZUZjlFERb1sgFmXpROC5TXEdApcdCN1FQZVfDP5Et687OC19zdUpVTLpFXJWWsVnVPZqM7CosZOfYW

soG6OeT5DuPO5vrZZmIV0rbJEcB6ErY6TvwxgcPXYUKxNaHRQxFJgbTRXwNpPoHLypFSL+Gt9RCXY+Pg

0DYE9kj2CaAWcsPaKlGC0baj8IRPO3WQ7QyWv0IDOh
E4YtZMLvBUYgk5ElPW1PMK5wgJdbAHJ5Ag3+ASnwMHF0osZqwB0HVKJyHR3x2kIPuH+w/8DHDlhsfopk

qXEX67oyxyCg3mFB5v4ympJ52QfwVHcd99xWhEqYcllsrWaZQqO4CF2Mv22GUfELDgF92v3jOFRiJots

1oMqre0u5Wcm1DHzy894pREXcBN6UTiX21pthviWy7
Liip0zz3cs+sk6Vg42GWq50VRT06MhBOY2nGsAHc0u6kj/vnyH06m90qC4/oGYifNpvCOl3dVRr2Dds0

WaO6aUuHQpjlnDy1MYe8MPkVVaS11xNy9jhT+CCh6xmufqEpoKQ9velfQkoIFjF6fJimSlZINr3ksC4N

knpJoXdhT8Q4XahNL+LOa8mmWeTZjF668+lzAOJfQL
qQaNtxAG+x7z4VqXmW4Y3/HZmBAXG7prnFKM3Pqj9CK1KHyLsqJfGZcTg9KnSuOkgQVe60q534aN1POq

ieup2KzJ8I06g6U38FJAATNo5MrUHbvSoEqW11wbeTjkxznpulH1yU/5Ln83PCDlNLUItsXwHg1o6/er

sCLV3iU62BkZGUtjzjivbqw8qBfHf/JQyIiwu2jAez
HimEP70SESW81NBBkRS7aLNnX46eGzzZAfay9fIztdOPqNX+TVFrNyPiJ963c61QoBwjyp5NUOyzloEa

+ujwrKBnON88akmHa5hQNwRY7RTl7wm3eub1CbVBeVXbF0NzN1XTk0Yd9gfAHRINIwKEH84PjcPecbn3

kLfuWUVUKuwd6++4iOzkbvXv/+0f61kRpgqtCAIu66
ODp/9+vO23DsE8fDlQQMjPO6Pf5LiwVYGq/SPnL2OS1mFIagQOUEYhBF6joow2UPzqfEZdNB0UnXtSR2

09vqwfOfs20OSA19PC0fmSv8++yvvIQZDBoqCYQfhPtaSKb7MIbBfZ8PnqeOjXnbZy3VVy4xrZtvqKK6

huUOuVi742lxxsLXoUjpaekQ3u+mOlBxqxdciMmG9A
o3JBhiCkv3bAUQ1jVNfSBlAjzrM8CZXeM10/kk9MOWp9O5MOnpBdx7rehZ4zHqUZ3Jc1JVSMxF41yCQZ

ssjeSYmDFVLPipcW18CdjFrjNLUAgj8Ngvb0vG4pQxvWdV0bcBnrifypumfFwG968W8yIcQfusYQrB5d

f0VKzWVmel14YElOUHkyhqiiHBZFbjn8MgAOgSVeXb
zmqx3RY0ytq4sdYpJKo/9RPO23xdlGzUDPwVcqGW12+o11SZksuAAcY0DRWkgC85EkBkHMhAkNQVAEM5

p4QKko+SxdukKZGCCMab/eJoWZczFLMqupw4/1rG1g/w8gw1UaRCVf0krYGyosRVKXy0fkjhLq0c313l

QCjJb0Usro44C25m4Erpu0TLqINOGLqjkqnStkJQOF
P0o4KMdb+KttwiIGDyQIJavezilLDIj22NxssR72GwJNhUHAJttau9AjW4kEx/iLYxCZ67O0Dv/DS6d4

u5wZ2v6MoQKTEjnqjp0+tpVDbIcyRY3S990RyklxbYyEEv6/RG7zP76a5icKQnXgePJehEpsGf91ZTuR

UVOqiOp4/E0nOyRBR2qck2XcMCAAOkKrVnpTz/YYzv
GUEKxj8ml1b4V30U8pt6Nr8Z9CWUd1s/JbwDcB4P4NdzwD5mVCr+GfroaSONIisPCgiEvbNm11PEKThQ

E4rIGGMKqxtFWzBGdFbX7tMyLKFwZUred5AtZVzoWK4U2BpjIx26ITJZS28zS8Djdns3LFOLEkFdEGKY

yJefOVWlokLqRjioL3IMbbXrJeMkUzHsQjTQpndzgG
pecc2R8BTK0J5o3rOzNTJ/LkhDv58LpUt/taTLQBjL57iJTmbEqVBCLPt5AHhPXbnLcTxiV6tUMo4yKH

MT6OlOjaRvnDXLTzm2IuUGX1rxXuotkObVtEaCtMV/4fkimZ8HtslVXvkaMd9FUHDVB+CsZvhziY/wmz

sPAgbjDfrCo3/EKaBmDk31yLQCUiYGt8Sy45G5CGwG
RC9scvjCMoLXZmV4VsW+nfin4bBPGPWwTpTkBPPskv5wCHcTl0v5eZcmw0rBbesoCiGqkRcLV6lotNwI

6g9zXl0COHKMyGU9VFQWsen3mtyL8IY0pJ4H67vsOxAue/koWmaUqDFr6EiwArbYExdHafiNDSqDzk2v

NpAt3MP7Mlu/IOMBCBbxY+N8a7We/asG8vFb252vbj
pCcIsNt5hcd+51oJGcudYCvSLTXimaSlk9/AAu/vn52kgjOFyeirMsdEJoVQ5KWwOyDO2jcr9NREHpVZ

CaHyaSDuDgEIdCLyKjLMBsPJdrYZ9AHVguSYihBVErA0GrmaWijGTeHBNfVz9PILTaKK0YELYqjWSzCY

HeOgGiPQWBEeSrVSHnNEWvdISAe9xhMnVrLWD6UYLh
UcwrEY5NAKAvUG1Vb308FT61eyBeGxLuFIWSEnIzVFDyZ9JemCYoLSwgR9UxD5VoPF8ulQEgTV7klWLk

N0BgX0NraqfaBCAHSzJqKXQoDSuxQZRpZsLfHYcwDYF+Mn4HHKA+Wo0UHE2cf7AsKVjqWOOlUN2hrz7M

PKP1XQ4YzQc9OJMcS8YqWCBnRKQng4OjEA1CVQ6tfK
daDZI6YS3+XLhsTQD3dbRklZ0BIXKeQ1idYmeRraxvz+UtjYGHu7oxM5WIKNUNQ0YJNcPiEbH4zIOBFo

tpHXmFID+s/98bWxbumhC84foBtc/ShONvyG/VrpVeTWeuqquJNyQT5s/TH1yrEv9Vua+glRqjjdrqa0

jn5I4xCDaLZWTz8WACXsftGcs/ukV/muc15Iyef38F
bHfIqRYyobx8E7+TN5E5S8wc1ox2nVIZZl71QxenSRKohBkzcJhy+BMX8ahZyG8z/E36IEuUXv+LMVKC

s2OLTSwvPw0jwKnxRAadPBVaRQYxG8BpwOQblfNq6o51KxLk+JzNLFkZiDYue5mJI13LSxNyLSDq2CcK

4PmQgt7WdPr4RSUzdVpixhfXQJs7jgoxBy21UEdlar
JV0pU3rm371IwAwQY6xweP26NhV6/HtpZChqVVBfhyFph37leD6+daZ8ZGBczHmU3T6fnnm0Djn6bZr+

IecWQiwY4siaRYNR6X6Cu+QxISJdxbGrWuNxeeQGPwPAhx5VPS8AmrgasdCWLkVNNkf+iQUJnVZmazcp

mx7gTWIM4JtDg8KtWGaaD1dLJdwF2QYi7F7AsWjTGx
Ukd7LLKnpvcuv8CNIecj5EQ9ST/wgZpEQb3LtQA1dQXS3LTN4kLTxb5Lfx++B2rCD9HHGwsQLgTEYCCn

4/WFelEAGzYDbUOiPeu9QS1D73zszChTM4vBpxegVWNsoJydKGLe2w75V5oYwMWn2aZ/gGPwZgRmtnKq

d7Ve25QkYKHvt4NtFxAioOkmP6WhJLRR4BShELx8tK
fHWjw9SOX7uEmXwncHcAZ2Xm4qCI/iFyFqoAmlifR0HFUoLH9FZqqm9xkJNOu+vuRRD4JbKNaY4EPVb3

/d5FwNhG6Xh/DvHN0iwS8zJ8axFlL2W0kNa+ORjzLcCzmC2+g7rD4PLJzIwRQcBx6qP3ERIm9YZTOqMH

MyU/EhyaYCmmVhPMikm8KZCyZhIPzWqAsoPpY6cFYu
qcvwUkHTIKllDDzFiXqWF0lRSJ6VctLZ/gDNkjrlZgnuh5+L0HurxW5rZrwrEWWyBJYn86nV+WoJHE+m

x8CqzmdpuaH2BqYLbzWiZ5G+xLq/vzerMbvQ7tytT4cSZ/WxDl3QYIRV49cGB09sgmrlkFAB3XHYqmKe

b1aQ7R1wfRWLYY6eAHEQwS+zpDNBqx1cW4mjm6HcsM
aIqzR3VrdZnn0hQxm0+nQ0kJjJUP5OACbE6YxvXM0IARXbvEC/7SPNite8usefNckwq93SDB2RAIQw5J

U2AomtMsuoTfh/lQXxJq6E5YDNf6EnNPdQ4y3JKqpB208qwJ0UgujLUG3RzEhWYRnw0kd65YhVdCBhMR

tqWCS1FBE7Xmys7zEdoG9sOqiTh9rSZNCU++yQCMf9
J4kt71O3YLpVH3cW8pwUF4b5W2P729qpsvU24hFx2cRGDF9A1DriTk9WW3jfZnMFjp1KvVRB1UqwkeXc

GF9PFZQoysJPm36AsQPOc63xbtscrtSbqlA3WNUKEMumInRXgO50mMkWs0pnu6Cl52prY/WMPVxL9GF8

KAaPeGyaEA2+cPdAzeAqwRVKhA6S/jcCR4zktBHdG7
o0tRv+0/LOtDau8Tnol8DfU4B49kWqGh+4cYDZ7qqoiupfAxYktmg8vtA/8AyNFxoiVCvrf8wM0C7w/F

evPO5lupFfBybR5R4NyZu5ufUBI9d5C1zRuiLLnMlOfR5Hi6gCYiK6rRYLVVCdQHoc3MVolOAwW6TG37

04ZjX1pgXhREbxGPhEiUr5iduOcFFN+m7I0JI8SPbX
UfVYi+nkGDYq81b+ihGTr+jznqYvMwSV2Os2KXBYWYEyqF8wAdsAK5uwZRoP2pwP2NnHsqgjDdR6niEK

+WfKdA0xsrx7aXHFenj3Hw+BVqibE9eh1JaYVFPfZyyprAPlqcTRa51cEcr68nZ7JV10SR1vut3QxJ3d

9Hsj4zJO5PezYMR+391AH64MiiNJjoCY4X9fs0giU0
de2T6COSIeQaf99R8OB2WRcbr2w7QBpSzapuk+AGnLcaKa7Bmn4sqxjLx6swQi81uEaJN+6uM+ZijH0t

b2d/DzkzPpqsoT+TTJtY0BF0jngolDZ5FS0UQPjU9nnryfN2JTP13cMuyl7D3v7xWm72OJ3lh/h9jlqd

gyts2I9vr/FCxqpmmvGJGm5Frn5Pr5yR/R1/8AXwgb
Dh8FEC1oa8WdTKOwJVjuosDkKynOAkQ7BOOwn5WgCOrsEUi2RMoxWIQdG8A8fLAsOSIvAQ1DOYGwBQ9V

HNGupdSwEwZlMPCQDjGpKUEbXxJuu6CxI6BqLZTvNABPJHxhGUYxA89lBMcuJs19ODemEYKyMcMjMKs8

Rc0UTuUvJAHuD94tsHGxrOIqBXXfBOGPUJovCNGqL3
zhg5PrPCl4SW8NKM4WibZpv6QdlbOyN6ekH8HBDW6ZYTGhU3NSK5HrX8kaDmOyg3IkK8tyLzPrv0XuWe

8YKyByDr2ZVaPbYL9cau0KHEPzZNTtXoiDKrKaPTsbXifwcRTqIT4WeCP1RSXvZ85sRPGmQVNjD1XcZT

E0MzM+Kd8IBNLwwBUhCT3DJyzU1Bygj+R6MK9mKN+D
CafL0FAvT4ggOdlEXFSausBoUtb6czyFTEUtSHXwafOyR0ZZzXvBbItb2MQGa48ksD2unsP9ffzHDM1/

kxZdDUKwfvu0MLEddKVtsgOM2Xdvfzp5lTW0vd28ihKkwV6FtWbj6bXjHuyBz+C3dq9Y/O5wyKY8+yYb

T+YDJSce7z88M4G/vwiwVs72/t7htIr4nUPHoGfagZ
mTv7jTlmK8DNP0NfOmpMJWMXPpgejZcGPEqerV6iBg4h5z/QEQIQpKmUttcmGswjRibMyxb/i0j8JTrT

HAu14pKNAf39w3RkxBajEwoz6JyT/e95Z5piUkO9+uH4fMx/VdrTU++jQ+7tW+sewp2IgMONoTWzjP7R

+dqwLHO5el2Yyfzx0pi/Fq61mNxtqis12zr60YAq92
f+uMBhhU373/Xx+9DiCMahitsIdMs4kW4khLxB6dC/2qzNAut0JdJMfI7dRV4G7VpMEuSaPKVRsFjEqg

wA7ci8+K0DmKYZ7lHQ3pbCWMlz0Y5aMYjnddoOECZrOuHJMI8nAr9xCUcv+pKH1EMEfwOkuXWzunMOT4

+R47zPBdvtXv2IgDrXKNJETYD3BbvEwXGeSB7IqNmK
PiXnT6ldfIr1qJzUSr4gnF/L/c1ENnQFfSrZioXUtLlNM2iSAVLEYCB+ZmtngsBpsyX+0qGa/y3HEfw7

wkWB+wDL+hu0uBK9CtGDosFOY1iva8GSGuLHynQUQoK+BgM44aSuVIpYpnTwZlxRKor6dSJyz5DFYgfC

lGCVMKSYV/eo7mI2jawFVRR5GDFt9VO81OHbpX2zwt
bqe3HW3jz0XSvEMAp4UFIg0Ft0xtrLkk8pfGDn1d7MNcmhbBq7H5S2Rrj59PRXK+xUQj9AD88UkSYl+q

X5EFU98UPHmYNH/dYiXCMcGEB0+Sx9D0S4Gce7IWaENydSZFH68pBcRzFAHnc5UdS8+pHEfIgiQoYhZS

rNJbkjCj0YkepgP2QSi30tzXgyM5ptgTUOrsLnIDgN
dBhUDrVs6o2bM6HTWRuDPSi9wdrlKFFTI5C7MWp6+acZf2mKhuzX/LVlp1VpfDXN/pGW/gVML0B8EstU

x0tcRY+KgaQdCIXR0MD6vCY3MoGz3mqjsPBgc7fGOL9fZWOaChZ84AUx7y6s/vEAXnFKzc7iOsofO9Lg

Pwb73f51D07B7RSoYzgooMmkfS5u1F0TwdaUnJN2bc
f0DUtGTfolbwfVSpZSB+WSNx5/P7h3rkSx1X83b1kOhuLQLtxSokLif6qzm6HFwL6VeWHsaY5hlf0/Wt

SGIv0HyjT0Cq0c+q9B5HN0TvwIJawoySOflEUK6GTeyLFuAFfLm8bcTPjcdEBcg61q7VqFm7Y6TttZfW

p0MypSSPHYjxEuTxwFzUydeKz+5EOGSKcOMJOC5Asq
xyVrxPN8uTrXuCKn90lfE3hkiEe74EbpzW8kGFGyIwKYKRKXdWsF2UcOzJMkDnMURLQgnBdpCZt1fFdw

ENeIIeANUd4gjzomRwPwu4zFwoDxUWrEzRyhUKttpbShWRpXMYqVPqusaEJPJGgWVzm6PNOAJfQg9k01

uSyInrJ6aXYot1YLiFJuwTZo0XNiO2qYYiiVMP47MM
b2hNNkiPOEtibrf3XadgdzXYG621pUycraGgnURMM1d6WefKO9mJv9tZXMGBoml1crODEiXVzsNRn6ZU

qIy/D4CVBQswxjiFbxByD22qu2cEgfQ5aM2twUIyTWHW3aT+aOMlC6G9v9hurh2n47JagxFVqF2soKZj

xu20BL1dioUzuYe0JPf0+tSRTD+Wqdj5YFQT48/Btf
83JdpiuAcVGE7N3S8Y/+Biueu/4e4+AASMN3xcv/h5g3AaavuHJjDFit+dfb6d0vm7/wA2D5QeNPdzvz

RauJEgCQ5ANrCyVB8cpo4ZJKydLMQxFjyKKlQfLThCLpWlPLGfDTxrIJ8UWRbrZXdpNWFyE6KycyPyyF

BoNTLuDp0IXFBxKJ7VQMMoyMMvDWZmDkJxZKDNWpBa
DWFdZLIymIOGa9vgDmCrOMX0SSIvQwvqBB0DJLKqCB5Bd121RK72lbUiTcKwPPTIShNeXKPhD3KblWZt

OKgvW5JuQ1RsQU8btETsVX7mpXJqJ6HsD2NbsvskNXDJNaXfWVNpGTlvJTGyAtFzUPieXTZ+Qd9LGKY+

So8TXB3hz5EqCIohOMRbFK9ail7FIBU5ZB7WaRa2VA
WbZ9HmWYQtNUFyk7AkNS6WBT0ujQehMFUwYq9+KWrjQVM1xyBnfQ0LFFAcW0zq6cWQnz+w/0MFCZpp3p

XOKPRHxht1B0lAaCTGuinDug8csaXHxfS5940wACvYSKA6SLLMxe8Rj2KJj2Ke+T5dAoD++QTOWAAhBM

2/2fCTTMAT4BKPQIiR8I2s3xG6lIv7PQY4WqNBWJ3/
HwzrmicLMQV/DCbF6s/BZLMSg/b8iqn0Gjh1MS6a5c3/oWM7Fxnmf2FhIoNBExkYfzNhQLC/Br+YgMEr

DNAPszhLwmWZA4e0HNmeKhf1UiBAJgSB10/A5C+YGQKkkWcil8CwUxMgjszs45e2E+mCrTFF9SEJDeDV

W2/8JuN4VQnajqbxJYj/x9lkiJFumWbd2V2Nk/3aof
FBOgvM0yps9+QNZBikRWU1SJy5vVsjnIJ1wHsuDK0pF+o3Omc7GtnnaH7RbXEd8g2uehN06o/7vmT9Uv

W2f6/qe6gVE2pkwnKaQkikC/NwYkFz910wr3b7zw11xVQ2ROfsP+CDQlPIkeYAslsvmGHtGf032E3PLZ

FJ31JfIbvLYbTgYX3ndXHguHzubWi4no8OGlJLC9JX
Ou2bNv53TmR0SxabwsJf9bJIOhKFCBXXEhRMkcAtPr3fdW4uk2TQtM4QffHdjdPXYrvqOpuKDol2IFXF

q8WeQFKFWW/UM7brfh3irMk8jU+/kjQCFW2u5KYevnqlrxmjoFbDTgPXBVIUVCt+Tv4krhLL6XcgL1Ch

xdVty5dvQYJf3X2MKv4xw9irgHW1eBg9jC0JisioIx
4Bbiuvuwozoub+tTQBjMEZGCqQ1jydb6f6GAUQKSm2aOFVl9lJuLi1ixxwKCKyATfD59BGR2bhztLPRT

1mJE5jcu4gdz0kiy6f4Bv33rg04lzdrb6JcwBmfavAA3dt7HlLRMHcPKTZsQOlLdzcyMXAGbT+DLzlVb

Ei1YDeGgxZashiy74yjzPNODqV65nxyKW0cKHR0adq
I/J2J4HVnc0sZSqaH5YjmzthUJbaOtDj+MaCsj5W+tDaBytSBeikg3HMotSC5GJ9KQaxq7vx6u+nikrp

IvyAZi26NWEpFK2sNPIgCHIwBsqkKkq6Arx53zauhXq+SuLU/5evAUw4rZ1jtOz1TZmzWOwvmF7W5Bv/

BDCIHhXZ/1WL9FoyDppqfbhhCIpeGRlHnQv+tZEK6Y
plrY1DsiN7UeaFaqcZJ88DoGF14GH5Elfxz+ub3iRkVP+b45EJMHWoJV9kquxvAdBwZ+EmB3TeWC1Q5s

fv/DGOX3bcn4W7BhkaP2HoYB+0cjafhr6Ar5c/gKx2M1NOuNFvpDQqnMGoV5QX1mOsxxMfGG3KeowdlH

+8oxknyu8/87Y5Cgr4WMoWAiuXwsvIVsLK+quE25vr
yXlDWfs93RhyIMhNldrbYqY2SNLezr+iXG5ok5SSiGugouHW22LFCLZOQPAonakVlYLzQYUbxo195SBC

MBZhA3UC+BpPff/Yk7mvI9mPqHD5buTNGHNJhPij6Twq1zH6vkT7m7bVDUb1yGc1HydTj42a+2da4XD2

qxlmTbj20XHH4PvyfinIDP79Zhisiqjsi23R623iAH
5D/1ODnBDtGcdcUL7cpkOyOeikEQQf2S/tRihU8/y9dmj9M2sjVVIwvymzXX/sKX4AyVsXqltMnGGPAA

079ubZpkorcJ/WS2KXUFj+Iev8JWldGFBw7d2ZFPEJzndkg7qg+lYEat17zFcvDDkN+oLgKzZU15eqUi

b9hug2OqWsnfKc7nWwOm9nVCnWTVH6pewQhgiialx6
z381lvdPfUgJC0I3yWZJSJ/WvRoLxSTf58fh6k6pN1x5TNNoIDiOA4DvvMLQQJ0o9HLO4EbnkgUPf5Qg

m9vI7iclfoyQieMevjp/4b1NFSKLBx6r+2lDvwK8xFAm6/VXZAGKUUKM5vUBX/khHyOkmFTaq4zgNOSC

Vc8A7ssj8ePv2gUJr+WuckGE6IUgcbJYq0Zg7MO80/
VHQFe/AIl2L/f+Fi2mRMaPKvYWXDqcXYfW3nRVsp+qu96E6HV5No7CBK3ev2BbAFPbFZexzsOwVjfVRz

H3ORDsh4SfFHfqCOx7ALloATMoV9O2tIAtHETrFE1IGVCfBT4BXPYbdlNoHfOgPUFFSwDzVMSqQnQxd4

PbJ7TsROOlLFWFFEpqAFLqQ04rYKqiNt78WTwbOZMx
LfYkJQe0Mw2KLzYlQARnD44tfTHdnLCsTGobVOTWGGweQTYvP7wlm4AmIPj7FE3KDH0GotTvw8OuclHu

W8qjO2IUBA9QCTVtD9MMB0SfF6mpRsOcz9ZwF1ioGwWhu1SoTc8CGzFsIa1WZwZmJO7txp7MXySgVDTp

VoxHHjIfOUclGflmoQRsZV4NtRM1LDApV14dEKXeNM
OkQ2QaXWZ6PDR+Em5NRVIbqMBnJK9MUwzG8Ndxl1m5YB4naC/ZpuHk2Af9fdBDifShpIm4cJhOh8pR7u

EpngmQveVZkvPzoh46wxdF9TBvbX5c4HHAPwBzPhBs4pjIKjx+/IEZoOFN1v4d7enfbfHw6MXK2D1ic9

51b6hqjKte9EOdp0aj0sAgt9VDda0hY4o7+jOd1Ld/
oTRQi2I0wejA31Uq5iE2vuftk/91Xhbzsnn+HI1+MDlEXIQYVdN2aTU/Jn3AKAeYI86bKMrZD9E4lj3U

WnAR9VveQujfkM1aV5TFkpZjZXLiyLCfFGse7AQgJhpIYiv0oZDCEYOdUGWWeVHAAkaIhyn2idZL7ekS

hLjTEOizFXE+JLmjyJAW0oJy0I2+CNWEuBeHBq/CjV
eS3Zraif4N98D1x6KlQqNJ1al7PJ4wlVXwR98g3SAonpuofcrWm934nPZcZeogrYwHx7AjZgSCZgbg8d

qsZRJ38t6OhQ46FhC548j4/YzwnZ9v4UrS2SOI61Y195WziA6jEefDyyLnG4cg4+UGawMAGocG7YxxCl

ZOVqIB5HGW1ZfgGlf95rrqgJC+h5OgmSi0RJDqy8dH
mAwcLOJCA71hdazXyBvvGyDHLAFgmXA3yy1PWoj6KZzIbmmRj5N6k9V+B7z8L3iW9QH3uyqrpEAwwKA8

p2YBNVHNFa2DqAV7In+Pf8pJsncYk2oHNhU5MC5OEQ2WG0/yxDAlJj+8FNxAIWHKZ31otHr/J103IbKw

LCHRRmbokRWiSJ0Ar+D7U52SrTAXe5Kxm+J0oz1K6X
rOhYccANf5K0/pjDdBvZWQbBWxrNPaO2WaRFnPAuid4SfgdmZ8rok9A5ANqfoTVP0qfvvmYNXXzKD3uF

iP69cF2RAHsd0y+NTjJxzvOhPhhy/7A3iWyK1vTkuXq9h8hDXT/ILvxDmZBBVoOxi8hqVlCzL15lSIRe

4Ea6wmFiK5UxBE+bMBPurj9Ull2Pn1AqxL9LOyHlgS
RsMSFsc5waOgZ8dNkWI2MzA8TuG0ECuLtm3AMdIWRw84GSLa9UPucXdqAr9Oq+l9Oh6rFc+1nCRn+qf3

0dkq1+PNryJcqW4LL0Cib6th3IjUpImJPRpvLHs1O+tRzJYIx/S77TQ4Tz1ayicy0htJidXn6RFvznxm

jukGXRXrBXjKm+ZPqze3sAbvlAXMVbcPJkiVK6fEuD
FOAhLWyPMLT58zMMdg5tXBXW11zy6GlPHJ656MKqj7Md4hWPjre8TQs35xb8e7tNzINcpehgZHugHCZK

0FqZMrbbRbrcm2JxBdUWByMaPInzWo/XlGD85FF3JJ0iCChKln9TMyKpPjRUVY8OOzl2+bOfwCZOuA8S

CrVdoiRLKhdlATZ2yCiSuCbVLDeszTzAVyjWGIpphN
45XS4rSlXGjMPyt9gvIUh/gcnHePLHaRL8/EQi59CuPSz4IM1XCeqDJEOYb7hpP1Rd+xPs8xas5K/x4E

9ghppmtVRInl7lafdlvU0Gc3Rh5cvewg5GulTu5/1uGbfDuDJyhTJsqWZy+l6Jqs7E4HfAmxD5ynStSv

kgBBuey6LfESrWNUGdd6Y15YftP1ngmuqh52SK6XJh
q9VBTOvQFod254SmFo8ejc3sC4o+qdHmu54p6snuyXF2cYszcHizKtZaTMZ0uN9j57HzXaMQ9676yrsQ

s+4A43wCFxvsHMIF8RsrvBR2bAXX0mzkSvRCgVehxEyUBnzBOFH0r1MSQXJR8wnxNRRuJpi31bq8KupK

G1rUIDhcwe+AtLmShF8G0O8zt3grP8xxN4VlP5UfLt
SUtiC1p3n5fGQkmui58Ugrk5nFE1z1jTwueY0RRA0V2VoO164njAVNOw36ACx3eG7UJaCcZtHBqTYy+Q

AGfCOXU3Yzopa8QQiuE9iajj0qLFPbukZuI0ipJTJwiKGHiilqYV3JUXqNPDQsGczZxLKN5m/ecoNX8j

w9UJ0Q5PQHS+kzC8awdJs+Pzk4s4hkvf5Hk4AK6Piv
OEjnxXzgtLwCAYf2wIOMXNst16CRAqKUNJy8S1ZpO0+FPdXf3n8d7cAJFyFKNnF56HFzaLKSErRG4EE5

/HItEZ1wvc8TK0k5/wOkGTi7zrclCnJxEUn/3V/UnOtqRVZbw1lGMb51I34UJ8Krj+Y4ratWDDCmAmYD

V5drWjwR5LFH8nh9ZfBDszMCKtGG2lnu8RBEV4QK4W
BTSdCQ7BzQRwI2MvE4XAWsOuUCIlEFRmVD94DUMnGDIDSPtmKZUdO3Oyp939wsKeucQyQXYhYw9FFSLb

HJ4YWNSuRYGuuWEnXKPlSCRxIzC8KJInTEbeGHDsQ8RyluYbduPfRFDtOWBcOy8LVSCpVF9Qck25cOF3

DXOfBwXtWXAtloPhHRBswyX2NM8HIvTsYOZ7tHHhUd
sYRK2FBVPioCUaAA6XGNVkyHNfWA8+DQogID4+CScehdPsCbtQZeQpRDTyn2FrKWkiQZe5J7DggNRmxw

CrElpjaJGCKJKyAZElD6cfyhh5bNKlBem6Ch9XNmJho0HtXHUyXOjYzy9nwZ/bNhD+PmD/hD6RaRVt19

HBooWQqeECq7BGBF9Aqx0FCoyWC6sDaQpo/p7xifn7
c9G4SaQjFz1MFU/Ljm2l086nL9v5+TtpAsNBKzz5nnPlI7dN2BmwaJ2Y4Akq20iygKt/2F00k/PqrCE7

G5JkqljRAcmc+X0wml//MRjdXNeDo+xmQzflrXs5SXfxAl0aMzbzVB+ePSPPX+qV9KsHBWsq9yooeaq/

QjMVv6STzPFUyBqiOSqDCokTIzCJtZQUr9S14qIbDe
qCXJSAqAofxBIBtyI6vZw22sXwRmiesWKvipcbx8izjr0tehqWviWlW9b4/fe8TBxTHb43Vd25XH5fAu

NBkRTWwPwJKcK3P+thegsVECdILLisIAr16JltSDRk+abwyEkMqFyFszeJpbfyZBbDUAqTxxoeV9LhYX

1hSCzNFEdama5fjVcA9CJYg0Vnz16V9KcYkJVo3/n4
2Lb3R+R4k+lDHiAa2QBW1FYaJk6enA5/h3bVT6HghPgzVHlebXyUfyv/32XpY1DYrtVlikh8rge7VDiq

rKsVhOXY0oS1XzF2ydTdGUFoP9UbxtKzxtkZ2WHfUgotqT+gWm5wX53k7LrykXqkIj+SbL0YlCFkSMpx

8thZWup3+B879C6fwpYZjtiynvbxyoIa9RhOuxx2js
S2azkZ2Hh0wDe0lUXZNSxZSZHIHWHxgwDYWiTwbH6Vy3tW/SkqCt5DLQ98Pt0qQxxqPtaI4EbnzTtWyq

VTZoafz5ARnQvK31vfs5njLtFAKF5GEQp9sLdeze3FZbohA0NHkfXDtlbjATtLpK0M37y8CV3SYB3kVk

HsOrV1rxFSR1yZ17Wph79qqRkj6tMSePVaAZwqKiVc
oBnm58jiypCtjyGmwKXxyuJ11E2TKxhmlcsGCMT3gAccnuyhWTUlactZZ2xNwFGRXrgpkOPPDpRr8ay7

ABgFrGV8cnNvG/+oUtiqozcT+vsRwJrg1QdwW4my/NNekuvw/DBu0iyzUhUoF1DNdnsOvRALgMAzP6Ce

hXTbEkv8QEDoKn77ArUjjnebnOig4YfLdFmuct6ior
g9nKQXOQ0fn189KLeiqpk/x2VZU0mL8jDdoh23zOUT71e5MUxixwv0r+DF1TlZE7OyXXKjcc0NaKNsD4

d9yzWKnHhMh450WDEsKC345HiTUW/pM7PBPP6/FbMf6xNx3ijIXAK7JjGCrwvbJOrJrTo9oyQ1HRAz1u

JTyC3iJcWjuXV1V491jcJbdh+qmc2iH+avpM5LfPcK
urdgcMfgAtd8PgIuFKnxAM5/c+i3aVJFY+wTPDZ0I5jIwKmyCtgkQfZpwBF9GCg/nLrWCGqBQFeusyg7

R+P187VJQXc23/vIKTdxqtAXn9wZnHDgLFBPDgQ0fJCnC5L1xxL7W2fu7NTpBaxqiNbgfqzmV6OSdnxs

saKYbl7f6TsBYGE8libvG5fkhkfvlpMqiGOUAeO2U5
mg0M/qfTVdbRxy/39Uhzde552iHSye9leLa5sDisbKgfaefID9/1RPMRJ0Tt3glkJtOthGNYwxNkNgxn

Ua3EELdHyttysjKWYb9l44YTjuBjb1c7euSWTtk6Otdv0Aj7A1yoRS9qTvYQM1spjCPHRqLKefmRPCKw

131TQHrDhZQIkDBRaf5vsNw5fFLvqaC4su2TSbag6q
nVvlqCb3V9pKLLFUBfTCjTkRBOZrl7AzT7F+++CFcAbNHfzc6JJrOAumRmpcc6ffsII7xlKtFEledtt2

kqoZe3MKj3aMfRcPkJbIecX5ZKO4/EUJUpvwgRAsn1llp4O6oXj9IwQo14lcXNIK3V2GJBX3Kzt7jV72

Fz/2nV72V/6a14NTZ1z9DgANb4wQID8mbTzaxbb9gZ
YPKVAnPcHni6r/+LXNumWmPrzDqsBgDtAkIa5X0aujl+L6ndMF5Mi4baBDvH1DARVGoINsqC0MkXesHx

alENJGvlK5OqKJNYYJHVmNACex9UO35IyZ9NNbVZJ2wYsf+GB00p+0H8B3WAH3xJYik+QRRVJddsSMQx

jX8As8ipZ0AoQy7ZifhqFA96+pOb4sK8NyimXKbHH/
r8qR4kxBGzz0THO56AQdY01i3sFsOA0v6Pzuf4b23++kZQrSCnqPGeoWSGnvfVVBLgXW3A+nFVPGGaGd

cnIRvlfX7LILiNo6/4PDJ59MJRJ9gmMkV62T6zDOHR8aZVjxvHrPr8f6odXGR2NpCoPEKLUdRUsm+AeE

lDG70KyCHweFc+sLCgoqLhxXLX46iui/R7pATmxjNI
lTNFKcojBSJBCLxIxs2DrxLSTLm7zt/8V3rPCLJhlkUP/lE3TWUn2XPBGQlJXPz4mV6wPzx1t/AbSvMU

nVInSeFTF2jcGgqI0IQX4vh0OyDLoyYeKdJG9qun8EAQD4BN5GJILbBQ4XtXOyD3WiS1OZHkJmTGSkHU

IaLB22WHSjWBXFOQjpJMWhA7Ahj483gcVkzyHmIMIw
Rl8SYWCgWM9JIWDuMKForJNhRLJxOOAfGsN3WSCrIAkhAQObU7QrkfVfzdXiTZGpMUCsWq4ITZFwJP3N

hj56gTW4AAKpUkMvELHlbnViXBVnyoU0QO6ZFrGsWMH8pBQwHrzNJA5VLDJcvZRjMW9SUMLwdKWgAQ3+

DQogID4+OAirrmVdEusPHpJ5HMEzn9YnZYtfGQj0X1
XyaWIdbaCySgkaoHAFXIYzQHZdP6dtgcm9aWCnZXp0Do2NZkEys3MfJUFtZOgDez5iVAAtQyT+T1X+wz

alKXO4BkaPj6Cuf2Pwsxgf4meCmUEOYQvSHwr6XIzBjl/ZZ0zDjmHH64KCV8d0i7+kv9rQHDB++apW4t

BxHHX73/1MwuuxkxJ553/xVDkp4Y58+MVMZvkqwL5y
i9HNyFM6lqTqPfjYd+AlgC9k8ZwJkor0BviLR8twXUlhLMrr48knjq16+11g4YWDHh8+Ve/WVqI85kLA

XDLPf/Edx7tb7ITnquCgiYY/MBfRSRMH/vdxnJRA5AUlaxn/yDY1WjZIsOj7Qww6ZPxYxLPVsuRk42GD

SwlUb6Wj1kRO6TOentQNWgXgl2lUVwYKwAVI3ykDPB
r2kfMQSFQM06bp3ACQ6gBphwsqhEyU9ZZHTxjrpIz3uDzRU2H7GgSsDqNJ+pUOiv5WwYhZH3392JZWar

hHx246gOwHPmTVxVq+XnRSLB2ZY5RWmngGbSNkCFtpPD9s09FLQwsy+rK6TEw3rg5A+cc61CYpwthK5/

pvupGJehnrSae8axVkuKC22ock+ZyyzfMiOZvZgf2v
BxK2eGBkaw4xRIefVMZIlaFvZZb8EDBCqxoG0xBQBUVpJPQUIqAwPRn5aHCWa2YxEUewVWOrpVkmLr1t

tl9nsfDitqwhe9TvbOzISnmcIUxUnJ2ONtf/nDLCnL7bHpqECTiWr4P2br0snmUBfDCxP/IYzgUhhuAO

u9tXLJMzIgZprfPKfYQ40DwdNDd/f3fbcWAiYdpG9i
bk8awccc+GUyqTvyWblQ3JpkipVF95LCFDYvO3l6JjPXwdpZ3zMqukGJYlwvlLiuxOeGhzWIK4KkQsi+

ZolsDiAXDBDAFrZajLESwFplIi+qWzLrLn4q6IoGZ7dEwkoL4VjMJT7puAjNbQanVPpJEqB/iul7qY6q

D0N7X1oAZTCOtlenGA745m8/Bd03I66gTmxAfwDTN6
9vAFQyVtPq7CM/pVkLZIQT7ZSTCygdQHN65C6i7Z3EynBWIluPjL2SFomqtImC9mwMlc1mAYswBLxHFt

UtZG2Cbjo4fdrCt30S7ojyzJQVx7ZH1PM11i1kuc0js+/1Rfi1Ou/81ktSn9m0jsqICmCYntgRY3Exkf

QmP5wmlX9rV08puBydcKXQWdUmJfIrrLesvJjI3hak
F7L1iGzhe3WR3CyPvhLanDposB2g8swl+aNKLBt00lDo4nY+E3CBWvDrlSnDTrY+zi5rcyzfv1BwgyTQ

qbAwgtnSgkHjGy/Ok8TCRmO0tfiPguVNYl7AvQJI1AqXk+bUreCOkeS74+7JVCihvcnfP5LxQPW77hwE

CN7YFbKR3gkg/IdjCWPvVLES8q/9XmaGE2sEaieYXp
qYdN7MXX930uBRc04WydwvoQ6W6iCP4vJYqnjmdd9OcAonIThQcSECFmpHccDIuT34iA3xOx15A/DrpB

XlgQ4z1ENvX+ImeATCSZQrUB5olckMyEg8Bb9VEv4Uful8ImKH6VPXVNXvVQE7EofZ1hTIh2r2jk2x7b

m+On1NJdHY/bDfATAZegUn139w9tgv7DA4MSYSZEbm
nd3my3+n2f4H1Z7Hol706+w508Pf3GgfAX5d7I3dbH5hqbm9w4kWhtXE4nx23LWm1lh7qTK3xEaB08lJ

2/DEtQl29VInRSmIHfi1Qh2ooW+7Qwwruh1oj6A8u6T+F9a69hkzy/1BZ8T3w0tZNmEF4bjGT9lyveLb

/33WKKotdBjLntBZIazyJ0P7WZB628eGFXYqF1d2cF
QTpbJ4zUHhgo1gDv1bRQ7MBVncl/hnmiKRDSsA0nJuuC9Xk7uWHiTaI2Zk6nMRUoI0UPJXgBzwB6iUiJ

thAxQXAPZ9BDXOrBK9mthVGzQDfClglaTAXnCwS0c99999wKbDd2/F0jFJFBwgT0XR0vYrmmQ3fFu70J

Mxo8PshpQa2zEkeHR9GOnsslRvEOLmHWT5IyQUr3Fh
5TPHwlCXhXtpBvS8uNvwwQFBS+gmKXskZ6T9eBP0iYWOSYVCSkCUdMo4Nl7pBJFZeou60xW5QssC3erN

NAXKmlE8TcHTDdXLL1BtD3A50Frzh1WGJXH4HrWm3MVcbOdg6qzPVFtU63GqJvoWQZD809Sn7XJWM9tD

/Sw30VupmUyph9QbK+7nyTS99Ku8z9YqjvXz6tniYC
KuRdUUCn6PBbumvBh2Ci8Sni/Op+iJjDqc6CCp0du3ayzSl1jLEy6I4Q1hsf+Cp32kcLVP/3mf6ODKXi

0S6hND05ukrJT0E8ZlBOAb/F+yuaEIAO/OQNr2uqFz4Z79waInSJmJK0d2Z3tKLWI4TPCNluum8GK2Ex

FBwgNaALnO5kRlolAxyTRIn4bbJmG4alDdkagujMqP
AH+OBtVJsnYPRUJ7+Egg+65XTgta2dMTYKGDT9nZNSFyzQ8vxUG0ty/ZpJzBg0GMYqujZeRN+1yOEt3E

xhpgn6bu6acbpD3P1ra22QRWKyx8UQJq+nZY00QBfSmu57Kn1AokIXftyWy6CF1NcOxt4YwU1q254Jxz

bFCRjtgtGstAUoZD5PUfOdSN8sqi5KZqDoCOWwKnqM
BfLkDSrQQlLpVBNoREkuWD7ASVkgBJtxKCIiH8BpshHgrRTzCIUtQb0OIGLjRL9LYBYqhNYtNCScPhFo

DPZDInKtVEUlLVVvmUVFr9rzDvBdMSM3DGYnZojhSH4IEHGnZR4Fz534TK78pnHmVPGlDCNGNzGsFWWw

U1PsfIJtUKmoM0LcB3FaWY2laNKwFT8xoYFhV4KsR0
RldmljZVJHQiAvSSBmYWxzZSAvSyBmYWxzZSA+Dc2HMPO+Iu8XJH6ve5VhDXhkObXhME0gts5TSCInZG

f9ASG2EYGuEzo5TMAcNsG5DeByOKP5HFJ5IhQ4UVzyRsLzLIAtMECcYfHsOpFfCQU0BKL9VRLhPin0IZ

JjHjPzHmaiHgp1RNB3UaT6VBNlDSN5QVH5AlJ5AWNr
CRA6QFE4JaA5YYQgXKL8SSV7PwJlObBvHcAzRFV7EHMAVjFjHGr7QHJ0XFK9PSHcGMv5EYqyEnA7DkOb

GiItPBblIkE9LsbgBdPmNQa8MUT2ScZjSqp6SKR8DyL3XoSxUfOhVOzvLaC2IdHlMml7MYF0LdG3Hiyd

MaQgQGY4KqL7RFNuLyZvJKR3QzP5QELjCaW3ZHR4Ca
I0YWQvGLcaJJEkHmRzUgdaOiNtEGQ6WOF2OTYpFdSbQSN9QnQ8WSXxJJR7SGRzQBL3WIDvMkMgPVIxDQ

F6TXFpLbq4FCW6MQQ4IJWgCbn3FCj8UUP5LYHyIyKlZUXuKTW6MYFnVce6OVG4JtJsHsJdEyXjEKB5Ut

G6IvfdHQE2GC3QJZN3HVXvPXSwDEO8SPMpORBjZta9
BSP9UKM7ZVBlYgQqSAv5KOD3HQGhMqPaOMz2IIM0VCLuDjPzZPj1GSZ1XTIsNeDlQSb0ERL8WLXsQyXf

HFw4WUZ2GVTnZaQsMJn6CZB8TMJjDqXpVAq4EKE7TQBsDvZmPVq1OPQEDbVoCpEeALz6REU9ADDyKlNl

QLc7WMP6TPZiPqZsTVh2WLZ2VTYjQww0XKWyHfJ4ZK
YjTFQ9LFA6GmN1CAMfKutjCXF8XaExDuNzFzD4SHZ6BDZ9WFAzGWp8KYZnIaT8PzvjXBUfUHTuOLP3QY

ueKrNyTWQiQxXaJiBmUQjhKWU5JzV0SCOiWtHdCOFuNhSzAcYvXzG0VFW1JvH2VaYiDZF8VNzeQCY8KM

HyYlQwVIukQgL7GsCbUpElORxhGnA7QsScPAScLZJ4
UvFrBsL8SYW5IbU8JhkoWcZ5LQB9HESaJcqqEir3UQI7MZQ6TgRcZaSwZTm6YXAMVnSnHpq8CWy7JJD9

RjwxKgy9GVQ7BEL8BhoxOiXmVVqtHjN7AoSpMdAhUUM2CpT8IxbbItQyQPB5RuQ1TEAkOGV9HTH5ArP6

RMEmSEN9ELj5GSX4UQUxBHR7NHG7IkY3VAUdWMQ4UG
S9IVFgNqiiLqf5KDN8UHJ5JHGzEFofRYAqDYD7CMBiWkXvZOIhLDP0TBLmKwCwSEP2RVE5FOItRgCbKK

UyUCE2FOEbOgIhLZT0InA6AJZiERL2ZO5iXHodywLxKhnRVlZ5IDTgl8IaVPdxGJw3KQpkDZUsQ3M0gB

VnNv1dxKDaz7FtdKR4g5YBKvVeXVXuMt5fdP9vyUFp
UJXsBTpmRq0tGS1UKAKbYE4Lp7CnbiVuVMI5J6FabEvrkHzroUX7QKFkGHQuA0FhcVRuIdPlHKxdQJHl

C0QdYIfbDHAgJWxkQOBeE1MjzuMFUy47PBmgLM9gRCJzUNTcAQT3XDGpVKywOXEgM8d1WGxhF5GeY3al

RRLnX9FtfEPgAK4SFZH+Pr1AYE8tu4QrJXjgZZEgXR
7tsk4QTWE1TS8OSUDpAF9RxRXkK8YyakClN0SgnWdyWP4AlhDsOXmfVN8PCBIgFd1xfH3SkvgyeUnBv6

rfX9CbU08byB2hB5qwafTeq6eGklTpZJdhCk2YGAOmOX0KlASohGHdKFKrCqHsSLJkdAStJQGaYeM5WJ

qhAFWnT5nvHDQaueDhAlDtSBYQNbFgOLAyVh2miKVl
d2OwuHY0k4GqNnjfAGEARDegRJ6+NGfvozQgMgkIZwC3YUOzl3UnMMfjDDkuLyDsFEu0QJCzSgttFwu8

PJM7SUP2HGPiREI9XYf5FQM7VijwHEbnIBUiPtOaKjXaGur3QGP8YTZrCidxGoCaEMC3CWEvWlutIIO2

DLB8TvL6WSYqHIX1LOM5ClI5CNZaGXC5RZW3HxX8HF
VoQZT1WRD0EIDjOtpkJTb2WY2SREW7DXQzSRb2GDG5UrQaUVA3DOT3ZkV7PzykGjRoZWmlStB0KdwsAa

BnGNv7AJW1XtBcLhx2SHMmBAG4RzgdTYV5DPmuDkL2SeOjHkn5BIS0HpB5KefyYtAnXHS8WjX7FQSbFu

RtHZJ6EpX3JSEhKyF1IOV1BiB8XDDpKJbbKBY3RSMm
RozuLjp2WMV5YIC4ZMLlHqHrYCT7HyO0FPEvSOYiJKA2GyX2RUJaWfu5RLA5VaX4PIOySuPbZXMbXoJ9

IWCgGnUoDIlqAvJ3GWTdAGG6GBF6HuD5AFNzMmMzIIAjLTMjWcmyHLP9LROiXPK5LrQmHBUtQB3RSVG7

LBHqQXKtQAUnEFZsNxWaSkH9RKS5QDY5JIFcHvQjWY
k6PPB3OWXrMxMnPJt0FEC9DCUoWiDrOBh8WWU8TXQgLiNnJUn8WTI3GQRgWmOuGBw8UUK7NEYkKwDpCN

d2YPT9WNVmKcFsMMw5LKD1LAUxOjErFGj4AVNABpHtBuXgAOp4KGY3MNOgNtVvNXs1LGX2YJAiCuWlNR

y9AJU7HIZeHge1RVOuRgK1FEPgYAM8EST9ZhA9VPCn
MnTlZLQ7OoZmEfPvBdS4FOH5YLL1VVDxTHf9XBRfJyY9EzweYCMvYEEuJEM8BAslOiWbCCCrVdCqKeKz

FDoxZCA3VmD8UsqyQsGeUADeYyCnAdZfJdY4TPR8JlI9MiGxTWB5KWagWET2RWAgIgE8WJL5QiP7Wymg

HnY0HHY0NmN2PfyzBRSzPZI4SyGzWeO7FLY0IuG8Qx
odJfK1JMX1OWVdGejxAmf8OBD8IAJ7JiDxDwVaCEq3YBPINnNnNcw3RVm9BCF0QssfUdk7FFX2HXP8Qq

qdHmRhCPhtQzP5NhIcBvCwFBG0TwR6DqibOtPaSYN8EiB0RQSiWSJ4HML7VrD5ESEvAVG2BMl4OSY5JQ

DqLHU4RZH5KcF7XTAmXBK1RZG4LFOqGumyUas2FFE4
YWB3JMHpOEoaEPX8WvN6AQZdWOJ3GWR0CkK3PTZtGTC8MWT4XPD7XVXvKBW0YFP6AeR5UZWmUGK3QBDp

APH0PCWsCBCiSN5pOJxpngNtAqqYLqQfPXJlz1VpLGooGJn2RTpdKAPpN6R0dBQeKk5bqIUwz6RxzEM6

l5IRDdSuYKDuGl1vrS6gbPLoQIBuFLzDGlFsFPAnTM
RiQW82BNysJT9XVKFEAOptfGQgGCW2L6Wro9RpcuZrPKNmId4KBIOdDH4EuFOncwZnKl4UIQKtQT1Bp5

03GtErrXLyOQYdDqMhPOInUVLrYRA3DP0GWRDrRC3JeBCrbYMHrvtqHHFrE9D8EH8GLNENQpJgQv9ISa

AhKJ2pbv0KXlFzXJIeZkbRYjShIUqGIfBfYXHjSZxw
OT2Ml643L6R8YzR3eBOvOOJ3WNZ4iHWhVwUkOFDuvgIwESNfGNllWX5tj3DvioaeU7sbUM0jfFUjL02f

zX1uULlmWJYsO1FxblY5Y0auvrLdAZ8XEHR5T4vgwxMjMVDUZdZpVNYaP0gycQlhFWY4SZVnKu0JKGZi

WT6Pj671YPUxL4DqvTEikpSjXZSnAKXVIuZpVl7BQg
QnYT2wse0KPdXqSYVuZccCXcByIcO3WLEeBExvODV9YkQcNyxwBVqpQQW3FpY4EbWpBQE1YKj7DTBsYL

PrDpG2KAL3GXY5GXUrUqJ4EMG5VLT8RIRpCjyxQGM6WiD7PzDnKJn0APZ1TFN3ObBdNQw9BFV3GQS0Gx

RyJPz7SPU0UZO3LmLaCfgmRIW1NKE4HSmbHChjMRrp
TpL4LUbgXGZ6GNr3XPA6JPSmQqKrNVUcEWV3GpnuRKBmLUBmTQK8CTLrFfN5UYNlArF2CVUgOyV5NQXl

VSG8UWjwHcnqJMa6CCG0CSFxEfL4GTK9ByR1NlGqEMk8ISgdVqV4UlucDMZcWHG6PBJnHbF9AGUlVaRW

YyUeOwV3BUWcAjRrLhhdZtN9YDClBVKsDNIuWSK0PU
OkIBL2VXNtQaE3FMQ5JIJrGCHcCqA1KVFvNrEtQPOsTns4YWW3YJKzBBIqIUU1UUHqPFW9UCGsOeLbKL

RtCKXvQAvmPUZaTUE3ASE2DCAuFMCtOFm8SbM4FNYsRWjzQSBzCQN6PpnmAaL0RREaStQnOhdyKlF2OH

AvUoA7PAXxQpA3CIC0FdG2GEDrVbM7ABM9KuB6WOEx
DnP2OIK4XmU4XZIxOgJ9FFH9OoQ6WDPlLuL4OBC4QsW2PZPfUnX5HWA6AdZ4XXLaGoN2TWC2VmN5WKNl

IhF7BV8DEHS3LDOcQdO0WSR8HpH3AFKoIrS6MOH5EqE9BNHvTlW4NKZ5UoRiNtUhGoI7ANP9KRF9BwAa

SLU7ISW5EXAlKqVpRRz1JXD3ZiA5EjKxLZRmMLK3IP
A0WFqwZXU0VSn7KNH3HUCsGuygJVxjDsP9JSIaEAD5DOZeDuJKLuThNwJgRIMtQOTwTyQsHmP6RBY6CE

K0XLAhZFpyOZj5PgF3HaLnYBj6ZRSqZYB9OZAyKgGqGTbrLeY1RBSuWiJeIOckDoV0BbKqPpP6MRDePl

K8YfXoDBChMVVmQrQzRWqtJuP0GBPlQvXrXLgsTxJ8
NGx1INH7ZBeoMLtlUYc9XTZ3ZFoyAqR2IRe1YTG6FWymOeE7WAwoJgK5TwGcEpSbQHqmHqA0QQbrYtL5

UDCbGPA0QfhjCTP3JKIbQFA1TojdFSI2QARvBBY4ZmbaHZusHUY0FGF8WFJcVSVaIKO9AZRzVFIhGci4

AGI4PQLfEPLuFCnyNA3DJWI6WUHhFvMpUGYjMQO4NP
HqOsTmSBUwSKI5HLrrFcZuKNCiPBR1IVCjKvW8QIMgWVL6ClroBcDtEBIcTFGsCE7AOJ6jj2HnTSlcMq

IkDT6zzf1LNUV2SU4BCMQrFF3TpZDgE3BkanFTPGCbygzcdK2qSSlzFCNcH6SbsfSBCP3oK9DrR73zAZ

deUi8yFP6WWPTfUO2Et0FhcbCrTXO7TX2MXJJJXSwp
zSYhJKV2AR1OGUGsZQ51AN0oZMBGPqDaVCAfPduoA1NoMwBKMwCiVTZaOj0vtHNQz8vlToIcMED0FZFh

ZLE3FHKaHlucAJynDLPfI9o5PAohW2GgE9arOHXmJ4YccIZeOA0QJKD+Rm7JEA4dq8TeAUydCSKwJX2o

ir9ZMHA6FX5UTKUqIR8FaXRrY0WvtzRzM8DemUwjLY
6QbkNgCOxvVU4WNWOwNz6nrO1YNTzyWRLMLCDxjQEvDY7vg4HavvbjV4ecJZ9gxWYcF25tgZ0aTQcuRF

DeG2QbyjB1U4gynnFaSH0RIRN4U6cilyHiIEBEGaOwSDItG6yifGunMNYlADHgFq2ICMXbBC1Fl146QN

MeC5TaxBPuuoTfLaCnOMCPAiEjEj5JZcZyJF3zrb0H
IoPiXYYgSwjLFuFmMuH6QZZrLUArKUK8AMDhBXEdIlgkEMZ0EOU1ModkBZI5YNbtUNZwIzHrGnXgVBWk

KxM9MVndTbv5KFQaQlV2QQVxLyF0DYD8NHY7QmhxNQI2TPXqAPB6UvvmQBJ3AGTaTQJ8FrqtYSX1NZGl

DCF4RwnaEzO2JVFkJyQ7FQYtGSdtHPI8FPW3YWTnYB
H6KJz9BBN8BBOwNFnkJAUjZIA0ZSIrPXP0GZM4PTI7TRAtVpDfGRC5TjGsLQrwUZcgXAF0MVX5RWspVk

T6CPk0WRA9KiVxOeY6ODG5ADA1NsFeHUJ9POL5JfJ2LdZyPXR8UVL9DJN3RQNyQPetME4IAYYcYPSuZh

q1GOBfUtI7CKUzUYI5WIZ1ZIT3PWnuUHo9UQY9DwF9
WWugDYCpOLAqFnV6AUexRSX2XXV4FqVmOWCuVVb6OXH2OcO2DfQhUXH7LKG8UgI8FLscCFm6EIT6SHJ8

TzGwTiY8IBZ1TgC2OlipViTsSIU1BMYKMiLeCCv3VNR7YgSkAAQzPkB4OEVnMqK0BRKkRiDxLWG5XxX3

OQNhMvZ8GWB6OcP2NOIyIqR9XPS2CnG0CVYoHmC5CN
H7KcI4WZCrIuQ4QMO6ShA1OTPnIbG3FEZ8MzO6TTPxUrZ4EGV7FeB4FXVwMrF4VJN5KyZ6NWPnUDvfBS

K7CdT6HMCzZgB8RJO5FcI9OOKzOaC5SJF5JmW5SLCmJyI7YIT4NLWpEUPgOGQ1NQYhELL8GTHdMOD7BR

RqMEG5FXcyNNZ1VCo9BAWiBONsFHH0FQE7MMB8QXTn
FEhrSJFlBTCbBkgqNxt0KPG4DgIaSjLsGdooKL8KEBB0SyhlUYH3GTEbAbCeXEEzGzEsULJuDSB7XFRe

VFP2JDrxFUX9UVAbDQZ1XPA0DGW9UKUdGwM1ZWM8AHX7HSPkArH3RIi0DLT5BUmkERS9UAPlDeL6SZEw

NZU2AER1ApUdEjVpSoL6SEV7RkR5BFBkBtX2PJn1XW
SMFzOlQyB0MPv3VJL4BGGvMnN7HXO5MTH4MDJkIll5MOC8UdU9TCfaSpj4XES0UjK9DfSyPCM2URMvAF

D9JLmrLCW7SGTxJQN5KAwuZZB3PHjuSaU3PbWwVAOdFACmAjE9ZmLnZSDwJAF0MmAyRJDiZcPpIPG4Fm

L1JSqrUUwuSWR3UjQ3WHAxTTn7YLB2UxK8PDVdIWh7
KRN5KWN9WANvXBM8MFB0PnP3VmIzRMK3PRC9DEX4UJhsUVc8VJ4jZOjtcuLfHnwHEqD3TZVve5CsUGzl

FQw3CIymJHTzU8O6lZGaLl3pdYYpv5HmmNS2s7TWKfHeUFNzYc4moC8ylZIoZ7Odd3CSNJ7APWXeQG5P

v5ZnwzCmRID2YN3TVIEBUVjumVTjAKU7BA2RYSQyMN
30MM5oEGBSZmCbJCXyNoqzC4FeDfCFFcDhVPIqIb1nkYAPz5obRiZuXJD5PBR7CBVuZTJ4RY7VEcJtEQ

WwSFWizNwtET8srIPyWJ4RkQWlSlSrBVulQT7+KUxmzlQxStpOGhY0SVGur3VjZGbnXYe0BUuzEEMtG3

I9rOIhVh1axD1YfCV2aGVuX1QwpRNJuIVaL6Epd4WB
s630T4GzU34qAJkpXD7hq1OcjtgbM9yrYS5myODiF14rbE0bUSuzIIAbF6WcbnJ2J7gppvJkEA6GSZP3

P1fmksFiHPFSPkSpIZWyM6xnzHxgHNY2RJSvWc9GRJWuWU3Tr699BCRbZ6UhqQQfkoVfZmVcTPZCBoSt

Pf1XUyLkLR4hgw5MDquvWWGjObxULoMaX25LTwRyNV
6ymx9GQzZjZE6few9MXEQ6HX3CAQCgET7Lua6oB5H9NMvjZKABT8PytMDaBF7rU1HMV9shIYhfRf5RGy

OoCDExZt9pePI5BQKuSvZhMcijBSRYUZnzHSAuYVYkFaClKaOuLOBELPajLEAoERAuOgUjGzRwRDOXMQ

raYPFbUHKlTfAjTknfKAGOUp6GLuCuFLSeUJ1clxCl
aDV2YPS+Pb5WDYVhWK5FeOAFY0KagXHyINwcA0PZGZ5HTBR1VW1QzHItXC4PqMHRL6XqnVNkGz7hYYTe

h1PcTq8pL8EQABSJOWDwASmpYAniPHCpLSw7H8N4MQZqZ1MXR044kUUqcLu5Fq6pO6KXQDhRUfTwTBqn

DZpfHUAeCWy6H4C1SXAqH4EZS8CgTwYiteKaG1A+Pi
ExUJTSDB7PIPGHNMy9J3H2uXKqX5I4fBbDnFB9EH8CIY2SbYFuqPXld43+KcTKToKaPCPiG2QTZNZMKn

CeWScxHGgxSRZaSAr4I0V5KPBpS9OLU3htK8q3LG7+QzEYWiOjYDIcBj9LYhEfNu8IZjBaTI8nyj9CTw

zqNTMcCpqZDrm7K5uyfkl4wNXlNaD6J6O9MwC4cVLv
RT1TA9A8iEVzEYJ4RYXwnVG+Yi1Hq2WaJTJqQJf2T9ruLRMgKHBlJnNmfI18Q++8mapfoUK4C5z9CBOG

vZElgToTvfOyB3wJDKY3s2G5QQx/Kt6NTGX5kOf2jYLlYFDaBXy6aR7ngFu4NqZwES57NGPuHEypiD7h

Fyd8V0Ohb3YuCe8cRw2ycAOiJi5KFoVwSCA5jtSpPl
VQHvL8jVdkhehxCVI6J8v2zII6Rk10t7zxlnWoa0VtMnG8QZsePJLyZhNsmgOtEOV0ibLldL6ohuPrJa

2UHHFeKVyrtyLkPyXUCe0YAsKoLZ23VyzwxC9mzDW+DQogICAgICAgICAgICAgICAgICAgICAgICAgIC

AgICAgICAgICAgICAgICAgICAgICAgICAgICAgICAg
ICAgICAgICAgICAgICAgICAgICAgICAgICAgICAgICAgICAgICAgICAgDQogICAgICAgICAgICAgICAg

ICAgICAgICAgICAgICAgICAgICAgICAgICAgICAgICAgICAgICAgICAgICAgICAgICAgICAgICAgICAg

ICAgICAgICAgICAgICAgICAgICAgICAgDQogICAgIC
AgICAgICAgICAgICAgICAgICAgICAgICAgICAgICAgICAgICAgICAgICAgICAgICAgICAgICAgICAgIC

AgICAgICAgICAgICAgICAgICAgICAgICAgICAgICAgICAgDQogICAgICAgICAgICAgICAgICAgICAgIC

AgICAgICAgICAgICAgICAgICAgICAgICAgICAgICAg
ICAgICAgICAgICAgICAgICAgICAgICAgICAgICAgICAgICAgICAgICAgICAgDQogICAgICAgICAgICAg

ICAgICAgICAgICAgICAgICAgICAgICAgICAgICAgICAgICAgICAgICAgICAgICAgICAgICAgICAgICAg

ICAgICAgICAgICAgICAgICAgICAgICAgICAgDQogIC
AgICAgICAgICAgICAgICAgICAgICAgICAgICAgICAgICAgICAgICAgICAgICAgICAgICAgICAgICAgIC

AgICAgICAgICAgICAgICAgICAgICAgICAgICAgICAgICAgICAgDQogICAgICAgICAgICAgICAgICAgIC

AgICAgICAgICAgICAgICAgICAgICAgICAgICAgICAg
ICAgICAgICAgICAgICAgICAgICAgICAgICAgICAgICAgICAgICAgICAgICAgICAgDQogICAgICAgICAg

ICAgICAgICAgICAgICAgICAgICAgICAgICAgICAgICAgICAgICAgICAgICAgICAgICAgICAgICAgICAg

ICAgICAgICAgICAgICAgICAgICAgICAgICAgICAgDQ
ogICAgICAgICAgICAgICAgICAgICAgICAgICAgICAgICAgICAgICAgICAgICAgICAgICAgICAgICAgIC

AgICAgICAgICAgICAgICAgICAgICAgICAgICAgICAgICAgICAgICAgDQogICAgICAgICAgICAgICAgIC

AgICAgICAgICAgICAgICAgICAgICAgICAgICAgICAg
PUGeVNKrWTPiMYPqRQJxEWEoJHWoPLDiTDRfXMReTIOdKNGlTJTbZWXdJAEwNUXqJLFrJEu1M1xmGJCe

IZUlEM3lANi5Hq3+WUvPMvPzHLI9tsIycJ5ZOB7dm8NkNIwfIHIsf0CfQTa0DE0ZQVHgUIcoGK2JYZjx

ko9EDLQmGIXmqJWBf5mnHlQdSSR2UECyUxchJF3CWH
VaJ5uwydJhJMRgCUOQRIcfRVSFBYstIJTMGTPpYJMiGeQsUvWhEWAdTIKaBERMBWC9VEQgMsUjBIRuPP

DrQwGjNOVFLSNqIIEuViWeAEBpGURkBD1ZJWSoD059ozQhTRJCZd9+EDtgrfJjVyvIKyOxSAQju8MwWB

p2QA9UBZDyWspmt2ClGASkLIPMFImcZT6BPGE3AJXj
ESMzOk0OIQXyO388kqUyTN8EIw1CIdYsDH7zjt0THGIiITChCgcLTkj0TDzrNN0GyAFsFStPfa5upwEy

swZXw8KlqmUcnKKKFBLsrNXKJQYrB7cjSSAkKH2ZEAJ1CECgKdJxGpQmVwLcPYQ9XFDgGH7kELnhRE4E

TKH6BSwbVHPvHLUrZ5xAFrMaAGi3XnHxjWkiHQ5QGv
VuH8WvynQvnHT6VLAeYGBIYb1+SYhxkrDbObhTNkIjFZEnc3RaUMb6KS5AJAOzIGozWB6JYKByiM7jBQ

xqJP8QZnHrANVdVNUJYyNoG06eiWIuQXs2O9HfTnCwJSRyGyxyCJLqVFouLxXxHLWfUvQwROktJO8+ID

4+TGbsZR8DOOuraoXxCGWiOh1HVXYpBERlDJ5fQUTc
NEKkK1C7eDzoLYNIAsNsO5oiawldUB8nYBExR901pMkbyyIjBSGiMPNoRl6JABMyRUU5NMLuhSNqYyav

TWJGTQmsDC4PmLZkUGF6dP0cFCelBXExISRpF8pSYkLyxEwjKC96pCrjcgZhoWOaEZx+Wz1BKQ5ai0Zn

ZXe0pbViZHcdBJXkIImlCAJeUHYyEYPjYRR8IQW1RM
QADnPgSSFkTVSrPSutWHMqRCBpjd2MZEXeYFXnMWd2SVTlJNQpNRLjWWcjJITwXTIfEcs9VEDxXNAqTS

0KKqMkBZNqHNKxOFqzMGLyMJFzyi2VGISnKBEnEsYdXFByUMTwUATnOCikHJAjYCQyGsX6HVYrVUHeDD

2WMeWuFUJgQKK2YeSkEHIyIOCluf0HHTKrFWBnTGm0
NEUfKVOpBRZmZLvpCFMkSPK8AUG0MFPpFABhKP7XKbJsEWMbLArbUzlkXMFoTCAusr1TFTGzSRSxROm9

IUTwCXGcOQUyPEdcLIAyAVH6XUo3MEClYAQsPO6JSwGuNQFlYQC1HtQbACSiEMNrls8ZBKVnECHiSDuk

GnFuFLDoRKDhJUqeQRVwUOJhMpS9PHYtELKwJO4MRd
QtCHOqDIC3VvQyLUZbWPTdct0HUUBlZTQgLAV9SVIaMAKeMWCeZJvvWAVzNDI4ODe1ZPNlYLShVY5MQv

UsFWRzCIjjJyxfNFXqNCKxnm4ROMPvMJToGyB7JOLkZHNjIXEvDOygEEAjELW0XNizKNKkHLUoSP3KIe

OoLMCeFAvoZtofCPPaCTWssl2MUFQsVRRgKZE3YGAw
PUAoHZRgJIerJQOgVYMwYeQ9ELDuEHQwIZ7TVhWeYSKlMiYjVkBeCOUvUUBcau2VFEMeTXDfGrTbCQYf

EFUbENNsPEguFABdQDO9BLk8UUZuKJTqEK1UEdRbPIDgUfX3OJpmLMBxIGNvww8NOYClHTQjKKi6WyEr

XHDkSSXkKWxbSWFvGRJ2XXV7IKNxPPZjKQ4NCdNuHB
AwByApAeCiDGMbMPZyur1OMJFpTSKfVrPpWtJqVLMzHKPqSMpvQNZsCNW6JNn7DEFhGJGnOV2NBdLlUP

IpBmsfASXfQYHeRATbyc7FUWRvEXOnTtC4QsQbJQVmXTVpMMmmMPLsUQP3OTYyLJRnRKZfAD4CUrHdCC

TdIio5BoEnRKEwTRQiye5GRKYmERUtXVbkXcAmMIDg
CLCkVEvxSJBkJHI9RdO9WBWoFMSeFT7UYzEtMSRdFsU5AkKhEOHjLFKpgl9DOGDjFDBrSRpcJeIqFDNc

CMUuFKhzNSLkJVJlCJm7LOBvZLZyXC9RLrMuJUxeFTJLDnp1QMgrW0f0IJC8Yf1PP5Tec5PyEHBhTWVL

MWxmPX1gmhNjWSYbCc1FF6dXLbjvMmbyZyE4BFvcGT
X7KBWuENJ8RMT9WuK5NwHiKxgiYm0cWLA3SIUkWqFfNRIlMLG9JZI3WsVkKJYeAEVmGcUtU7ItAlUwUK

5HHk0OEgN7UXK5yFDzYr3INzOlOtWHPzXvUA0KHHt=









                    ID                  Date                Data Source

 

                    [id number]        2020 04:53:00 AM EST [facility]

 Services









          Name      Value     Range     Interpretation Code Description Data Renita

rce(s) Supporting 

Document(s)

 

          SODIUM (MMOL/L) IN SER/PLAS           135-146                       [facility]  

 

          POTASSIUM (MMOL/L) IN SER/PLAS           3.5-5.3                      

 [facility]  

 

          CHLORIDE (MMOL/L) IN SER/PLAS                                   

[facility]  

 

          CARBON DIOXIDE, TOTAL (MMOL/L) IN SER/PLAS           21-32            

             [facility]  

 

          UREA NITROGEN (MG/DL) IN SER/PLAS           7-23                      

    [facility]  

 

          CREATININE (MG/DL) IN SER/PLAS           0.7-1.3                      

 [facility]  

 

          GLUCOSE (MG/DL) IN SER/PLAS           65-99                         [facility]  

 

          CALCIUM (MG/DL) IN SER/PLAS           8.4-10.4                      [facility]  

 

          ANION GAP IN SER/PLAS           5-15      Below low normal           [facility]

[facility]  

 

           GLOMERULAR FILTRATION RATE ML/MIN/1.73 SQ M.PREDICTED            >60 

                             [facility]                                 

 

           UREA NITROGEN/CREATININE (MASS RATIO) IN SER/PLAS                    

                         [facility]

                                         









                    ID                  Date                Data Source

 

                    [id number]        2020 04:35:00 AM EST [facility]









          Name      Value     Range     Interpretation Code Description Data Renita

rce(s) Supporting 

Document(s)

 

           LEUKOCYTES(10*3/UL) IN BLOOD BY AUTOMATED COUNT            4.0-10.5  

 Above high normal            

[facility]                   

 

           ERYTHROCYTES (10*6/UL) IN BLOOD BY AUTOMATED COUNT            4.00-5.

80                        [facility]                                

 

          HEMOGLOBIN (G/DL) IN BLOOD           13.0-18.0                     [facility]

[facility]  

 

           HEMATOCRIT (%) IN BLOOD BY AUTOMATED COUNT            37.0-50.0      

                  [facility]                                 

 

           ERYTHROCYTE MEAN CORPUSCULAR VOLUME (FL) BY AUTOMATED COUNT          

  77.0-100.0                       

[facility]                   

 

           ERYTHROCYTE MEAN CORPUSCULAR HEMOGLOBIN (PG) BY AUTOMATED COUNT      

      26.0-33.0                        

[facility]                   

 

                    ERYTHROCYTE MEAN CORPUSCULAR HEMOGLOBIN CONCENTRATION (G/DL)

 BY AUTOMATED                     

31.0-36.0                                       [facility]  

 

           ERYTHROCYTE DISTRIBUTION WIDTH (RATIO) BY AUTOMATED COUNT            

12.0-17.0                        [facility]                         

 

                PLATELET MEAN VOLUME (FL) IN BLOOD BY AUTOMATED COUNT           

      8.0-12.0        Above high 

normal                                  [facility]  

 

           NEUTROPHILS/100 LEUKOCYTES IN BLOOD BY AUTOMATED COUNT            35.

0-78.0                        [facility]                          

 

           LYMPHOCYTES/100 LEUKOCYTES IN BLOOD BY AUTOMATED COUNT            20.

0-42.0                        [facility]                          

 

           MONOCYTES/100 LEUKOCYTES IN BLOOD BY AUTOMATED COUNT            <=15.

0                           [facility]                                 

 

           EOSINOPHILS/100 LEUKOCYTES IN BLOOD BY AUTOMATED COUNT            <=1

0.0                           [facility]                          

 

           BASOPHILS/100 LEUKOCYTES IN BLOOD BY AUTOMATED COUNT            <=2.0

                            [facility] 

Services                                 

 

           NEUTROPHILS (10*3/UL) IN BLOOD BY AUTOMATED COUNT            1.40-8.4

0                        [facility] 

Services                                 

 

           LYMPHOCYTES (10*3/UL) IN BLOOD BY AUTOMATED COUNT            1.00-4.0

0                        [facility]                                 

 

           MONOCYTES (10*3/UL) IN BLOOD BY AUTOMATED COUNT            <=1.50    

                       [facility]                                 

 

           EOSINOPHILS (10*3/UL) IN BLOOD BY AUTOMATED COUNT            <=0.70  

                         [facility] 

Services                                 

 

           BASOPHILS (10*3/UL) IN BLOOD BY AUTOMATED COUNT            <=0.10    

 Above high normal            

[facility]                   

 

           PLATELETS (10*3/UL) IN BLOOD AUTOMATED COUNT            125-425      

                    [facility]                                 

 

           IMMATURE NEUTROPHILS/100 LEUKOCYTES IN BLOOD BY AUTOMATED COUNT      

      <=0.5                            

[facility]                   

 

           IMMATURE NEUTROPHILS (10*3/UL) IN BLOOD BY AUTOMATED COUNT           

 <=0.40                           [facility]                          









                    ID                  Date                Data Source

 

                    [id number]            2020 10:02:42 PM EST [facility]

 Services









          Name      Value     Range     Interpretation Code Description Data Renita

rce(s) Supporting 

Document(s)

 

          H&P                                               [facility]

es 

GXTABk5jHpVKZpXu29/MQMjiIGZse4SeKDgySPf9ULxmAEQoF7AfKBC1qA3eOLL8TDuKCkGiFsKbGtZ3

lbm
JgTveESwMyPNNxCyaJOoYgKKebGqldzIQfLG8BnAA5HHNsX75lGATpRXLiZ4YiCWUtEzM+Vd3UTKSxtR

RlMI8CRzwA5ZoCv9p8Qy7h8a0YpTLTznRCbIcq5kpPqPZbE8NOacNDrRvDHFW5PfuW/f7ZQHzuU6eVtZ

JUuzWTHKQuNrsbja/JE3YqWN/2TidQLp753/4/fo0S
sK2Q9C7/YyObJx/Zxq+FEjcjwhnbcDY06ibfx/jngp2+4noJP8dpWbtGHl7+0rzSkz22lFrXEztmM99v

w64a3enRlVFfFDThPu/VAWfKJRIXU96DQo4bBhoNT++1qvdPbwwRW9rBVBDpfOVFruipI5tAGqkOXxPJ

L9yuHwhc8g8LerrGMIG9yHbE5/ftnRxhEAQSFlBJ8z
7zGxFDj8cBLDq6EUi6I0xP7YYOnKPm7+75XUW1NbcBWENTH7kntIBdEbkXswvNM8DFgQ2RoUzIrlICoT

hlmyiio6N30fsrbbIcPYvpsgfNtUQHW2l+3Xx+sb7zOos5A8dmMllnayvMX8fKUDkiJkm5BuXkRGy32Y

JuQxQEnxtY+US8cavFtaba6jHdbyivAwqjFZTR7FJ1
x1rQGvZTjGXpV59i5xVV82sBii96co+LMs+cQj/i/wstcRRDfYmdrNRJNYua9tzQ+B3wG/GgeHay9PYc

c+rYq+DFL8RR+/0slKNJRI9cqIFnQOe4W79fTgHuHg69rMs1erLODEUIe+u7V6aPwJ5e5YIxZmjZFse9

Mu61bQaAs6HagDur9nOabPdxx6c3PAIvqNDz5pYkOE
Scj22GOMTHD/IEsrDZITDhReSlNSQ//rQOfzdo7ReiRWlxPpsAab6xBpw2Y940SRDqlv8jmWfd15p2ER

e01V56mNrI8UhfnRW/mLWIn0zzT5LVfCmImtfjmq42q9xyQLHS9vj0Jt7bf8lX7vI17YVnR/q+qrld+8

YOSM6FYMntrZupcD1V7va8aiFQpwu1FrIIhIM1y+2g
Nw4vfTeG72itcdXhimnjnhv70wVITRJ5sFYLxlxbwpm7isl6ddb1FOWbtu5wkrrvC99bh8YjPG+b31yd

W417lMcxHUTepb16HjOku97+JJo8mWNclhxer6xYTdAb2/cNbYG3q7wwz6v+ioMZ1Yj1YUnjyYvY8b8j

MOONvhDeoaoG9HGENttq+gT9cc91yaP1ory1brGghT
kM55BKgHl9dVEJR6Qw/M6qDTpfuUkvQIIbUPKiAHqBB6PlEJLGOu7txktY6utMSjLKnh27XWSmOYgvuP

MtEnHBDIm7Lsnjs7H+Eed9IzWPaP75OTfrNQzYruJFcQF2vd9zlStAmsUkHyZBPawMoauZa7cRz83NI8

d/eMk7Lkppo0jgeAMzB663iAecap7jhuuuVsFikiW7
EPInfejjR8TNWtZEeheDbCIGLfFRL5cRW0sXnqxh0SjKIZ40OfOpgKSvOPGymIZgxxQEAUdcujClDeXp

d/Eh9RUwT6ts4uJ3/9NxCXFnOX8+gfzqMpBCIS07Uv5jCr1u4ouWRI2o3qenzObzqjoCHYKd45ZVaIRZ

9Fd4/CNJHyEjT8MXVATppkxwcewy5TVtkpsmg8j1yR
zh1VijaqWWiuPdEBkoYy/tJwPup6K2TlI9bIgMZEicsb9AMc3FoAr7NeQD9SZG+/L00gIf9kTca9YR0/

Co93yeqvQms6HMVMUfp5m5cRf03sSxIws67mD1egQb2mzU33nx6wy0AYsMuX7LZOTHBfRGeUNGi6Raxb

y/QYjo6QsHZVBxgXKtmImSialB7kOzBjAHuWgTos9i
g49RKAds030ZGK4w97ipl9mnHXlJ9K5cxgx6nq1s6JEBT8r2mC/yyGj2uHmBEDdsjXJEI5i4RKWS5XJw

mAJDEMULdFlvV2s6HSMWkJBsAs1sYkqhJPZM0aulcNE45achAC53nao8154oERCve2301UCCsN8vZMK2

8We0fbA7jRjCI/cQkNGHxvXet6Y+XFt29OFauUrpvQ
poW8WWQcXKNAqZJKQcGNcuRDoUemZKhVHcaCF65/SGQB01OwRFgkNEqEQ0CPmMj3pUWVDdNA3djUiXOs

9LRlGpNEGbYkuKoNU5xpgHlopFuGf4NbsOJU0Bli1Wc7R837gdC+utvbJaSscdvGooDaTVbBoo2Zv3bF

9q/TiMyy28I62FSLX/hfr/Hgs5q9Ywtowu+rFnbMbe
Ow2f4ZbLa+gOBqUBxG2HJuGtRfKoqJjCs5DRw+mxLWxmzFGZWOpWttGjXMkwmlifZPp9rABA6sNDBMkU

OuCqx5CtMVcUTOx0UKVXWFlPgYvtJH7UoPlQTIM2qCShMHIKBRcD2RM2IVvyumk+hGXqVBBx0XE2dRJH

USmB9Vg3/ePvNkxfetxDTlP8Esb0IDUqeHtrX/H7Sj
Y0sUlnL9Dd+vI2rHgaGLN3ISlYwhaFUmL+VKA195uiQ49LP5Qn2mJMSKoffnHYfZyStbf/ZOAfgXn71G

fD8A/wIhOGd0/h3LpgKhj7QY5sKRCTXmYL8QUlPoFxMFvJLhsir0zEAqwA5/ATBrnR1SwIdrMXESXADI

HtorfwRJ5miTGxamuI0YKTrNDO7hiXyy/Xs6X5d8Hm
TMlqJIQqoc3scOEU/2W41jIsseePPp97NZzZj9ns7ol7Xrps2nljVaYquzOEM2cxFSf/EjIo+tnCnO+a

9g006j4SNX3d5l7QS5xkmJikFDkC44ItgogMzkJebbBB+zjbe3n0Wj4BLyIg/gnz6H8ThTjq+87aipT5

k5fas5JQbc96qpQJWKFysLyiG1f+CtRmNVwQ4WXJCN
aGkKLO+BpUW9zk+D1Zz0oIl6B+fJTRMLQcbYVCMVzIVKTHOZGaFT2Uj9agEHAWWuLck8QyEWFjBnpC3X

jWGVDlDvuArCOrA+pyCFlhMs4cYxRxQJYIeARuxC9Q56jYQEyZmz1DtVqdlrTUWWVPKhQH+gDhvyJdQg

lIVNAVbgRfvw8yE74s8LoJAiHzEGtLPXoQUCTqCAX0
I/iFzrwjf8aCbk+qBuvfCnj/OfFj6Sze/Nl8pmUOI8vli9XSPqE2K8EO1js6RAZZYK/R2OXV5epUwGhv

941NfuyRWZ7HZI3mm2EfDJFmQEtmbyOvNbyXLqLsQAPjPouYZrSiDSfLHoWgHAXrDLgvRS6UXUhdZXca

HQPdS0TnqiIerVDrYPUiLo6LFBJzQC7FLCDghIUsWP
RvIyBiZYGAVwKzZJRxQMQzeFQLk8wrRtNvOAU2CEHiVsvxHT6FBIGbEI5Xu499XV34qcM4GDHqGc8EEL

ZpCV0Ahs37wYP8DXRbGjRrCHEfuuPmILMrusE6PP4EAyQxPRC0gHKfZjqJIW7SXAKskWIbKI2XEGXbjI

NlID4+DQogID4+DQplbmRvYmoNCjYgMCBvYmoNCiAg
DIqtQwxpnBBsCO0BpEU0TBVdQ94zPTDoHDEzI6CmHDCmKxQ+Ce8QASPfqMYwJN8OAqzN1F6Yu8eNJw1v

ed1Q15FTU49i7PzP1QoCq2SegdUujyEmobChNLWBnuJ2WKO/XxAAJXUH/oWbZLb3yjMqXguxVfqo0yiX

Zgh560g8XM9QjgC3u5zGMowlz1w78o/UYzfXeej2+J
I2jmv4mXQbNarI4XG4vcL6qDiwU6O3Hf66PXbcIh0W32ds7fveSlufcwosGm6xVgdo/3WeJtfp/PVr9e

wulVX3sJnCZ+DFR9f+I/V2Q4R1KmpOHaR2WFY25szcm+y3Evc41K2cZ5d1TSw/oYY/E3ckeeTS4p2q58

BXtP8xnbbpM7Szf+dIw7he03VUssn1kkp+DMKquaI/
WsWrJcBzf7dUlfJISLEF89n6DCWqcBXIJXKWDjG+LAkY0C0VOOVpb1eMOet/xI2n1gUoiUOMM3R24HGH

BMO+GNVFWXtSYteUo4FNyZmaJLiy792RLxpGtvoKDHpLZnP+XwRrQBpnqWMhHd0y70bS2HgBxu1BByrz

ZeVAVR4to2nE0/LGdqttmPR6TWeZnt1FukkXjjRxh1
+r/Upx2pt8JgC6tK6PHcidDxNsFxdUSvjGGw0CUZ5CXGTUxfd5KMZW4Nk6Vc7vAoftvJ3Y9h94uW3d8u

+NUnAESW3NI9i4LqsRVL3P3/4FaOLaeuLgLZ3UgIrSVGuewUBTz3OiJ8DokZ0DKsa3vNbbuxwWcuSs2M

RuGpb+riRtO3H+gp5Z1rTsie8bPbM/LlFki/kEp4lS
LxXsI3K5VNFkpfpXBmTS5gBlEH3EuYL7NDdW6+ph/IVlPXIrvUoInflHhx1Te4LGFTrJvqbnysz08hKc

cnhXIONWcTlbxaXBwuQYoKEVw0T6TXsbIDpqESNZmuUhYsLW0bN+xm9GEOeEPnfbn7FYZP8WCBDJhnLR

Y8RnkpJs7hKI58tuEiROUd5OI0hwDqHiEH3EKXPQyQ
GaQS8jRCOoGOJHXfsMmWgIhZwaKMJL71oW9tdKMCEn3FJdoYqamkWEFMr0RMqR3hgOsP6c+l/Ook0dai

VVpUaMRJl377VELF+hXaaKwthFh+8epTzaIDxvaoOlP3aCa/YfDcTh2r1SvDQ9jCqcOpiBQXnS4ENRrR

x9Sx2lfvjcpN9/fKW/tVZxNQHlF9shhZPJnOqrXXXY
UapHLP5mOCdFXmFvubEKFtj4Y9eNJ8DvpNA3/pORfCXs1LySULL+sB3gSPVoEZ3fJvGBdW8aeBH4iGO4

O01Bg2mL1obAWtRc62TQ/Si5Y1iSkb8wjYFLFdIsf8YR9Z4mTsxHuAvtl6okpp76yOL2LaYHc7x2+rd2

S8sQHnQEfLFrxkVHSAUA9ZTibZiIcsjMiad7AiduIr
6Az2pZScujqpX9cTEmTUzkiE0DE5qTgwehDw5Hkdcs3yjLEPNik28benRuG+1Lm7sxkhd8/M/bl4OqZH

TTgIJUGxfFC5yFy+b5oYk4eOBsmn39cVORhr8o52voLwfJdBaVhSxqF6760moaazCl3KwM3mSaBz3ye/

jH/bhFmaq0HXSDG1Kg63RsgwAapZH+2FXBPJ2NkQJV
LNWpcZgMsR2cRtkV5pYuP8lKUMB8NpP6qniRsVzTm+xT36R1Qc8R+/fQgsK5aw/1R0I9+rHqxvdiUg1X

tExY3zZuYHyQ/QowudS5n0dhtLZmWA6aaAsFn3Eegm6gG1QDb4+JsicUA0UPZYJMcsCqSd/FlIWaO2Tk

GbOnnkhyFPS3DzjVyJMcOVDlqq30BE+juI+t/qYIO5
uyjrHToHhkAYvRpLWBIHvoA96Lb+a5OleQ9MiO72GTCBryLjzu8+3mTwy7AGENd2YgqckgReBMy8EoI1

j0us9IN8rjQEqFn+O8eMkNKXUQ+UbpRpxbyWJyI5hWv7kAPKXTUd/X5iaPcKMYx6aTl7fLtyp9cJyQsT

VhA3g4XU1BtREhwqFz4hK3QOSlNiz22AUVecqUA1K2
PrlNfXinpM0km1D/FKul537ZIeJ88HnLGsupYVppnQYKhZUe25T+cFc8jkUDMLKSlnnOJYeDDQeSOrfr

xaaQQWXfGlokYK/QFd8CkYBniuEGPol2jTaMrSQrkWFEZHFwoq/k1yKrbo4i/Ooo3VraYxS56VjRKg0Q

/txk908Phf7XjMHWkqACwUGSDjFBb6tOEU4INOJkkN
Wnq6po9IavAIVTZr98sAfTpQvLlYXdX5S91aB2eVEBNqLwT62IEDvk2PHN5+9GQQPbGFSYW+bbFgF0qE

uMvBenQWwL6Zk7Y7loHsAP7J+hbukr0+1vsvL2A2LrPvFZExjhWGU3UR11jtHMBMo3UDy3+3oQ1VMuuW

IBkTiNIlLYrl1h9u+gUBzeqiBIZDOPSMXqZF8rPicN
vUb9hIyf00/7/METI2MBVTfMJ/m47Oumnfsn2z3WOO7fU2edV84ofbYiZOZYgWzRkioHDHzvuoG+1KF9

frtU3VmrBoO8jic3smXdIuoW1TDDaWYN73OBP/oniXvKY2+yjb0wXFIhgNNl1LaJPpVsS9Q5FSERkWzl

JmHXWiTp1YwEGByop9XeoeTct2SUk1B0LBML+P3b7a
B3NO5GTpEGf99FWdVtqycc9NGa8P4izC94N0D0CnqdS4gpuTMvIP2Vi2OtTAwrHap1QTuDMgEDDKCQI8

P+vb4kIRA8VPKHeDEh4f29i63Q8u/jY/QnYTs+pGl0lKVHtpktFSEjJtY/3s+BrJK7omGroYDEfOMTEq

iUasJWhk25gG8Uliki6XVsUZQ2m+NRJkMxpL1yyK0J
3vwwG0aI6AJeswi+Xs7R0wH2AnCAERlPOhwaSezgtzEslRVUJUzrK55AMbpVOHN5zIJxfz4Nrkw8tYaD

frASMpJnILBmuXusAGsyc4mnI4DkKq4aHpqYh9xVauMONFc4WUalrmm5/1USXkzKWd/25/pNua+9c0IQ

mT2ZqkEKsd3bAV/I5ZdnliJr9kX23r9jCstfRX97PX
Wh2Nje5eGIwFXsgSCOL3QG46NW4+qLBfEXNZ1YfvW81HyWtcJOj5TKeef0KN6iZRTp26u6b2XLr9G6oB

Ywjzw3ycHD2wUPK39pcW6Cz4OioV8pYqYOmV0doMP8Ovhg3ZOXko0ItI4iutD8eS3bLCwk1g59jXoq9I

6lcWJZPQgNG5A/HvE2CW4909TrsJXfQCQIiFWxBLYC
ws7lOqT0qqSlkrW4GXs2cebx7wF9SUzqYNiPu4PTSbThmhCq2TQDG7gSHf7MEOSc2X+/XIYB3Xl3KL1W

fVP/FGt5VdOvCJENhRE2vZSRDgpQOuquBG6ok5N2sOP763zZMxjCSWnXk3X1ssNnjjzIFFS3O0bhRq8d

2KTMi0aFEZoLXyJ4nlHw13D53VC8Ql+wbKY47H78zN
fiKzbFlmoNulcJLhKPmaY4BCBKWozSDRgH0dfS0uMvMxHCGAoKF1zDUuJSm2/tf6tdKT+NcyF5gS6ISK

wJ6DWueDvES4kyILdpEvBacO9lW+q3/XD6wnGk0KyNnPL9CCZSQe7z3p1juXU1XmXrda/DZLNBuh4F9M

zzKvmLyVomoUZmJmqvpd8R3wUFiu4YcxvW8v7Lnt/r
ePm5ObWGykqh6CAFUZwqrOPoUNyztpTBn7HY5cLtb7E/85APGGJRlhNGktnysEf3mVsQcg77dNOweGmf

ODIRpS0mipDPNtvIaNJEUdQZMzCYz07wJmGjzeYRck/mi3abgVpl4cWerqq6htz5e0Wgiov4lSy8WTjE

c00OPMlA+JeTChAQwhywJuRsqYLPscQz5EG9lPWr4f
wwf1AukHJiuhSucpg5hqL2JsxeQSwayXQmx01PTWayz8LKVh3/sRl+jQY0b6ZhsAxnArrSdwQ+Hn5wtF

oHZbpc2G89n1yAMqOGOlAUYX7ivvdkmLUc2rOjap5in9mu35+qz4SnrvHH47NMd5bXlYmHl2d5U69mZL

xS7TX5FcvB7mmzmMoV2HKY20eu2FKahK/57JCw06Kr
K37G4sc6x1YR0WNHhpIkSSk+ybhIB6dhtVoh4z9o3Yu7b12UjsGHWFUS3m67iujOrao3Y5mGD3j2+f6t

KsD+FcX6K3tffAwzMKuCb2AWUPjF0qnXaE/Ix/1C2ghNTl5LEM2qj0RqDWFwGTrrazNgWtmYFrlqAHDk

YklBXdCnHLbJJcGsUEFgOUxwRB7URTxnGGtpNCXwT3
IzuvLucAHkFLDpLc4AOLKqIZ7ZBWLssHZgQHVpKgVlIDSMWfLmDKByCMYovNQUc8trQsQtLRS7LAEjUq

rzPH4PJYUnPY4Ty234LC01xmV0OYWnRk4AZYAvGF2Ygm30wFT5EEBoYgDkCKGkwzGdVRZdowZ1CL6JOa

EnOJO0hPIeEkpSUQ7JNGOwhPWxXW5TMIUbhZRoXU9+
DQogID4+BNfkxuEgRvtRVfhhUBAaEexNIzJbQJqoWrytqKRiSC7WjFH7CZDbZ52iOIAcZLVwG8TmMQL8

NDA+Jb7ZMZPofISfBY9SWngEpLdxi6u3FE5lwY/Lh5YPyuyBGliuYIYpxsT29D5HfkcLPQZAOkmgADlk

/w9krMKMCMGSwEheCP5M/p0cPTGCe2HHUR//gMMogB
AG1rq2QNEMMOok1b/vnMyU34QAz75LJ5+OW4Yngczhkpq5d3L1TcjSG9SHvyZcb8kiVxu8yy8pb0TWZP

qW3eck0drlsosLsho70Vy9jCPByHOTmnpYlx0OAv/Z9EmE0FhR1UdEAAvc27FfXfl86LLNHBrLh/oGLP

4CyPcllDR/D04jDOBiWNXdOpkudDyDsQOjiLodinCQ
2qMIWFRulsICCQohIBSoNsgQQAP6qRBJfrHknWSM32hsMX/f6arRIzUfQz0Aohfp1TesU93baNyANRCn

r687o59HMT3l6r8Ke7XZL01tISpOd8knpR/G5lTatg72CJrOfA1ZWggS1pc+GlJ5sbCFB5453o2cuKuN

Jqr3UKEpzcASkH5ipZk5vgNQ9EVGg4SGB7O5YXisAw
cIR4Fk+foOQhq9/iFXA41iwkoBQ5UeJr91OvYeoLcTWyOCbZbTiw45jjRfUV3ZXmQoUUYRnk7WVNRtnc

RSZmlZZZVt/0rHLiSswDozVPpyMtpA5wROLLY1XwMifePHPb0nUCnA4bNiZxoW6AhmVliLVOjo7pY3i/

j76lW0kHppH7NmOCYopf3YvbMxobTazinz4qwdIGc6
Prz99Bt8/i6TLpzg2T8EJsMI5aCWEqRnDEuPmRgGrATk+g8Ix3k9Mo+VKlXtyjUPs1wT/JlUFXLXVl8x

ZI4qK2VhEIoD77xaG/L75xKFMHQM/vQ+nihDCnAknTjAMZLhBHcj+2q/caILWiXvM81cELzrbnLqUNK+

vo2WhJ5r4nDlaPD9Gu/o4iMGoBb0Tt/2brWcJk5uLJ
umeECZsL42cNPqy/Ut5skUe5pznBzfXKBz63f1HyNxAFeJ01Xv0m+WA+W3bdRdIrC2d8ynRQ17+3A2G+

c/1hQzo4sP/KVYe8YIld+OzmI/sLizlZOOnN8vecJRKZFGOs6xOYV1H69INXIS+ycwmQUiCyKpSAdV8W

hxrEnzaVEznrMxK/n+TnB6OGi+gdtPxjgktw0P3PqH
qEc8xaO2Zgl6Edn5H3MIzKsKKXOOfu4x3eARkZ7+9pdijMRZM527Qqzg3U2tfl7LivsFVD3E9y3vLS69

NTVMBtyc1olqIXpp64cOXZ6sDXWEFgn6WaMlbtwKieWyF2KV+rULjpt+jUOH+6uO4c5vjTj2pGF26KIp

2yijdJBmWQ2dflg2fnLBE4CKSby3moHw2d626PKaBl
8rmc0RuCYXhIx/S0skBo6TLN8etyy0t8y/WKYl70UegTFXkY5g/PR0gctBJaa/NGfaGJKG4FkfobTU0C

gTV/KkLMpVlozV+YMDDbwvWNiaDn49RHWcHP4ZKmq48mxuBhZ5cQeFpGqEo++lSHhqMT+49l2BEUckbF

r09QNkVAWve3+vB3fjt0DoHNh05zMRlPgQGrbeSh9z
euTQZHsLVJDieWVq2WJWxRWlJpZFHN1Abh0v+uPN5YjWSq34Qxk3puBjkuNwZeg65VGycqO4igv4Y9/q

p2CBnH/woc4MbIRHYwI/icEwpq+6xOCGvtk/Cpfu0eIj/Yi1b2XROmrChB41Y7BX4U8/bifFaLfIueQ8

aU7EJXT5xOwx6c1qbFNPx1ZpndhPWXtWBa9i8i7b5V
ym2lnepg7j6XD0n84ALqoOhyfzEvLx9OuwMJjrBjQ2LHeuGrvvF2t5Ru0wLOFYSQcc/XUcPrKaeVyI2I

E4HE8+pKpMIMusTpfDCQmW4RsWBEtxj4UoYrF4lAhhlFSq5oC/NJVMBOTTufZjCfyXiKbIgb8cM//1Of

sAJOe/su1HvV+WVJuTZUG2Mv2oo3Q8M9r2unL/WG/c
rj1TFS8rg7IwANMsGYvbtrQmWbmGHwikBHImQpqUQoZoJYjITnXbVQJpYRvmCD5YCNdiSIioQKPbV2Vc

hrGeyPKxWYCrTv7SFAHvNR2VIPZgaDJcUJYyJoHjINPUPjAcEZStTVJjwREAr0qcOaNiGVE9TPCyKdqv

IL8CELXpRG9Eo599BQ86byP3ADPjAu7CHHThFW4Rpq
94xRI8SKCaOsCtOMSahgYbRCIxlcN9RL3NEiHqMQL4pABoDgpKQK2FAXCkkJDbYU2OBLZoaJErUK2+DQ

ogID4+BAmlmlMiDivYOjRlXXVqe4XdXSrkXTf3Q3KpgTXhmdVyZsunnHAHIZRsCKGkB7bwvcl0mWEnWZ

JdFt5AYsOad8OcTUVdZEjFsbNf0IHXTJG/v5n7H/bp
FdtESdxmFYFIyKkKw9RhXT20zZkVNZZKjn38Wdn//pp7MKYmNSwhYFdCOsl35i+tdtcOAP/5HI8LGuFM

7b/gQHi8tG5e63/uelnjbLuzCOnG0v1Jn1oOdhW7fYpTluZzRvIHymWA1xCkiLkTWzvwpUypTOD1ZPmF

x+tp0/7XGUlmpHr+AA5bYNswHlfCqIAakIs/B38xge
AAHNVtsJeXGNRtOp7yizGFlSNYTulzz4JJQ0s8Qsg5zJTf/7CNF442GoqKmT2ZsSx/8Sfp7QGXzN4tt/

qBLQEbIh1dIgYWQwFKxCKug1qV5ZyHOTwgIBWLX81hRJqCgbjHk40cCnEQky9mu5MS+K21CSgWMol90M

un587ZRPTeFQ6b4XwIG44qnGjxG9WlQp5eGDTqy48E
Ongi7f2ZWqRhrOf/U/glO6ZxCK5Gw/UQ4txSoIbuPYvuDjWeiiZpyYcn7m30k1Qe/dM+KQtIoZN7ZyzI

xcBheaEIfG6HDZ8/JqLgsOV6SiVWeaqMfd22R/yigkRkcbQfGa3h7aRsbDB9tU0xAGbwVmPK+PAMoSh8

/hEmb+2A9X5ojzMTmcxJjqxzFbozUBiPe96iMjnVVk
VyoWG7NVvzhfTZO0l7xBxKQjRPOyeR2jNjh/ViJ5KriOePHL8Y3+KbzbVwzOql6YeT1yI7Enmokl0MLa

pcMn22nm7qtw7B9b35h3jpcMPV5/BhLWWg7mAszUarFrL4zUnNGGVMvsWrTfdlcHpAmuUpFXvG1MOBCt

M9C5J8Ps4SFCY7AG8SZmVn4CRQR/oCtBBu4R6n2Vaz
ZUU+b1tKgilvFKCOZJa2xkkwf3ZVjqC2rMa3sstfyXUk9+EAufZSu+R+vvWFr3CVXMK8KNNgJF1k8oID

jmRNoekfSrA5ju9JpR7at7a5nKuweu8h7x93Qncbye7m6Xi3Ckfjr5+iebP5inpk8mSQ++BisamzdMf/

qykdnTY5X45ntmgSu/5QHFrSm5mqJYohFlayWc/a6d
Da8THNSxx7cDDA6eDosIEQj2AKLGKyWnRvP1ViXOOlKe5Fv7qjy/bsFGAzl4kbbpICWgVxIHDW2KEhUl

lrU+00pgr9kvgn111G+0VxMRBcXs6dpp88LpK71U8psdwUy+ZtasLqXvjzbzLpbeqEUVbB4Ax8FYzeVE

4ftFvXDWIevZ4YdeXc+mrL1Ib6YgBzKJqRI3HwrREH
FjiLrNMwOJM4c2OUFBzhDyKPUmUZAPr6v6sk5bptbqQwtSWL12sJNX3hzcEN3GexJr3FX/tcxehN+9px

0xyZ53ckSkhqsaT+wgXxhvoyOkktq6Q48WJEpGo0Q7EIS4XAU0YsnwS1s3i+IJ2Ctj5BGmo4As09X1ZT

FijX/AwanHFONF3FOgN0yAvrz1Kz2EO4CuBgPb0BGu
rCiqgy0Ejah8v3AIM2rzPa/fRWcW2aiAwc53+OfnYzkqQpnNrUKP9K9J3P61WzNWyG0Dl2MzysL1SXbb

T0cja7YICMWBafdXz+Iam4JEUbQ2Q13xwt1Jegk7Hdw1a5ci71TdS1ZE+6YjtQs3qkWzUCcOZOFGMw9K

Gcx3Cs0aBi5ubPjBQ1k41OHz0H2G9Kzgps3mJr/W2o
oz7Tc24hF8J9r60DgckIOrfjxJNFN7ZN6dGRDsdWGsxKYrCkMtEPGabPNXNVmcDifhSn9uJBiEFVvuX3

1qtC2eWDOmODWMjRVcHbiDIGcDhSa8vJEDkfIyzq7s5/FQNrBuoyDysiwqnETBXK3N4Txh6hguPuOSiY

PUL5Xaf9Mgs9pEFYNPsP9CbVwgVvUVchfwiB21KbsI
13a/xFEowDi/JUeo7PqpHTu72hqDm9kgchJO9p53B0X2Q8O/jnRGZ0vziiG10I2IhXlyKDgQsbn7Ik9A

SFSEtOE58PP+XnR2Ngt/wcyLocRZECtyo4bGhe0rPtOvWDv7W6GzNRoWKRA89c3L/Xphcg0TGwAHS7xL

UOolyeZyMTHfxQTl1L2qs4Mu4qhOStHfI+ShJQqtTQ
HjjMyKsoIbUJBFsDEPBZqP6YbDZ1VhrmJZ8WvwBjxusGccLbnhmmSrfr9yUHP5Fyx/43syAwwupfE1TE

IIRhmOeZZYRw6yl6Z8wiRau71B8nNR7IH6DcCulFGn3KAU5bjM+SLLMHHAG/LONDON+AbXvEaTk8L2nTk7YD

5MqAuzzIODtrGv9E0Ix46yBUpNLbrn5yiU655XWn1V
KZUl3H2LQuQLB7WDDTmN7HW/aRaKqy3aigll0r7Sx8uyxpbAOGq0FZkKtH0GS07DmMBEHAZi8kSRcwSz

UDZYz0I6Cjmw25ADu+VOTBTK7leSjTYEP4ARliQ3tPao+q8+z5fnm6Pa8Yvw3v/uqxkLv3GKqfc3lpER

BVKkCqmcHjkeosZUr3TSAobOMCi6JPO5/RdfkKqExf
9Oz66blpEzBlXQxCADZN25W34+V8Zwr4K3BTzaILKdXpUZW3gxJmqS0VUL1eg3CzQPgsCBVeOM2tso9H

JVN7RI0FJGLmDS3EbBNuJ2LnR1JWGoHeCXTgZMKcWX41TRMeQNVIJYqtPPWyY4Zpz179ynRoozKoCEYp

Bz7LVVBrDS5UMFWoYHGsiRJbBAAbWNIaSbX4HHMcVH
szHNPhV8RwplOrgaHxNXAyERQcOh5TGMJfXN2Vlz03oJR1GBKmXrYiQCXnxmRpQTTcxqO3LP9KLhJqSP

F7mPYcNstYMN0LRAFnqAKuNW1QEAIkyNCkWN1+DQogID4+EDewbsNdMdfGMcOrOPXjf2KuWZcjKEm7Q4

YmkXYzlaZtRuqppQFAYZVxJIPhX8szxwe3mBCrMnG8
Ma9EMtSpt1QiSUOwSRnZcb2f12/bNhB+H7D/jT9QCaAxIx4skcXl4FYOzIlA1CDvggweOawfypWWvvL1

xv7BlrFiYL3VOdrcW9KHJB/e0f4n2VpULM05ky99T8CVyn/p7rgtqlboax/GVDuJb4PJk7cxx5Dml7vO

O6yX5J7tn2+MHR9iJsb0Y2Gs53kMniV6z6bXzWgAIF
pipGzmvp3itwwn+tv43LEcCCKU8ODYpUaUs/wd/QZLxjiGyGUh7CEnIAe+bn3kC+S7QajZVEQF/NXlax

A9lspzr1zP+YXe4cbZooZEkQf4Sanav4Gyqt2GJPgsx90lkc3Qu+RSk9kUgJbUGukV0XxqQflo7FyBKZ

Yd9W291ldwZwx4UUQ9ya2ZwUKWhsGiXsF/0xxKzKHU
QYmjbsgrduBlhswsPxaVAEU8MC2vlvyqWHVz4uxtgjzqrs2/NlVS9nkSmHstApZQdrydbff0rPGf3Gt6

30RlYswRzmyMDQAyNoFHE03pFhzq6+g5rGKKZltTdGFgAmzETn6dOQHrnkvZFtdh2sD/7cpyKY3JcKmV

bQIye2kl+1flY3b/+pj2/s3614pTI3NEZeMVk9Q/f1
zlBVdj3cT/wjkTkHMGA6r8YxxhSqpioS2F3SwdLm1oWhgcUAhzGllStEebWVRQuEoXNajscrsrGD2diQ

eL0tekeg7lrvU5lnbKVNaLQ4IU6sE2T7Ubu6qt75tpw0qwgSfVdPO1muYAiN7wykF8uLt2e4nbRDZ7qb

PIStSfWXUOQdzvqPhA08UwDzPN2DgEZdPkK4iI8B6t
0PUQgq+gIY3yxBIoa2klohQ6fCW5SL9ivbiwQ20ojPYQK6JwrbCbDqWiV4iLUfEgugFB+8ZRDT/bg3i4

VrobflmWykOg+wIGVgQuRaivdizGG3Nv3m/wcDT1igYTapne90WKXJFhQ7nsH7cFj9Yw7Pf4La9N7MhP

lA/IOwIqpUeN4UrrylWsGCdJ5/WVmEnO8lp83hVFK7
AFaHtzFi/VIB7bPdZrLflfr69ummYeI4gtsljipk5TMdSQaj7bZB5X+mH/mlUjEYsmOn0mhWHUFVM+wk

Cwa/KrBt1eP3SWc9ecFPzZstx/zzbvzqrAltHpXqkpspJ9Y2HMjTZuWqQRFj+CYNx4CW5+h1WxpOC5Am

OcySR6GgDBxvMKpAxFuZIK7hFTMNuMtgbgjtznxkQh
X316XOX60a8Zr3ncbLwJEoNHGfyl1pqK5mf/dxtcw1da+JM7qhV4ERqVGLDAfO7DQ2hDKO7SbwOhqf+z

V0wCSWJRBEssD/mhR91Ej6lerZ6QCuuTmREXwdUpG0Tk3xjJYZHwY/Beti/dIbDTPwSiltQailIbrjlIe

EtNkFK4xfxtCsxSK2adaEbLUXGtnjEmxJ9hqlpGn4g
4BXYg66ajUztbBXjzXj0MPMyieafNPZWYJ8+YWWuLhL5OJYnTvlVNt+PLgE6F21xGNrIFsAsWUwSat8y

mndoe47hCOJ92eUVjDloe4ZOecNqn+Tu9MAHP8oE3C+QgPtsLXRjDOUBB/08hA/fuqFtR193GSNgyyL1

YwPWRnlpHRHPSQyISoHLMTIzjCqga57k/in5KEkPbY
ThAc5DARzEg1lA8mwXbKTgi4e2ttQ+R7VNx1rr/F8Yufy4Mo13fKtk9l78L4hC9IPkMV4dTILoHjK0MP

IaS/a/Tjn1MHQWuWkqgob/N3bX7srBxtPCjV0678rlburQGigjjXF+m6WrEQs0U5U8KE0RdZW6JkKMW2

htdjHgd1Woq1v8p8iaNWedwbrWwkW8EDwZyXzjlnHf
RTsA7lqChigxLWubTlxFUIVCoeqxW8DCmPrAOGNp7oEnWMBcLcXM4MMMrAuOmHmZtswlxEjL1ndqFfIn

I2uglk60khnAU7o1CV0LYmsRd6IpQB2spiWONtPhxFC/1q64ask+EK98PBmr8iEPWi71C8FfqntNomcM

DNe4ZyboJcxQ7f+tVyLnL5Jvm4TB2DvuTKBgxEKDEx
DTH2dWvYmqUdH5kpQgPW0kpeW2xZC6+8PvAVJrAOsgwyJxKxZw2Fen60GfsTVWmhVD2I5h3QH2U2eDc1

rvQm1f3lzQ4+c3I7vdcsCBfzJ9u5okWQIRxwIDkpbz1K+vRkxk/G5V+Eymd5GSubpgPxiCVkCC9XJdMf

IS2osi9XGSPaPTVePxeXPmBlHGeSOsLcWAEkWJerZM
8JZNcuGVrcYUAoT3FwjvXqxNLqCRPeVt0RDAUxOW5IETHwaKKwGYSkEsMlRYHLIcLzBDMlZTBocTMOz0

bfNrJuUJQ2BOHcEhwdTK2GGQNgHS9Bv876AY19daRxTeHyTGNSJyTeRQBnC1DsaGBdGRiuO1UzF7RgLF

5jcKQoTT5eyHXeA8UvI5KlrrsbKWBDZnFaIMRkGTqw
ZSAvSyBmYWxzZSA+Hg8HZHG+Bz9QXP8av4TgDOitJTDtBF3jdm9DYNU0DA4IqUn4RUMwN7RuABLrOHQk

x5KnSB2VRA2opEqpZmX7Ly6+PHyjSVX4gzNhkM8DMSGqCoii46SK/96Z/g/3KePMWCTuDq/zqJmRbTV2

KyuyxGSmU/eRSYFnEp7HDwBh/rZP5dOl2/BWIO+QVq
EziXTGYd+0k7gJCLZp/CJg9mLs6vyg/6f+LRKRlMmiv52cr5xF4ZO9r7UVk7/7F+s8xFXynA8m9h/SNB

lA1xG1H2+wuv9Xf/B4H/dvommyjNJktezfbYdp/vck2lhxs0/7jr1++iMiJ04yMXRk6AnBy3S+esD6f3

HOIqzkCZAHHWX90Iz7liTlFWnWKlLOm1e9RHo9qCoj
VkIh3QomaEJIjEnVgEx2pnfyrotPNIhB0mDYAPF/qRe2rXfa2z6oVDtIPoyjnNPVw9XazWgWEQxmdLAZ

IdyUlWZecNLYNQ9lWaIfBaY5JGkBpHKvl6UMUEjvqNxqqiu4MJo1hJDuRRsaIdD5bKGZMAMENrCLcS4h

HQXdLEtRpncQU0c6IXGZF7FnvLKVvml2TK2Y9WiAOl
aLMbpmJQpyKyyIeprgNo44lKwOXxHXeViABGTbuaI4edqap3o0UVqXhgTdcRgOHuEY2/4n3IsdOg25G1

XVvKNmVpzQ2Swz6REFGA3aFiBRdpBZPsVgH3/BrWWwu+F+yWeSJFzWxZFrYPuLae2i4ePGcCNrZQmFE5

GsrXknOmM8qE/zXjZB/5tBjnQDZ+z0HKKUteJ1TUJT
mDeE1gRNMAUQAW/spjWtYm4Ip+rAdBo9/9j1yvlAGwiKuSrxKTRkICAuGXBlDMmNfqHJlgWS2wo9VPtO

8coDCMMeLDWCRYh6T3VoCzt0w3NEN0H7iVydwGaQ4XpdviA+Yz97dHjGF7PywaC3KJcixdphhgBclb8Y

CHsOFB97Lw8J/zFpP0gFQxvwSfdoL98wyTI84hHUe+
ZKaUuqPjsKXCKXXiJCvEbijVB2X37FvklIDhR64yGVZWzR1GZR1eSBZc2j+DIEq77rvdXr/A0hpnVKSp

2PnRE04OKjFsK7eAtrCiicTFqR+lbtZXsRz3wTLDZPwqpObNtsBAkNBvLE2ZD0TxS4w2NAesJOaonmse

HagUwfMuxRtj/btYNSPXCGsCojdAU5SXT7NFDSf2IG
UO7bMFtazNZzOiKrb7ChHB1C2N+XSH8BZKLgMqWQWfwuh+Ns4LFNAJPE8kDSAtHBD0sA74pDJbJMjQcn

e8ozdkJRf9Q+GEi5yT2JD2t1/5jKVZoMNVRCLvV44tBOcvNIsxLw0YjY1Mom45vK/oXnTA1YxsNKUgjj

g0B2lQJ9em/AgDjXv3UTxIOmzs+vhU/xWYvFtKWiRY
TzxEbI0JyzrDnACTAAJr9ZCnGDNCg4w8h3pvcVOP+AomLUjBPtXKBmBT4mzbfab8zc8SuKuTonPZvJFD

IJ2eY/ZmNtLNdalRVqzfc/wpUQNb89ZolRIwBUBpRyHjofjXEH1WgasvDp9oCZeAjXxg4gu7gidzc68/

DGinrODklj1QsLwQCF58sJuwvJr+jjCX+Qj2uDn7a1
hYeWKu1ZGGu9VQS8GOcbIx4tngvF4RW3KrZ8JzDG0fSk4IXWjVvr1YqzSXpVNJz7D2ej/5VNnezPA78p

a3ub1imapxBtIecCBwIGG+avJv2rqvkCz/1O1NwGIAN8nDg2iKBr5/jvD57hPeyeIZ35uf9EYCcBdf9u

Yoxguuidlhae9NBpCaATDxUdhuRwoJtYtfhQkqaUtb
abpOZpxZLC4/5M4bbbmQxtFUNFE4s7rjU2RY6j+TojkZfcm3jtWFBFaRg/k5OxoAd5mluJl2qem2B9Eu

ZdBEvlQdlQ3/m+hPb6JBOG+fUSpHnUC8uC+8a0s5FrWsXBc2sL+G7IrP3amIs0hWua4nD79qyTDWeR0K

/JYTxXfmUoc7rGDPtU/IdST8hy2PmQYX7ynBfy0GBC
JkcGw0uiOJJCoYijjO17Isj5Zf1AHeoBOmqDnEgyf0w2iqtp1yMqQQBIoU14YXyl+9cU/XrM9pdHOGdv

kB2IJr9bqG0DJlcRLTCMTvHF8IqDNhaJxn807CQunV3i4lkFhrTMEjEOuMOSceWH/WwTzMbQ6CR4mWxU

nIX0IW/pyT2QTytgTPiQa57OArrxlHtRhbzezE7lNB
SENHTDH8mg0xbbWmrzFP6tcnkfecDhK6/WoOZwwkm3Khv8msNNf9jRMJz0x6HWshmkvLMi+BabQxD75X

PpZgAcF8SQiTFu5Yu2oPG/lpgDkACUP7gejIjxLdWxLspFkPf0YW6mVUWk4T6y8abO6kTDArnYaCJcGt

PMYwGCz34PYFoZ5MvWNVrqXm2ibAhUrIXl1/XDarvp
O3dempkpiewAjInfMZZQ5jHcg3h8jTQU8Ja6dMdZWw5hQMtYrC+vomEGkelq/kht6692wk6csyNYn606

+xDwR/OtgRFVBMoLe04dctPQXHRpZwoVZ5P7GPSMO1ei6ORuhf7BX/cgMzsCmW8kF0dyIOxrrd/5fl0M

3H29nRhkceYOnoFm11fq6xSqTVMi0/yNPSU2q06edH
G5lFAYmnB+UYDZD199lMUKbBbRWRghOHL/KJ9+GtCaDGVb1VZV2s/KlyXf/8QurgfvX//w9h/t3c0a1z

U1+/Ti9uLm/OlGe81fz3KwZBO+4THmQxND0psG+Ols5EsY6HVyMcr5QeG1FXRZ4FlBbZfW2ubcWcRdlU

YZZpuZNJStGMmMlwGx6l3gAhv1JuN/0GvTm2PXaKcR
G+PjVdbO0rYkDVjlGOxZyohaiEcHoOYSWhI3PwNjziKbNf3V2/RTNN8e/XCLb0ttFf6eNpHTs6pCoV3q

/y7IXaJMj6P0cFad/fy/xy1CmKHEgaWllbdEh3fcHPkRlDJTbsQWN8bbwXelAAbRcaSWaTRw5bnlq0Kj

mebxNFxW+iPua2dkma1cIyULBQganHEspPcH4MtHXb
Px5DdmOgIWMJchj4YcDmwzPNacRJl2Y9jay56nOUx4ga5Q2y+7kQMKHaOg4yAHArgaiY9VdT6em/lfnJ

5ii2l/dIVFoOS3LOTwyQe8zL34GORXuXUIPCpn7FLb7SFQgRwWJaiIWtuAUDIXRdtSyc2JhxmZmvJAMh

t9cp4xbGavIFETMtNtCcQjIY1lFbb1nBqDJez+7HeC
Cjo6e5cyRNrApNSeY/velFub8YD2Q++ymKTwpMrBu4LulbIvCxQO7xtCKmtMIoUDCiENYvEtD9C2n50h

eGHIfRK3ihZtC+TVMjb0CcRNA71THebuc+rTt6JRtxd4OmX/4h8lc3lUfcvoGExaa8lMl+8CfWV+Uitj

nMzFp1XgUDkaF9MqDEJs415r43IiuH3B0p1XPtU1dG
6tMTrXV4elS1d3G1ooOKiWHwV5BzWQauj2TQ+OFZtzbgEbK+Iyk6HzDRoaYHAXFr5VQyJw+7od6dE0dc

oeacaXliWb4wT9N/5/dm6/N7eZp2Z5j0WJYLTYCyt8+cKsvi46IX5zp/gR/GmmmwZfdn6BMW0xq3NzXD

EcTCdczxBkVarLJqF2WCRun7GlFOcwMFj4AYmuHPSj
I8C4rVLgZZIzBV2YELIjOW1IISTiwxAgEeBgFCRQZcPrMQMbKfSsu4KjY8YdLAEhYQFDFLsxRDLaP56m

JMxpQw32SQmiMBKnUsQgPJs0Xh0LFyCrSPYaI32jtYQyoIRiECEjZUWJUXvsWSTtI7pef1DnBAx5WW2G

AI8YieQkg7GhlrQrV5caS7WWMT0ROVIxT3IPW2RnF5
jeYvElu0TtF2klVgMff1CuLc6WHqNaVo1OEgPhWQ0bba5OLJCgAVZoSsuPHqEeXOqcKerazFGqEY6DcH

N7WMEjT31kOFIcBDRoH2XeUEN5Niz+Fb8AFMSxvINaEQ2JXutB5Khee7n7KX6euJ/Xt3UvHze7omMDBN

X71KTtjro1BxvaiM8cPrU9M+7Qfz+SE8zLFDQjXF7E
ebBORH+H5+DtBiUgGTtun8XLZsHSsW03QXLQi1Re5IwjM0jgmsdjjkFqh84eqpnZC/FYg9PT/Fvdn8oa

yRj8no+q5R/68Hemau3O62atrKqRNp8cZ2KCxz7BpxPx7tAWUQ09EHtQIXYftpnXoy2a/BkR7uwT8DaI

4AZxGeOTiq6GTrI9CLaFKSDfj/rwGoz+OYuMYfLB84
YrchuZRDQHo0IsWrhaFNvfetEw7qa4fH5jDk63+JNr6QsupJF981HNxdrfMH6IBKAfsjBoCbRzXHeL1i

2E5w7i8wOtukSn+fvZ5F4Me4ZsUV4JkKbElhanSulqdTP6M4jY1NF1RzAsO8+kPiQlwgsfGdz53QOl3t

YSstVRKZLaUN1amF5F1FNIlq3m5+m1sd+NwO0+Axj5
AQzBFAiIZDtaQQ/uu+s7jeuAQG78OFJ2I8TEf0xhCS37TUIoG9DpuwKBtjoHsLCY+LXx2Hd7VdM3x+02

92Swr0VSMOOvP8o0s5S2UJC6uM2ZlfMreU6qKHkuAfPvhJzFxuHEf6WpUuUw4hLcIJSvM4PsMYE41I+Z

IvrT+fYVgkhg7acE2eydGMkTxQY+J4WihthIspdYG3
04ozxXaA6dDI4YhUXgvLiIYgYnzMuTy5anb7BeN7JFSOZNK6wl/OCB7JUcaoG424M+s2tYVHME3wVQse

CqsbKlXk0U5yqElPbBBEdU3dzhXwmGyDg2dAx8UESOUn5Gtpd0UgKx1ToIL8WZHtNAlk9VVQOQVzWVNU

dTWXSgiGOLnEpLUTqML9s4k2susQRWmFJAPqW4qEwG
JNyUkTPOedv5eV3sQknDPBcC58X4mImA3bMhrwFqPMvTUGK7SSIPbGkUHFhAAHFJsSAZIOVrL2KEVV4d

401wpqHVNiLzy4obkE4wVnQMpUxNEHVGRrWrfZ+x4FSZDJjD1CkrAX30T9K0NZFPH5PcnbI1xrMTTXkg

q38ZngEkTpT53bG33kA80GtwZ4mfmc0Q1o03Ik352A
0/ZF17E3WrLZg0s76o+g+2CncLKZVjxLO27zp0xfOhwAG7W8JrpbEX9lNWRewjGXM9Sr04Bg3GPGaa3h

ZLRxxUInAUiIsMobtu3cJpAT6v47gHKawZskyN46A7Lgdi0JKUjiCwzMvEUtIf9iKUqytM4j5xZJsr7c

xHjJ7bBilhFFZfeeEv3f1l/UdTL8Dx6M9zPLb5yRIV
kHsJTTp+0RtFq9i0+g8Rj5nUGLzHQXvX6DvLQklCjCyvW48OgOIen02naNA9DK6v7SdbkEmOZYb4kN3N

AzeBF3yb1XYCIpGxjHfWmJQHft+6fQ3Se0ivbWPYBOv7//qUtVV2UEmiXdUMbgirC5J2pckZTBvHds4k

CaK4G6rH3kgA4lDrIrpFUXHHZPvlCUGQOVqekNp65z
zpbafMN/lKhOM3toxE958pOQVj0tXv3ZsOWsqvqF+gGN1B5O6STWvT2gf5Ql+vPuOi51xbUs1lBtgOKP

oi7yC5hMZWvfXVZeYk54YjHd1TdHI7Kmc8ZpRdFotg2Jmk+Pq7lSfhvYvfG58Am43E4jJKVUjLiOQ1Nu

9e3V/ejA58+rGPH++Exn/7tdIqeqPFNla2lbyr8Iu4
N7T4ErInCJ9pj1i0vPPh62eUfbtlqCVPMOcpZUliE+ukfSckzh7pbjmvHMyLgGtVF+0jstpUvM0GsKUs

Q6U8FeeBDrq48fO5J3HGu1iDzRTi+20LmIiiZsewoSi/MGbPuULKlqRHejl5CgtrhWP+P6xh6SKZcaC+

GReqf6BDJKMzUwEJTZRJGMC3Efyq4f6dvEM1+BsefX
12bD2fj9OOexLafd263HiC7RrLCsRNU+lpB5LT0XEjlGtTGqWL1cbaPvfqC55U8hzbUqH3FVv4cWxcRL

JRqpOuAd+jpMnf1ImTFhiz74/fDaJ8nSC8nIhVfzaHeMTSVkUb8texUv4IuZ5h3kBB57kIA0JQ4pC2O6

Ylq0ktWgY2220w+5H5sFFQILzboI+H1N8u3y8B0q0s
+S+MkmiNQIqiolLtmHGtNY6LTtSoEX0vvz6SZMwzGUJuFsmTJzRuTTeAQaVqVOIaPHxiII2XTRccBTbl

UGRiK2MkhqZxsHSzJEHnVb9GKQZzQH8PKACtcMMaNHYqKnEyZZUTGhJrTYQlAGYvtWDGk6vbJkNgYOV0

EIYfSvfzAU4MQIMuZU1Yh491KL20ujHtOvLgGXWUNw
SjVRHcZ8NxfDByLAdiV7JeM6IuBL3qsFFeWL3epCKpW5DfR3ZizzfyULQSFxOlUEFfBNwwXBUdWrWlDC

xzZSA+Zm6UADL+Wm9UIM7cd1NbKDgsGZVnRE8xsn4RDGX5YE1UaPx5FMJxS9ZfSDMyOYDsk3XxEZ8HTM

5vlAtcDEW7TW1+FQdrDQP9rpFdpF6ZSTWhU1nn81EE
fS/Da4lTXluVXCf/TA6Z77bwMPfrDUbe7RNHwtjYnMV7YcJJH0l/26OnoTRqxLDv2CNwK2AQ6RkeT1np

ZyUaIf/bMvnFbNpl5q76whhGF6s2/GgabeID39WrnwxAkftNKvd5/LtkExHXy1DpRP+ubsop+mM0rm7/

HY7jgbvN+OZ8X18nvtHptn9g4y0/nZSTabl++RIdvT
sDkYPdaoPR6Q9f/3d+JCfaQquIzyYlwX9cCwp8KihB3qKg82WJHB9+EEfEto1vhfnQnyvPmGqvTgd5vN

wppLcu/BRYlVsbLRg5ki40m4O5r9c+4r5bAixSvTiO0mQ28+kAzrAP6JyP8+g6M+5MqhPeTwnSYHSvUj

xk8Q80n0pR07bNpcAvRr7M9MruBUZDxOkorQ0iADe8
Rn7MPvDsFoCH62a2RHyEtW+iSI4wMuwnOoGuUKTk9zspT6fjlKFYv+7TU2e/TjP842lFIY8fqSNIZiFg

3Yp6cF+t6l9CDWCzUfPbX9BcryKiXnhDhdcq+5MHxcSityYmQrSNiQgpG+akhO7FwfcwpdTQHgypCmYB

edjpBHlCJIcc79aAbcjrEQD1l9f9DYAJUhQwf0A+C3
PxJgh9vKt7ibfesCb4gaYIYhpZCAMGWiLOK0ita8dYQ9bVnx6bti24n1whPPP/kZd7UcLzphLxk1aDNt

hKHJ2ssQ7cWC8IL0vZnoqajdtFHUF9lFY1le2J0Tkc+pBKyIhxxNjCtW9nIK0ips7AfFM1y8oPJ0fqaP

dkuGjuN0pEvaBbSW2nHgSYyqAiTE5GP0RCh+BeODOu
Cx53nsH9jCt3mvVhtoky2RJgaD2RCSQiaMplWXpw7omkMQHuFRQ3w7zOgrfLLAMc2MakFo9Z5T2puxo2

fFORAefa4/A+C8FG8jvV0iSPm/eO4gI9lFOOyULDDonk+2e25Q7e9wXYMxK9jwAezbJqBzIgsDvOzr1e

bJFuofIvQBHjC0LbbIMpLNio/fyNV7K5cTEmUMEEUI
XI/kIK/kg4EZDyHUVYSGcy6iP3wBCDoDGwRtULDM+vxAXO1MEM8PIOaJSTsPMP2fCFG+6CwM/YaHsKnQ

QsQsUPOqt9OGyLgMgEEyY1fIkVzvKYWgOscW0C2arTw6aYBm3AHjTDe3OmF7cCZzHNMpgQu5l3kAmJ0l

K+Bh0f3KTUErQgswePvuCubDyHYTIABcJKDBs3aveL
7E/a4ks0m3jr+it22OYUzPLyRwWz0eqY8L5+UIzFbl4xqk+GtjPhYThnXSpTJ3rK4jCRbBfEv/DX4AtT

Uko96+Dn/LfGNKquB7vB33ycPM7O1yf65YeslA23HR31wBDvEsgtwdyV9VjJKReT7BypNJni5Tl6stoU

6HNYpBAmg3+CFX7ZBCkFe04iuoKhgWDM3xvqfcRH3q
Jnm04OycC7bPMCOoPvP3rc12KtqYzsNKaxEgYC/lB53dWtrx+IUa8JPqHnvdErnJ0MlCkoHaC5bww6J4

0s1fFBsZcJV6Aua/dQa1YIVgTgl3lGfi5WzMQUycCYCIzXv8CHpTPPZ5Nr8BcA8DKGJTQerBQjHTHgoe

RwGo6F4YiWOlV3FHeUKiTlcyt6MdhaswjYMhWQVnHK
UoA+eJMeptzBlYoYQO3frcJ0oaqi+FIFAdfVEZsUvClOurlA+7BZKEvinwrOIXVtfcryQFqbOTHomH8R

FRz8QN+CE/6GlgzqKB5SlUvwgzKOrHJOu8etJyeiMrNGnG7gltwTQ3LduMxBprPt3pZdaCzEfWJoCzLL

N83AHKsPuUDgNkCc5hFu8gubZZ1htgW518+0LppskA
vRwC10iMwxKhnmsbmFTMZXcz3BKmQ8aBD5cDenW3/WN8cU+G+wFDZfccShPKhe12n9eSSxpOzgKgr6rl

+wVBLmPUncArhk8UNKs03YO4gnUJDUqbiZlzeq3BWUrkiV6MYLSiK0HYwe976h67AtXtrNkiLQSIZO73

VbAx/yjkGsIHzAFrjM175b24sgANhOG3ufRsw0vsXt
RmR/46hVkv91ssAFNE+24p2G3UJapdi+4tG0Q3oEoeY1h8926I/ze0oWBvh1ZET7Ifo9yks7A2mpmco/

FRLEZrhVLtVbASL0zaXrzX7XBM8cz1XlARfsBPDgOD0aah0OGTA5AO4XWFDaKL0VwHWwB9QtA9MSHlGl

HTPzIVCkCZ16QFCoYRGBTEzuDRRvB3Rcr783ayIddj
MfHIVvOe2RGBToMU6XYCFhTVNvyFDsEMJiFMCaJkK3CBFiCMhcKIQzZ2IporWvapTwQNU6XCYbRr0IOZ

ZrUQ1Acy75aCO6LVHeDhNpTNDcjiOkZWVznfN4YZ1VMiHtJXZ1mSRcEwaIGX1BZCUlgMQkZT9MHILyoR

NlID4+DQogID4+SOabtfVqNjtUIdTmHGNgg4RmAAmj
PEs6U7LkoTSojwDySxjmlYKDPHGnOYImH0zncxs4sMZyKXIxGx4CXiLsv2MgKXCaOZwSev2cTAKgXlS3

70z/r773f7c05KSN8tcijKl8s1TDXffd4dvJjqDBxPDFYzVlZazKIi9xFpvZlQmoR/Jwi1z78k71T/Ky

CNnXR/EeHtswwA1BWomFdvLJzl3l5q9hjIosc3hnnT
/yaYfbtmstqT753NMOGm8bdks1uOJGr443nial3xbeW3eaplFJUrC2sYo+4vWqC4se0FobLgkN5HXDLS

hsr2dM8+Y7Gkf1hUnMBS6mYpmGNPg0W7FCVyooAxUIcASg7wMb/jUH70rYYV2FxBuCGGwijRWLKQ4RrX

crJRwLjXcS9My4NmOtlc8+5BP0mWVMomnFbwF9cECJ
Q0NK1Nl2AneuxAyQdmI1PGk5bv0ILOFEir27pQw/zIYhXDxO7VqiO5jZrc30lqmapvhMkDLTMG9V2HB3

ztfmByd8AogQjBhLSqi65VjBRkpFJcp+SfxsAa8EN4R66p9gOJjpHNa/qcOgjmGbNZIiLyH5UsTNr0/f

vKWfIGE1a8IKOYkBulRP9/uFfjEdfY5l5+MWTGAN1x
Q7hfcBHK4EG1OnTkBeuAsjd3fqks0WZ/FjK6F5reAax++eGVf3N5in6/hF2rhfpe8h5KdL45Zx3Mn9NV

OJBKNkLR7wdFELzks5bPbgs5bsGSKANeAOLKGdmK8M+CJp9l2HGCfnGBKEgkq4xkhzgQfB4ZMMZ14bNR

pq4B4WT6PSM9WhqDWMZGODOFHBR5eEh0iTzuY0sb3I
Hn1ysCt1vKg00x01eZn89gkvbo1ZDWejua3xBO0mIGlH6hL4AqOjKJWZpS75rmNzAV1Ai8ZqugI7tGTz

/EOhJctdWwFUr3gOOQ1rsNTdb0rkT2BNGiV8FqrvDPlFg5BIiAX09+qfgeK2Z1xGiHiofybKRMxoiVqK

VxKBMgbAss5uhQl3ojQJEpLSlcwYO6dy9UA3vCJQT2
568MO16sTB1IGloI4SlhYnZzecC88PAYCCgozzgqmWF5BYTGWdnPDcKq0ogOh6tVKiZ8YCpVkO7t1GZL

PmOuUAEdXCEEKeLaL8RhYEioFMMXT7Uon8r86+pGi0QW+46GPQ1JwWRQ0L/kuawv+PimVuyCe2L6dzus

z8JGnzhOAcx9WhjlmQk1eYwI7x5f+ubKr4CePsWLbD
7PzVmKZZDyITVknJ9fgKSlb6sOcrQo1FPgCnOB0vhJfQckVrYWOyo4iGuuejBzWqsu4kjDQAMf9/wwaj

4dPsrP4COjnAVIkj2/Umw1MF7VdQxNLUQrvb13uJV2O725AIn5bMhPsD0RSOs5W+hMqRnPxLOi+lilvU

sJBTtft5hcCPFj65Gzf7x0Fh0n2UXJyoTXEJI5MjaB
iLfawYS34civvBzYPA+OsqmjkPF0ZTU0QOCrxDlU/4cB4HoD5VoIkNNruRHx923FlRpfco+YwIXEDl5p

Dc6wT2qwdHqhYVCpnF+TqrUcQHWxpDT0N1tikvGvvpz+gUhZWZXQTv7fQ6/82+cIDNqvFbT3dEDwrrWk

/fQ6Ph3Be1usMFh9WjE0GdYfwyFBW+GJQ4FNE0QCE5
w2T9PRezMfHSt1+F1oU3tDqLN/A4dRTYv4InZ6jqOf1gagdRMkgTrAJEz1/bWkIx5S+X+PT4VZ9xslum

s97e7ko4vxgWwV7le5v5ju+w5pPbe7R/SZamqlYAZRVRHpZVo/sI/54mOtpyxmkNSCy2ypwFAsYiSRRD

YSIffia9w44rxG3yQH9TbvaJ1eW2cbpeCGEryjpGN4
mW64dMidDAqaqhKQLl3zL0Cy7MGStrqaZrspqIK2Gm45G2QEYsiyBxgmIS6DbIcfv1JRYU7kOs4dWTqD

T6epmiYRsFqFhCG4Z4ASo3xZufg7VYLln3YzSYON4+R+gGThTA7EBJ+6z9PednVcyHOj9Bb9RebJs2VK

6LPf1K4Rs8JP0RON3wt7UrCYMhBDhzqtPtBcqFBxTx
XALrc2CxQCulILc0GDjgISFzY6I2uGLzITNwHT1JAGEuCS5NVSTbuvZoOpZqLHEDGkByNNSjAcUlo8Al

X7XrLQQzOGVINYtnALRpT83bCXxkGu92PNrxPCQuPfMlGKc2Gh6FUhAgASOoY31nsFPepDYdEpOgHGYC

OEfeNKTcF6ekk4QhJNd0WK9DWG4CoxKrn4JcetNoC2
gaE6BYFJ3OBOXfA5AZV3VwY6xyDgTcv8QrT5mqReCod1GmWn2UNoUcOl6MIcPfVM5bcq9IVxVzLAUvLi

iBBlBrHWsgQlmgdRToDB6AvGY3DPVgM54yHRXkFCBsP8QsHDE4JWH+Vg1ZYVAqlSNfRE9YPbmL5Bajw7

s2EP8+LD4FPy7lnDa7pDnPNPG49yRU95OfpKn9CaEw
F3nsVrLeTm/2BVJhuEyCFVWwuGs4gV925G/28S8ATAwVJ3+KUcAcgDV5e07QTj4WT/EsC9DRBbchcLYN

mDb3/YOqzifpqCYvX/UG7ohs2ZIs2ul/WN7/0R8+1Aj6h1Cgc5O8O+w5gbM1Bs3rHJnCPaCqGXf0KfSj

lAX11zKNvTYnhy4PWl21Sj8DiSNWYHQlXFYCdsnjQZ
O7SnXyWJ98bQNJljJKDY6hiucMdjywQEYOXFRj7IQxwvc4Wv16QPQy2nz9FMd5nBLe6uTk3ZSidcxuNm

jmKXYxV4WgEVdOPuJvYKur9i9O0AYfUOCJArTeQoJem7UDNZNGtu9FS4GuRyETOCCwQkiUNVh+UE3Ch8

wlFoUdMi/lxfjEINRcv5AKmIGUPh2ShyRrUs2AFXI3
SjQ1WqCEP1iWsW3UyHkDCNr8bb6GZhgf6gMYfMSlh6HLjssKzoQ2ydupFZn5UUPREm4BcETwzFcYdRVd

piwwXChu3q47Mcx6gqAd4vzfoWJ/cZ+wiCYMp0k+V9uvAhupQ+M0kcbZ0CXw5XkZmag3nDimGLzgNfE3

F2ZMywW3SRKgUrkkzsfkbP6ztH2eFGdxfNJiXL2600
AuGYfU3Os9fMqETDJfJG2PQvi9k5nDlcRzDE+lo3HXTld+RQidCle+PMoKKb7dT/Z8ytRZuXv+klk+qs

cUwDiLP369LOIzTdEPqfFTNISOGEpHFTbFOl4XrwLI2PlNbVlDU7fgiAW0MCkpwoLHRIpq0XUaWKjtU8

XO12L9OHkHrHVt0CDRgWG7cmOkBOYBhQzPPfKiPYb2
ZBmXoi6Mw61zTHVQN4Qemv9adH1hHyaVR7h2OH83gfsdJ3X4J+GcJCajgO9FRc4pW35kuAputL8ePGJL

Th/HBmcojfaLWawh4e+dKNSFbnfV62T+1GqjukEDdzRkfzRCeTZcMg5DHTfcPNcN6trhCbKs21AvfYmW

q70wtwWQj5L4e90K1GTA9gmljQ0LGLd0D4ZNwiefZ7
wx9CJEBWJtnHoXo3tby+bSfKRen87rekL7uwXd8wJ5ceXcyR0LR/zdJrYU01rmxiPVXE5v00D46NJB/F

5Xmw99n6NvasBhPHUPD5OSlu9oG6WudtA1h7gcdiZOpI318rcH6KPdKHggaW1whwlrhIzB9esvALJMQv

1niudVUDmPKSG21OEp4CnxnDcPvNjnqLP56rDYERon
KtsJKeQycNlIlcT87G182Vo4gRJzJb3wegYr1Bj5QnBzafzxxMukEpfpuGAicPhKPxtsE2jL00Si1KHd

yaVS8PO5wL+6k2/sFAeqUg/us1E+yUfkTZV+Jj8Cd1AAubAwbn44TRODsxsz9Azz4SFUHoQQmMsq6Ug6

/0wC3kgtDnVS9IHpc3Z6T7nEqTLVTucmWplpCX4nc+
QlS4fNM6pswsT8yRutRRdq9UzzEV7MLnguGI4KpWLm9jzhxgW9MCOjvTJaYAOEjmY5LfXvFCAZoZmGvx

hwzvC3nSMagjCfRaC1pJjWq3eLBmXfoZiDxs/IkWTbq+Uft2VIU9VAKzzWAeMyPZRtHHB76d10QqUwg6

D5OPizNtZ7Qw2qy1tAacgPc4ricfHDheSAuDrpkv2u
XTgY7zit6nuor+wNjNs36ySFIWWng/fjYBxSDbD8tcv1gVruPbows98CfZz9eO6mPF6yyuRNdp02qLfi

Pi/YwBmkqpQkkf7q67Gt6mrwHnvLd4cqUzwtQh6baEOU1BNSlhQLC3Y9PUCYlg6IhV6TQC7WxsZbgsKl

Au138pMlgHGl5cmdT1w14X8dAGVqS9f910Ck8ujDb4
jsAA6Gv1w69urjArEVrOMYbnKhKhO1E+MVak9yqTZZREXhXjSExUwY9Ml28RNUI67PYC4azaDbYtVybk

6F2iMryAEJYpOxy6dAOI/SG49VDHNAFkKEJNY8a+ceD9rCekWfZ6oRrLl1pu7356P/Ebci5JMmXwXmRY

QFfMd9q3hPy12tUKYACuHXl0v0XOh6VlW3Wo7/cwtF
BkG58XUQDhLYS+Bp6CYWVkaF7b1+FHggd0due/SSmtLomrCV5FSuYrAuryKJOEAZ+sr2exv5Y3WIMuxf

gDfezjK6NSaHxQp2S7kwMgeXUsms0xsmHA0Y0voRk6g5jNQdCLh0m1zrjcECC20iSuHSwPZyHmfux9D9

uRSbRT5Kh43ZTU1ZD2pdyZ4YK1Hqc/0H7wCmh3GYjU
LcVpRKpHLEMDMwwZbneA0hDjDWDrNUP+dOLATmTqYbxmE3t0zgkpmh02TR4QDaj4EhXk2Uwa+ogKd77E

HyNwS1h7d8Yi6BnjxVJz53HlC58cHKI5s7c0bSACUO8hkt79Wgv0q/SLp+69Mn0UcB+2o780KFnutoSp

sWEHirJ14dFvsSp32WuC6kBz8OEH9ts3CpOIIyVPbj
ziYjRloZQjLkLEXaw1InDMvzZQi3ROdaYWXqG0C0cNXsFDQzTH4ZRYFmQV2ENUFarbFwXcKnPKKRErSg

UREtAbKpm8QlU0FzOYRqVAPEKSidSJPbE31dTGbcCu43WSiiTEAdClOgQYg5Hv8AUiEqHTUrP84omVXo

yIZnLtWbGBNFKMzjVWMiM0iny9YeRXx8RZ4SAG6Rfd
Ubl1UbfiDjH2teX3RWYE4NHXAlF6LUS2CqU5axZxOei2XhD6cnRbRin6TnXv1AIgVkXn0TTbJhNR6gwr

7UKjUuABCkCgyIJvPfGLmqLomyuNIlSR2BdWP0RBAuE27gKWUrKLHhK2HqDKApFeP+Iy0HMBLmcZHlXU

6BEdcW8Piiv3v9FB1+YP+GR3VtAsJRyc1VMiJz4IRL
29j6uYzaY3LFRnvfPIfBjoAlJkmCCceqUXojotz5CPnO+5S5nrr73okintFOnL10DFwhJMGF60+3j3Gq

sRjSdPpNwGssf0oxm23w9C9jv7OJ3C9bv2X85cc3FDTF7DO+Jb9y1a6jp6/7kb8j4hA92k/Iyx7APby0

eVtM/8lxl89y4u3/t28pCPgfiPUYOoMi1hjkHl/gin
RfWLmDER6sdtKNU5h+YrhtcRMnZIYH4G7p0FiXfcsRJ9WKQWg+LGOQHoH0MbTBZBI46XN+oRWH5nAhQK

1j9+tLQGjFPbq8jdDPNZtsDO6hGE3JOAn5RjJBHefb7wwMsDk4zNNXAf9np9LyJvy/RQU2M+iRlr9HNR

iwmPlstiGFKXt4PQGBvIs2RCrNB5oKKGN2Xd512dNN
kDmleK+wPu2lFSDqM1hPXNBeEnE9mwcQ2J8Xp3pz9QjjLJbwMMznpnHHPpTf6dgrNZsQvCgoQUwyJVzl

z0PBBNhuOBj0rCzY5TbwESwXRLeiNRPaT3y98gan9pbjFR0cQAFfiOziIuIBhTUCaCItmIttLg8GTSY8

qsTOGwAPYjGLf0QvPR7XUbj35gF2lr9ptJ+eyjhkeo
GptHeEtENmEDi3RfVwlHNglPSAIDMpNS7PLjPFwQDvZDBKE0fPEuVVLZ1MLm5aHSVZDEQ7tcoAP8vjm1

D4wgB4Yh64bnA/KR5ESsTbXJQLzz9XFCiBNsCe7OgecBCEwyV+LxYPR5AFPWuaCdQNWVUkPyWthMVPWq

W0APTS2C679ZfiGBYFRF70xKvf/FHjSQGzDn0+CWOD
8UO/Fs4w1AloVMJHtLlAiREfMz5F6JVwNYMcOMRqTanQbmP+Q3+R7RRLfDeRhryKBidNg6hHq+uXodnk

P597oRC0JcOvuQy3NbrHeZZe6POP0/DpTy8BZ4roogei4UqSt7lu5q6kGrttzY3sh1ckwtW/XzbcVh3D

RfbWO6Bx6sSXEaQ5x+ZbhnJCEFMBEsK0fUVe7NjcRP
AHyygjJE05F6WSBO4crIsfp7FY+0FjcB1W4d6Gkwt7Q29jKFVvhDr6dfR4cInc5oN24DcAZHMgrO10iL

4ITxa3e1grxzd7fDLDobqOo4vvUxwaFumoQYJaN5sLCFYjDQMUtSTjOZRrECEbAWDG53avGUa504YkNW

2dFQsHk2qC8DekDBrGAR9xR8eora2yHwZJTJvIKTSt
IXzN6als6fLXi7vCYXvI4/lHuBhYpaBQyqIPzH+JGXIUf9BH10LsEfXk6cqgqJJ3zDvoTsordov/9+se

PlMFk/M4lsSU0BXguT+KLgnnrXvJECYhTx7kNNOkf3WdRqJq/Pr201d5mDPIzaWBgWSaab+0YTCAiQoM

EvY3YkSWCFjAVeDG0iZrcavCpzP6bph0VKxQ4W8N8+
Cih6Ag6Rsx/r1rQeygiJwp/G/TB9cVa+Gj7Vnr4gtwwjbQ5/WR6cKptQpMq5+2t3qdg/3dLdHdfbG/0X

vVaa+Lw/2IIUJo6Ae/tpa8qkiwwZ2VErAQVaCdeYBGRK0wdRgC+/lnNyuELb/RC1Yn7q0gAQzG/qfcZc

uBIqEecvLZRuwreMRxu4e1NKBrdgJvaQRi/jradLXV
sy4Cq+AEM2Qwyvw8x+PDCj7jMlfQuYogVdgX7GWq3jyf7fAcVdbjW5C32OVwwst4AQa7dWzzm2dWRg44

SJ+lYLj/U7uHdgQQYhXBu6ZAQJbaB3iQJdUQpwLxECMX0NOILHtuKWUNVrg7LD7BI0KR+RHNUB53qem1

B4RYijNEy9bMwVjxtTJ1+Sg2iAM6KOMdPBquAewinV
s2OP6ebmbiG7mzMcihGWBrhlIkUh5qoLjKHvEymjO3iZmoicbjH6B8Xnh4SEpViGlAT1Q1ceZM/YfRYa

EYeQhDhO/ZrTPQ1dAUc0TfhWwctNbBFjsSjTUVCo+1RSQqTB/sQJo9FfbpBjgcvLTssczV0sfGUdeURP

ZPI2LmuXeABzS5O/NrmjTZZzCvBLIoAvetVInw9Klu
QWPyiJMvOPYFNRwWEOY/0bO6oix6V5qKB363LfS+aLuQirPrR/Q0NlFSvW1awaImXIZ0tVk0dNxx+HQD

IieO5a4N+bm+pAlLw6QY/CDRKCeLP5qXo4Az0E4NTY3HsgKZB2+Bvenf/dcXfJSqML9CJlkOZBB4xPl1

hecFuClm3b/OYHCrzH9r1XezX+NiMkekuuR5rTrKRP
33Q3GlLtexXG0Qh9tFeDUafayvZVjkapKgS2yMZbiW4LpUaXWoyV5bhLTm77P9iUbfL0cCaz4OqsiBce

YII6VYW6KSfmDYUV1nYnoeyvvgr3QTP/6CGPaZcWeK3gnFQBl0cXIwCmbQOj2JVCGla9xktq18U6z4k0

c79FHYuFqfLdOLoW/hG6Bnu8xAE1spiEUMHCxcz755
K2yr3KohPXJ6qVWh+5wasvi8Kqr+V8hoLj4YIn2iU/K0CtDhRxUqkkcOozNs7GPqSe3KQNSMczrRt9pz

1xxQUJtvqrMP5cN9mzaYzuMP+QFP9RD0D/tHu3hwhC2ZlkE1b+PzKsYje8Xh+cX6OxfdrkuGsQbku928

vARFa+VEpbmnS3M9MiRjfS42aic5TvNcY+f4KMez9/
[place]+MvrZIPA7V58BGrZBI/Y1IaGWqqTGBi7+kV2M6NJucM/pPB0lESFpx5i84VEbuSrgiVEjvAXcscS5H

FDQXto5GJPfzY96VxNqbush1TgI/8Qy2IzkeMdBFEjCLdLqY0Pa/yyEO1PeVl2bGc3Yko4HzlfVhRV+c

9tb3W5CgKhtkiJFzfzVvmR/5Rn36Sz13XqvcocjjFR
pRiWrEQNMBDd+8MH5iI8Nd7/S8gedlVYZpEoQWA8nsJquR9SBY1qu1EmZDayJMReVW7wku2MGQO1HT3E

NNQeHC2QnVWwE1LnV1STZsRtEIOdAYRpZA95CVCxPNBDOEfiDNIfG8Uso773qxRtkgTnFFRzYs6OPNKg

MD4CMHLfWIQlqBGcFFKnCPBpAdH1SFGvTZxdLESlZ6
SmqrIeslLfLVP8XVFmVp8CDYCrKD2Bbv46oCJ4ZPPgXfUyAIAufiMpNVPczlD5CC6OVzSjDCZ0rKDsSh

aSWM9GWUKbvEGuLW7ORSTqoQGvHB6+DQogID4+WOqobdXuGngOCsB3FLBsh0FbNXblUYm4E3XpwRKewh

RnRyoouYNQXNItTJRiU5vmexx5iSJoBHT9Rw9HQcPa
h8LeOSMfYBsXme1iH0/bxhJ+L3D+fr6quAUob6j1UAHQQTaV0sahtKOHxh+3iIwvOAJaHpbktl8tL8gx

Am3nxaAwPBtKDS33qMmf725JRomErG/2RD0Rc5Yh9qq1/zOIpJguxX++Eg/DHHk/7OEnG/Tw++x5UaXv

3gpcqi210MaYbgWNYjK/nk3y29/NTknu1Ej1GK2FeZ
Qtl2Pc8X6U/D5hy9lPYY08P021pWEQiWQfQUz5/unYj4m/yWhr7k5AaiVs25sOxOr12R/FXIS+K55Fth

ST2dc/adAfnlyajDxLYabjYz9nBJopSYXSqf2LM04ICJij36LDVGW3EbbIAiymZT2dXTYjSQqodKMEYw

LoxFoPFCbUuCO4S3ZjM7oX41GtkmU4TaxXdGekjiB5
N+fEvz7QWCmBDuF9lyUwAVg7YOUiNXDjAWchoZxxyNwilLtm0Z++ItLyRL5ObuobOKM+IoJowkz2Rgi9

i59JzupE9BVsZ7N0D5zMAWBUZf56ozzXl/twcCXKTHoDhk9XziI/MpFf4wsAk1/Sf85j6gZQL40n/Ut8

qH/81BTsfBEKWirZGMh12v0KOj0WZEXR/ZBNz3V0ZR
J619gBRHtKYyCQIQr3EUThf41CDtXc8xf+RU3J+kyOocABd0ubLhoH0pbvzuuYipxZMnm2SyIKwvfpB3

5ztK/rRiVPRPmo2I4lp9NRl9Ga0fRVQsiYOcYe5Ua9hQ0aG4bEoZeVhSQkSSkufY4YDYW+WRf7EhRXkE

17knIAqc12tSJ8BwNQ6X6rFL6VqJCOGrV8H80CuOgj
KxzOThaexADBe8M7b7wejJuyXSYhtkU4GD3DWpieOtO9Jj5M1DIrBmpoyhEQpdztAEAIAXkwLlMJm8+3

AUILy2RXirJYd5LDhkPeDad2HVEQTjQZ/I10Znp4CaA+f4c5woC2xGup/BYfA6Ew8xn+/WB7nGO4ACgT

tTA3mAmJZuOSjUcgHIsoJa01+0b3Qr3PAyORRScnLI
1dt9wrvN0vROF9Q0bjV/mceNEfMqaQAyHosnZiCZTjYL8qgQU2AfvSgyiNtVJsScAgClH7DBbaKR6Ymp

UeaTbGghIBvpzVATgybHJzsYdPDhUHuiMm9WtknTSigOPsZeJSonP8wo2Tj02s7dXt7kvmnDUVBAezQS

62ro6rND0dpn7NLaqU8taMgYI0TBJBesuF0iJy1qnq
iRCW2rncKfiLjY1LaRsjQYkOALSYqjNjbrK6ui64tazcb4QFq42L4KgYxb7fHJDZvBPtAndUO++DgeXr

+nYzCeVSlcgKpFV3gB5wwcjdHb1C7g+6W29BphbI4DQMr0JTXeyU0hWurgv96URQI8BVHvy4VOEIWBBN

ZJb5rom/tA2tQzo8W4JIRebCNjwieJbQAZ//7MA+mW
sveiXrMZzPGrPAw7fiChmJ3WEojKYrrljW+dHarnLqUzSB5I2BFWQsiL0V1dtBlG8XMiCW73ImHw3u2A

LGxTuPAVXzvA2lKfa9hbY2dJFzUPgFQMJdyBSeI/KoXnpt6ijuO5omqbaYi7D0ngMt6BFnOicgU/dd93

YWb9HyuZAQZ3etNqUIPQJCkSjzSmh2jjvJWHSHdry2
bEXIL52YXpEXJeiRuDqRc7AXBSZosXcAxv1YuQSLChNq4ZTD6fzJ3v0/F1mW7GWyv+znRw0RTB7+evvy

0Isp17ihkdqkJJirA4UF8Rulk/Fa1NsYN1IOGE2Finv3ASowT/6ErhGk3QqsKcj9cDKwk/L9rHWZaGVh

X91Xpc7vvxiuO5wmP7hMkvBsmJ3df7Lfh2ybXGq1HP
2jJyoUdChKV/xFvso9u2CE8b3QmE+9pLVKcd6+lZTJXZPQpSFJ8rcHjtTl7+tsma/hCkQ8zE8etQ3ZE8

Zo6sJOWmNFuLhoPK7N3B1jnHVqQysAjRK4I+oC+ljFhTed5CJyD+jmBOQGDuilTNdTnJH0BYYnsPVJoZ

jJuewCJTIBOV9AcFsWWwacK9Dv/kLXkoFCb6g2fD62
0AfQeHo0grxflFaAEq5ug86+j4bpSknbgLmv6Va939EiF7Jym/wNxnCtiW23faRO/c4Au9STedyxdRK+

/s8qJtIC3ocPWW+m5nvVQ+f9YkWqGiOCCSMzxghV95HF3yRXXlfPb9WeWvrsmYb9JbjQVpsRWLc0kk4Y

YRB+8TrnHwO2S8r9KuVqjvc7Z/NhA861+oRF8Qwgdk
M62qqdOHhyXq1kqZ5D/1aah2g4yhQGQ++ps+d1FS6wGKvxHzf2ZxU0FOoEnOSj8JMns8IW62LeD+1TPi

37BmbS8g9FHCoAYI3g0KP8gqwq3QRf8W9qnmIw1mqX4LkN2A/W5sTz1hU0qL5IgFxsWX4g77uQW1QiJZ

FkwOtmp6ePvWSwko+ypDKepEd+TIAvT39jS4zTcfLT
sa4VUT3vfTL1K5YmIgB/Y9D4790f3P3XBRiuxLw9DtW/+v8pJvFOr7V6MOc3C7cwJxSGE8Ygojh4tO0y

+qqLPe8c0d/u+LPcwcmgTPD/3r37oLdlzE//qNqpZlNMqbtv87tp+hSuGeZWOuL2rncVVYuN1lv7sAof

XQhFpGiuScDvftIlugat2pcPPrI3NI77KQlzvjfV/P
d3voGxigv+4fCQ9FZoTwCcvoWP5Q3sHKcQ7JxBd45Bx+zpEkPUggSsjp4fp3SRUZgRnwqq3ZM9zTQitQ

P9yFKO/P39ecVteWzzWjAnq+1fyxYJ3EPTFVptGYysiXtUWSfLlIiYtZVpzPIRstAAJb5LuzFqF21ES4

kzlYCW00AtRVLKQA4G1t8w81+yoT0B45LG4KrBDLSc
1rW+hM5wjSh5s32E/Dfl2nX1y2Blj9OMBzHcpvUcqptTobMiXug0qUtCMcK6PbuetKgZc7tI9K/xfkt1

sdVGSX6HR/VFmVRP+7JvzzpIwc94+3sYGRgfhvpZnzTlhRwPkgBzZYZncXjXGT9RawwObhZLA5wOWSDi

EBIzPURUdFtuCmYUKevlnzeadPMi1xUBirrY8MxmQv
TZJXuWzLFaXivh538Lx51bV7K7LVJUZHgCtqhocQrDEXsZ+FxxoIA6LhT9pIBuLoNxnEyQ4udb5wKbZG

AMaGUJCIIndtHvSnbdSpTgsDSuKocIV51R0OXlQRfJsZ6Nf9UOwwagJvYT4ko9kb8fDMz5KnwS39Ntu+

mcjuxOGF5zEa6kQ3bUxndWz0ehmAp5hyRgTONiP14e
unYlXFqh7GnFaa6kCpmi7MIX0W3HQEWcSTftXTq3jcbEt3yYERsBa5FNBgxTCPzC7KjE1IwRpAv/cGxX

d6LKDsJNfOeNVnlmDdDgG+G3u8e/CRKNLtxkuz1Gfe+sjbbvtGr0db8XAA1BBZc99lkLCOt9Wzcq+w3g

Z3Q8txCE14m6UIYLD1lbWT1fk8Sm2Dt8z46h7p8flf
xWzV0WAew0CwEa1EEHt9cV/H6H30Pj0oZQmBkSQ2SdKdCMDsJ0KZp6KwqXywv+hCSsC0cDoPKtn7vc3e

DMIUs05zQ2zGX6r4hYCNIAPhWIqhINPZMqIPeggah1P1o8Ul6YHdArmJWME4XwoLyKynN5UGMEi/lLE5

vXz3tyWs2kPg2kU+Ki7pyx1ftazRX0Trp1qs9c9lwH
Xxi/yA96MuarJ79FkbBI142TvCPlxQ+mJ4fuBFW0b+Awwh7TvoQc5u8tY6kycZyDXcDCPDqrvcdahcP0

xx058lqku8Htzj9sNaf9T/p6nFd2Dq3JT45xg2JzxOvpnLDOUe2HTBUyO/3+v8LBp00HDsPwbJhAzOnf

234PsHD/ZgvjG/XaY11fn2qDCaOdPKU4fwAwaM6UIK
3sp2MdKJadEhPsFC2pef7HQXQ0IE9YBICrXN0FlXKzD6XkD3NFXfKaKPDhQRVaDM75VTRqKAWVZKxoGV

ZvD1Ptg196shUgspDiVMTiHm4TQJOiOC6GDTIsQXYphNMdELTjNMLhTvX6LVWvEBphONXmS3DewzLjdn

NcYDN3FLSjRi7LSRXhWE5Cum48dSA7LCXuQnAmXOIw
lpUzMNQvisT2OI4SWcDyMRI3uSOfUxsUOT2EQQNadUUzXO5ALYCpmSBnBA3+DQogID4+DQplbmRvYmoN

GsY0DHMwk1QuYMqoQEr7Y0ZtsLYpzxHxLdvfmSWALZStWGWfF6xvgmy6xSS5DGZ+Jt8PVKPptRMhXR1B

GbeMrNMMH0TOBVblUj5GGUdN9Q5j7jvXJedCYQyCvB
UVQ1NbHYUlymwDz9lkHjHJdhaRT/Owb+lhqc5ttHp3uI2aFYhk/s6KW8VTl9O9JCoCtXcFOrCFn4x39o

rvCLIY5Egc9bcBQ5fSxyTgo38SyHyuERUBp3YOJ+xEYT4N2/1f/YMP8q0mN69/CCms7gHCpmkCUGpz8A

DIIwVZGYQzWSIVLtV+tnNwbRNuBeMQJo1+S9VVWTDq
Y3p9cEyLqPuKeTTXVfIHm9zm5HefppmtxLOnKnkVH49lvWRnhmRa4LVnSMNsBYQkaoXzrFIuLTjaiXYl

7o47dEU7Xz31vJdugDExBnhF3pDdVpfxptS31bbOoRErsQexXRkL7dMTfc62OvzcJMLUdIGc7OyxZwGB

q2ZsDzhsnpC6585eNfkMAK3fzGlwIOWYg1RAG67BsA
vbEv44FdefeGDgciKnBi4d68EtQiOCV+494S0I5C+wz6Jracke8UErYUP49iNDb0fy7sZAiu0+rHg723

oTFJbyZdkH+MTWkfRnORVGcq44rrbrCExsqlMryQCfQR8PQvUrYX2sxx4LUvcwCJHzGivIRdFiKCeIPi

XoMLXgOTwrLA7NYUjbMDqyAECdM7CjgrQzpANtKCTe
Cy7UEHBcLN0KBEKzmAGmFQVrEoNuJIDOMmTcNYUzILVwgSUHy7raWeRlFNG4DENtDjbcFJ3VDCZjUW0V

a453AQ08naJaAYNyUVKNLtXtILDoZ7WhhWHyIVhzN5VjQ3MtUV3zyPUnAH8hhBNoZ2DaF3OsgekuBVVT

QiAvSSBmYWxzZSAvSyBmYWxzZSA+Wq6CADQ+Pg0KZW
1rq3CxHIkfWWLiSH4ayv1ZAJJhVCi2TYS3RZTnGgo3AEFbPfS6XySzWNZ5OBE4KvO2FFzdPjQtCLZuXA

PsXbUuPnYtFEM6YPO4KAKsJpr7LNRoTxOrJmbjHof3KDK7NoS9ESLrZGR5WPM0KzY9PSQiUXX3NPO4Tv

V4VXBrDSV3QEG6NkNnXmPePdBdZVG7RYFJLiRwNQw0
ECY2JIZ5MHZnCDt1ZKzcLrC9RsIiJdViZWalUpZ2NbahNrIbOFm2KLI8OoHjUcv0XRH4WeR7WyZyDfCw

ZZzoEwG7NqWyHkl8CZD0XhL8JnyyFuXcEGK3EfM5EOEgVuHgFPK4YwG8KLBnFsJ7WUB3QkU9LYWlAVvd

LCNlPxUwVqrvYuAaLTM6NRO9KQXdIlThXKC2KgI4DB
ZwEIP5EINvBCU7QOUvEuNaJVSdHAF8KIGfVpq8FZP0ZXE1FQLiQmf9OSr9EZJ9PIIyTyOcUYZeZSD9UN

WpXzq4ZPS6HlFwVlOkKoDkPIT3JtI9FstiIBW4BA1UJTR7BIQePFSpCND5CCAoOHHoKcs8ZDF3WOU2PJ

JzDcLbRWv5DCM4RJVyGeFmJOj9TEL4LGEdWzCgXTx6
WUK0KVWwMcFdSCy8OWQ1KJTxBpIkOMk7BGD8TTMiChZhFFr1QGR5DEDsGzVjSSq5OCA3HIOwSjNsSOa4

ZMYGNrEiTzRfHOa6TZK0EXRkMaIoAAj9GSH2JEVmTwMaBZu3SCZ9SLOwIya6LYSmKcZ3OSHyGFN3NCY5

PaK4HXZcVlkyCSP8XhZdWxXaJeX7HCB1XRV8ZBXsBV
j6KIKbFcQ0IdbsFRSaRYFzRRE5FBmxQuTeRXFcMzTuIoHhISayNNN4VoW2HHTkQhYkBJSnJyQaGwSmNb

F7IBJ7BrN9KqScKTU0GJkjWBP8SHKdMdKePEojTrG9VpMaTkNuVVgxOtS7AuFnZTQsYCE3FfCuZnR2PV

K6ApC0BxfcLoU9CMB9BVNiSsexBva8BVD7NED7QiPy
BcOpSKn1DUJSObTbYxm9DVc4YSK3LkjiLbp5OKJ5BLF4NnmnTgAyQFjiBxE5DeFoPsWcJKT7HnO1Hwhe

UeGjLZT7LnH1KBEzZWZ6HUN7ElI3PAHuUGF1USk7LZH1STWeNVR1GPB6IhE1XWRqUZX6XFN3KPBpCulg

Rax7ZHI4BML2JLTcCXodYCIuHWP9DDGhRpSvQRIhKM
E8IUQbDnMkUDS1ZCT5HGQnHjBiRSEtKUR4ZFTeMbAqSUN2OcE6BWBuOKU0PV4dWWctjiRdOjgRKdYaSL

Scf4ToNOybSQe2SAdsZQBcF6U5lZTgRn9dsWXzk1CmkIU8m0KDXnRaUYWyEo4mmM5ggTIjOFAfWOzrAm

8iQA2PPJIvAQ1Fy6JkufTqGYS4X0FfjBsnsNyhkRP2
PDQiGKRpA6QtwRBjPdZqQQsiDSOkY5GuVEydEADwFKupPHCgI1GtaqZMNb78UPntJY7tDPGmIGUzYGT2

GMEvAPcpNSOuJ4l1LGutU9HgU0vcIGJnV1IrwJYkHQ9GQQV+Rb6VNF6gd0AkMBjpVwTmKE0tcs3ZPLW7

PE8FVEJuCV3YvXOvP3MzunSdG1FkqGblLW4MfxRyJL
bsCJ2QEGKcYe7xlB8AeuzxaBfDu0owK2UsD17obY6iI9iyuxMfd2aLvhAnQPjsOl1TMFMdYY0MkSMuuI

QcFVDuJqVjJZJefSLgQBUcQzY6AMgdLDNhB9tbLNWflsGiYPKlESVCKhHeSKPeQe7zlXBux5NpkEV1j9

IgMzEgMCBSDQogID4+PHzfvcPhMchMOeTgAIZeu4Ag
KWyvQRjgKfWgRBb2NXQqJxqdHya3XMW0PYF0AMRwNVJ1JOp4XJS9GxkvFXuaMQOpWvVnOsNgIdq8WOZ1

ZQJrOoakHiDlTHR3HRGkJqpeZBV3YFL4RsK8MTSkQWN7KTL9UcY6BYYxOFY8GJL6XrF5EHZkEMO7HDF9

DUMgTqmfOBw6MQ6WJVT9GDVwFYs6DBK1UwXzFTA6KV
C4KlF2YmsbOzAfUNkwFjG7PcqgEuMgARx1ZHT1TlLiZvh0VFWjRFC8XuaqPIT5QCupSbL2VsWcUod2TZ

E8JsW5HrgnSrFzDNC1FcT7STJbGmLwZSZ2PcO1FIIxOgX8FDW4MfU2EPNeRZgyDLD8GTVfMoclQms8YG

G1XOA6TKHpTiUpLJV0OvG9JXZiNAAtWTU9DnE3OWXz
Cts1WCM7QxH1IUHbPgGaOBRfWgK5WJMgWhHyYZblAfH5NNBdSKU6YPN9VqA5WWZqCzOmZVQxXUMkLocq

QIJ4TPJtOKZ9UxWyQUWlEU4UGPI7MKUrYNFsMPLwPYKtObEhSmT6RNY1VDR3GPUrCtImSDz9SDY8RAYx

DdFjQZy3UNV5YHNaSrUkISq8MEJ7QJKeGzZiWSy7LN
S3DJCxVoVuIEq1OGC6SUJnDtDhVKf7TBE6VZCbZuJhKOv4IYU6SNUdMkCrLMt1FMKFUjDpKpXdXGt4JO

A7DSAlZqMoVFc1GBV0LZDfEpLeVKj8AKQ6WXNtYvj4DKVoHdO4JMJhAHN9CQN5UyZ4TOMlYwHeHIP6Ee

SbEcFpTfV4RRP2POP8BJMdPGc0FLPlFqV6ZfuhNAHy
TNMhJHT3MPypHoBmXIStRnUmFwSuBVgbZHF1OkU8BmznVsHeCWQhQmAlUnQwRhX8IWT7XhM9OwPxSKJ5

AWbkLAD9FURlKiH9RTW9ZwB3BdiyKpE0XER0OeW4CbhySDGlTOO7TvPrTaT8KOW8EsS3KucrDaX3YJA3

LHZrXvwjLpo5WGB7OJM6RgAzQpAmPZl7WMMABkQpWg
o5YHw1KSQ0NopgEif3ZHC8VXQ1ZjxjUfRdBZvlDaA2UtXcFwWyNGV1UrR7MdrvIfFoQTE5JbZ8CMPnCC

N2VJK9LtG2CTBfUXA4EVz2QWU9HGLbKPG6EWS5MbQ5NRBbLMU1HQF3UTQoJainKww7HOY8JCL0EIDmWB

vuREC1DdN8WIIaZFK9BFH5VmL3KITfIQG6JRK8MAK8
EDGfRRT8ITT6RlP6SSJnWIJ4PMDdBIV3GYSfDNXoFI0oQBdfqtCwLtnBNaH7CNGjw2OjGSgbCEm1NFlc

YTLmL5K8hFRaIi4tfCFbm1QjdVE9w7SKBkOlTYOzAw2dwY7qkJVpARHiRLgVXpBcGOWmVZMxJI71MKpv

AM6LKAACNBompZEhJGG6J7Zsn1FcgbLhUIXwGg0XRT
GkDJ6GqKUjmxCoCr1WXYUyGA8Wh280AdMsgWRgYSZxNkFqMSTmLRUbEYH0JL8LEFPhSH6DdBBbwPKRqg

jyVPUoP3T8GK0ETUDLKoKxNn9WSoWwGG6rah8UNkVmXVReUadMZfVrWUgQOaXgOJMhVHomEO3Yf466M4

I5JuL6dZTgRIB7IHS9eGNnSnPsIQVrabUxUDEcUYfz
UG5bw2JjygfrF5zkBK5dcGMkZ16kkU4eACqmNCSuT0UxapB8L5txoaUeDK6DLDG8D8hcecDaETSHTkAo

ZCKiR6ftgBmwMUIdIPSpOv9IKFUkZS5Fh931AOFiU7HkyVCxdnSdEVNsOYLZDiByIa2WZcCoPU2yho2A

WfZtOPQpPcgUOzLtTsD2YWAnFNgzDXC8ChKlNvzmYN
auRNE6GhJ4EcLnFET0KCs6QJEjQYMiJyV3SBB5KLW0ZKClEeK7UUE7JZB5EBPfIdsjMUQ1DfE3YzAmIF

l3CUI8ANV8NpAgYIu5XFL8WGP0JoSpJMa3EWR6FLK9BvQvSahuQGB6SVK3TVobWAhoZRzyPuE7IWzsVQ

C5FEs4ZCF7ADKnLnSzRNYgVTU6WlqxZPVwRGCpDNI7
QFGqQaC0IBIgGhO2TSYrCdJ7ECOoTDL8WKnvNwntKXp6ASE4AUFtMbI1TXE1GhI1GfMuPJn0HPcwTxG8

OhclPVCuAAI1XCVtSkV6PSAuHcLRPmEuXbP6WBRaMeGxProrBcA0PLFeTEDkFKXxLOX7LNYeHMA1CCBs

HqR6GYA5CAKmKQKdFcT7SKEkOzLwANNfWvo2CXR1AQ
LfJGVaRLR1NGXbSLX6PKMqAqUbYPNbQEHlHSruIMBzNFR5PKE0CLGrZNZwWAn9TsM2VFPgQAahLVNpFI

O8TziuFhK7POGvIdFxRpuzWkV5MKFjFhQ4MRDmCrI6YRD6UyW2PVFsGgY5HVQ4KvC6GDUtKrW0BCT3Do

F6RWXeFmY1LIY6MzH4FWVfGhI6LLO5OxH1IDOeFmN6
FDZ7PbB7PNMmTkV5CHE0ZcV2MJMrPxV6PT3KOXL8QZGpXqH6PAC4HeJ7GJClQoB5AAS9TjG1SFQeNzH3

QWX9NfCdWuQrPbS7KKR6RHG2RxYhTQG5BRN1KMLiKpYpAWn4RVC0UhJ8QfHsRFKoQPZ6SWC0COvlVZL8

RYb2NZB4JOCbSphsHZluBmE3WDZpBIH3RQUtCePGZx
QhRuPpMWBxCIQlQiYmAkH1EFY1EKG9EAPfLMqhWHn4SmE9TyYwFTb6QEPuCHH0ONLnTuVwZMigEhR2BQ

ClSsKqAFlpDwN2IuLjHnZ4UMMvLsS8HdVgJWNwUDNhGgMwOYmrMtV3HVPqDgDdSZrpUpE9PGv2PKL6XS

egVFziWFg0OVV4SVmeUeK3HYg2GDQ0WWswRrV3DQni
ZwS1WpQsVaLlVDchVcP3KMthYqH1OQSlJKC3ArsuNDN7EFFyEWD3IrwyZFV9OQHlXPL1TccbTKalRTN1

ASI2VRVlMUYmPKL0LHIvSKOlZmg5ZGY1ONXxUFCzMDixJQ0BXUM2LDAzWaAtKJEnYRZ9ZVSvHlHlBURj

LQA7OKfvIsGmLZJwMOG0VXQbFvB1UYGpJAG4YnbqUy
GzLHBdMFWpBE8GNY5ty8BwTFhoLqEtEI4ykt9QVCV1JE9CHOQxCD8LxFCuD2XvzjNHTQKblchjqK7aJD

lrUGTjX7OmnjRIAX2uS7WhN21gRBgyAo6xTX9PQPDrCR1Xu2XtaqKmHUT8YB3QGKGNEKsobAGwDZP0JU

8PFNOoXY18NR2kUBQZPoIpKDHgJohaS4QtYlGIFxOw
WQOkGc0rqKDGt4iyEvTnTOF5SFDbVJW5JCFdJovyQYkcDZAaV1b0RRrwL3MzL8ekZPHdS2RzsRLjBD5D

ICA+Wx7VIA2vl2FhUSjzLBJyNM3lwo9AYCO2TO3IHVWfAS3WfNWoB9QbrlYdA4DwsFhzNM0VcdYnAFad

GV0HGCCiUn0jsN0PJNprRSIRRKHxcNVeGA1rt2Dzzv
ixI8vdAN6lqTHtO11zdO7rVVlyUWHwD3OwrjU4M1mbxnNvVR8FFMK2I0vnoxCmDXZGIeTiGRVuA9jgeC

iyPXG1ANVrCa4TMOEfTT4Ck055QZRcS3GtyJDgarEvGtPaWBQVWwSnZv3MRiKuQA6xqe2JZbmkYPHsKr

yXUdTkXeK4KWZnHSVhPBJ3FNCmMVKyPgjaOND9MOI4
CjjtOJN9HTymKFFhUzPnNcWfHTJzGqD7XHxeWoy5DRBrEwZ3NGBjUcL1QIL3KRW2AnmfOQE2JDGoFCY4

EynjTVL4ZYWlOXF5RlbfSSD6OGZvPRV4ElpyTkP2QGRbKtS2BPRoMLcuTKT9CHN2KPIoHQS5LFj0QXG5

EEIrXMjaJOSbAAE3KKQqKIT5GLJ0WDQ6SSZiArMfRZ
G6MtIpGQpyEKjpHVJ8LNQ1LPtkJiW8KCd8AYG0IsMtPgG8HYC9VFQ7ZhSaSRG9JBQ7HeK7EtYjQJE8UY

Q3NAI0ZSVpVHhsYK6PJTDcSOCnDth9CDQtMfI9NUPcQUF9GBF4FXX3QQnxMZb7NQK3GnY8HBupYRXxIZ

NpBfT5DHazYNX9MSY2OaFeUKEnNNk1CKP9HrH4BpNw
ZKE4TZJ9DhU2KOzyHJb0IGV1YPO8RqToLgD5EFQ2JfE9WuiqSuAwTYJ6ZAZMCkEuRSb3JUC4XlJnKAHf

ZhS1WCClZsN2RXZbVuMoABD7LdG6ZTMnAwL1CMX9RiB3FPRpHqX7GHG8NnC4VCTrQkT3ZGB3NoR0HYJe

OwE7VAM4AfL6AXHaZaT5UPD0PzA4NVWzPyT3BCQ4Tq
N7HFVyNnE1XKP3SoP7ZNKiRSfuMGE1WgG4SRXtXuA1MJD5JlF8FHUeGnS3GQN2NmJ9SEZhZqL9ALN6PY

QyYIXsAST2WVHeHUC3BUPuSTA1OVOwBEM5TUpbUKM3JQk7RSJlPTJlILX2XLR3MWZ5RJMbWJygEUCsKP

UfRxmoOlz8MCX0GrFqJzMyKhueUX5GJUT4WheoHUX3
VCKcMhGgMWAeBpBzORChWNL1FKNoDEN4AGfwKRK5VCVsGLT4AUS1LZV9DUBuIiI6TRD1DFT2LWSlGkV7

YWm8PEQ7AGfhUGK7ZNQxBvY5XUSiLED0MTU4JbVnTuVeXiI8PWF1TzB6KLDbHxV6QBk6JKBLGzMvEjB2

CAu8JUE1FYOmEaW0BPV1OTY1IRViGde3CUR7SzY9BJ
ocYht2ALG6BsH6IwIjHFP4ISFgBVJ1BVpyNGZ2NHZaFKP4WNgcUXO7GFdzYtF1AsEuGGGrZUOzPzW9Mz

NtVGEcGTM2KzRbXCApRnNzOXE4YvL0NGdmIFurTFJ5OvH0OWDzKTl0LEK1SfW3TJRePNe6NVV7LWG4HV

XjRUW8KIQ5DdH3DxLdEPI3XEY8GXB7SKdgVUh4MK3e
HLtkefYoMfeJOsXiJVQzr3QyTKdyZUx1ASckHXLdS7I9eYEhLv4icVBlt0QyyCQ1z4FGUjQzUVWgKb7b

gB9abDQpG6Zhz3URKV4LIBZsNS4Sp6GmgyYlZZP4ML5MFMXOJXwmrRDiBYS5JW5OGGTkSR37XN3bQFPN

JtKlEHHtEpvfR4WqLfWSYmFlNOZcEo4tbFEKz6dwXs
OgXWB4GKU3XDPbDBV1HM7JKyQkSGUiBGQvkNbsVL8wlXUjOM8PcCGaXuGaIRclEZ6+DQplbmRvYmoNCj

VkWJXfq2EhQFptLCy8HHzdBAYuA9G5eKLrCk9fpH3PmAE4lSYqO5HuyNFQvRYrG7Ema5NTu178O2TyC5

4sFRjlBK0mx3DtlznjD5toCH5txPIgQ11xdH2zFEru
AJCeQ0KwquQ9U4kfbhZcWE8KYET8O5llofKnNYXCVqYmMVJyQ3addLosJDR9EGZsTs5AZGOiMM2Zg856

BKQsR0XbsBWncfW1NJXhGPEWYiPuDs2ADhZwTC4xce8SOPGcGRIpCxzLGxJmAZhlCrhrcSFjDR9BxWH9

JYDwO99hMASbVWUmR7BnAWQqWeB8KV6UKT0hgTbrFS
D2LpKaOl5TGrPpq2XeREHmHAhCxn65z1dPFxx8k79JPQMZWVKt9QV+BFLNxVnDSSZilguBbu2R5kJFIO

NgnYkIRZigNqOiyAFf6DGWatEjzQmvrKGTPqueVW1iuWTKucKD6q/vs/beh+PBW/O+7+fz/gZd66bRxv

/0nMLt6zuxwi46qIHExAnMZqX+onFjvWe+K0TEFZTC
jlMG1Ucz6jx+n4C2d4+wBiabVF7XRrVTO++HRpL7rlqFSnHTZVYR0SKnpZx8EqCSMmRBSvs0Led6Kw1J

+Y5Ec9IJo0Ph0dalnm7RhPfn6mk9VRaIJFu2RpP5Nl1WYGDOG7lxwUjiyRmjQQohuh+++W15F1lxzK1Y

c6hNN092773hB7j/d0FVpP5YnecAAZ+mxt7jDdAkR1
NciEjd+wI0FfVb/lF27JxphK5KkkF0WZgYzbjkcCkx0wxvWpJE99n//UtEnRXv/nHwXPMB9iNlXX1bA2

fRVBa4smf3EoiIGIB12dCGlvbEzK3Y/mDGZrV5utrqm+bS3YQpaac0hq0uA2u8aRxmJAaG6LKZu8e+NU

a+9vCQUe3xLEVK92nfOnr0MZ2sre5PtqVjrBVd+l6L
3bV3itBCKE063qW+/viwajlBs1W/IzijAP/QDUUHX8bbKfxGYS4WV85zg/++wL+8qKRtBupR5k3DI9SO

3WC8mqUzNKN3A4WKZLrWP5UAmQi+092vpOMmbCwdK4PC+WyygXPVPRMsHpGunD3Jdn3UA1oTeFIAFM5u

yE4Xt2T+mli1q4YBMGdE31URDTgX56fH5yzHWqD8tP
An+v2//b1Wb9TF5dxE1W5U8X6zDt/TOzFmGV1DVcXCYx46rzYuKJadpR6CqgzQxu4WJ6XAC1vfV8EmPM

X1fSx51d3cgMS0N79xmB2iidEFpC8f/Y+9Al9qiQGQQIewAkCvvqzQ67Y63nSlm/b77/ff77/ff7//fv

/9f/nchJT3Ad57NsuPzJdd0YvmmZwdmj2KR6JKnNw/
PbyB33gsECnpZ4aArvWStCiQKzRYJigEF2LdyGsuYU1fw/rLWFoRs3tRft0A0f4efwxQ7dWqNJ0uE5wm

PQ6MLxtpy9/VRsc8ZitzbNicqV1N8jhfit/oR8D1DliAlMmVvO9u7ABQOVPep4F5T+HuF+4+8p3k0F7L

5FHsLlQbGa0FQjSwiwb7OAuPvRfy8YFJRWKxzyGpGM
aFow5r1MdKwZ6cRaAhCXsWaWxup8EdTMBr/Hy8VJlL+z7huNqvxWHcdPRKq/2XQENxyVuz9UCV3/4B/7

BlbP2tahUgHbn7SJ0Km2kw7Aacbx2E4Hd+zolwxk+PZ18xRu0x6kU/reu3m6w5/WsvuiJgQcT1ufKdgV

Ygg5pTxszVY9fApQ8IeZKb1Gi/jbq/BQ3KJbObcoM7
M0A3nWfWhZeGt3j8zykdm1K+JoXJb8D7Z+kMxPwq0rxU43YpJeMfsy0k2BiIYLyc2scxhYk66vjKXrFy

aJslTRXiW3e/pRHzRSoeiFs8D8auaav5bmeLBHLD5Ign1PF8b1f8/J3f4olAxPuv88y3TV0k2Pae8oi9

kRmzpgI0Fi0NLU0RwuhAUKq4po5HcyJ4RqVONL14rs
6F7u60w+Y+aRYSTpxoar7slTx1MWD0wM0w96BIEMZE0dCmeZ1p5YKdh24lh/wphBlZct3WE6mdixzscs

jNAI5acPcPPw+auIiuBmdGMHE22PUC+l8yDjHHMBg+hOp+CG9isHU3fAUQV4+GLOgGzBA7vl1EdCYquC

2qMjC9zT9FzXh69mDo672eu2QwPVVnhenit15Y1Fmr
cBGxuiyITq33SOy+Knn+RbkE6xP9fmvcyhfLPG3Uekf9ao/WRkalHXib+ah9cT+65W/9jU2Zeks4xtpF

3F6zUH6h90lkRpe7xeA07IETuOlbO5ySyUQHb3FWJ8L7rLtjNBc8OX+vcAT6jAW9vilDwx8RA/PnSu7Z

v2K4gQMsg47aTIdPG8cvyrVMRBTcjYUh/nbRfxbJEx
HKSSJAcjz5p+5FbVvZ4wEQKq0CbQ3sr4upclX2q3OaqCq1h82wj6sQpX8UQRuhLQpgeuHTKbmS64CPVs

Q1irZ6xz5pDbGA5/DrgWzgLUBFTC/M5JLMsBNQHh53e3hhQzbsW3IzlVHgY6QY7uwvJoG3GXRKSt3A16

ogG3gZ+H54ZgHRVcvMvkp5oOh0bz5Zbg+rLpfMGB68
KHgvBoQF1mIYU4ARy4ceB4WD9exA3kN1rdPFriSpSTLMFQyeJoMt0v5xnQRfMRge81eg1CFsEcNfaaz4

uUgppQ9VXr6rPagChds7Ck19qsF4cjJ7GczL6Ze3BYRUm6NhlbgxUqSou4OjcGsyiyjbkakBl3q8wDqh

BmqIxbO1lCpD38atYe0omsf9F6mOdpd2pEke3Ut6tX
ZLsG0lWeh4lHImpj6mm9TFT/qwzZsvo6R5AgmL8Bjb24tKyd5MjFfNWodBE+tu0iazaUNymA9wJw0MJA

HvRHcgUyhPcMkfktJumLksfPujtRg9Qg77i2cyb166+l8q1OwtZJ1r+1yW5zN6OhjPr4bNy40tPeM5Eo

u2z/R6evkq08NTjWcGm5IVjweEqglzg6ZSwL0IndjD
PLnR45x0ruhBrpWdixVDVHgrXqRtXiJ0OJZPJYvN6131jgOwTstbVyK1r/RDHfdOtVvHPhHri/X1RU9f

VLsdSZd9Wr0Oiy5kL3aF7CifWrFlMz4LWVjoJWBad8gdcC4FK8KfuPjuP35TF6XJ2mG4hCIqZ3PZENlJ

QNSmco3tq1lcTxa49ptG01J0r+4A7cCnT84MdE/0Hv
Qe8L7t/Ni3c5xi6y4oIa1mz049i1k4o3N59NnrKu9g3S4nKrNb2Z54gvTB1rF1q7vPjL97N9gL7o/83/

8SHo4trcjaR1cCYF0e9ErStSBpCpasXqaK5jGqbHSUCF3BBDvFrXqNxI4e72Or4F/iyqc6ld/Ovl0Ydq

Snztg8Uzrse8LTSSXi9YvwBh3eKt5UiJnOkbI3BTgR
ZgM9bjdsozY+20Z2HqxZJL/YgQIGBWRSGD68Ry13zLzXE7olaRA+2QbJOJ9W+tN2xUV/Vl+xRXWA4hoq

lKPfSdOlj+jM7nWuUPZPWHJQl3xMuQzgsBYZGlIxNjY0pkrgKDQ9B3ZVKqB22M4tFSnPoKG5/Kb6K6in

quqvAkDTcM90bU7nEm9tDlmkC6U3yJDIetviAa86xd
FKZryTp0J9eg1lz0gYdSC2t1Ygj8GApEJW2+S7D96Pj8yZFmz8qDvezdYVEahUgPPHCkzJBicdOasmwr

btCR6+OfFX0ymEq1rPm+4SsxJFszoLBZNNwM48MM7rCE8dHifVXz0VG8B/ZD6D7Xkq+uRJ2swzgP/Pj6

NpeVx5pqKS+PXbvAjoViiGdBTO0AP3OmH/ChaHW9Hj
JE921H3ZpbZw22b4QmVKUR5WxqbqElTx8jFltqKiwT/PWzDAVyCgvLtD/dGhwWoKvFqnrepNN7wmsCaJ

1kL8R2bFnP1ZuBuzSkjf8CSyy2Hlu+bcsaiHyQl3BvNU9YqlYwJxip+g/tM7QLNQPzMPbFWzX+QrV5lC

Q/Ja6rrn6UPgVfA+K8i8J7AeMFbAp1Zj4anIPnjjPR
BS+8uP7t6kZc8EcSybF3uowZyUBHJa4QINOEBhAdyhciG0nyXyEjpkE4wl0EH4nwNAG6aTct+Vjm3eJ6

/nhVZQjhgPHguLMiB/M+VU3jzfV/ectu3TuDxrrQoJN5SZXiogXoVOkbzjmSF9Jp3RzyV1XmIIfCqr+L

Z7XkWxkVlVoHag0YF7afepcJGTgXhx7K7pJkaMJDf6
s+HFvCqFlLDYUaWL6fv5aGxJM6xS2lWAfWFeLjEZZH/9t1R3NebbTiDP5tvxst3WTu/F/NPGUYHggdvP

HuGJipmu62VE2ozxO1lKiz/PQbihViP1KEzVtYM3UrDj/thmzpn1AnfeYwhWu2giWp1+sKnyhjkvgAL1

GJIbI28R9xQwDtSeof+pjbbKbI/bprUwn7FRlf/wop
fPKY2dP2F1K2L7F7DnC6wchm86fH4AF6XVBNgmJI546RRvhlqUtwQ/N0STVn7XyO+NMGXk7f6JgBlxCh

1El2m5ZrTRwDIEk5jgLkAdEhRd1GoL6j/GNaGgQgh2iE+D+aQDcd0o3OROb9MGjPdyQ/Wv46XpD9jcdX

8jZW8YV12t/7MOUQ/ErM4SvVLVRWGvQfEx5VbsiL8b
dB7Ba7Vq5P7OH3D+MunmekQmtvTg0cTHwCr0WtS9nQ9qkwaFsIOeNumzySX6KfG+5lJOr0OAwQXEL3gY

0wElUQudnxrTLdwA7+n9vAreSV85Iu1qI3OmZP3Z26KvNtYdCbYq5S3dChcpEIqmxIKChAZsSE0ScrEw

i7x7CRx1hX79sRjmbfc8GEv3pHG4piC3pGmQqn+IOJ
+Rh4YlU9HRocc1ESt3wH5QMSYeot7z3fYoaC8Ot8yBAqIx2dKV1uuAISaDb7wsLVZr8V3yh1PzhZrh0l

rbjvzRWseALyb4XcePzrkiH5dei/UoS6HSdQaKby3qxW4+OJ8W6bBqNs+at7VzNb6TJ4PsvPcHZ46cDQ

iq5XtX3NVV07YkYp3VfihGeASVGqGeZOXwGc+Mtd4w
vaICUIGpoJcsGuPgBol0DDcXv9/HI84Jas0IOTfadXyJ/CP1+gCz5HeMbXugP5Ix3DPdnM3ZoexRs3j0

SXbwOtI9DyFihfeuS8MPQ1r/qR4mWPT+HFLyaMKdBWmayonrD6Qq/xELa9kOOl7JgUgMO5RkT0uo0PWz

eaTTBkcALRZoUP5xJW1pDmYJzs9Ik/rx5BVZjTQ37c
kMwctWLqP0XEj7abUcq6qaN8dDVbXiL7eIuyq8P0QzxIit0sYEEC0+Jepf+4DniCaNantwUtGTcgDjX1

0ZlYbWVRcFLKo1HyNmPkMgS6pQzxFpgDM5GTQQHA7zy6HkHqZptbivQLkESa0MNvkHyw58Y1h8YMKxNs

XLRhkJNJTMLHXiTGI01F2+7ILfM5l4FjDms3YljFTL
3Rbnz8sLRY+zjXuRdSuYrgUttcPQXe+eq2mDxbQ18332tis8kf5kGnPOc00s6ONMS5bRW5SuWs2Qm0FH

HvHsyyc4MfnfCxW0hwUdtS5uNDeyGtvd15EyiuyIxvQAS8Veah90iC6pQFkEz/SIaC790+M1eILr00VO

wFXT3Cw5rsBhQROG9IcbUb3DkXq0nQ4wSbDVGrVMJx
TBaG4fRyz2Jyvw8dDGbOQojZ6QT5u0EkdxFbJv5xEAnU8pz7QfY5vZrm4vGhRfW3PXVlThF9ARrEv6yl

Y/vLdjp3jlTyAdboi3EpsXT7/ZBg2VvFmGdTpgjvGxSM8k5QcKCuMqFpGMu/skcS6s9TkK+7cjxr7u/n

60+lKbxQuOQqrBVg23ni9mWHgGo8b1PVnKQNEywfN0
+kDKhshN1gtDwjjOrlfouMHql17h7UDMXE7L7FMrzL+YQoBJ5DIsIq6fsK2PN/alIJJFApXDSV99ipjb

tTKSZ6m/I84Aul4hPLc6Cj9vaWmtFYXM1ZrC3BJPNKrHPJIdfFYzwmBywD4G/bOPdQn5p4Hel5Xo2QNF

3VGglsiQQfOAk/A/A/x7nMoRSR/qNmEbrU1i+UrrQU
rrqXgTeZDCiTo8dyJSBkU488lsZfJRR8xLcSXqF+kfL7ky6yYdfPIwcdobYaziRzN2Ir5Ga/Jb28Fq7H

tY8rekegJSWzr7tmvRTPm27qhwjhX75QS/UqOvVFU0RgTvxiigNp6tnF0V+V8r0rfsDXMEtvJ+4wzWy5

fM/Jyod4b3Ism15IkESREYaA5qEzsGPje5JStegVB9
HifBFWXq7cxrkNMxAYQDCuzdj2eo2QfgxrINgYwUYn3HD2c4P+FejBkTsLZt9sdX4Vr4sviGR+VqrAkp

tyJFImdNq9VZKaGrg3WzKND8MHjuQKt8TEkOf/zmBA9KWEUIlREHfTSRFbEiVC9APlsDSckKOkXDhCdg

Hz54TChBly/D1t2D+iXYvith0+8lvdkpZw9DjrUb/+
J78l1Jx3y8yn4oe8AUwQlmx0RPTvbn2ydvinjtIPI+CghbpQq1OQaoFOBf+7ji0IjtORsAVvnxeQlAQN

th4HaHticLk0fZLn7IfTH0kWF5tVSiRbr7ps9KKORuew6mLR5nm5Li9ieVU/V6hBhqNMzEn5nvNw5Fjj

kGoX1zfaAATItyZPNIoJrqlKm1BVeAHuulSDSX3oWH
3Xz09ahokEFMayDxJIZcbfXQ+5DIfDND7Ea6BM7F+nwTXsy2hwb19yN9CkcZoYCKVG/S34fQO7VqNG/Q

SpxRhgegXKAC+06Y9G1HhB3DJzEgppzJyNNLR/JMw2lPIUQzz8uGzi+V2Z4Bo9OO+Zp0AIORCrMm9mzL

vceZiOlwMG2A9XMDfUlG84iWXmQFhSsEmq8cruQgUk
0KJfj8V8DweYpWmZzL3iL+o+EX3dC5t0tzbz60sFdvcfhhA64oMQTnxpJ4GVPg1BxmsoVpGEzyXlIO8W

T9jhZLjJ5LOHzTLuzqjY65dT/GXoXeOAL+HtptcD0Y/tpFemPEryH5xfuYxktlLAXMmsEQBnMd6OAEiv

tpZerfhVvY1nl30XirKI6X7IxZixHxUddoR52QsPO2
tlYN+xvzKGFWnpSmjeE9OjI4PqaMpdFwktv1/GTlKUrE/jv8Vc6Yb8sC0u+25CVRCGnvlIy5UWc8Uot7

Pp9YBQk3lZ6VfucMbwjsq4tYVfsFlYc1MPe4HzYsdnS1hP/ouC4TH18FBnDn/sk2Q7FCuQLG8VOLaHdz

zqsK0TqYmVvDBh2jo1TwcCZkKHa2Y4Ts4DJpcn0fov
v/uFujV4HXINZljShW09qxifSl93m7HKOBMVGPifKb8jgam/jesSF6kctUkM/vZO3J/Ykyltrrqk3iDE

heaZ1UrnwwUp+pMi1L7QICJrdTiTkV0qqJlw+fWTyM+iCv+vSybggGpWbmfvG6RR4bmbox2Enx1XIU8K

zTRz3HvohPGb/vGx99xO/5WHfYeoN1CHSHvEms5R2A
OZr7d9XNqcvNWp2bj7VfIAaUBrqeP6jGv91z7RixBcYDU5nPEXkOLXs2D29XskMf5Dhn9mbPhqCjKkLP

8wd0oPWeSmDr6bqEe1RZcaLTqJCp7ZP/flci/HrKpH7OD9JqTrNUsDe/CF2nxcaoSfqLHqSm4EYCyXww

PVjbyoKB/ZFB9nG1xR6tLfQT6M69E7YIsQ3hCIhOsZ
Igcdx5EUxmuXDQgGNLaYD1egM9fSZ+S/Rpmzy6zUV7+/1YLj7YrxlUjAmlqtyvSHC++zfwcak+bhsMxL

p8NC2OvlD1WxZ4sMo7iHVzmv8MGmVHW6mIbZ7XcA6gKjR9kvYPSoxrn9Ux37smovE/w6zbBeD/6kIYp4

DT8PdG+VMt2/ggIsUSHGH3cTtUfvbD+0wNmoQf4AxE
10FPHaI+Qn+Et46ifqoPDHghtBd/HG2E3pLsWm3EDSpa1qpzpkcJggjtvr5BYKjzqpjv+OlLUYq8ujxA

QCer/bMyVtZzNZxO6hwdd0dI89ndVSbBwe1qqnvgPp6G+UwJVI8Y4DsJH9xOeZ6kdi+gWfKnkJf+oFME

BsDfR/VOKShjiBQ5Y1J7Ew6Al9bS2hPPAxdVffn/A2
o1wMrUW0dBk6xvyRmbu0Yy2rPlM9QMn8u+nsjPRTppNE5fEG7dUhskICX/q1em7LgzfNLqQLqBjchPVz

bmjRxU1i8Ilipk/UB73Ov6A2UtjDoYm/vIhcYZLo/1pcjP+rp46WGFxc1/J+dTPSf8M7UG7KwpbzCXRQ

SimnR13K+wRvwEe+T10PC5gOT5o5TAdCTKicR+6I41
tj2/T3XcD/sG0HT/O+KM1DAjufkus9FlHW69DAtpkjagQ8GV/+9IDLTf7NDbE+x0Nsm9pyXh1793Abv2

4JiaeB1AxHTKWKkR8bYIqawzg2EU0gDraBg7QBiqbwZG0HQD+XoTEhOuQ3Tm/Ptoob1uwN0gBRh/jtt6

J269Bx/tfw++JjwUmyVT74vQhB8G1742Um4dhSb/C8
O3S3Be5BcN6yQpxvRE1bZdo7TKp7Bokh7dPjmPw1qhm3Ua6Avm0wewJFRwXqCpGCBRnvzobQdhhoPjOM

eLmOfz2i2eFnrXa0CJ+sjfQ3b+ifKGGPst+dBEHesw7o+KoV7fo6UYHrOFZUSFMg/YW/M+9XN+Jybkdb

Xgit9FEobMu3WuIRgNWDuFLgaOSwgK1RC0x3y5xCLX
djAO/0FfyKDngK+Bz6G49hlE8yfEZGY58HvlpgLBQ1i4HNmx6HWdqcxippQzDQOo2RaY4dhaETGbAYwm

71Uk0aiIy92ZPSk4eD3vu62e6j2tnxOzQ2ZPaZT2EfrOjG5YrdzH+F0Qrz07OD1J3OK7/XMB3bfsguK+

W9xKmV9yBPTlXXWtPijY8ZC3YXO5jOFxmnpzDreIZT
EDk1D7m+O1abfVP6JkmhhExz+LKhvfI/6IiXvLlSzPb7x1uxe0vI2EEeqzam0aDK4vONnoHFyUbuwP1f

OkTAUj2y8Z6r0y0hr9NGGrcGs6NDu0vTlUpw7oSq+V7tqE01Z52oXwhhdC13475YOeqhHF+EUfWJBRF4

HznGT5sp6wlQYdWU5jRlpogiAI4D1onRwy4oguHR+j
pLZMMKGDZE01Lel3Fq1YILI+Ibi9CatAcyP38Pc4bvg7ct2P/WD1H/ZIXMgd46tSbVHsdWbFUBcp7ev+

DRRQb7H/eaDD8MtXxbPpq6Ol0DXh/m12iH2v51oFj80Np4JnxAGA2EDN5J6wSKZQ8Z+BMsFAHPODEmkv

2twCNNJq/93N9jO3o663HrJSLM8bWamOCSnt4Jv4o3
q+xerk7N7TyaAp3e/36DKfMnaBfGXKkAqUfsy1tG1LS0OLwnv37orktWjf/y6i79bpZnvZCwBGWa8RD8

EZP8dQukKnzwBZeAe8SIbHplffG+orXR79YUcRh63t56HBfFu93cBVZkMH3dKGwO0Cxw61nDyPIAR+w4

SPcQQ4lepbetkkDEohypXjYyPRPv+hyJxnfUisA4GL
vNhv32OXbhBC24LxEaQ7L1eVF1Jc0jtL5eWJvgFx92qZ3/p1MFfN7JPRG1iE/dhsr9Nd+JavmYEuBfZa

c733ZIe/CFxLeg4P1fgYdQgsGCRP5HZY5JWMbbVm1Jk2urmxR4yicb/19+S+zV6v4FXXqqC9tkSie0TN

bqTe1nSuQ3b+Ev1x1IU0MtrF71IZ41s0rClG1CGlxU
TA1Ss57rmz5mntHqWYsyR1gZloo6V6BdhTXPtFwnL7RBzlN/jmwJx7361K5eXo0+0gHg5jjfcnnRV+iX

W/ALZ3K+WLgPe1/Q4FEk5micS6EMyk3V4LY6LnRU6XJv3KQqOPAB13BSnKWiO8JGg2lE2RG8gAi02xvQ

Gz/8EkDviTyD0XnxrEU0N/2Bdu1RjzX36U+9+NtFVo
z95cq9RAOTB9U5u7/[place]/utyprBPiT/Dxo9Kl4v1InZ/o3n+vlPBc6H/TH6vYxl83FaFJJcScEIrvafL

Z//2e+9nbIf2UGpdKoFdTUFO7JA2EecB9qLj6C4K/YrK+H6oNoO+3b4PBNVh3vgENsmDwx8KlGW/AC3V

j8iN2a/uD0KOkBQ4Fi5NRkGGTsZQ/xP2wm+jwyT0Tf
/M4p4a49mC3Cj2Lg7gl7V3knZ8Lhw/CPlaz/QucDlTvhULgi3J7GbOfSBysASHttCfu/A/pD/RSituA5

dv+4JK6yYexJ36WIKaM0JAu2CMipcwPErRdnmOymuJhL2N7CwhNRhVKZRF1sIPHX+C5uI18zF9g0pG9O

8vSr9Yz7Xt+BdDVrdStnYLZagzKUm/kBBc51OIxnT7
YvP7WkjNdNjPoPZXPNbLXNaoPiMECT+QZ2jaLAcmsdUax/CkJjg9ndbeEsotrp1/gv/DV7rSmultTwtf

HnaijOq2GO1Z10e0rO+8Vvb9nRF0gbs23OQ2MaDNaR5AxEURprfJ2/yIbTI1V+dvpcBz4gyMnB60lkFb

ZCcx9wQgfDt58Ksu+s9/o6CNFTJq20jE1/0rNV52aS
tc8erBhBuzOpZ1GtJn7iIenGl0z6VBbQQEmF6ibaXCuoKvWC3JCyF+yS25DvE5FX278SrRzdDdlerYzI

8FlSz5ZVBF9X8qrr1T5/mAbIYK81pzPDEf2ggc/eLePfMxEnOrFPr/vE13PViRIbQzf0Q4OsI2W4seuH

UuL51zGsz6+gr/gPnAkErRqTneQq06H/Jq1pVOabEd
zGexd+sB/DMQImgUL5YcBNS5s9c/BfFDMT+93T3Wjiy01/iD4K+WDJAbhlWJ6YxZHqbbaUobnTTt3Uwm

xLxesU3CFq1wHqmEUQCxrVXzomdZdQCyfarH1qMZunlrH+fPFgTBVpLEBtIk4H8mHAKesJoQovMmniF5

BNbs+4J75QhMmom17ts/tO4iAOKCmtVIjzQGfE/3td
YJhKP3sFScBp37cp5H0N1V7mKbC8jIsmQmrshlbLWm9+NRCU6TMhvyKo9CZ6Ew87gz/wT/PbSC4/URiO

mJ4IPP9qEM6P+Qxi+d42jIx0jLvmRBr8n8gtO442cfyv7INmmHmDs57i6m4qs9xmxDsi75HjzOMum+iv

2+5jb73yjVQc9OI+Y7GhLjC30p5h7maWTVPe7J0o0x
budeAnif3HyZ7slBo2IK0i4CF7r33wNJV+Z0AgtUSyg1xEAbE47dz0YcSAMPBlMR5elW8WhhsWmXEkT+

U9gxUYb5EdOQk0rmxnDLClv080tCyZh+u0fsjQR2e/rauJm5HK0gSyY67YFH32UgR2sjufwcS8Cqb3b9

+XbGgrEeQA90RxQ81nvwCoKYHVFFIfhl+UXQuB6WFC
/61adHkQ/6y414IKqqR5i6Fpm6Y5OcETmuAaHMcH6fziYi3wwC5TJLWe2kLkoatlommdhKwzfMuRO6mh

o1qCcXG/KeWRTeeJ2DLLX7G52EhI1WtIXd6aKXYcPEw+RkELph2IQywfdtnSINPs06Y5GR5i8SViBKU5

D7Amux/d6EQ1FqNm6UjCOp7He3cS4Hcg1LLVPtrHUb
Z4K4oW86mS4sA42K99391gclaQJFGls7av8eFs060PAq2EcjB136PXrsvu+BH+NRg1N27G94lQU0P1Il

69IrQYO6aEjb7C8wZ1sEQtpuFPaitMD94/EDgN5EK7Um0pNbEQ7O688W21TkaNFMfbQi4sUh8ySjZarW

/cayZfj9BTF7naQpk1HBYf4h14W/EpQRZfIRyLuQLe
DwPto0o2q6Kvmx9ro0Oa//BpaeXHjMz5VEjP6ch5fqRhZQ3GLRO36JE5fNGZ8DfcSkrmxs4oxCXcwA51

u3jeuvcLY1BYpNI/ay+U7ZGyUOq8LSyz/ThtvfH/M6CaNkSuhb+puD7sDg+Z39rqgJa/lopez+U4dADlMre

oevnF3IR86Cb5tTOUY22wTgpCphLChLcqnW0bd95KA
w95Bf37huC8568kHBs0ABc4vlXSJPCLl8z7nRhEWMr18y/5+R/5GAEY8s5o+k2Pfj+Xvc+u3pKn4b5V7

go2e02zM9/2YCezl+OnxCbGOvKtlCakbY5nRpfBMTVNlfQ0CAROltphxk49ZpTDQmUGh61UQgXv49lo9

qTjd4856OvH/VVbN82ozfOADgk5F+5JgWjT9f0EW+i
hw72/n1t413f3hsFmips10L8crfedici1abp4xIwsivy/rLnXO+kgHFr3PU0kMPQnwalnR5OA5JAC6Hk

Bvj9n/5gB4oCS6EDjc9KPd0ZC7yxV08vZNas05M74NL8pXsP3+Lolv6p5k26db80q4qd5C37v/zTojCp

LpC6n/nLnz5LcqvOCl+52APeb+s0QHooM07fNCXumb
h02WtNVIWvhy+Em2HhdYr8iA/PPbnHPrASWAAkahRd/A/PUyIe32cwvhPH2GlArVvvZmh4Bnh/yuQ9pA

ur3dTXcT2CR/WqA3+qYFgPYb+/5fwJF/dZCl8+W/OxC26s9FjvFHzGxfgPwvJYmG6gzF7/rzp7CVeKMN

PwDH+HyK61T/wy8KDzhG89wQWnGSkEiZSEE+Vm+DELMIS
vNaxVaXHNYfbD7Fd83mToiy8c0V6Rl5C+wZD8oZtptm6GWxgOrWbt9UU/DijuVf7wd+avek5E1jjuZtb

LbcQR7ktO9ZhwieK1/F+o5n6CdFV6Q84Odq116p0hPlvqn1XjDh8EBAW9kG8oZtVr2Rc3J2PPZJwO/og

g68zKtZBD1X7MXBqrRKX+c3i245p9rOUxWPvMP3n6F
0Y8PwAF62NIhZ7pUioVcu5b69sAEcDNBdnlWZVGVAguAuq0A+P+Fvj1HU/I58ZveoTo8lMj0u+l2vRXd

o0Jsko27WqMS3d6xSbS4sqpok/dq7eA/N0K2zkITc89VMNm8Nssxma26y8SVyFSQLram6WERaKcjWKd4

FdWpZBj7M2uRzjW0YTpt/VzkXdhkOJ0DJB4JhP1fCu
ZvAjhnjzO0WmytQkD5y8RErSxQKPw6Rr9FcNR1ImJu+RhxfEt95n3Z/AkHXpKXeN6or2rhuets4U7TA1

taCBUfZAxKvelmdRCaKkOW6BmHROQsxGbBdw4Gm+dd3rpzmiajfGcCrPjbr6AvEzugo3OZlBy0flmgz3

LY0km49iQ/at0KxOB/u3IBU9CXeMRFfOAtTCaycvr1
EPvsvhib+IDqrjX8EIS4Lagz7VcfopGx5G/aY5ORHmJZm57Ioo6+UGTEuvf6oNXe/gH4/XF2hPYmpJuk

Ja66NXUetTJfP2cFs7CT/pX6hn3ylSzgAPZ2U40vn3LCknjru3FyVe4LawjkheeZAAvWKUbjs/2sCfWc

PWe6SerED43EWMX+6Fwo6xsX804xKnf76akCTvJXHv
x83yk78MJBKnSQVM5uUxvyz5Xu6MpZA7x/CzV8LNUU1dwApQ1Emm4DrQdXnN1f+/PI6HtO3QZ1BVlZ+L

eJni20P8YRpQtm63Ac87CZHRNDy7NrNtXLqhB+0q1mYn/7YR7XJeD/MVGPh0C+wthvxVzYCrmYTFWyQl

JMNkpkzPWfGWeNw36lyt84dIJpxFeQclzT4BzPAejk
C/d4PS0Xa7WD9uwnwR4KPdI1zomh1miY4WV4cJCIhYqVV7i6IgNqLGy49hz8AR4+DKyCjgDcs24yGWfI

x4AhpBSJNE8OowsUg/qbLTfuD3mMqRpzf97V0P+JsxGryU1YuplK7YpIuqbX0QleaBnGdsUG5TTpen8E

z87GYp/1Db6SdeLNqR+qe178Ek+qd0N3/GIP4AtnYl
+BFxl91hEI6+vXVI65zJ/ptj4DmXaJQal2ailndgK6P3Z82HDhuxExUESwONGJu9LCGaJeQQqGjsyXQK

hBPL+94i1t1T5qYkUxR5mVq2UsyT7fVVssrvRTTaDd30CN+Q0HZcst6raj7ZxYOISdaVpKMzsuRY/d9g

jK/hMJJo2O9F9ssWj9+mUSyldvTnsPIiY66SM4rC2Q
nbThiLe+YXW8B/0bBtgi7MyJmryu0JnYcPxnZZyDlDZuDkzD57kRcxV9nPhbZt+gKmBoCWojfhx9LkDg

qe1C6AKWeDxY4TeX8QLjKmKJ7UWfJAydTbLyO+n5mhmDsKxP5VKMyZSwcX+U3iGUvL+6lX6KAtIDEuSU

24GZwJ+WC0V6X2oyH0FMzGVrZUkMnIf+s8SYgIbyT3
1fxFRvg8DzcMStY4rX8HP5KlvgoYO6UzRRzS5YIpNYUY30DGja5KTOPz+xX3OtmXb0MAvJ+lnCJ7yK6G

JhRgpRtoxLDPjwRB0UDvP+wBzgQ+Wo1FFyfUKc8reAp4yNuYKWNR0y58+0plQIlT2j7qMsB+GA2NUjYJ

GGPNtQ9IzzRmNu8Z+Iv8nK/6dEFEA2uVZfiy5XSnP2
ARSUfkHOzEXYUVdSrtokXx6Ap+vItYap43X2k1CtLz7Kn2UTWPf/Xi8JtnwUkdrdeXIvQIBYJqRXRAbe

QD/wFRYp0lZNQvHmHRxHnBizlbHS6LUUPE4HDwZojqcPJrDe7q98QjjvEPSVMIqNaEu2PN82Z7xZkiZX

Ddu2rkuQyzz4PNwP/Cj7RSwqLljXsiH8NL2E31LHlZ
/MIcKK8Nyr9l2xnw1ZhpUE2YGbL/ftSJ5Lztwxed0Q21UPfP8NgmZWS5V3Y5SQzT31kneMlB7B1ByBjL

X15SFcmGNtJXVoohbiJAbGAox5G2nK76M4jkJmp67uJJj0sUS0opeJ0i7NGwqQ15g/RGNgJxaEbYAuOy

u+s24J+cFKHskXj1hQmYO2Q5Q1Ci/0LLtDGB2TiVV1
n65pJJswQ0SGo8/UUb968VC8q1kP+vNY9K443z8mBVHNyyJXcPvSIk6KYlmThi9alUi3zWI8X/WTJ1BH

CofQx0Cz+ak904nekjw6xrLF8grjwHl5jO9YCkv2odg5jO90mKPpeWtDQ/wtp+bi3K5c/keR4dp34egt

b3wsdvGZhxY0shB+sc4tYeRme8J2awQe4oFx4JiX+m
I/wFZ65XPMghTGVnFB8TT3Rz6X28QASoBgkxgO7PilJVbfU+vuVw94NAaFFQFTd4YPS4Z6q3ZWuEqHq3

IcLcTfUJmjJtJcccZo/U8rdi46Ox+WUivm3/06ApoCXfOycEKYamqpj1RANg85McXXORACEwI4r3yzL1

p4ijM3jpV5UGb6KIC+Kjm1hcJaW0v47NcHEAiJ5r43
LUC6a4kvHcDRI9ujovrGUcvKytDe3ds/OYRuYuf8PmKFMc1z3eLHG4N+nSEgJ65F3YgjrGmrGfRWl/monique

1amHi8HTsl15GtPBwsp4oAAmEadyDaiR+D8X92eHw3HeeCI6SuJMIulyo0j+soG72iC4tidVetA1NsED

yWuxx+B7/sH5MJkzKi17vDrx7ETGsI2B48/B4dsgRh
yP+AtrXRYYMZc9n1L1so2xjs+kdrD/RklNjU/7R2EcmgVyK52Sp3IO7JdU1N4UQkI3V7IhFyhcYsZ36L

re5FMwP8hlw9XozXB0c7XMyVnhiiLLcWF4e6E8FD4VjJpwO04C0B0UzpX0u7zLwXYK029/qKcJAU1tuF

rCFoNOeNlJeJShA7qXy7ck/4fVCbX86kFP0PMoyncE
z4nkq4prANIqEkl3vNHXb/s2e4gS96xSMydX039Znd5Uk6aRthV1J+vUfSu8a2rICsx7a+tqk+hsnXBN

xBcewbFoIpjBB1ghTtoMxHE7d3WYBAwysZje40LX2yO3b8gi4ZnXp3HI6us9ULq36JYO7Suzy5y1AFaA

I2bAdzcpq04w+1uBil78A0+KBNo0Xv0yxYTHB9gLvZ
zUW13r49qvkz3xb6lNNJ+yI66aicglkuc/fn7i6ePwt5dZOUgS8+G/jZwTx6IGMh7+cMtXF6fHSPS5jQ

XNqOAPpmQPZtRz8qml+RLsYH7+rAztbj3kPDYzxMYPMRjbli+jg5YwWmjuz4qjweFw0AW8R4g8RKfXK2

RGST4p+eCFvlaSvu4Hmu+sgh/6UqIdeKOermvE6q8N
q3iI2fc7hWvkxd6WU9erXzKCwPAN4x19F/MyY5B6s/XiU0JJYqFBBiKKOA54IjARH5lnpUXxgeGuwytU

OIBA3AqZ7RWgIog5lanvqXGj37YYtP8s6tzkBDabCZq5mce+kwmf3s/qOJioDdG7ttpfkbDkB25HiFOb

CEo1a30j24vCkI6aZLMMozKfb6b4Sy28QiJnPWLUNR
Om3r8DHj+cH5oHYQTh4u6Q24lwZb5j3uq8brQQTHmcQATmxP/bubXwTA1HfLbtvtfQWVzd940hUyvWyn

6r7WZXsCAzoy8FFEqQFFkZGJlND3WhlC9KBM6I1HDN6r+loGfCYgM6aqAzqv93hYVT5Lhpmxy/YkmkL/

iPWlRK83MeiWNEH4dFXhNMU4sgH7XwAkjXjfZXPDER
f7b6ohPrGzeTIW/bfntV3kJFleViM6AS2UDzJO+iiVgy8cEVW8cgpMZmQdYhdfvKrk41GiN0QzZYc+cZ

FsvXS2IIZs8eB/VfGDlVDW1DLbzx1/MTzXFHVp+y4XOqs3tQqdT22flw3REMn7KV32WVee1gHldxcM6L

8no1h7gCxCMbJ+g1c9OT7XjKOdTx8yK4NPu7FMSGms
ssLIw0aOQ/yCOrlV+TgxV95mM9/B73Q6zW+ruc/UsKSsZWG/YKSv0DL9zjkKzEcVuUQ3g4TyVaVX/z9S

/Y38OCGZd1Gfkjs/8uz63Pxz4d9cpqIFixCpo+rsTUuMSm+XaenEslW/MMEg+902qODydecUXSN6C6+7

F8jJHBwKXTulR0NJe73iBo2TZyBtiIC9NJpUgYSLW/
PnsLGRKEOKnjzJW9S9MHOHBVeIeRpan6aCZT33phaKcNZPGsRpOYjaBwIxhx9p2gbhdtrXXI/W9NhFZY

krasyh2t28ChBKZDKhABPrQ6P9Da38q4WX09f+fXXnSMe5b/PCVOtnxynMK+UFqRW9hlnEg7fQ5VHGgK

6XK+6/SXht3pw0ojyRK3V6HJlEoNnNo7dgJLe61h7s
+x4sMySE+T4aGpSR2mY4EDF5zoVxVuIY1wwuCAatFcDN3R8hCLjd01Ry7cRhhGz0jZw7s2llTpxqAd80

/tWhsmcNWBuq7Q37Lyx+PCAb9x/kXy1D3LdHOCt0haOPAB7ixOZwKybC7RrA66dhyCFDFBkptXpJCDwT

1yiHPZpd1O46UB48hvR4Y6bM6TcDOaF1vIn2K7pgGH
uSNHLQH0DhSYUzi656Hmk6RrCn+/MOHZOd1VzTfT0c/94Xm+/k35WQT1kosbFBxiCZrndIlxVBoPggxx

BD4U5n3Ol/c3tYpxposu9/R2VaXQeMbqCnTMLm4jSgBgSuToxxAF69yzIz3LbFZw2FLhsAweMLMGkXyv

ARsGIhcpW6JxBFSZeE+478U2Z9RuPcWV74pQTDUV7L
ZP2CIpd2DRphnEJGWY34t624qtKf4P9UlkXcb/43dcAkv/S2U01tYQSxZeLLE7gaDjbX4JLR3vs3PpUJ

p7ChMmEI3oha3JASI9WC1ZgAi8RMBvL1TgNZDaXOQfg1SyHL2UYC4gcGlxBpA1Kz2DYyHoe3QnOGDeJS

nNeKXMm11RXLhxW/RaGOrfPWwRBvdUcgBOZICLlc4Q
HtHgKBOy3UErg7/wAARuphqCZAHVNKwcszrwT5igPdRqP70mlPrPJmmsbizdd88v+5ttJdlaIRmAqk6F

3So/C5MoDOFF66OtK/Q6BuTshmiHR9y2AtA4zDTi+NAlDQjyJvnS1BxHypyJvHOZAJCKHFM+Hvem/Ltq

EUTaeKPse/ACMx1eci9xvh2sisfdwyssLfNV6SkGIa
vGlH4w7iM7y/VB8V+p3Kqjr39dWXXcjt9QDAwql22R1s0aIS70q0xq3BYNXe7eaT77XoXs++RXjV5NSQ

3hv4JoMXQcFLwmzpAoEtvIYjQ9FVOmw3CuGAl9JL2EUYQwWDbyMO3Zk472HKHrP5KacHJesj4DLOGcDt

0tmC9gxVJwBQJXKVKHG9BxK76bRPnLDlWrZ2XkJ0Lq
kXFaICi9U2WbnGpxyFpzkFXeSEn0A6Sqx8HdeyAxLGJ9XC0OEAIaZabzM8YpIxUHLyPeB8PlbtQLFa99

ALpgMN2gHSDmQYJjFCJpEGj0OE0MCSCxLNOglOmfCR1dqKLiSQ1LlAFwJeCoEGoiCE4Do832YebpCZFl

NDIgMCBSDQo+As1JSC2qe0VoKWk0QTUnUF8jfe2MAQ
oMFkXtN8F5eYRfOw5ehX0BoVK2nIWtR7QERKEftoKAzQDvAz5TKCRfJd9igM0ZTDOFZKFaD0Tok6UTPD

4WGLUDv6kGRG2nlM3NPCFtgMa2aI3YSQNpU8kHI6iqgAZvIY0cqkC3YQ1EOLatk4JtlCJsSYPjThEuC5

8nDZNomK0zZYhNSYXxfIi5cSssY6C1lWBlLB8bzhNi
MD4+OR1HQRGhUf3xnBMru3UjuTE6v9LkQRRvZFOQOBnoFK0PYyThRDNtK0njOGEeZsV8BYQgCkSvVaDb

No1rLC9AXv2LFmWpCQ8yys7QJPRvYTJvHuoZFam6MZmvLI8BlFGuB0HcbzGpT6MuwXpdDH2QmPFnYB4F

RFKjZg7vqR3PGZCDZTFqI5Fxu1ZFKH3BgcMmQDptGc
1VFOSmsDi6iZ5XOVqkTJ9PJBWbOF7sWC21Eb9xrPLjHbZ9OXZdFz5YIcTiG8BsEB0aU64pPWG9EhEuXC

INCj4+SVmmgxBkXgmJTjC0OUEzh4XhUEbpPBHjCOG9CMXpQun7UJNvDPWdSnVjOQidIZHvFTL9XvBaRx

fgZOVdGvZgHYIkLoY9INMxWJE2UTDkAcE8GACePOOd
NAhlYso3SQUqPHV0XlejQKU0SEMnQVT9QwvfEBO6ADUaMKP8StrgLSN1DNBoGGVrPWznEaL1VLK3QGWI

PsJ2WDZ6QHHlMQAnRFb2DFS9TtXrXNtjDYDfMHR1RPKvAQk1WOA2EFX7TGisVoK7DEH2YnQzGZEuToWa

OLR1JpK6WItnToDuLMs0GFS3ZnVzBrW4NMI2KzV6NW
brDRL6DIU7TGT4CrxvJON1GVU1GIH3OYQoBFheVW8YIlV7OEZ8SiVrFHEqGon9DSJsNUFvUWapCWYoQW

Y4NfO1CbWiRUArDTO9TnSlMCOiLEQbQGC8DrLpMSmgScMnKII8NGCiUJkgYWE7COT3GmA5JfDnKBFdBX

X6CuDaMUPqGee4DSOjPeA2JIjoCDknFZyxSLP7URNc
WUn8SKEfBRDuGZAtMBLeYTNdHrG5NPP5TYQkGHQxTiL7WRW4MoE8OGXgHrP6ZIM1PzM7BMLcKjD0RYL3

VmX2UREbMrS6MPX2VtX6WEAqCyS7VWG3BkA3WWImGdZ9DWL7DmH4CBRfImK0CHJ8GrV5IZRfCjO4NQD4

IoGDMiC8DaC1WHNsZgA6LLY0GmJ3LXVwVxO5ZFX2Mc
A5FPLhUeD5LUQ5QRHgHHrkKNN2EYGcCQR7EIRiGAG9IJO3IMT2WWrhWcC5NNrvRJZ1IoOwPPG0OLO4AK

H2CTObCHsfJYX1GGCxAejaByp5JEO3UkEbXpSkLsP3NX1XGXe4FOD5XNXzFMjhCnH9VCM0HeT0ICzuZK

C3IUfpGrZ4FJtdODSxDOAsTZT8UhPiTpWqWXNvVaI9
AzIyHsBcFVUuExC1EIHfDfE9ZZI1DWK8TBqgYSr8IKW9RJJmDaYaAhL4KOA5EeKkArJmDyDmXWt6DTE1

DHFsLBj6ZQAgEtY4DHk6ZUJ0YZAhRfv0HZl5FHO9VIdwNmj2LNH6TNC3VXvhQYB3RGM4MQB9ORScYUDh

TAA7WrF0ZTWqFDUeLMN7YhL0DIUhCEP6TJNzMlN0EU
cfLCw7XTP0MiW9WTycZkQ5TBY9WGGgNEykTcE6XOBoXvTITiS9JyS9RuKpFUV3CZGlHCU5XiBsRSA1QB

M9EHE1UkTiONK5ISQ2RNI6WWdnVMA6KAR9BQL4NAHsOJa6VR4cFNslanZvGpfDSsM6KCYnr1RyTVi1EK

7QUSIkDGyuUD0Bh046VYRfO9OmrCApxd1ZIAUrCj8v
wG1ezJCqK6Eyp1HLIRiXlZFzlCNPHqVsE8NhI7HztHOoYQm8S8LalKfpkAapeEYbYGe9J8Jbr7CjxmZi

HQO2FX9DCSBfTdnpQ1ReRISYKjHwX2DcstXXMs80EYhiQB8pLWUnFLTtQFGiGlwhBCfaUL0LuLBxcVQQ

yidsSCJfD2E1LH0JCMEEPw1+MWyshiRqOmsRHpS0JS
Lki5QyFAp0HS4YHKRhRByeEU9Cp244B1A4RwO2qMEnKTJ6AOU1pOTiUeJyRDQyslExV6Xnh1MZSArRfU

BrwIUdQL0zq9FkshgiR8ulLM2neRAyV51ozU9bHAhqMB5ExUXdsRQbNZWwWeJvYEWceZCfPAPoWkU3EQ

dvOO8IsDF1fFTbUSkvSHIKQLkcVZ1Yw112BZMpL9Rc
mNBvjxY1HVZtXWTLDv6+AMrtpnBhTneSCyCtQGXzi4VgDOdwFH5AQX8bg9TnSJnsVBTci2TkXAm4OY0Y

ZODtQPUtK7HghHKpC5DSRu8DBMj1W1dqKSbnCl6DoPHeINTpRZcbYR6Vm619SYw5QB1LBXAqYuQySWOI

IyEnJTHiBhWrArAkZKAHDDbxVEEsB0XdEBK0OSRgJu
6TAACpWK4WDfX4UPJoPAHGLwQvKUFvVtRsIMWcVDDNGKsfRIIhW2J5PZS2SLAoHc3+WGwcVC6VQ4NlKI

D3DEa9CG4+YNqsUE0EuDLCL6McpZJqYRrqZ1XJOA0YETX5GN6LfDNuPC6EoLJNE3XzvGRaUz2eQHHex6

KpCv3dJ0WXPVLVQJFnOZxpGZeuHIRnCAt0Y7J8GKVk
D5NZK038sGLyoEv1Gr5lF9XXCJzEVrBcTDvrEYjcKHOqJMc1Z8C5WJRlM0VAQ6YuBaZnnyDmX0M+PiAv

YPJARW1QMPBJJVr5A2T7hTWxK7I9hGcLeUG1MN4SLG1MvOQcdCAyu48+JfVVKoZiY0QFZ1NXUU8WCYa0

Y7H8jPRiN7E5iMvTbWT9SI0RKD1VbXhuzTKdGf7hGO
ogICA+Yk0BGr2PMpZzJT4ubt8XJLQuBQEbTcqCNzm9N1joyck0sMCjYyW2T4R3AnV5aHZwNA6RH9X6dW

YtBJT0BZPzwNF+Ns9Es9TvHFEmVNn7S1ekZZRaMJWyZsJjgR55L++4lbrocRY5D9z6KVZKfNLdnTnMhe

ArV6mHXNG7k6J2BRa/Dr0RPJU1kEf7cJTqNNVhLDr5
nH9qqIo1ApMcPY73ZWEdPPcopS4mVif9S4Wcm8YzYe2nKp4kdABsHw1GYwFiXCS4fkNwUrIUJrG5bCdk

kxqtSBX2B8j6gPX7Yx68n2pcemPzr2AfMjN9XLwlTAZhFkUsweVkYPM6ddFcnV5lwzDvAo2QUCSxZYnn

oqBaTiFDKj2XPcYuGS97JhsiwP9voZQ+DQogICAgIC
AgICAgICAgICAgICAgICAgICAgICAgICAgICAgICAgICAgICAgICAgICAgICAgICAgICAgICAgICAgIC

AgICAgICAgICAgICAgICAgICAgICAgICAgICAgICAgICAgDQogICAgICAgICAgICAgICAgICAgICAgIC

AgICAgICAgICAgICAgICAgICAgICAgICAgICAgICAg
ICAgICAgICAgICAgICAgICAgICAgICAgICAgICAgICAgICAgICAgICAgICAgDQogICAgICAgICAgICAg

ICAgICAgICAgICAgICAgICAgICAgICAgICAgICAgICAgICAgICAgICAgICAgICAgICAgICAgICAgICAg

ICAgICAgICAgICAgICAgICAgICAgICAgICAgDQogIC
AgICAgICAgICAgICAgICAgICAgICAgICAgICAgICAgICAgICAgICAgICAgICAgICAgICAgICAgICAgIC

AgICAgICAgICAgICAgICAgICAgICAgICAgICAgICAgICAgICAgDQogICAgICAgICAgICAgICAgICAgIC

AgICAgICAgICAgICAgICAgICAgICAgICAgICAgICAg
ICAgICAgICAgICAgICAgICAgICAgICAgICAgICAgICAgICAgICAgICAgICAgICAgDQogICAgICAgICAg

ICAgICAgICAgICAgICAgICAgICAgICAgICAgICAgICAgICAgICAgICAgICAgICAgICAgICAgICAgICAg

ICAgICAgICAgICAgICAgICAgICAgICAgICAgICAgDQ
ogICAgICAgICAgICAgICAgICAgICAgICAgICAgICAgICAgICAgICAgICAgICAgICAgICAgICAgICAgIC

AgICAgICAgICAgICAgICAgICAgICAgICAgICAgICAgICAgICAgICAgDQogICAgICAgICAgICAgICAgIC

AgICAgICAgICAgICAgICAgICAgICAgICAgICAgICAg
ICAgICAgICAgICAgICAgICAgICAgICAgICAgICAgICAgICAgICAgICAgICAgICAgICAgDQogICAgICAg

ICAgICAgICAgICAgICAgICAgICAgICAgICAgICAgICAgICAgICAgICAgICAgICAgICAgICAgICAgICAg

ICAgICAgICAgICAgICAgICAgICAgICAgICAgICAgIC
AgDQogICAgICAgICAgICAgICAgICAgICAgICAgICAgICAgICAgICAgICAgICAgICAgICAgICAgICAgIC

CdPXNfDHObXDSiOJSeZSFqOZReCHVpIFKxMLLnSHYsKAWfJXRtSZIwDFPpJOy3B8wfHGRsJUJdJY0dJS

d3Jz8+XYfZCoBvSXV2leLnoB8GZM9nw6YoBDtzDYPp
d1EgFEd4XP2FUPRrPSsoVH8FBYeick5VXCPsLIExfGNAe1pbVdOaEMD6NJVrMcnqVE4KEXIwY0uzgrYu

PIVwBLHEOYbkXDOAZKwdFKKDPDBfKYBmBsCzZcOqGYYnALChCMOMZVZ6OWLqEtQkBSNmLAKgSgGrSNBC

EMQwMWMnPmOoJYPfHGOaYrmyKNCQMSB3MPMcOlPdWA
nlRZ9Ia9QpbLApHm5YAi9PLqPhNV3cwa5GLWXhLCKcLjhAGzq5LRpeMG4EpHDjfKS7CsCoPBLHAeRtR7

iid8AlYIYyGXDAPVerEX6Ha0HioBIqUQo+Gm3SGN5ep9KcZRn7OoWiDR2gjz2VIBmQDwMuA8ZhaRbxDL

hvRJCxpAPUcAxrxD2bBNSRaDZnGIZSBSGkgXBiVb6g
NR3dGCMuYJE1KrIcSHOPIK3HVJDaALTtlAQxGPIsEXCLHY6GBIqeTBZ8YLKxquWkxSKzQGtkPK6IIETp

bnQgNTIgMCBSDQo+Fi0WZI9ey9YfKAd8COKzSK1evv4RMBySLlPtR2E2cOXiC2S3JPxhOo3KRLTqKYZb

QMYnZDCKVRdqMO1IEJ6cbvH5XW0AwQVcVYBeXASawX
BlRBp8W72qdNYaWIvjUB9NHDH+Santosh+Yc1CSXFfKDEiRHMoZuLmLIQOFsVjV2ZlQ9UNd8NhG8XaBE84dZ

merxGyYRnbNE0DJB7nDYSrLJNEKJ0TwFHypC9zvwR6DzLwXDUYGlPeO75paVMbARAvYHEnOTVoXg4ZZT

NjV2OprmKoaVeelwRdAGGlQNVDGU0NCTabxvGheNAh
zKywDR75hWhaZJ5BQj1IIgGyME5yem4DyILpCa4KBTL9KC7EDZCdLXYbFRXtIMC2OKCtJeSvTXezSCBv

NZAvKDG5HVQnNAXhPV5SLxQtDOEnGHyyBaTeSLGfYAGjep4WGDPlAUS3FOikFWDrDKEkTLPrPQowFGTx

VZItKXR6QYFdRZTzNG3HTmWlXAWlNQA1ZUWqCBPnRZ
Qzum1CPLJdWDCnNgrfBmFpQJHxKSViKKbsHKReXTU4QBsiYBUgYYBoRD7JWuMaXASbVUGoUhMdHVUpSG

Qaip7XSELpEJFxAaA7YfTjWFCgNFVbQDbaUMEwASN0ETDdDPPhZGBvRZ0TGfWhABFmNKy8LNZeAEWrPJ

Bqcz9ZVHJwAEYrUSp6TcHtOWYeTZEoXGkcAPXpDSPy
HyMaSKTxAEOtQK4UXoHePQFfRWB0QWKyPIEaKVZqfc3ZVYEnKDIkJIJtRESfQZUxCUXoDIcsMUGnARP6

FiTkRREsLEEuPF1ULcUqKPVaTHOyRVgnUJVqFCClfg3BANDkFELmKbT0KQTpSTXrNKUzDIxbJHDmRIC6

IpY1ZAXrJAQkYM2FHuQqVVWpBTr3NpIuTFDaJYDnio
2JMXFhDRByEHw9GoLcOBZfDIOvYOceWWYjAEVtYxZfKWOxDEVrXV7AWnGvLQHmKjFmPsEsWNHwXWNgef

1EFZIzXWEoEqThMwRoNHGuRNUxZNsbLAXvYPX3ZDt7QUQgIRAiBA4OQoFkXBAjPdT2DPGmXEHsZWRlhx

1RBJXgUFZuMvysAEVsQTWoMFGbVXwzPZMgBVR3BQR4
PTAhKIXyHR5RIkAvJTNbKre7VuDyNFWwNYHzpo6DSHSjIMEgLHX0OELfZVYaWYBqLGkyPOVsDMZ5HDh7

CCEvVORrRS0TWpGpFZImXdf5DSZrVUYlFDJkkx9MYVVpNIMrOLv7MKHsXUWpOBIeLZkaOIJnZTEjAKP8

LWThEFMyTW7CChNbZHMrToWjTGPoZUNlTLWgxr0GDB
BnDQTfQECyELBuBWMpDQVuCArvYSZbDCUgDun7NPQwRFGuYN7WBfRfEOOaQnN0PUDxANQoHQQjlk0FQL

KeVYTcMdT6DHXfBWYaUCVxPPitFTSfPXUlLySsNBBhKNIgAX9HDdXrNSTpMvB6GRZgJJFvUMUpvf6NNQ

ZgHSQaODAeQLEbIOCtLVRlXKmbNHUzZZH6FZk3QJFk
AWTjZZ6HEnChWIWnCVgrHJRjGYYaJXCeji7KPKXtNIA6WyAnDSKlNUPpRYKeIMkmSRBkMZA5FjZ5WRHc

OSXbAA9XXjWhZZWbFJh9EWqkWWTbHSUwjj3LIPPuGWO9TYaxDREhOOLyCWEtXDgyTKRsAVQ6MMPzIVTr

VALnSR1HEdTlGYDmEEquJWIcBBEcCNLpyk9TWYEcXM
Y9MGT1CTGhHCNhNZLbGGzhIRRcNTOpQEcqAKXuROBjHB6YWrSgOYTsYMWoORUoZFVqULGntg3AJJEhDN

W4UBH3SLQyMNMwALZpEGr0qwIkwBPrXEj2PP5ZX8BzmhKyCTPXXm7Ox039RJO6ASNeLg3PX9wvAj6hRL

InDRYNVj3AALo9DHZ7HiIiQmY5XLVeVFVkRuTcMNX9
AjW4ZSW4MSQ1XYX+XEuaUEynNqXqVAbvHJM7GHAdPKBtHEiaOBA1JffpTEmrWY6qMPNMOf1+DQpzdGFy

uXzdHUJWEtLzXAM7MRqfUEHPVe5Z









                    ID                  Date                Data Source

 

                    [id number]        2020 05:35:00 PM EST [facility]

 Services









          Name      Value     Range     Interpretation Code Description Data Renita

rce(s) Supporting 

Document(s)

 

                    AMPHETAMINE+METHAMPHETAMINE SCREEN (PRESENCE) IN URINE      

               Negative (Cutoff: 1000 

ng/mL)                                          [facility]  

 

                                        Drug of Abuse tests are screening result

s only.Positive results are not 

confirmed by independent methodsand should be used for medical (treatment) 
purposes only.Unconfirmed screening results should not be used fornon-medical 
purposes(employment or legal testing). 

 

                    BENZODIAZEPINES (PRESENCE) IN URINE BY SCREEN METHOD        

             Negative (Cutoff: 200 

ng/mL)                                          [facility]  

 

                CANNABINOID (PRESENCE) IN URINE BY SCREEN METHOD                

 Negative (Cutoff: 50 ng/mL) 

Abnormal (applies to non-numeric results)                     [facility]

ices  

 

           COCAINE (PRESENCE) IN URINE BY SCREEN METHOD            Negative (Cut

off: 300 ng/mL)                       

[facility]                   

 

                BARBITURATES PRESENCE IN URINE BY SCREEN METHOD                 

Negative (Cutoff: 200 ng/mL)  

                                        [facility]  

 

           OPIATES (PRESENCE) IN URINE BY SCREEN METHOD            Negative (Cut

off: 300 ng/mL)                       

[facility]                   

 

                                        Please note that the Opiate assay does n

ot detect Oxycodone and Metabolites. 

 

          CREATININE IN URINE FOR UDS           >30.0                         Un

[facility]  









                    ID                  Date                Data Source

 

                    [id number]        2020 05:06:00 PM EST [facility]









          Name      Value     Range     Interpretation Code Description Data Renita

rce(s) Supporting 

Document(s)

 

           COLOR OF URINE            Yellow, Light Yellow, Straw, Clear, Colorle

ss, Dark Yellow                       

[facility]                   

 

          CLARITY OF URINE           Clear                         [facility]  

 

          PH OF URINE           5.0, 5.5, 6.0, 6.5, 7.0, 7.5                    

 [facility]  

 

           LEUKOCYTE ESTERASE PRESENCE IN URINE BY TEST STRIP            Negativ

e, Trace                       [facility]                          

 

          NITRITE PRESENCE IN URINE           Negative                      Unit

[facility]  

 

          PROTEIN IN URINE BY TEST STRIP           Negative, Trace              

       [facility]  

 

          GLUCOSE IN URINE           Normal                        [facility]  

 

          BILIRUBIN, PRESENCE IN URINE           Negative                      U

[facility]  

 

          SPECIFIC GRAVITY OF URINE           1.005-1.030                     Un

[facility]  

 

          KETONES (MG/DL) IN URINE           Negative, Trace                    

 [facility]  

 

                UROBILINOGEN (MG/DL) IN URINE                 Normal          Ab

normal (applies to non-numeric results)

                                        [facility]  

 

          HEMOGLOBIN PRESENCE IN URINE           Negative                      U

[facility]  









                    ID                  Date                Data Source

 

                    [id number]        2020 04:36:00 PM EST [facility]









          Name      Value     Range     Interpretation Code Description Data Renita

rce(s) Supporting 

Document(s)

 

          SARS-CoV-2 RNA Resp Ql RAHUL+probe                                      

   [facility]     

 

                                        This lab was ordered by [facility] and reported by [facility] 

HOSPITAL LAB. 









                    ID                  Date                Data Source

 

                    [id number]        2020 06:31:00 PM EST [facility]

 Services









          Name      Value     Range     Interpretation Code Description Data Renita

rce(s) Supporting 

Document(s)

 

          INFLUENZA A DNA BY NAAT           Negative                      [facility]  

 

          INFLUENZA B DNA BY NAAT           Negative                      [facility]  

 

          RESPIRATORY SYNCYTIAL VIRUS DNA BY NAAT           Negative            

          [facility]  









                    ID                  Date                Data Source

 

                    [id number]        2020 06:24:00 PM EST [facility]

 Services









          Name      Value     Range     Interpretation Code Description Data Renita

rce(s) Supporting 

Document(s)

 

          SARS-COV-2 BY NAAT           Negative, Indeterminate                  

   [facility]  

 

                                        NUCLEIC ACID SEQUENCES OF THE 2019 NOVEL

 CORONAVIRUSWERE NOT DETECTED BY 

NAAT.NOTE: THIS TEST WAS DEVELOPED FOR THE PURPOSE OFDIAGNOSTIC TESTING TO ALLOW
 FOR RAPID RESPONSEDURING A DECLARED PUBLIC HEALTH EMERGENCY AND HASEUA 
CLEARANCE FROM THE FDA. NEGATIVE RESULTS DO NOTPRECLUDE 2019_nCoV INFECTION AND 
SHOULD NOT BEUSED AS THE SOLE BASIS FOR PATIENT MANAGEMENT DECISIONS. 









                    ID                  Date                Data Source

 

                    [id number]            2020 04:11:57 PM EST [facility]

 

                                        -REPORT:HISTORY: Hallucinations.TECHNIQU

E: Multiple axial images of the brain 

are submitted for review.COMPARISON: MRI of the brain dated 2015. 
Head CT dated 2015.FINDINGS: There is no hydrocephalus. There is no 
midline shift. There isno acute intracranial hemorrhage. There is no extra-axial
 fluidcollection. There is no CT evidence for acute large territorialinfarction.
 No depressed skull fracture is identified. Visualizedparanasal sinuses and 
mastoid air cells are well aerated.IMPRESSION:There is no acute intracranial 
hemorrhage or mass effect.TECHNICAL NOTE: This CT exam was performed using one 
or more of thefollowing dose reduction techniques: automated exposure 
control,adjustment of the mA and/or kV according to patient size, and/or use 
ofiterative reconstruction techniques.DWS: [id number] SIGNATURE: Mulugeta Escalante MD 









          Name      Value     Range     Interpretation Code Description Data Renita

rce(s) Supporting 

Document(s)

 

                                                                       









                    ID                  Date                Data Source

 

                    [id number]            2020 04:03:40 PM EST [facility]

 Services









          Name      Value     Range     Interpretation Code Description Data Renita

rce(s) Supporting 

Document(s)

 

          ED Procedure                                         [facility] 

IVCZKs9nKrKQJgVu60/VZSlxHDBsc3UiHWdnYNi7DEexUAIsZ6JyKCP3tD2jJWW6FSgSItQbBgRrUnR2

lbm
MlZdnFPoHgNGKdPmvLSvJeZVgoXjtvoHKlLZ9VwDF7PGLoF85uUYOjKWNlS1CgQVD2LfH+Xq5BFPDgaE

QzYD7PBsaA2Ljuy8g2MG5wjP+Ssz7SRveRZVdEaW2Nu8EmC4dzI0e+AYpTlZDvO3ZRMW/9KJGydbTKJD

aTYcBSpPGBR/2iqsuZHeaIeP56kQyioLXa/60/SkXR
eIZ+/fsd9Hwfv13+OkxgAsUxlmaC/ipD9xJbDiR5XzL8Lzjr5oVNrrv6Y23eYTJ6RmKYmjtOl9ey+QsR

zkYJtgy5IFiJEH6XKFMnE08pSHOqjByMdFPDX4GBCmpMxeORhEUlSWpHZyS0NLB4zPqgJagVHEZYC53D

Eqs+XnxTSIx4fVrrBPiwuhVCPb7jUGCOzlFH1923Vz
KeC3bLSJRX27K94n7yQsI0YIHCWOBDpYLmc/YrNlkkUuu9gYImNDG1WtkuIfil0NLGA12rlMWMCZPsLE

MzIieT6ae/ALp0M6lnKgZ57sDKUKEHE9Mn1BzBXJG7E84PLti31MLTQn4USBFLUfmfdOdYgZm98xW+3j

HeIo37nF+EVuNm3ByLvbCqbZZlA0NZ91NwJS8gB6Vj
2nlpCVk9VttgXL6TOJc2cXNgzFP2L72D+IL++b1c215XznDiewKIvIgvvmByyheGT6Jy60jeJt7PBn6o

NhQuvYtZejVUYeh2Y/p8szSBMXTugzHCSnZ2ACEEHKHymZI2uWB0mghWgC7Keq1o6lqb4AWz7b9BloPa

LRoHA7pZ/GKmxI4Kng+kqZggQ16QZZXUK/38qsgmz+
XnuhaCI2N7VkoTxiNm5s/Lyh5no3uW9m09HHzCzApMPTgxQeIYo2/yXjct8qspyJN+8hEyOIMwAvplgL

xfX+45lJin7PyzD4z7OwxKTTU+Z2LBcrl5p90bJ4s/Ds+Hn2WbSGTareufQxJ6y8CXKaLlVQAVDJqklp

XezjKvQW14/EcDoNwyaEfUYxNVKqe5vgZelqb4luIX
zuLzbfEFNTElrbKjJJ/oTuFVHaSOlMmwNaqzEBut7BSmdzp+WAu4BP6g8LNk9X71gZf7fETjJZr0PIs6

w26rmFG08PXRHE/Q+X61/9FIQTPvjxO7v6WLtH9tuWjGfYajsDKDWpJbBJrA4QXmAVI5tp8y/TTosZNU

Tcg6g/MmgRLZL9DjFcZXEDpZVVovpLhBGqBIVRi8G4
3VihbwMGylTwDV9kvzb2Z1YeKw5lX2pC6dsouIgU+lCKOPAzfe5kDF/DTCn3hrlzZ5j61raOW8lXeMUs

hDy2rPrjf6GGtfE2y7RpmMmCa4XakKOLw7TpmXgeajnDuKhIXMaUW34GE2SyA1xyRP9Kl95GsTWvsgs4

mdSU9jUeIKOnK3sk8ye7edTQoINKwuHzhFHnkAusoi
eST9m07QC7VZdH9air+DyuFzyg4l++4d6HpP0l7LmJeEocwv+PQd69bTcpOOrgfU038WY+azRVrqBFvN

6JtFlywiL3i5plfDth2PHAErAQ7bQZbNCLh3SaviQgoo3bg8A5p7xzlEXz0AewsNMCl/oRp5aiNfguh5

8Z0E2JGaAe4oxmlz8aS6pOlw5JT14JefFneWmxCu0+
WqT5gAcLbf0/+7nQTfYZLNVzSrYdnZVyEVbqzTjKkXwNhGZAhImw0G3JBnXlva8DkWRVBPQnxhZXmmvZ

klwuGmA6OB1Z0GPb6grPN46lRM6BOFfd6SZ1c3hju+g/UkUA6Lm3RqlgZV3UfKmuWUzaIUlVZZKksbP6

iwN+x2IvVspAiXBixsgqW2CKqwxnrbWDK+zGre+t7H
6mRZ/69ijfksbfNBpDljwFtQ49aeakmlcGEyrCQaKn/hdxkOgUhON2rnRrHDUMyDqIgVk0uFSRyGmKA9

op2fnHQXbMOW/9f+Oq7mi/O2GWZqzqEvCCK1ijQxoGJjVKmhEZVqNjy32Bzd5o6YERrYn6QU2I1YhVRY

fVBjqorsh04U9dNwxF6KiJJ9OqgLwQ7lLU1fBN6NpA
a2vPsJ0wKeu7NTzpdmdmHIbjBalEaAY8XbK4iEIsb7OQt02Un4GVYkL7d87vcjXcn+gphTn+rc0nGK0+

w9ip9mL3FzzNEH29pGv8xrnt/JI7c9VjO/x/LYSD1ZPW6zQeJqzvQUr+fU7wZ3ArhrD7IXDXsthMvYJw

7idnWtg02Gge6sDEpk4aEeaHzCU4W/nvRU3JbEoTvo
DNhas2AI+zldeWopzal4DztdZo33tEis5b5qrZtt7QznoU24svOZ8V3y0/gexG16wuMB1CcE61F1WWYT

+lROsnK/NwWw/5xIzkV5zpEbQseETUY5ULrZ31+1CEwjUa5leKprcsTi5gs7a7zzc3lwpM+AYpBVeUmn

MuuSO9BKpFj/nXoioEsIRsbGR71rCv89KzmzUbvFZ1
X3TSH72Jt5XtZWkFsB1OZQxzLg9DYkkAgHKu/rqmMQz5QgRO+CUbCofjk2a68U3/JW5MJPKf5Xb8Kx8k

B8/AfHN/BESRbnxgJvxFOfXT9SEsNjUQ4dwa2KOWCfJH6epm1HDJD9EI4VHSZtRW7GyIBmK5YcN8BGYh

YuMENtSNMeTH02EVOuNEWFVJgnQTQiG2Zdd389yeYs
tbNlIYEqUr5KOJTtBL6GOLIfEZWfnGQiMUYqHSPiLkX3DPTlDXgvCAAbF6JxioZlzkGwFOGaANJTILql

JGPbY4daf8ElQBp2VF8LQB0QibBfq7MteoZvB3pgW0CNSX7JUYBaB9EZI8ApJ9zuYaAvw1ShW6zsBcKd

a3UxKt5GNvZrOb5QVwUeXO4gsi9CMnXuIS5fqd9BPL
K1GT3WwPj0SOKmE5GeHVLlJOUvy3NcKL7MOU2swKraTnp0Hh7RSoGvq8OmCUAdHHyRuL3AET/CMBSG70

38D+hINhRnVyRoMYoAatCSnG0DF5KiFKJOP7sx18IF+LdT4fhpoy2tyq1wQoyrROR5cP2F39rK7r0VOk

HTMsPANdkMMAtdJ6QMzFlBj9L2QI3Q6OjopTFogAFr
xQP4TO9daM9pfZeT6xyv7bZwSRIslZ12oVXoUdTt8Bhz8tWPH0vogykZXg+IimZvjl4gNkpuubborHBP

MYHQ9+D9Ca7JxDYjaxgF1ha0zri1Pr9zZEJMrNJ2qA1AYJQOEsYtKJBNx6NLwERvXRlTrZPYoivIL/ZL

TKgS/jUPvKP51ygeg39piEAEQB6iE7mHFBPpqxtqEz
o9SHLRVBnl3qcPDzmtGC6pdf2Sph6xXTb1K5RawTURH3BzE9pFURWEDdg1PuK4ENsDNx7/Gp4Ly85S81

x7sTLw0CeGI1H5R397yNxSkQvU+7pnEgxkKHkv24CGsjcb7K5YRDC5t5Gn188jv66QqtC1j+3TT1+MOM

z0V/w0rQFarHA/DRMyx4ex+yjcAGfXYEcNCmVuZHN0
ogDfzY6RJI7lq2ElCTh8EORdo4JtGDntHLc6PXjwWANnY7K8cXXuUPEnHB6OJVBsSB3XRQPntfLxEfAy

MHRHVcHtYOKtZuThj3KgM4QwUMNzICXHDErmIXQrF88cTMulJh21IGvxHBPsWgGtBUx3Wq9LJqTxEWYk

T19qdVBqoFDhLvMcOAQAYhJdLHHrQ0KdjYPoULbfZ5
FiL9KsAV1dwYOvYJ6yaPAtG5NuZ7XwvhjdTRQBHhWmIORbVOnkEIFuHpMfRUtwPJF+Kn7TTKC+Pg0KZW

7nw6YnJPh2SSQdi0LsBYqqPCakLdKaVOd9PKRjNclcNmCzNUA5UPT0WVMhWAQ7VQx1WRM5IsSpDhO7DK

IeVhTqPaNgZlh2BVI3PXZnXfwuWjKmEHR2CBFcBipj
MNL2GGJ1MlO8XZJhYBH6JUE8ZuH3TUSdQRO0KCX1AeP4TJPnDVG3PEUwMgBsDkXzGKe3KH5VECZ0ALCx

HZp0OUGiUUY9KeHwKeGxSIczVmQ8WdPnTrMsGIM6EjF4KFOdSwm3SMsyDoDpWbnmUBD3LLrdHsM8OMNy

JZNoCBinSpR2JmxsVgV8QCm8BOH9GoNrIzQ2IVFsUV
Z5JzVyLkN7KSb4GFG7ItvfLvM5VKYnXXAUXqRyWzGqSDE6RRIaYhLvREw9KSK9FkEkTqXaJVV3CVJuRK

K6RYFpBbFdJLN1PpQnRxVtStOxOVIeUEPeOntoIxq9ADI6PaVuYuwbUTs6SVJkDTM6EHEaTqPiTAUpED

QkOEuhADW1KRCrWfU9EMWrQNU8PQo9RQG9NGIiAXkm
SPI9MtG4CEYdPfd4GQH4XSZhNFziYOw2QRd8XCE1RSSnFkSaZLm0CAM8PJZzZsDlWJj5KNL4HZMoZlSp

AGz9ASJ5JGBhOeYpKWl9GZY4XUEcNvAzPHd7YRJ7EAXnQvSbZHz8RDI9ZQPrTeKsXLq2IDM0ZDDsGzXu

WE2UCWC2QLHqHlHeBMu9VFY5DKSmNpMjCMq7SWV0RF
NoZjMsCJk2KICdJqmkKbLiOZH7CvW2MEBzWNB8PET2DxVhSCIlSAB4NTCcWiK1OprxMdkfDPV4KkZ1FP

GzWaFrGDbaGoD3TABiLCFbNWM0CLQbWnIqNlCuLWZfPnGSWfFhIOi2YFL0AgEyViGjFgYwXFJsMeRqNz

RdLGQ2ZAdgIEL7QgDqYEJ4OUAxSMG0MhRcWtKmAKwp
JdB0ZxJuQwIhDDnkBvFkPRNoFHkoYeD1RwyfTbS8BJS7WrA7StrsVxz9BPL3LTIaAmvdPoy5DZasOuU1

JrQxWrn0YZ9ZFQY2UxmrPzq9BCf7LWT6IlklJOl6CDp0QDY3RcLuCcQnDFckMoZ8LjLlSjT8GHO8LbR9

EHTlXDD1VWK3IdM2IIFpRAL6SXD0XsC7KNEgHUd1XC
D6QrA2OYTwRNG6DHE8AvM7XLSzScz1WRC9SPHjDlyuVrs6NWPrLTTJZcQmBkWsNFVhNKP4OGLdXmYqDM

VjHKS7SAUhKHS2UDYyILI5BMNjTcQxLJBrQQB9STZvXMV0VCQnFTW8OUNsHTWWPbGyGO6emm3TXNLnEB

1aby1DDAU2HP8FUJAcLI5KcORhI2BhqxFAZCXuorbx
xO0nRPxyKDLtS6EbtfAUTV0lP0TetHVvJZTyzEOPUiGkSZKkDOAsFB55MBufRI9AMEKFQDmbmXOrFPM4

L4Efp4BcisVyHZXaNw7URVXdTB7JpXPpkkGdTw3UVQHbIW6Ed449CoCwmQHsBMAuRjBeXQEhBZLgVAD6

JQ4YSMQhMT3HlQWdfGEItshhSKErQ6A7FV9OBFXKOr
LgPv8VZmYrFU2ppf0UIRXuJNToTbdGBuBcVRxYQzZtHKYeLMvgNX8Tj516Q4X0BtX1wQZaITM9FPG6lJ

GlTmQcVSZzjgJvCRNiTHekXa4xYG0QmwRwNPigCl4SbK3FomHtPQ9gs4YzxdqZJcSbHMDkBqndd4BNoV

EgLPYrU2crr9KFaQMcDOZ6JE5DISCbRD4OhMX2pUEl
XNYvNZMOIgXqVJGdLi4nmJDfm1KmoGJ3e3DgJYSlMOAYXqVbOy8OKqEmDF4rvy5MOBYfMQIyLyzDAfFt

XpK9NZEjKlDdBZU3HGMoSjvvIaN8CSM5DqY0KVTsADj7KKR9OpNtOHYfBcJsTDSqXtZtSFtcFGo7BNC1

NVZgWaCxXqz7UBU2SGF5FIKyUFX8CBY7WpF8XHFrXN
J3JKV2YeM8DGUiQIM5LBY5AjX3MOLvAcj4AFY0EHM3ONTyOKmeTVD2QUO2BCWuNLL3MMFjRMRgXrU5KZ

K2LoY5UxUfLjBhNWV5DxM0ZCSxYxw2UZalSgUsRaujMHFdARX7ItB4TVFlHOKnFPjgXpA1DjfqHdG8CP

i0JEH3TiPgHcA7XUFtOBG5JvSnKjU1VVu1EIO5Loho
SzY8MUNtLJVKVbKiKgx2NSI1WYByBlrjGNY7ZMZ5RvOqOzWpWXC7CJF9RbL8ROIrOWX1AHU6GiBxTuuf

FHB4JSN4FmDbGjUrTcPrQUHdWVBtWhQaUAIvHEN9OoT0ONMlJCK8CER8AsCcWrUyBRPmWFF2QEO6CXBr

EBImFLkrLpL2DUNfUWsyMMPoIAH3TJElOhM6WNU1FC
HvTsDzRJj8NWj5CKY7IIQmUuSvKIw1OZA8LEDvLaUvTYz5CTA4LKJfFyHqILr9JYS0WMOwOuFtJPc1YS

X3NGQxHjGhFXu0PVZ1BPWrAyQsYXr9XGP9LWEfAzMnDBl9CQU2LJTwHzTsDF8NMXK4ZXEuIkTuWOm9NN

F0XJGmQzUnABm9BTT5RRTvKjVuENc9KAPnAvndStFw
XXC3QgD5ZTXmQNZ9UOE8CaJmGgHdGYT2QGBoVdK2QhuxSlbpGBK5CgJ5WJUyBbFdRFlySmY9JCOxFSZe

AMF4EUDlGwOhRkUkWXBqBvTVDkKkLKy0WELdTbMbNiJwTrYsDWLjIuStBgCwKPW1ZGvpBAK1JtOrISZ9

OQNvMRW1IqcbDsZ2BAN6GoD6SbthBuE7QZN0WsReGR
IfAWyfSdG7OfyzTdN6RHK2UyA5SoquRfs4FWU8QHDeGihaIns4NScqSjL4IeHzYna5NT0OSRS6AtqmGc

p3YGe7ZAC1QphhCLv4SVm6QLT0WmLiCsHqSBuqYsY3QpOmBsR7FIA8QcV2DWJnIPM3XHS1PvQ3CYGePJ

Y2TCA8OkB1VJLyZRt7MNEsDWM1NGYhNCE9IQC9CeE5
LXPvAbx5TEN5SEZfCiloJkw2KBC1UxRVJtTnNPW6MEX1JiV9UTYpVBB1PSQ5EuA6RJLeTCF7SKUwMTR4

CWSmAJF6JUE4NtP0JRMbMRNgEYY6BwT4VUSgQRZGRzZiKF1zvt1MCGXxJSEuNtvQInWkUVeQAxVzTCPu

YYugPL8Jy811HWXjU0ZjkXAhel6NRHLhVL3Bv319Md
YgPI6ZaxuqoD3HIXQsNK4Ph3LciqHoNFC2V4ArpXzxiRuxaYN2VDVsYJGnF9YroVEpMsGgKEfpWQAiF7

VlXPmpMTKwNOycPCZwE7MjtbVXZq30TGmaYR3cUCIlIAUiSFI0TILwGMjwSQCsV1o4LFqvM3OlZ9jzNA

ZoQ5LocJKiNO7RGRP+Hx6BDU4fq7QrXWxaGeFiLT3t
ff6DTBC3XC1KJDPnRR8BkRKhV3RzroYhQ7WdqTlxQF7TaaKnAEanFA0OEWKjYz8fkO2LxetyjU1VlyOn

DQmyXp9WxI5VvvAeOO0fy8ZonncZOuPiECBbVstph6KRtKPgIFUvG5lxn8PTvNWsCSZ1CP0IRSHeOX4Q

qFB0eSShBUOoGPWGLYqxTGWpZ8KyjgULWCOwmirweC
8qYWZoGHLxZa9JIMG+Df5HPH1jd3ToEJwcMMIhJH7keu0LARO0DM4ZoZr1HUIlN2SbURNvVAVlj6TbCR

3CIG5ibZuoNJEbEBSoU2xdkjc6kMXeUdJuQAn+Hb3TDHMuhVFqPX2AOhhN4NhKnHTPpx+ywoq3xbDEHX

JS7rTcOHNZWYKIJEvQhp0CVeJKUMenngSFJoqGoRDp
1PThAjYXwKEBjHBXX0xeRu6zJOIvKdcr8AMKhSEawMG+61s5v6vnXOdf/+//sw825yavV3pghxIgREMx

9CJyhfMEqpo4zRvWWdadKK0JVzwNVJw6BuYoCOB9m/vryU1ysKbyvX6cxwLZBDl3P1+b77/m3QGQ6A7X

DG6We482enb/9VCP8FvTyE696lxaaj6FT4V5+c3gXz
9jzvwFi/9k9newEthCyVPnsaHjXzpyLvK3vtzhe+bNrZi/mjpZ8N/F+U+fu0Plx58WpeD/CHm+cOXcqZ

P7MW1zidlEunIRtn4f+/j036BWuko/lYpYs6W3ekq+E9PDV4++/Ietp6+LPx4siEZZOoHDjd9MjMRsN4

Z+2PrDjbHkhNd/Ex9Xte1+S/1sBXsKUzwzPjzQlvx4
EfbyoH6GBAUOM0Nisp1mE+hU8HyPcliSQgdUVN0AxLodBY6oj9RQxrs92QdfXZGduP86xZ5MhWwKDlUc

7X7gZrZJXEyNZdXbhaR7CGR7PaW/FfhxBRnxeRVggdVbijvsqoNo6d+Kkmg0bpL8gDfHDEvfsqtT9oI1

UY0TqAmDVORlZVKGBj2a7SN4Jw/VOr140pywWLq9Sv
+D1t+PS7crLLiqwJ/A2zyasmlhNSpIYrK0JS3X90PSbPBqKf4XHNKinlzn41X9TLkTXUS+jR5RMJOtwb

phka1IpB+JA3Smjid1tduKaeFDDFNyDxFkd4QFOJxQvyievuviG5e/oXdEY+QB0hn9msYiFy0B+2ynQ2

GrZli6OF42bxrVDIBhQtiDU/6Kz4PzLsBxrNNQixuG
YSXjaR5qKkNhqcEwT1v1Q/D0OEpojglGrpESU90MH7ovdbWV1EMROffaaRlpN4e8dh26VFgSOgy6f3ss

3krJww4qBViapkE0mF8n19CdLolBVtDuzlKO6BO8nR7WKvIgjtJjlwOxJB/LXDlJPiA/3y5Lf7Zt06zG

K76Dj9lFcnXsTPgdCNrCO1YHeuNOVz2Gz6DG2Oc9IL
6UY+UlrNwjsM1c/AuZngtAd6PNP6dR856T5bnmotj5h2eerDanQO0Gx5oV5j+s0ZtheFQC7zz1H/pIGK

DKuHyfL8EPDaECctyZVjDEuMFLqe7l0NPbzeafaE1+Rhokjd00ToBYmA2tZsnd0GFoBhitIDver/Kv8q

ESVgkgUSy3xj3owZQAIp8P4qH7Pl8MIUw/uHpe1aqh
pgRryhvXScexbPQwx89kSs/vf/n12M9v52+juuV1q+ta1rLjS9G10qNOgkVYkOzHe/StbkUbbB75gk7b

NhXlxiAkGMNPI71BMfGV6QNYueN9cN82ryR/RywSVo4hmvZxNiUCcvlM8jA8lwA+n3pp0nj8k8hLgveu

ln18j9OXi8NhnC2nVN9euPp3U0pJP07NpO7sz3D+0r
4RgpMD0vG0z78LW2nr6JR1SD9xdx5+KQ6dNKEeIR59b6920lTj1ca102YJDZ7iw4iKBS56OLm2a3jM1B

l01yU7HEeZDDm8YLi+9izdIXvoifKQPIT+FDfHnpIKFONmWeryTUJ6zTxvXBjRbwTTkaMTGsgqx5gjb0

TsIlmEhsBeAssouXad6S2a/fV99JW+G+92CDkvMBuL
gyI93FkkXLh+HQFamSjtja1R26caNF/+DU4eX0sV6vq2yZFRmbF9+sJoeTP2A+vX8EqqZz2g29VNQ2rA

oh+WMBgjD6bYYGVZvMUAecWg5pR1KMykndxUiLIirWp+B1CwZ5Xc4WUoTZktxShZswGFClmcNbtKE/Uq

4TkcH7u/jvqgO5bCoTnVc8oKvQMgkia2AKrC9Pt6O0
sK0h+Na3yB6NWAAEMBI+FQExkUC3hvFsLPy38jUPzST6h3COg4y8RmMRA6CazPbyZtEqN2d6COCYLJXk

QBPWcY+mYbZOU1rYdFH+ukNDLjdwyVqu9x0vFJHZeTDC8JtQ074jg8R3kS2Hpksm4ip6r5bEd9xSLqNr

H3xF0eC5qAYJZ9V97gWvc644lfFeC2jZEYDZNLqTzI
rhMqui7gTf3f5vt5YzBEUIX9epIiiqKHurDgF6T5Z2EUigZpHMQ+wVv+HXKL0aYYy2qZdbjy8CzbqI+g

5mZNfr7Y2oefXsmZndJcp7ATMWxm74C2Qk25X17JP5z24nL9vklBC0UYXhDZttL/K/St2qC9RkIVaYhg

1pm/gi32Ml6IJvoUBL/XzeKa6shIPniLU/M8plhrXJ
hOqKLvtDxm31g138fZ7ZYKm7PQmlu8eUpDPJT/EoTP0jMzTRdEZbjYqNrxiNC0BW58PkPXSiVM5NfrKt

LTfF1dHsRnvRVzrFXuWp8tEnjqoGgMsKeTZZLjPflQMAPJMNyh9II6CdpD2Rh8ZYaEu1WjOcwXs0KQCq

qcioq7hjpwp0XdkyMwYt0YlfiBjz/Ey5R32/hSmw4f
qnY0EVKqT7gs+1E8gOchr1gxaWI3cAaRavQKzsOOvHg5tEy8hVJk9MUmhzEiGXirDe9cLtWQHHmbzHPn

t8qa5wBiOOlxZS6zjxPCypFY41MN5cLdusmcRRh++By6eO0fb8rHsLcTewZNso9pO5QX6HDO+O1lX3Nv

5TAszudpqYhXIpAx5MvpenS8nbXq+/J9ipF/pkwgG5
nKYUrvJG4VBeizKIYzh8qslNx9tqSw78BxWm7D93eLqsj9FnllnLvi5a9qfsLI5tgaPGvJf2piXQxzS1

41pxc23cCtra5Td8NW1zyQx1bgcXC40QrQfAIT71ln1Y/TVys/3bdM7M2Z6S1+FKSGAqK0VAKl5PrYTY

TYWNNZ03346bUK3C4hOyJC5AwygRT63qZfs+BN6gYE
nAPDJ88Uw1Hs7wQi2PizlTIQsw/AAPLJg/B9xe9kauikz/ZpJajStnU3IW+baiYefQ2NR0eHOtdMptk1

E30muW9ecdqBWS9K3uzjjFOtmKQVHFMXgSS2ZOhspp4nAiezSzA76Uoo+ytBg5pha1BppAK7XTHKPb/S

+65AGe9P+r4Gq4F45Khj6Tb3GugDcO/021bCxY/0Gx
fBxY/0K6+Naheed/w2eHi1aq9rAbq4XF95Rp41FtW588n833Qg+Lg8U6OA34TFA8VVlcyvLP75GwN7lTCCf

m2HP9dZcctxfPKrm92xkcL0H5AJSFBhkHO64MvFzG6DEH9F4BKk6yUBRmfYgbGwwGjzZ1ueDUGJdb+lr

kVHXDHBqRAJ8qOEpDHKjfQNOT7DueXqVE9IfPjMG1W
1dWSAiUGZekiV3T1TuNmhdOYiN2ULfKQgsvWV/cXRJZ6sLeN3rRxp56Ndu9d3cwlVubKGxRnn2lNMl6M

WgcaR7aKVlWvGnzK1MnNAJE0BlwYQdBX9XqH44wxXYbhBofBGCBV3AqeUuUZQ4UyCfHlOCVi3PdI5UVU

Yl6Vsf4rAUhtwU9cNwuJE3n6X8KDkLPj08jhDO7k4P
SGYWibv3Mj0OGS161++N4EzeqYVo+Fr2Ktqkai6ypeMrE1dkZ46h/7BsaL/0U3Rdlb6VSxAd19P0Kpl1

2V6IM4qALmJxSrehPbZYVGs58Xl2c84VU4aWmMMnS4kzPk7ojo6MkmM/6qXmT7cuTkfb3lNvcioAcpo2

4M6M6cO3J2+OUdebBbosyYLZMZmQmY91aKXRrfkbKT
rnnjXnAhZCN9JPwZDU+Y9Am+glmvN2NZX5mZCBv7uiNr2/U7yWcbic5+dkBvo8H3xmImE3ShzUNtc+Je

tWJGsLNntafEMxJWVpansyfV4/C29cQ6ZprbaEMjjp4L8uhn72i2qp+QWsqo1xeysIOSPNyNSwqFFz+S

rtyxkp/XFzUM3VATtuJGjMTUSCIuXqV1w1ZdZQ/ou6
JBaFwjRKEmPDQENI4ONhqqcUgvMAH6oHHzCpnPCk+fL8btjgtkYTIltljA3WoZTaHz4EOgIDdxbF7Bzm

aINddMMejRDGyLPJ4jn30HvbcIjZ3R0acfOi6e24js0Do118VrbyTI7AqPemPhjwriQjeH1ke9U9odhP

F+Ij9vp/h6r2MZtliD9MV+WR4CKgGXpfzEbgEKm/zi
7+BDj/l91hZxpqY+0cyd1WfobH70iSenAraem6obQAUXJcDp+XXBsRMD2jTrxlj0Rdsp/FSheCpa+cN5

XpTDUp1G9tPniGML28FUXnPMPXBoPLkwfQPsWOOcn6RVKoAHu9puSOrJx1z6jzTbOK2a8HcjuJtktZcn

HAVPxs/9TMvJyBDb+pVOnZg/FUSiCunrMhTFwt1eZq
MseszsHc98uHC1BP47cEdFFJknJvoQPjrvjjAQ2qoH/GoyJ0lxL/eB2NMVwAxdFnDOb2BhhtgU++UHJu

hMxel21tDgQ9P8cq5zu6mqP3JYfBfDPZlhQqyK5mPWz4syPrsAPa8yPIAZIyvBAy9ACn9txOOafCqfCf

tFo7+LBrxF4xOBuYnCrt5+kZkRm8vQTkcSxRFRK08I
VuDcBRo3IggbFLvbJEOPEsXNWYLVM/iyTb4Hx2roEPL3D+EEdzihW16dhGyLwjFpRB3Ofe5nY1y2xRs6

0Adx+sCe6AlK+KXHgP7EhnZo0CFFpw7afEitI6Q3gkRFFmJZZxnvqR1B3DtVphvQoHR4VFfphyC0n/2v

aOksj2YB+jp9MYlm0eRVxIAlYkdVMbTHlXR5yEzpVW
wnEX4uEaatehDFSLO5eYukK3LL2Rk+Jkcrb2PpEo7Au3dhDmkp6Eot/0KcYrQH0AzQdcpSZGFfygZTgX

LDEllDGnZqsgxYtf93E1H/RKNoj/3OaRBulBzwpk2COAiVzeAwWRZCoel/dP8Rsd/2S173R8CPZvuiUh

27pTZLbZaGB/Ql/wsD6h6SDWqM+gI8LpLX7ENoPdLK
UOqRbKEvwGAFx76J/PpfFtufMr/o1b4Uc8N6BibqrywaKQeorByrOlLdYiolq7K0MZhqYA5epLHIrUkd

uW18N6BhXRN7ClzTuRxcGua8l7AWhew1JOzNqFWGfEKwbWBjcE0g0lbwlif2aoqZMrsj7eAaQ1q9RuBi

Gj1g6lM6rQbgB1Ucjt/Okr3IfQcQ5kqDxJUobkdJ3y
N2PsALQsl21XH8O7N4UuM9y2dBanVHxVDlWh6F+mkwnZ892BV8SL+tIeF5wXVDatXFsYSErXJvUT0y7J

irwAxRqgcUsG6GF4WEZP2AbxB2Ih4XjNOkIffw2gM/FQ8lbLMBQYpUKyDCM3Hv6CD7x8mj/zgl9l79To

X1leswTtY2Sk4OA7jMxYKG5kGR0ss8YAQeVURTLI6I
KW8GOJFHOlDO7zMedqVFuMF7oLY+YF7Yw1ix1B0lCr3eDxVqWMaCcG8NqD9MmuBgzqRwsBMgM+qNuBvN

7XpYquX8d+A7GGB/vR2lSk5G/pWH/TgK41ZhZ3n/xIGFhT6XmTy+ucoGS5PUTUl1F75uW/AvQruOQVk/

3g2j9wyFh3WdxtKQAC/Kz+U332ZL3+k5wPxX3NonHT
yVAt6d+pVz5fPEM45qjzpJLWqGiqRi87KbSwGOOtgR/smU7e6CBphPIsUAydPmImxT8ECtsXXavPSU0C

neh7IOzU2Z/GO9k5zUuXkHKJ+595P8ma47bGey4KCpsIoA8EpCzXjadvjel4Me2p0ptwK3ZDyt/j1b9f

u/9Xxlx9cwGQx2pqLRBVSpXZ5IvIroRXPUz9IccKJi
+uXm0c3S5xxB33M9qpjAlFvW6u70033moHWtQWBspy94pPoK7IKMFJy6u0uw7JvelAY+atWdb0SZ/QTl

jC8ql0XHOLsi3FomlrJNf3w3kN7Wvk97bi3hiE6WPxYjgTnS0UnMwuj4xgX+XWjVjmuT6QS9crJXo4nG

D6W0NVvUroyNC6lSg/dw0l4V7DhIZacANoQYekLOsk
bpw7PR51HsUslpgeXxaQEl5Q7Th1mvTc8cvUJoST5XlFmUuf1VvP5RCnQ6yHf6gADCNokv0y4pHl6Ef0

bgm5vGmYatrZjdXumU4JegF+y4oFzJd8iDfeiUSL9ekd+82D0thKC1pqVcpQBscu3jS295+R73Zk3tZD

7jtaeF7z6waoPZYgN1/XVWff/cT4+R5r00q6y+Oiui
aotN2MjLND3p1B/wWYFDK0m9Q/5IWn89WUw62uDvhxVUJ89kM0Dcas4+NGVvcr0WvXUD+IMt8eEW4n1u

HbibaA1fh1ijaXtwq8sN403Qh9qWgeGdho6taswm+oYyA2he5wVCcOpAEtAT4OeNHc7uCy2w3sBlNq0x

gqdmlicBo9IBTzi5BMCE4Jy2X+gqqWA8qBXmhvkgu9
lGGAJL19J6RUFLocw3X1q5z027SmiMc4V4UV1e4m1PWD8S5YRnxm5FHFhLoTGNXP54pm7Nva0CWrcYlm

mt+I2GCb7990IMmVpPGWWIkymIjecedzX7YmFPpR3L/YLTzoZVhrrwTCUP+uwI8HB+k3w6SYmc4sIEWN

ciESjF5nKlU7hPwMexze75kc28emk8dqTdjja6iGjI
mIunTcjjMaQrZlmQLknN1/xSmQdfp3VkbhOblSl/B8lM4jm4fpGzzNYyJL5hyTDqBuH3Y454tb8p3PY1

xtVHcQ52hWgusfmnkalpLuwXx27qYOfgYVxoA7e6UNU0Jax3l2efwiX+E97Znm4DlU0cftKL7rc8VthU

gh6VCmcCjAbot0C24SN1ZAuBy0GBM6wmLs2mdQ13kx
7WU85+TunP48MVT7wB1iM17VFL6TwvapF90vjuvv2YnhOsAUhSlPa+B4zVk/QXKkKU8HZ0wX8SS4vvL0

aXCehkel66GEegmtQjVy+Ahv3R9Ait/LY1Xj5PDBs5+w3yNT9JL5zwqNRN+dm3eJ8b+UzWX0DLgPqH3l

M8Z4ItMA2pN4d3pYYDqx4sQGnJjY1mTVmEkPnimXWE
y+9b6VFa3PD9Jv7XEeXucW/SMY/+ZRRUNi7MbLg0rlXkEOyKRvHwspHD0s5ah7XUsSxPfxBldv5eoZcz

qkxe98CDlPeU/W9gq+8B3ddN1aMRKU+1tPj2SjVGQGVyR2TyV1UKf4zw6zRlexuvIsNJ0nEL/9tBBQU9

xtrFiORHPV+Y3r6MA0V8BeQXdyYMY7nE8golmnMXpY
uki/TvuRbZh1U9ndqdM3Ugd4XYu7D3SDm/f9rpqJ7/Zt0Qb14tnwmsX6g8mgSi8cshbzs8heQx4qLrLg

738mGgU21nNR7c5OCY/p8zE2qe938lhP+yihVvR76RiP3eglO5ig5plM8xy3awnZnJoQ+1oxTd3WkhW4

F+039xiBGhApqN0/m0RtxUUPxlcMJZ1Essd+s3R1J7
fhdPPOI0PpHxMI6/NR3IMA5I34BWURjmMeV7fSfY+vahaZ9Ri1lSjY02cKz35a3ot4S4P+Trrh+igaDp

xL4Lv0CIgtV1wZdS32ypO6gfgazehRZ8DD2/m0LvunD6QZy5/6YZkmUChPaDbQ64LCmitDSaVz5sjFPf

oUt+vLIazj1uQ/oWjrL493ROocAhOPTXdJ3WrTwpzE
9B+YVthfAMhCRnEjOzdWdQ4czgS2PgK/U7oh++Oqil5J2vTfiT/XcA8/mpP4O+ALoG/Z2j7W6zI5boom

dXlb6hA/2v7l4XCWX8WoB31C9AdmBG9Oiy3D25Yb06O3ewF7/1NTcwIJzq0SRnB+bFoAnIl59v0wvDZj

500CGgcIecALTDhk0kLylWld7sdA8rG6ptt5P825Xe
F9nkNWR3WzpblWr9j5C16IC5yrA6NNmlrgLDcs0JfuIpqg5Z9MaUxtEo3Hujkt8Dc8b3ixRk+JnEG6LL

skNng+zvwT5veg0MDm/+FmujRxnwo4/ILWgQEtbsEThk5mHggej2OB1XsN+6j3lvE7/LqG2Ssb2w/y+d

I/+JCVONqoUDMAzvCU7JEytRa/DtHAXz6395y62nwm
6fp/0cdsxUx6raHUsJH0PitEe5D0+hJhxfU9Jr/lJ/pN3xi/4Tzmnes4E52NV7q3MtcqppLrYp0mdT2k

g1z3ndn4uGbSZkZ54iL+guVveJDk3cPKKo2V/epENU5wefFhgKvj+kLO/TlAX/qmXkfrNKuGSy7VHjVv

9vW6fh/lR1adcCbAretCpu/hzZb9k+V/Bz6msttvpM
fsoE+pDuAYFmu96wHyXy2UCTff8ar/CE0vj6JYWYTucL4eSefvrpm6Q/Gdg3ltyWsmwVJ/B+CIy3dKT2

++nqv3pSseKNlRIN4T5Jw7OP04wcJ8xuw/uh3cZtoxuBG3NPdfnemF/dp4M/zyrTOP0jS9Tptv99WjAp

fFGhIS2F4tcHzqskN3HXNj/bsh12bF1nn5xPnzSiWq
P+PH5ECtrILwcL1aiTT7Z9mlr3CNRlk7XK8/5O4rd9g2tM++7aCNrtnG+FojfT+qvWfT35nFp0g0vcdh

Nki4SXQJ240lRVgEr7ncGozms1Z35a51/0DnIu6pNrc304BFE5V5zP20aTw+wc2lGlYp4z8OTM+5gN2D

nO26hNkax6hrzc/Pud1u+hjs0Ydxa/Kx7oG9gYA4CR
bkwc8aW4TF5FoRKv14dKVp0YhyYTlFb7jMBwfOQT1X5c9mog7v0ra6fzcs6VfSq7a8JgE6dInPNNxuJc

/wFP5G9nu5BKvJ0Pfp4hqBct3qjl9r7R1+Xb4F+WTNiQurk4jO4hCuP8zGE0vmvPdmvlemUuUWaMIUSP

ylNtMamdVEgllfQJU5BW2ltuSoqR5h5Bb+Ca7iskgw
sETDCR3sq0d6xpYUCL52Z4yTrpZlB2N1LL/R9AuaxJ6ROJ9CHtaOqO8itbLomG16I1RAesRf/nOSrwBN

QcG7PUoA2poZEmq+aJpjiX0d1Qn6iHBo4drzW3oqfjGcflaGNvfKbDFVV3Y94H1TxI41buu68klqpvfs

Z6o0wMENqJcRP2K9fQsXk3UkF005Zwgid63IVOW1hx
8ImcfN1ZR/rya4CC9q5CvEYyktJyeGPlXBvVcrmZSXXMjtMOOHbLTj8y5IvcOaKirUHRLqMQ1kPczLmN

LaXObfKY2SglnVKwbcqZxjOi8MxmBT2/BJdY5t8IYgNSm8VtOLYZC/wFclyonttFAuHtfkKOEZFA+IhI

KgKqsM2aucdc68yJ/+TM95+GReET89y2Fr4xK9Dc/5
5zpq1MzP357zKKT1c3YvGqQQ2HWeK9Z5A1rEW8SUy5p5bZj+OsPnn8FTyadH4In6346l4fkByqvopd/v

TToL+L8LcE/nUMl3go3fiYIBVmb62xeGKsrIs95b3h/TRf6vplvHNlwQWoHEK7XSzGiV2MC6S+0GcA3h

w1aKBH8U4s+mF2oj4sFrQB9Ud4BLIUXLVi3uxhuRjD
x0ZuFmxnVhklsmf22mg8Sny3M6iwZ1TNBWWHHQJ7uOcrLdiyMa4luFBQCIE7HKxJzwfAnpJ/RInGpzRJ

p9cfaA1sjhrH05NBpK2ZhRbgc64aldVfsRbeBKrCpTotmDAJa8n2Css8OckBIG3b4zT709LgtNJOv86j

8N+Cyo6WCiIxPDs970No9sAhMXUBCI/5zEnUx/gVDX
jSleRoqeOxuy1gzGyRLgT7Yzsu9km/kHYyPjAiLlWReFAxtmn5E/ceaoHwx+39FdX/iV7ttEQWMwvEW9

fS7dkDv3/DeWs8Vi4oCjABgW4bPLj5Ms1k0o7ZWDxPiC2tkKyRJw0wf3U1nEXJT2O0/UYq5gjf+ZPnCu

ozIBM3h+0tuwH3HnpYRjRiXIw/ALbbI/oMZS/GqXMt
Zz+zgzn87CzbWd6xazLYjg7kE0GfGD8fRh1il/s+oDx/1r+/J1LaTRrFPJ/e95kH4LM7fBdUMH5H0Zzf

DbOsf5210ALdYb23sYjaVnAoDpwpuHeOarVLgCxPLeB85aIrs29Yz9QJQjnbFEIMkalLhOueDvgfSitl

M8Z5z+0jGYXE4+NMHM8AdB7pNrrecnGn1msIRicqYl
4/p9RhjY6OcH0Jp/rPDv/YLOm5Q4dI8zV/uW6u7AGeiy03r4xF/bzfFqof37H0QTCfaxJq5WcOs0HiwL

3MkJ9exC9CjL7AfB8yNYem5/7SxrigP6pZHrPG1RvQ1jwaclrURpFf4BnvBYppixPdmk7pvdbh1nO2ri

xl5wsztdNHhfnRp+1udrHricYS02n/sPfkzlDznPVT
QSGlEemrn4M2VuCw1/PdnJ9A/B9rI5DJuY9mSB9WiJ1EzYiGjepDiqSbXwUspSqHhL4BwO6KbXuNoTwS

KHkWdKqNAwPQCnS0H2Y4Z+H89+qgsNrXFeAuJjXx90mu/44DiAZlVJp9qemk2de8q8VHksDdl9O8E7hh

uGXw0rUkA7m2q+Y8M2WK5Xyk0RdlihN/2y7/qff+Kd
xH0arUvign6XUYAqxhA+DoKgDjvq3V7WYRvq4m5PZugJYtG0Gqhi9dorMBe8gi+8b3jt2cJ3tkUpN4st

f+cOmLrfWt2WIS3/2XwOYf8S4pdh3/BC7qvqH8qxye91V1LsNqqz94U/u5knaHxvz9YQ2bJbSc8rzqhq

9A08+2+wuwdq6rhrA9vHZ/pOZcXsHg7a+NB0RQFgg/
H35ZLSCmcMdd0Uuaggh3vE4KRBvX4LwoVZ+DK1qhBgdkapcY4hEG6bC77pfztkfq7DLhdrzM+hep9hDd

qvulQbW+HqXPxJp+MeFfYyJr4tXhU6xRqVNVHkpBtJ48yx3T396u5v43P6vzKckyiTKbhYeo/Rq+VwOo

LrnrEd9zBjR+o3T7b13vSe8azROsp4n77l5H6vCfTu
OLs30FZaZmhzVNdq+YHu1b7h/sJkYQTn4IKPW5u4ZHyKp87pWxJxfh2G83aF5JjZG0MGDTSDJtUS5j4r

e6MUpEJ+AHNB3+74WaMJhjUCgcMJKTwttz8C4QPmb2MMcHqIlSQtwgzEeXURsuFe8vI5/ESkmDRaHFO2

0X3TsOmc+2uod1FmZVoI3E6Xxixc6ASB9xEaREsGsb
NrVhz3Ef8P+sQU8Zrz0WrmaoM239kyrEytWSolNm/kCy6z4Xi62fTWRyiknaMr4D95AX9iO/OEr9je/o

QHMdWyfkDV38PrbF93iu+3RpBiNW41U87+5UWCR6wLynBHoQ+P6GS+UWsn+zVKzmI/e90Kvo8ZU0S1V/

7w0mta081HSpzNWYOFMg35b5iH3VAzSwH4btfIdIU2
EBsLykh3m58JkD13MIgzN+93Ty85ccliNUnwnkQpDs15WeBUajsxoE1CFNv4b3WvZ2j/o+rXkyWua8c7

rN+E75fxKnhiMqxM6U5M2Qbp2xbKD/mWbguHv8LgpX8pW/9abwtsXm1VX0kPthe+woU5nS0F6JbCj55J

IBZmwg2uwqv57R9zyszu5PrFByikGdcwqluzJ/2ugr
1Z7+Ujs/nb9ljMZjeWl/Iw8uZPnooOvTjocQyV9OziU9vjSjK+cl86xfKrDw009CjityWoruYXAxbaFz

Lw3RfxlD3kaj4iydE3fqTnL6EH2I7oBLBRBBO9TllRoFMusfpOw0zPmt9d3db3fmSaqq9XmI+puwe67k

PiyZjc3Vv9k8UG3QEvwzFN/kWZ3qu8QihqoiS9r51L
wfAkvacAhChXncf4xgpymro/l5mGfbBrs86KRVu7ocI4yaz5ePq+LtrhmrZbORbqPfi4Ix+W5v/bqLqI

e6G/8VeL7RhVo9Gs0N+e5+d3cbhVQ8xv1s9kX2isBt777s7O8fysxI7JKbgn9vizyKhxce1+8cIIE0lG

rvqbC+wnUz5Zsk+unt9Ws/fp8kygLP+cah5NJiXteQ
1kbNogzsj2qzai8vtYvi++ujqBoSWUGUjSjWsabNJXFX5EghY+VvzWk8VpYhw/n6P7wm4ikfz/n+QN1O

3w3C25eJ5V0UtpIz120VOyxx2pot8J0QJPe6Ri19560gtDxfR8YijMGC61TcMMai7RdGDn+YqMXBPhiF

xmWLs0UjYs4O7Qwct9tL61Cm1CvHv4ZHhCSVz2azE4
BtxB4RhCzrzb+RfhMVGy/FEIORYGY0Rh6oOBVsV90Gb4KZWEi/fkK/htk5yHj1IsfFmoaizqMjjgcD87

wHm1SdZU/JnRA8Bgt0XaZX63h0D4ViUG/giHz9I1Oo6GHo4QzOUlKUrsi417f1o7R0B/xdw12RvEmZB5

Hg+xIris6rv9bPlHRQtGjZJEWpvyj3bN5zakx2Hvbu
qpxjmBP30V1KhvOBZZrryORAfGF9A5nuAN0BvOPKEgklRzhYjsi8w3HMkSeiPAbXRTi4XvSvqSvKXw38

DfxAIh3Wulc6O1vQTTxQgim3qegwKe2cXKfN26VDY9vxybMg20Ty396bLXnXO1YTllHyotmR4dCen894

3sxzwpaR+5EgVrSSjg7ZKthPgo3LFu4xNG0jZvmZej
QSEDU2Y69um2l8QK2+pJmVr3sGO16DvpA2i4XJGmEUXD7yqaUqJLvn+L1VtDrX7q1LrHuyjJMTRJAaH1

35QWpQ2NWWmutRvE/kql2Yoqxwhdr7whwdi42kd5DVerVAG6tHqbylDuoZPLZRR/DfDLuS+4ml0VEIdX

gEL3AmZHgQadH4/ElqFXwqxJ0rDxMlBPWn/EzbqnH8
c8Hj/vbwn8pveTTMnvZ9DTIZtRR8K+upcD/rG8JGgLz6aIH7EukTfJKgz5fFwzbDK54ZwdR4jMXmrVl0

XgvHBhofVv+z7yfjhR1MEkWdU6TxQRTYbU4ahWdILp6JaoeJSTbJUHR5MmkPrgmvpwx2n+8FOjReTQVN

G/McYO1q+llmeGicNNnCc5tSHPNHO2v0st1FpMv0sM
9405oE+jnokgvddbmQP/OrYcDlfYdxPqnvndoUoN83rgC6XC2acfH/Cl5euWdo0B9K7zDKmA53Cvn/0F

TXGXXyIxyzgQqmkXL+3Hv/P/e9+F6aaviE3wvuNrfaF6NkOZmyqjVdGign/YXwt/I7C6EtEZPBF9u21w

+3bQRvPSif8X5iZomaBD32gWoydubw0ZGRHJaB+cav
YX/NVXulzdVvidn+egl8qHf2tW5HO/MuE9GsOqmKocV8bNjC18EBwHv/F1fyJp3m4207Cz94o/PbZv4N

22xbW25rZH571QqGaugGoSTRq/LOWrO+BVKlPvd9F+shDwTctZ0mKL1QjcY1pGMF7oQiEG4jrzA8Doeq

Mykt8HvxNlbjqIqdFaHykIxk3NbL4izx48+CXkntGN
wdhEPoc1LtsFx9+g3ewW/gzn2DY+N5oZ1IA64vRcYQspmSzJ0mHmkXE6MJdg8kx0rcnzAhLP/vGSK3Dp

3M2S0mmHBszC0aTPR7u+17XtuH//wMnwpyjKx3zo5Z0bExK12DFMJ7DUwNU/o29pPN9wmGfZD0dy6XRO

XLk5eGgSz7spyA6JxaXeL/ZP4D8wWkM6SG/NGPUidr
Re3RBf2Cr/iLBU5bdkX5hJ+JF0eBFf69H8BReCNd2SgabtK5GPcPP5shB4P36v4J7JxBRTX+XPHYgcHs

SWy906g4D+9ePS/ubkV5L1vCYy4wEoMm7M88kU2zgltq72V6JwDBPsRujlFuefc2f36a1tPxp+gor8c0

aJ3M7thxVOlwr0BBPly8AEQgZjhAZZfZ7NOUsoHrIT
Zz45B8ULcFgaNtCWaAZeuBMgdeloIQ+vtT/y6d/fkBf+lqNzTfErpNBxM8WW2t6R/6SrmHK/qxue8W5s

CTMECuG8qX58uRqR1/MTHdyOh6bvdaN64bMclSIb29Q7cydv6XMqO1By0B44gXleOWISXv/N9EhxiYUP

QcBZYGM9fN5ATEMN0fx1P7lTVahuf15SOA6j5qCvJ2
dyAKsFahugFkGddZeRGL6MPZZyElwG7Ik/urO++vWmlnK6XPRanAH5grMehQlXMCeStcOBb9E7Xzht8O

alqAeLjzhxUx5OXyYu8cvf+9VVXL/AIVw7cMB1N4BQbQTIGPnzZRVSXv0VAzuVS+jwExldK6GSQRnYu/

vwm/lf1+3w1OYFqi7C9ZRUFJL8miskMkeuxTSWxP3U
nmuNDY2wtg4kituwSAKuJDhOEDcEjt7G8snokU9iZX/xIoR6n3jq2cFfhobvUAu8J7LcdwHd4rebs1wt

URnr3OoEEMfoARkOaP/ZIpVNI27b0Phw6rkswdpNTRYhLxGY2YiHmApz6zqw5C6Ik+KXWIWawP4nB3YY

Uha85pWtiQAsCngfP4ku+q09tCt17hvSjBm65uMVs7
wEHPDBY7c8lqtN9nBCFcyvxLBL90ZxT6puByWxb/NZUb+XlzBM1AT2H3goC0+4yWef7YPdPFE4YOP/a6

GqVTYBAoscPnMyylV68AYbQGX3uZ0/3lDat3d1/RNyRa/kT0NDu+sChQOHpCiT+/hody16NkA/ns+N71

rN5w5Gk3OHsEsrjkregA/xkxYph5FH0Aqut+TNWpy2
w9WeRTGKZ0HaVJstM7yeZ5TtZkUhk1ZuYiPd3dt9ND0z9807/xNX0D0zNu0eYvlt5Dm76ijdk+Kw5dzS

7amRYE44dhJra/N3OYMMvp+Uy92zEmyWePhtekBXWG/4WCIU4KnKhEpqHa9x0jDbsJ6FVRGDO2ASYxGL

HexgtcYK9vxNujzFE+QJXud3zpAUPEmouNGfHxC8AC
F7JfLOtARoOEaDb3oWOcR8nLdeHZxj478YSnYONzLK5xdEgFfoi4V1+qCUmkELarA9reIWCfd4ZF7Whp

DBJVqlhdBSj/1RiOHpahqkffTolhmIc2cIHYBTPzUlY2Yxdn3eyx5rAORxrYx5xJkKwT3tS+Z4+KtD+s

zGVUrrJ6QaFWCAL2MlddHIZTmHucyNeaemNe0D9ROG
ZZ2RBAwgCw2GrjP5fjZFHIkE5OEayYVIouwMdAt09ChG1yM9P/XLe6PuATtucykuvf5LSMYEo4GUJnyt

bUtaZeutJc7ZRkFHtruTCBT795g9aZPbYa9nt1M+YQP5zXy2sWnFbgMQDwLtSOuHokh2HYNPU8STpJJO

NAYLyUdwi3ny/D/+F/Adb/D+1vKOaCXsJxIBJ0avHm
qN5PCF1no2NmIWapZLViSD7ovs8QWDC0RQ7TkYi8JVFrK5QlLUWdCYFgn2OaFE5HSF4nnCzfLzR5Po2V

QpJat2UvCFTtSHgZxLFXc42CTYk7L+o/+Qfzt7FCC3PokIJIQu9r/SPZNJvqhDZIGyy2R8iwTQvSslBK

Aw9GkjMH2bVcJ9O6ujQ2RfmfJcjOibvKYvmn3n2fft
XROCrNl3gdzq/4Cp884NGO9Ydh4aaNNVzsnP2IwkQnOZN5tJEmZw5oXzkyH+puAvXo/By7jHSApIRCyQ

et3pR/Nt2L9Mo6jiS5hKRZMoVW5I7h5RDUDxHgGm9iysThIoRppZNiU0Y5Ptqm/fO3G5ZVfH4zdYGBM4

b0i7L7NJIv45i4TE8nRY5u2OdxmAptO/KSAod82yxc
j6mBMXlryeLibXAuGI2OEsKdRE6txv1HGPWiFBQyWdrNCxh2QKkvUN2WuLUuF8HogyDLDIUdhhkmiG5z

UJwxBZ3Cp081DoIcNJ6DTOKVGQJsVEDyTCjRYyDeB5IgO1ZpbNU5QTRjW5AsFBHaI6l5WLqrFU2ANODm

WT86VN4kAHMWNjLtL2BvHBrnTEWyVZdjCL9Hf769Ec
IxrJHnYGNuCgTwRYWxTVJxEDB0YR3APHKyGKBvpAovBX6dcTHyGV5QpVWyGbCxHKttME2Nn843ZfxkCO

IgMTQgMCBSDQo+Jj4KSE3gx5FyDBusUiKwWJ9mxx8LIAtMFzJnN0L5yIPsPg3fyO3TeZM9vNHnN8KMVX

PbotGMvPTsJp2BJYAzPn9vbA2HCPXYGWOpDWGjFOqb
T0cCOS6OMKYNWJLcHDAhfiDciSlWJoNsB4QPAQF3v4JitQkwBr3vSGjnHfAzsZP1qwldGZYiq2NqXJ3D

vlLbwuofQhXkAPTkwcIrnAbnJD5JsQRueWYqXL32XEN+PjYJGeRgC5FyjkMRHBYplmngwG2rYLN1EEJu

We8DVKFqNOtkFNOfPS8TDgAwBhz0DR8iKPz6JXjzGP
AwXSBdDQo+Yp6QCW8rt8WwBJldYCWlSN8liy9ZNUtCOiUgZ0Q4lFHhIn8drM1FaET1uEJtN8U4gNQlA3

Oni1ZEv940J9ELEOQIJzdKkebqkK6PonDaZQxnZw3ACIBjtWk2nT4AINopLU4XQPGrII0lWV32Qc2nzM

FdElB6HPYkEn6PEiMpG2KdVS2iE51cGKKfWYIpBZOB
Cj4+TThrlxKnBhdQPdR5DUIpx2YpJYobSWN4DkY9OMYgBms2SNGnCrH2BZOuFHmvGHP2OUT6QxlxQEXn

BFL4RaVzMhmbXuG5JSE7VIW0OVowDnjxUQApMGQfFbLnWUIxAYZ3WKM0GtNjLIt7TJU8SXR5DiNdLBe7

HPQ3RBX3EvShUDe2DXK1POPyGxJnMovaUCowKmANBi
jjErS6TSngTRK4KIn1UGN7ONGuRfEiRCUiUZL6GcblHGUwWDIsNLM2BFOoXuT5YHNlYsG4LRIwLxT6CA

MlBFN8DPlsKicmRSo9MZG7KYUbPhZ2FMX9MtP7EoGtWEi4ZYvlQsM1NxssVOUtGGQ2KUGcGsD0YYSpBz

WXUjB7BAR2DnUxQoJ9KCdcEtT6OWZeFpC6EXTnFFB8
DjLfQBbaFESqQOC7ODWfEiljFRNwKCV6FKVuGzu8EUQ4WLT5DQazJwx9XUozDdR9NJAnDsFuZMCuCLC6

JZpzUvj2KGF1LXSoTUzoRdR4RGZ0YuB4EVrtLXJeJQ8TRMO2NOH4LSDhRsbwVrW1TOU4GQZ6TPlmUdLr

PSI3KuD0IKWuTuO2COB2XjK0NGTgPgT4JQP2KrY6JR
SfCfT7RWI9HhC3RIAyPfI7BZH0HnL3PEVrOqV9RWX7JlL6JGXkKmQ3YWS4TiZ6XQPeNgZ6CPF3WeU6JX

EfPRx0KCNmDzS8LWI5HgR3YQFjNuF9TNN5AoK5DYMqAiJ1GUH9CjUcBzTbRlJ4UWI1ZXN9WlVpZDB8TX

F8TFAnLoCrKNe4OEO7BwY0AgXlQKKkDKO1TFW7XXqf
JJT2BHf4VMR4ORDrQytaURxoNmM2BOTeGVI5EDZdKqDDZkQrReI2ILwiRhklKUZlKBDqVBFyFWO7GQC7

EGT2MCVdHZP8XHh3SFH7JdsxSlErRMjiHeV8EYXiAiAwCWrbIoY7DBXrNNA5OLElAWG7SpEdLQMeSOVi

JuT2NlPrYgX8VNOwIbXtUEmaVkY3MUbyBbF5CATeIm
T0QN1UVfZ1NTi3LIJ0UIuqHmX9WWpjIwP9MUzeJuNkAVuaMlO3UrDvStAyQRQyPxM5ZEPnJeB3LFQtRY

X8EfbiESI3FJByMIZ5ZsgsLDH3VWagVGP5HBHvUDUhZXZ0ZHP6NBBoRMUcZTA5DJHdGCLrBzg3WDQ3TL

M3WYGeUHb5OAOrHrHsJQAqYDO4BVJcAwCkNBGbLBZ9
AOskVvWkTIZrHJZ2XSPbXzG5XIOkKVS1VkqgCxMtFEGqHHPvJH5TDH2fh6DrQNjiBQNdFS0dlh9PVFgQ

LxFkG6X3rFWrSe6wcAKaw1KafKS9b0CCVgCkR3HuvlJHRP9sX8SsE11rHZvbMe5bDN7JOJRfPEPmWG13

BDEdZhgrZ6WdTCXxS9f3WWOjHwawOWGmX9EdiPSbWq
KhODqqUT4KgBRqtxEoPi9CPHOmFq8ssQRDy3maRoApENV5MUQhQXJ4GDPqWsxxAVtcHP4IeVHthQHNoe

sxSIPgI4X3GB2DQUBPWv9+UNrlqtCuUwoDXlTmEFWzy4CxXSf9CN2IHEYdLNlcHE5Qy840I6K8KqD4wJ

BjNSD8JMD9dCOmLhTcDWUjqiCuJ9Rix7PWYIkOk7yp
F8AyK10vzX5vF4ycsoCwx0qQycCpYJjxZy7RLZPnRgsuz4WEbXPiAUDbF1vlt3EKgINkYXP5FS8JRPTy

F8wjsEdoZJI1OUUkIx1SQUQyVe5akZAfc7DchRD1v1LdGhQgBZXULIn+Hw0JMK2zr1XsJEeiEvCrFM1g

kk1WOnG8NHJzWPSnJZI0DQTwQJJxWqldNSV6PMI1Qn
mgHBL5SBsaQUNpDwYfOwAyTROfQoR5UIqwZih8WYGmAdN0JMSyPxB5KXA9AAT4IqyySUT4FCFfAAZ4Os

nsUDR1ZHSiWGX7BpokSPU7DVEbZGB8AoczVxH3CBCzBeW9WWYyBHn6EWDzTmp9BVP0ICO4SXXuUgX8CJ

V3DlW0XCnuOvKiPQDuXrA8NZhiFwd1SVomNNTfEvRh
UgW4VDY4PDK6XnNiOSr7NWu2MYN2YDQmUAQxLUb9ZCR2DIvaZEQqEOBqFBH6CIyoCsYcGMkgYVP7DJMm

XCIwJRD2MUOHUiPcEhJuVtecNsMsOUJ2RFG2TJKjQfP6ZWSuDFV2VMesNAXhWZZ0HOV6AjEvLmPoRVI3

JQV0CkAtKvCvWEG7EeH2MGebIzNxBMp0SFO9QyCvST
b4UWO9ZWY2MXvdHdL4MGLuGGRcMyxtESX6TCG6DBE5TaCuDDC3XU2VJOh9RBT8LrMdTCYuCoN6DFTkQh

L2QKXdAkWbCOK7VdS4QVJuCgI4SBA9PiH3OBDdHfY4RUP8TyJ5LVXoDoM6JQC2VgB2ESXpXdD3AYR6Ha

P0IAZkSgF0FEK4OpL9CJWeIuM5IIV3CdB8FZEdHfY2
DQT5HpI2UADmMIw7FYNeZsI3HQK9TbE2WLSoWdO7ZQG8IdT0UQAqLoE9RBA7GiKfVzQnZdu3YOIqCRJ1

JsofHJG9XYHkVBTeCdffGOZ2RNBuPFA2BOEiJegeDORnZxP0BCCqVMKvVHb6LVP9LXGiPsz1IWZ5ODJn

HoPqGmZ3IIS4BnAGExD6HaN4TQncMnZ0ONPdJJAwCO
VmHTOlPFMiLzV1MPFrQIAwPWm2ZjM8SJlvEnZ3UOK1DRR4ROBnElU2NTO5SNT9TOPeLCCmXKJbSDU3OL

VeUEP2LVRxVrX9KONtFeQ6FCI2MaPiVyCsRuN9TFjxJEA7PSmoQrG7VO3CVpD4ZDj2HLA1TTMqXuI6QX

A4RLJ0DQPfLro9CAA0XuQ3IVciJpb3HUJ2YyC8MhZt
TQH2OJSeLQA5NGftDTQ4WNGvTIC5ZVonNQN8TGqbFmL9YhCeKNDwDIBoOtE4CsUyRCEgHRU0QaFyPFRs

FfVvYUR2OkY6CMbaXXd6TuCuHBp9XNB4UlU2PZKnFVl5TQF8JgX3FWEyURl1DHY8AoV4DzInKEP8TUI3

PvO8FAIdNLl2ZXN6HOFpEV4IWA9hh3VpMGauOfDgZZ
7aog4CRYqOOgPeX4X1bJRbLh7foQJig4LvaCH6k0WFFzPtS1IvpdFDUF2bH8BpB77bLKcACaBoD0HeO5

JapIQgEJc6S0IxnNimyBdmbJEbCHx4U4Wvm6LkfrJxLBY8UY3WAXOlRxyfA5ZqAmZQBjTxR2LizzGCNw

10AVopAD2nWGCzNBH0YMDfBjazMHxaOZ1MzXLxeERB
ypbbXWDiL9Y5WP9FHEMGPu3+KBzuifWlIouZQxR3UIIez0TpOIc8UO1MEDZqXXhoSJ2Re344S3P5WdE1

hMCbBWL0FXS7fXPkMeXtKHQfhuQuP2Eaj7GBLG7WmkSlFMedFo5TaU2QyvRkLT3ye4NrsjcUGbHvX8Pl

xpQ1C1ddpyHdKW3NFCP5G1fuppDuNCXBFhZzB9guPP
BnagOoGrVeWRBIJnEnE0SdugMMXQMqqvcbaE2pFFZfWCWoLj6HIx5LCoZlXC4rvv7BSgOsJXGyZqeJUe

wbJVrkbvKcYwrGDrZyQBExQyxULtz3RCkeID2Sew5tO7M9HRbsJCGKJ2DuvDHlEW4nD8GFW7osKPcnDa

8GTvLlH9QmnvPmUFuqN9YfDMGxUXNrCb6OGCAiXC7J
DYVgAdUjDUZMMuWmHDDdIhRyCRjfZJQFTLbmSIDnI5EiGCOaAEIwWp2VIOIdEW4NQKQoCMUgYEF+Pg0K

KZIiZH6rguNpzAR2KDU+Eq7JYARlMJi7A6U2AFTtSTo6V1WJQ3KPFIAwKHezRRmqHGUgNVy9R4Z1FLCa

P8QIE2Ovkxyofo9+XA3JW20YPYFaUCj4J4L2tMZkY6
K1rLnSgWG3LU2UGO5GeWy5nDPraA1+IK0DF9LIYkXyTSf0V8R2uXAvO3O2qBiEsYO7OR4GYO4RiFGqJN

FgmvWnFc2nO8YABMwNXqJVMGG5BB9SuWDcRO4NbXPON8VowXTkCe6kLUdkcRTdqS4nFz8rQYgqYD6LWr

WPRCbPAJF9JX2ThYUyBZ0NoZIGC5PmtAUiUb9iRGyi
aHRlbj4+MX9MOFJnXw7ZIx4+LQhucbYiPgnZFjX8TKBxl4SqNRh4QE0KWE5eeAoaOBP7Iq3OvOX3lGWp

N6kJMP2HhICnX44rjCMyGZHxHp6XDrG3gjKmiA0MOG13pLRus7K0QPNgW9zjFPsrl09wRRlcKOjKPS5x

PRVVMTkeFCwrXPK8AxVhhplzCOSuNm9ELpHzRPp0hG
3fqND8LYA2UdbelSKkBAylXgWtMaDnUqD6sJcdpar7LJxsLP3gDZasgqlqXESoGwo+DQogICAgPHJkZj

oBABKryG3yxnV0hgDbABukmLUuWr8bl9f0VtzaXs7vOj6mPBd6HgXrXpPvTKBsYk9jbG90SSlgdiQoTc

3UKyNmAQJ0S8AsTmuWXFU+JDjlQIabbEf9pXWoMWPy
Mz7ITVGeLHCsJMEiCBMfKIQgNXKhGJYuUIRyVFSbBFBzHKKrWZQiIVEyVISsOOOiXGEiMFRfJEFzPXUl

ERGdPBVvGLVgDWRrYOIxMZKoYQNqMCVfATSrXGJwBSNlLEJzWJZbCBJlTS5JBDVoUPBdITPcOSGvQFOt

ICAgICAgICAgICAgICAgICAgICAgICAgICAgICAgIC
YuQLYaMGAfQRSyFXBoJBXlRWEsYGNsIZVuOJVqAATbCTFrLQNeYOGaRPOyPPFzHJUcCRMsGE4XLQMtLR

AgICAgICAgICAgICAgICAgICAgICAgICAgICAgICAgICAgICAgICAgICAgICAgICAgICAgICAgICAgIC

AgICAgICAgICAgICAgICAgICAgICAgICAgICAgICAg
ETWpUZ9ZETHcYIMmUIKlHYGpJEScABKwGGUeOHWdVXShOIFtJTGmWOFiNJYsOVCmIOEsUEKnFWKoEMMq

OCJvVXUlKPMhLHEpABSiHVDhECAvTFKkOLJmGZFgWQQwMLRsPMZnRSCxBTIuRC7PGKQhSFSbPVDzOKMg

ICAgICAgICAgICAgICAgICAgICAgICAgICAgICAgIC
JfUMHhTKQkMQTiZYAkMQRqYWDuHENiXDSyHXJuUNPrGHXiPLSyKEDlQNKhCFSxMDQyDGFqDDZjPJ5DHJ

AgICAgICAgICAgICAgICAgICAgICAgICAgICAgICAgICAgICAgICAgICAgICAgICAgICAgICAgICAgIC

AgICAgICAgICAgICAgICAgICAgICAgICAgICAgICAg
DYQkFUAfNC6WQSCaGQZgBVMhZUOwPTEmKEFbUUEjPPVcLWBmHIGtDRZzDKNlBTHwLERmXVTlYXVyBWQe

MERrMANfGGKgPGKnARKrJZVaLKRgOVUyWOTcBUAwVHYmQWQpYXEeQOGgUULzWVQsRH6OGLNcTWVqBUPh

ICAgICAgICAgICAgICAgICAgICAgICAgICAgICAgIC
AgICAgICAgICAgICAgICAgICAgICAgICAgICAgICAgICAgICAgICAgICAgICAgICAgICAgICAgICAgIA

0KICAgICAgICAgICAgICAgICAgICAgICAgICAgICAgICAgICAgICAgICAgICAgICAgICAgICAgICAgIC

AgICAgICAgICAgICAgICAgICAgICAgICAgICAgICAg
HPChVKKeFLOuSX9PWXEgOFEcBTHfLBHfWVSoQEMeQHFnKBXkONFcCQIcCDIzBYLfNTXsZFDmNUCnEYGm

NSRaQKLjCGDaXYSxGAOoQHRfLLVqQEPeOWRxINOlCGBjRLCbTMNcEVJbNNNoHVZoASOiUE9FOE95wMPt

p4K1CQFuGD6vdyu/Vl8TMWqbrzHdhVFcSW8AFuAtOD
1zmx4AVeAdJV7cpa2SOZvMNsQgE3X2oMDtXYYmCXNFEjHdA66nYUzoQd52JGhdRXQaUrVvQHz9Oe5LTn

PkL5kdUBKiUhV5OCUpUdZ0DESkTlLyFCbjHY4Mc3DxgLNlCZh+Tb3LYT9ag2ZhSErdTyCzPO5swo0KNC

vJJoNiD4QqzoH4GJO5DKJcRu4ISSZxTPHgqARfOBYm
OYRGKeBbG2IamS79ZTGCIl7+VMlnneSdNcvDCiF3TBExw3ZyLPc4IF1ZBWLcWNg9xJTrNLKzVHHyE7Xo

gPFxNR7izQYyAxqxGEItjGBfpUZcWDFONCMoGJoaATYIZLE5GVHjZiK6HwVoIsXpSWR4IADwTP3zKOjb

HU2ZZQO4TBejGCZdTIXlD5dHHhSpQJn2KkBliBbzPJ
4MSeHjT0PdbjAboFIhQhDkPORZMd0+JJabuoEcYigQFgX8PZOxg6SqUCq3PW2LVSBnFTgeLF3WKEJioQ

8iOVfpEY3NPiQzIDHyBTFKCfKoH45vxNFcXZy0A5CkJhApMKYjJvemBQHpKVgvIrGgXWNzGdMfHCbeXL

4+ID4+TRrkDQ8AURkdayWlJHAhBd8PZBGuXGVcIV0y
OVZcRIGxO1W8uCdrWNOOJwEfI0irgfjkFG2fZWToS186gSubpmEmNQP2CGNxJg6NQMUqHGQ7WNMinYPm

YlYgPMLOIQupGB7IrXCnYFH2vD2yPMktIHJaTRPjR4lVDsMxmFgsOP36aTuojoMyyIDcTEn+Vw9XTI7z

j3OxPAb1agQnCCpkKGBpDVeoMVNzAUGpJKYnBNS4ND
Z4WKFYEtBfKEBvNAQcKYmqXRZtCUBpel8GEULfVAElFNCxRCSiZJFpNMKyBCgjNIDcYKMaLyg7KPZrGC

SgTT4YCeOxKVLzNDWaFCkeOKVePVKxfx8GTSVwFAFaAtB6LUAuERJlXWLgVCciFNObJMCmRaIaWPVwLA

ApUQ0GYrWoIWSoCIB7GGPcYXIkVKGxyh2PIWCaLPMj
Gcy8BQEvUZQfRJCsPRofROEoHGAuWBKsRCKrDECpIF3RHkGpWFDoJRWyWVNvXGMpZICzcg3BKULdNUId

NUK3ZBCtFWLzZBKtQDbqVLTeTSH9NlOeFLEsZLOvKI8FEyDoEVOoAQM6NEwkNYVfEFSugp8AGAWsSGSi

DZPiJQPwHNBrMJHjEAmsCPKwDGP8IRn4VYDwDMIcFK
7KNmYwGHArSYieYPPsNVTjBPYkad5UAAHrWWOsXpFzMDZqKQErQZFkABuxPVWcLGI8XrJ5YAYmDLVcHT

3VGjMpWEDcXAx6ELBuLEEmCGBvqe7KVHWyXSUlHXihCiIyJHFuLYQcLHdlDQCfMSG3JUS9LKDbGLFgAF

0DHsMwOJOaIVzgLBPuXZSqYABmxi4IDHSiYRTwVSW9
TQRbMBLaYMBzUAmoPHPlKRPtLyW0SRCnVFUyBX2PStFzDGRzYkT3KEMwZIVkZPBlkc4TFQGiTCJyZZqq

WYIrWNUsBDLdQRdpDFVsEKRdKip3DJVsBGJiGP3KEmBbNEGuGnK4QCIhVJFfRNFupo4SRBDpNEMiObYs

TsDcOBRbTRHyQTs3scDpuNOwDBd9XY9ME6ZfupUtOo
YWEl2Xe207OGE3BBTlLc5KB9reUu5eJYJzMAWOTz6WRZz9YYIcXMoxBPD9PRIvY3UeGzpxYaV7RGj4Lo

ZhNTZhYWU+XFn3MjKqARDcDOY4FEL2OwFsNWM0AfJ7BMinPhH6GkDuYI7tCNRKGj1+DQpzdGFydHhyZW

XRJwYzZVWpSDpqCDGGBq0V









                    ID                  Date                Data Source

 

                    [id number]            2020 03:50:51 PM EST [facility]

 

                                        -REPORT:PROCEDURE: XR CHEST 1 VIEWDATE A

ND TIME: 2020 3:10 PM ESTHISTORY: 

Hallucinations. Sarcoidosis. Rib pain.COMPARISON: 2019.TECHNIQUE: 
Portable erect AP chest.FINDINGS: The lungs are clear and the heart, hilar and 
mediastinalshadows are unremarkable. There is resection of left distal 
clavicle.IMPRESSION:No acute pulmonary disease.DWS: [id number] SIGNATURE: 
Winston Sinha MD 









          Name      Value     Range     Interpretation Code Description Data Renita

rce(s) Supporting 

Document(s)

 

                                                                       









                    ID                  Date                Data Source

 

                    [id number]        2020 08:22:00 PM EST [facility]









          Name      Value     Range     Interpretation Code Description Data Renita

rce(s) Supporting 

Document(s)

 

                THYROTROPIN (MIU/L) IN SER/PLAS BY DETECTION LIMIT <= 0.05 MIU/L

                 0.465-4.680      

                                        [facility]  









                    ID                  Date                Data Source

 

                    [id number]        2020 04:47:00 PM EST [facility]









          Name      Value     Range     Interpretation Code Description Data Renita

rce(s) Supporting 

Document(s)

 

          ACETAMINOPHEN (UG/ML) IN SER/PLAS           3.0-30.0 ug/mL            

         [facility]  









                    ID                  Date                Data Source

 

                    [id number]        2020 04:47:00 PM EST [facility]









          Name      Value     Range     Interpretation Code Description Data Renita

rce(s) Supporting 

Document(s)

 

           C REACTIVE PROTEIN (MG/L) IN SER/PLAS            <=0.9      Above hig

h normal            [facility] 

[facility]                                 









                    ID                  Date                Data Source

 

                    [id number]        2020 04:47:00 PM EST [facility]









          Name      Value     Range     Interpretation Code Description Data Renita

rce(s) Supporting 

Document(s)

 

          CREATINE KINASE (U/L) IN SER/PLAS                               

    [facility]  









                    ID                  Date                Data Source

 

                    [id number]        2020 04:34:00 PM EST [facility]









          Name      Value     Range     Interpretation Code Description Data Renita

rce(s) Supporting 

Document(s)

 

          HIV-1 AND HIV-2 ANTIBODIES           Non-reactive                     

[facility]  

 

                                        A non-reactive result indicates that HIV

-1/O/2 antibodies or p24 antigen have 

not been detected. However, it does not preclude previous exposure of 
infection with HIV-1/O/2.All reactive results are referred for confirmatory 
testing by [facility] ([facility]).Only samples which give "reactive" results 
by both HIV-1/O/2 EIA and NATConfirmatory testing should be considered positive 
for HIV-1/O/2 antibodies.For specimens originating in [place] the 
physician or other healthcare providers authorized to order HIV testing must 
certify that the person tested must be given informed written consent for this 
test, pursuant [facility] Law 27-F. Informed consent includes 
explanation of the test, meaning of results, benefits of diagnosis and 
intervention. Post testcounseling is provided at the time the test results 
are reported to thepatient when appropriate. This information is protected by
 [place] State[place] and cannot be disclosed without written permission. 

 

          HIV-1 P24 AG           Non-reactive                     [facility] 

[facility]  









                    ID                  Date                Data Source

 

                    [id number]        2020 04:28:00 PM EST [facility]









          Name      Value     Range     Interpretation Code Description Data Renita

rce(s) Supporting 

Document(s)

 

          TROPONIN I.CARDIAC (NG/ML) IN SERUM OR PLASMA                         

                [facility]  

 

                                        <0.034 Normal0.034-0.119 Indeterminate>/

= 0.120 Abnormal 









                    ID                  Date                Data Source

 

                    [id number]        2020 04:17:00 PM EST [facility]









          Name      Value     Range     Interpretation Code Description Data Renita

rce(s) Supporting 

Document(s)

 

          MAGNESIUM (MG/DL) IN SER/PLAS           1.6-2.4                       

[facility]  









                    ID                  Date                Data Source

 

                    [id number]        2020 04:17:00 PM EST [facility]









          Name      Value     Range     Interpretation Code Description Data Renita

rce(s) Supporting 

Document(s)

 

          LIPASE (U/L) IN SER/PLAS           <300                          [facility]

[facility]  









                    ID                  Date                Data Source

 

                    [id number]        2020 04:17:00 PM EST [facility]









          Name      Value     Range     Interpretation Code Description Data Renita

rce(s) Supporting 

Document(s)

 

          SALICYLATES (MG/DL) IN SER/PLAS           2.0-20.0 mg/dL              

       [facility]  









                    ID                  Date                Data Source

 

                    [id number]        2020 04:17:00 PM EST [facility]









          Name      Value     Range     Interpretation Code Description Data Renita

rce(s) Supporting 

Document(s)

 

          SODIUM (MMOL/L) IN SER/PLAS           135-146                       Un

[facility]  

 

          POTASSIUM (MMOL/L) IN SER/PLAS           3.5-5.3                      

 [facility]  

 

          CHLORIDE (MMOL/L) IN SER/PLAS                                   

[facility]  

 

          CARBON DIOXIDE, TOTAL (MMOL/L) IN SER/PLAS           21-32            

             [facility]  

 

          ANION GAP IN SER/PLAS           5-15                          [facility]  

 

          UREA NITROGEN (MG/DL) IN SER/PLAS           7-23                      

    [facility]  

 

          CREATININE (MG/DL) IN SER/PLAS           0.7-1.3                      

 [facility]  

 

           UREA NITROGEN/CREATININE (MASS RATIO) IN SER/PLAS                    

                         [facility]

                                         

 

          GLUCOSE (MG/DL) IN SER/PLAS           65-99                         Un

[facility]  

 

          CALCIUM (MG/DL) IN SER/PLAS           8.4-10.4                      Un

[facility]  

 

           ASPARTATE AMINOTRANSFERASE (SGOT) (U/L) IN SER/PLAS            <59   

                           [facility] 

[facility]                                 

 

           ALANINE AMINOTRANSFERASE (SGPT) (U/L) IN SER/PLAS            <50     

                         [facility] 

[facility]                                 

 

          ALKALINE PHOSPHATASE (U/L) IN SER/PLAS                          

         [facility]  

 

          PROTEIN (G/DL) IN SER/PLAS           6.3-8.2                       [facility]

[facility]  

 

          ALBUMIN (G/DL) IN SER/PLAS           3.5-5.0                       [facility]  

 

          ALBUMIN/GLOBULIN (G/G RATIO) IN SER/PLAS                              

           [facility]  

 

          BILIRUBIN TOTAL (MG/DL) IN SER/PLAS           0.0-1.3                 

      [facility]  

 

           GLOMERULAR FILTRATION RATE ML/MIN/1.73 SQ M.PREDICTED            >60 

                             [facility] 

[facility]                                 









                    ID                  Date                Data Source

 

                    [id number]        2020 04:17:00 PM EST [facility]

 Services









          Name      Value     Range     Interpretation Code Description Data Renita

rce(s) Supporting 

Document(s)

 

          ETHANOL (G/DL) IN SER/PLAS           <0.01                         [facility]  

 

                                        This test is for medical use only 









                    ID                  Date                Data Source

 

                    [id number]        2020 03:49:00 PM EST [facility]

 Services









          Name      Value     Range     Interpretation Code Description Data Renita

rce(s) Supporting 

Document(s)

 

           LEUKOCYTES(10*3/UL) IN BLOOD BY AUTOMATED COUNT            4.0-10.5  

 Above high normal            

[facility]                   

 

           ERYTHROCYTES (10*6/UL) IN BLOOD BY AUTOMATED COUNT            4.00-5.

80                        [facility]

 Services                                

 

          HEMOGLOBIN (G/DL) IN BLOOD           13.0-18.0                     [facility]

[facility]  

 

           HEMATOCRIT (%) IN BLOOD BY AUTOMATED COUNT            37.0-50.0      

                  [facility] 

[facility]                                 

 

           ERYTHROCYTE MEAN CORPUSCULAR VOLUME (FL) BY AUTOMATED COUNT          

  77.0-100.0                       

[facility]                   

 

           ERYTHROCYTE MEAN CORPUSCULAR HEMOGLOBIN (PG) BY AUTOMATED COUNT      

      26.0-33.0                        

[facility]                   

 

                    ERYTHROCYTE MEAN CORPUSCULAR HEMOGLOBIN CONCENTRATION (G/DL)

 BY AUTOMATED                     

31.0-36.0                                       [facility]  

 

           ERYTHROCYTE DISTRIBUTION WIDTH (RATIO) BY AUTOMATED COUNT            

12.0-17.0                        [facility]                         

 

           PLATELET MEAN VOLUME (FL) IN BLOOD BY AUTOMATED COUNT            8.0-

12.0                         [facility]                          

 

           NEUTROPHILS/100 LEUKOCYTES IN BLOOD BY AUTOMATED COUNT            35.

0-78.0                        [facility]                          

 

           LYMPHOCYTES/100 LEUKOCYTES IN BLOOD BY AUTOMATED COUNT            20.

0-42.0                        [facility]                          

 

           MONOCYTES/100 LEUKOCYTES IN BLOOD BY AUTOMATED COUNT            <=15.

0                           [facility] 

[facility]                                 

 

           EOSINOPHILS/100 LEUKOCYTES IN BLOOD BY AUTOMATED COUNT            <=1

0.0                           [facility]                          

 

           BASOPHILS/100 LEUKOCYTES IN BLOOD BY AUTOMATED COUNT            <=2.0

                            [facility] 

[facility]                                 

 

           NEUTROPHILS (10*3/UL) IN BLOOD BY AUTOMATED COUNT            1.40-8.4

0                        [facility] 

[facility]                                 

 

           LYMPHOCYTES (10*3/UL) IN BLOOD BY AUTOMATED COUNT            1.00-4.0

0                        [facility] 

[facility]                                 

 

           MONOCYTES (10*3/UL) IN BLOOD BY AUTOMATED COUNT            <=1.50    

                       [facility]                                 

 

           EOSINOPHILS (10*3/UL) IN BLOOD BY AUTOMATED COUNT            <=0.70  

                         [facility] 

[facility]                                 

 

           BASOPHILS (10*3/UL) IN BLOOD BY AUTOMATED COUNT            <=0.10    

 Above high normal            

[facility]                   

 

           PLATELETS (10*3/UL) IN BLOOD AUTOMATED COUNT            125-425      

                    [facility] 

[facility]                                 

 

           IMMATURE NEUTROPHILS/100 LEUKOCYTES IN BLOOD BY AUTOMATED COUNT      

      <=0.5                            

[facility]                   

 

           IMMATURE NEUTROPHILS (10*3/UL) IN BLOOD BY AUTOMATED COUNT           

 <=0.40                           [facility]                          









                    ID                  Date                Data Source

 

                    [id number]       2020 05:35:14 PM EST [place] [facility] - [facility]

 

                                        EXAM:XR SWALLOWING FUNCTION W/ VIDEOMRN:

[id number] PROVIDER:MD, PGY-1 Carola Daly MD, PGY-1CLINICAL HISTORY:50-year-old male history of dysphagia.This 
procedure was performed by IMTIAZ Dos Santos under the directsupervision of 
Hernan Clifford M.D..COMPARISON STUDY:NoneTECHNIQUE:A swallowing evaluation was 
performed by the speech pathologist. The patientwas given various consistencies 
of barium. A video recording of the exam wasobtained. The fluoroscopy time is 
1.4 minutes.FINDINGS:No penetration and/or aspiration is identified. There are 
no strictures noted.There is no abnormal pooling identified. Swallowing reflex 
and transit timeappeared to be within normal limits.IMPRESSION:Essentially an 
unremarkable video fluoroscopy. For clinical informationincluding feeding 
recommendations please refer to the speech pathologist'sreport.This document has
 been authenticated by Hernan Clifford MD on 2020 5:33 PM.  Thank you for 
referring your patient to [facility]. 









          Name      Value     Range     Interpretation Code Description Data Renita

rce(s) Supporting 

Document(s)

 

                                                                       









                    ID                  Date                Data Source

 

                    [id number]       2020 02:49:05 PM EST [place] [facility] - [facility]

 

                                        EXAMINATION: US KIDNEYS AND BLADDERMRN: 

[id number] PROVIDER:Kala Vergara DATE: 2020 1:54 PMDATE OF BIRTH: 1970HISTORY:Urinary 
retentionCOMPARISON: Retroperitoneal ultrasound 2020.TECHNIQUE: Routine 
sonographic images were obtained through the regions of the kidney,bladder and 
prostate.FINDINGSRIGHT KIDNEY:The right kidney measures 11.2 x 5.6 x 6.1 cm. The
 right cortex measures 2.4cm.. No focal abnormality, calculi, cyst or 
hydronephrosis identifiedLEFT KIDNEY:The left kidney measures 12.1 x 5.4 x 6.2 
cm. The left cortex measures 1.4 cm.No focal abnormality, calculi, cyst or 
hydronephrosis identified.BLADDER:Grossly unremarkable on limited 
visualizationPROSTATE:The prostate measures 3.6 x 3.7 x 3.9 
centimetersIMPRESSION:1. Unremarkable sonographic evaluation of the kidney/b
ladderThis document has been authenticated by Hernan Alarcon MD on 2020 
2:47PM.      Thank you for referring your patient to [facility].
 









          Name      Value     Range     Interpretation Code Description Data Renita

rce(s) Supporting 

Document(s)

 

                                                                       









                    ID                  Date                Data Source

 

                    [id number]          2020 12:27:36 PM EST [place]

rdes - [facility] [place], [facility]

 

                                        ALFRED WILLIAMSON BPATIENT IDENTIFICATION:[facility]Patient Name[facility]  ALFRED WILLIAMSON BMedical Record Number:   [id number] Of Birth:  1970CHIEF 
COMPLAINT:Patient presents for follow up with HX HTN and echo completed 
10/22/2020. C/OSOB at rest andwith exertion, occasional BLE edema, occasional 
palpitations, daily postruallightheadednss withoccasional episodes of presyncope
 and denies CP.HISTORY OF PRESENT ILLNESS:49-year-old man with past medical 
history ofhypertension, asthma Parkinson, dementia.Patient was previously seen 
complaining mostly of neurological symptoms.He was also mentioning chest 
discomfort which is nonexertional and stablefor the last 10 years. Chest pain 
has improved since he lost weight recently.  His shortness ofbreath was 
attributedto asthma and also improved.No interval changes since last 
appointment.  He denies chest pain.He continues to smoke.  Denies orthopnea or 
PND.His previous echocardiogram from  showed mildly reduced systolicfunction
 with ejectionfraction of 45 to 50%.Follow-up echocardiogram from 2020 showed normal systolicfunction ejection [id number] to 65% with trace mitral
 regurgitation. REVIEW OF SYSTEMS:All 10 point review of systems were obtained 
and all are negative other thanthe ones mentionedin history of present illness. 
PROBLEM LIST/PAST MEDICAL HISTORY:OngoingAsthmaAtopic dermatitisChest 
painDementiaDepression with anxietyDyspneaEchocardiogram abnormalGERD 
(gastroesophageal reflux disease)HyperlipemiaHypertensionIBS (irritable bowel 
syndrome)InsomniaLumbar herniated discLump in neckMemory lossNumerous 
molesOnychomycosis of toenailOsteoarthritisParkinsonismParkinsons 
diseasePolycythemiaPoor sleep patternRLS (restless legs syndrome)SarcoidSolar 
lentigoTourette syndromeTremorUnstable gaitUTI (urinary tract infection) with 
pyuriaVitamin D deficiencyHistoricalAbdominal pain in maleAcute 
gastroenteritisAcute URIAtypical chest painDiarrheaDizziness - light-
headedExposure to potentially hazardous body fluidsFoot painGastroenteritis and 
colitis, viralHA (headache)HTN (hypertension)Lower leg edemaNausea and 
vomitingPROCEDURE/SURGICAL HISTORY:Procedure on wrist (Procedure on wrist)Biopsy
 of LiverCurettage of Calcaneus Bone CystArthroscopy Shoulder LeftBiopsy Lung 
PercutaneousCyst removed from lip as childSOCIAL HISTORY:AlcoholAlcohol Use 
Current. 1-2 times per month,    2019Alcohol Use Current.,   
2018Substance UseDrug Use Current. Marijuana, Daily,   
2018Tobacco/Nicotine4 packs per month Use:.,   2020FAMILY 
HISTORY:Anxiety:  Patient.Brain tumor:  Mother.Degenerative disc disease:   
Father.Gout:  Father.Heart attack:  Mother and Father.Hyperlipidemia.:  
Patient.Hypertension:  Patient.Sarcoidosis:  Patient.ALLERGIES:NKAHome 
Medications:Home Medications (31) ActiveMisc Medication (blood pressure cuff) Se
e Instructions, Taking as prescribed;dose verified,(Special Instructions: take 
blood pressure daily) Misc Medication (cane) See Instructions, Taking as 
prescribed; dose verified,(SpecialInstructions: to use with ambulation) Misc 
Medication (held shower head) See Instructions, Taking as prescribed;dose 
verified,(Special Instructions: to use when showering) Misc Medication (shower 
bench) See Instructions, Taking as prescribed; doseverified, 
(SpecialInstructions: to use with showering) Misc Rx Supply (Please provide a 
CPAP compliance in 2 weeks) SeeInstructions, Taking asprescribed; dose verified,
 (Special Instructions: PHC) Misc Rx Supply (right wrist brace/splint) See 
Instructions, Taking asprescribed; dose verified,(Special Instructions: m25.531)
 Misc Rx Supply (Adjustable shower head) See Instructions, Taking asprescribed; 
dose verified,(Special Instructions: Use as Directed Dx: G20) Misc Rx Supply 
(Shower bench/chair) See Instructions, Taking as prescribed;dose 
verified,(Special Instructions: Use as Directed) Misc Rx Supply (Rollator/walker
 with brakes) See Instructions, Taking asprescribed; doseverified, (Special 
Instructions: Use as Directed) Misc Rx Supply (Hand held/adjustable  shower he
ad) See Instructions, Takingas prescribed; doseverified, (Special Instructions: 
Use as Directed) bifidobacterium-lactobacillus (Probiotic Formula oral capsule) 
1 cap(s), PO(oral), qDay, Takingas prescribed; dose verifiedbusPIRone (busPIRone
 30 mg oral tablet) 30 mg = 1 tab(s), PO (oral), bid,Taking as prescribed;dose 
verifiedcarbidopa-levodopa (Sinemet 25 mg-100 mg oral tablet) 1 tab(s), PO 
(oral),qid, Taking asprescribed; dose verifiedcarbidopa-levodopa (carbidopa-
levodopa 25 mg-100 mg oral tablet, extendedrelease) 1 tab(s), PO(oral), at 
bedtime, Taking as prescribed; dose verifiedcholecalciferol (Vitamin D3 1000 
intl units (25 mcg) oral capsule) 1,000IUnits = 1 cap(s), PO(oral), qDay, Taking
 as prescribed; dose verifiedgabapentin (gabapentin 300 mg oral capsule) 300 mg 
= 1 cap(s), PO (oral),qAM, Taking asprescribed; dose verifiedgabapentin 
(gabapentin 300 mg oral capsule) 900 mg = 3 cap(s), PO (oral), atbedtime, Taking
 asprescribed; dose verifiedginkgo (Ginkgo Biloba) , PO (oral), qDay, Taking as 
prescribed; dose verifiedhydrOXYzine (hydrOXYzine hydrochloride 25 mg oral 
tablet) 25 mg = 1 tab(s),PRN, PO (oral), tid,Taking as prescribed; dose 
verifiedhydroCHLOROthiazide (hydroCHLOROthiazide 12.5 mg oral tablet) 12.5 mg = 
1tab(s), PO (oral),qDay, Taking as prescribed; dose verified, (Special 
Instructions:  patient-needsmedicaid provider) levothyroxine 
(levothyroxine 25 mcg (0.025 mg) oral tablet) 25 mcg = 1tab(s), PO 
(oral),qOTHERday, Taking as prescribed; dose verifiedmelatonin (Melatonin 2.5 mg
 oral capsule) 2.5 mg = 1 cap(s), PO (oral), atbedtime, Taking asprescribed; 
dose verifiedmetoprolol (metoprolol tartrate 25 mg oral tablet) 25 mg = 1 
tab(s), PO(oral), bid, Taking asprescribed; dose verifiedmometasone nasal 
(Nasonex 50 mcg/inh nasal spray) 2 spray(s), Nasal, qDay,Taking as 
prescribed;dose verified, (Special Instructions: INHALE 2 SPRAYS NASALLY EVERY 
DAY) multivitamin (Vitamin B Complex oral capsule) 1 cap(s), PO (oral), 
qDay,Taking as prescribed;dose verifiedomega-3 polyunsaturated fatty acids 
(Omega-3 oral capsule) 1 cap(s), PO(oral), qDay, Taking asprescribed; dose 
verifiedomeprazole (omeprazole 20 mg oral delayed release capsule) See 
Instructions,Taking asprescribed; dose verified, (Special Instructions: take 1 
capsule by mouth oncedaily)rosuvastatin (Crestor 5 mg oral tablet) 5 mg = 1 
tab(s), PO (oral), qDay,Taking as prescribed;dose verifiedtamsulosin (tamsulosin
 0.4 mg oral capsule) 0.8 mg = 2 cap(s), PO (oral),qDay, Taking asprescribed; 
dose verifiedvalsartan (valsartan 320 mg oral tablet) 320 mg = 1 tab(s), PO 
(oral), qDay,Taking asprescribed; dose verifiedvenlafaxine (venlafaxine 150 mg 
oral capsule, extended release) 150 mg = 1cap(s), PO (oral),qDay, Taking as 
prescribed; dose verified, (Special Instructions: TAKE 1CAPSULE BY MOUTH 
EVERYDAY) PHYSICAL EXAM:VITALS:BP:  144/107 HT:  180 cm  WT: 109.7 kg   BMI: 
33.9 General- Patient is sitting comfortably with no apparent distress.Head- 
atraumatic and normocephalic.Neck supple no JVD appreciated.Cardiovascular 
regular S1 and S2 no murmurs gallops or rubs.Lungs clear to auscultation 
bilaterally.Abdomen soft nontender.Extremities mild edema noted.Skin warm and 
dry.Neurologic- patient is alert and oriented 

3 no focal deficits.Psychiatric- Normal affect LAB RESULTS:***CHEMISTRY***Est. 
Avg. Glucose: 111 mg/dL (10/12/20)Hgb A1c: 5.5 % (10/12/20)TSH: 1.4 mcIU/mL 
(10/12/20)PATHOLOGY RESULTS:Pathology Results No qualifying data 
available.DIAGNOSTIC RESULTS:No qualifying data available.EKG 
INTERPRETATIONSINUS RHYTHMBORDERLINE LEFT AXIS DEVIATION [QRS AXIS < -
20]MODERATE T-WAVE ABNORMALITY, CONSIDER LATERAL ISCHEMIA [-0.1+ mV T WAVE 
INI/aVL/V5/V6]ABNORMAL ECGPerformed By:  Venessa Lugo     Signed Date:  
2020 12:22 ASSESSMENT AND PLAN:1. Echocardiogram abnormal Patient has 
normal echocardiogram.No CHF symptoms, patient is advised to call for any 
worsening shortness ofbreath or chestdiscomfort.2. Hypertension Continue 
management by primary physician.3. Chest pain No episodes since last appoin
tment.Previously had atypical chest pain, nonexertional for years which 
hasresolved recently.  No qualifying data available.Medications Affected this 
Encounter:No Prescription Activity This EncounterAdded Patient Education Heart 
Disease Education Follow-Up Appointments With:   Venessa LugoAddress:   
[facility] (), [address] [place] [address];[place], [place],[facility];[phone] Business ()When:   In 1 year(s) [1]  Cardiology phone 
Visit TEJA; Venessa Lugo 2020 12:05 EDTThis document was 
authenticated by Venessa Lugo MD on  2020 12:27 PM 









          Name      Value     Range     Interpretation Code Description Data Renita

rce(s) Supporting 

Document(s)

 

                                                                       









                    ID                  Date                Data Source

 

                    [id number]            2020 12:14:40 PM EST [place] Janice camargo - [facility], [facility]

 

                                        ----------------------------------------

--------------------TRANSTHORACIC 

ECHOCARDIOGRAMHeight:      180 cmWeight:      110 kgBP:          136 / 
84SONOGRAPHER: Haydee Wu RDCS----------------------
--------------------------------------INDICATIONS:  Chest pain (R07.9) Shortness
 of 
breath(R06.02)------------------------------------------------------------CONCLU

SIONSSUMMARY:1. Left ventricle: The cavity size is normal. There is   moderate 
concentric hypertrophy. Systolic function is   normal. The estimated ejection 
fraction is 60-65%. Wall   motion is normal; there are no regional wall motion  
 abnormalities.2. Aortic valve: Thickening and calcification, consistent   with 
sclerosis.------------------------------------------------------------STUDY 
DATA:   Procedure:  Transthoracic echocardiography wasperformed for diagnosis. 
Image quality was technicallylimited. Scanning was performed from the 
parasternal,apical, and subcostal acoustic windows.  M-mode, slcvmbpm3V, 
complete spectral Doppler, and color Doppler.  Studystatus:  Routine.  Patient 
status:  Outpatient.  Location:Echo laboratory.  Objective:  Diagnostic 
evaluation.LEFT VENTRICLE:  The cavity size is normal. There ismoderate concentr
ic hypertrophy. Systolic function isnormal. The estimated ejection fraction is 
60-65%. Wallmotion is normal; there are no regional wall motionabnormalities. 
Diastolic function is normal.LEFT ATRIUM:  The left atrium is normal in 
size.RIGHT VENTRICLE:  The cavity size is normal. Wall thicknessis normal. 
Systolic function is normal.RIGHT ATRIUM:  The right atrium is normal in 
size.ATRIAL SEPTUM:  The septum is normal.AORTA:  Aortic root: The aortic root 
is not dilated.AORTIC VALVE:   Thickening and calcification, consistentwith 
sclerosis.  Doppler:   There is no stenosis.    Noregurgitation.MITRAL VALVE:   
Doppler:   There is no evidence forstenosis.    No regurgitation.TRICUSPID 
VALVE:   Doppler:   There is no evidence forstenosis.    Trivial 
regurgitation.PULMONIC VALVE:    Doppler:   There is no evidence forstenosis.   
 Trivial regurgitation.PULMONARY ARTERY:   PA peak pressure: 10 mm Hg 
(S).SYSTEMIC VEINS:Inferior vena cava: Diameter: 1.5 cm.PERICARDIUM:  There is 
no pericardial effusion.-----
-------------------------------------------------------Measurements IVC         
              Value        10/13/2016 Reference Diameter          (N)     1.5   
cm     1.3        <2.0  Left ventricle            Value        10/13/2016 
Reference LV Diastolic      (N)     4.4   cm     4.5        4.2 - 5.8 Dimension 
LV Systolic       (N)     3.1   cm     3.4        2.5 - 4.0 Dimension LV PW 
thickness,  (H)     1.3   cm     1.1        0.7 - 1.2 ED EF (Bi-Plane)     (N)  
   66    %      50         52 - 74 LV stroke volume,         51    ml     
---------- --------- Teichholz MM LV stroke                 21.4  ml/m2  
---------- --------- volume/bsa, Teichholz MM LV e', lateral            8.49  
cm/sec 14         --------- LV E/e', lateral          9            3          
--------- LV e', medial             8.03  cm/sec ---------- --------- LV E/e', 
medial           10           ---------- --------- LV e', average            
8.26  cm/sec ---------- --------- LV E/e', average          9            -----
----- ---------  Ventricular septum        Value        10/13/2016 Reference 
Diastolic         (H)     1.6   cm     1.1        0.7 - 1.2 Thickness  LVOT     
                 Value        10/13/2016 Reference LVOT ID, S                2.1
   cm     ---------- --------- LVOT area                 3     cm2    ----------
 ---------  Aortic valve              Value        10/13/2016 Reference Aortic 
valve peak         1.5   m/sec  1.2        --------- velocity, S Aortic valve 
VTI,         30.8  cm     20.8       --------- S Mean gradient, S          5    
 mm Hg  3          --------- Aortic peak               9     mm Hg  6          
--------- gradient, S  Aorta                     Value        10/13/2016 
Reference Aortic Root       (N)     3.3   cm     ---------- <4.4 Diameter  Left 
atrium               Value        10/13/2016 Reference LA volume, ES,           
 66    ml     ---------- --------- 2-p TRAV              (N)     28    ml/m2  
---------- 16 - 34  Mitral valve              Value        10/13/2016 Reference 
Mitral E-wave             0.78  m/sec  0.46       --------- peak velocity Mitral
 A-wave             0.71  m/sec  0.54       --------- peak velocity Mitral      
              153   ms     ---------- --------- deceleration time Mitral peak   
            2     mm Hg  ---------- --------- gradient, D Mitral E/A ratio,     
    1.09         0.84       --------- peak  Pulmonary arteries        Value     
   10/13/2016 Reference PA pressure, S,           10    mm Hg  ---------- 
--------- DP  Tricuspid valve           Value        10/13/2016 Reference 
Tricuspid regurg          1.4   m/sec  3.1        --------- peak velocity 
Tricuspid peak            7     mm Hg  38         --------- RV-RA gradient  
Systemic veins            Value        10/13/2016 Reference Estimated RAP       
      3     mm Hg  ---------- ---------  Right ventricle           Value        
10/13/2016 Reference TAPSE             (N)     2.9   cm     ---------- 1.7 - 
3.1Legend:(L)  and  (H)  brian values outside specified referencerange.(N)  marks
 values inside specified reference range.Electronically signed Venessa Blount2020 12:14 









          Name      Value     Range     Interpretation Code Description Data Renita

rce(s) Supporting 

Document(s)

 

                                                                       









                    ID                  Date                Data Source

 

                    [id number]          10/12/2020 02:44:11 PM EDT [place] Janice camargo - [facility]









          Name      Value     Range     Interpretation Code Description Data Renita

rce(s) Supporting 

Document(s)

 

           Hgb A1c    5.5 %      <=5.7                            [place] Demetrio

meli - [facility]                            

 

                                        The estimated average glucose was calcul

ated using the American Diabetes 

Association equation eAG = (28.7 X HbA1C) - 46.7In accordance with the ADA, the 
prediabetic A1c range = 5.7-6.4%The Abbott Hemoglobin A1c assay should not be 
used to diagnose or monitor diabetes in patients with altered red cell lifespan,
 such as homozygous hemoglobin variants, HbS, HbC, HbF > 5% and hemolytic 
anemia. 

 

           Est. Avg. Glucose 111 mg/dL                                   [place]

[place] - [facility]                   









                    ID                  Date                Data Source

 

                    [id number]          10/12/2020 02:21:52 PM EDT [place] Janice camargo - [facility]









          Name      Value     Range     Interpretation Code Description Data Renita

rce(s) Supporting 

Document(s)

 

           TSH        1.40 mcIU/mL 0.36-3.74                        [place] Malissa yeung - [facility]                            









                    ID                  Date                Data Source

 

                    [id number]          10/07/2020 03:55:41 PM EDT [place] Janice camargo - [facility]

 

                                        ALFRED WILLIAMSON BPATIENT IDENTIFICATION:[facility]  ALFRED WILLIAMSON BMedical Record Number:   [id number] Of Birth:  1970CHIEF 
COMPLAINT:BP has been running highHISTORY OF PRESENT ILLNESS:ALFRED WILLIAMSON  is a
 49 Years  old Male who presents to the clinic thisafternoon for concernsabout 
elevated blood pressure. States that "every time I get up my bloodpressure 
shoots up [id number]." Reports that he feels light-headed when changing positions 
tooquickly. He feels like thisall has to do with his adrenal gland. He states 
that [place]," 
as he feels like all of this is connected.Also complaining ofbilateral tingling 
and color changes to toenails. Is requesting referral topodiatrist.REVIEW OF 
SYSTEMS:Complete Review of Systems was performed and is negative unless 
statedotherwise in the HPI.PROBLEM LIST/PAST MEDICAL HISTORY:OngoingAsthmaAtopic
 dermatitisChest painDementiaDepression with anxietyDyspneaEchocardiogram 
abnormalGERD (gastroesophageal reflux disease)HyperlipemiaHypertensionIBS 
(irritable bowel syndrome)InsomniaLumbar herniated discLump in neckMemory 
lossNumerous molesOnychomycosis of toenailOsteoarthritisParkinsonismParkinsons 
diseasePolycythemiaPoor sleep patternRLS (restless legs syndrome)SarcoidSolar 
lentigoTourette syndromeTremorUnstable gaitUTI (urinary tract infection) with 
pyuriaVitamin D deficiencyHistoricalAbdominal pain in maleAcute 
gastroenteritisAcute URIAtypical chest painDiarrheaDizziness - light-
headedExposure to potentially hazardous body fluidsFoot painGastroenteritis and 
colitis, viralHA (headache)HTN (hypertension)Lower leg edemaNausea and 
vomitingPROCEDURE/SURGICAL HISTORY:Procedure on wrist (Procedure on wrist)Biopsy
 of LiverCurettage of Calcaneus Bone CystArthroscopy Shoulder LeftBiopsy Lung 
PercutaneousCyst removed from lip as childSOCIAL HISTORY:AlcoholAlcohol Use 
Current. 1-2 times per month,    2019Alcohol Use Current.,   
2018Substance UseDrug Use Current. Marijuana, Daily,   
2018Tobacco/Nicotine4 or less cigarettes(less than 1/4 pack)/day in last 
30 days Use:.,2019FAMILY HISTORY:Anxiety:  Patient.Brain tumor:  
Mother.Degenerative disc disease:   Father.Gout:  Father.Heart attack:  Mother 
and Father.Hyperlipidemia.:  Patient.Hypertension:  Patient.Sarcoidosis:  
Patient.ALLERGIES:NKAHome Medications:Home Medications (28) ActiveMisc 
Medication (blood pressure cuff) See Instructions, Taking as prescribed;dose 
verified,(Special Instructions: take blood pressure daily) Misc Medication 
(cane) See Instructions, Taking as prescribed; dose 
verified,(SpecialInstructions: to use with ambulation) Misc Medication (held 
shower head) See Instructions, Taking as prescribed;dose verified,(Special 
Instructions: to use when showering) Misc Medication (shower bench) See 
Instructions, Taking as prescribed; doseverified, (SpecialInstructions: to use 
with showering) Misc Rx Supply (Please provide a CPAP compliance in 2 weeks) 
SeeInstructions, Taking asprescribed; dose verified, (Special Instructions: PHC)
 Misc Rx Supply (right wrist brace/splint) See Instructions, Taking 
asprescribed; dose verified,(Special Instructions: m25.531) Misc Rx Supply 
(Adjustable shower head) See Instructions, Taking asprescribed; dose 
verified,(Special Instructions: Use as Directed Dx: G20) Misc Rx Supply (Shower 
bench/chair) See Instructions, Taking as prescribed;dose verified,(Special 
Instructions: Use as Directed) Misc Rx Supply (Rollator/walker with brakes) See 
Instructions, Taking asprescribed; doseverified, (Special Instructions: Use as 
Directed) Misc Rx Supply (Hand held/adjustable  shower head) See Instructions, 
Takingas prescribed; doseverified, (Special Instructions: Use as Directed) 
busPIRone (busPIRone 30 mg oral tablet) 30 mg = 1 tab(s), PO (oral), bid,Taking 
as prescribed;dose verifiedcarbidopa-levodopa (Sinemet 25 mg-100 mg oral tablet)
 1 tab(s), PO (oral),qid, Taking asprescribed; dose verifiedcarbidopa-levodopa 
(carbidopa-levodopa 25 mg-100 mg oral tablet, extendedrelease) 1 tab(s), 
PO(oral), at bedtime, Taking as prescribed; dose verifiedcholecalciferol 
(Vitamin D3 1000 intl units (25 mcg) oral capsule) 1,000IUnits = 1 cap(s), 
PO(oral), qDay, Taking as prescribed; dose verifiedgabapentin (gabapentin 300 mg
 oral capsule) 300 mg = 1 cap(s), PO (oral),qAM, Taking asprescribed; dose 
verifiedgabapentin (gabapentin 300 mg oral capsule) 900 mg = 3 cap(s), PO 
(oral), atbedtime, Taking asprescribed; dose verifiedhydrOXYzine (hydrOXYzine 
hydrochloride 25 mg oral tablet) 25 mg = 1 tab(s),PRN, PO (oral), tid,Taking as 
prescribed; dose verifiedhydroCHLOROthiazide (hydroCHLOROthiazide 12.5 mg oral 
tablet) 12.5 mg = 1tab(s), PO (oral),qDay, Taking as prescribed; dose verified, 
(Special Instructions:  patient-needsmedicaid provider) levo
thyroxine (levothyroxine 25 mcg (0.025 mg) oral tablet) 25 mcg = 1tab(s), PO 
(oral),qOTHERday, Taking as prescribed; dose verifiedmelatonin (Melatonin 2.5 mg
 oral capsule) 2.5 mg = 1 cap(s), PO (oral), atbedtime, Taking asprescribed; 
dose verified     (PRN MED)metoprolol (Metoprolol Tartrate 50 mg oral tablet) 75
 mg = 1.5 tab(s), PO(oral), bid, Taking asprescribed; dose verifiedmometasone 
nasal (Nasonex 50 mcg/inh nasal spray) 2 spray(s), Nasal, qDay,Taking as 
prescribed;dose verified, (Special Instructions: INHALE 2 SPRAYS NASALLY EVERY 
DAY) omega-3 polyunsaturated fatty acids (Omega-3 oral capsule) 1 cap(s), 
PO(oral), qDay, Taking asprescribed; dose verifiedomeprazole (omeprazole 20 mg 
oral delayed release capsule) See Instructions,(SpecialInstructions: take 1 
capsule by mouth once daily) rosuvastatin (Crestor 5 mg oral tablet) 5 mg = 1 
tab(s), PO (oral), qDaytamsulosin (tamsulosin 0.4 mg oral capsule) 0.8 mg = 2 
cap(s), PO (oral),qDay, Taking asprescribed; dose verifiedvalsartan (valsartan 
320 mg oral tablet) 320 mg = 1 tab(s), PO (oral), qDay,Taking asprescribed; dose
 verifiedvenlafaxine (venlafaxine 150 mg oral capsule, extended release) 150 mg 
= 1cap(s), PO (oral),qDay, Taking as prescribed; dose verified, (Special 
Instructions: TAKE 1CAPSULE BY MOUTH EVERYDAY) PHYSICAL EXAM:VITALS:HR:  67  RR:
 16  BP: 136 /84  SpO2: 97% HT:  180 cm  WT: 109.9 kg   BMI: 33.9 General: The 
patient is well developed, obese, in no acute distress. Skin warmand dry.  
Patientis sitting comfortably.Lungs: Clear bilaterally with no rales, rhonchi, 
or wheezing.Heart: Regular rate and rhythm without murmur, rub, or 
gallop.Extremities: Minimal clubbing of hallux bilateral.  Onychomycosis noted 
onthe fifth digits ofboth feet.  2+ dorsalis pedis bilateral.  Sensation intact 
bilateral.Neurological: Alert and Oriented X4. No focal deficits noted. CNII - 
XIIgrossly intactbilateral. 2+ Deep Tendon Reflexes of patella 
bilateral.Psychiatric: Mood and affect appropriate.LAB 
RESULTS:***HEMATOLOGY***Basophils, absolute: 0.1 K/mcL (20)Basophils, 
auto: 1 % (20)Eosinophils, absolute:   0.4 K/mcL High 
(20)Eosinophils, auto: 2.9 % (20)Hct: 49.3 % (20)Hgb: 15.8 
gm/dL (20)Immature Gran: 0.3 % (20)Immature Gran Absolute: 0.04 
K/mcL (20)Lymphocytes, absolute: 3.2 K/mcL (20)Lymphocytes, auto: 
26.5 % (20)MCH: 26.5 pg (20)MCHC: 32 gm/dL (20)MCV: 82.6 fL (0
20)Monocytes, absolute: 0.8 K/mcL (20)Monocytes, auto: 6.8 % 
(20)MPV: 11.5 fL (20)Neutrophils, absolute: 7.5 K/mcL 
(20)Neutrophils, auto: 62.5 % (20)Platelet: 235 K/mcL (20)RBC:
 5.97 Million/mcL (20)RDW: 14 % (20)WBC:  12 K/uL High 
(20)***CHEMISTRY***AGAP: 9 mEq/L (20)Albumin Level: 4.3 gm/dL 
(20)Alk Phos: 92 unit/L (20)ALT: 27 unit/L (20)AST:  38 unit/L
 High (20)Bilirubin Total.: 0.6 mg/dL (20)BUN: 14 mg/dL 
(20)Calcium: 9.2 mg/dL (20)Chloride: 105 mmol/L (20)CO2: 24 
mmol/L (20)Creatinine: 0.83 mg/dL (20)GFR-AA: >60 (20)GFR-RAHUL:
 >60 (20)Globulin: 3.1 gm/dL (20)Glucose Level.: 95 mg/dL 
(20)Potassium Level: 3.8 mmol/L (20)Sodium Level: 138 mmol/L 
(20)Total Protein: 7.4 gm/dL (20)Troponin-I: 0.02 ng/mL 
(20)***COAGULATION***APTT: 28 second(s) (20)INR: 1 (20)PT: 
10.9 second(s) (20)***URINALYSIS***UA Bilirubin: Negative (20)UA 
Blood w/UC: Negative (20)UA Clarity: Clear (20)UA Color: Yellow 
(20)UA Glucose: Normal (20)UA Ketones: Negative (20)UA Leuk 
w/uc: Negative (20)UA Nitrite w/UC: Negative (20)UA pH: 7 
(20)UA Protein w/UC: Negative (20)UA Specific Gravity: 1.016 
(20)UA Urobilinogen: Normal (20)***TOXICOLOGY***Alcohol percent: 
<0.010 (20)Amphetamines Screen, Urine: Negative (20)Barbiturates 
Sceen, Urine: Negative (20)Benzodiazapines Screen, Urine: Negative 
(20)Buprenorphine, Urine: Negative (20)Cannabinoid Screen Urine.: 
Presumptive Positive Abnormal (20)Cannabinoids Confimation, Urine: >500 
(20)Cocaine Screen, Urine: Negative (20)Ecstasy, Urine: NEG 
(20)Methadone Screen, Urine: Negative (20)Methamphetamine Screen, 
Urine: Negative (20)Opiates Screen, Urine: Negative (20)Oxycodone, 
Urine.: Negative (20)Phencyclidine Screen, Urine: Negative 
(20)Tricyclic Antidepressants Screen, Urine: Negative (20)PATHOLOGY 
RESULTS:Pathology Results No qualifying data available.DIAGNOSTIC RESULTS:No 
qualifying data available.ASSESSMENT AND PLAN:1. Hypertension- Patient is 
insistent on something being wrong with his blood pressure, thathe thinks it has
 todo with his adrenal gland- Normotensive in the office- States that his 
uncontrolled blood pressure changes might have to dosomething withhis 
parkinsonism, that he is in the process of being evaluated in [place]- Patient
 was monitored his blood pressure multiple times a, even immediatelyafter 
positionchanges- Instructed to only check his blood pressure once a around the 
same timeand given the similarcircumstances- He has been followed by nephrology 
in the past, and states that he willsee if he can be seenby them again- 
Instructed to present to the ED if he has any severe headachesor auditory or 
visual changes2. Onychomycosis of toenail- Fifth toe bilateral- Will refer to 
podiatry to address this and his other complaints as notedabove  No qualifying 
data available.Medications Affected this Encounter:No Prescription Activity This
 EncounterAdded Patient Education Aerobic Exercise for a Healthy 
HeartControlling High Blood Pressure Follow-Up Appointments With:   Sergio Strange MD, PGY-2Address:   [phone] [place], [address];[phone]:   For routine medical care This document was authenticated by 
Gricel Thompson DO, PGY-2 LANDEN NIEVES on10/2020 03:55 PMItem was sent for review to 
Grace Cortez DO 









          Name      Value     Range     Interpretation Code Description Data Renita

rce(s) Supporting 

Document(s)

 

                                                                       







                                        Procedure

 

                                          



                                                                                
                                                                                
                                                                                
                                                                                
                                                                                
                                                                                
                                                                                
                                                                                
                                                                                
                                                                                
                                                          



Social History

          



           Code       Duration   Value      Status     Description Data Source(s

)

 

           Smoking    2021 12:00:00 AM EDT Current Smoker completed  Curre

nt Smoker eCW1 

([facility])

 

           Smoking    2021 12:00:00 AM EDT Current Smoker completed  Curre

nt Smoker eCW1 

([facility])

 

           Smoking    2021 12:00:00 AM EDT Current Smoker completed  Curre

nt Smoker eCW1 

([facility])

 

           Smoking    2021 12:00:00 AM EDT Current Smoker completed  Curre

nt Smoker eCW1 

([facility])

 

           Smoking    2021 12:00:00 AM EDT Current Smoker completed  Curre

nt Smoker eCW1 

([facility])

 

                          2021 11:30:27 AM EDT Current some day smoker com

pleted    Current some day 

smoker                                  [facility]

 

             Smoking      2021 11:30:00 AM EDT Current some day smoker com

pleted    Current 

some day smoker                         [facility]

 

                          08/10/2021 11:46:00 PM EDT Current every day smoker co

mpleted    Current every 

day smoker                              [facility]

 

             Smoking      08/10/2021 11:46:00 PM EDT Current every day smoker co

mpleted    Current 

every day smoker                        [facility]

 

           Smoking    2021 12:00:00 AM EDT Current Smoker completed  Curre

nt Smoker eCW1 

([facility])

 

           Smoking    2021 12:00:00 AM EDT Current Smoker completed  Curre

nt Smoker eCW1 

([facility])

 

           Smoking    2021 12:00:00 AM EDT Current Smoker completed  Curre

nt Smoker eCW1 

([facility])

 

           Smoking    2021 12:00:00 AM EDT Current Smoker completed  Curre

nt Smoker eCW1 

([facility])

 

           Smoking    2021 12:00:00 AM EDT Current Smoker completed  Curre

nt Smoker eCW1 

([facility])

 

           Smoking    2021 12:00:00 AM EDT Current Smoker completed  Curre

nt Smoker eCW1 

([facility])

 

           Smoking    2021 12:00:00 AM EDT Current Smoker completed  Curre

nt Smoker eCW1 

([facility])

 

           Smoking    2021 12:00:00 AM EDT Current Smoker completed  Curre

nt Smoker eCW1 

([facility])

 

           Smoking    2021 12:00:00 AM EDT Current Smoker completed  Curre

nt Smoker eCW1 

([facility])

 

           Smoking    2021 12:00:00 AM EDT Current Smoker completed  Curre

nt Smoker eCW1 

([facility])

 

           Smoking    2021 12:00:00 AM EDT Current Smoker completed  Curre

nt Smoker eCW1 

([facility])

 

           Smoking    2021 12:00:00 AM EDT Current Smoker completed  Curre

nt Smoker eCW1 

([facility])

 

           Smoking    2021 12:00:00 AM EDT Current Smoker completed  Curre

nt Smoker eCW1 

([facility])

 

           Smoking    2021 12:00:00 AM EDT Current Smoker completed  Curre

nt Smoker eCW1 

([facility])

 

           Smoking    2021 12:00:00 AM EDT Current Smoker completed  Curre

nt Smoker eCW1 

([facility])

 

           Smoking    2021 12:00:00 AM EDT Current Smoker completed  Curre

nt Smoker eCW1 

([facility])

 

           Smoking    2021 12:00:00 AM EDT Current Smoker completed  Curre

nt Smoker eCW1 

([facility])

 

           Smoking    2021 12:00:00 AM EDT Current Smoker completed  Curre

nt Smoker eCW1 

([facility])

 

           Smoking    2021 12:00:00 AM EDT Current Smoker completed  Curre

nt Smoker eCW1 

([facility])

 

           Smoking    2021 12:00:00 AM EDT Current Smoker completed  Curre

nt Smoker eCW1 

([facility])

 

           Smoking    2021 12:00:00 AM EDT Current Smoker completed  Curre

nt Smoker eCW1 

([facility])

 

           Smoking    2021 12:00:00 AM EDT Current Smoker completed  Curre

nt Smoker eCW1 

([facility])

 

           Smoking    2021 12:00:00 AM EDT Current Smoker completed  Curre

nt Smoker eCW1 

([facility])

 

           Smoking    2021 12:00:00 AM EDT Current Smoker completed  Curre

nt Smoker eCW1 

([facility])

 

           Smoking    2021 12:00:00 AM EDT Current Smoker completed  Curre

nt Smoker eCW1 

([facility])

 

           Smoking    2021 12:00:00 AM EDT Current Smoker completed  Curre

nt Smoker eCW1 

([facility])

 

           Smoking    2021 12:00:00 AM EDT Current Smoker completed  Curre

nt Smoker eCW1 

([facility])

 

           Smoking    2021 12:00:00 AM EST Current Smoker completed  Curre

nt Smoker eCW1 

([facility])

 

           Smoking    2021 12:00:00 AM EST Current Smoker completed  Curre

nt Smoker eCW1 

([facility])

 

                Tobacco smoking status NHIS 2020 12:00:00 AM EST Current e

very day smoker 

                                                    [facility]

 

           Tobacco use and exposure 2020 12:00:00 AM EST Never used       

                [facility]

 [facility]

 

           Alcohol intake 2020 12:00:00 AM EST Ex-drinker (finding)       

                [facility]

 [facility]

 

           Alcohol intake 2020 12:00:00 AM EST Ex-drinker (finding)       

                [facility]

 Services



                                                                                
                                                                                
                                                                                
                                                                                
                                                                                
                                                                                
                            



Vital Signs

          



                    ID                  Date                Data Source

 

                    UNK                                      









           Name       Value      Range      Interpretation Code Description Data

 Source(s)

 

           Body weight 254.0 [lb_av]                       254.0 [lb_av] eCW1 ([facility])

 

           Body height 69 [in_i]                        69 [in_i]  eCW1 (On license of [facility])

 

           Body mass index (BMI) [Ratio] 37.51 kg/m2                       37.51

 kg/m2 eCW1 ([facility])

 

           Heart rate 81 /min                          81 /min    eCW1 ([facility])

 

           Respiratory rate 18 /min                          18 /min    eCW1 ([facility])

 

           Body temperature 97.7 [degF]                       97.7 [degF] eCW1 (

[facility])

 

           Systolic blood pressure 144 mm[Hg]                       144 mm[Hg] e

CW1 ([facility])

 

           Diastolic blood pressure 96 mm[Hg]                        96 mm[Hg]  

eCW1 ([facility])

 

           Body weight 254.0 [lb_av]                       254.0 [lb_av] eCW1 ([facility])

 

           Body height 69 [in_i]                        69 [in_i]  eCW1 (On license of [facility])

 

           Body mass index (BMI) [Ratio] 37.51 kg/m2                       37.51

 kg/m2 eCW1 ([facility])

 

           Heart rate 81 /min                          81 /min    eCW1 ([facility])

 

           Respiratory rate 18 /min                          18 /min    eCW1 ([facility])

 

           Body temperature 97.7 [degF]                       97.7 [degF] eCW1 (

[facility])

 

           Systolic blood pressure 144 mm[Hg]                       144 mm[Hg] e

CW1 ([facility])

 

           Diastolic blood pressure 96 mm[Hg]                        96 mm[Hg]  

eCW1 ([facility])

 

           Body weight 258 [lb_av]                       258 [lb_av] eCW1 ([facility])

 

           Body height                                   [in_i]    eCW1 (On license of [facility])

 

           Body mass index (BMI) [Ratio] 50.38 kg/m2                       50.38

 kg/m2 eCW1 ([facility])

 

           Systolic blood pressure 146 mm[Hg]                       146 mm[Hg] e

CW1 ([facility])

 

           Diastolic blood pressure 96 mm[Hg]                        96 mm[Hg]  

eCW1 ([facility])

 

           Systolic blood pressure 134 mm[Hg]                       134 mm[Hg] NEHA BIRD ([facility], )

 

           Diastolic blood pressure 92 mm[Hg]                        92 mm[facility]Hg[facility]  

[facility]

 

           Body height 69 [in_i]                        69 [in_i]  [facility] ([facility])

 

                                        5'9" 

 

           Body weight 254.00 [lb_av]                       254.00 [lb_av] MEDEN

T ([facility])

 

           Body mass index (BMI) [Ratio] 37.5 kg/m2                       37.5 k

g/m2 [facility] ([facility])

 

           Ideal body weight 160 [lb_av]                       160 [lb_av] MEDEN

T ([facility])

 

           Body weight 115.214 kg                       115.214 kg [facility] ([facility])

 

           Body surface area Derived from formula 2.29 m2                       

   2.29 m2    [facility] ([facility])

 

           Diastolic blood pressure 98 mm[Hg]                        98 mm[Hg]  

eCW1 ([facility])

 

           Body weight 250.1 [lb_av]                       250.1 [lb_av] eCW1 ([facility])

 

           Body height                                   [in_i]    eCW1 (On license of [facility])

 

           Body mass index (BMI) [Ratio] 48.84 kg/m2                       48.84

 kg/m2 eCW1 ([facility])

 

           Heart rate 85 /min                          85 /min    eCW1 ([facility])

 

           Respiratory rate 18 /min                          18 /min    W1 ([facility])

 

           Body temperature 97.6 [degF]                       97.6 [degF] eCW1 (

[facility])

 

           Systolic blood pressure 142 mm[Hg]                       142 mm[Hg] e

CW1 ([facility])

 

           Body height 70.9 [in_i]                       70.9 [in_i] [place]

[facility] [facility] [place]

 

                                        Result Comment: Result placed secondary 

from cm, converted to Inches 

 

           Body weight Measured 237 lb 0 oz                       237 lb 0 oz As

[facility] [place] [facility]

 

                                        Result Comment: Result placed secondary 

from kg, converted to lbs 

 

             Systolic blood pressure 138 mm[Hg]                Normal (applies t

o non-numeric results) 138

 mm[Hg]                                 [place] [facility]

 

           Diastolic blood pressure 98 mm[Hg]             Above high normal 98 m

m[Hg]  [place] 

[place] [facility]

 

           Heart rate 68 /min               Normal (applies to non-numeric resul

ts) 68 /min    [place] 

[place] [facility]

 

           Respiratory rate 16 /min               Normal (applies to non-numeric

 results) 16 /min    

[place] [facility]

 

           Body height 70.9 [in_i]                       70.9 [in_i] [place]

[place] [facility]

 

                                        1Result Comment: Result placed secondary

 from cm, converted to Inches 

 

           Body weight Measured 241 lb 14 oz                       241 lb 14 oz 

[place] [facility]

 

                                        2Result Comment: Result placed secondary

 from kg, converted to lbs 

 

           Systolic blood pressure                 Above high normal  mm[H

g]    [place] [facility] 

[facility]

 

           Body height 70.9 [in_i]                       70.9 [in_i] [place]

[facility] [facility]

 

                                        1Result Comment: Result placed secondary

 from cm, converted to Inches 

 

           Body weight Measured 242 lb 5 oz                       242 lb 5 oz As

[facility] [place] [facility]

 

                                        2Result Comment: Result placed secondary

 from kg, converted to lbs 

 

             Systolic blood pressure                     Normal (applies t

o non-numeric results)  mm[Hg]

                                        [place] [facility]

 

           Heart rate 67 /min         Normal (applies to non-numeric resul

ts) 67 /min    

[place] [facility]

 

           Respiratory rate 16 /min    14-20      Normal (applies to non-numeric

 results) 16 /min    

[place] [facility]

 

                Deprecated Oxygen saturation in Capillary blood by Oximetry 97 %

                      Normal 

(applies to non-numeric results) 97 %                      [place] [facility]

[facility]









                    ID                  Date                Data Source

 

                    [id number]          2021 12:50:50 PM EST [facility]









           Name       Value      Range      Interpretation Code Description Data

 Source(s)

 

           PREFERRED NAME Alfred Simon      Health system









                    ID                  Date                Data Source

 

                    [id number]           2021 09:24:56 AM EDT [facility]









           Name       Value      Range      Interpretation Code Description Data

 Source(s)

 

           WEIGHT     240 lb                           240 lb     [facility]

 

           HEIGHT     69 in                            69 in      [facility]



                                                                                
                                      



Patient Treatment Plan of Care

          



             Planned Activity Planned Date Details      Description  Data Source

(s)

 

                          24 HR venlafaxine 150 MG Extended Release Oral Capsule

 10/28/2021 12:00:00 AM 

EDT                                                         eCW1 ([facility])

 

                          24 HR venlafaxine 150 MG Extended Release Oral Capsule

 10/28/2021 12:00:00 AM 

EDT                                                         eCW1 ([facility])

 

             Albuterol Sulfate  (90 Base) MCG/ACT 10/26/2021 12:00:00 AM 

EDT                           eCW1 

([facility])

 

             Albuterol Sulfate  (90 Base) MCG/ACT 10/26/2021 12:00:00 AM 

EDT                           eCW1 

([facility])

 

             Albuterol Sulfate  (90 Base) MCG/ACT 10/26/2021 12:00:00 AM 

EDT                           eCW1 

([facility])

 

             Albuterol Sulfate  (90 Base) MCG/ACT 10/26/2021 12:00:00 AM 

EDT                           eCW1 

([facility])

 

             Albuterol 0.83 MG/ML Inhalant Solution 2021 12:00:00 AM EDT  

                         eCW1 

([facility])

 

             Albuterol 0.83 MG/ML Inhalant Solution 2021 12:00:00 AM EDT  

                         eCW1 

([facility])

 

             Albuterol 0.83 MG/ML Inhalant Solution 2021 12:00:00 AM EDT  

                         eCW1 

([facility])

 

             Albuterol 0.83 MG/ML Inhalant Solution 2021 12:00:00 AM EDT  

                         eCW1 

([facility])

 

             Albuterol 0.83 MG/ML Inhalant Solution 2021 12:00:00 AM EDT  

                         eCW1 

([facility])

 

             Rosuvastatin calcium 40 MG Oral Tablet 2021 12:00:00 AM EDT  

                         eCW1 

([facility])

 

             Aspirin 81 MG Chewable Tablet 2021 12:00:00 AM EDT           

                eCW1 ([facility])

 

             coenzyme Q10 50 MG Oral Capsule 2021 12:00:00 AM EDT         

                  eCW1 ([facility])

 

             Rosuvastatin calcium 40 MG Oral Tablet 2021 12:00:00 AM EDT  

                         eCW1 

([facility])

 

             Aspirin 81 MG Chewable Tablet 2021 12:00:00 AM EDT           

                eCW1 ([facility])

 

             coenzyme Q10 50 MG Oral Capsule 2021 12:00:00 AM EDT         

                  eCW1 ([facility])

 

             Rosuvastatin calcium 40 MG Oral Tablet 2021 12:00:00 AM EDT  

                         eCW1 

([facility])

 

             Aspirin 81 MG Chewable Tablet 2021 12:00:00 AM EDT           

                eCW1 ([facility])

 

             coenzyme Q10 50 MG Oral Capsule 2021 12:00:00 AM EDT         

                  eCW1 ([facility])

 

             Rosuvastatin calcium 40 MG Oral Tablet 2021 12:00:00 AM EDT  

                         eCW1 

([facility])

 

             Aspirin 81 MG Chewable Tablet 2021 12:00:00 AM EDT           

                eCW1 ([facility])

 

             coenzyme Q10 50 MG Oral Capsule 2021 12:00:00 AM EDT         

                  eCW1 ([facility])

 

             Rosuvastatin calcium 40 MG Oral Tablet 2021 12:00:00 AM EDT  

                         eCW1 

([facility])

 

             Aspirin 81 MG Chewable Tablet 2021 12:00:00 AM EDT           

                eCW1 ([facility])

 

             coenzyme Q10 50 MG Oral Capsule 2021 12:00:00 AM EDT         

                  eCW1 ([facility])

 

             Rosuvastatin calcium 40 MG Oral Tablet 2021 12:00:00 AM EDT  

                         eCW1 

([facility])

 

             Aspirin 81 MG Chewable Tablet 2021 12:00:00 AM EDT           

                eCW1 ([facility])

 

             coenzyme Q10 50 MG Oral Capsule 2021 12:00:00 AM EDT         

                  eCW1 ([facility])

 

             Rosuvastatin calcium 40 MG Oral Tablet 2021 12:00:00 AM EDT  

                         eCW1 

([facility])

 

             Aspirin 81 MG Chewable Tablet 2021 12:00:00 AM EDT           

                eCW1 ([facility])

 

             coenzyme Q10 50 MG Oral Capsule 2021 12:00:00 AM EDT         

                  eCW1 ([facility])

 

             Rosuvastatin calcium 40 MG Oral Tablet 2021 12:00:00 AM EDT  

                         eCW1 

([facility])

 

             Aspirin 81 MG Chewable Tablet 2021 12:00:00 AM EDT           

                eCW1 ([facility])

 

             coenzyme Q10 50 MG Oral Capsule 2021 12:00:00 AM EDT         

                  eCW1 ([facility])

 

             Rosuvastatin calcium 40 MG Oral Tablet 2021 12:00:00 AM EDT  

                         eCW1 

([facility])

 

             Aspirin 81 MG Chewable Tablet 2021 12:00:00 AM EDT           

                [facility] [facility]

 

             coenzyme Q10 50 MG Oral Capsule 2021 12:00:00 AM EDT         

                  [email or web address] ([facility])

 

             Nasonex 50 MCG/ACT 05/10/2021 12:00:00 AM EDT                      

     [email or web address] ([facility])

 

             Nasonex 50 MCG/ACT 05/10/2021 12:00:00 AM EDT                      

     eCW1 ([facility])

 

             Nasonex 50 MCG/ACT 05/10/2021 12:00:00 AM EDT                      

     eCW1 ([facility])

 

             Nasonex 50 MCG/ACT 05/10/2021 12:00:00 AM EDT                      

     eCW1 ([facility])

 

             Nasonex 50 MCG/ACT 05/10/2021 12:00:00 AM EDT                      

     eCW1 ([facility])

 

             Flonase Allergy Relief 50 MCG/ACT 2021 12:00:00 AM EDT       

                    eCW1 ([facility])

 

             Flonase Allergy Relief 50 MCG/ACT 2021 12:00:00 AM EDT       

                    eCW1 ([facility])

 

             Flonase Allergy Relief 50 MCG/ACT 2021 12:00:00 AM EDT       

                    eCW1 ([facility])

 

             Flonase Allergy Relief 50 MCG/ACT 2021 12:00:00 AM EDT       

                    eCW1 ([facility])

 

             Flonase Allergy Relief 50 MCG/ACT 2021 12:00:00 AM EDT       

                    eCW1 ([facility])

 

             Flonase Allergy Relief 50 MCG/ACT 2021 12:00:00 AM EDT       

                    eCW1 ([facility])

 

             Flonase Allergy Relief 50 MCG/ACT 2021 12:00:00 AM EDT       

                    eCW1 ([facility])

 

                                        120 ACTUAT Budesonide 0.08 MG/ACTUAT / f

ormoterol fumarate 0.0045 MG/ACTUAT 

Metered Dose Inhaler [Symbicort] 2021 12:00:00 AM EDT                     

            eCW1 ([facility])

 

                                        120 ACTUAT Budesonide 0.08 MG/ACTUAT / f

ormoterol fumarate 0.0045 MG/ACTUAT 

Metered Dose Inhaler [Symbicort] 2021 12:00:00 AM EDT                     

            eCW1 ([facility])

 

                                        120 ACTUAT Budesonide 0.08 MG/ACTUAT / f

ormoterol fumarate 0.0045 MG/ACTUAT 

Metered Dose Inhaler [Symbicort] 2021 12:00:00 AM EDT                     

            eCW1 ([facility])

 

                                        120 ACTUAT Budesonide 0.08 MG/ACTUAT / f

ormoterol fumarate 0.0045 MG/ACTUAT 

Metered Dose Inhaler [Symbicort] 2021 12:00:00 AM EDT                     

            eCW1 ([facility])

 

                                        120 ACTUAT Budesonide 0.08 MG/ACTUAT / f

ormoterol fumarate 0.0045 MG/ACTUAT 

Metered Dose Inhaler [Symbicort] 2021 12:00:00 AM EDT                     

            eCW1 ([facility])

 

                                        120 ACTUAT Budesonide 0.08 MG/ACTUAT / f

ormoterol fumarate 0.0045 MG/ACTUAT 

Metered Dose Inhaler [Symbicort] 2021 12:00:00 AM EDT                     

            eCW1 ([facility])

 

                                        120 ACTUAT Budesonide 0.08 MG/ACTUAT / f

ormoterol fumarate 0.0045 MG/ACTUAT 

Metered Dose Inhaler [Symbicort] 2021 12:00:00 AM EDT                     

            eCW1 ([facility])

 

                    Triamcinolone Acetonide 0.001 MG/MG Topical Ointment 

021 12:00:00 AM EST 

                                                    eCW1 ([facility])

 

             Ivermectin 3 MG Oral Tablet 2021 12:00:00 AM EST             

              eCW1 ([facility])

 

             Doxycycline Monohydrate 100 MG Oral Capsule 2021 12:00:00 AM 

EST                           eCW1 

([facility])

 

             metoprolol tartrate 25 mg oral tablet 2021 02:26:00 PM EST   

                        [place] 

[facility]

 

                venlafaxine 150 mg oral capsule, extended release 2021 02:

26:00 PM EST                  

                                        [place] [facility]

 

             Metoprolol Tartrate 50 mg oral tablet 2021 12:55:00 PM EST   

                        [place] 

[facility]

 

             Nasonex 50 mcg/inh nasal spray 2021 12:55:00 PM EST          

                 [place] [facility]

 

             Amlodipine 5 MG Oral Tablet 2020 02:05:00 PM EST             

              [place] [facility] 

[facility]

 

             Nasonex 50 mcg/inh nasal spray 2020 09:55:00 AM EST          

                 [place] [facility] 

[facility]

 

             Metoprolol Tartrate 50 mg oral tablet 2020 12:35:00 PM EST   

                        [place] 

[facility]

 

             metoprolol tartrate 25 mg oral tablet 2020 12:34:00 PM EST   

                        [place] 

[facility]

 

             valsartan 320 MG Oral Tablet 2020 03:13:00 PM EST            

               [place] [facility] 

[facility]

 

             Vitamin B Complex oral capsule 2020 11:50:00 AM EST          

                 [place] [facility] 

[facility]

 

             Ginkgo Biloba 2020 11:49:00 AM EST                           

[place] [facility]

 

             Probiotic Formula oral capsule 2020 11:49:00 AM EST          

                 [place] [facility] 

[facility]

 

             metoprolol tartrate 25 mg oral tablet 2020 11:47:00 AM EST   

                        [place] 

[facility]

 

             Levothyroxine Sodium 0.025 MG Oral Tablet 10/20/2020 09:15:00 PM ED

T                           

[place] [facility]

 

             Hydrochlorothiazide 12.5 MG Oral Tablet 10/16/2020 11:34:00 AM EDT 

                          [place]

 [facility]

 

             Hydroxyzine Hydrochloride 25 MG Oral Tablet 10/15/2020 05:26:00 PM 

EDT                           

[place] [facility]

 

             Carbidopa 25 MG / Levodopa 100 MG Oral Tablet 2020 12:00:00 A

M T                           

[facility]

 

             Nasonex 50 mcg/inh nasal spray 2020 03:50:00 PM EDT          

                 [place] [facility] 

[facility]

 

             Carbidopa 25 MG / Levodopa 100 MG Oral Tablet 03/10/2020 12:00:00 A

[facility]

## 2021-11-22 NOTE — CCD
Summarization of Episode Note

                             Created on: 10/28/2021



MOHIT WILLIAMSONN WILMER

External Reference #: [id number]

: 1970

Sex: Male



Demographics





                          Address                   [address]

[place]  [address]

 

                          Home Phone                [phone]

 

                          Preferred Language        Unknown

 

                          Marital Status            Unknown

 

                          Nondenominational Affiliation     Unknown

 

                          Race                      White

 

                          Ethnic Group              Not  or 





Author





                          Author                    [facility]

[facility]

 

                          Organization              [facility]

ems

 

                          Address                   Unknown

 

                          Phone                     Unavailable







Support





                Name            Relationship    Address         Phone

 

                    LIANE WILLIAMSON         GUAR                [address]

 

[place]  [address]                    [phone]

 

                FABIAN BACON ECON            Unknown         [phone]







Care Team Providers





                    Care Team Member Name Role                Phone

 

                    Zachary Snow        Unavailable         [phone]







PROBLEMS





          Type      Condition ICD9-CM Code IDO82-GW Code Onset Dates Condition S

tatus W/U 

Status              Risk                SNOMED Code         Notes

 

       Problem Parkinson's disease        G20           Active confirmed        

[id number]  

 

       Problem Delusions of parasitosis        F22           Active confirmed   

     [id number]  

 

        Problem Parkinsonism, unspecified Parkinsonism type         G20         

    Active  confirmed         

[id number]                                 

 

        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

  confirmed         [id number]

                                         

 

        Problem Mild persistent asthma without complication         J45.30      

    Active  confirmed 

                          [id number]                  

 

       Problem RENUKA (obstructive sleep apnea)        G47.33        Active confirm

ed        [id number]  

 

          Problem   POTS (postural orthostatic tachycardia syndrome)           I

49.8               Active    

confirmed                               [id number]            

 

             Problem      Essential hypertension with goal blood pressure less t

nielsen 130/80              I10           

             Active       confirmed                 [id number]      







ALLERGIES

No Known Allergies



ENCOUNTERS from 1970 to 2021-10-28





             Encounter    Location     Date         Provider     Diagnosis

 

                    [address] [phone] [place] [address] 27 Oct, 2021

                          Zachary Snow                 







IMMUNIZATIONS

No Information



SOCIAL HISTORY

Tobacco Use:



                    Social History Observation Description         Date

 

                    Details (start date - stop date) Current Smoker       



Sex Assigned At Birth:



                          Social History Observation Description

 

                          Sex Assigned At Birth     Unknown



Education:



                    Question            Answer              Notes

 

                    Level of Education: College              



Language:



                    Question            Answer              Notes

 

                    Languages spoken:   English              



Alcohol Screening:



                    Question            Answer              Notes

 

                    Did you have a drink containing alcohol in the past year? Ye

s                  

 

                    Points              1                    

 

                    Interpretation      Negative             

 

                          How often did you have six or more drinks on one occas

ion in the past year? 

Never (0 points)                         

 

                                        How many drinks did you have on a typica

l day when you were drinking in the past

year?                     1 or 2 (0 points)          

 

                          How often did you have a drink containing alcohol in t

he past year? Monthly or 

less (1 point)                           



Tobacco Use:



                    Question            Answer              Notes

 

                    Are you a:          current smoker       

 

                    How many cigarettes a day do you smoke? 5 or less           

 







REASON FOR REFERRAL

No Information



VITAL SIGNS

No information



MEDICATIONS





           Medication SIG (Take, Route, Frequency, Duration) Notes      Start Da

te End Date   

Status

 

                          Flomax 0.4 MG             1 capsule 30 minutes after t

he same meal each day Orally Once a 

day for 90 days                                                 Active

 

           Ginkgo Biloba 40 MG as directed Orally                               

   Active

 

                          Metoprolol Tartrate 25 MG TAKE 1 TABLET BY MOUTH DAILY

 TAKE WITH METOPROLOL 50MG

AT NIGHT Oral for 30                                                 Not-Taking

 

                          Omeprazole 20 MG          TAKE ONE CAPSULE BY MOUTH 30

 MINUTES PRIOR TO MORNING MEAL for 

90                                                              Active

 

                Nasonex 50 MCG/ACT 2 sprays in each nostril Nasally Once a day f

or 30 day(s)                 

10 May, 2021                                        Active

 

            434.8 (200 Fe) MG 1 capsule Orally Once a day for 30 day(s)  

                                Active

 

           Venlafaxine HCl 25 MG 1 tablet with food Orally Twice a day for 30 da

y(s)                                  

Active

 

           clonazePAM 0.5 MG 1 tablet at bedtime Orally twice a day             

                     Active

 

           Symbicort 80-4.5 MCG/ACT 2 puffs Inhalation Twice a day                        Active

 

                          Albuterol Sulfate (2.5 MG/3ML) 0.083% 3 ml as needed I

nhalation every 6 hrs for 

90 days                         22 Sep, 2021                    Active

 

           hydrOXYzine HCl 25 MG 1 tablet as needed Orally 3 times a day        

                          Active

 

           busPIRone HCl 30 MG 1 tablet Orally Twice a day for 90 days          

                        Active

 

           Doxycycline Monohydrate 100 MG 1 capsule Orally Once a day for 30 day

s                                  

Active

 

                Rosuvastatin Calcium 40 MG 1 tablet Orally Once a day for 90 day

s                 

                                                    Active

 

           Sinemet CR 25-100mg 1 tablet orally at night                         

         Not-Taking

 

           Gabapentin 400 MG 3 capsule Orally 2qhs & 1 am                       

           Active

 

           Probiotic Acidophilus - as directed Orally                           

       Active

 

           Valsartan 320 MG 1 tablet Orally Once a day for 90 day(s)            

                      Not-Taking

 

                          Levothyroxine Sodium 25 MCG 1 tablet in the morning on

 an empty stomach Orally 

Every other day                                                 Active

 

           Vitamin D3 250 MCG ([address] UT) as directed Orally Once a day for 30 da

ys                                  

Not-Taking

 

                          Metoprolol Tartrate 50 MG 1 tablet with food Orally 2 

tablets in am and 1 tablet

in PM for 90 days                                                 Active

 

           Sinemet  MG 1 tablet Orally Once a day for 30 day(s)           

                       Not-Taking

 

           Omega 3 1000 MG 2 capsule Orally Once a day                          

        Active

 

           Aspirin 81 MG 1 tablet Orally Once a day for 30 day(s)                        Active

 

                    Ibuprofen 200 MG    1 tablet with food or milk as needed Ora

lly Three times a day  

                                                            Not-Taking

 

           Coenzyme Q10 50 MG as directed Orally                    

    Active

 

                          Flonase Allergy Relief 50 MCG/ACT 1 spray in each nost

ril Nasally Once a day for

30 day(s)                       04 May, 2021                    Not-Taking

 

                          Albuterol Sulfate  (90 Base) MCG/ACT 1 puff as 

needed Inhalation every 4 

hrs for 90 days                 26 Oct, 2021                    Active







PROCEDURES

No Information



RESULTS

No Results



REASON FOR VISIT

REFILL Venlafaxine HCl 25 MG Tablet



MEDICAL (GENERAL) HISTORY





                    Type                Description         Date

 

                    Medical History     High blood pressure  

 

                    Medical History     Parkinsons disease undiagnosed  

 

                    Medical History     hypothyroidism       

 

                    Medical History     lymes disease        

 

                    Medical History     mild persistent asthma  

 

                          Medical History           sarcoidosis involving multip

le organs brain, lungs, liver, 

spleen, skin                             

 

                    Medical History     GERD                 

 

                    Medical History     generalized anxiety disorder  

 

                    Medical History     major depressive disorder  

 

                    Medical History     osteoarthritis] open cervical, thoracic,

 lumbar bracket close  

 

                    Medical History     morgellons disease   

 

                    Medical History     irritable bowel syndrome  

 

                    Medical History     memory loss          

 

                    Medical History     neuropathy bilateral legs  

 

                    Medical History     cognitive decline    

 

                    Surgical History    cardiac cath        2021

 

                    Hospitalization History cardiac             2021







Goals Section

No Information



Health Concerns

No Information



MEDICAL EQUIPMENT

No Information



MENTAL STATUS

No Information



FUNCTIONAL STATUS

No Information



ASSESSMENTS

No Information



PLAN OF TREATMENT

Medication



                Medication Name Sig             Start Date      Stop Date

 

                Venlafaxine HCl 25 MG 1 tablet with food Orally Twice a day for 

30 day(s)                  

 

                          Albuterol Sulfate  (90 Base) MCG/ACT 1 puff as 

needed Inhalation every 4 

hrs for 90 days           26 Oct, 2021               

 

                          Omeprazole 20 MG          TAKE ONE CAPSULE BY MOUTH 30

 MINUTES PRIOR TO MORNING MEAL for 

90                                                   

 

                          Albuterol Sulfate (2.5 MG/3ML) 0.083% 3 ml as needed I

nhalation every 6 hrs for 

90 days                   22 Sep, 2021               



Next Appt



                                        Details

 

                                        Provider Name:Zachary Gillespiei, 2021 10:

30:00 AM, [address] ,

[phone], [place], [address], [phone]







Insurance Providers





             Payer Name   Payer Address Payer Phone  Insured Name Patient Relati

onship to 

Insured                   Coverage Start Date       Coverage End Date

 

          [facility] [address]  [facility] [id number]           LIANE PORTER OD self                



 

             [employer] [address] [facility] [address] [phone]

0 LIANE WILLIAMSON 

self

## 2021-11-22 NOTE — CCD
Summarization of Episode Note

                             Created on: 2021



TERI LIANE WILMER

External Reference #: [id number]

: 1970

Sex: Male



Demographics





                          Address                   [address]

[place]  [address]

 

                          Home Phone                [phone]

 

                          Preferred Language        Unknown

 

                          Marital Status            Unknown

 

                          Adventism Affiliation     Unknown

 

                          Race                      White

 

                          Ethnic Group              Not  or 





Author





                          [facility]                    [facility]

[facility]

 

                          Organization              [facility]

[facility]

 

                          Address                   Unknown

 

                          Phone                     Unavailable







Support





                Name            Relationship    Address         Phone

 

                    LIANE WILLIAMSON         GUAR                [address]

 

[place]  [address]                    [phone]

 

                FABIAN BACON ECON            Unknown         [phone]







Care Team Providers





                    Care Team Member Name Role                Phone

 

                    Zachary Snow        Unavailable         [phone]







PROBLEMS





          Type      Condition ICD9-CM Code BIU33-JH Code Onset Dates Condition S

tatus W/U 

Status              Risk                SNOMED Code         Notes

 

       Problem Parkinson's disease        G20           Active confirmed        

[id number]  

 

       Problem Delusions of parasitosis        F22           Active confirmed   

     [id number]  

 

        Problem Parkinsonism, unspecified Parkinsonism type         G20         

    Active  confirmed         

[id number]                                 

 

        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

  confirmed         [id number]

                                         

 

        Problem Mild persistent asthma without complication         J45.30      

    Active  confirmed 

                          [id number]                  

 

       Problem RENUKA (obstructive sleep apnea)        G47.33        Active confirm

ed        [id number]  

 

          Problem   POTS (postural orthostatic tachycardia syndrome)           I

49.8               Active    

confirmed                               [id number]            

 

             Problem      Essential hypertension with goal blood pressure less t

nielsen 130/80              I10           

             Active       confirmed                 [id number]      







ALLERGIES

No Known Allergies



ENCOUNTERS from 1970 to 2021





             Encounter    Location     Date         Provider     Diagnosis

 

                    [address] [phone] [place] [address] 24 Sep, 2021

                          Zachary Snow                Mild persistent asthma witho

ut complication J45.30







IMMUNIZATIONS

No Information



SOCIAL HISTORY

Tobacco Use:



                    Social History Observation Description         Date

 

                    Details (start date - stop date) Current Smoker       



Sex Assigned At Birth:



                          Social History Observation Description

 

                          Sex Assigned At Birth     Unknown



Education:



                    Question            Answer              Notes

 

                    Level of Education: College              



Language:



                    Question            Answer              Notes

 

                    Languages spoken:   English              



Alcohol Screening:



                    Question            Answer              Notes

 

                    Did you have a drink containing alcohol in the past year? Ye

s                  

 

                    Points              1                    

 

                    Interpretation      Negative             

 

                          How often did you have six or more drinks on one occas

ion in the past year? 

Never (0 points)                         

 

                                        How many drinks did you have on a typica

l day when you were drinking in the past

year?                     1 or 2 (0 points)          

 

                          How often did you have a drink containing alcohol in t

he past year? Monthly or 

less (1 point)                           



Tobacco Use:



                    Question            Answer              Notes

 

                    Are you a:          current smoker       

 

                    How many cigarettes a day do you smoke? 5 or less           

 







REASON FOR REFERRAL

No Information



VITAL SIGNS

No information



MEDICATIONS





           Medication SIG (Take, Route, Frequency, Duration) Notes      Start Da

te End Date   

Status

 

                          Omeprazole 20 MG          1 capsule 30 minutes before 

morning meal Orally Once a day for 

90 days                                                         Active

 

            434.8 (200 Fe) MG 1 capsule Orally Once a day for 30 day(s)  

                                Active

 

                          Flomax 0.4 MG             1 capsule 30 minutes after t

he same meal each day Orally Once a 

day for 90 days                                                 Active

 

           clonazePAM 0.5 MG 1 tablet at bedtime Orally twice a day             

                     Active

 

                          Albuterol Sulfate (2.5 MG/3ML) 0.083% 3 ml as needed I

nhalation every 6 hrs for 

90 days                         22 Sep, 2021                    Active

 

           hydrOXYzine HCl 25 MG 1 tablet as needed Orally 3 times a day        

                          Active

 

                Nasonex 50 MCG/ACT 2 sprays in each nostril Nasally Once a day f

or 30 day(s)                 

10 May, 2021                                        Active

 

           Venlafaxine HCl 25 MG 1 tablet with food Orally Twice a day for 30 da

y(s)                                  

Active

 

                          Levothyroxine Sodium 25 MCG 1 tablet in the morning on

 an empty stomach Orally 

Every other day                                                 Active

 

           Symbicort 80-4.5 MCG/ACT 2 puffs Inhalation Twice a day                        Active

 

           Valsartan 320 MG 1 tablet Orally Once a day for 90 day(s)            

                      Not-Taking

 

           busPIRone HCl 30 MG 1 tablet Orally Twice a day for 90 days          

                        Active

 

                          Metoprolol Tartrate 50 MG 1 tablet with food Orally 2 

tablets in am and 1 tablet

in PM for 90 days                                                 Active

 

           Sinemet  MG 1 tablet Orally Once a day for 30 day(s)           

                       Not-Taking

 

           Vitamin D3 250 MCG (20065 UT) as directed Orally Once a day for 30 da

ys                                  

Not-Taking

 

           Omega 3 1000 MG 2 capsule Orally Once a day                          

        Active

 

           Sinemet CR 25-100mg 1 tablet orally at night                         

         Not-Taking

 

           Gabapentin 400 MG 3 capsule Orally 2qhs & 1 am                       

           Active

 

           Ginkgo Biloba 40 MG as directed Orally                               

   Active

 

           Probiotic Acidophilus - as directed Orally                           

       Active

 

                          Metoprolol Tartrate 25 MG TAKE 1 TABLET BY MOUTH DAILY

 TAKE WITH METOPROLOL 50MG

AT NIGHT Oral for 30                                                 Not-Taking

 

           Doxycycline Monohydrate 100 MG 1 capsule Orally Once a day for 30 day

s                                  

Active

 

                Rosuvastatin Calcium 40 MG 1 tablet Orally Once a day for 90 day

s                 

                                                    Active

 

           Coenzyme Q10 50 MG as directed Orally                    

    Active

 

           Aspirin 81 MG 1 tablet Orally Once a day for 30 day(s)                        Active

 

                    Ibuprofen 200 MG    1 tablet with food or milk as needed Ora

lly Three times a day  

                                                            Not-Taking

 

                          Flonase Allergy Relief 50 MCG/ACT 1 spray in each nost

ril Nasally Once a day for

30 day(s)                       04 May, 2021                    Not-Taking







PROCEDURES

No Information



RESULTS

No Results



REASON FOR VISIT

[facility]



MEDICAL (GENERAL) HISTORY





                    Type                Description         Date

 

                    Medical History     High blood pressure  

 

                    Medical History     Parkinsons disease undiagnosed  

 

                    Medical History     hypothyroidism       

 

                    Medical History     lymes disease        

 

                    Medical History     mild persistent asthma  

 

                          Medical History           sarcoidosis involving multip

le organs brain, lungs, liver, 

spleen, skin                             

 

                    Medical History     GERD                 

 

                    Medical History     generalized anxiety disorder  

 

                    Medical History     major depressive disorder  

 

                    Medical History     osteoarthritis] open cervical, thoracic,

 lumbar bracket close  

 

                    Medical History     morgellons disease   

 

                    Medical History     irritable bowel syndrome  

 

                    Medical History     memory loss          

 

                    Medical History     neuropathy bilateral legs  

 

                    Medical History     cognitive decline    

 

                    Surgical History    cardiac cath        2021

 

                    Hospitalization History cardiac             2021







Goals Section

No Information



Health Concerns

No Information



MEDICAL EQUIPMENT

No Information



MENTAL STATUS

No Information



FUNCTIONAL STATUS

No Information



ASSESSMENTS





             Encounter Date Diagnosis    Assessment Notes Treatment Notes Treatm

ent Clinical 

Notes

 

                24 Sep, 2021    Mild persistent asthma without complication (ICD

-10 - J45.30)                 



                                        











PLAN OF TREATMENT

Medication



                Medication Name Sig             Start Date      Stop Date

 

                          Albuterol Sulfate (2.5 MG/3ML) 0.083% 3 ml as needed I

nhalation every 6 hrs for 

90 days                   22 Sep, 2021               



Next Appt



                                        Details

 

                                        Provider Name:Zachary Snow, -10- 03:

30:00 PM, [address] ,

[phone], [place], [place], [address], [phone]







Insurance Providers





             Payer Name   Payer Address Payer Phone  Insured Name Patient Relati

onship to 

Insured                   Coverage Start Date       Coverage End Date

 

          [facility] [address]  [facility] [address]           WO

OD,LIANE B self                



 

             MEDICAID     FIZZ[place] [address] [facility] [address] [phone]

0 LIANE WILLIAMSON

## 2021-11-22 NOTE — CCD
Continuity of Care Document (CCD)

                             Created on: 10/04/2021



Alfred Willis

External Reference #: [id number]

: 1970

Sex: Male



Demographics





                          Address                   [address] [place] [address]  [address]

 

                          Home Phone                [phone]

 

                          Preferred Language        Unknown

 

                          Marital Status            Unknown

 

                          Temple Affiliation     Unknown

 

                          Race                      Unknown

 

                          Ethnic Group              Unknown





Author





                          Organization              Unknown

 

                          Address                   Unknown

 

                          Phone                     Unavailable







Care Team Providers





                    Care Team Member Name Role                Phone

 

                    Zachary Snow MD      AUT                [phone]







Problems





                                        Description

 

                                        No Information Available







Social History





                Type            Date            Description     Comments

 

                Birth Sex                       Unknown          







Allergies and adverse reactions





                                        Description

 

                                        No Information Available







Medications





                                        Description

 

                                        No Information Available







Immunizations





                                        Description

 

                                        No Information Available







Vital Signs





                                        Description

 

                                        No Information Available







Results





        Test    Acquired Date Facility Test    Result  H/L     Range   Note

 

                    Liver Function Test/ Liver Hep 2021          [facility] - not

 interfaced

           (315)-   - Ast/Sgot   39         High       15-37       

 

             Alt/SGPT     60 30-65                                

 

             Alk Phos     100                               

 

             Albumin      4.1                       3.2-5.2       

 

             Total Bilirubin 0.6                       0.0-1.0       

 

             Total Protein 7.2                       6.4-8.2       

 

             A/G Ratio    1.3                       1.00-1.93     

 

                    CBC without Differential 2021          [facility] - not inter

faced

           (315)-   - White Blood Count 11.4       High       4.0-10.0    

 

             Red Blood Count 6.18         High         4.30-6.10     

 

             Platelets    233                       150-450       

 

             Hemoglobin   15.2                                    

 

             Hematocrit   48.1                                    

 

                    CMP                 2021          [facility] - not interfaced

           (315)-   - Albumin Serum/Plasma 4.1                               

 

             Alt - SGPT   20                                      

 

             Calcium Ser/Plasma Mass/Vol 9.8                                    

 

 

             Carbon Dioxide Ser/Plasm 30                                      

 

             Chloride Serum/Plasma 106                                     

 

             Alkaline Phosphatase 82                                      

 

             Potassium    4.9                                     

 

             Protein Total 6.8                                     

 

             Sodium       139                                     

 

             Ast - Sgot   33                                      

 

             BUN - Urea Nitrogen 15                                      

 

             Glucose      110          High                 

 

             Creatinine For GFR 0.76                                    

 

                    Lipid Profile/Cardiac Risk Pro 2021          [facility] - not

 interfaced

           (315)-   - Triglycerides 304        High       <150        

 

             Cholesterol  209          High         <200          

 

             HDL          27           Low          >40.0         

 

             LDL Cholesterol 121                                     

 

             Chol/HDL Ratio 7.740        High         <5            

 

                    Laboratory test finding 2021          [facility] - not interf

aced

           (315)-   - Thyroid Stimulating Hormone 2.040                         

    

 

             Free T4      0.88                                    







Procedures





                                        Description

 

                                        No Information Available







Medical Devices





                                        Description

 

                                        No Information Available







Encounters





                                        Description

 

                                        No Information Available







Assessments





                                        Description

 

                                        No Information Available







Plan of Treatment

Future Appointment(s):* 10/08/2021 10:30 am - Hernan Garcia MD at Main Office





Functional Status





                                        Description

 

                                        No Information Available







Mental Status





                                        Description

 

                                        No Information Available







Referrals





                                        Description

 

                                        No Information Available

## 2021-11-22 NOTE — CCD
Summarization of Episode Note

                             Created on: 10/30/2021



MOHIT WILLIAMSONN WILMER

External Reference #: [id number]

: 1970

Sex: Male



Demographics





                          Address                   [address]

[place]  [address]

 

                          Home Phone                [phone]

 

                          Preferred Language        Unknown

 

                          Marital Status            Unknown

 

                          Amish Affiliation     Unknown

 

                          Race                      White

 

                          Ethnic Group              Not  or 





Author





                          Author                    [facility]

ems

 

                          Organization              [facility]

ems

 

                          Address                   Unknown

 

                          Phone                     Unavailable







Support





                Name            Relationship    Address         Phone

 

                    LIANE WILLIAMSON         GUAR                [address]

 

[place]  [address]                    [phone]

 

                FABIAN BACON ECON            Unknown         [phone]







Care Team Providers





                    Care Team Member Name Role                Phone

 

                    Zachary Snow        Unavailable         [phone]







PROBLEMS





          Type      Condition ICD9-CM Code GLX86-ZO Code Onset Dates Condition S

tatus W/U 

Status              Risk                SNOMED Code         Notes

 

       Problem Parkinson's disease        G20           Active confirmed        

[id number]  

 

       Problem Delusions of parasitosis        F22           Active confirmed   

     [id number]  

 

        Problem Parkinsonism, unspecified Parkinsonism type         G20         

    Active  confirmed         

[id number]                                 

 

        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

  confirmed         [id number]

                                         

 

        Problem Mild persistent asthma without complication         J45.30      

    Active  confirmed 

                          [id number]                  

 

       Problem RENUKA (obstructive sleep apnea)        G47.33        Active confirm

ed        [id number]  

 

          Problem   POTS (postural orthostatic tachycardia syndrome)           I

49.8               Active    

confirmed                               [id number]            

 

             Problem      Essential hypertension with goal blood pressure less t

nielsen 130/80              I10           

             Active       confirmed                 [id number]      







ALLERGIES

No Known Allergies



ENCOUNTERS from 1970 to 2021-10-29





             Encounter    Location     Date         Provider     Diagnosis

 

                    [address] [phone] [place] [address] 28 Oct, 2021

                          Zachary Snow                 







IMMUNIZATIONS

No Information



SOCIAL HISTORY

Tobacco Use:



                    Social History Observation Description         Date

 

                    Details (start date - stop date) Current Smoker       



Sex Assigned At Birth:



                          Social History Observation Description

 

                          Sex Assigned At Birth     Unknown



Education:



                    Question            Answer              Notes

 

                    Level of Education: College              



Language:



                    Question            Answer              Notes

 

                    Languages spoken:   English              



Alcohol Screening:



                    Question            Answer              Notes

 

                    Did you have a drink containing alcohol in the past year? Ye

s                  

 

                    Points              1                    

 

                    Interpretation      Negative             

 

                          How often did you have six or more drinks on one occas

ion in the past year? 

Never (0 points)                         

 

                                        How many drinks did you have on a typica

l day when you were drinking in the past

year?                     1 or 2 (0 points)          

 

                          How often did you have a drink containing alcohol in t

he past year? Monthly or 

less (1 point)                           



Tobacco Use:



                    Question            Answer              Notes

 

                    Are you a:          current smoker       

 

                    How many cigarettes a day do you smoke? 5 or less           

 







REASON FOR REFERRAL

No Information



VITAL SIGNS

No information



MEDICATIONS





           Medication SIG (Take, Route, Frequency, Duration) Notes      Start Da

te End Date   

Status

 

                          Flomax 0.4 MG             1 capsule 30 minutes after t

he same meal each day Orally Once a 

day for 90 days                                                 Active

 

                          Omeprazole 20 MG          TAKE ONE CAPSULE BY MOUTH 30

 MINUTES PRIOR TO MORNING MEAL for 

90                                                              Active

 

                          Metoprolol Tartrate 25 MG TAKE 1 TABLET BY MOUTH DAILY

 TAKE WITH METOPROLOL 50MG

AT NIGHT Oral for 30                                                 Not-Taking

 

           Vitamin D3 250 MCG (06705 UT) as directed Orally Once a day for 30 da

ys                                  

Not-Taking

 

           Venlafaxine HCl 25 MG 1 tablet with food Orally Twice a day for 30 da

y(s)                                  

Active

 

           clonazePAM 0.5 MG 1 tablet at bedtime Orally twice a day             

                     Active

 

                Venlafaxine HCl  MG 1 capsule with food Orally Once a day 

for 90 day(s)                 

28 Oct, 2021                                        Active

 

           Ginkgo Biloba 40 MG as directed Orally                               

   Active

 

           hydrOXYzine HCl 25 MG 1 tablet as needed Orally 3 times a day        

                          Active

 

                Nasonex 50 MCG/ACT 2 sprays in each nostril Nasally Once a day f

or 30 day(s)                 

10 May, 2021                                        Active

 

            434.8 (200 Fe) MG 1 capsule Orally Once a day for 30 day(s)  

                                Active

 

           Symbicort 80-4.5 MCG/ACT 2 puffs Inhalation Twice a day                        Active

 

           busPIRone HCl 30 MG 1 tablet Orally Twice a day for 90 days          

                        Active

 

           Doxycycline Monohydrate 100 MG 1 capsule Orally Once a day for 30 day

s                                  

Active

 

                Rosuvastatin Calcium 40 MG 1 tablet Orally Once a day for 90 day

s                 

                                                    Active

 

           Sinemet CR 25-100mg 1 tablet orally at night                         

         Not-Taking

 

                          Levothyroxine Sodium 25 MCG 1 tablet in the morning on

 an empty stomach Orally 

Every other day                                                 Active

 

           Probiotic Acidophilus - as directed Orally                           

       Active

 

           Valsartan 320 MG 1 tablet Orally Once a day for 90 day(s)            

                      Not-Taking

 

                          Albuterol Sulfate (2.5 MG/3ML) 0.083% 3 ml as needed I

nhalation every 6 hrs for 

90 days                         22 Sep, 2021                    Active

 

           Gabapentin 400 MG 3 capsule Orally 2qhs & 1 am                       

           Active

 

                          Metoprolol Tartrate 50 MG 1 tablet with food Orally 2 

tablets in am and 1 tablet

in PM for 90 days                                                 Active

 

           Sinemet  MG 1 tablet Orally Once a day for 30 day(s)           

                       Not-Taking

 

           Omega 3 1000 MG 2 capsule Orally Once a day                          

        Active

 

           Aspirin 81 MG 1 tablet Orally Once a day for 30 day(s)                        Active

 

                    Ibuprofen 200 MG    1 tablet with food or milk as needed Ora

lly Three times a day  

                                                            Not-Taking

 

           Coenzyme Q10 50 MG as directed Orally                    

    Active

 

                          Flonase Allergy Relief 50 MCG/ACT 1 spray in each nost

ril Nasally Once a day for

30 day(s)                       04 May, 2021                    Not-Taking

 

                          Albuterol Sulfate  (90 Base) MCG/ACT 1 puff as 

needed Inhalation every 4 

hrs for 90 days                 26 Oct, 2021                    Active







PROCEDURES

No Information



RESULTS

No Results



REASON FOR VISIT

Venlafaxine ER HCl 150 Mg 



MEDICAL (GENERAL) HISTORY





                    Type                Description         Date

 

                    Medical History     High blood pressure  

 

                    Medical History     Parkinsons disease undiagnosed  

 

                    Medical History     hypothyroidism       

 

                    Medical History     lymes disease        

 

                    Medical History     mild persistent asthma  

 

                          Medical History           sarcoidosis involving multip

le organs brain, lungs, liver, 

spleen, skin                             

 

                    Medical History     GERD                 

 

                    Medical History     generalized anxiety disorder  

 

                    Medical History     major depressive disorder  

 

                    Medical History     osteoarthritis] open cervical, thoracic,

 lumbar bracket close  

 

                    Medical History     morgellons disease   

 

                    Medical History     irritable bowel syndrome  

 

                    Medical History     memory loss          

 

                    Medical History     neuropathy bilateral legs  

 

                    Medical History     cognitive decline    

 

                    Surgical History    cardiac cath        2021

 

                    Hospitalization History cardiac             2021







Goals Section

No Information



Health Concerns

No Information



MEDICAL EQUIPMENT

No Information



MENTAL STATUS

No Information



FUNCTIONAL STATUS

No Information



ASSESSMENTS

No Information



PLAN OF TREATMENT

Medication



                Medication Name Sig             Start Date      Stop Date

 

                    Venlafaxine HCl  MG 1 capsule with food Orally Once a 

day for 90 day(s) 28

Oct, 2021                                

 

                          Albuterol Sulfate  (90 Base) MCG/ACT 1 puff as 

needed Inhalation every 4 

hrs for 90 days           26 Oct, 2021               

 

                          Albuterol Sulfate (2.5 MG/3ML) 0.083% 3 ml as needed I

nhalation every 6 hrs for 

90 days                   22 Sep, 2021               

 

                Venlafaxine HCl 25 MG 1 tablet with food Orally Twice a day for 

30 day(s)                  

 

                          Omeprazole 20 MG          TAKE ONE CAPSULE BY MOUTH 30

 MINUTES PRIOR TO MORNING MEAL for 

90                                                   



Next Appt



                                        Details

 

                                        Provider Name:Zachary Snow, 2021 10:

30:00 AM, [address],

[phone], [place], [address], [phone]







Insurance Providers





             Payer Name   Payer Address Payer Phone  Insured Name Patient Relati

onship to 

Insured                   Coverage Start Date       Coverage End Date

 

             MEDICA[employer]     [id number] acquired by [employer] [address] [facility] [address] [phone]

0 LIANE WILLIAMSON 

self                                                 

 

          [facility] [address]  [facility] [address]           WO

LIANE ORDOÑEZ self

## 2021-11-22 NOTE — CCD
Continuity of Care Document (CCD)

                             Created on: 10/04/2021



Alfred Willis

External Reference #: [id number]

: 1970

Sex: Male



Demographics





                          Address                   [address]

[place]  [address]

 

                          Home Phone                [phone]

 

                          Preferred Language        Unknown

 

                          Marital Status            Unknown

 

                          Orthodoxy Affiliation     Unknown

 

                          Race                      Unknown

 

                          Ethnic Group              Unknown





Author





                          Organization              Unknown

 

                          Address                   Unknown

 

                          Phone                     Unavailable







Care Team Providers





                    Care Team Member Name Role                Phone

 

                    Zachary Snow MD      AUTM                [phone]







Problems





                                        Description

 

                                        No Information Available







Social History





                Type            Date            Description     Comments

 

                Birth Sex                       Unknown          







Allergies and adverse reactions





                                        Description

 

                                        No Information Available







Medications





                                        Description

 

                                        No Information Available







Immunizations





                                        Description

 

                                        No Information Available







Vital Signs





                                        Description

 

                                        No Information Available







Results





        Test    Acquired Date Facility Test    Result  H/L     Range   Note

 

                    CMP                 2021          [facility] - not interfaced

           (315)-   - Albumin Serum/Plasma 4.1                               

 

             Alt - SGPT   20                                      

 

             Calcium Ser/Plasma Mass/Vol 9.8                                    

 

 

             Carbon Dioxide Ser/Plasm 30                                      

 

             Chloride Serum/Plasma 106                                     

 

             Alkaline Phosphatase 82                                      

 

             Potassium    4.9                                     

 

             Protein Total 6.8                                     

 

             Sodium       139                                     

 

             Ast - Sgot   33                                      

 

             BUN - Urea Nitrogen 15                                      

 

             Glucose      110          High                 

 

             Creatinine For GFR 0.76                                    

 

                    Lipid Profile/Cardiac Risk Pro 2021          [facility] - not

 interfaced

           (315)-   - Triglycerides 304        High       <150        

 

             Cholesterol  209          High         <200          

 

             HDL          27           Low          >40.0         

 

             LDL Cholesterol 121                                     

 

             Chol/HDL Ratio 7.740        High         <5            

 

                    Laboratory test finding 2021          [facility] - not interf

aced

           (315)-   - Thyroid Stimulating Hormone 2.040                         

    

 

             Free T4      0.88                                    







Procedures





                                        Description

 

                                        No Information Available







Medical Devices





                                        Description

 

                                        No Information Available







Encounters





                                        Description

 

                                        No Information Available







Assessments





                                        Description

 

                                        No Information Available







Plan of Treatment

Future Appointment(s):* 10/08/2021 10:30 am - Hernan Garcia MD at Main Office





Functional Status





                                        Description

 

                                        No Information Available







Mental Status





                                        Description

 

                                        No Information Available







Referrals





                                        Description

 

                                        No Information Available

## 2021-11-22 NOTE — CCD
Summarization of Episode Note

                             Created on: 2021



LIANE WILLIAMSON

External Reference #: [id number]

: 1970

Sex: Male



Demographics





                          Address                   [address]

[place]  [address]

 

                          Home Phone                [phone]

 

                          Preferred Language        Unknown

 

                          Marital Status            Unknown

 

                          Worship Affiliation     Unknown

 

                          Race                      White

 

                          Ethnic Group              Not  or 





Author





                          Author                    [facility]

ems

 

                          Organization              [facility]

ems

 

                          Address                   Unknown

 

                          Phone                     Unavailable







Support





                Name            Relationship    Address         Phone

 

                    LIANE WILLIAMSON         GUAR                [address]

 

[place]  [address]                    [phone]

 

                FABIAN BACON            Unknown         [phone]







Care Team Providers





                    Care Team Member Name Role                Phone

 

                    Yuliana Roberts       Unavailable         [phone]







PROBLEMS





ALLERGIES

No Known Allergies



ENCOUNTERS from 1970 to 2021





IMMUNIZATIONS

No Information



SOCIAL HISTORY





REASON FOR REFERRAL

No Information



VITAL SIGNS





MEDICATIONS





PROCEDURES

No Information



RESULTS

No Results



REASON FOR VISIT





MEDICAL (GENERAL) HISTORY





Goals Section





Health Concerns





MEDICAL EQUIPMENT

No Information



MENTAL STATUS





FUNCTIONAL STATUS





ASSESSMENTS

No Information



PLAN OF TREATMENT





Insurance Providers

## 2021-11-22 NOTE — ROOR
________________________________________________________________________________

Patient Name: Alfred Willis               Procedure Date: 11/22/2021 11:58 AM

MRN: [id number]                          Account Number: [id number]

YOB: 1970              Age: 50

Room: [facility]                            Gender: Male

Note Status: Finalized                 

________________________________________________________________________________

 

Procedure:            Colonoscopy

Indications:          High risk colon cancer surveillance: Personal history of 

                      colonic polyps

Providers:            Vu Yan MD

Referring MD:         Zachary Snow Md

Requesting Provider:  

Medicines:            Monitored Anesthesia Care

Complications:        No immediate complications.

________________________________________________________________________________

Procedure:            Pre-Anesthesia Assessment:

                      - The heart rate, respiratory rate, oxygen saturations, 

                      blood pressure, adequacy of pulmonary ventilation, and 

                      response to care were monitored throughout the procedure.

                      The Colonoscope was introduced through the anus and 

                      advanced to 10 cm into the ileum. The colonoscopy was 

                      performed without difficulty. The patient tolerated the 

                      procedure well. The quality of the bowel preparation was 

                      adequate.

                                                                                

Findings:

     The perianal and digital rectal examinations were normal.

     Two sessile polyps were found in the splenic flexure and proximal 

     ascending colon. The polyps were 4 to 5 mm in size. These polyps were 

     removed with a cold snare. Resection and retrieval were complete.

     Small Internal Hemorrhoids.

     The exam was otherwise without abnormality on direct and retroflexion 

     views.

                                                                                

Impression:           - Two 4 to 5 mm polyps at the splenic flexure and in the 

                      proximal ascending colon, removed with a cold snare. 

                      Resected and retrieved.

                      - Small Internal Hemorrhoids.

                      - The examination was otherwise normal on direct and 

                      retroflexion views.

Recommendation:       - Repeat colonoscopy in 5 years for surveillance.

                                                                                

Procedure Code(s):    --- Professional ---

                      82682, Colonoscopy, flexible; with removal of tumor(s), 

                      polyp(s), or other lesion(s) by snare technique

Diagnosis Code(s):    --- Professional ---

                      K63.5, Polyp of colon

                      Z86.010, Personal history of colonic polyps

 

CPT copyright 2019 [employer]. All rights reserved.

 

The codes documented in this report are preliminary and upon  review may 

be revised to meet current compliance requirements.

 

Vu Yan MD

________________

Vu Yan MD

11/22/2021 12:40:57 PM

Electronically signed by Vu Yan MD

Number of Addenda: 0

 

Note Initiated On: 11/22/2021 11:58 AM

Estimated Blood Loss: Estimated blood loss: none.

## 2021-11-22 NOTE — CCD
Summarization of Episode Note

                             Created on: 2021



TERI LIANE WILMER

External Reference #: [id number]

: 1970

Sex: Male



Demographics





                          Address                   [address]

[place]  [address]

 

                          Home Phone                [phone]

 

                          Preferred Language        Unknown

 

                          Marital Status            Unknown

 

                          Muslim Affiliation     Unknown

 

                          Race                      White

 

                          Ethnic Group              Not  or 





Author





                          Author                    [facility]

ems

 

                          Organization              [facility]

ems

 

                          Address                   Unknown

 

                          Phone                     Unavailable







Support





                Name            Relationship    Address         Phone

 

                    LIANE WILLIAMSON         GUAR                [address]

 

[place]  [address]                    [phone]

 

                FABIAN BACON ECON            Unknown         [phone]







Care Team Providers





                    Care Team Member Name Role                Phone

 

                    Zachary Snow        Unavailable         [phone]







PROBLEMS





          Type      Condition ICD9-CM Code HBS04-AU Code Onset Dates Condition S

tatus W/U 

Status              Risk                SNOMED Code         Notes

 

             Problem      Essential hypertension with goal blood pressure less t

nielsen 130/80              I10           

             Active       confirmed                 [id number]      

 

       Problem RENUKA (obstructive sleep apnea)        G47.33        Active confirm

ed        [id number]  

 

       Problem Delusions of parasitosis        F22           Active confirmed   

     [id number]  

 

        Problem Mild persistent asthma without complication         J45.30      

    Active  confirmed 

                          [id number]                  

 

          Problem   POTS (postural orthostatic tachycardia syndrome)           I

49.8               Active    

confirmed                               [id number]            







ALLERGIES

No Known Allergies



ENCOUNTERS from 1970 to 2021





             Encounter    Location     Date         Provider     Diagnosis

 

                    [address] [phone] [place] [address] 27 Aug, 2021

                          Zachary Snow                 







IMMUNIZATIONS

No Information



SOCIAL HISTORY

Tobacco Use:



                    Social History Observation Description         Date

 

                    Details (start date - stop date) Current Smoker       



Sex Assigned At Birth:



                          Social History Observation Description

 

                          Sex Assigned At Birth     Unknown



Education:



                    Question            Answer              Notes

 

                    Level of Education: College              



Language:



                    Question            Answer              Notes

 

                    Languages spoken:   English              



Alcohol Screening:



                    Question            Answer              Notes

 

                    Did you have a drink containing alcohol in the past year? Ye

s                  

 

                    Points              1                    

 

                    Interpretation      Negative             

 

                          How often did you have six or more drinks on one occas

ion in the past year? 

Never (0 points)                         

 

                                        How many drinks did you have on a typica

l day when you were drinking in the past

year?                     1 or 2 (0 points)          

 

                          How often did you have a drink containing alcohol in t

he past year? Monthly or 

less (1 point)                           



Tobacco Use:



                    Question            Answer              Notes

 

                    Are you a:          current smoker       

 

                    How many cigarettes a day do you smoke? 5 or less           

 







REASON FOR REFERRAL

No Information



VITAL SIGNS

No information



MEDICATIONS





           Medication SIG (Take, Route, Frequency, Duration) Notes      Start Da

te End Date   

Status

 

                Rosuvastatin Calcium 40 MG 1 tablet Orally Once a day for 90 day

s                 

                                                    Unknown

 

           hydrOXYzine HCl 25 MG 1 tablet as needed Orally every 8 hrs for 90 da

ys                                  

Unknown

 

                          Metoprolol Tartrate 50 MG 1 tablet with food Orally 2 

tablets in am and 1 tablet

in PM for 90 days                                                 Unknown

 

                Symbicort 80-4.5 MCG/ACT 2 puffs Inhalation Once a day for 30 da

ys                                                                 Unknown

 

           Doxycycline Monohydrate 100 MG 1 capsule Orally Once a day for 30 day

s                                  

Active

 

            434.8 (200 Fe) MG 1 capsule Orally Once a day for 30 day(s)  

                                

Unknown

 

                          Flonase Allergy Relief 50 MCG/ACT 1 spray in each nost

ril Nasally Once a day for

30 day(s)                       04 May, 2021                    Unknown

 

           Venlafaxine HCl 25 MG 1 tablet with food Orally Twice a day for 30 da

y(s)                                  

Unknown

 

           Sinemet CR 25-100mg 1 tablet orally at night                         

         Not-Taking

 

           Ginkgo Biloba 40 MG as directed Orally                               

   Unknown

 

                          Metoprolol Tartrate 25 MG TAKE 1 TABLET BY MOUTH DAILY

 TAKE WITH METOPROLOL 50MG

AT NIGHT Oral for 30                                                 Unknown

 

                          Flomax 0.4 MG             1 capsule 30 minutes after t

he same meal each day Orally Once a 

day for 90 days                                                 Unknown

 

           Omega 3 1000 MG 2 capsule Orally Once a day                          

        Unknown

 

           Sinemet  MG 1 tablet Orally Once a day for 30 day(s)           

                       Not-Taking

 

           Aspirin 81 MG 1 tablet Orally Once a day for 30 day(s)                        Unknown

 

           Valsartan 320 MG 1 tablet Orally Once a day for 90 day(s)            

                      Unknown

 

                          Omeprazole 20 MG          1 capsule 30 minutes before 

morning meal Orally Once a day for 

90 days                                                         Unknown

 

                          Levothyroxine Sodium 25 MCG 1 tablet in the morning on

 an empty stomach Orally 

Every other day for 30 days                                                 Unkn

own

 

                    Ibuprofen 200 MG    1 tablet with food or milk as needed Ora

lly Three times a day  

                                                            Unknown

 

           Probiotic Acidophilus - as directed Orally                           

       Unknown

 

           busPIRone HCl 30 MG 1 tablet Orally Twice a day for 90 days          

                        Unknown

 

           Coenzyme Q10 50 MG as directed Orally                    

    Unknown

 

           Gabapentin 400 MG 3 capsule Orally qhs for 60 days                   

               Unknown

 

                Nasonex 50 MCG/ACT 2 sprays in each nostril Nasally Once a day f

or 30 day(s)                 

10 May, 2021                                        Unknown

 

           Vitamin D3 250 MCG ([address] UT) as directed Orally Once a day for 30 da

ys                                  

Unknown

 

           clonazePAM 0.5 MG 1 tablet at bedtime Orally twice a day             

                     Unknown







PROCEDURES

No Information



RESULTS

No Results



REASON FOR VISIT

stopped Valsartan 



MEDICAL (GENERAL) HISTORY





                    Type                Description         Date

 

                    Medical History     High blood pressure  

 

                    Medical History     Parkinsons disease   

 

                    Medical History     hypothyroidism       

 

                    Medical History     lymes disease        

 

                    Medical History     mild persistent asthma  

 

                          Medical History           sarcoidosis involving multip

le organs brain, lungs, liver, 

spleen, skin                             

 

                    Medical History     GERD                 

 

                    Medical History     generalized anxiety disorder  

 

                    Medical History     major depressive disorder  

 

                    Medical History     osteoarthritis] open cervical, thoracic,

 lumbar bracket close  

 

                    Medical History     morgellons disease   

 

                    Medical History     irritable bowel syndrome  

 

                    Medical History     memory loss          

 

                    Medical History     neuropathy bilateral legs  

 

                    Medical History     cognitive decline    

 

                    Surgical History    No Surgical history information  







Goals Section

No Information



Health Concerns

No Information



MEDICAL EQUIPMENT

No Information



MENTAL STATUS

No Information



FUNCTIONAL STATUS

No Information



ASSESSMENTS

No Information



PLAN OF TREATMENT

Medication



                Medication Name Sig             Start Date      Stop Date

 

                Doxycycline Monohydrate 100 MG 1 capsule Orally Once a day for 3

0 days                  



Next Appt



                                        Details

 

                                        Provider Name:Zachary Snow, 2021 10:

00:00 AM, [facility] [facility],

[phone], [place], [address], [phone]

 

                                        Provider Name:Zachary Snow, 2021 11:

00:00 AM, [address],

[phone], [place], [address], [phone]







Insurance Providers





             Payer Name   Payer Address Payer Phone  Insured Name Patient Relati

onship to 

Insured                   Coverage Start Date       Coverage End Date

 

          [facility] [address]  [facility] [phone]           WO

LIANE ORDOÑEZ self                



 

             MEDICAID     [employer] [address] [facility] [address] [phone]

0 LIANE WILLIAMSON 

self

## 2021-12-16 ENCOUNTER — HOSPITAL ENCOUNTER (OUTPATIENT)
Dept: HOSPITAL 53 - M LAB | Age: 51
End: 2021-12-16
Attending: INTERNAL MEDICINE
Payer: MEDICARE

## 2021-12-16 DIAGNOSIS — I11.9: Primary | ICD-10-CM

## 2021-12-16 DIAGNOSIS — R06.02: ICD-10-CM

## 2021-12-16 DIAGNOSIS — E03.9: ICD-10-CM

## 2021-12-16 DIAGNOSIS — R60.0: ICD-10-CM

## 2021-12-16 LAB
ALBUMIN SERPL BCG-MCNC: 4.5 GM/DL (ref 3.2–5.2)
BUN SERPL-MCNC: 14 MG/DL (ref 7–18)
CALCIUM SERPL-MCNC: 9.7 MG/DL (ref 8.5–10.1)
CHLORIDE SERPL-SCNC: 101 MEQ/L (ref 98–107)
CO2 SERPL-SCNC: 33 MEQ/L (ref 21–32)
CREAT SERPL-MCNC: 0.85 MG/DL (ref 0.7–1.3)
GFR SERPL CREATININE-BSD FRML MDRD: > 60 ML/MIN/{1.73_M2} (ref 56–?)
GLUCOSE SERPL-MCNC: 127 MG/DL (ref 70–100)
MAGNESIUM SERPL-MCNC: 2.3 MG/DL (ref 1.8–2.4)
NT-PRO BNP: 181 PG/ML (ref ?–125)
PHOSPHATE SERPL-MCNC: 2.8 MG/DL (ref 2.5–4.9)
POTASSIUM SERPL-SCNC: 4.1 MEQ/L (ref 3.5–5.1)
SODIUM SERPL-SCNC: 139 MEQ/L (ref 136–145)
TSH SERPL DL<=0.005 MIU/L-ACNC: 2.37 UIU/ML (ref 0.36–3.74)